# Patient Record
Sex: FEMALE | Race: WHITE | NOT HISPANIC OR LATINO | Employment: FULL TIME | URBAN - NONMETROPOLITAN AREA
[De-identification: names, ages, dates, MRNs, and addresses within clinical notes are randomized per-mention and may not be internally consistent; named-entity substitution may affect disease eponyms.]

---

## 2017-01-26 ENCOUNTER — LAB (OUTPATIENT)
Dept: LAB | Facility: HOSPITAL | Age: 57
End: 2017-01-26

## 2017-01-26 DIAGNOSIS — E03.9 ACQUIRED HYPOTHYROIDISM: ICD-10-CM

## 2017-01-26 LAB
T3 SERPL-MCNC: 102 NG/DL (ref 97–169)
T4 FREE SERPL-MCNC: 0.52 NG/DL (ref 0.78–2.19)
TSH SERPL DL<=0.05 MIU/L-ACNC: 10.7 MIU/ML (ref 0.46–4.68)

## 2017-01-26 PROCEDURE — 84443 ASSAY THYROID STIM HORMONE: CPT | Performed by: GENERAL PRACTICE

## 2017-01-26 PROCEDURE — 84439 ASSAY OF FREE THYROXINE: CPT | Performed by: GENERAL PRACTICE

## 2017-01-26 PROCEDURE — 36415 COLL VENOUS BLD VENIPUNCTURE: CPT

## 2017-01-26 PROCEDURE — 84480 ASSAY TRIIODOTHYRONINE (T3): CPT | Performed by: GENERAL PRACTICE

## 2017-01-30 DIAGNOSIS — Z12.11 ENCOUNTER FOR SCREENING COLONOSCOPY: Primary | ICD-10-CM

## 2017-01-30 RX ORDER — SODIUM CHLORIDE 0.9 % (FLUSH) 0.9 %
1-10 SYRINGE (ML) INJECTION AS NEEDED
Status: CANCELLED | OUTPATIENT
Start: 2017-01-30

## 2017-02-01 ENCOUNTER — OFFICE VISIT (OUTPATIENT)
Dept: FAMILY MEDICINE CLINIC | Facility: CLINIC | Age: 57
End: 2017-02-01

## 2017-02-01 VITALS
DIASTOLIC BLOOD PRESSURE: 78 MMHG | HEART RATE: 73 BPM | BODY MASS INDEX: 27.95 KG/M2 | WEIGHT: 173.9 LBS | SYSTOLIC BLOOD PRESSURE: 130 MMHG | OXYGEN SATURATION: 99 % | HEIGHT: 66 IN

## 2017-02-01 DIAGNOSIS — G43.009 NONINTRACTABLE MIGRAINE, UNSPECIFIED MIGRAINE TYPE: ICD-10-CM

## 2017-02-01 DIAGNOSIS — E03.9 ACQUIRED HYPOTHYROIDISM: Primary | ICD-10-CM

## 2017-02-01 PROCEDURE — 99214 OFFICE O/P EST MOD 30 MIN: CPT | Performed by: GENERAL PRACTICE

## 2017-02-01 RX ORDER — VENLAFAXINE HYDROCHLORIDE 75 MG/1
75 CAPSULE, EXTENDED RELEASE ORAL DAILY
Qty: 30 CAPSULE | Refills: 5 | Status: SHIPPED | OUTPATIENT
Start: 2017-02-01 | End: 2017-05-02

## 2017-02-01 RX ORDER — TOPIRAMATE 50 MG/1
50 TABLET, FILM COATED ORAL NIGHTLY
Qty: 90 TABLET | Refills: 3 | Status: SHIPPED | OUTPATIENT
Start: 2017-02-01 | End: 2017-08-02 | Stop reason: SDUPTHER

## 2017-02-01 RX ORDER — SUMATRIPTAN 20 MG/1
1 SPRAY NASAL
Qty: 10 EACH | Refills: 5 | Status: SHIPPED | OUTPATIENT
Start: 2017-02-01 | End: 2017-11-14 | Stop reason: SDUPTHER

## 2017-02-01 RX ORDER — LEVOTHYROXINE AND LIOTHYRONINE 57; 13.5 UG/1; UG/1
90 TABLET ORAL DAILY
Qty: 30 TABLET | Refills: 6 | Status: SHIPPED | OUTPATIENT
Start: 2017-02-01 | End: 2017-09-08 | Stop reason: CLARIF

## 2017-02-01 NOTE — PROGRESS NOTES
Subjective   Adilene Morgan is a 56 y.o. female.     Chief Complaint   Patient presents with   • Follow-up   • Hypothyroidism     Hypothyroidism   This is a chronic problem. The current episode started more than 1 year ago. The problem occurs constantly. The problem has been unchanged. Pertinent negatives include no abdominal pain, arthralgias, change in bowel habit, chest pain, chills, congestion, coughing, fatigue, fever, headaches, joint swelling, myalgias, nausea, neck pain, numbness, rash, sore throat, vomiting or weakness. Nothing aggravates the symptoms. Treatments tried: armour thyroid. The treatment provided significant relief.   Migraines are stable, occur rarely, would like to try intranasal imitrex as has worked well for her in the past.      The following portions of the patient's history were reviewed and updated as appropriate: allergies, current medications, past social history and problem list.    Current Outpatient Prescriptions:   •  aspirin 325 MG tablet, Take 325 mg by mouth Daily., Disp: , Rfl:   •  loratadine (CLARITIN) 10 MG tablet, Take 10 mg by mouth Daily., Disp: , Rfl:   •  mometasone (NASONEX) 50 MCG/ACT nasal spray, 2 sprays into each nostril Daily., Disp: , Rfl:   •  thyroid (ARMOUR THYROID) 60 MG PO tablet, Take 1 tablet by mouth Daily., Disp: 30 tablet, Rfl: 3  •  topiramate (TOPAMAX) 50 MG tablet, Take 1 tablet by mouth Every Night., Disp: 90 tablet, Rfl: 3  •  venlafaxine XR (EFFEXOR-XR) 75 MG 24 hr capsule, Take 1 capsule by mouth Daily., Disp: 30 capsule, Rfl: 5  •  SUMAtriptan (IMITREX) 20 MG/ACT nasal spray, 1 spray into each nostril Every 2 (Two) Hours As Needed for migraine., Disp: 10 each, Rfl: 5  •  Thyroid (ARMOUR THYROID) 90 MG PO tablet, Take 1 tablet by mouth Daily., Disp: 30 tablet, Rfl: 6    Review of Systems   Constitutional: Negative.  Negative for activity change, appetite change, chills, fatigue, fever and unexpected weight change.   HENT: Negative.  Negative  "for congestion, ear pain, hearing loss, nosebleeds, rhinorrhea, sinus pressure, sneezing, sore throat, tinnitus and trouble swallowing.    Eyes: Negative.  Negative for pain, discharge, redness, itching and visual disturbance.   Respiratory: Negative.  Negative for apnea, cough, chest tightness, shortness of breath and wheezing.    Cardiovascular: Negative.  Negative for chest pain, palpitations and leg swelling.   Gastrointestinal: Negative.  Negative for abdominal distention, abdominal pain, change in bowel habit, constipation, diarrhea, nausea and vomiting.   Endocrine: Negative.    Genitourinary: Negative.  Negative for dysuria, frequency and urgency.   Musculoskeletal: Negative.  Negative for arthralgias, back pain, gait problem, joint swelling, myalgias, neck pain and neck stiffness.   Skin: Negative.  Negative for color change and rash.   Allergic/Immunologic: Negative.    Neurological: Negative.  Negative for dizziness, weakness, light-headedness, numbness and headaches.   Hematological: Negative.  Negative for adenopathy.   Psychiatric/Behavioral: Negative.  Negative for dysphoric mood and sleep disturbance. The patient is not nervous/anxious.      Objective     Visit Vitals   • /78 (BP Location: Left arm, Patient Position: Standing, Cuff Size: Adult)   • Pulse 73   • Ht 66\" (167.6 cm)   • Wt 173 lb 14.4 oz (78.9 kg)   • SpO2 99%   • BMI 28.07 kg/m2     Physical Exam   Constitutional: She is oriented to person, place, and time. She appears well-developed and well-nourished. No distress.   HENT:   Head: Normocephalic and atraumatic.   Nose: Nose normal.   Mouth/Throat: Oropharynx is clear and moist.   Eyes: Conjunctivae and EOM are normal. Pupils are equal, round, and reactive to light. Right eye exhibits no discharge. Left eye exhibits no discharge.   Neck: No thyromegaly present.   Cardiovascular: Normal rate, regular rhythm, normal heart sounds and intact distal pulses.    Pulmonary/Chest: Effort " normal and breath sounds normal.   Lymphadenopathy:     She has no cervical adenopathy.   Neurological: She is alert and oriented to person, place, and time.   Skin: Skin is warm and dry.   Psychiatric: She has a normal mood and affect.   Nursing note and vitals reviewed.    Results for orders placed or performed in visit on 17   T4, Free   Result Value Ref Range    Free T4 0.52 (L) 0.78 - 2.19 ng/dL   TSH   Result Value Ref Range    TSH 10.700 (H) 0.460 - 4.680 mIU/mL   T3   Result Value Ref Range    T3, Total 102.0 97.0 - 169.0 ng/dl       Assessment/Plan     Problem List Items Addressed This Visit        Cardiovascular and Mediastinum    Migraine    Relevant Medications    venlafaxine XR (EFFEXOR-XR) 75 MG 24 hr capsule    topiramate (TOPAMAX) 50 MG tablet    SUMAtriptan (IMITREX) 20 MG/ACT nasal spray       Endocrine    Acquired hypothyroidism - Primary    Relevant Medications    Thyroid (ARMOUR THYROID) 90 MG PO tablet    Other Relevant Orders    TSH    T4, Free    T3        Start 60 alt with 90 mg of armour thyroid.    New Medications Ordered This Visit   Medications   • Thyroid (ARMOUR THYROID) 90 MG PO tablet     Sig: Take 1 tablet by mouth Daily.     Dispense:  30 tablet     Refill:  6   • venlafaxine XR (EFFEXOR-XR) 75 MG 24 hr capsule     Sig: Take 1 capsule by mouth Daily.     Dispense:  30 capsule     Refill:  5   • topiramate (TOPAMAX) 50 MG tablet     Sig: Take 1 tablet by mouth Every Night.     Dispense:  90 tablet     Refill:  3   • SUMAtriptan (IMITREX) 20 MG/ACT nasal spray     Si spray into each nostril Every 2 (Two) Hours As Needed for migraine.     Dispense:  10 each     Refill:  5

## 2017-02-10 RX ORDER — BIOTIN 10 MG
10000 TABLET ORAL DAILY
COMMUNITY
End: 2018-07-03

## 2017-02-10 RX ORDER — FLUTICASONE PROPIONATE 50 MCG
1 SPRAY, SUSPENSION (ML) NASAL DAILY PRN
COMMUNITY
End: 2022-05-18 | Stop reason: HOSPADM

## 2017-02-14 RX ORDER — THYROID 60 MG
TABLET ORAL
Qty: 30 TABLET | Refills: 3 | Status: SHIPPED | OUTPATIENT
Start: 2017-02-14 | End: 2017-09-08 | Stop reason: CLARIF

## 2017-02-15 ENCOUNTER — ANESTHESIA (OUTPATIENT)
Dept: GASTROENTEROLOGY | Facility: HOSPITAL | Age: 57
End: 2017-02-15

## 2017-02-15 ENCOUNTER — HOSPITAL ENCOUNTER (OUTPATIENT)
Facility: HOSPITAL | Age: 57
Setting detail: HOSPITAL OUTPATIENT SURGERY
Discharge: HOME | End: 2017-02-15
Attending: SURGERY | Admitting: SURGERY

## 2017-02-15 ENCOUNTER — ANESTHESIA EVENT (OUTPATIENT)
Dept: GASTROENTEROLOGY | Facility: HOSPITAL | Age: 57
End: 2017-02-15

## 2017-02-15 VITALS
OXYGEN SATURATION: 98 % | SYSTOLIC BLOOD PRESSURE: 105 MMHG | BODY MASS INDEX: 28.47 KG/M2 | TEMPERATURE: 96.8 F | RESPIRATION RATE: 20 BRPM | WEIGHT: 170.86 LBS | DIASTOLIC BLOOD PRESSURE: 56 MMHG | HEART RATE: 76 BPM | HEIGHT: 65 IN

## 2017-02-15 DIAGNOSIS — Z12.11 ENCOUNTER FOR SCREENING COLONOSCOPY: ICD-10-CM

## 2017-02-15 PROCEDURE — 25010000002 FENTANYL CITRATE (PF) 100 MCG/2ML SOLUTION: Performed by: NURSE ANESTHETIST, CERTIFIED REGISTERED

## 2017-02-15 PROCEDURE — 25010000002 PROPOFOL 10 MG/ML EMULSION: Performed by: NURSE ANESTHETIST, CERTIFIED REGISTERED

## 2017-02-15 PROCEDURE — 45378 DIAGNOSTIC COLONOSCOPY: CPT | Performed by: SURGERY

## 2017-02-15 RX ORDER — PROPOFOL 10 MG/ML
VIAL (ML) INTRAVENOUS AS NEEDED
Status: DISCONTINUED | OUTPATIENT
Start: 2017-02-15 | End: 2017-02-15 | Stop reason: SURG

## 2017-02-15 RX ORDER — FENTANYL CITRATE 50 UG/ML
INJECTION, SOLUTION INTRAMUSCULAR; INTRAVENOUS AS NEEDED
Status: DISCONTINUED | OUTPATIENT
Start: 2017-02-15 | End: 2017-02-15 | Stop reason: SURG

## 2017-02-15 RX ORDER — SODIUM CHLORIDE 0.9 % (FLUSH) 0.9 %
1-10 SYRINGE (ML) INJECTION AS NEEDED
Status: DISCONTINUED | OUTPATIENT
Start: 2017-02-15 | End: 2017-02-15 | Stop reason: HOSPADM

## 2017-02-15 RX ORDER — SODIUM CHLORIDE, SODIUM GLUCONATE, SODIUM ACETATE, POTASSIUM CHLORIDE, AND MAGNESIUM CHLORIDE 526; 502; 368; 37; 30 MG/100ML; MG/100ML; MG/100ML; MG/100ML; MG/100ML
1000 INJECTION, SOLUTION INTRAVENOUS CONTINUOUS PRN
Status: DISCONTINUED | OUTPATIENT
Start: 2017-02-15 | End: 2017-02-15 | Stop reason: HOSPADM

## 2017-02-15 RX ORDER — SODIUM CHLORIDE, SODIUM GLUCONATE, SODIUM ACETATE, POTASSIUM CHLORIDE, AND MAGNESIUM CHLORIDE 526; 502; 368; 37; 30 MG/100ML; MG/100ML; MG/100ML; MG/100ML; MG/100ML
20 INJECTION, SOLUTION INTRAVENOUS CONTINUOUS
Status: DISCONTINUED | OUTPATIENT
Start: 2017-02-15 | End: 2017-02-15 | Stop reason: HOSPADM

## 2017-02-15 RX ADMIN — SODIUM CHLORIDE, SODIUM GLUCONATE, SODIUM ACETATE, POTASSIUM CHLORIDE, AND MAGNESIUM CHLORIDE 20 ML/HR: 526; 502; 368; 37; 30 INJECTION, SOLUTION INTRAVENOUS at 07:32

## 2017-02-15 RX ADMIN — PROPOFOL 60 MG: 10 INJECTION, EMULSION INTRAVENOUS at 07:57

## 2017-02-15 RX ADMIN — PROPOFOL 20 MG: 10 INJECTION, EMULSION INTRAVENOUS at 08:14

## 2017-02-15 RX ADMIN — FENTANYL CITRATE 50 MCG: 50 INJECTION, SOLUTION INTRAMUSCULAR; INTRAVENOUS at 08:00

## 2017-02-15 RX ADMIN — FENTANYL CITRATE 50 MCG: 50 INJECTION, SOLUTION INTRAMUSCULAR; INTRAVENOUS at 07:57

## 2017-02-15 RX ADMIN — PROPOFOL 30 MG: 10 INJECTION, EMULSION INTRAVENOUS at 08:09

## 2017-02-15 RX ADMIN — PROPOFOL 30 MG: 10 INJECTION, EMULSION INTRAVENOUS at 08:00

## 2017-02-15 RX ADMIN — PROPOFOL 40 MG: 10 INJECTION, EMULSION INTRAVENOUS at 08:04

## 2017-02-15 NOTE — H&P
No chief complaint on file.  Screening cscope per Dr Raymundo    Adilene Morgan is a 56 y.o. female referred today for evaluation for colonoscopy.  She notes no change in bowel habits, no blood in the stool.     Prior Colonoscopy:no  Prior Polyps:no  Family History of Colon Cancer:no  On anticoagulation:no    Past Surgical History   Procedure Laterality Date   • Injection of medication  09/02/2011     Albuterol (2u) (1)      • Laparoscopic cholecystectomy  12/10/1995     Cholecystectomy, laparoscopic (1)      • Other surgical history       Head surgery procedure (1)    plastic surgery on the face    • Injection of medication  04/03/2015     Kenalog (5)     • Pap smear  05/08/2013     PAP SMEAR (2)    normal    • Other surgical history  10/17/2014     Repair Superficial Wound TR-EXT 2.5 < CM 59757 (1)      • Injection of medication  07/02/2012     Toradol (1)      • Cosmetic surgery       plastic surgery to face x 4 r/t trauma     Past Medical History   Diagnosis Date   • Achilles bursitis    • Achilles tendinitis    • Acquired hypothyroidism    • Acute bronchitis    • Acute maxillary sinusitis    • Acute pharyngitis    • Acute pharyngitis    • Acute rhinosinusitis    • Acute sinusitis    • Allergic rhinitis    • Allergy to meat      alphagal   • Allergy to milk products    • Ankle pain    • Asthmatic bronchitis    • Asthmatic bronchitis    • Calcaneal spur    • Candidiasis    • Depressive disorder    • Encounter for general adult medical examination without abnormal findings    • Encounter for gynecological examination    • Encounter for routine adult health examination    • Herpes simplex    • Hypermetropia    • Hypothyroidism    • Menopausal flushing    • Menopause    • Migraine    • Nausea    • Open wound of finger    • Otalgia    • Pain in the coccyx    • Pneumonia    • Presbyopia    • Screening mammogram, encounter for    • Trochanteric bursitis    • Upper respiratory infection    • Urinary tract infectious  disease    • Ventricular premature beats    • Wheezing      Social History     Social History   • Marital status:      Spouse name: N/A   • Number of children: N/A   • Years of education: N/A     Occupational History   • Not on file.     Social History Main Topics   • Smoking status: Never Smoker   • Smokeless tobacco: Never Used   • Alcohol use No   • Drug use: No   • Sexual activity: Defer     Other Topics Concern   • Not on file     Social History Narrative     Current Facility-Administered Medications   Medication Dose Route Frequency Provider Last Rate Last Dose   • electrolyte-148 (PLASMALYTE) solution 1,000 mL  1,000 mL Intravenous Continuous PRN Lupillo Ugarte MD       • electrolyte-148 (PLASMALYTE) solution  20 mL/hr Intravenous Continuous Lupillo Ugarte MD 20 mL/hr at 02/15/17 0732 20 mL/hr at 02/15/17 0732   • sodium chloride 0.9 % flush 1-10 mL  1-10 mL Intravenous PRN Lupillo Ugatre MD           Review of Systems   Constitutional: Negative.    HENT: Negative for hearing loss, nosebleeds and trouble swallowing.    Respiratory: Negative for apnea, chest tightness and shortness of breath.    Cardiovascular: Negative for chest pain and palpitations.   Gastrointestinal: Negative for abdominal distention, abdominal pain, blood in stool, constipation, diarrhea, nausea and vomiting.   Genitourinary: Negative for difficulty urinating, dysuria, frequency and urgency.   Musculoskeletal: Negative for back pain, joint swelling and neck pain.   Skin: Negative for rash.   Neurological: Negative for dizziness, seizures, weakness, light-headedness, numbness and headaches.   Hematological: Negative for adenopathy.   Psychiatric/Behavioral: Negative for agitation. The patient is not nervous/anxious.      Allergy to red meat and port, have retocele.  There were no vitals filed for this visit.    Physical Exam   Constitutional: She is oriented to person, place, and time. She appears well-developed and  well-nourished. No distress.   HENT:   Head: Normocephalic and atraumatic.   Nose: Nose normal.   Eyes: Conjunctivae are normal. No scleral icterus.   Neck: Normal range of motion. No tracheal deviation present. No thyromegaly present.   Cardiovascular: Normal rate, regular rhythm and normal heart sounds.    No murmur heard.  Pulmonary/Chest: Effort normal and breath sounds normal. No stridor. No respiratory distress. She has no wheezes. She has no rales. She exhibits no tenderness.   Abdominal: Soft. She exhibits no distension. There is no tenderness. There is no rebound and no guarding. No hernia.   Musculoskeletal: She exhibits no tenderness or deformity.   Neurological: She is alert and oriented to person, place, and time.   Skin: Skin is warm and dry. No rash noted.   Psychiatric: She has a normal mood and affect. Her behavior is normal. Judgment and thought content normal.   Vitals reviewed.      Assessment     In need of screening colonoscopy.    Plan     Risks, benefits, rationale and prep for colonoscopy have been discussed with the patient.  The patient indicates understanding of these issues and agrees with the plan.

## 2017-02-15 NOTE — PLAN OF CARE
Problem: Patient Care Overview (Adult)  Goal: Plan of Care Review    02/15/17 0806   Coping/Psychosocial Response Interventions   Plan Of Care Reviewed With patient   Patient Care Overview   Progress no change   Outcome Evaluation   Outcome Summary/Follow up Plan vitals stable, arousal on stimulation         Problem: GI Endoscopy (Adult)  Goal: Signs and Symptoms of Listed Potential Problems Will be Absent or Manageable (GI Endoscopy)    02/15/17 0806   GI Endoscopy   Problems Assessed (GI Endoscopy) all   Problems Present (GI Endoscopy) none

## 2017-02-15 NOTE — ADDENDUM NOTE
Addendum  created 02/15/17 0852 by Nabila Riggins, TREVA    Anesthesia Event edited, Anesthesia Intra Flowsheets edited, Anesthesia Intra Meds edited, Anesthesia Review and Sign - Ready for Procedure, Anesthesia Review and Sign - Signed, Anesthesia Staff edited, Flowsheet data copied forward, Order sets accessed, Patient device added, Patient device removed, Problem List reviewed, Procedure Event Log accessed, Sign clinical note

## 2017-02-15 NOTE — PLAN OF CARE
Problem: Patient Care Overview (Adult)  Goal: Plan of Care Review    02/15/17 0855   Coping/Psychosocial Response Interventions   Plan Of Care Reviewed With patient   Patient Care Overview   Progress no change         Problem: GI Endoscopy (Adult)  Goal: Signs and Symptoms of Listed Potential Problems Will be Absent or Manageable (GI Endoscopy)    02/15/17 0855   GI Endoscopy   Problems Assessed (GI Endoscopy) all   Problems Present (GI Endoscopy) none

## 2017-02-15 NOTE — ANESTHESIA POSTPROCEDURE EVALUATION
Patient: Adilene Morgan    Procedure Summary     Date Anesthesia Start Anesthesia Stop Room / Location    02/15/17 0756 0816 [facility] ENDOSCOPY 2 / [facility] ENDOSCOPY       Procedure Diagnosis Surgeon Provider    COLONOSCOPY (N/A ) Encounter for screening colonoscopy  (Encounter for screening colonoscopy [Z12.11]) MD Nabila Alvarado CRNA          Anesthesia Type: MAC  Last vitals  /60 (02/15/17 0744)    Temp 98.4 °F (36.9 °C) (02/15/17 0744)    Pulse 56 (02/15/17 0744)   Resp 18 (02/15/17 0744)    SpO2 99 % (02/15/17 0744)      Post Anesthesia Care and Evaluation    Patient location during evaluation: bedside  Patient participation: complete - patient participated  Level of consciousness: awake and awake and alert  Pain score: 1  Pain management: satisfactory to patient  Airway patency: patent  Anesthetic complications: No anesthetic complications  PONV Status: none  Cardiovascular status: acceptable and stable  Respiratory status: acceptable, room air, unassisted and spontaneous ventilation  Hydration status: acceptable  Post Neuraxial Block status: Motor and sensory function returned to baseline

## 2017-02-15 NOTE — ANESTHESIA PREPROCEDURE EVALUATION
Anesthesia Evaluation     Nursing notes reviewed   NPO Status: > 8 hours   Airway   Mallampati: II  TM distance: >3 FB  Neck ROM: full  no difficulty expected  Dental - normal exam     Pulmonary - normal exam   (+) pneumonia , asthma, recent URI resolved,   Cardiovascular - negative cardio ROS and normal exam        Neuro/Psych  (+) headaches, psychiatric history Depression,    GI/Hepatic/Renal/Endo    (+)  hypothyroidism,     Musculoskeletal (-) negative ROS    Abdominal  - normal exam   Substance History - negative use     OB/GYN negative ob/gyn ROS         Other - negative ROS                                   Anesthesia Plan    ASA 2     MAC     intravenous induction   Anesthetic plan and risks discussed with patient.

## 2017-05-01 ENCOUNTER — LAB (OUTPATIENT)
Dept: LAB | Facility: HOSPITAL | Age: 57
End: 2017-05-01

## 2017-05-01 DIAGNOSIS — E03.9 ACQUIRED HYPOTHYROIDISM: ICD-10-CM

## 2017-05-01 LAB
T3 SERPL-MCNC: 92.9 NG/DL (ref 97–169)
T4 FREE SERPL-MCNC: 0.56 NG/DL (ref 0.78–2.19)
TSH SERPL DL<=0.05 MIU/L-ACNC: 3.75 MIU/ML (ref 0.46–4.68)

## 2017-05-01 PROCEDURE — 36415 COLL VENOUS BLD VENIPUNCTURE: CPT

## 2017-05-01 PROCEDURE — 84439 ASSAY OF FREE THYROXINE: CPT | Performed by: GENERAL PRACTICE

## 2017-05-01 PROCEDURE — 84443 ASSAY THYROID STIM HORMONE: CPT | Performed by: GENERAL PRACTICE

## 2017-05-01 PROCEDURE — 84480 ASSAY TRIIODOTHYRONINE (T3): CPT | Performed by: GENERAL PRACTICE

## 2017-05-02 ENCOUNTER — OFFICE VISIT (OUTPATIENT)
Dept: FAMILY MEDICINE CLINIC | Facility: CLINIC | Age: 57
End: 2017-05-02

## 2017-05-02 VITALS
HEART RATE: 65 BPM | WEIGHT: 173.5 LBS | DIASTOLIC BLOOD PRESSURE: 70 MMHG | HEIGHT: 65 IN | SYSTOLIC BLOOD PRESSURE: 110 MMHG | BODY MASS INDEX: 28.91 KG/M2 | OXYGEN SATURATION: 99 %

## 2017-05-02 DIAGNOSIS — Z11.59 NEED FOR HEPATITIS C SCREENING TEST: ICD-10-CM

## 2017-05-02 DIAGNOSIS — M70.62 TROCHANTERIC BURSITIS OF LEFT HIP: ICD-10-CM

## 2017-05-02 DIAGNOSIS — F32.A DEPRESSIVE DISORDER: ICD-10-CM

## 2017-05-02 DIAGNOSIS — E03.9 ACQUIRED HYPOTHYROIDISM: Primary | ICD-10-CM

## 2017-05-02 PROCEDURE — 99214 OFFICE O/P EST MOD 30 MIN: CPT | Performed by: GENERAL PRACTICE

## 2017-05-02 RX ORDER — VENLAFAXINE HYDROCHLORIDE 37.5 MG/1
37.5 CAPSULE, EXTENDED RELEASE ORAL DAILY
Qty: 30 CAPSULE | Refills: 2 | Status: SHIPPED | OUTPATIENT
Start: 2017-05-02 | End: 2017-08-02 | Stop reason: SDUPTHER

## 2017-07-25 ENCOUNTER — LAB (OUTPATIENT)
Dept: LAB | Facility: HOSPITAL | Age: 57
End: 2017-07-25

## 2017-07-25 DIAGNOSIS — E03.9 ACQUIRED HYPOTHYROIDISM: ICD-10-CM

## 2017-07-25 DIAGNOSIS — Z11.59 NEED FOR HEPATITIS C SCREENING TEST: ICD-10-CM

## 2017-07-25 LAB
T3 SERPL-MCNC: 167 NG/DL (ref 97–169)
T4 FREE SERPL-MCNC: 0.63 NG/DL (ref 0.78–2.19)
TSH SERPL DL<=0.05 MIU/L-ACNC: 3.1 MIU/ML (ref 0.46–4.68)

## 2017-07-25 PROCEDURE — 84480 ASSAY TRIIODOTHYRONINE (T3): CPT | Performed by: GENERAL PRACTICE

## 2017-07-25 PROCEDURE — 84443 ASSAY THYROID STIM HORMONE: CPT | Performed by: GENERAL PRACTICE

## 2017-07-25 PROCEDURE — 86803 HEPATITIS C AB TEST: CPT | Performed by: GENERAL PRACTICE

## 2017-07-25 PROCEDURE — 84439 ASSAY OF FREE THYROXINE: CPT | Performed by: GENERAL PRACTICE

## 2017-07-25 PROCEDURE — 36415 COLL VENOUS BLD VENIPUNCTURE: CPT

## 2017-07-26 LAB — HCV AB SER DONR QL: NEGATIVE

## 2017-08-02 ENCOUNTER — OFFICE VISIT (OUTPATIENT)
Dept: FAMILY MEDICINE CLINIC | Facility: CLINIC | Age: 57
End: 2017-08-02

## 2017-08-02 VITALS
BODY MASS INDEX: 28.96 KG/M2 | HEIGHT: 65 IN | DIASTOLIC BLOOD PRESSURE: 75 MMHG | SYSTOLIC BLOOD PRESSURE: 115 MMHG | WEIGHT: 173.8 LBS | HEART RATE: 62 BPM | OXYGEN SATURATION: 98 %

## 2017-08-02 DIAGNOSIS — E03.9 ACQUIRED HYPOTHYROIDISM: Primary | Chronic | ICD-10-CM

## 2017-08-02 DIAGNOSIS — F32.A DEPRESSIVE DISORDER: Chronic | ICD-10-CM

## 2017-08-02 DIAGNOSIS — K13.0 ANGULAR CHEILITIS: ICD-10-CM

## 2017-08-02 DIAGNOSIS — G43.009 NONINTRACTABLE MIGRAINE, UNSPECIFIED MIGRAINE TYPE: ICD-10-CM

## 2017-08-02 DIAGNOSIS — Z00.00 ANNUAL PHYSICAL EXAM: ICD-10-CM

## 2017-08-02 PROCEDURE — 99214 OFFICE O/P EST MOD 30 MIN: CPT | Performed by: GENERAL PRACTICE

## 2017-08-02 RX ORDER — VENLAFAXINE HYDROCHLORIDE 37.5 MG/1
37.5 CAPSULE, EXTENDED RELEASE ORAL DAILY
Qty: 90 CAPSULE | Refills: 3 | Status: SHIPPED | OUTPATIENT
Start: 2017-08-02 | End: 2017-11-14 | Stop reason: SDUPTHER

## 2017-08-02 RX ORDER — TOPIRAMATE 50 MG/1
50 TABLET, FILM COATED ORAL NIGHTLY
Qty: 90 TABLET | Refills: 3 | Status: SHIPPED | OUTPATIENT
Start: 2017-08-02 | End: 2017-11-14 | Stop reason: SDUPTHER

## 2017-08-02 NOTE — PROGRESS NOTES
Subjective   Adilene Morgan is a 57 y.o. female.   Chief Complaint   Patient presents with   • Follow-up   • Hypothyroidism   • Depression   • Headache     For review and evaluation of management of chronic medical problems. Labs reviewed. Tried weaning off venlafaxine but too irritable off of it. Feels like mouth is coated in plastic, has cracking at edges of mouth. Migraines are stable, not frequent.   Hypothyroidism   This is a chronic problem. The current episode started more than 1 year ago. The problem occurs constantly. The problem has been unchanged. Associated symptoms include fatigue. Pertinent negatives include no abdominal pain, arthralgias, chest pain, chills, congestion, coughing, fever, headaches, joint swelling, myalgias, nausea, neck pain, numbness, rash, sore throat, vomiting or weakness. Nothing aggravates the symptoms. Treatments tried: armour thyroid. The treatment provided moderate relief.      The following portions of the patient's history were reviewed and updated as appropriate: allergies, current medications, past social history and problem list.    Outpatient Medications Prior to Visit   Medication Sig Dispense Refill   • ARMOUR THYROID 60 MG tablet TAKE 1 TABLET BY MOUTH DAILY. 30 tablet 3   • aspirin 325 MG tablet Take 325 mg by mouth Daily.     • Biotin (BIOTIN MAXIMUM STRENGTH) 10 MG tablet Take 10,000 mcg by mouth Daily.     • fluticasone (FLONASE ALLERGY RELIEF) 50 MCG/ACT nasal spray 1 spray into each nostril Daily As Needed.     • loratadine (CLARITIN) 10 MG tablet Take 10 mg by mouth Daily.     • SUMAtriptan (IMITREX) 20 MG/ACT nasal spray 1 spray into each nostril Every 2 (Two) Hours As Needed for migraine. 10 each 5   • Thyroid (ARMOUR THYROID) 90 MG PO tablet Take 1 tablet by mouth Daily. 30 tablet 6   • topiramate (TOPAMAX) 50 MG tablet Take 1 tablet by mouth Every Night. 90 tablet 3   • venlafaxine XR (EFFEXOR XR) 37.5 MG 24 hr capsule Take 1 capsule by mouth Daily. 30  "capsule 2     No facility-administered medications prior to visit.        Review of Systems   Constitutional: Positive for fatigue. Negative for chills, fever and unexpected weight change.   HENT: Negative.  Negative for congestion, ear pain, hearing loss, nosebleeds, rhinorrhea, sneezing, sore throat and tinnitus.    Eyes: Negative.  Negative for discharge.   Respiratory: Negative.  Negative for cough, shortness of breath and wheezing.    Cardiovascular: Negative.  Negative for chest pain and palpitations.   Gastrointestinal: Negative.  Negative for abdominal pain, constipation, diarrhea, nausea and vomiting.   Endocrine: Negative.    Genitourinary: Negative.  Negative for dysuria, frequency and urgency.   Musculoskeletal: Negative.  Negative for arthralgias, back pain, joint swelling, myalgias and neck pain.   Skin: Negative.  Negative for rash.   Allergic/Immunologic: Negative.    Neurological: Negative.  Negative for dizziness, weakness, numbness and headaches.   Hematological: Negative.  Negative for adenopathy.   Psychiatric/Behavioral: Negative.  Negative for dysphoric mood and sleep disturbance. The patient is not nervous/anxious.        Objective   Visit Vitals   • /75 (BP Location: Left arm, Patient Position: Sitting, Cuff Size: Adult)   • Pulse 62   • Ht 65\" (165.1 cm)   • Wt 173 lb 12.8 oz (78.8 kg)   • SpO2 98%   • BMI 28.92 kg/m2     Physical Exam   Constitutional: She is oriented to person, place, and time. She appears well-developed and well-nourished. No distress.   HENT:   Head: Normocephalic and atraumatic.   Nose: Nose normal.   Mouth/Throat: Oropharynx is clear and moist.   Angular cheilitis   Eyes: Conjunctivae and EOM are normal. Pupils are equal, round, and reactive to light. Right eye exhibits no discharge. Left eye exhibits no discharge.   Neck: No thyromegaly present.   Cardiovascular: Normal rate, regular rhythm, normal heart sounds and intact distal pulses.    Pulmonary/Chest: " Effort normal and breath sounds normal.   Lymphadenopathy:     She has no cervical adenopathy.   Neurological: She is alert and oriented to person, place, and time.   Skin: Skin is warm and dry.   Psychiatric: She has a normal mood and affect.   Nursing note and vitals reviewed.      Results for orders placed or performed in visit on 07/25/17   TSH   Result Value Ref Range    TSH 3.100 0.460 - 4.680 mIU/mL   T4, Free   Result Value Ref Range    Free T4 0.63 (L) 0.78 - 2.19 ng/dL   Hepatitis C Antibody   Result Value Ref Range    Hepatitis C Ab Negative Negative   T3   Result Value Ref Range    T3, Total 167.0 97.0 - 169.0 ng/dl       Assessment/Plan   Problem List Items Addressed This Visit        Cardiovascular and Mediastinum    Migraine    Relevant Medications    venlafaxine XR (EFFEXOR XR) 37.5 MG 24 hr capsule    topiramate (TOPAMAX) 50 MG tablet       Endocrine    Acquired hypothyroidism - Primary (Chronic)    Relevant Orders    Comprehensive Metabolic Panel    TSH    T4, Free    Urinalysis With Microscopic    Lipid Panel       Other    Depressive disorder (Chronic)    Relevant Medications    venlafaxine XR (EFFEXOR XR) 37.5 MG 24 hr capsule      Other Visit Diagnoses     Angular cheilitis        Annual physical exam        Relevant Orders    Comprehensive Metabolic Panel    TSH    T4, Free    Urinalysis With Microscopic    Lipid Panel        Increase armour thyroid.     New Medications Ordered This Visit   Medications   • Multiple Vitamins-Minerals (MULTIVITAMIN ADULT PO)     Sig: Take  by mouth.   • nystatin (MYCOSTATIN) 156994 UNIT/ML suspension     Sig: Swish and swallow 5 mL 4 (Four) Times a Day.     Dispense:  240 mL     Refill:  0   • venlafaxine XR (EFFEXOR XR) 37.5 MG 24 hr capsule     Sig: Take 1 capsule by mouth Daily.     Dispense:  90 capsule     Refill:  3   • topiramate (TOPAMAX) 50 MG tablet     Sig: Take 1 tablet by mouth Every Night.     Dispense:  90 tablet     Refill:  3     Return in about  3 months (around 11/6/2017) for Annual physical.

## 2017-09-08 ENCOUNTER — DOCUMENTATION (OUTPATIENT)
Dept: FAMILY MEDICINE CLINIC | Facility: CLINIC | Age: 57
End: 2017-09-08

## 2017-09-08 RX ORDER — LEVOTHYROXINE AND LIOTHYRONINE 57; 13.5 UG/1; UG/1
90 TABLET ORAL DAILY
COMMUNITY
End: 2017-11-14

## 2017-09-08 RX ORDER — LEVOTHYROXINE AND LIOTHYRONINE 38; 9 UG/1; UG/1
90 TABLET ORAL DAILY
Qty: 30 TABLET | Refills: 3 | Status: SHIPPED | OUTPATIENT
Start: 2017-09-08 | End: 2017-11-14

## 2017-09-11 ENCOUNTER — LAB (OUTPATIENT)
Dept: LAB | Facility: HOSPITAL | Age: 57
End: 2017-09-11

## 2017-09-11 ENCOUNTER — TRANSCRIBE ORDERS (OUTPATIENT)
Dept: LAB | Facility: HOSPITAL | Age: 57
End: 2017-09-11

## 2017-09-11 DIAGNOSIS — Z91.018 ALLERGY, FOOD: ICD-10-CM

## 2017-09-11 DIAGNOSIS — Z91.018 ALLERGY, FOOD: Primary | ICD-10-CM

## 2017-09-11 PROCEDURE — 86003 ALLG SPEC IGE CRUDE XTRC EA: CPT | Performed by: ALLERGY & IMMUNOLOGY

## 2017-09-11 PROCEDURE — 36415 COLL VENOUS BLD VENIPUNCTURE: CPT

## 2017-09-14 LAB
A-LACTALB IGE QN: <0.1 KU/L
ALPHA GAL IGE: 16.6 KU/L
BEEF IGE QN: 4.76 KU/L
CASEIN IGE QN: <0.1 KU/L
CONV CLASS DESCRIPTION: NORMAL
LACTOGLOB IGE QN: <0.1 KU/L
LAMB IGE QN: 1.16 KU/L
Lab: 2
Lab: 2
Lab: 3
PORK IGE: 2.74 KU/L

## 2017-11-05 PROCEDURE — 87660 TRICHOMONAS VAGIN DIR PROBE: CPT | Performed by: FAMILY MEDICINE

## 2017-11-05 PROCEDURE — 87510 GARDNER VAG DNA DIR PROBE: CPT | Performed by: FAMILY MEDICINE

## 2017-11-05 PROCEDURE — 87480 CANDIDA DNA DIR PROBE: CPT | Performed by: FAMILY MEDICINE

## 2017-11-08 ENCOUNTER — LAB (OUTPATIENT)
Dept: LAB | Facility: HOSPITAL | Age: 57
End: 2017-11-08

## 2017-11-08 DIAGNOSIS — E03.9 ACQUIRED HYPOTHYROIDISM: ICD-10-CM

## 2017-11-08 DIAGNOSIS — Z00.00 ANNUAL PHYSICAL EXAM: ICD-10-CM

## 2017-11-08 LAB
ALBUMIN SERPL-MCNC: 4.4 G/DL (ref 3.4–4.8)
ALBUMIN/GLOB SERPL: 1.3 G/DL (ref 1.1–1.8)
ALP SERPL-CCNC: 139 U/L (ref 38–126)
ALT SERPL W P-5'-P-CCNC: 30 U/L (ref 9–52)
ANION GAP SERPL CALCULATED.3IONS-SCNC: 13 MMOL/L (ref 5–15)
ARTICHOKE IGE QN: 72 MG/DL (ref 1–129)
AST SERPL-CCNC: 29 U/L (ref 14–36)
BACTERIA UR QL AUTO: ABNORMAL /HPF
BILIRUB SERPL-MCNC: 0.5 MG/DL (ref 0.2–1.3)
BILIRUB UR QL STRIP: NEGATIVE
BUN BLD-MCNC: 13 MG/DL (ref 7–21)
BUN/CREAT SERPL: 14.3 (ref 7–25)
CALCIUM SPEC-SCNC: 9.6 MG/DL (ref 8.4–10.2)
CHLORIDE SERPL-SCNC: 102 MMOL/L (ref 95–110)
CHOLEST SERPL-MCNC: 186 MG/DL (ref 0–199)
CLARITY UR: CLEAR
CO2 SERPL-SCNC: 30 MMOL/L (ref 22–31)
COLOR UR: ABNORMAL
CREAT BLD-MCNC: 0.91 MG/DL (ref 0.5–1)
GFR SERPL CREATININE-BSD FRML MDRD: 63 ML/MIN/1.73 (ref 51–120)
GLOBULIN UR ELPH-MCNC: 3.4 GM/DL (ref 2.3–3.5)
GLUCOSE BLD-MCNC: 95 MG/DL (ref 60–100)
GLUCOSE UR STRIP-MCNC: NEGATIVE MG/DL
HDLC SERPL-MCNC: 81 MG/DL (ref 60–200)
HGB UR QL STRIP.AUTO: NEGATIVE
HYALINE CASTS UR QL AUTO: ABNORMAL /LPF
KETONES UR QL STRIP: NEGATIVE
LDLC/HDLC SERPL: 1.05 {RATIO} (ref 0–3.22)
LEUKOCYTE ESTERASE UR QL STRIP.AUTO: ABNORMAL
NITRITE UR QL STRIP: NEGATIVE
PH UR STRIP.AUTO: <=5 [PH] (ref 5–9)
POTASSIUM BLD-SCNC: 4 MMOL/L (ref 3.5–5.1)
PROT SERPL-MCNC: 7.8 G/DL (ref 6.3–8.6)
PROT UR QL STRIP: NEGATIVE
RBC # UR: ABNORMAL /HPF
REF LAB TEST METHOD: ABNORMAL
SODIUM BLD-SCNC: 145 MMOL/L (ref 137–145)
SP GR UR STRIP: 1.02 (ref 1–1.03)
SQUAMOUS #/AREA URNS HPF: ABNORMAL /HPF
T4 FREE SERPL-MCNC: 0.43 NG/DL (ref 0.78–2.19)
TRIGL SERPL-MCNC: 100 MG/DL (ref 20–199)
TSH SERPL DL<=0.05 MIU/L-ACNC: 5.53 MIU/ML (ref 0.46–4.68)
UROBILINOGEN UR QL STRIP: ABNORMAL
WBC UR QL AUTO: ABNORMAL /HPF

## 2017-11-08 PROCEDURE — 36415 COLL VENOUS BLD VENIPUNCTURE: CPT

## 2017-11-08 PROCEDURE — 81001 URINALYSIS AUTO W/SCOPE: CPT | Performed by: GENERAL PRACTICE

## 2017-11-08 PROCEDURE — 80061 LIPID PANEL: CPT | Performed by: GENERAL PRACTICE

## 2017-11-08 PROCEDURE — 84439 ASSAY OF FREE THYROXINE: CPT | Performed by: GENERAL PRACTICE

## 2017-11-08 PROCEDURE — 80053 COMPREHEN METABOLIC PANEL: CPT | Performed by: GENERAL PRACTICE

## 2017-11-08 PROCEDURE — 84443 ASSAY THYROID STIM HORMONE: CPT | Performed by: GENERAL PRACTICE

## 2017-11-13 DIAGNOSIS — Z12.31 ENCOUNTER FOR SCREENING MAMMOGRAM FOR MALIGNANT NEOPLASM OF BREAST: Primary | ICD-10-CM

## 2017-11-13 RX ORDER — VENLAFAXINE HYDROCHLORIDE 37.5 MG/1
CAPSULE, EXTENDED RELEASE ORAL
Qty: 30 CAPSULE | Refills: 2 | Status: SHIPPED | OUTPATIENT
Start: 2017-11-13 | End: 2017-11-14 | Stop reason: SDUPTHER

## 2017-11-14 ENCOUNTER — OFFICE VISIT (OUTPATIENT)
Dept: FAMILY MEDICINE CLINIC | Facility: CLINIC | Age: 57
End: 2017-11-14

## 2017-11-14 VITALS
SYSTOLIC BLOOD PRESSURE: 120 MMHG | BODY MASS INDEX: 29.04 KG/M2 | HEART RATE: 68 BPM | WEIGHT: 180.7 LBS | DIASTOLIC BLOOD PRESSURE: 80 MMHG | OXYGEN SATURATION: 98 % | HEIGHT: 66 IN

## 2017-11-14 DIAGNOSIS — Z00.00 ANNUAL PHYSICAL EXAM: Primary | ICD-10-CM

## 2017-11-14 DIAGNOSIS — G43.009 NONINTRACTABLE MIGRAINE, UNSPECIFIED MIGRAINE TYPE: ICD-10-CM

## 2017-11-14 DIAGNOSIS — E03.9 ACQUIRED HYPOTHYROIDISM: Chronic | ICD-10-CM

## 2017-11-14 PROCEDURE — 99396 PREV VISIT EST AGE 40-64: CPT | Performed by: GENERAL PRACTICE

## 2017-11-14 RX ORDER — VENLAFAXINE HYDROCHLORIDE 37.5 MG/1
37.5 CAPSULE, EXTENDED RELEASE ORAL DAILY
Qty: 30 CAPSULE | Refills: 11 | Status: SHIPPED | OUTPATIENT
Start: 2017-11-14 | End: 2018-05-15 | Stop reason: SDUPTHER

## 2017-11-14 RX ORDER — TOPIRAMATE 50 MG/1
50 TABLET, FILM COATED ORAL NIGHTLY
Qty: 30 TABLET | Refills: 11 | Status: SHIPPED | OUTPATIENT
Start: 2017-11-14 | End: 2018-08-16 | Stop reason: SDUPTHER

## 2017-11-14 RX ORDER — LEVOTHYROXINE SODIUM 112 MCG
112 TABLET ORAL DAILY
Qty: 30 TABLET | Refills: 6 | Status: SHIPPED | OUTPATIENT
Start: 2017-11-14 | End: 2018-05-15

## 2017-11-14 RX ORDER — SUMATRIPTAN 20 MG/1
1 SPRAY NASAL
Qty: 10 EACH | Refills: 5 | Status: SHIPPED | OUTPATIENT
Start: 2017-11-14 | End: 2019-09-04 | Stop reason: SDUPTHER

## 2017-11-14 NOTE — PROGRESS NOTES
Subjective   Adilene Morgan is a 57 y.o. female.     Chief Complaint   Patient presents with   • Annual Exam   • Hypothyroidism   • Hypertension     History of Present Illness     For annual wellness exam.  Labs reviewed. Due for mammogram. Is still positive for Alphagal.     The following portions of the patient's history were reviewed and updated as appropriate: allergies, current medications, past family and social history and problem list.    Outpatient Medications Prior to Visit   Medication Sig Dispense Refill   • albuterol (VENTOLIN HFA) 108 (90 BASE) MCG/ACT inhaler Inhale 2 puffs Every 4 (Four) Hours As Needed for Wheezing. 1 inhaler 0   • aspirin 325 MG tablet Take 325 mg by mouth Daily.     • benzonatate (TESSALON) 200 MG capsule Take 1 capsule by mouth 3 (Three) Times a Day As Needed for Cough. 30 capsule 0   • Biotin (BIOTIN MAXIMUM STRENGTH) 10 MG tablet Take 10,000 mcg by mouth Daily.     • fluticasone (FLONASE ALLERGY RELIEF) 50 MCG/ACT nasal spray 1 spray into each nostril Daily As Needed.     • loratadine (CLARITIN) 10 MG tablet Take 10 mg by mouth Daily.     • Multiple Vitamins-Minerals (MULTIVITAMIN ADULT PO) Take  by mouth.     • SUMAtriptan (IMITREX) 20 MG/ACT nasal spray 1 spray into each nostril Every 2 (Two) Hours As Needed for migraine. 10 each 5   • Thyroid (ARMOUR THYROID) 60 MG PO tablet Take 1.5 tablets by mouth Daily. (Patient taking differently: Take 60 mg by mouth Daily. PT IS TAKING 90 MG EVERY DAY AND 60 MG ONE A WEEK) 30 tablet 3   • Thyroid 90 MG PO tablet Take 90 mg by mouth Daily. PT TAKING 90 MG EVERY DAY AND 60 MG 1 DAY A WEEK     • topiramate (TOPAMAX) 50 MG tablet Take 1 tablet by mouth Every Night. 90 tablet 3   • venlafaxine XR (EFFEXOR-XR) 37.5 MG 24 hr capsule TAKE 1 CAPSULE BY MOUTH DAILY. 30 capsule 2   • nystatin (MYCOSTATIN) 329459 UNIT/ML suspension Swish and swallow 5 mL 4 (Four) Times a Day. 240 mL 0   • promethazine-dextromethorphan (PROMETHAZINE-DM) 6.25-15  "MG/5ML syrup Take 5 mL by mouth At Night As Needed for Cough. 120 mL 0   • venlafaxine XR (EFFEXOR XR) 37.5 MG 24 hr capsule Take 1 capsule by mouth Daily. 90 capsule 3     No facility-administered medications prior to visit.        Review of Systems   Constitutional: Negative.  Negative for chills, fatigue, fever and unexpected weight change.   HENT: Negative.  Negative for congestion, ear pain, hearing loss, nosebleeds, rhinorrhea, sneezing, sore throat and tinnitus.    Eyes: Negative.  Negative for discharge.   Respiratory: Negative.  Negative for cough, shortness of breath and wheezing.    Cardiovascular: Negative.  Negative for chest pain and palpitations.   Gastrointestinal: Negative.  Negative for abdominal pain, constipation, diarrhea, nausea and vomiting.   Endocrine: Negative.    Genitourinary: Negative.  Negative for dysuria, frequency and urgency.   Musculoskeletal: Negative.  Negative for arthralgias, back pain, joint swelling, myalgias and neck pain.   Skin: Negative.  Negative for rash.   Allergic/Immunologic: Negative.    Neurological: Negative.  Negative for dizziness, weakness, numbness and headaches.   Hematological: Negative.  Negative for adenopathy.   Psychiatric/Behavioral: Negative.  Negative for dysphoric mood and sleep disturbance. The patient is not nervous/anxious.        Objective     Visit Vitals   • /80 (BP Location: Left arm, Patient Position: Sitting, Cuff Size: Adult)   • Pulse 68   • Ht 66\" (167.6 cm)   • Wt 180 lb 11.2 oz (82 kg)   • SpO2 98%   • BMI 29.17 kg/m2     Physical Exam   Constitutional: She is oriented to person, place, and time. She appears well-developed and well-nourished. No distress.   HENT:   Head: Normocephalic and atraumatic.   Nose: Nose normal.   Mouth/Throat: Oropharynx is clear and moist.   Eyes: Conjunctivae and EOM are normal. Pupils are equal, round, and reactive to light. Right eye exhibits no discharge. Left eye exhibits no discharge.   Neck: No " tracheal deviation present. No thyromegaly present.   Cardiovascular: Normal rate, regular rhythm, normal heart sounds and intact distal pulses.    No murmur heard.  Pulmonary/Chest: Effort normal and breath sounds normal. No respiratory distress. She has no wheezes. She has no rales. She exhibits no tenderness. Right breast exhibits no inverted nipple, no mass, no nipple discharge, no skin change and no tenderness. Left breast exhibits no inverted nipple, no mass, no nipple discharge, no skin change and no tenderness.   Abdominal: Soft. Bowel sounds are normal. She exhibits no distension and no mass. There is no tenderness. No hernia.   Musculoskeletal: Normal range of motion. She exhibits no deformity.   Lymphadenopathy:     She has no cervical adenopathy.   Neurological: She is alert and oriented to person, place, and time. She has normal reflexes.   Skin: Skin is warm and dry.   Psychiatric: She has a normal mood and affect. Her behavior is normal. Judgment and thought content normal.   Nursing note and vitals reviewed.    Results for orders placed or performed in visit on 11/08/17   Comprehensive Metabolic Panel   Result Value Ref Range    Glucose 95 60 - 100 mg/dL    BUN 13 7 - 21 mg/dL    Creatinine 0.91 0.50 - 1.00 mg/dL    Sodium 145 137 - 145 mmol/L    Potassium 4.0 3.5 - 5.1 mmol/L    Chloride 102 95 - 110 mmol/L    CO2 30.0 22.0 - 31.0 mmol/L    Calcium 9.6 8.4 - 10.2 mg/dL    Total Protein 7.8 6.3 - 8.6 g/dL    Albumin 4.40 3.40 - 4.80 g/dL    ALT (SGPT) 30 9 - 52 U/L    AST (SGOT) 29 14 - 36 U/L    Alkaline Phosphatase 139 (H) 38 - 126 U/L    Total Bilirubin 0.5 0.2 - 1.3 mg/dL    eGFR Non  Amer 63 51 - 120 mL/min/1.73    Globulin 3.4 2.3 - 3.5 gm/dL    A/G Ratio 1.3 1.1 - 1.8 g/dL    BUN/Creatinine Ratio 14.3 7.0 - 25.0    Anion Gap 13.0 5.0 - 15.0 mmol/L   TSH   Result Value Ref Range    TSH 5.530 (H) 0.460 - 4.680 mIU/mL   T4, Free   Result Value Ref Range    Free T4 0.43 (L) 0.78 - 2.19  ng/dL   Lipid Panel   Result Value Ref Range    Total Cholesterol 186 0 - 199 mg/dL    Triglycerides 100 20 - 199 mg/dL    HDL Cholesterol 81 60 - 200 mg/dL    LDL Cholesterol  72 1 - 129 mg/dL    LDL/HDL Ratio 1.05 0.00 - 3.22   Urinalysis - Urine, Clean Catch   Result Value Ref Range    Color, UA Dark Yellow Yellow, Straw, Dark Yellow, Charlene    Appearance, UA Clear Clear    pH, UA <=5.0 5.0 - 9.0    Specific Gravity, UA 1.023 1.003 - 1.030    Glucose, UA Negative Negative    Ketones, UA Negative Negative    Bilirubin, UA Negative Negative    Blood, UA Negative Negative    Protein, UA Negative Negative    Leuk Esterase, UA Small (1+) (A) Negative    Nitrite, UA Negative Negative    Urobilinogen, UA 0.2 E.U./dL 0.2 - 1.0 E.U./dL   Urinalysis, Microscopic Only - Urine, Clean Catch   Result Value Ref Range    RBC, UA 3-5 (A) None Seen /HPF    WBC, UA 0-2 None Seen, 0-2, 3-5 /HPF    Bacteria, UA None Seen None Seen /HPF    Squamous Epithelial Cells, UA 6-12 (A) None Seen, 0-2 /HPF    Hyaline Casts, UA 7-12 None Seen /LPF    Methodology Automated Microscopy       Assessment/Plan   Problem List Items Addressed This Visit        Endocrine    Acquired hypothyroidism (Chronic)    Relevant Medications    SYNTHROID 112 MCG tablet    Other Relevant Orders    TSH    T4, Free      Other Visit Diagnoses     Annual physical exam    -  Primary    Nonintractable migraine, unspecified migraine type        Relevant Medications    topiramate (TOPAMAX) 50 MG tablet    venlafaxine XR (EFFEXOR-XR) 37.5 MG 24 hr capsule    SUMAtriptan (IMITREX) 20 MG/ACT nasal spray        Will notify regarding results. Switch to Synthroid.     New Medications Ordered This Visit   Medications   • topiramate (TOPAMAX) 50 MG tablet     Sig: Take 1 tablet by mouth Every Night.     Dispense:  30 tablet     Refill:  11   • venlafaxine XR (EFFEXOR-XR) 37.5 MG 24 hr capsule     Sig: Take 1 capsule by mouth Daily.     Dispense:  30 capsule     Refill:  11   •  SUMAtriptan (IMITREX) 20 MG/ACT nasal spray     Si spray into each nostril Every 2 (Two) Hours As Needed for Migraine.     Dispense:  10 each     Refill:  5   • SYNTHROID 112 MCG tablet     Sig: Take 1 tablet by mouth Daily.     Dispense:  30 tablet     Refill:  6     Do not sub     Return in about 3 months (around 2018) for Recheck.

## 2017-11-28 DIAGNOSIS — E03.9 ACQUIRED HYPOTHYROIDISM: ICD-10-CM

## 2017-11-29 RX ORDER — THYROID,PORK 90 MG
TABLET ORAL
Qty: 30 TABLET | Refills: 6 | OUTPATIENT
Start: 2017-11-29

## 2018-02-06 ENCOUNTER — LAB (OUTPATIENT)
Dept: LAB | Facility: HOSPITAL | Age: 58
End: 2018-02-06

## 2018-02-06 DIAGNOSIS — E03.9 ACQUIRED HYPOTHYROIDISM: Chronic | ICD-10-CM

## 2018-02-06 LAB
T4 FREE SERPL-MCNC: 1.08 NG/DL (ref 0.78–2.19)
TSH SERPL DL<=0.05 MIU/L-ACNC: 0.24 MIU/ML (ref 0.46–4.68)

## 2018-02-06 PROCEDURE — 36415 COLL VENOUS BLD VENIPUNCTURE: CPT

## 2018-02-06 PROCEDURE — 84443 ASSAY THYROID STIM HORMONE: CPT

## 2018-02-06 PROCEDURE — 84439 ASSAY OF FREE THYROXINE: CPT

## 2018-02-13 RX ORDER — VENLAFAXINE HYDROCHLORIDE 37.5 MG/1
CAPSULE, EXTENDED RELEASE ORAL
Qty: 30 CAPSULE | Refills: 2 | Status: SHIPPED | OUTPATIENT
Start: 2018-02-13 | End: 2018-05-15 | Stop reason: SDUPTHER

## 2018-02-14 ENCOUNTER — OFFICE VISIT (OUTPATIENT)
Dept: FAMILY MEDICINE CLINIC | Facility: CLINIC | Age: 58
End: 2018-02-14

## 2018-02-14 VITALS
WEIGHT: 184.4 LBS | BODY MASS INDEX: 29.63 KG/M2 | OXYGEN SATURATION: 99 % | SYSTOLIC BLOOD PRESSURE: 128 MMHG | HEIGHT: 66 IN | DIASTOLIC BLOOD PRESSURE: 80 MMHG | HEART RATE: 63 BPM

## 2018-02-14 DIAGNOSIS — K59.00 CONSTIPATION, UNSPECIFIED CONSTIPATION TYPE: ICD-10-CM

## 2018-02-14 DIAGNOSIS — E03.9 ACQUIRED HYPOTHYROIDISM: Primary | Chronic | ICD-10-CM

## 2018-02-14 PROCEDURE — 99214 OFFICE O/P EST MOD 30 MIN: CPT | Performed by: GENERAL PRACTICE

## 2018-02-14 RX ORDER — LUBIPROSTONE 24 UG/1
24 CAPSULE ORAL 2 TIMES DAILY WITH MEALS
Qty: 60 CAPSULE | Refills: 2 | Status: SHIPPED | OUTPATIENT
Start: 2018-02-14 | End: 2018-05-15

## 2018-02-14 NOTE — PROGRESS NOTES
Subjective   Adilene Morgan is a 57 y.o. female.   Chief Complaint   Patient presents with   • Follow-up   • Hypothyroidism     For review and evaluation of management of chronic medical problems. Labs reviewed. Is having trouble with constipation, only going every 10 days and often will vomit at the same time, has had this in the past. Stool is pasty, does take metamucil.     Hypothyroidism   This is a chronic problem. The current episode started more than 1 year ago. The problem occurs constantly. The problem has been unchanged. Associated symptoms include a change in bowel habit and fatigue. Pertinent negatives include no abdominal pain, arthralgias, chest pain, chills, congestion, coughing, fever, headaches, joint swelling, myalgias, nausea, neck pain, numbness, rash, sore throat, vomiting or weakness. Nothing aggravates the symptoms. Treatments tried: armour thyroid. The treatment provided moderate relief.   Constipation   This is a chronic problem. The current episode started more than 1 year ago. The problem has been gradually worsening since onset. Her stool frequency is 1 time per week or less. Stool description: pasty. The patient is on a high fiber diet. She exercises regularly. There has been adequate water intake. Pertinent negatives include no abdominal pain, back pain, diarrhea, fever, nausea or vomiting. Risk factors include stress. She has tried fiber, enemas and laxatives for the symptoms. The treatment provided mild relief. Her past medical history is significant for endocrine disease.      The following portions of the patient's history were reviewed and updated as appropriate: allergies, current medications, past social history and problem list.    Outpatient Medications Prior to Visit   Medication Sig Dispense Refill   • aspirin 325 MG tablet Take 325 mg by mouth Daily.     • Biotin (BIOTIN MAXIMUM STRENGTH) 10 MG tablet Take 10,000 mcg by mouth Daily.     • fluticasone (FLONASE ALLERGY  RELIEF) 50 MCG/ACT nasal spray 1 spray into each nostril Daily As Needed.     • loratadine (CLARITIN) 10 MG tablet Take 10 mg by mouth Daily.     • Multiple Vitamins-Minerals (MULTIVITAMIN ADULT PO) Take  by mouth.     • SUMAtriptan (IMITREX) 20 MG/ACT nasal spray 1 spray into each nostril Every 2 (Two) Hours As Needed for Migraine. 10 each 5   • SYNTHROID 112 MCG tablet Take 1 tablet by mouth Daily. 30 tablet 6   • topiramate (TOPAMAX) 50 MG tablet Take 1 tablet by mouth Every Night. 30 tablet 11   • venlafaxine XR (EFFEXOR-XR) 37.5 MG 24 hr capsule Take 1 capsule by mouth Daily. 30 capsule 11   • venlafaxine XR (EFFEXOR-XR) 37.5 MG 24 hr capsule TAKE 1 CAPSULE BY MOUTH DAILY. 30 capsule 2     No facility-administered medications prior to visit.        Review of Systems   Constitutional: Positive for fatigue. Negative for chills, fever and unexpected weight change.   HENT: Negative.  Negative for congestion, ear pain, hearing loss, nosebleeds, rhinorrhea, sneezing, sore throat and tinnitus.    Eyes: Negative.  Negative for discharge.   Respiratory: Negative.  Negative for cough, shortness of breath and wheezing.    Cardiovascular: Negative.  Negative for chest pain and palpitations.   Gastrointestinal: Positive for change in bowel habit. Negative for abdominal pain, constipation, diarrhea, nausea and vomiting.   Endocrine: Negative.    Genitourinary: Negative.  Negative for dysuria, frequency and urgency.   Musculoskeletal: Negative.  Negative for arthralgias, back pain, joint swelling, myalgias and neck pain.   Skin: Negative.  Negative for rash.   Allergic/Immunologic: Negative.    Neurological: Negative.  Negative for dizziness, weakness, numbness and headaches.   Hematological: Negative.  Negative for adenopathy.   Psychiatric/Behavioral: Negative.  Negative for dysphoric mood and sleep disturbance. The patient is not nervous/anxious.        Objective   Visit Vitals   • /80 (BP Location: Left arm, Patient  "Position: Sitting, Cuff Size: Adult)   • Pulse 63   • Ht 167.6 cm (66\")   • Wt 83.6 kg (184 lb 6.4 oz)   • SpO2 99%   • BMI 29.76 kg/m2     Physical Exam   Constitutional: She is oriented to person, place, and time. She appears well-developed and well-nourished. No distress.   HENT:   Head: Normocephalic and atraumatic.   Nose: Nose normal.   Mouth/Throat: Oropharynx is clear and moist.   Eyes: Conjunctivae and EOM are normal. Pupils are equal, round, and reactive to light. Right eye exhibits no discharge. Left eye exhibits no discharge.   Neck: No thyromegaly present.   Cardiovascular: Normal rate, regular rhythm, normal heart sounds and intact distal pulses.    Pulmonary/Chest: Effort normal and breath sounds normal.   Abdominal: Soft. Normal appearance and bowel sounds are normal. She exhibits no mass. There is no tenderness.   Lymphadenopathy:     She has no cervical adenopathy.   Neurological: She is alert and oriented to person, place, and time.   Skin: Skin is warm and dry.   Psychiatric: She has a normal mood and affect.   Nursing note and vitals reviewed.    Results for orders placed or performed in visit on 02/06/18   TSH   Result Value Ref Range    TSH 0.240 (L) 0.460 - 4.680 mIU/mL   T4, Free   Result Value Ref Range    Free T4 1.08 0.78 - 2.19 ng/dL     Assessment/Plan   Problem List Items Addressed This Visit        Endocrine    Acquired hypothyroidism - Primary (Chronic)    Relevant Orders    Basic Metabolic Panel    TSH    T4, Free      Other Visit Diagnoses     Constipation, unspecified constipation type        Relevant Medications    lubiprostone (AMITIZA) 24 MCG capsule          Continue current treatment. Try Amitiza. Recheck if not improving.      New Medications Ordered This Visit   Medications   • lubiprostone (AMITIZA) 24 MCG capsule     Sig: Take 1 capsule by mouth 2 (Two) Times a Day With Meals.     Dispense:  60 capsule     Refill:  2     Return in about 3 months (around 5/14/2018) for " Recheck.

## 2018-03-14 ENCOUNTER — TRANSCRIBE ORDERS (OUTPATIENT)
Dept: LAB | Facility: HOSPITAL | Age: 58
End: 2018-03-14

## 2018-03-14 ENCOUNTER — LAB (OUTPATIENT)
Dept: LAB | Facility: HOSPITAL | Age: 58
End: 2018-03-14

## 2018-03-14 DIAGNOSIS — Z91.018 HISTORY OF FOOD ALLERGY: Primary | ICD-10-CM

## 2018-03-14 DIAGNOSIS — Z91.018 HISTORY OF FOOD ALLERGY: ICD-10-CM

## 2018-03-14 PROCEDURE — 36415 COLL VENOUS BLD VENIPUNCTURE: CPT

## 2018-03-14 PROCEDURE — 86003 ALLG SPEC IGE CRUDE XTRC EA: CPT

## 2018-03-17 LAB
COW MILK IGE QN: 0.31 KU/L
GELATIN IGE QN: <0.1 KU/L

## 2018-03-19 LAB
ALPHA GAL IGE: 6.18 KU/L
BEEF IGE QN: 2.03 KU/L
LAMB IGE QN: 0.49 KU/L
Lab: 1
Lab: 2
Lab: 2
PORK IGE: 1.42 KU/L

## 2018-05-09 ENCOUNTER — LAB (OUTPATIENT)
Dept: LAB | Facility: HOSPITAL | Age: 58
End: 2018-05-09

## 2018-05-09 DIAGNOSIS — E03.9 ACQUIRED HYPOTHYROIDISM: Chronic | ICD-10-CM

## 2018-05-09 LAB
ANION GAP SERPL CALCULATED.3IONS-SCNC: 8 MMOL/L (ref 5–15)
BUN BLD-MCNC: 15 MG/DL (ref 7–21)
BUN/CREAT SERPL: 16.5 (ref 7–25)
CALCIUM SPEC-SCNC: 9.4 MG/DL (ref 8.4–10.2)
CHLORIDE SERPL-SCNC: 104 MMOL/L (ref 95–110)
CO2 SERPL-SCNC: 28 MMOL/L (ref 22–31)
CREAT BLD-MCNC: 0.91 MG/DL (ref 0.5–1)
GFR SERPL CREATININE-BSD FRML MDRD: 63 ML/MIN/1.73 (ref 60–120)
GLUCOSE BLD-MCNC: 98 MG/DL (ref 60–100)
POTASSIUM BLD-SCNC: 4.1 MMOL/L (ref 3.5–5.1)
SODIUM BLD-SCNC: 140 MMOL/L (ref 137–145)
T4 FREE SERPL-MCNC: 1.32 NG/DL (ref 0.78–2.19)
TSH SERPL DL<=0.05 MIU/L-ACNC: 0.17 MIU/ML (ref 0.46–4.68)

## 2018-05-09 PROCEDURE — 84443 ASSAY THYROID STIM HORMONE: CPT

## 2018-05-09 PROCEDURE — 84439 ASSAY OF FREE THYROXINE: CPT

## 2018-05-09 PROCEDURE — 80048 BASIC METABOLIC PNL TOTAL CA: CPT

## 2018-05-09 PROCEDURE — 36415 COLL VENOUS BLD VENIPUNCTURE: CPT

## 2018-05-15 ENCOUNTER — OFFICE VISIT (OUTPATIENT)
Dept: FAMILY MEDICINE CLINIC | Facility: CLINIC | Age: 58
End: 2018-05-15

## 2018-05-15 VITALS
BODY MASS INDEX: 29.25 KG/M2 | HEIGHT: 66 IN | SYSTOLIC BLOOD PRESSURE: 120 MMHG | OXYGEN SATURATION: 98 % | DIASTOLIC BLOOD PRESSURE: 70 MMHG | HEART RATE: 70 BPM | WEIGHT: 182 LBS

## 2018-05-15 DIAGNOSIS — F32.A DEPRESSIVE DISORDER: Chronic | ICD-10-CM

## 2018-05-15 DIAGNOSIS — K59.00 CONSTIPATION, UNSPECIFIED CONSTIPATION TYPE: Chronic | ICD-10-CM

## 2018-05-15 DIAGNOSIS — E03.9 ACQUIRED HYPOTHYROIDISM: Primary | Chronic | ICD-10-CM

## 2018-05-15 PROCEDURE — 99214 OFFICE O/P EST MOD 30 MIN: CPT | Performed by: GENERAL PRACTICE

## 2018-05-15 RX ORDER — VENLAFAXINE HYDROCHLORIDE 37.5 MG/1
37.5 CAPSULE, EXTENDED RELEASE ORAL DAILY
Qty: 30 CAPSULE | Refills: 11 | Status: SHIPPED | OUTPATIENT
Start: 2018-05-15 | End: 2018-06-05

## 2018-05-15 RX ORDER — LEVOTHYROXINE SODIUM 100 MCG
100 TABLET ORAL DAILY
Qty: 30 TABLET | Refills: 11 | Status: SHIPPED | OUTPATIENT
Start: 2018-05-15 | End: 2018-11-19 | Stop reason: SDUPTHER

## 2018-05-15 NOTE — PROGRESS NOTES
Subjective   Adilene Morgan is a 58 y.o. female.   Chief Complaint   Patient presents with   • Thyroid Problem   • Depression     For review and evaluation of management of chronic medical problems. Labs reviewed. Still having problems with constipation. Depression stable.    Hypothyroidism   This is a chronic problem. The current episode started more than 1 year ago. The problem occurs constantly. The problem has been unchanged. Associated symptoms include a change in bowel habit and fatigue. Pertinent negatives include no abdominal pain, arthralgias, chest pain, chills, congestion, coughing, fever, headaches, joint swelling, myalgias, nausea, neck pain, numbness, rash, sore throat, vomiting or weakness. Nothing aggravates the symptoms. Treatments tried: armour thyroid. The treatment provided moderate relief.   Constipation   This is a chronic problem. The current episode started more than 1 year ago. The problem has been gradually worsening since onset. Her stool frequency is 1 time per week or less. Stool description: pasty. The patient is on a high fiber diet. She exercises regularly. There has been adequate water intake. Pertinent negatives include no abdominal pain, back pain, diarrhea, fever, nausea or vomiting. Risk factors include stress. She has tried fiber, enemas and laxatives for the symptoms. The treatment provided mild relief. Her past medical history is significant for endocrine disease.      The following portions of the patient's history were reviewed and updated as appropriate: allergies, current medications, past social history and problem list.    Outpatient Medications Prior to Visit   Medication Sig Dispense Refill   • aspirin 325 MG tablet Take 325 mg by mouth Daily.     • Biotin (BIOTIN MAXIMUM STRENGTH) 10 MG tablet Take 10,000 mcg by mouth Daily.     • fluticasone (FLONASE ALLERGY RELIEF) 50 MCG/ACT nasal spray 1 spray into each nostril Daily As Needed.     • loratadine (CLARITIN) 10 MG  "tablet Take 10 mg by mouth Daily.     • Multiple Vitamins-Minerals (MULTIVITAMIN ADULT PO) Take  by mouth.     • SUMAtriptan (IMITREX) 20 MG/ACT nasal spray 1 spray into each nostril Every 2 (Two) Hours As Needed for Migraine. 10 each 5   • topiramate (TOPAMAX) 50 MG tablet Take 1 tablet by mouth Every Night. 30 tablet 11   • SYNTHROID 112 MCG tablet Take 1 tablet by mouth Daily. 30 tablet 6   • venlafaxine XR (EFFEXOR-XR) 37.5 MG 24 hr capsule Take 1 capsule by mouth Daily. 30 capsule 11   • lubiprostone (AMITIZA) 24 MCG capsule Take 1 capsule by mouth 2 (Two) Times a Day With Meals. 60 capsule 2   • venlafaxine XR (EFFEXOR-XR) 37.5 MG 24 hr capsule TAKE 1 CAPSULE BY MOUTH DAILY. 30 capsule 2     No facility-administered medications prior to visit.        Review of Systems   Constitutional: Positive for fatigue. Negative for chills, fever and unexpected weight change.   HENT: Negative.  Negative for congestion, ear pain, hearing loss, nosebleeds, rhinorrhea, sneezing, sore throat and tinnitus.    Eyes: Negative.  Negative for discharge.   Respiratory: Negative.  Negative for cough and wheezing.    Cardiovascular: Negative.  Negative for chest pain.   Gastrointestinal: Positive for change in bowel habit. Negative for abdominal pain, constipation, diarrhea, nausea and vomiting.   Endocrine: Negative.    Genitourinary: Negative.  Negative for dysuria, frequency and urgency.   Musculoskeletal: Negative.  Negative for arthralgias, back pain, joint swelling, myalgias and neck pain.   Skin: Negative.  Negative for rash.   Allergic/Immunologic: Negative.    Neurological: Negative.  Negative for dizziness, weakness, numbness and headaches.   Hematological: Negative.  Negative for adenopathy.   Psychiatric/Behavioral: Negative.  Negative for dysphoric mood and sleep disturbance.       Objective   Visit Vitals  /70   Pulse 70   Ht 167.6 cm (66\")   Wt 82.6 kg (182 lb)   SpO2 98%   BMI 29.38 kg/m²     Physical Exam "   Constitutional: She is oriented to person, place, and time. She appears well-developed and well-nourished. No distress.   HENT:   Head: Normocephalic and atraumatic.   Nose: Nose normal.   Mouth/Throat: Oropharynx is clear and moist.   Eyes: Conjunctivae and EOM are normal. Pupils are equal, round, and reactive to light. Right eye exhibits no discharge. Left eye exhibits no discharge.   Neck: No thyromegaly present.   Cardiovascular: Normal rate, regular rhythm, normal heart sounds and intact distal pulses.    Pulmonary/Chest: Effort normal and breath sounds normal.   Lymphadenopathy:     She has no cervical adenopathy.   Neurological: She is alert and oriented to person, place, and time.   Skin: Skin is warm and dry.   Psychiatric: She has a normal mood and affect.   Nursing note and vitals reviewed.    Results for orders placed or performed in visit on 05/09/18   Basic Metabolic Panel   Result Value Ref Range    Glucose 98 60 - 100 mg/dL    BUN 15 7 - 21 mg/dL    Creatinine 0.91 0.50 - 1.00 mg/dL    Sodium 140 137 - 145 mmol/L    Potassium 4.1 3.5 - 5.1 mmol/L    Chloride 104 95 - 110 mmol/L    CO2 28.0 22.0 - 31.0 mmol/L    Calcium 9.4 8.4 - 10.2 mg/dL    eGFR Non African Amer 63 >60 mL/min/1.73    BUN/Creatinine Ratio 16.5 7.0 - 25.0    Anion Gap 8.0 5.0 - 15.0 mmol/L   TSH   Result Value Ref Range    TSH 0.170 (L) 0.460 - 4.680 mIU/mL   T4, Free   Result Value Ref Range    Free T4 1.32 0.78 - 2.19 ng/dL      Assessment/Plan   Problem List Items Addressed This Visit        Endocrine    Acquired hypothyroidism - Primary (Chronic)    Relevant Medications    SYNTHROID 100 MCG tablet    Other Relevant Orders    TSH    T4, Free       Other    Depressive disorder (Chronic)    Relevant Medications    venlafaxine XR (EFFEXOR-XR) 37.5 MG 24 hr capsule      Other Visit Diagnoses     Constipation, unspecified constipation type  (Chronic)            Decrease Synthroid. Try Linzess.     New Medications Ordered This Visit    Medications   • SYNTHROID 100 MCG tablet     Sig: Take 1 tablet by mouth Daily. Do not substitute     Dispense:  30 tablet     Refill:  11   • venlafaxine XR (EFFEXOR-XR) 37.5 MG 24 hr capsule     Sig: Take 1 capsule by mouth Daily.     Dispense:  30 capsule     Refill:  11   • linaclotide (LINZESS) 145 MCG capsule capsule     Sig: Take 1 capsule by mouth Every Morning Before Breakfast.     Dispense:  90 capsule     Refill:  3     Return in about 3 months (around 8/15/2018) for Recheck.

## 2018-07-03 ENCOUNTER — LAB (OUTPATIENT)
Dept: LAB | Facility: HOSPITAL | Age: 58
End: 2018-07-03

## 2018-07-03 ENCOUNTER — APPOINTMENT (OUTPATIENT)
Dept: GENERAL RADIOLOGY | Facility: HOSPITAL | Age: 58
End: 2018-07-03

## 2018-07-03 ENCOUNTER — HOSPITAL ENCOUNTER (EMERGENCY)
Facility: HOSPITAL | Age: 58
Discharge: HOME | End: 2018-07-03
Attending: EMERGENCY MEDICINE | Admitting: EMERGENCY MEDICINE

## 2018-07-03 VITALS
DIASTOLIC BLOOD PRESSURE: 59 MMHG | OXYGEN SATURATION: 97 % | HEIGHT: 66 IN | HEART RATE: 70 BPM | WEIGHT: 180 LBS | BODY MASS INDEX: 28.93 KG/M2 | SYSTOLIC BLOOD PRESSURE: 129 MMHG | RESPIRATION RATE: 18 BRPM | TEMPERATURE: 98 F
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
## 2018-07-03 DIAGNOSIS — R53.1 WEAKNESS: ICD-10-CM

## 2018-07-03 DIAGNOSIS — R19.7 DIARRHEA, UNSPECIFIED TYPE: ICD-10-CM

## 2018-07-03 DIAGNOSIS — R51.9 ACUTE NONINTRACTABLE HEADACHE, UNSPECIFIED HEADACHE TYPE: ICD-10-CM

## 2018-07-03 DIAGNOSIS — N39.0 URINARY TRACT INFECTION WITHOUT HEMATURIA, SITE UNSPECIFIED: Primary | ICD-10-CM

## 2018-07-03 LAB
ALBUMIN SERPL-MCNC: 3.8 G/DL (ref 3.4–4.8)
ALBUMIN/GLOB SERPL: 1.2 G/DL (ref 1.1–1.8)
ALP SERPL-CCNC: 118 U/L (ref 38–126)
ALT SERPL W P-5'-P-CCNC: 26 U/L (ref 9–52)
ANION GAP SERPL CALCULATED.3IONS-SCNC: 9 MMOL/L (ref 5–15)
AST SERPL-CCNC: 24 U/L (ref 14–36)
BACTERIA UR QL AUTO: ABNORMAL /HPF
BASOPHILS # BLD AUTO: 0.03 10*3/MM3 (ref 0–0.2)
BASOPHILS NFR BLD AUTO: 0.2 % (ref 0–2)
BILIRUB SERPL-MCNC: 0.4 MG/DL (ref 0.2–1.3)
BILIRUB UR QL STRIP: NEGATIVE
BUN BLD-MCNC: 13 MG/DL (ref 7–21)
BUN/CREAT SERPL: 11.5 (ref 7–25)
CALCIUM SPEC-SCNC: 8.6 MG/DL (ref 8.4–10.2)
CHLORIDE SERPL-SCNC: 101 MMOL/L (ref 95–110)
CLARITY UR: ABNORMAL
CO2 SERPL-SCNC: 25 MMOL/L (ref 22–31)
COLOR UR: YELLOW
CREAT BLD-MCNC: 1.13 MG/DL (ref 0.5–1)
D-LACTATE SERPL-SCNC: 0.9 MMOL/L (ref 0.5–2)
DEPRECATED RDW RBC AUTO: 42.8 FL (ref 36.4–46.3)
EOSINOPHIL # BLD AUTO: 0.02 10*3/MM3 (ref 0–0.7)
EOSINOPHIL NFR BLD AUTO: 0.2 % (ref 0–7)
ERYTHROCYTE [DISTWIDTH] IN BLOOD BY AUTOMATED COUNT: 13.8 % (ref 11.5–14.5)
GFR SERPL CREATININE-BSD FRML MDRD: 49 ML/MIN/1.73 (ref 51–120)
GLOBULIN UR ELPH-MCNC: 3.1 GM/DL (ref 2.3–3.5)
GLUCOSE BLD-MCNC: 117 MG/DL (ref 60–100)
GLUCOSE UR STRIP-MCNC: NEGATIVE MG/DL
HCT VFR BLD AUTO: 38.8 % (ref 35–45)
HGB BLD-MCNC: 13.2 G/DL (ref 12–15.5)
HGB UR QL STRIP.AUTO: ABNORMAL
HOLD SPECIMEN: NORMAL
HOLD SPECIMEN: NORMAL
HYALINE CASTS UR QL AUTO: ABNORMAL /LPF
IMM GRANULOCYTES # BLD: 0.03 10*3/MM3 (ref 0–0.02)
IMM GRANULOCYTES NFR BLD: 0.2 % (ref 0–0.5)
KETONES UR QL STRIP: NEGATIVE
LEUKOCYTE ESTERASE UR QL STRIP.AUTO: ABNORMAL
LYMPHOCYTES # BLD AUTO: 0.73 10*3/MM3 (ref 0.6–4.2)
LYMPHOCYTES NFR BLD AUTO: 5.8 % (ref 10–50)
MCH RBC QN AUTO: 28.9 PG (ref 26.5–34)
MCHC RBC AUTO-ENTMCNC: 34 G/DL (ref 31.4–36)
MCV RBC AUTO: 85.1 FL (ref 80–98)
MONOCYTES # BLD AUTO: 0.56 10*3/MM3 (ref 0–0.9)
MONOCYTES NFR BLD AUTO: 4.4 % (ref 0–12)
NEUTROPHILS # BLD AUTO: 11.24 10*3/MM3 (ref 2–8.6)
NEUTROPHILS NFR BLD AUTO: 89.2 % (ref 37–80)
NITRITE UR QL STRIP: NEGATIVE
PH UR STRIP.AUTO: 6 [PH] (ref 5–9)
PLATELET # BLD AUTO: 217 10*3/MM3 (ref 150–450)
PMV BLD AUTO: 9.9 FL (ref 8–12)
POTASSIUM BLD-SCNC: 3.3 MMOL/L (ref 3.5–5.1)
PROT SERPL-MCNC: 6.9 G/DL (ref 6.3–8.6)
PROT UR QL STRIP: ABNORMAL
RBC # BLD AUTO: 4.56 10*6/MM3 (ref 3.77–5.16)
RBC # UR: ABNORMAL /HPF
REF LAB TEST METHOD: ABNORMAL
SODIUM BLD-SCNC: 135 MMOL/L (ref 137–145)
SP GR UR STRIP: <=1.005 (ref 1–1.03)
SQUAMOUS #/AREA URNS HPF: ABNORMAL /HPF
UROBILINOGEN UR QL STRIP: ABNORMAL
WBC NRBC COR # BLD: 12.61 10*3/MM3 (ref 3.2–9.8)
WBC UR QL AUTO: ABNORMAL /HPF
WHOLE BLOOD HOLD SPECIMEN: NORMAL
WHOLE BLOOD HOLD SPECIMEN: NORMAL

## 2018-07-03 PROCEDURE — 81001 URINALYSIS AUTO W/SCOPE: CPT | Performed by: EMERGENCY MEDICINE

## 2018-07-03 PROCEDURE — 74022 RADEX COMPL AQT ABD SERIES: CPT

## 2018-07-03 PROCEDURE — 96365 THER/PROPH/DIAG IV INF INIT: CPT

## 2018-07-03 PROCEDURE — 25010000002 METOCLOPRAMIDE PER 10 MG: Performed by: EMERGENCY MEDICINE

## 2018-07-03 PROCEDURE — 83605 ASSAY OF LACTIC ACID: CPT | Performed by: EMERGENCY MEDICINE

## 2018-07-03 PROCEDURE — 25010000002 LEVOFLOXACIN PER 250 MG: Performed by: EMERGENCY MEDICINE

## 2018-07-03 PROCEDURE — 96361 HYDRATE IV INFUSION ADD-ON: CPT

## 2018-07-03 PROCEDURE — 80053 COMPREHEN METABOLIC PANEL: CPT | Performed by: EMERGENCY MEDICINE

## 2018-07-03 PROCEDURE — 80053 COMPREHEN METABOLIC PANEL: CPT | Performed by: FAMILY MEDICINE

## 2018-07-03 PROCEDURE — 96375 TX/PRO/DX INJ NEW DRUG ADDON: CPT

## 2018-07-03 PROCEDURE — 87040 BLOOD CULTURE FOR BACTERIA: CPT | Performed by: FAMILY MEDICINE

## 2018-07-03 PROCEDURE — 25010000002 KETOROLAC TROMETHAMINE PER 15 MG: Performed by: EMERGENCY MEDICINE

## 2018-07-03 PROCEDURE — 85025 COMPLETE CBC W/AUTO DIFF WBC: CPT | Performed by: EMERGENCY MEDICINE

## 2018-07-03 PROCEDURE — 99284 EMERGENCY DEPT VISIT MOD MDM: CPT

## 2018-07-03 PROCEDURE — 25010000002 DIPHENHYDRAMINE PER 50 MG: Performed by: EMERGENCY MEDICINE

## 2018-07-03 PROCEDURE — 25010000002 ONDANSETRON PER 1 MG: Performed by: EMERGENCY MEDICINE

## 2018-07-03 PROCEDURE — 85025 COMPLETE CBC W/AUTO DIFF WBC: CPT | Performed by: FAMILY MEDICINE

## 2018-07-03 RX ORDER — SODIUM CHLORIDE 0.9 % (FLUSH) 0.9 %
10 SYRINGE (ML) INJECTION AS NEEDED
Status: DISCONTINUED | OUTPATIENT
Start: 2018-07-03 | End: 2018-07-04 | Stop reason: HOSPADM

## 2018-07-03 RX ORDER — SULFAMETHOXAZOLE AND TRIMETHOPRIM 800; 160 MG/1; MG/1
1 TABLET ORAL 2 TIMES DAILY
Qty: 14 TABLET | Refills: 0 | Status: SHIPPED | OUTPATIENT
Start: 2018-07-03 | End: 2018-08-16

## 2018-07-03 RX ORDER — LEVOFLOXACIN 5 MG/ML
500 INJECTION, SOLUTION INTRAVENOUS ONCE
Status: COMPLETED | OUTPATIENT
Start: 2018-07-03 | End: 2018-07-03

## 2018-07-03 RX ORDER — ONDANSETRON 2 MG/ML
4 INJECTION INTRAMUSCULAR; INTRAVENOUS ONCE
Status: COMPLETED | OUTPATIENT
Start: 2018-07-03 | End: 2018-07-03

## 2018-07-03 RX ORDER — ONDANSETRON 4 MG/1
4 TABLET, ORALLY DISINTEGRATING ORAL EVERY 6 HOURS PRN
Qty: 12 TABLET | Refills: 0 | Status: SHIPPED | OUTPATIENT
Start: 2018-07-03 | End: 2018-08-16

## 2018-07-03 RX ORDER — KETOROLAC TROMETHAMINE 30 MG/ML
30 INJECTION, SOLUTION INTRAMUSCULAR; INTRAVENOUS ONCE
Status: COMPLETED | OUTPATIENT
Start: 2018-07-03 | End: 2018-07-03

## 2018-07-03 RX ORDER — SODIUM CHLORIDE 9 MG/ML
125 INJECTION, SOLUTION INTRAVENOUS CONTINUOUS
Status: DISCONTINUED | OUTPATIENT
Start: 2018-07-03 | End: 2018-07-04 | Stop reason: HOSPADM

## 2018-07-03 RX ORDER — DIPHENHYDRAMINE HYDROCHLORIDE 50 MG/ML
25 INJECTION INTRAMUSCULAR; INTRAVENOUS ONCE
Status: COMPLETED | OUTPATIENT
Start: 2018-07-03 | End: 2018-07-03

## 2018-07-03 RX ORDER — METOCLOPRAMIDE HYDROCHLORIDE 5 MG/ML
10 INJECTION INTRAMUSCULAR; INTRAVENOUS ONCE
Status: COMPLETED | OUTPATIENT
Start: 2018-07-03 | End: 2018-07-03

## 2018-07-03 RX ADMIN — KETOROLAC TROMETHAMINE 30 MG: 30 INJECTION, SOLUTION INTRAMUSCULAR at 18:35

## 2018-07-03 RX ADMIN — METOCLOPRAMIDE 10 MG: 5 INJECTION, SOLUTION INTRAMUSCULAR; INTRAVENOUS at 18:35

## 2018-07-03 RX ADMIN — SODIUM CHLORIDE 125 ML/HR: 900 INJECTION, SOLUTION INTRAVENOUS at 18:19

## 2018-07-03 RX ADMIN — SODIUM CHLORIDE 1000 ML: 900 INJECTION, SOLUTION INTRAVENOUS at 18:30

## 2018-07-03 RX ADMIN — ONDANSETRON HYDROCHLORIDE 4 MG: 2 INJECTION, SOLUTION INTRAMUSCULAR; INTRAVENOUS at 20:28

## 2018-07-03 RX ADMIN — LEVOFLOXACIN 500 MG: 5 INJECTION, SOLUTION INTRAVENOUS at 21:20

## 2018-07-03 RX ADMIN — DIPHENHYDRAMINE HYDROCHLORIDE 25 MG: 50 INJECTION INTRAMUSCULAR; INTRAVENOUS at 18:35

## 2018-07-03 NOTE — ED PROVIDER NOTES
Subjective   Patient presents with a 2 to three-day history of diarrhea, nausea vomiting, and progressive headache consistent with her prior migraines.  Patient notes diffuse abdominal cramps.  Patient is intractable diarrhea which was initially brown and now it is a greenish color to her.  Patient is vomited multiple times with this.  Patient having difficulty tolerating by mouth at this point.  Patient notes no significant prior abdominal surgeries.            Review of Systems   Constitutional: Negative.  Negative for appetite change, chills and fever.   HENT: Negative for congestion.    Eyes: Negative.  Negative for photophobia and visual disturbance.   Respiratory: Negative.  Negative for cough, chest tightness and shortness of breath.    Cardiovascular: Negative.  Negative for chest pain and palpitations.   Gastrointestinal: Positive for abdominal pain, diarrhea, nausea and vomiting. Negative for constipation.   Endocrine: Negative.    Genitourinary: Negative.  Negative for decreased urine volume, dysuria, flank pain and hematuria.   Musculoskeletal: Negative.  Negative for arthralgias, back pain, myalgias, neck pain and neck stiffness.   Skin: Negative.  Negative for pallor.   Neurological: Positive for headaches. Negative for dizziness, syncope, weakness, light-headedness and numbness.   Psychiatric/Behavioral: Negative.  Negative for confusion and suicidal ideas. The patient is not nervous/anxious.    All other systems reviewed and are negative.      Past Medical History:   Diagnosis Date   • Achilles bursitis    • Achilles tendinitis    • Acquired hypothyroidism    • Allergic rhinitis    • Allergy to meat     alphagal   • Allergy to milk products    • Asthmatic bronchitis    • Asthmatic bronchitis    • Calcaneal spur    • Depressive disorder    • Herpes simplex    • Hypermetropia    • Hypothyroidism    • Menopausal flushing    • Menopause    • Migraine    • Otalgia    • Pneumonia    • Presbyopia    •  Trochanteric bursitis    • Ventricular premature beats        Allergies   Allergen Reactions   • Meat Extract      Red meat, pork , ham   • Azithromycin    • Ceclor [Cefaclor] Diarrhea and Nausea Only   • Keflex [Cephalexin]    • Penicillins        Past Surgical History:   Procedure Laterality Date   • COLONOSCOPY N/A 2/15/2017    Procedure: COLONOSCOPY;  Surgeon: Lupillo Ugarte MD;  Location: [facility] ENDOSCOPY;  Service:    • COSMETIC SURGERY      plastic surgery to face x 4 r/t trauma   • LAPAROSCOPIC CHOLECYSTECTOMY  12/10/1995    Cholecystectomy, laparoscopic (1)      • OTHER SURGICAL HISTORY      Head surgery procedure (1)    plastic surgery on the face    • OTHER SURGICAL HISTORY  10/17/2014    Repair Superficial Wound TR-EXT 2.5 < CM 05791 (1)          Family History   Problem Relation Age of Onset   • Stroke Mother    • Diabetes Father    • Heart disease Father    • Thyroid disease Sister    • Breast cancer Maternal Aunt    • Cancer Neg Hx         multiple in mothers family       Social History     Social History   • Marital status:      Social History Main Topics   • Smoking status: Never Smoker   • Smokeless tobacco: Never Used   • Alcohol use No   • Drug use: No   • Sexual activity: Defer     Other Topics Concern   • Not on file           Objective   Physical Exam   Constitutional: She is oriented to person, place, and time. She appears well-developed and well-nourished. She appears distressed.   HENT:   Head: Normocephalic and atraumatic.   Nose: Nose normal.   Dry mucous membranes.   Eyes: Conjunctivae and EOM are normal. No scleral icterus.   Neck: Normal range of motion. Neck supple. No JVD present.   Cardiovascular: Normal rate, regular rhythm, normal heart sounds and intact distal pulses.  Exam reveals no gallop and no friction rub.    No murmur heard.  Pulmonary/Chest: Effort normal. No respiratory distress. She has no wheezes. She has no rales. She exhibits no tenderness.   Abdominal:  Soft. She exhibits no distension and no mass. There is tenderness. There is no rebound and no guarding.   Diffuse abdominal tenderness without rebound or guarding or rigidity.   Musculoskeletal: Normal range of motion. She exhibits no edema, tenderness or deformity.   Lymphadenopathy:     She has no cervical adenopathy.   Neurological: She is alert and oriented to person, place, and time. No cranial nerve deficit. She exhibits normal muscle tone.   Skin: Skin is warm and dry. No rash noted. She is not diaphoretic. No erythema. No pallor.   Psychiatric: She has a normal mood and affect. Her behavior is normal. Judgment and thought content normal.   Nursing note and vitals reviewed.      Procedures           ED Course      Labs Reviewed   COMPREHENSIVE METABOLIC PANEL - Abnormal; Notable for the following:        Result Value    Glucose 117 (*)     Creatinine 1.13 (*)     Sodium 135 (*)     Potassium 3.3 (*)     eGFR Non  Amer 49 (*)     All other components within normal limits   CBC WITH AUTO DIFFERENTIAL - Abnormal; Notable for the following:     WBC 12.61 (*)     Neutrophil % 89.2 (*)     Lymphocyte % 5.8 (*)     Neutrophils, Absolute 11.24 (*)     Immature Grans, Absolute 0.03 (*)     All other components within normal limits   URINALYSIS W/ MICROSCOPIC IF INDICATED (NO CULTURE) - Abnormal; Notable for the following:     Appearance, UA Cloudy (*)     Blood, UA Large (3+) (*)     Protein, UA 30 mg/dL (1+) (*)     Leuk Esterase, UA Large (3+) (*)     All other components within normal limits   URINALYSIS, MICROSCOPIC ONLY - Abnormal; Notable for the following:     RBC, UA Too Numerous to Count (*)     WBC, UA Too Numerous to Count (*)     Bacteria, UA 1+ (*)     Squamous Epithelial Cells, UA 6-12 (*)     All other components within normal limits   LACTIC ACID, PLASMA - Normal   BLOOD CULTURE   BLOOD CULTURE   GASTROINTESTINAL PANEL, PCR   RAINBOW DRAW    Narrative:     The following orders were created for  panel order [place] Draw.  Procedure                               Abnormality         Status                     ---------                               -----------         ------                     Light Blue Top[[id number]]                                   Final result               Green Top (Gel)[[id number]]                                  Final result               Lavender Top[[id number]]                                     Final result               Gold Top - SST[[id number]]                                   Final result                 Please view results for these tests on the individual orders.   CBC AND DIFFERENTIAL    Narrative:     The following orders were created for panel order CBC & Differential.  Procedure                               Abnormality         Status                     ---------                               -----------         ------                     CBC Auto Differential[[id number]]        Abnormal            Final result                 Please view results for these tests on the individual orders.   LIGHT BLUE TOP   GREEN TOP   LAVENDER TOP   GOLD TOP - SST       XR Abdomen 2 View With Chest 1 View   Final Result   CONCLUSION:          1. Negative examination.                                  If pain or symptoms persist beyond reasonable expectations, a   contrast enhanced CT examination is suggested, as is deemed   clinical appropriate.                           Electronically signed by:  FINN Zamora MD  7/3/2018 6:59 PM   CDT Workstation: [id number]        Patient with improvement with ED interventions.  There is no acute abdominal process noted.  Mild leukocytosis with symptoms consistent with UTI.  Patient already on doxycycline.  We'll add Bactrim to cover for any infectious diarrhea and urinary symptoms.  Patient continue with the Doxy that was given for the tick borne illness.  Patient also given antiemetics as needed for nausea.            MDM      Final diagnoses:    Urinary tract infection without hematuria, site unspecified   Diarrhea, unspecified type   Weakness   Acute nonintractable headache, unspecified headache type            Sam Crowder MD  07/03/18 [id number]

## 2018-07-06 ENCOUNTER — APPOINTMENT (OUTPATIENT)
Dept: LAB | Facility: HOSPITAL | Age: 58
End: 2018-07-06

## 2018-07-06 ENCOUNTER — OFFICE VISIT (OUTPATIENT)
Dept: FAMILY MEDICINE CLINIC | Facility: CLINIC | Age: 58
End: 2018-07-06

## 2018-07-06 ENCOUNTER — OFFICE VISIT (OUTPATIENT)
Dept: CARDIOLOGY | Facility: CLINIC | Age: 58
End: 2018-07-06

## 2018-07-06 VITALS
BODY MASS INDEX: 29.83 KG/M2 | WEIGHT: 185.6 LBS | SYSTOLIC BLOOD PRESSURE: 118 MMHG | DIASTOLIC BLOOD PRESSURE: 84 MMHG | HEART RATE: 62 BPM | HEIGHT: 66 IN

## 2018-07-06 VITALS
HEART RATE: 79 BPM | DIASTOLIC BLOOD PRESSURE: 82 MMHG | HEIGHT: 66 IN | OXYGEN SATURATION: 99 % | BODY MASS INDEX: 29.88 KG/M2 | WEIGHT: 185.9 LBS | SYSTOLIC BLOOD PRESSURE: 130 MMHG

## 2018-07-06 DIAGNOSIS — I49.8 BIGEMINY: ICD-10-CM

## 2018-07-06 DIAGNOSIS — R19.7 DIARRHEA, UNSPECIFIED TYPE: ICD-10-CM

## 2018-07-06 DIAGNOSIS — E03.9 ACQUIRED HYPOTHYROIDISM: Chronic | ICD-10-CM

## 2018-07-06 DIAGNOSIS — I49.3 PVC (PREMATURE VENTRICULAR CONTRACTION): Primary | ICD-10-CM

## 2018-07-06 DIAGNOSIS — N39.0 UTI (URINARY TRACT INFECTION), UNCOMPLICATED: Primary | ICD-10-CM

## 2018-07-06 DIAGNOSIS — R00.2 PALPITATION: ICD-10-CM

## 2018-07-06 LAB
ANION GAP SERPL CALCULATED.3IONS-SCNC: 12 MMOL/L (ref 5–15)
BACTERIA UR QL AUTO: ABNORMAL /HPF
BILIRUB UR QL STRIP: NEGATIVE
BILIRUB UR QL STRIP: NEGATIVE
BUN BLD-MCNC: 9 MG/DL (ref 7–21)
BUN/CREAT SERPL: 8.3 (ref 7–25)
CALCIUM SPEC-SCNC: 8.7 MG/DL (ref 8.4–10.2)
CHLORIDE SERPL-SCNC: 104 MMOL/L (ref 95–110)
CLARITY UR: ABNORMAL
CLARITY UR: ABNORMAL
CO2 SERPL-SCNC: 26 MMOL/L (ref 22–31)
COLOR UR: YELLOW
COLOR UR: YELLOW
CREAT BLD-MCNC: 1.09 MG/DL (ref 0.5–1)
GFR SERPL CREATININE-BSD FRML MDRD: 51 ML/MIN/1.73 (ref 51–120)
GLUCOSE BLD-MCNC: 85 MG/DL (ref 60–100)
GLUCOSE UR STRIP-MCNC: NEGATIVE MG/DL
GLUCOSE UR STRIP-MCNC: NEGATIVE MG/DL
HGB UR QL STRIP.AUTO: ABNORMAL
HGB UR QL STRIP.AUTO: ABNORMAL
HYALINE CASTS UR QL AUTO: ABNORMAL /LPF
KETONES UR QL STRIP: NEGATIVE
KETONES UR QL STRIP: NEGATIVE
LEUKOCYTE ESTERASE UR QL STRIP.AUTO: ABNORMAL
LEUKOCYTE ESTERASE UR QL STRIP.AUTO: ABNORMAL
MAGNESIUM SERPL-MCNC: 2 MG/DL (ref 1.6–2.3)
NITRITE UR QL STRIP: NEGATIVE
NITRITE UR QL STRIP: NEGATIVE
PH UR STRIP.AUTO: 6 [PH] (ref 5–9)
PH UR STRIP.AUTO: 6 [PH] (ref 5–9)
POTASSIUM BLD-SCNC: 3.4 MMOL/L (ref 3.5–5.1)
PROT UR QL STRIP: ABNORMAL
PROT UR QL STRIP: ABNORMAL
RBC # UR: ABNORMAL /HPF
REF LAB TEST METHOD: ABNORMAL
SODIUM BLD-SCNC: 142 MMOL/L (ref 137–145)
SP GR UR STRIP: 1.02 (ref 1–1.03)
SP GR UR STRIP: 1.02 (ref 1–1.03)
SQUAMOUS #/AREA URNS HPF: ABNORMAL /HPF
T4 FREE SERPL-MCNC: 1.19 NG/DL (ref 0.78–2.19)
TRANS CELLS #/AREA URNS HPF: ABNORMAL /HPF
TSH SERPL DL<=0.05 MIU/L-ACNC: 10.7 MIU/ML (ref 0.46–4.68)
UROBILINOGEN UR QL STRIP: ABNORMAL
UROBILINOGEN UR QL STRIP: ABNORMAL
WBC UR QL AUTO: ABNORMAL /HPF

## 2018-07-06 PROCEDURE — 80048 BASIC METABOLIC PNL TOTAL CA: CPT | Performed by: GENERAL PRACTICE

## 2018-07-06 PROCEDURE — 36415 COLL VENOUS BLD VENIPUNCTURE: CPT | Performed by: GENERAL PRACTICE

## 2018-07-06 PROCEDURE — 99214 OFFICE O/P EST MOD 30 MIN: CPT | Performed by: GENERAL PRACTICE

## 2018-07-06 PROCEDURE — 87086 URINE CULTURE/COLONY COUNT: CPT | Performed by: GENERAL PRACTICE

## 2018-07-06 PROCEDURE — 99204 OFFICE O/P NEW MOD 45 MIN: CPT | Performed by: INTERNAL MEDICINE

## 2018-07-06 PROCEDURE — 84443 ASSAY THYROID STIM HORMONE: CPT | Performed by: GENERAL PRACTICE

## 2018-07-06 PROCEDURE — 84439 ASSAY OF FREE THYROXINE: CPT | Performed by: GENERAL PRACTICE

## 2018-07-06 PROCEDURE — 81001 URINALYSIS AUTO W/SCOPE: CPT | Performed by: GENERAL PRACTICE

## 2018-07-06 PROCEDURE — 83735 ASSAY OF MAGNESIUM: CPT | Performed by: GENERAL PRACTICE

## 2018-07-06 NOTE — PROGRESS NOTES
Adilene Morgan  58 y.o. female    07/06/2018  1. PVC (premature ventricular contraction)    2. Acquired hypothyroidism    3. 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  No chest pain orthopnea PND no syncope or near syncopal episode reported.  No sustained palpitation was reported.  No dysuria hematuria or bright red blood per rectum reported.  Her BMI is 30 and she does not smoke. No Family history of premature heart disease.        SUBJECTIVE    Allergies   Allergen Reactions   • Meat Extract      Red meat, pork , ham   • Azithromycin    • Ceclor [Cefaclor] Diarrhea and Nausea Only   • Keflex [Cephalexin]    • Penicillins          Past Medical History:   Diagnosis Date   • Achilles bursitis    • Achilles tendinitis    • Acquired hypothyroidism    • Allergic rhinitis    • Allergy to meat     alphagal   • Allergy to milk products    • Arrhythmia    • Asthmatic bronchitis    • Asthmatic bronchitis    • Calcaneal spur    • Depressive disorder    • Herpes simplex    • Hypermetropia    • Hypothyroidism    • Menopausal flushing    • Menopause    • Migraine    • Otalgia    • Pneumonia    • Presbyopia    • Trochanteric bursitis    • UTI (urinary tract infection) 07/03/2018   • Ventricular premature beats          Past Surgical History:   Procedure Laterality Date   • COLONOSCOPY N/A 2/15/2017    Procedure: COLONOSCOPY;  Surgeon: Lupillo Ugarte MD;  Location: [facility] ENDOSCOPY;  Service:    • COSMETIC SURGERY      plastic surgery to face x 4 r/t trauma   • LAPAROSCOPIC CHOLECYSTECTOMY  12/10/1995    Cholecystectomy, laparoscopic (1)      • OTHER SURGICAL HISTORY      Head surgery procedure (1)    plastic surgery on the face    • OTHER SURGICAL HISTORY  10/17/2014    Repair  "Superficial Wound TR-EXT 2.5 < CM 72484 (1)            Family History   Problem Relation Age of Onset   • Stroke Mother    • Diabetes Father    • Heart disease Father    • Thyroid disease Sister    • Breast cancer Maternal Aunt    • Cancer Neg Hx         multiple in mothers family         Social History     Social History   • Marital status:      Spouse name: N/A   • Number of children: N/A   • Years of education: N/A     Occupational History   • Not on file.     Social History Main Topics   • Smoking status: Never Smoker   • Smokeless tobacco: Never Used   • Alcohol use No   • Drug use: No   • Sexual activity: Defer     Other Topics Concern   • Not on file     Social History Narrative   • No narrative on file         Current Outpatient Prescriptions   Medication Sig Dispense Refill   • aspirin 325 MG tablet Take 325 mg by mouth Daily.     • fluticasone (FLONASE ALLERGY RELIEF) 50 MCG/ACT nasal spray 1 spray into each nostril Daily As Needed.     • loratadine (CLARITIN) 10 MG tablet Take 10 mg by mouth Daily.     • Multiple Vitamins-Minerals (MULTIVITAMIN ADULT PO) Take  by mouth.     • ondansetron ODT (ZOFRAN-ODT) 4 MG disintegrating tablet Take 1 tablet by mouth Every 6 (Six) Hours As Needed for Nausea or Vomiting. 12 tablet 0   • sulfamethoxazole-trimethoprim (BACTRIM DS,SEPTRA DS) 800-160 MG per tablet Take 1 tablet by mouth 2 (Two) Times a Day. 14 tablet 0   • SUMAtriptan (IMITREX) 20 MG/ACT nasal spray 1 spray into each nostril Every 2 (Two) Hours As Needed for Migraine. 10 each 5   • SYNTHROID 100 MCG tablet Take 1 tablet by mouth Daily. Do not substitute 30 tablet 11   • topiramate (TOPAMAX) 50 MG tablet Take 1 tablet by mouth Every Night. 30 tablet 11     No current facility-administered medications for this visit.          OBJECTIVE    /84   Pulse 62   Ht 167.6 cm (66\")   Wt 84.2 kg (185 lb 9.6 oz)   BMI 29.96 kg/m²         Review of Systems     Constitutional:  Denies recent weight loss, " weight gain, fever or chills, no change in exercise tolerance     HENT:  Denies any hearing loss, epistaxis, hoarseness, or difficulty speaking.     Eyes: No blurring     Respiratory:  Denies dyspnea with exertion,no cough, wheezing, or hemoptysis.     Cardiovascular: The H&P     Gastrointestinal:  Denies change in bowel habits, dyspepsia, ulcer disease, hematochezia, or melena.     Endocrine: Negative for cold intolerance, heat intolerance, polydipsia, polyphagia and polyuria. Denies any history of weight change, heat/cold intolerance, polydipsia, polyuria     Genitourinary: Negative.      Musculoskeletal: Denies any history of arthritic symptoms or back problems     Skin:  Denies any change in hair or nails, rashes, or skin lesions.     Allergic/Immunologic: Negative.  Negative for environmental allergies, food allergies and immunocompromised state.     Neurological:  Denies any history of recurrent headaches, strokes, TIA, or seizure disorder.     Hematological: Denies any food allergies, seasonal allergies, bleeding disorders, or lymphadenopathy.     Psychiatric/Behavioral: Denies any history of depression, substance abuse, or change in cognitive function.         Physical Exam     Constitutional: Cooperative, alert and oriented, well-developed, well-nourished, in no acute distress.     HENT:   Head: Normocephalic, normal hair patterns, no masses or tenderness.  Ears, Nose, and Throat: No gross abnormalities. No pallor or cyanosis. Dentition good.   Eyes: EOMS intact, PERRL, conjunctivae and lids unremarkable. Fundoscopic exam and visual fields not performed.   Neck: No palpable masses or adenopathy, no thyromegaly, no JVD, carotid pulses are full and equal bilaterally and without  Bruits.     Cardiovascular: Regular rhythm, S1 and S2 normal, no S3 or S4. Apical impulse not displaced. No murmurs, gallops, or rubs detected.     Pulmonary/Chest: Chest: normal symmetry, no tenderness to palpation, normal  respiratory excursion, no intercostal retraction, no use of accessory muscles.            Pulmonary: Normal breath sounds. No rales or ronchi.    Abdominal: Abdomen soft, bowel sounds normoactive, no masses, no hepatosplenomegaly, non-tender, no bruits.     Musculoskeletal: No deformities, clubbing, cyanosis, erythema, or edema observed. There are no spinal abnormalities noted. Normal muscle strength and tone. Pulses full and equal in all extremities, no bruits auscultated.     Neurological: No gross motor or sensory deficits noted, affect appropriate, oriented to time, person, place.     Skin: Warm and dry to the touch, no apparent skin lesions or masses noted.     Psychiatric: She has a normal mood and affect. Her behavior is normal. Judgment and thought content normal.         Procedures      Lab Results   Component Value Date    WBC 12.61 (H) 07/03/2018    HGB 13.2 07/03/2018    HCT 38.8 07/03/2018    MCV 85.1 07/03/2018     07/03/2018     Lab Results   Component Value Date    GLUCOSE 85 07/06/2018    BUN 9 07/06/2018    CREATININE 1.09 (H) 07/06/2018    EGFRIFNONA 51 07/06/2018    BCR 8.3 07/06/2018    CO2 26.0 07/06/2018    CALCIUM 8.7 07/06/2018    ALBUMIN 3.80 07/03/2018    LABIL2 1.2 07/03/2018    AST 24 07/03/2018    ALT 26 07/03/2018     Lab Results   Component Value Date    CHOL 186 11/08/2017     Lab Results   Component Value Date    TRIG 100 11/08/2017    TRIG 176 10/06/2015     Lab Results   Component Value Date    HDL 81 11/08/2017    HDL 57 (L) 10/06/2015     No components found for: LDLCALC  Lab Results   Component Value Date    LDL 72 11/08/2017    LDL 53 10/25/2016    LDL 76 10/06/2015    LDL 57 10/06/2015     No results found for: HDLLDLRATIO  No components found for: CHOLHDL  No results found for: HGBA1C  Lab Results   Component Value Date    TSH 10.700 (H) 07/06/2018    L3CXBQU 167.0 07/25/2017           ASSESSMENT AND PLAN  #1 premature ventricular complex of atypical left  bundle-branch block morphology #2 palpitation # 3 hypothyroidism    Clinically,and cardiac decompensation based on the clinical history physical finding.  No evidence of ongoing ischemia.  Patient documented history of premature ventricular complex with atypical left bundle branch block morphology and small R-wave in lead V1 in the possibility of tachycardia or right ventricular outflow track or coronary sinus cannot be excluded.  No sustained palpitation reported.  We will proceed with 24-48 hour Holter in order to document the previously burden, treadmill stress test to assess the functional capacity and premature ventricular complex during stress test.  Will arrange an echocardiographic study to assess the left ventricle systolic function.  Risk factor lifestyle modification discussed.  Low carbohydrate and dash diet explained given the BMI of 30.  Will not consider medical management at this stage.  We'll see her back in 6 month R depends on patient clinical condition and our outcome of cardiac evaluations.    Adilene was seen today for palpitations.    Diagnoses and all orders for this visit:    PVC (premature ventricular contraction)  -     Holter Monitor - 48 Hour; Future  -     Treadmill Stress Test; Future  -     Adult Transthoracic Echo Complete W/ Cont if Necessary Per Protocol; Future    Acquired hypothyroidism  -     Holter Monitor - 48 Hour; Future  -     Treadmill Stress Test; Future  -     Adult Transthoracic Echo Complete W/ Cont if Necessary Per Protocol; Future    Palpitation  -     Treadmill Stress Test; Future  -     Adult Transthoracic Echo Complete W/ Cont if Necessary Per Protocol; Future        Clement Palacio MD  7/6/2018  10:47 AM

## 2018-07-06 NOTE — PROGRESS NOTES
"Subjective   Adilene Morgan is a 58 y.o. female.   Chief Complaint   Patient presents with   • Follow-up   • Urinary Tract Infection   • Abdominal Pain   • Fatigue     History of Present Illness     Seen in the ER for abdominal pain, nausea, vomiting and diarrhea. Got sick suddenly 5 days ago, has mucousy BM's. Had been on antibiotic in June for strep throat. Urine showed TNTC R$BC's and WBC\"S, given IV fluids, IV Levaquin and started on Bactrim. Is still feeling weak and nauseated, not eating well, no further diarrhea.   The patient denies fever or chills. Had not had any urinary symptoms. Labs and xrays reviewed. Medications reviewed and reconciled. Has an appt with Dr. Palacio today for frequent PVC's.    The following portions of the patient's history were reviewed and updated as appropriate: allergies, current medications, past social history and problem list.    Outpatient Medications Prior to Visit   Medication Sig Dispense Refill   • aspirin 325 MG tablet Take 325 mg by mouth Daily.     • fluticasone (FLONASE ALLERGY RELIEF) 50 MCG/ACT nasal spray 1 spray into each nostril Daily As Needed.     • loratadine (CLARITIN) 10 MG tablet Take 10 mg by mouth Daily.     • Multiple Vitamins-Minerals (MULTIVITAMIN ADULT PO) Take  by mouth.     • ondansetron ODT (ZOFRAN-ODT) 4 MG disintegrating tablet Take 1 tablet by mouth Every 6 (Six) Hours As Needed for Nausea or Vomiting. 12 tablet 0   • sulfamethoxazole-trimethoprim (BACTRIM DS,SEPTRA DS) 800-160 MG per tablet Take 1 tablet by mouth 2 (Two) Times a Day. 14 tablet 0   • SUMAtriptan (IMITREX) 20 MG/ACT nasal spray 1 spray into each nostril Every 2 (Two) Hours As Needed for Migraine. 10 each 5   • SYNTHROID 100 MCG tablet Take 1 tablet by mouth Daily. Do not substitute 30 tablet 11   • topiramate (TOPAMAX) 50 MG tablet Take 1 tablet by mouth Every Night. 30 tablet 11   • doxycycline (MONODOX) 100 MG capsule Take 1 capsule by mouth 2 (Two) Times a Day. 14 capsule 0 " "    No facility-administered medications prior to visit.        Review of Systems   Constitutional: Positive for fatigue. Negative for chills, fever and unexpected weight change.   HENT: Negative.  Negative for congestion, ear pain, hearing loss, nosebleeds, rhinorrhea, sneezing, sore throat and tinnitus.    Eyes: Negative.  Negative for discharge.   Respiratory: Negative.  Negative for cough, shortness of breath and wheezing.    Cardiovascular: Negative.  Negative for chest pain and palpitations.   Gastrointestinal: Positive for nausea. Negative for abdominal pain, constipation, diarrhea and vomiting.   Endocrine: Negative.    Genitourinary: Negative.  Negative for dysuria, frequency and urgency.   Musculoskeletal: Negative.  Negative for arthralgias, back pain, joint swelling, myalgias and neck pain.   Skin: Negative.  Negative for rash.   Allergic/Immunologic: Negative.    Neurological: Positive for weakness. Negative for dizziness, numbness and headaches.   Hematological: Negative.  Negative for adenopathy.   Psychiatric/Behavioral: Negative.  Negative for dysphoric mood and sleep disturbance. The patient is not nervous/anxious.        Objective   Visit Vitals  /82 (BP Location: Left arm, Patient Position: Sitting, Cuff Size: Adult)   Pulse 79   Ht 167.6 cm (66\")   Wt 84.3 kg (185 lb 14.4 oz)   SpO2 99%   BMI 30.01 kg/m²     Physical Exam   Constitutional: She is oriented to person, place, and time. She appears well-developed and well-nourished. No distress.   HENT:   Head: Normocephalic and atraumatic.   Nose: Nose normal.   Mouth/Throat: Oropharynx is clear and moist.   Eyes: Conjunctivae and EOM are normal. Pupils are equal, round, and reactive to light. Right eye exhibits no discharge. Left eye exhibits no discharge.   Neck: No thyromegaly present.   Cardiovascular: Normal rate, normal heart sounds and intact distal pulses.  An irregular rhythm present.  Extrasystoles are present.   Is in Bigeminy "   Pulmonary/Chest: Effort normal and breath sounds normal.   Abdominal: Soft. Normal appearance and bowel sounds are normal. She exhibits no mass. There is no tenderness.   Lymphadenopathy:     She has no cervical adenopathy.   Neurological: She is alert and oriented to person, place, and time.   Skin: Skin is warm and dry.   Psychiatric: She has a normal mood and affect.   Nursing note and vitals reviewed.      Assessment/Plan   Problem List Items Addressed This Visit        Endocrine    Acquired hypothyroidism (Chronic)    Relevant Orders    T4, Free    TSH      Other Visit Diagnoses     UTI (urinary tract infection), uncomplicated    -  Primary    Relevant Orders    Urinalysis With Culture If Indicated - Urine, Clean Catch    Urinalysis With Microscopic - Urine, Clean Catch    Diarrhea, unspecified type        Relevant Orders    Gastrointestinal Panel, PCR - Stool, Per Rectum    T4, Free    TSH    Urinalysis With Culture If Indicated - Urine, Clean Catch    Urinalysis With Microscopic - Urine, Clean Catch    Basic Metabolic Panel    Magnesium    Bigeminy        Relevant Orders    T4, Free    TSH    Urinalysis With Culture If Indicated - Urine, Clean Catch    Urinalysis With Microscopic - Urine, Clean Catch    Basic Metabolic Panel    Magnesium         Symptomatic treatment. Will notify regarding results. If has any more diarrhea need to check for C. Diff.     No orders of the defined types were placed in this encounter.    No Follow-up on file.

## 2018-07-07 LAB — BACTERIA SPEC AEROBE CULT: NORMAL

## 2018-07-08 LAB
BACTERIA SPEC AEROBE CULT: NORMAL
BACTERIA SPEC AEROBE CULT: NORMAL

## 2018-07-11 DIAGNOSIS — E03.9 ACQUIRED HYPOTHYROIDISM: Primary | Chronic | ICD-10-CM

## 2018-07-11 DIAGNOSIS — R31.29 OTHER MICROSCOPIC HEMATURIA: ICD-10-CM

## 2018-08-01 ENCOUNTER — DOCUMENTATION (OUTPATIENT)
Dept: CARDIOLOGY | Facility: CLINIC | Age: 58
End: 2018-08-01

## 2018-08-08 ENCOUNTER — LAB (OUTPATIENT)
Dept: LAB | Facility: HOSPITAL | Age: 58
End: 2018-08-08

## 2018-08-08 DIAGNOSIS — R31.29 OTHER MICROSCOPIC HEMATURIA: ICD-10-CM

## 2018-08-08 DIAGNOSIS — E03.9 ACQUIRED HYPOTHYROIDISM: Chronic | ICD-10-CM

## 2018-08-08 LAB
ANION GAP SERPL CALCULATED.3IONS-SCNC: 8 MMOL/L (ref 5–15)
BACTERIA UR QL AUTO: ABNORMAL /HPF
BILIRUB UR QL STRIP: NEGATIVE
BUN BLD-MCNC: 15 MG/DL (ref 7–21)
BUN/CREAT SERPL: 17.4 (ref 7–25)
CALCIUM SPEC-SCNC: 9.2 MG/DL (ref 8.4–10.2)
CHLORIDE SERPL-SCNC: 103 MMOL/L (ref 95–110)
CLARITY UR: ABNORMAL
CO2 SERPL-SCNC: 27 MMOL/L (ref 22–31)
COLOR UR: YELLOW
CREAT BLD-MCNC: 0.86 MG/DL (ref 0.5–1)
DEPRECATED RDW RBC AUTO: 42.4 FL (ref 36.4–46.3)
EOSINOPHIL # BLD MANUAL: 0.08 10*3/MM3 (ref 0–0.7)
EOSINOPHIL NFR BLD MANUAL: 1 % (ref 0–7)
ERYTHROCYTE [DISTWIDTH] IN BLOOD BY AUTOMATED COUNT: 13.4 % (ref 11.5–14.5)
GFR SERPL CREATININE-BSD FRML MDRD: 68 ML/MIN/1.73 (ref 51–120)
GLUCOSE BLD-MCNC: 68 MG/DL (ref 60–100)
GLUCOSE UR STRIP-MCNC: NEGATIVE MG/DL
HCT VFR BLD AUTO: 39.7 % (ref 35–45)
HGB BLD-MCNC: 13.1 G/DL (ref 12–15.5)
HGB UR QL STRIP.AUTO: ABNORMAL
HYALINE CASTS UR QL AUTO: ABNORMAL /LPF
KETONES UR QL STRIP: NEGATIVE
LEUKOCYTE ESTERASE UR QL STRIP.AUTO: ABNORMAL
LYMPHOCYTES # BLD MANUAL: 1.99 10*3/MM3 (ref 0.6–4.2)
LYMPHOCYTES NFR BLD MANUAL: 26 % (ref 10–50)
LYMPHOCYTES NFR BLD MANUAL: 5 % (ref 0–12)
MCH RBC QN AUTO: 28.2 PG (ref 26.5–34)
MCHC RBC AUTO-ENTMCNC: 33 G/DL (ref 31.4–36)
MCV RBC AUTO: 85.6 FL (ref 80–98)
MONOCYTES # BLD AUTO: 0.38 10*3/MM3 (ref 0–0.9)
NEUTROPHILS # BLD AUTO: 5.21 10*3/MM3 (ref 2–8.6)
NEUTROPHILS NFR BLD MANUAL: 68 % (ref 37–80)
NITRITE UR QL STRIP: NEGATIVE
PH UR STRIP.AUTO: 7 [PH] (ref 5–9)
PLAT MORPH BLD: NORMAL
PLATELET # BLD AUTO: 258 10*3/MM3 (ref 150–450)
PMV BLD AUTO: 10.6 FL (ref 8–12)
POTASSIUM BLD-SCNC: 3.7 MMOL/L (ref 3.5–5.1)
PROT UR QL STRIP: NEGATIVE
RBC # BLD AUTO: 4.64 10*6/MM3 (ref 3.77–5.16)
RBC # UR: ABNORMAL /HPF
RBC MORPH BLD: NORMAL
REF LAB TEST METHOD: ABNORMAL
SODIUM BLD-SCNC: 138 MMOL/L (ref 137–145)
SP GR UR STRIP: 1.01 (ref 1–1.03)
SQUAMOUS #/AREA URNS HPF: ABNORMAL /HPF
T4 FREE SERPL-MCNC: 1.66 NG/DL (ref 0.78–2.19)
TSH SERPL DL<=0.05 MIU/L-ACNC: 2.57 MIU/ML (ref 0.46–4.68)
UROBILINOGEN UR QL STRIP: ABNORMAL
WBC MORPH BLD: NORMAL
WBC NRBC COR # BLD: 7.66 10*3/MM3 (ref 3.2–9.8)
WBC UR QL AUTO: ABNORMAL /HPF

## 2018-08-08 PROCEDURE — 36415 COLL VENOUS BLD VENIPUNCTURE: CPT

## 2018-08-08 PROCEDURE — 84439 ASSAY OF FREE THYROXINE: CPT

## 2018-08-08 PROCEDURE — 85025 COMPLETE CBC W/AUTO DIFF WBC: CPT

## 2018-08-08 PROCEDURE — 80048 BASIC METABOLIC PNL TOTAL CA: CPT

## 2018-08-08 PROCEDURE — 85007 BL SMEAR W/DIFF WBC COUNT: CPT

## 2018-08-08 PROCEDURE — 81001 URINALYSIS AUTO W/SCOPE: CPT

## 2018-08-08 PROCEDURE — 84443 ASSAY THYROID STIM HORMONE: CPT

## 2018-08-08 PROCEDURE — 87086 URINE CULTURE/COLONY COUNT: CPT

## 2018-08-09 LAB — BACTERIA SPEC AEROBE CULT: NORMAL

## 2018-08-16 ENCOUNTER — OFFICE VISIT (OUTPATIENT)
Dept: FAMILY MEDICINE CLINIC | Facility: CLINIC | Age: 58
End: 2018-08-16

## 2018-08-16 VITALS
HEART RATE: 71 BPM | WEIGHT: 185 LBS | HEIGHT: 66 IN | SYSTOLIC BLOOD PRESSURE: 120 MMHG | OXYGEN SATURATION: 99 % | DIASTOLIC BLOOD PRESSURE: 70 MMHG | BODY MASS INDEX: 29.73 KG/M2

## 2018-08-16 DIAGNOSIS — G43.909 MIGRAINE WITHOUT STATUS MIGRAINOSUS, NOT INTRACTABLE, UNSPECIFIED MIGRAINE TYPE: ICD-10-CM

## 2018-08-16 DIAGNOSIS — E03.9 ACQUIRED HYPOTHYROIDISM: Primary | Chronic | ICD-10-CM

## 2018-08-16 DIAGNOSIS — Z00.00 ANNUAL PHYSICAL EXAM: ICD-10-CM

## 2018-08-16 DIAGNOSIS — G43.009 NONINTRACTABLE MIGRAINE, UNSPECIFIED MIGRAINE TYPE: ICD-10-CM

## 2018-08-16 PROCEDURE — 99213 OFFICE O/P EST LOW 20 MIN: CPT | Performed by: GENERAL PRACTICE

## 2018-08-16 RX ORDER — TOPIRAMATE 50 MG/1
50 TABLET, FILM COATED ORAL NIGHTLY
Qty: 90 TABLET | Refills: 3 | Status: SHIPPED | OUTPATIENT
Start: 2018-08-16 | End: 2018-11-19 | Stop reason: SDUPTHER

## 2018-08-16 NOTE — PATIENT INSTRUCTIONS
Trochanteric Bursitis Rehab  Ask your health care provider which exercises are safe for you. Do exercises exactly as told by your health care provider and adjust them as directed. It is normal to feel mild stretching, pulling, tightness, or discomfort as you do these exercises, but you should stop right away if you feel sudden pain or your pain gets worse. Do not begin these exercises until told by your health care provider.  Stretching exercises  These exercises warm up your muscles and joints and improve the movement and flexibility of your hip. These exercises also help to relieve pain and stiffness.  Exercise A: Iliotibial band stretch    1. Lie on your side with your left / right leg in the top position.  2. Bend your left / right knee and grab your ankle.  3. Slowly bring your knee back so your thigh is behind your body.  4. Slowly lower your knee toward the floor until you feel a gentle stretch on the outside of your left / right thigh. If you do not feel a stretch and your knee will not fall farther, place the heel of your other foot on top of your outer knee and pull your thigh down farther.  5. Hold this position for __________ seconds.  6. Slowly return to the starting position.  Repeat __________ times. Complete this exercise __________ times a day.  Strengthening exercises  These exercises build strength and endurance in your hip and pelvis. Endurance is the ability to use your muscles for a long time, even after they get tired.  Exercise B: Bridge (  hip extensors)  1. Lie on your back on a firm surface with your knees bent and your feet flat on the floor.  2. Tighten your buttocks muscles and lift your buttocks off the floor until your trunk is level with your thighs. You should feel the muscles working in your buttocks and the back of your thighs. If this exercise is too easy, try doing it with your arms crossed over your chest.  3. Hold this position for __________ seconds.  4. Slowly return to the  starting position.  5. Let your muscles relax completely between repetitions.  Repeat __________ times. Complete this exercise __________ times a day.  Exercise C: Squats (  knee extensors and  quadriceps)  1.  front of a table, with your feet and knees pointing straight ahead. You may rest your hands on the table for balance but not for support.  2. Slowly bend your knees and lower your hips like you are going to sit in a chair.  ? Keep your weight over your heels, not over your toes.  ? Keep your lower legs upright so they are parallel with the table legs.  ? Do not let your hips go lower than your knees.  ? Do not bend lower than told by your health care provider.  ? If your hip pain increases, do not bend as low.  3. Hold this position for __________ seconds.  4. Slowly push with your legs to return to standing. Do not use your hands to pull yourself to standing.  Repeat __________ times. Complete this exercise __________ times a day.  Exercise D: Hip hike  1. Stand sideways on a bottom step. Stand on your left / right leg with your other foot unsupported next to the step. You can hold onto the railing or wall if needed for balance.  2. Keeping your knees straight and your torso square, lift your left / right hip up toward the ceiling.  3. Hold this position for __________ seconds.  4. Slowly let your left / right hip lower toward the floor, past the starting position. Your foot should get closer to the floor. Do not lean or bend your knees.  Repeat __________ times. Complete this exercise __________ times a day.  Exercise E: Single leg stand  1. Stand near a counter or door frame that you can hold onto for balance as needed. It is helpful to  front of a mirror for this exercise so you can watch your hip.  2. Squeeze your left / right buttock muscles then lift up your other foot. Do not let your left / right hip push out to the side.  3. Hold this position for __________ seconds.  Repeat  __________ times. Complete this exercise __________ times a day.  This information is not intended to replace advice given to you by your health care provider. Make sure you discuss any questions you have with your health care provider.  Document Released: 01/25/2006 Document Revised: 08/24/2017 Document Reviewed: 12/02/2016  Elsevier Interactive Patient Education © 2018 [employer]

## 2018-08-16 NOTE — PROGRESS NOTES
Subjective   Adilene Morgan is a 58 y.o. female.   Chief Complaint   Patient presents with   • Hypothyroidism     labs     For review and evaluation of management of chronic medical problems. Labs reviewed. Headaches are stable on topamax.   Hypothyroidism   This is a chronic problem. The current episode started more than 1 year ago. The problem occurs constantly. The problem has been unchanged. Associated symptoms include a change in bowel habit and fatigue. Pertinent negatives include no abdominal pain, arthralgias, chest pain, chills, congestion, coughing, fever, headaches, joint swelling, myalgias, nausea, neck pain, numbness, rash, sore throat, vomiting or weakness. Nothing aggravates the symptoms. Treatments tried: armour thyroid. The treatment provided moderate relief.      The following portions of the patient's history were reviewed and updated as appropriate: allergies, current medications, past social history and problem list.    Outpatient Medications Prior to Visit   Medication Sig Dispense Refill   • aspirin 325 MG tablet Take 325 mg by mouth Daily.     • fluticasone (FLONASE ALLERGY RELIEF) 50 MCG/ACT nasal spray 1 spray into each nostril Daily As Needed.     • loratadine (CLARITIN) 10 MG tablet Take 10 mg by mouth Daily.     • Multiple Vitamins-Minerals (MULTIVITAMIN ADULT PO) Take  by mouth.     • SUMAtriptan (IMITREX) 20 MG/ACT nasal spray 1 spray into each nostril Every 2 (Two) Hours As Needed for Migraine. 10 each 5   • SYNTHROID 100 MCG tablet Take 1 tablet by mouth Daily. Do not substitute 30 tablet 11   • topiramate (TOPAMAX) 50 MG tablet Take 1 tablet by mouth Every Night. 30 tablet 11   • ondansetron ODT (ZOFRAN-ODT) 4 MG disintegrating tablet Take 1 tablet by mouth Every 6 (Six) Hours As Needed for Nausea or Vomiting. 12 tablet 0   • sulfamethoxazole-trimethoprim (BACTRIM DS,SEPTRA DS) 800-160 MG per tablet Take 1 tablet by mouth 2 (Two) Times a Day. 14 tablet 0     No  "facility-administered medications prior to visit.        Review of Systems   Constitutional: Positive for fatigue. Negative for chills, fever and unexpected weight change.   HENT: Negative.  Negative for congestion, ear pain, hearing loss, nosebleeds, rhinorrhea, sneezing, sore throat and tinnitus.    Eyes: Negative.  Negative for discharge.   Respiratory: Negative.  Negative for cough, shortness of breath and wheezing.    Cardiovascular: Negative.  Negative for chest pain and palpitations.   Gastrointestinal: Positive for change in bowel habit. Negative for abdominal pain, constipation, diarrhea, nausea and vomiting.   Endocrine: Negative.    Genitourinary: Negative.  Negative for dysuria, frequency and urgency.   Musculoskeletal: Negative.  Negative for arthralgias, back pain, joint swelling, myalgias and neck pain.   Skin: Negative.  Negative for rash.   Allergic/Immunologic: Negative.    Neurological: Negative.  Negative for dizziness, weakness, numbness and headaches.   Hematological: Negative.  Negative for adenopathy.   Psychiatric/Behavioral: Negative.  Negative for dysphoric mood and sleep disturbance. The patient is not nervous/anxious.        Objective   Visit Vitals  /70   Pulse 71   Ht 167.6 cm (66\")   Wt 83.9 kg (185 lb)   SpO2 99%   BMI 29.86 kg/m²     Physical Exam   Constitutional: She is oriented to person, place, and time. She appears well-developed and well-nourished. No distress.   HENT:   Head: Normocephalic and atraumatic.   Nose: Nose normal.   Mouth/Throat: Oropharynx is clear and moist.   Eyes: Pupils are equal, round, and reactive to light. Conjunctivae and EOM are normal. Right eye exhibits no discharge. Left eye exhibits no discharge.   Neck: No thyromegaly present.   Cardiovascular: Normal rate, regular rhythm, normal heart sounds and intact distal pulses.    Pulmonary/Chest: Effort normal and breath sounds normal.   Lymphadenopathy:     She has no cervical adenopathy. "   Neurological: She is alert and oriented to person, place, and time.   Skin: Skin is warm and dry.   Psychiatric: She has a normal mood and affect.   Nursing note and vitals reviewed.    Results for orders placed or performed in visit on 08/08/18   Urine Culture - Urine, Urine, Clean Catch   Result Value Ref Range    Urine Culture Mixed Anjelica Isolated    T4, Free   Result Value Ref Range    Free T4 1.66 0.78 - 2.19 ng/dL   TSH   Result Value Ref Range    TSH 2.570 0.460 - 4.680 mIU/mL   Basic Metabolic Panel   Result Value Ref Range    Glucose 68 60 - 100 mg/dL    BUN 15 7 - 21 mg/dL    Creatinine 0.86 0.50 - 1.00 mg/dL    Sodium 138 137 - 145 mmol/L    Potassium 3.7 3.5 - 5.1 mmol/L    Chloride 103 95 - 110 mmol/L    CO2 27.0 22.0 - 31.0 mmol/L    Calcium 9.2 8.4 - 10.2 mg/dL    eGFR Non  Amer 68 51 - 120 mL/min/1.73    BUN/Creatinine Ratio 17.4 7.0 - 25.0    Anion Gap 8.0 5.0 - 15.0 mmol/L   Urinalysis without microscopic (no culture) - Urine, Clean Catch   Result Value Ref Range    Color, UA Yellow Yellow, Straw, Dark Yellow, Charlene    Appearance, UA Cloudy (A) Clear    pH, UA 7.0 5.0 - 9.0    Specific Gravity, UA 1.009 1.003 - 1.030    Glucose, UA Negative Negative    Ketones, UA Negative Negative    Bilirubin, UA Negative Negative    Blood, UA Small (1+) (A) Negative    Protein, UA Negative Negative    Leuk Esterase, UA Large (3+) (A) Negative    Nitrite, UA Negative Negative    Urobilinogen, UA 0.2 E.U./dL 0.2 - 1.0 E.U./dL   Urinalysis, Microscopic Only - Urine, Clean Catch   Result Value Ref Range    RBC, UA 3-5 (A) None Seen /HPF    WBC, UA 6-12 (A) None Seen, 0-2, 3-5 /HPF    Bacteria, UA None Seen None Seen /HPF    Squamous Epithelial Cells, UA 6-12 (A) None Seen, 0-2 /HPF    Hyaline Casts, UA None Seen None Seen /LPF    Methodology Automated Microscopy    CBC Auto Differential   Result Value Ref Range    WBC 7.66 3.20 - 9.80 10*3/mm3    RBC 4.64 3.77 - 5.16 10*6/mm3    Hemoglobin 13.1 12.0 - 15.5  g/dL    Hematocrit 39.7 35.0 - 45.0 %    MCV 85.6 80.0 - 98.0 fL    MCH 28.2 26.5 - 34.0 pg    MCHC 33.0 31.4 - 36.0 g/dL    RDW 13.4 11.5 - 14.5 %    RDW-SD 42.4 36.4 - 46.3 fl    MPV 10.6 8.0 - 12.0 fL    Platelets 258 150 - 450 10*3/mm3   Manual Differential   Result Value Ref Range    Neutrophil % 68.0 37.0 - 80.0 %    Lymphocyte % 26.0 10.0 - 50.0 %    Monocyte % 5.0 0.0 - 12.0 %    Eosinophil % 1.0 0.0 - 7.0 %    Neutrophils Absolute 5.21 2.00 - 8.60 10*3/mm3    Lymphocytes Absolute 1.99 0.60 - 4.20 10*3/mm3    Monocytes Absolute 0.38 0.00 - 0.90 10*3/mm3    Eosinophils Absolute 0.08 0.00 - 0.70 10*3/mm3    RBC Morphology Normal Normal    WBC Morphology Normal Normal    Platelet Morphology Normal Normal      Assessment/Plan   Problem List Items Addressed This Visit        Cardiovascular and Mediastinum    Migraine    Relevant Medications    topiramate (TOPAMAX) 50 MG tablet       Endocrine    Acquired hypothyroidism - Primary (Chronic)    Relevant Orders    TSH    T4, Free      Other Visit Diagnoses     Nonintractable migraine, unspecified migraine type        Relevant Medications    topiramate (TOPAMAX) 50 MG tablet    Annual physical exam        Relevant Orders    Comprehensive Metabolic Panel    Lipid Panel    TSH    T4, Free    Urinalysis With Microscopic - Urine, Clean Catch         Continue current treatment.     New Medications Ordered This Visit   Medications   • topiramate (TOPAMAX) 50 MG tablet     Sig: Take 1 tablet by mouth Every Night.     Dispense:  90 tablet     Refill:  3     Return in about 3 months (around 11/16/2018) for Annual physical.

## 2018-08-20 DIAGNOSIS — Z12.31 ENCOUNTER FOR SCREENING MAMMOGRAM FOR MALIGNANT NEOPLASM OF BREAST: Primary | ICD-10-CM

## 2018-08-29 DIAGNOSIS — G43.009 NONINTRACTABLE MIGRAINE, UNSPECIFIED MIGRAINE TYPE: ICD-10-CM

## 2018-08-29 RX ORDER — TOPIRAMATE 50 MG/1
50 TABLET, FILM COATED ORAL NIGHTLY
Qty: 90 TABLET | Refills: 3 | Status: SHIPPED | OUTPATIENT
Start: 2018-08-29 | End: 2018-10-12 | Stop reason: SDUPTHER

## 2018-09-11 ENCOUNTER — LAB (OUTPATIENT)
Dept: LAB | Facility: HOSPITAL | Age: 58
End: 2018-09-11

## 2018-09-11 ENCOUNTER — TRANSCRIBE ORDERS (OUTPATIENT)
Dept: LAB | Facility: HOSPITAL | Age: 58
End: 2018-09-11

## 2018-09-11 DIAGNOSIS — Z91.018 ALLERGY, FOOD: ICD-10-CM

## 2018-09-11 DIAGNOSIS — Z91.018 ALLERGY, FOOD: Primary | ICD-10-CM

## 2018-09-11 PROCEDURE — 86003 ALLG SPEC IGE CRUDE XTRC EA: CPT

## 2018-09-11 PROCEDURE — 36415 COLL VENOUS BLD VENIPUNCTURE: CPT

## 2018-09-11 PROCEDURE — 86008 ALLG SPEC IGE RECOMB EA: CPT

## 2018-09-14 LAB
ALPHA GAL IGE: 5.19 KU/L
BEEF IGE QN: 2.11 KU/L
COW MILK IGE QN: 0.37 KU/L
GELATIN IGE QN: <0.1 KU/L
LAMB IGE QN: 0.87 KU/L
Lab: 2
PORK IGE: 0.91 KU/L

## 2018-11-16 ENCOUNTER — LAB (OUTPATIENT)
Dept: LAB | Facility: HOSPITAL | Age: 58
End: 2018-11-16

## 2018-11-16 DIAGNOSIS — E03.9 ACQUIRED HYPOTHYROIDISM: Chronic | ICD-10-CM

## 2018-11-16 DIAGNOSIS — Z00.00 ANNUAL PHYSICAL EXAM: ICD-10-CM

## 2018-11-16 LAB
ALBUMIN SERPL-MCNC: 4.8 G/DL (ref 3.4–4.8)
ALBUMIN/GLOB SERPL: 1.5 G/DL (ref 1.1–1.8)
ALP SERPL-CCNC: 173 U/L (ref 38–126)
ALT SERPL W P-5'-P-CCNC: 33 U/L (ref 9–52)
ANION GAP SERPL CALCULATED.3IONS-SCNC: 11 MMOL/L (ref 5–15)
ARTICHOKE IGE QN: 81 MG/DL (ref 1–129)
AST SERPL-CCNC: 40 U/L (ref 14–36)
BACTERIA UR QL AUTO: ABNORMAL /HPF
BILIRUB SERPL-MCNC: 0.5 MG/DL (ref 0.2–1.3)
BILIRUB UR QL STRIP: NEGATIVE
BUN BLD-MCNC: 16 MG/DL (ref 7–21)
BUN/CREAT SERPL: 18.6 (ref 7–25)
CALCIUM SPEC-SCNC: 9.5 MG/DL (ref 8.4–10.2)
CHLORIDE SERPL-SCNC: 99 MMOL/L (ref 95–110)
CHOLEST SERPL-MCNC: 190 MG/DL (ref 0–199)
CLARITY UR: CLEAR
CO2 SERPL-SCNC: 27 MMOL/L (ref 22–31)
COLOR UR: YELLOW
CREAT BLD-MCNC: 0.86 MG/DL (ref 0.5–1)
GFR SERPL CREATININE-BSD FRML MDRD: 68 ML/MIN/1.73 (ref 51–120)
GLOBULIN UR ELPH-MCNC: 3.2 GM/DL (ref 2.3–3.5)
GLUCOSE BLD-MCNC: 91 MG/DL (ref 60–100)
GLUCOSE UR STRIP-MCNC: NEGATIVE MG/DL
HDLC SERPL-MCNC: 64 MG/DL (ref 60–200)
HGB UR QL STRIP.AUTO: ABNORMAL
HYALINE CASTS UR QL AUTO: ABNORMAL /LPF
KETONES UR QL STRIP: NEGATIVE
LDLC/HDLC SERPL: 1.48 {RATIO} (ref 0–3.22)
LEUKOCYTE ESTERASE UR QL STRIP.AUTO: ABNORMAL
NITRITE UR QL STRIP: NEGATIVE
PH UR STRIP.AUTO: <=5 [PH] (ref 5–9)
POTASSIUM BLD-SCNC: 4.1 MMOL/L (ref 3.5–5.1)
PROT SERPL-MCNC: 8 G/DL (ref 6.3–8.6)
PROT UR QL STRIP: NEGATIVE
RBC # UR: ABNORMAL /HPF
REF LAB TEST METHOD: ABNORMAL
SODIUM BLD-SCNC: 137 MMOL/L (ref 137–145)
SP GR UR STRIP: 1.01 (ref 1–1.03)
SQUAMOUS #/AREA URNS HPF: ABNORMAL /HPF
T4 FREE SERPL-MCNC: 1.16 NG/DL (ref 0.78–2.19)
TRIGL SERPL-MCNC: 156 MG/DL (ref 20–199)
TSH SERPL DL<=0.05 MIU/L-ACNC: 1.6 MIU/ML (ref 0.46–4.68)
UROBILINOGEN UR QL STRIP: ABNORMAL
WBC UR QL AUTO: ABNORMAL /HPF

## 2018-11-16 PROCEDURE — 36415 COLL VENOUS BLD VENIPUNCTURE: CPT

## 2018-11-16 PROCEDURE — 84439 ASSAY OF FREE THYROXINE: CPT

## 2018-11-16 PROCEDURE — 80061 LIPID PANEL: CPT

## 2018-11-16 PROCEDURE — 84443 ASSAY THYROID STIM HORMONE: CPT

## 2018-11-16 PROCEDURE — 81001 URINALYSIS AUTO W/SCOPE: CPT

## 2018-11-16 PROCEDURE — 80053 COMPREHEN METABOLIC PANEL: CPT

## 2018-11-19 ENCOUNTER — OFFICE VISIT (OUTPATIENT)
Dept: FAMILY MEDICINE CLINIC | Facility: CLINIC | Age: 58
End: 2018-11-19

## 2018-11-19 VITALS
HEART RATE: 78 BPM | SYSTOLIC BLOOD PRESSURE: 122 MMHG | BODY MASS INDEX: 30.84 KG/M2 | WEIGHT: 191.9 LBS | DIASTOLIC BLOOD PRESSURE: 78 MMHG | HEIGHT: 66 IN | OXYGEN SATURATION: 99 %

## 2018-11-19 DIAGNOSIS — Z00.00 ANNUAL PHYSICAL EXAM: Primary | ICD-10-CM

## 2018-11-19 DIAGNOSIS — G43.009 NONINTRACTABLE MIGRAINE, UNSPECIFIED MIGRAINE TYPE: ICD-10-CM

## 2018-11-19 DIAGNOSIS — E03.9 ACQUIRED HYPOTHYROIDISM: Chronic | ICD-10-CM

## 2018-11-19 DIAGNOSIS — Z12.31 ENCOUNTER FOR SCREENING MAMMOGRAM FOR BREAST CANCER: Primary | ICD-10-CM

## 2018-11-19 PROCEDURE — 99396 PREV VISIT EST AGE 40-64: CPT | Performed by: GENERAL PRACTICE

## 2018-11-19 RX ORDER — LEVOTHYROXINE SODIUM 100 MCG
100 TABLET ORAL DAILY
Qty: 90 TABLET | Refills: 3 | Status: SHIPPED | OUTPATIENT
Start: 2018-11-19 | End: 2019-05-24

## 2018-11-19 RX ORDER — LUBIPROSTONE 24 UG/1
24 CAPSULE ORAL 2 TIMES DAILY WITH MEALS
Qty: 180 CAPSULE | Refills: 3 | OUTPATIENT
Start: 2018-11-19 | End: 2019-01-21

## 2018-11-19 RX ORDER — TOPIRAMATE 50 MG/1
50 TABLET, FILM COATED ORAL NIGHTLY
Qty: 90 TABLET | Refills: 3 | Status: SHIPPED | OUTPATIENT
Start: 2018-11-19 | End: 2019-11-27 | Stop reason: SDUPTHER

## 2018-11-19 RX ORDER — VENLAFAXINE HYDROCHLORIDE 37.5 MG/1
37.5 CAPSULE, EXTENDED RELEASE ORAL DAILY
Qty: 90 CAPSULE | Refills: 3 | OUTPATIENT
Start: 2018-11-19 | End: 2019-01-21

## 2018-11-19 RX ORDER — VENLAFAXINE 37.5 MG/1
37.5 TABLET ORAL DAILY
COMMUNITY
End: 2018-11-19

## 2019-01-22 ENCOUNTER — OFFICE VISIT (OUTPATIENT)
Dept: CARDIOLOGY | Facility: CLINIC | Age: 59
End: 2019-01-22

## 2019-01-22 VITALS
DIASTOLIC BLOOD PRESSURE: 76 MMHG | HEART RATE: 67 BPM | BODY MASS INDEX: 30.86 KG/M2 | OXYGEN SATURATION: 99 % | HEIGHT: 66 IN | SYSTOLIC BLOOD PRESSURE: 118 MMHG | WEIGHT: 192 LBS

## 2019-01-22 DIAGNOSIS — R00.2 PALPITATION: Primary | ICD-10-CM

## 2019-01-22 DIAGNOSIS — I49.3 PVC (PREMATURE VENTRICULAR CONTRACTION): ICD-10-CM

## 2019-01-22 DIAGNOSIS — E03.9 ACQUIRED HYPOTHYROIDISM: Chronic | ICD-10-CM

## 2019-01-22 PROCEDURE — 99213 OFFICE O/P EST LOW 20 MIN: CPT | Performed by: INTERNAL MEDICINE

## 2019-01-22 NOTE — PROGRESS NOTES
Adilene Morgan  58 y.o. female    01/22/2019  1. Palpitation    2. PVC (premature ventricular contraction)    3. Acquired hypothyroidism        History of Present Illness:    Patient's Body mass index is 30.99 kg/m². BMI is above normal parameters. Recommendations include: exercise counseling, nutrition counseling and referral to primary care.  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  No chest pain orthopnea PND no syncope or near syncopal episode reported.  No sustained palpitation was reported.  No dysuria hematuria or bright red blood per rectum reported.  Her BMI is 31 and she does not smoke. No Family history of premature heart disease.    STRESS TEST 7/2018  Patient exercised according to Leo protocol for 8 minutes and 24 second with METs achieved 10.  This reflects normal functional capacity.  Baseline heart rate 64 and increased to 164 bpm representing 100% of age matched projected heart rate.  The patient base line blood pressure 144/79 and increased to 195 representing hypertensive blood pressure response.  Test was stopped due to target heart rate achieved and leg discomfort.  No ST-T wave changes suggesting of ischemia.  No arrhythmia noted.    Echo 7/2018  · Mild mitral valve regurgitation is present  · Mild tricuspid valve regurgitation is present.  · Left ventricular systolic function is normal. Estimated EF = 60%.  · Left atrial cavity size is borderline dilated.    7/2018  Holter forInterpretations 48-hour      Indictation palpitations     Description     Rhythm is sinus with average heart rate of 67 minimum 45 the minimum heart to beat.  5:30 and 6 AM with a maximum 130 bpm.  No significant bradyarrhythmia or any significant pause noted.  There are premature ventricular complex total #1034  with some bigeminy and 115 couplet  rare3 beat runs of nonsustained wide complex rhythm with a maximum heart rate range from 130 to 150 bpm.  No sustained ventricular or supraventricular rhythm noted.    SUBJECTIVE:    Allergies   Allergen Reactions   • Meat Extract      Red meat, pork , ham   • Azithromycin    • Ceclor [Cefaclor] Diarrhea and Nausea Only   • Keflex [Cephalexin]    • Penicillins          Past Medical History:   Diagnosis Date   • Achilles bursitis    • Achilles tendinitis    • Acquired hypothyroidism    • Allergic rhinitis    • Allergy to meat     alphagal   • Allergy to milk products    • Arrhythmia    • Asthmatic bronchitis    • Asthmatic bronchitis    • Calcaneal spur    • Depressive disorder    • Herpes simplex    • Hypermetropia    • Hypothyroidism    • Menopausal flushing    • Menopause    • Migraine    • Otalgia    • Pneumonia    • Presbyopia    • Trochanteric bursitis    • UTI (urinary tract infection) 07/03/2018   • Ventricular premature beats          Past Surgical History:   Procedure Laterality Date   • COLONOSCOPY N/A 2/15/2017    Procedure: COLONOSCOPY;  Surgeon: Lupillo Ugarte MD;  Location: [facility] ENDOSCOPY;  Service:    • COSMETIC SURGERY      plastic surgery to face x 4 r/t trauma   • LAPAROSCOPIC CHOLECYSTECTOMY  12/10/1995    Cholecystectomy, laparoscopic (1)      • OTHER SURGICAL HISTORY      Head surgery procedure (1)    plastic surgery on the face    • OTHER SURGICAL HISTORY  10/17/2014    Repair Superficial Wound TR-EXT 2.5 < CM 70195 (1)            Family History   Problem Relation Age of Onset   • Stroke Mother    • Diabetes Father    • Heart disease Father    • Thyroid disease Sister    • Breast cancer Maternal Aunt    • Cancer Neg Hx         multiple in mothers family         Social History     Socioeconomic History   • Marital status:      Spouse name: Not on file   • Number of children: Not on file   • Years of education: Not on file   • Highest education  level: Not on file   Social Needs   • Financial resource strain: Not on file   • Food insecurity - worry: Not on file   • Food insecurity - inability: Not on file   • Transportation needs - medical: Not on file   • Transportation needs - non-medical: Not on file   Occupational History   • Not on file   Tobacco Use   • Smoking status: Never Smoker   • Smokeless tobacco: Never Used   Substance and Sexual Activity   • Alcohol use: No   • Drug use: No   • Sexual activity: Defer   Other Topics Concern   • Not on file   Social History Narrative   • Not on file         Current Outpatient Medications   Medication Sig Dispense Refill   • aspirin 325 MG tablet Take 325 mg by mouth Daily.     • Fexofenadine HCl (MUCINEX ALLERGY PO) Take 1 tablet by mouth.     • fluticasone (FLONASE ALLERGY RELIEF) 50 MCG/ACT nasal spray 1 spray into each nostril Daily As Needed.     • Multiple Vitamins-Minerals (MULTIVITAMIN ADULT PO) Take  by mouth.     • promethazine-dextromethorphan (PROMETHAZINE-DM) 6.25-15 MG/5ML syrup Take 5 mL by mouth Every 6 (Six) Hours As Needed for Cough. 120 mL 1   • SUMAtriptan (IMITREX) 20 MG/ACT nasal spray 1 spray into each nostril Every 2 (Two) Hours As Needed for Migraine. 10 each 5   • SYNTHROID 100 MCG tablet Take 1 tablet by mouth Daily. Do not substitute 90 tablet 3   • topiramate (TOPAMAX) 50 MG tablet Take 1 tablet by mouth Every Night. 90 tablet 3     No current facility-administered medications for this visit.            Review of Systems:     Constitutional:  Denies recent weight loss, weight gain, fever or chills, no change in exercise tolerance.     HENT:  Denies any hearing loss, epistaxis, hoarseness, or difficulty speaking.     Eyes: No blurring    Respiratory:  Denies dyspnea with exertion,no cough, wheezing, or hemoptysis.     Cardiovascular: See H&P    Gastrointestinal:  Denies change in bowel habits, dyspepsia, ulcer disease, hematochezia, or melena.     Endocrine: Negative for cold  "intolerance, heat intolerance, polydipsia, polyphagia and polyuria. Denies any history of weight change, polydipsia, polyuria.     Genitourinary: Negative.      Musculoskeletal: Denies any history of arthritic symptoms or back problems.     Skin:  Denies any change in hair or nails, rashes, or skin lesions.     Allergic/Immunologic: Negative.  Negative for environmental allergies, food allergies and immunocompromised state.     Neurological:  Denies any history of recurrent headaches, strokes, TIA, or seizure disorder.     Hematological: Denies any food allergies, seasonal allergies, bleeding disorders, or lymphadenopathy.     Psychiatric/Behavioral: Denies any history of depression, substance abuse, or change in cognitive function.       OBJECTIVE:    /76   Pulse 67   Ht 167.6 cm (66\")   Wt 87.1 kg (192 lb)   LMP  (LMP Unknown)   SpO2 99%   BMI 30.99 kg/m²       Physical Exam:     Constitutional: Cooperative, alert and oriented, well-developed, well-nourished, in no acute distress.     HENT:   Head: Normocephalic, normal hair patterns, no masses or tenderness.  Ears, Nose, and Throat: No gross abnormalities. No pallor or cyanosis. Dentition good.   Eyes: EOMS intact, PERRL, conjunctivae and lids unremarkable. Fundoscopic exam and visual fields not performed.   Neck: No palpable masses or adenopathy, no thyromegaly, no JVD, carotid pulses are full and equal bilaterally and without  Bruits.     Cardiovascular: Regular rhythm, S1 and S2 normal, no S3 or S4. Apical impulse not displaced. No murmurs, gallops, or rubs detected.     Pulmonary/Chest: Chest: normal symmetry, no tenderness to palpation, normal respiratory excursion, no intercostal retraction, no use of accessory muscles. Pulmonary: Normal breath sounds. No rales or rhonchi.    Abdominal: Abdomen soft, bowel sounds normoactive, no masses, no hepatosplenomegaly, non-tender, no bruits.     Musculoskeletal: No deformities, clubbing, cyanosis, " erythema, or edema observed. There are no spinal abnormalities noted. Normal muscle strength and tone. Pulses full and equal in all extremities, no bruits auscultated.     Neurological: No gross motor or sensory deficits noted, affect appropriate, oriented to time, person, place.     Skin: Warm and dry to the touch, no apparent skin lesions or masses noted.     Psychiatric: She has a normal mood and affect. Her behavior is normal. Judgment and thought content normal.         Procedures      Lab Results   Component Value Date    WBC 7.66 08/08/2018    HGB 13.1 08/08/2018    HCT 39.7 08/08/2018    MCV 85.6 08/08/2018     08/08/2018     Lab Results   Component Value Date    GLUCOSE 91 11/16/2018    BUN 16 11/16/2018    CREATININE 0.86 11/16/2018    EGFRIFNONA 68 11/16/2018    BCR 18.6 11/16/2018    CO2 27.0 11/16/2018    CALCIUM 9.5 11/16/2018    ALBUMIN 4.80 11/16/2018    AST 40 (H) 11/16/2018    ALT 33 11/16/2018     Lab Results   Component Value Date    CHOL 190 11/16/2018    CHOL 186 11/08/2017     Lab Results   Component Value Date    TRIG 156 11/16/2018    TRIG 100 11/08/2017    TRIG 176 10/06/2015     Lab Results   Component Value Date    HDL 64 11/16/2018    HDL 81 11/08/2017    HDL 57 (L) 10/06/2015     No components found for: LDLCALC  Lab Results   Component Value Date    LDL 81 11/16/2018    LDL 72 11/08/2017    LDL 53 10/25/2016     No results found for: HDLLDLRATIO  No components found for: CHOLHDL  No results found for: HGBA1C  Lab Results   Component Value Date    TSH 1.600 11/16/2018    H1GLXNT 167.0 07/25/2017           ASSESSMENT AND PLAN:  #1 premature ventricular complex of atypical left bundle-branch block morphology #2 palpitation # 3 hypothyroidism     Clinically,and cardiac decompensation based on the clinical history physical finding.  No evidence of ongoing ischemia.  Results of stress test, a 48 hour Holter an echocardiographic study discussed with the patient.  No change in the  palpitation compared to previous one [id number] slightly on the bedside.  She doesn't want to consider initiations of medication at this stage.   Patient documented history of premature ventricular complex with atypical left bundle branch block morphology and small R-wave in lead V1  possibility right ventricular outflow track or coronary sinus cannot be excluded.  No sustained palpitation reported.    Risk factor lifestyle modification discussed.  Low carbohydrate and dash diet explained given the BMI of 31.    We'll see her back in 12 months R depends on patient clinical condition and our outcome of cardiac evaluations        Adilene was seen today for follow-up.    Diagnoses and all orders for this visit:    Palpitation    PVC (premature ventricular contraction)    Acquired hypothyroidism        Clement Palacio MD  1/22/2019  3:36 PM

## 2019-05-14 ENCOUNTER — LAB (OUTPATIENT)
Dept: LAB | Facility: HOSPITAL | Age: 59
End: 2019-05-14

## 2019-05-14 DIAGNOSIS — E03.9 ACQUIRED HYPOTHYROIDISM: Chronic | ICD-10-CM

## 2019-05-14 LAB
ALBUMIN SERPL-MCNC: 4.1 G/DL (ref 3.5–5.2)
ALBUMIN/GLOB SERPL: 1.2 G/DL
ALP SERPL-CCNC: 169 U/L (ref 39–117)
ALT SERPL W P-5'-P-CCNC: 19 U/L (ref 1–33)
ANION GAP SERPL CALCULATED.3IONS-SCNC: 13.7 MMOL/L
AST SERPL-CCNC: 20 U/L (ref 1–32)
BILIRUB SERPL-MCNC: 0.3 MG/DL (ref 0.2–1.2)
BUN BLD-MCNC: 16 MG/DL (ref 8–23)
BUN/CREAT SERPL: 19.5 (ref 7–25)
CALCIUM SPEC-SCNC: 9.6 MG/DL (ref 8.6–10.5)
CHLORIDE SERPL-SCNC: 103 MMOL/L (ref 98–107)
CO2 SERPL-SCNC: 26.3 MMOL/L (ref 22–29)
CREAT BLD-MCNC: 0.82 MG/DL (ref 0.57–1)
GFR SERPL CREATININE-BSD FRML MDRD: 71 ML/MIN/1.73
GLOBULIN UR ELPH-MCNC: 3.3 GM/DL
GLUCOSE BLD-MCNC: 97 MG/DL (ref 65–99)
POTASSIUM BLD-SCNC: 4.1 MMOL/L (ref 3.5–5.2)
PROT SERPL-MCNC: 7.4 G/DL (ref 6–8.5)
SODIUM BLD-SCNC: 143 MMOL/L (ref 136–145)
T4 FREE SERPL-MCNC: 0.95 NG/DL (ref 0.93–1.7)
TSH SERPL DL<=0.05 MIU/L-ACNC: 4.18 MIU/ML (ref 0.27–4.2)

## 2019-05-14 PROCEDURE — 36415 COLL VENOUS BLD VENIPUNCTURE: CPT

## 2019-05-14 PROCEDURE — 84443 ASSAY THYROID STIM HORMONE: CPT

## 2019-05-14 PROCEDURE — 84439 ASSAY OF FREE THYROXINE: CPT

## 2019-05-14 PROCEDURE — 80053 COMPREHEN METABOLIC PANEL: CPT

## 2019-05-24 ENCOUNTER — OFFICE VISIT (OUTPATIENT)
Dept: FAMILY MEDICINE CLINIC | Facility: CLINIC | Age: 59
End: 2019-05-24

## 2019-05-24 VITALS
DIASTOLIC BLOOD PRESSURE: 80 MMHG | HEIGHT: 66 IN | OXYGEN SATURATION: 98 % | BODY MASS INDEX: 31.98 KG/M2 | HEART RATE: 67 BPM | SYSTOLIC BLOOD PRESSURE: 132 MMHG | WEIGHT: 199 LBS

## 2019-05-24 DIAGNOSIS — R53.83 OTHER FATIGUE: ICD-10-CM

## 2019-05-24 DIAGNOSIS — Z00.00 ANNUAL PHYSICAL EXAM: ICD-10-CM

## 2019-05-24 DIAGNOSIS — G43.909 MIGRAINE WITHOUT STATUS MIGRAINOSUS, NOT INTRACTABLE, UNSPECIFIED MIGRAINE TYPE: ICD-10-CM

## 2019-05-24 DIAGNOSIS — E03.9 ACQUIRED HYPOTHYROIDISM: Primary | Chronic | ICD-10-CM

## 2019-05-24 PROCEDURE — 99213 OFFICE O/P EST LOW 20 MIN: CPT | Performed by: GENERAL PRACTICE

## 2019-05-24 RX ORDER — LEVOTHYROXINE SODIUM 112 MCG
112 TABLET ORAL DAILY
Qty: 90 TABLET | Refills: 3 | Status: SHIPPED | OUTPATIENT
Start: 2019-05-24 | End: 2020-05-26

## 2019-05-24 NOTE — PROGRESS NOTES
Subjective   Adilene Morgan is a 59 y.o. female.   Chief Complaint   Patient presents with   • Hypothyroidism     labs     For review and evaluation of management of chronic medical problems. Labs reviewed. Migraines are controlled.   Hypothyroidism   This is a chronic problem. The current episode started more than 1 year ago. The problem occurs constantly. The problem has been unchanged. Associated symptoms include a change in bowel habit and fatigue. Pertinent negatives include no abdominal pain, arthralgias, chest pain, chills, congestion, coughing, fever, headaches, joint swelling, myalgias, nausea, neck pain, numbness, rash, sore throat, vomiting or weakness. Nothing aggravates the symptoms. Treatments tried: armour thyroid. The treatment provided moderate relief.      The following portions of the patient's history were reviewed and updated as appropriate: allergies, current medications, past social history and problem list.    Outpatient Medications Prior to Visit   Medication Sig Dispense Refill   • aspirin 325 MG tablet Take 325 mg by mouth Daily.     • Fexofenadine HCl (MUCINEX ALLERGY PO) Take 1 tablet by mouth.     • fluticasone (FLONASE ALLERGY RELIEF) 50 MCG/ACT nasal spray 1 spray into each nostril Daily As Needed.     • Multiple Vitamins-Minerals (MULTIVITAMIN ADULT PO) Take  by mouth.     • promethazine-dextromethorphan (PROMETHAZINE-DM) 6.25-15 MG/5ML syrup Take 5 mL by mouth Every 6 (Six) Hours As Needed for Cough. 120 mL 1   • pseudoephedrine (SUDAFED) 30 MG tablet Take 1-2 tablets by mouth every 6 hrs prn congestion. 30 tablet 0   • SUMAtriptan (IMITREX) 20 MG/ACT nasal spray 1 spray into each nostril Every 2 (Two) Hours As Needed for Migraine. 10 each 5   • topiramate (TOPAMAX) 50 MG tablet Take 1 tablet by mouth Every Night. 90 tablet 3   • SYNTHROID 100 MCG tablet Take 1 tablet by mouth Daily. Do not substitute 90 tablet 3     No facility-administered medications prior to visit.   "      Review of Systems   Constitutional: Positive for fatigue and unexpected weight change. Negative for chills and fever.   HENT: Negative.  Negative for congestion, ear pain, hearing loss, nosebleeds, rhinorrhea, sneezing, sore throat and tinnitus.    Eyes: Negative.  Negative for discharge.   Respiratory: Negative.  Negative for cough, shortness of breath and wheezing.    Cardiovascular: Negative.  Negative for chest pain and palpitations.   Gastrointestinal: Positive for change in bowel habit and constipation. Negative for abdominal pain, diarrhea, nausea and vomiting.   Endocrine: Negative.    Genitourinary: Negative.  Negative for dysuria, frequency and urgency.   Musculoskeletal: Negative.  Negative for arthralgias, back pain, joint swelling, myalgias and neck pain.   Skin: Negative.  Negative for rash.   Allergic/Immunologic: Negative.    Neurological: Negative.  Negative for dizziness, weakness, numbness and headaches.   Hematological: Negative.  Negative for adenopathy.   Psychiatric/Behavioral: Negative.  Negative for dysphoric mood and sleep disturbance. The patient is not nervous/anxious.        Objective   Visit Vitals  /80   Pulse 67   Ht 167.6 cm (66\")   Wt 90.3 kg (199 lb)   LMP  (LMP Unknown)   SpO2 98%   BMI 32.12 kg/m²     Physical Exam   Constitutional: She is oriented to person, place, and time. She appears well-developed and well-nourished. No distress.   HENT:   Head: Normocephalic and atraumatic.   Nose: Nose normal.   Mouth/Throat: Oropharynx is clear and moist.   Eyes: Conjunctivae and EOM are normal. Pupils are equal, round, and reactive to light. Right eye exhibits no discharge. Left eye exhibits no discharge.   Neck: No thyromegaly present.   Cardiovascular: Normal rate, regular rhythm, normal heart sounds and intact distal pulses.   Pulmonary/Chest: Effort normal and breath sounds normal.   Lymphadenopathy:     She has no cervical adenopathy.   Neurological: She is alert and " oriented to person, place, and time.   Skin: Skin is warm and dry.   Psychiatric: She has a normal mood and affect.   Nursing note and vitals reviewed.      Assessment/Plan   Problem List Items Addressed This Visit        Cardiovascular and Mediastinum    Migraine       Endocrine    Acquired hypothyroidism - Primary (Chronic)    Relevant Medications    SYNTHROID 112 MCG tablet    Other Relevant Orders    TSH    T4, Free      Other Visit Diagnoses     Other fatigue        Annual physical exam        Relevant Orders    Comprehensive Metabolic Panel    Lipid Panel    TSH    T4, Free    Urinalysis With Microscopic - Urine, Clean Catch         Increase Synthroid    New Medications Ordered This Visit   Medications   • SYNTHROID 112 MCG tablet     Sig: Take 1 tablet by mouth Daily.     Dispense:  90 tablet     Refill:  3     Do not sub, dosage change     Return in about 6 months (around 11/20/2019) for Annual physical.

## 2019-05-29 DIAGNOSIS — Z13.820 ENCOUNTER FOR SCREENING FOR OSTEOPOROSIS: Primary | ICD-10-CM

## 2019-05-29 DIAGNOSIS — Z12.31 ENCOUNTER FOR SCREENING MAMMOGRAM FOR MALIGNANT NEOPLASM OF BREAST: Primary | ICD-10-CM

## 2019-09-04 DIAGNOSIS — G43.009 NONINTRACTABLE MIGRAINE, UNSPECIFIED MIGRAINE TYPE: ICD-10-CM

## 2019-09-04 RX ORDER — SUMATRIPTAN 20 MG/1
SPRAY NASAL
Qty: 6 EACH | Refills: 5 | Status: SHIPPED | OUTPATIENT
Start: 2019-09-04 | End: 2020-08-17

## 2019-09-12 ENCOUNTER — TRANSCRIBE ORDERS (OUTPATIENT)
Dept: LAB | Facility: HOSPITAL | Age: 59
End: 2019-09-12

## 2019-09-12 ENCOUNTER — LAB (OUTPATIENT)
Dept: LAB | Facility: HOSPITAL | Age: 59
End: 2019-09-12

## 2019-09-12 DIAGNOSIS — Z91.018 ALLERGY TO MEAT: ICD-10-CM

## 2019-09-12 DIAGNOSIS — Z91.018 ALLERGY TO MEAT: Primary | ICD-10-CM

## 2019-09-12 PROCEDURE — 86003 ALLG SPEC IGE CRUDE XTRC EA: CPT

## 2019-09-12 PROCEDURE — 36415 COLL VENOUS BLD VENIPUNCTURE: CPT

## 2019-09-12 PROCEDURE — 86008 ALLG SPEC IGE RECOMB EA: CPT

## 2019-09-15 LAB
COW MILK IGE QN: 0.18 KU/L
GELATIN IGE QN: <0.1 KU/L

## 2019-09-17 LAB
ALPHA GAL IGE: 2.12 KU/L
BEEF IGE QN: 1.04 KU/L
LAMB IGE QN: 0.19 KU/L
Lab: 1
Lab: 2
Lab: ABNORMAL
PORK IGE: 0.68 KU/L

## 2019-11-11 ENCOUNTER — LAB (OUTPATIENT)
Dept: LAB | Facility: HOSPITAL | Age: 59
End: 2019-11-11

## 2019-11-11 DIAGNOSIS — E03.9 ACQUIRED HYPOTHYROIDISM: Chronic | ICD-10-CM

## 2019-11-11 DIAGNOSIS — Z00.00 ANNUAL PHYSICAL EXAM: ICD-10-CM

## 2019-11-11 LAB
ALBUMIN SERPL-MCNC: 4 G/DL (ref 3.5–5.2)
ALBUMIN/GLOB SERPL: 1.1 G/DL
ALP SERPL-CCNC: 196 U/L (ref 39–117)
ALT SERPL W P-5'-P-CCNC: 20 U/L (ref 1–33)
ANION GAP SERPL CALCULATED.3IONS-SCNC: 14.1 MMOL/L (ref 5–15)
AST SERPL-CCNC: 19 U/L (ref 1–32)
BACTERIA UR QL AUTO: ABNORMAL /HPF
BILIRUB SERPL-MCNC: 0.3 MG/DL (ref 0.2–1.2)
BILIRUB UR QL STRIP: NEGATIVE
BUN BLD-MCNC: 18 MG/DL (ref 8–23)
BUN/CREAT SERPL: 17.8 (ref 7–25)
CALCIUM SPEC-SCNC: 9.4 MG/DL (ref 8.6–10.5)
CHLORIDE SERPL-SCNC: 105 MMOL/L (ref 98–107)
CHOLEST SERPL-MCNC: 210 MG/DL (ref 0–200)
CLARITY UR: CLEAR
CO2 SERPL-SCNC: 23.9 MMOL/L (ref 22–29)
COLOR UR: YELLOW
CREAT BLD-MCNC: 1.01 MG/DL (ref 0.57–1)
GFR SERPL CREATININE-BSD FRML MDRD: 56 ML/MIN/1.73
GLOBULIN UR ELPH-MCNC: 3.7 GM/DL
GLUCOSE BLD-MCNC: 97 MG/DL (ref 65–99)
GLUCOSE UR STRIP-MCNC: NEGATIVE MG/DL
HDLC SERPL-MCNC: 56 MG/DL (ref 40–60)
HGB UR QL STRIP.AUTO: NEGATIVE
HYALINE CASTS UR QL AUTO: ABNORMAL /LPF
KETONES UR QL STRIP: NEGATIVE
LDLC SERPL CALC-MCNC: 108 MG/DL (ref 0–100)
LDLC/HDLC SERPL: 1.94 {RATIO}
LEUKOCYTE ESTERASE UR QL STRIP.AUTO: ABNORMAL
NITRITE UR QL STRIP: NEGATIVE
PH UR STRIP.AUTO: <=5 [PH] (ref 5–8)
POTASSIUM BLD-SCNC: 4.6 MMOL/L (ref 3.5–5.2)
PROT SERPL-MCNC: 7.7 G/DL (ref 6–8.5)
PROT UR QL STRIP: NEGATIVE
RBC # UR: ABNORMAL /HPF
REF LAB TEST METHOD: ABNORMAL
SODIUM BLD-SCNC: 143 MMOL/L (ref 136–145)
SP GR UR STRIP: 1.02 (ref 1–1.03)
SQUAMOUS #/AREA URNS HPF: ABNORMAL /HPF
T4 FREE SERPL-MCNC: 1.1 NG/DL (ref 0.93–1.7)
TRIGL SERPL-MCNC: 228 MG/DL (ref 0–150)
TSH SERPL DL<=0.05 MIU/L-ACNC: 1.51 UIU/ML (ref 0.27–4.2)
UROBILINOGEN UR QL STRIP: ABNORMAL
VLDLC SERPL-MCNC: 45.6 MG/DL (ref 5–40)
WBC UR QL AUTO: ABNORMAL /HPF

## 2019-11-11 PROCEDURE — 36415 COLL VENOUS BLD VENIPUNCTURE: CPT

## 2019-11-11 PROCEDURE — 80053 COMPREHEN METABOLIC PANEL: CPT

## 2019-11-11 PROCEDURE — 84439 ASSAY OF FREE THYROXINE: CPT

## 2019-11-11 PROCEDURE — 80061 LIPID PANEL: CPT

## 2019-11-11 PROCEDURE — 84443 ASSAY THYROID STIM HORMONE: CPT

## 2019-11-11 PROCEDURE — 81001 URINALYSIS AUTO W/SCOPE: CPT

## 2019-11-21 PROCEDURE — G0123 SCREEN CERV/VAG THIN LAYER: HCPCS | Performed by: GENERAL PRACTICE

## 2019-11-21 PROCEDURE — 87624 HPV HI-RISK TYP POOLED RSLT: CPT | Performed by: GENERAL PRACTICE

## 2019-11-22 ENCOUNTER — OFFICE VISIT (OUTPATIENT)
Dept: FAMILY MEDICINE CLINIC | Facility: CLINIC | Age: 59
End: 2019-11-22

## 2019-11-22 VITALS
HEART RATE: 66 BPM | BODY MASS INDEX: 31.98 KG/M2 | WEIGHT: 199 LBS | SYSTOLIC BLOOD PRESSURE: 125 MMHG | OXYGEN SATURATION: 99 % | HEIGHT: 66 IN | DIASTOLIC BLOOD PRESSURE: 80 MMHG

## 2019-11-22 DIAGNOSIS — F32.A DEPRESSIVE DISORDER: Chronic | ICD-10-CM

## 2019-11-22 DIAGNOSIS — Z01.419 ENCOUNTER FOR GYNECOLOGICAL EXAMINATION WITHOUT ABNORMAL FINDING: ICD-10-CM

## 2019-11-22 DIAGNOSIS — E03.9 ACQUIRED HYPOTHYROIDISM: Chronic | ICD-10-CM

## 2019-11-22 DIAGNOSIS — Z00.00 ANNUAL PHYSICAL EXAM: Primary | ICD-10-CM

## 2019-11-22 PROCEDURE — 99396 PREV VISIT EST AGE 40-64: CPT | Performed by: GENERAL PRACTICE

## 2019-11-22 RX ORDER — VENLAFAXINE 37.5 MG/1
37.5 TABLET ORAL EVERY OTHER DAY
COMMUNITY
End: 2020-03-02

## 2019-11-22 NOTE — PROGRESS NOTES
Subjective   Adilene Morgan is a 59 y.o. female.     Chief Complaint   Patient presents with   • Annual Exam   • Hypothyroidism   • Hyperlipidemia       History of Present Illness     For annual wellness exam.  Labs reviewed. Due for pap, mammogram and bone density.    The following portions of the patient's history were reviewed and updated as appropriate: allergies, current medications, past family and social history and problem list.    Outpatient Medications Prior to Visit   Medication Sig Dispense Refill   • aspirin 325 MG tablet Take 325 mg by mouth Daily.     • Fexofenadine HCl (MUCINEX ALLERGY PO) Take 1 tablet by mouth.     • fluticasone (FLONASE ALLERGY RELIEF) 50 MCG/ACT nasal spray 1 spray into each nostril Daily As Needed.     • Multiple Vitamins-Minerals (MULTIVITAMIN ADULT PO) Take  by mouth.     • promethazine-dextromethorphan (PROMETHAZINE-DM) 6.25-15 MG/5ML syrup Take 5 mL by mouth Every 6 (Six) Hours As Needed for Cough. 120 mL 1   • pseudoephedrine (SUDAFED) 30 MG tablet Take 1-2 tablets by mouth every 6 hrs prn congestion. 30 tablet 0   • SUMAtriptan (IMITREX) 20 MG/ACT nasal spray NOT COVERED : PILLS COVERED, INHALE 1 SPRAY INTO EACH NOSTRIL EVERY 2 HOURS AS NEEDED FOR MIGRAINE 6 each 5   • SYNTHROID 112 MCG tablet Take 1 tablet by mouth Daily. 90 tablet 3   • topiramate (TOPAMAX) 50 MG tablet Take 1 tablet by mouth Every Night. 90 tablet 3   • venlafaxine (EFFEXOR) 37.5 MG tablet Take 37.5 mg by mouth Every Other Day.       No facility-administered medications prior to visit.        Review of Systems   Constitutional: Negative.  Negative for chills, fatigue, fever and unexpected weight change.   HENT: Negative.  Negative for congestion, ear pain, hearing loss, nosebleeds, rhinorrhea, sneezing, sore throat and tinnitus.    Eyes: Negative.  Negative for discharge.   Respiratory: Negative.  Negative for cough, shortness of breath and wheezing.    Cardiovascular: Negative.  Negative for chest  "pain and palpitations.   Gastrointestinal: Negative.  Negative for abdominal pain, constipation, diarrhea, nausea and vomiting.   Endocrine: Negative.    Genitourinary: Negative.  Negative for dysuria, frequency and urgency.   Musculoskeletal: Negative.  Negative for arthralgias, back pain, joint swelling, myalgias and neck pain.   Skin: Negative.  Negative for rash.   Allergic/Immunologic: Negative.    Neurological: Negative.  Negative for dizziness, weakness, numbness and headaches.   Hematological: Negative.  Negative for adenopathy.   Psychiatric/Behavioral: Negative.  Negative for dysphoric mood and sleep disturbance. The patient is not nervous/anxious.        Objective     Visit Vitals  /80 (BP Location: Left arm)   Pulse 66   Ht 167.6 cm (66\")   Wt 90.3 kg (199 lb)   LMP  (LMP Unknown)   SpO2 99%   BMI 32.12 kg/m²     Physical Exam   Constitutional: She is oriented to person, place, and time. She appears well-developed and well-nourished. No distress.   HENT:   Head: Normocephalic and atraumatic.   Nose: Nose normal.   Mouth/Throat: Oropharynx is clear and moist.   Eyes: Conjunctivae and EOM are normal. Pupils are equal, round, and reactive to light. Right eye exhibits no discharge. Left eye exhibits no discharge.   Neck: No tracheal deviation present. No thyromegaly present.   Cardiovascular: Normal rate, regular rhythm, normal heart sounds and intact distal pulses.   No murmur heard.  Pulmonary/Chest: Effort normal and breath sounds normal. No respiratory distress. She has no wheezes. She has no rales. She exhibits no tenderness. Right breast exhibits no inverted nipple, no mass, no nipple discharge, no skin change and no tenderness. Left breast exhibits no inverted nipple, no mass, no nipple discharge, no skin change and no tenderness.   Abdominal: Soft. Bowel sounds are normal. She exhibits no distension and no mass. There is no tenderness. No hernia. Hernia confirmed negative in the right inguinal " area and confirmed negative in the left inguinal area.   Genitourinary: Vagina normal and uterus normal. Pelvic exam was performed with patient supine. There is no rash, tenderness or lesion on the right labia. There is no rash, tenderness or lesion on the left labia. Uterus is not deviated, not enlarged, not fixed and not tender. Cervix exhibits no discharge and no friability. Right adnexum displays no mass, no tenderness and no fullness. Left adnexum displays no mass, no tenderness and no fullness. No erythema, tenderness or bleeding in the vagina. No foreign body in the vagina. No vaginal discharge found.       Musculoskeletal: Normal range of motion. She exhibits no deformity.   Lymphadenopathy:     She has no cervical adenopathy.        Right: No inguinal adenopathy present.        Left: No inguinal adenopathy present.   Neurological: She is alert and oriented to person, place, and time. She has normal reflexes.   Skin: Skin is warm and dry.   Psychiatric: She has a normal mood and affect. Her behavior is normal. Judgment and thought content normal.   Nursing note and vitals reviewed.    Results for orders placed or performed in visit on 11/11/19   Comprehensive Metabolic Panel   Result Value Ref Range    Glucose 97 65 - 99 mg/dL    BUN 18 8 - 23 mg/dL    Creatinine 1.01 (H) 0.57 - 1.00 mg/dL    Sodium 143 136 - 145 mmol/L    Potassium 4.6 3.5 - 5.2 mmol/L    Chloride 105 98 - 107 mmol/L    CO2 23.9 22.0 - 29.0 mmol/L    Calcium 9.4 8.6 - 10.5 mg/dL    Total Protein 7.7 6.0 - 8.5 g/dL    Albumin 4.00 3.50 - 5.20 g/dL    ALT (SGPT) 20 1 - 33 U/L    AST (SGOT) 19 1 - 32 U/L    Alkaline Phosphatase 196 (H) 39 - 117 U/L    Total Bilirubin 0.3 0.2 - 1.2 mg/dL    eGFR Non African Amer 56 (L) >60 mL/min/1.73    Globulin 3.7 gm/dL    A/G Ratio 1.1 g/dL    BUN/Creatinine Ratio 17.8 7.0 - 25.0    Anion Gap 14.1 5.0 - 15.0 mmol/L   Lipid Panel   Result Value Ref Range    Total Cholesterol 210 (H) 0 - 200 mg/dL     Triglycerides 228 (H) 0 - 150 mg/dL    HDL Cholesterol 56 40 - 60 mg/dL    LDL Cholesterol  108 (H) 0 - 100 mg/dL    VLDL Cholesterol 45.6 (H) 5 - 40 mg/dL    LDL/HDL Ratio 1.94    TSH   Result Value Ref Range    TSH 1.510 0.270 - 4.200 uIU/mL   T4, Free   Result Value Ref Range    Free T4 1.10 0.93 - 1.70 ng/dL   Urinalysis without microscopic (no culture) - Urine, Clean Catch   Result Value Ref Range    Color, UA Yellow Yellow, Straw    Appearance, UA Clear Clear    pH, UA <=5.0 5.0 - 8.0    Specific Gravity, UA 1.021 1.005 - 1.030    Glucose, UA Negative Negative    Ketones, UA Negative Negative    Bilirubin, UA Negative Negative    Blood, UA Negative Negative    Protein, UA Negative Negative    Leuk Esterase, UA Moderate (2+) (A) Negative    Nitrite, UA Negative Negative    Urobilinogen, UA 0.2 E.U./dL 0.2 - 1.0 E.U./dL   Urinalysis, Microscopic Only - Urine, Clean Catch   Result Value Ref Range    RBC, UA 0-2 None Seen, 0-2 /HPF    WBC, UA 21-30 (A) None Seen, 0-2 /HPF    Bacteria, UA None Seen None Seen /HPF    Squamous Epithelial Cells, UA 3-6 (A) None Seen, 0-2 /HPF    Hyaline Casts, UA 0-2 None Seen /LPF    Methodology Automated Microscopy       Assessment/Plan   Problem List Items Addressed This Visit        Endocrine    Acquired hypothyroidism (Chronic)    Relevant Orders    Comprehensive Metabolic Panel    Lipid Panel    Urinalysis With Culture If Indicated -    TSH    T4, Free       Other    Depressive disorder (Chronic)    Relevant Medications    venlafaxine (EFFEXOR) 37.5 MG tablet      Other Visit Diagnoses     Annual physical exam    -  Primary    Relevant Orders    Comprehensive Metabolic Panel    Lipid Panel    Urinalysis With Culture If Indicated -    TSH    T4, Free    Encounter for gynecological examination without abnormal finding        Relevant Orders    Liquid-based Pap Smear, Screening          Will notify regarding results. Continue current treatment. Age-appropriate counseling is  provided.     No orders of the defined types were placed in this encounter.    Return in about 1 year (around 11/22/2020) for Annual physical.

## 2019-11-27 DIAGNOSIS — G43.009 NONINTRACTABLE MIGRAINE, UNSPECIFIED MIGRAINE TYPE: ICD-10-CM

## 2019-11-27 LAB
GEN CATEG CVX/VAG CYTO-IMP: NORMAL
LAB AP CASE REPORT: NORMAL
LAB AP GYN ADDITIONAL INFORMATION: NORMAL
PATH INTERP SPEC-IMP: NORMAL
STAT OF ADQ CVX/VAG CYTO-IMP: NORMAL

## 2019-11-27 RX ORDER — TOPIRAMATE 50 MG/1
50 TABLET, FILM COATED ORAL NIGHTLY
Qty: 90 TABLET | Refills: 3 | Status: SHIPPED | OUTPATIENT
Start: 2019-11-27 | End: 2021-02-05

## 2019-12-04 LAB — HPV I/H RISK 4 DNA CVX QL PROBE+SIG AMP: NEGATIVE

## 2019-12-13 RX ORDER — VENLAFAXINE HYDROCHLORIDE 37.5 MG/1
CAPSULE, EXTENDED RELEASE ORAL
Qty: 90 CAPSULE | Refills: 3 | Status: SHIPPED | OUTPATIENT
Start: 2019-12-13 | End: 2020-03-02

## 2020-01-06 ENCOUNTER — TRANSCRIBE ORDERS (OUTPATIENT)
Dept: LAB | Facility: HOSPITAL | Age: 60
End: 2020-01-06

## 2020-01-06 ENCOUNTER — LAB (OUTPATIENT)
Dept: LAB | Facility: HOSPITAL | Age: 60
End: 2020-01-06

## 2020-01-06 DIAGNOSIS — Z01.84 IMMUNITY STATUS TESTING: ICD-10-CM

## 2020-01-06 DIAGNOSIS — Z01.84 IMMUNITY STATUS TESTING: Primary | ICD-10-CM

## 2020-01-06 PROCEDURE — 86762 RUBELLA ANTIBODY: CPT

## 2020-01-06 PROCEDURE — 86735 MUMPS ANTIBODY: CPT

## 2020-01-06 PROCEDURE — 86615 BORDETELLA ANTIBODY: CPT

## 2020-01-06 PROCEDURE — 36415 COLL VENOUS BLD VENIPUNCTURE: CPT

## 2020-01-06 PROCEDURE — 86765 RUBEOLA ANTIBODY: CPT

## 2020-01-08 LAB
MEV IGG SER IA-ACNC: POSITIVE
MUV IGG SER IA-ACNC: POSITIVE
RUBV IGG SERPL IA-ACNC: POSITIVE

## 2020-01-09 LAB
B PERT IGG SER-ACNC: 2.33 INDEX (ref 0–0.94)
B PERT IGM SER QL IA: <1 INDEX (ref 0–0.9)

## 2020-01-28 DIAGNOSIS — R00.2 PALPITATION: Primary | ICD-10-CM

## 2020-03-11 ENCOUNTER — OFFICE VISIT (OUTPATIENT)
Dept: PODIATRY | Facility: CLINIC | Age: 60
End: 2020-03-11

## 2020-03-11 VITALS — OXYGEN SATURATION: 98 % | HEART RATE: 82 BPM | BODY MASS INDEX: 31.34 KG/M2 | HEIGHT: 66 IN | WEIGHT: 195 LBS

## 2020-03-11 DIAGNOSIS — M72.2 PLANTAR FASCIITIS: ICD-10-CM

## 2020-03-11 DIAGNOSIS — M79.672 LEFT FOOT PAIN: Primary | ICD-10-CM

## 2020-03-11 PROCEDURE — 99203 OFFICE O/P NEW LOW 30 MIN: CPT | Performed by: PODIATRIST

## 2020-03-11 PROCEDURE — 20550 NJX 1 TENDON SHEATH/LIGAMENT: CPT | Performed by: PODIATRIST

## 2020-03-11 RX ORDER — TRIAMCINOLONE ACETONIDE 40 MG/ML
10 INJECTION, SUSPENSION INTRA-ARTICULAR; INTRAMUSCULAR ONCE
Status: COMPLETED | OUTPATIENT
Start: 2020-03-11 | End: 2020-03-11

## 2020-03-11 RX ORDER — DEXAMETHASONE SODIUM PHOSPHATE 4 MG/ML
2 INJECTION, SOLUTION INTRA-ARTICULAR; INTRALESIONAL; INTRAMUSCULAR; INTRAVENOUS; SOFT TISSUE ONCE
Status: COMPLETED | OUTPATIENT
Start: 2020-03-11 | End: 2020-03-11

## 2020-03-11 RX ORDER — BUPIVACAINE HYDROCHLORIDE 5 MG/ML
5 INJECTION, SOLUTION PERINEURAL ONCE
Status: COMPLETED | OUTPATIENT
Start: 2020-03-11 | End: 2020-03-11

## 2020-03-11 RX ADMIN — BUPIVACAINE HYDROCHLORIDE 1 ML: 5 INJECTION, SOLUTION PERINEURAL at 15:27

## 2020-03-11 RX ADMIN — TRIAMCINOLONE ACETONIDE 10 MG: 40 INJECTION, SUSPENSION INTRA-ARTICULAR; INTRAMUSCULAR at 15:26

## 2020-03-11 RX ADMIN — DEXAMETHASONE SODIUM PHOSPHATE 2 MG: 4 INJECTION, SOLUTION INTRA-ARTICULAR; INTRALESIONAL; INTRAMUSCULAR; INTRAVENOUS; SOFT TISSUE at 15:27

## 2020-03-11 NOTE — PROGRESS NOTES
Adilene Morgan  1960  59 y.o. female     Patient came to clinic with pain in left foot states her pain is 6/10    03/11/2020    Chief Complaint   Patient presents with   • Left Foot - Pain       History of Present Illness    Adilene Morgan is a 59 y.o.female who presents to clinic today with chief complaint of left foot pain.  Pain is located to the bottom of her heel.  Pain is been present for approximately 4 months.  Relates to mild injury to the bottom of her heel prior to heel pain.  She describes the pain as sharp and rates it as a 6 out of 10.  Pain is aggravated with weightbearing.  She has tried stretching, icing and arch supports which have not helped.  She denies any other lower extremity complaints.      Past Medical History:   Diagnosis Date   • Achilles bursitis    • Achilles tendinitis    • Acquired hypothyroidism    • Allergic rhinitis    • Allergy to meat     alphagal   • Allergy to milk products    • Arrhythmia    • Asthmatic bronchitis    • Asthmatic bronchitis    • Bone spur    • Calcaneal spur    • Depressive disorder    • Family history of thyroid problem    • Herpes simplex    • Hypermetropia    • Hypothyroidism    • Menopausal flushing    • Menopause    • Migraine    • Otalgia    • Pneumonia    • Presbyopia    • Trochanteric bursitis    • UTI (urinary tract infection) 07/03/2018   • Ventricular premature beats          Past Surgical History:   Procedure Laterality Date   • COLONOSCOPY N/A 2/15/2017    Procedure: COLONOSCOPY;  Surgeon: Lupillo Ugarte MD;  Location: [facility] ENDOSCOPY;  Service:    • COSMETIC SURGERY      plastic surgery to face x 4 r/t trauma   • LAPAROSCOPIC CHOLECYSTECTOMY  12/10/1995    Cholecystectomy, laparoscopic (1)      • OTHER SURGICAL HISTORY      Head surgery procedure (1)    plastic surgery on the face    • OTHER SURGICAL HISTORY  10/17/2014    Repair Superficial Wound TR-EXT 2.5 < CM 60558 (1)            Family History   Problem Relation Age of Onset    • Stroke Mother    • Diabetes Father    • Heart disease Father    • Hypertension Father    • Thyroid disease Sister    • Breast cancer Maternal Aunt    • Cancer Neg Hx         multiple in mothers family       Allergies   Allergen Reactions   • Meat Extract      Red meat, pork , ham   • Azithromycin    • Ceclor [Cefaclor] Diarrhea and Nausea Only   • Keflex [Cephalexin]    • Penicillins        Social History     Socioeconomic History   • Marital status:      Spouse name: Not on file   • Number of children: Not on file   • Years of education: Not on file   • Highest education level: Not on file   Tobacco Use   • Smoking status: Never Smoker   • Smokeless tobacco: Never Used   Substance and Sexual Activity   • Alcohol use: No   • Drug use: No   • Sexual activity: Defer         Current Outpatient Medications   Medication Sig Dispense Refill   • aspirin 325 MG tablet Take 325 mg by mouth Daily.     • Fexofenadine HCl (MUCINEX ALLERGY PO) Take 1 tablet by mouth.     • fluticasone (FLONASE ALLERGY RELIEF) 50 MCG/ACT nasal spray 1 spray into each nostril Daily As Needed.     • metroNIDAZOLE (METROCREAM) 0.75 % cream APPLY TWICE DAILY FOR ROSACEA     • minocycline (MINOCIN,DYNACIN) 100 MG capsule      • Multiple Vitamins-Minerals (MULTIVITAMIN ADULT PO) Take  by mouth.     • SUMAtriptan (IMITREX) 20 MG/ACT nasal spray NOT COVERED : PILLS COVERED, INHALE 1 SPRAY INTO EACH NOSTRIL EVERY 2 HOURS AS NEEDED FOR MIGRAINE 6 each 5   • SYNTHROID 112 MCG tablet Take 1 tablet by mouth Daily. 90 tablet 3   • topiramate (TOPAMAX) 50 MG tablet TAKE 1 TABLET BY MOUTH EVERY NIGHT. 90 tablet 3     No current facility-administered medications for this visit.        Review of Systems   Constitutional: Negative.    HENT: Negative.    Eyes: Negative.    Respiratory: Negative.    Cardiovascular: Negative.    Gastrointestinal: Negative.    Endocrine: Negative.    Genitourinary: Negative.    Musculoskeletal:        Foot pain    Skin:  "Negative.    Allergic/Immunologic: Negative.    Neurological: Negative.    Hematological: Negative.    Psychiatric/Behavioral: Negative.          OBJECTIVE    Pulse 82   Ht 167.6 cm (66\")   Wt 88.5 kg (195 lb)   LMP  (LMP Unknown)   SpO2 98%   BMI 31.47 kg/m²       Physical Exam   Constitutional: She is oriented to person, place, and time. She appears well-developed and well-nourished. No distress.   HENT:   Head: Normocephalic and atraumatic.   Nose: Nose normal.   Eyes: Pupils are equal, round, and reactive to light. Conjunctivae and EOM are normal.   Pulmonary/Chest: Effort normal. No respiratory distress. She has no wheezes.   Musculoskeletal: Normal range of motion. She exhibits tenderness. She exhibits no edema or deformity.   Neurological: She is alert and oriented to person, place, and time. She displays normal reflexes.   Skin: Skin is warm and dry. Capillary refill takes less than 2 seconds.   Psychiatric: She has a normal mood and affect. Her behavior is normal.   Vitals reviewed.      Gait: normal     Assistive Device: none     Left Lower Extremity    Cardiovascular:    DP/PT pulses palpable    CFT brisk  to all digits  No erythema or edema noted   Musculoskeletal:  Muscle strength is 5/5 for all muscle groups tested   ROM of the 1st MTP is WNL   ROM of the MTJ is WNL   ROM of the STJ is WNL   ROM of the ankle joint is  WNL   Pain on palpation to the medial tubercle of the calcaneus.  Negative lateral squeeze test.  Dermatological:   Skin is warm, dry and intact   Webspaces 1-4 are clean, dry and intact.   No subcutaneous nodules or masses noted    Neurological:   Sensation intact to light touch        Procedures    Plantar Fasciits Injection  Date/Time: 03/11/2020  Performed by: CHIKI EDMOND  Authorized by: CHIKI EDMOND   Consent: Verbal consent obtained. Written consent obtained.  Risks and benefits: risks, benefits and alternatives were discussed  Consent given by: patient  Patient identity " confirmed: verbally with patient  Indications: pain relief  Nerve block body site: left heel.  Sedation:  Patient sedated: no    Patient position: sitting  Needle size: 27 G  Local anesthetic: 0.5% Marcaine plain, Kenalog 40 mg/ml , Decadron 4 mg/mL.   Outcome: pain improved  Patient tolerance: Patient tolerated the procedure well with no immediate complications     ASSESSMENT AND PLAN    Adilene was seen today for pain.    Diagnoses and all orders for this visit:    Left foot pain  -     bupivacaine (MARCAINE) 0.5 % injection 1 mL  -     dexamethasone (DECADRON) injection 2 mg  -     triamcinolone acetonide (KENALOG-40) injection 10 mg    Plantar fasciitis      - Comprehensive foot and ankle exam performed  - X-rays reviewed.  No acute osseous or articular abnormalities.  - Steroid injection left heel  - Patient advised to stretch, ice and to make appropriate shoe gear changes to include wearing athletic type shoes with supportive insoles. Patient was given written instructions on how to correctly perform the stretching of the Achilles tendon/calf stretches, and the heel spur/plantar fasciitis regimen. Limit bare foot walking.    - Recommended over-the-counter insole such as power steps, spenco or walk fit  to properly support the arch in order to alleviate the tension and stress on the plantar fascia associated with normal daily walking. Patient was educated on the break-in period for new arch supports.  - All questions were answered to the patient's satisfaction.  - RTC in 6-8 weeks           This document has been electronically signed by Tha Salazar DPM on March 11, 2020 17:00     3/11/2020  17:00

## 2020-05-15 ENCOUNTER — TELEPHONE (OUTPATIENT)
Dept: FAMILY MEDICINE CLINIC | Facility: CLINIC | Age: 60
End: 2020-05-15

## 2020-05-15 RX ORDER — FLUCONAZOLE 150 MG/1
150 TABLET ORAL ONCE
Qty: 2 TABLET | Refills: 0 | Status: SHIPPED | OUTPATIENT
Start: 2020-05-15 | End: 2020-05-15

## 2020-05-26 RX ORDER — LEVOTHYROXINE SODIUM 112 MCG
TABLET ORAL
Qty: 90 TABLET | Refills: 2 | Status: SHIPPED | OUTPATIENT
Start: 2020-05-26 | End: 2020-12-15 | Stop reason: SDUPTHER

## 2020-06-05 PROBLEM — M79.676 PAIN OF FIFTH TOE: Status: ACTIVE | Noted: 2020-06-05

## 2020-08-17 DIAGNOSIS — G43.009 NONINTRACTABLE MIGRAINE, UNSPECIFIED MIGRAINE TYPE: ICD-10-CM

## 2020-08-17 RX ORDER — SUMATRIPTAN 20 MG/1
SPRAY NASAL
Qty: 6 EACH | Refills: 5 | Status: SHIPPED | OUTPATIENT
Start: 2020-08-17 | End: 2021-08-23

## 2020-09-21 ENCOUNTER — TRANSCRIBE ORDERS (OUTPATIENT)
Dept: LAB | Facility: HOSPITAL | Age: 60
End: 2020-09-21

## 2020-09-21 DIAGNOSIS — Z91.018 ALLERGY TO OTHER FOODS: Primary | ICD-10-CM

## 2020-10-26 ENCOUNTER — LAB (OUTPATIENT)
Dept: LAB | Facility: HOSPITAL | Age: 60
End: 2020-10-26

## 2020-10-26 DIAGNOSIS — Z91.018 ALLERGY TO OTHER FOODS: ICD-10-CM

## 2020-10-26 PROCEDURE — 86003 ALLG SPEC IGE CRUDE XTRC EA: CPT

## 2020-10-26 PROCEDURE — 82785 ASSAY OF IGE: CPT

## 2020-10-26 PROCEDURE — 86008 ALLG SPEC IGE RECOMB EA: CPT

## 2020-10-26 PROCEDURE — 36415 COLL VENOUS BLD VENIPUNCTURE: CPT

## 2020-10-28 LAB
COW MILK IGE QN: 0.12 KU/L
IGE SERPL-ACNC: 80 IU/ML (ref 6–495)

## 2020-10-30 LAB
ALPHA-GAL IGE QN: 1.26 KU/L
BEEF IGE QN: 0.52 KU/L
DEPRECATED BEEF IGE RAST QL: 1
DEPRECATED LAMB IGE RAST QL: ABNORMAL
DEPRECATED PORK IGE RAST QL: ABNORMAL
LAMB IGE QN: 0.12 KU/L
PORK IGE QN: 0.28 KU/L

## 2020-12-07 ENCOUNTER — TELEPHONE (OUTPATIENT)
Dept: FAMILY MEDICINE CLINIC | Facility: CLINIC | Age: 60
End: 2020-12-07

## 2020-12-07 DIAGNOSIS — E03.9 ACQUIRED HYPOTHYROIDISM: ICD-10-CM

## 2020-12-07 DIAGNOSIS — Z00.00 ROUTINE GENERAL MEDICAL EXAMINATION AT A HEALTH CARE FACILITY: Primary | ICD-10-CM

## 2020-12-07 NOTE — TELEPHONE ENCOUNTER
PATIENT CALLING IN ADVISED THAT SHE WAS TOLD BY THE LAB THAT HER ORDERS HAVE  AND SHE WOULD LIKE TO GET NEW LABS SENT DOWN FOR HER TO GET DONE TOMORROW.     PATIENT IS REQUESTING A CALLBACK TO CONFIRM THAT THEY ARE READY.     Adilene Morgan (Self) [phone] (M)

## 2020-12-08 ENCOUNTER — LAB (OUTPATIENT)
Dept: LAB | Facility: HOSPITAL | Age: 60
End: 2020-12-08

## 2020-12-08 DIAGNOSIS — Z00.00 ROUTINE GENERAL MEDICAL EXAMINATION AT A HEALTH CARE FACILITY: ICD-10-CM

## 2020-12-08 DIAGNOSIS — E03.9 ACQUIRED HYPOTHYROIDISM: ICD-10-CM

## 2020-12-08 LAB
ALBUMIN SERPL-MCNC: 4.3 G/DL (ref 3.5–5.2)
ALBUMIN/GLOB SERPL: 1.3 G/DL
ALP SERPL-CCNC: 215 U/L (ref 39–117)
ALT SERPL W P-5'-P-CCNC: 22 U/L (ref 1–33)
ANION GAP SERPL CALCULATED.3IONS-SCNC: 11.7 MMOL/L (ref 5–15)
AST SERPL-CCNC: 28 U/L (ref 1–32)
BILIRUB SERPL-MCNC: 0.3 MG/DL (ref 0–1.2)
BILIRUB UR QL STRIP: NEGATIVE
BUN SERPL-MCNC: 15 MG/DL (ref 8–23)
BUN/CREAT SERPL: 17 (ref 7–25)
CALCIUM SPEC-SCNC: 9.2 MG/DL (ref 8.6–10.5)
CHLORIDE SERPL-SCNC: 105 MMOL/L (ref 98–107)
CHOLEST SERPL-MCNC: 176 MG/DL (ref 0–200)
CLARITY UR: CLEAR
CO2 SERPL-SCNC: 23.3 MMOL/L (ref 22–29)
COLOR UR: YELLOW
CREAT SERPL-MCNC: 0.88 MG/DL (ref 0.57–1)
GFR SERPL CREATININE-BSD FRML MDRD: 65 ML/MIN/1.73
GLOBULIN UR ELPH-MCNC: 3.2 GM/DL
GLUCOSE SERPL-MCNC: 92 MG/DL (ref 65–99)
GLUCOSE UR STRIP-MCNC: NEGATIVE MG/DL
HDLC SERPL-MCNC: 55 MG/DL (ref 40–60)
HGB UR QL STRIP.AUTO: NEGATIVE
KETONES UR QL STRIP: NEGATIVE
LDLC SERPL CALC-MCNC: 93 MG/DL (ref 0–100)
LDLC/HDLC SERPL: 1.6 {RATIO}
LEUKOCYTE ESTERASE UR QL STRIP.AUTO: NEGATIVE
NITRITE UR QL STRIP: NEGATIVE
PH UR STRIP.AUTO: <=5 [PH] (ref 5–8)
POTASSIUM SERPL-SCNC: 4 MMOL/L (ref 3.5–5.2)
PROT SERPL-MCNC: 7.5 G/DL (ref 6–8.5)
PROT UR QL STRIP: NEGATIVE
SODIUM SERPL-SCNC: 140 MMOL/L (ref 136–145)
SP GR UR STRIP: 1.02 (ref 1–1.03)
T4 FREE SERPL-MCNC: 1.08 NG/DL (ref 0.93–1.7)
TRIGL SERPL-MCNC: 166 MG/DL (ref 0–150)
TSH SERPL DL<=0.05 MIU/L-ACNC: 3.08 UIU/ML (ref 0.27–4.2)
UROBILINOGEN UR QL STRIP: NORMAL
VLDLC SERPL-MCNC: 28 MG/DL (ref 5–40)

## 2020-12-08 PROCEDURE — 80053 COMPREHEN METABOLIC PANEL: CPT

## 2020-12-08 PROCEDURE — 81003 URINALYSIS AUTO W/O SCOPE: CPT

## 2020-12-08 PROCEDURE — 80061 LIPID PANEL: CPT

## 2020-12-08 PROCEDURE — 36415 COLL VENOUS BLD VENIPUNCTURE: CPT

## 2020-12-08 PROCEDURE — 84439 ASSAY OF FREE THYROXINE: CPT

## 2020-12-08 PROCEDURE — 84443 ASSAY THYROID STIM HORMONE: CPT

## 2020-12-15 ENCOUNTER — OFFICE VISIT (OUTPATIENT)
Dept: FAMILY MEDICINE CLINIC | Facility: CLINIC | Age: 60
End: 2020-12-15

## 2020-12-15 DIAGNOSIS — N39.0 UTI (URINARY TRACT INFECTION), UNCOMPLICATED: ICD-10-CM

## 2020-12-15 DIAGNOSIS — E03.9 ACQUIRED HYPOTHYROIDISM: Chronic | ICD-10-CM

## 2020-12-15 DIAGNOSIS — Z12.31 ENCOUNTER FOR SCREENING MAMMOGRAM FOR MALIGNANT NEOPLASM OF BREAST: ICD-10-CM

## 2020-12-15 DIAGNOSIS — Z00.00 ANNUAL PHYSICAL EXAM: Primary | ICD-10-CM

## 2020-12-15 PROCEDURE — 99396 PREV VISIT EST AGE 40-64: CPT | Performed by: GENERAL PRACTICE

## 2020-12-15 RX ORDER — LEVOTHYROXINE SODIUM 112 MCG
112 TABLET ORAL DAILY
Qty: 90 TABLET | Refills: 3 | Status: SHIPPED | OUTPATIENT
Start: 2020-12-15 | End: 2021-12-17

## 2020-12-15 RX ORDER — SULFAMETHOXAZOLE AND TRIMETHOPRIM 800; 160 MG/1; MG/1
1 TABLET ORAL 2 TIMES DAILY
Qty: 14 TABLET | Refills: 0 | Status: SHIPPED | OUTPATIENT
Start: 2020-12-15 | End: 2021-09-24

## 2021-01-03 VITALS
SYSTOLIC BLOOD PRESSURE: 138 MMHG | HEIGHT: 66 IN | OXYGEN SATURATION: 98 % | HEART RATE: 68 BPM | DIASTOLIC BLOOD PRESSURE: 72 MMHG | BODY MASS INDEX: 33.11 KG/M2 | WEIGHT: 206 LBS

## 2021-01-06 RX ORDER — LOSARTAN POTASSIUM 25 MG/1
25 TABLET ORAL DAILY
Qty: 90 TABLET | Refills: 0 | Status: SHIPPED | OUTPATIENT
Start: 2021-01-06 | End: 2021-03-31

## 2021-01-20 LAB — REF LAB TEST RESULTS: NORMAL

## 2021-01-21 DIAGNOSIS — Z12.31 ENCOUNTER FOR SCREENING MAMMOGRAM FOR MALIGNANT NEOPLASM OF BREAST: Primary | ICD-10-CM

## 2021-02-05 DIAGNOSIS — G43.009 NONINTRACTABLE MIGRAINE, UNSPECIFIED MIGRAINE TYPE: ICD-10-CM

## 2021-02-05 RX ORDER — TOPIRAMATE 50 MG/1
50 TABLET, FILM COATED ORAL NIGHTLY
Qty: 90 TABLET | Refills: 3 | Status: SHIPPED | OUTPATIENT
Start: 2021-02-05 | End: 2022-02-09

## 2021-02-25 RX ORDER — VENLAFAXINE HYDROCHLORIDE 37.5 MG/1
CAPSULE, EXTENDED RELEASE ORAL
Qty: 90 CAPSULE | Refills: 3 | Status: SHIPPED | OUTPATIENT
Start: 2021-02-25 | End: 2022-03-03

## 2021-03-31 RX ORDER — LOSARTAN POTASSIUM 25 MG/1
TABLET ORAL
Qty: 90 TABLET | Refills: 0 | Status: SHIPPED | OUTPATIENT
Start: 2021-03-31 | End: 2021-07-06

## 2021-04-09 ENCOUNTER — IMMUNIZATION (OUTPATIENT)
Dept: VACCINE CLINIC | Facility: HOSPITAL | Age: 61
End: 2021-04-09

## 2021-04-09 PROCEDURE — 0001A: CPT | Performed by: THORACIC SURGERY (CARDIOTHORACIC VASCULAR SURGERY)

## 2021-04-09 PROCEDURE — 91300 HC SARSCOV02 VAC 30MCG/0.3ML IM: CPT | Performed by: THORACIC SURGERY (CARDIOTHORACIC VASCULAR SURGERY)

## 2021-04-30 ENCOUNTER — IMMUNIZATION (OUTPATIENT)
Dept: VACCINE CLINIC | Facility: HOSPITAL | Age: 61
End: 2021-04-30

## 2021-04-30 PROCEDURE — 91300 HC SARSCOV02 VAC 30MCG/0.3ML IM: CPT | Performed by: THORACIC SURGERY (CARDIOTHORACIC VASCULAR SURGERY)

## 2021-04-30 PROCEDURE — 0002A: CPT | Performed by: THORACIC SURGERY (CARDIOTHORACIC VASCULAR SURGERY)

## 2021-05-12 ENCOUNTER — TRANSCRIBE ORDERS (OUTPATIENT)
Dept: LAB | Facility: HOSPITAL | Age: 61
End: 2021-05-12

## 2021-05-12 ENCOUNTER — LAB (OUTPATIENT)
Dept: LAB | Facility: HOSPITAL | Age: 61
End: 2021-05-12

## 2021-05-12 DIAGNOSIS — Z91.018 ALLERGY TO OTHER FOODS: Primary | ICD-10-CM

## 2021-05-12 DIAGNOSIS — Z91.018 ALLERGY TO OTHER FOODS: ICD-10-CM

## 2021-05-12 PROCEDURE — 82785 ASSAY OF IGE: CPT

## 2021-05-12 PROCEDURE — 86003 ALLG SPEC IGE CRUDE XTRC EA: CPT

## 2021-05-12 PROCEDURE — 86008 ALLG SPEC IGE RECOMB EA: CPT

## 2021-05-12 PROCEDURE — 36415 COLL VENOUS BLD VENIPUNCTURE: CPT

## 2021-05-16 LAB
COW MILK IGE QN: <0.1 KU/L
IGE SERPL-ACNC: 62 IU/ML (ref 6–495)

## 2021-05-17 LAB
ALPHA-GAL IGE QN: 0.6 KU/L
BEEF IGE QN: 0.31 KU/L
DEPRECATED BEEF IGE RAST QL: ABNORMAL
DEPRECATED LAMB IGE RAST QL: 0
DEPRECATED PORK IGE RAST QL: ABNORMAL
LAMB IGE QN: <0.1 KU/L
PORK IGE QN: 0.16 KU/L

## 2021-05-19 LAB
REF LAB TEST METHOD: NORMAL
REF LAB TEST METHOD: NORMAL

## 2021-07-06 RX ORDER — LOSARTAN POTASSIUM 25 MG/1
TABLET ORAL
Qty: 90 TABLET | Refills: 0 | Status: SHIPPED | OUTPATIENT
Start: 2021-07-06 | End: 2021-09-24

## 2021-07-15 ENCOUNTER — APPOINTMENT (OUTPATIENT)
Dept: ONCOLOGY | Facility: CLINIC | Age: 61
End: 2021-07-15

## 2021-07-15 ENCOUNTER — APPOINTMENT (OUTPATIENT)
Dept: ONCOLOGY | Facility: HOSPITAL | Age: 61
End: 2021-07-15

## 2021-07-22 ENCOUNTER — APPOINTMENT (OUTPATIENT)
Dept: ONCOLOGY | Facility: HOSPITAL | Age: 61
End: 2021-07-22

## 2021-08-22 DIAGNOSIS — G43.009 NONINTRACTABLE MIGRAINE, UNSPECIFIED MIGRAINE TYPE: ICD-10-CM

## 2021-08-23 RX ORDER — SUMATRIPTAN 20 MG/1
SPRAY NASAL
Qty: 6 EACH | Refills: 5 | Status: SHIPPED | OUTPATIENT
Start: 2021-08-23 | End: 2022-07-06

## 2021-09-24 ENCOUNTER — OFFICE VISIT (OUTPATIENT)
Dept: FAMILY MEDICINE CLINIC | Facility: CLINIC | Age: 61
End: 2021-09-24

## 2021-09-24 DIAGNOSIS — I82.4Z2 ACUTE DEEP VEIN THROMBOSIS (DVT) OF DISTAL VEIN OF LEFT LOWER EXTREMITY (HCC): ICD-10-CM

## 2021-09-24 DIAGNOSIS — C34.90 PRIMARY MALIGNANT NEOPLASM OF LUNG METASTATIC TO OTHER SITE, UNSPECIFIED LATERALITY (HCC): Primary | ICD-10-CM

## 2021-09-24 DIAGNOSIS — I63.9 CEREBROVASCULAR ACCIDENT (CVA), UNSPECIFIED MECHANISM (HCC): ICD-10-CM

## 2021-09-24 PROCEDURE — 99214 OFFICE O/P EST MOD 30 MIN: CPT | Performed by: GENERAL PRACTICE

## 2021-09-24 RX ORDER — PROMETHAZINE HYDROCHLORIDE 25 MG/1
25 TABLET ORAL EVERY 6 HOURS PRN
COMMUNITY
End: 2021-12-17 | Stop reason: SDUPTHER

## 2021-09-24 RX ORDER — OXYCODONE AND ACETAMINOPHEN 7.5; 325 MG/1; MG/1
1 TABLET ORAL EVERY 4 HOURS PRN
COMMUNITY
End: 2021-12-17

## 2021-09-24 RX ORDER — LOPERAMIDE HYDROCHLORIDE 2 MG/1
2 CAPSULE ORAL AS NEEDED
COMMUNITY
End: 2022-05-18 | Stop reason: HOSPADM

## 2021-09-24 RX ORDER — SENNA PLUS 8.6 MG/1
1 TABLET ORAL DAILY
COMMUNITY
End: 2022-05-18 | Stop reason: HOSPADM

## 2021-09-24 RX ORDER — ATORVASTATIN CALCIUM 40 MG/1
40 TABLET, FILM COATED ORAL NIGHTLY
COMMUNITY
End: 2021-12-17

## 2021-09-24 RX ORDER — DIPHENHYDRAMINE HCL 25 MG
25 CAPSULE ORAL 2 TIMES DAILY PRN
COMMUNITY
End: 2021-12-17

## 2021-09-24 RX ORDER — ANTIOX #8/OM3/DHA/EPA/LUT/ZEAX 250-2.5 MG
1 CAPSULE ORAL 2 TIMES DAILY
COMMUNITY
End: 2022-05-05

## 2021-09-24 RX ORDER — PROCHLORPERAZINE MALEATE 10 MG
10 TABLET ORAL EVERY 6 HOURS PRN
COMMUNITY
End: 2022-03-18 | Stop reason: SDUPTHER

## 2021-09-24 RX ORDER — ONDANSETRON HYDROCHLORIDE 8 MG/1
8 TABLET, FILM COATED ORAL EVERY 8 HOURS PRN
COMMUNITY
End: 2022-03-18 | Stop reason: SDUPTHER

## 2021-09-24 RX ORDER — MULTIPLE VITAMINS W/ MINERALS TAB 9MG-400MCG
1 TAB ORAL DAILY
COMMUNITY

## 2021-09-24 RX ORDER — LORATADINE 10 MG/1
10 TABLET ORAL AS NEEDED
COMMUNITY
End: 2022-07-06

## 2021-09-24 RX ORDER — ASPIRIN 81 MG/1
81 TABLET ORAL DAILY
COMMUNITY

## 2021-10-12 DIAGNOSIS — C34.90 ADENOSQUAMOUS CARCINOMA OF LUNG, UNSPECIFIED LATERALITY (HCC): Primary | ICD-10-CM

## 2021-10-12 DIAGNOSIS — T45.1X5A ANEMIA DUE TO ANTINEOPLASTIC CHEMOTHERAPY: ICD-10-CM

## 2021-10-12 DIAGNOSIS — D64.81 ANEMIA DUE TO ANTINEOPLASTIC CHEMOTHERAPY: ICD-10-CM

## 2021-10-13 ENCOUNTER — INFUSION (OUTPATIENT)
Dept: ONCOLOGY | Facility: HOSPITAL | Age: 61
End: 2021-10-13

## 2021-10-13 ENCOUNTER — LAB (OUTPATIENT)
Dept: LAB | Facility: HOSPITAL | Age: 61
End: 2021-10-13

## 2021-10-13 VITALS
SYSTOLIC BLOOD PRESSURE: 113 MMHG | DIASTOLIC BLOOD PRESSURE: 66 MMHG | HEART RATE: 66 BPM | RESPIRATION RATE: 18 BRPM | TEMPERATURE: 97.5 F

## 2021-10-13 DIAGNOSIS — C34.90 ADENOSQUAMOUS CARCINOMA OF LUNG, UNSPECIFIED LATERALITY (HCC): ICD-10-CM

## 2021-10-13 DIAGNOSIS — D64.81 ANEMIA DUE TO ANTINEOPLASTIC CHEMOTHERAPY: ICD-10-CM

## 2021-10-13 DIAGNOSIS — T45.1X5A ANEMIA DUE TO ANTINEOPLASTIC CHEMOTHERAPY: ICD-10-CM

## 2021-10-13 DIAGNOSIS — I49.3 PVC (PREMATURE VENTRICULAR CONTRACTION): ICD-10-CM

## 2021-10-13 LAB
ABO GROUP BLD: NORMAL
ABO GROUP BLD: NORMAL
ANISOCYTOSIS BLD QL: ABNORMAL
BLD GP AB SCN SERPL QL: NEGATIVE
BLD GP AB SCN SERPL QL: NEGATIVE
DEPRECATED RDW RBC AUTO: 68.1 FL (ref 37–54)
EOSINOPHIL # BLD MANUAL: 0.06 10*3/MM3 (ref 0–0.4)
EOSINOPHIL NFR BLD MANUAL: 1 % (ref 0.3–6.2)
ERYTHROCYTE [DISTWIDTH] IN BLOOD BY AUTOMATED COUNT: 24.6 % (ref 12.3–15.4)
HCT VFR BLD AUTO: 22.9 % (ref 34–46.6)
HGB BLD-MCNC: 6.9 G/DL (ref 12–15.9)
HYPOCHROMIA BLD QL: ABNORMAL
LYMPHOCYTES # BLD MANUAL: 0.95 10*3/MM3 (ref 0.7–3.1)
LYMPHOCYTES NFR BLD MANUAL: 10 % (ref 5–12)
LYMPHOCYTES NFR BLD MANUAL: 16 % (ref 19.6–45.3)
Lab: NORMAL
Lab: NORMAL
MCH RBC QN AUTO: 24.1 PG (ref 26.6–33)
MCHC RBC AUTO-ENTMCNC: 30.1 G/DL (ref 31.5–35.7)
MCV RBC AUTO: 80.1 FL (ref 79–97)
MONOCYTES # BLD AUTO: 0.59 10*3/MM3 (ref 0.1–0.9)
NEUTROPHILS # BLD AUTO: 4.33 10*3/MM3 (ref 1.7–7)
NEUTROPHILS NFR BLD MANUAL: 70 % (ref 42.7–76)
NEUTS BAND NFR BLD MANUAL: 3 % (ref 0–5)
PLATELET # BLD AUTO: 410 10*3/MM3 (ref 140–450)
PMV BLD AUTO: 9.2 FL (ref 6–12)
POIKILOCYTOSIS BLD QL SMEAR: ABNORMAL
RBC # BLD AUTO: 2.86 10*6/MM3 (ref 3.77–5.28)
RH BLD: POSITIVE
RH BLD: POSITIVE
SMALL PLATELETS BLD QL SMEAR: ADEQUATE
T&S EXPIRATION DATE: NORMAL
T&S EXPIRATION DATE: NORMAL
WBC # BLD AUTO: 5.93 10*3/MM3 (ref 3.4–10.8)
WBC MORPH BLD: NORMAL

## 2021-10-13 PROCEDURE — 86900 BLOOD TYPING SEROLOGIC ABO: CPT

## 2021-10-13 PROCEDURE — 86850 RBC ANTIBODY SCREEN: CPT | Performed by: NURSE PRACTITIONER

## 2021-10-13 PROCEDURE — 36430 TRANSFUSION BLD/BLD COMPNT: CPT

## 2021-10-13 PROCEDURE — 86901 BLOOD TYPING SEROLOGIC RH(D): CPT

## 2021-10-13 PROCEDURE — 86900 BLOOD TYPING SEROLOGIC ABO: CPT | Performed by: NURSE PRACTITIONER

## 2021-10-13 PROCEDURE — 86901 BLOOD TYPING SEROLOGIC RH(D): CPT | Performed by: NURSE PRACTITIONER

## 2021-10-13 PROCEDURE — 85025 COMPLETE CBC W/AUTO DIFF WBC: CPT

## 2021-10-13 PROCEDURE — 36415 COLL VENOUS BLD VENIPUNCTURE: CPT

## 2021-10-13 PROCEDURE — 86923 COMPATIBILITY TEST ELECTRIC: CPT

## 2021-10-13 PROCEDURE — P9016 RBC LEUKOCYTES REDUCED: HCPCS

## 2021-10-13 PROCEDURE — 85007 BL SMEAR W/DIFF WBC COUNT: CPT

## 2021-10-13 PROCEDURE — 86850 RBC ANTIBODY SCREEN: CPT

## 2021-10-13 RX ORDER — SODIUM CHLORIDE 9 MG/ML
250 INJECTION, SOLUTION INTRAVENOUS AS NEEDED
Status: CANCELLED | OUTPATIENT
Start: 2021-10-13

## 2021-10-13 RX ORDER — SODIUM CHLORIDE 9 MG/ML
250 INJECTION, SOLUTION INTRAVENOUS AS NEEDED
Status: DISCONTINUED | OUTPATIENT
Start: 2021-10-13 | End: 2021-10-13 | Stop reason: HOSPADM

## 2021-10-13 RX ADMIN — SODIUM CHLORIDE 250 ML: 9 INJECTION, SOLUTION INTRAVENOUS at 10:59

## 2021-10-13 NOTE — PATIENT INSTRUCTIONS
"https://www.[facility].org/donate-blood/blood-donation-process/what-happens-to-donated-blood/blood-transfusions/types-of-[email or web address].html\"> https://www.[facility].org/donate-blood/blood-donation-process/what-happens-to-donated-blood/blood-transfusions/risks-complications.html\">   Blood Transfusion, Adult, Care After  This sheet gives you information about how to care for yourself after your procedure. Your health care provider may also give you more specific instructions. If you have problems or questions, contact your health care provider.  What can I expect after the procedure?  After the procedure, it is common to have:  · Bruising and soreness where the IV was inserted.  · A fever or chills on the day of the procedure. This may be your body's response to the new blood cells received.  · A headache.  Follow these instructions at home:  IV insertion site care         · Follow instructions from your health care provider about how to take care of your IV insertion site. Make sure you:  ? Wash your hands with soap and water before and after you change your bandage (dressing). If soap and water are not available, use hand .  ? Change your dressing as told by your health care provider.  · Check your IV insertion site every day for signs of infection. Check for:  ? Redness, swelling, or pain.  ? Bleeding from the site.  ? Warmth.  ? Pus or a bad smell.  General instructions  · Take over-the-counter and prescription medicines only as told by your health care provider.  · Rest as told by your health care provider.  · Return to your normal activities as told by your health care provider.  · Keep all follow-up visits as told by your health care provider. This is important.  Contact a health care provider if:  · You have itching or red, swollen areas of skin (hives).  · You feel anxious.  · You feel weak after doing your normal activities.  · You have redness, swelling, warmth, or pain around the IV " insertion site.  · You have blood coming from the IV insertion site that does not stop with pressure.  · You have pus or a bad smell coming from your IV insertion site.  Get help right away if:  · You have symptoms of a serious allergic or immune system reaction, including:  ? Trouble breathing or shortness of breath.  ? Swelling of the face or feeling flushed.  ? Fever or chills.  ? Pain in the head, back, or chest.  ? Dark urine or blood in the urine.  ? Widespread rash.  ? Fast heartbeat.  ? Feeling dizzy or light-headed.  If you receive your blood transfusion in an outpatient setting, you will be told whom to contact to report any reactions.  These symptoms may represent a serious problem that is an emergency. Do not wait to see if the symptoms will go away. Get medical help right away. Call your local emergency services (911 in the [place] Do not drive yourself to the hospital.  Summary  · Bruising and tenderness around the IV insertion site are common.  · Check your IV insertion site every day for signs of infection.  · Rest as told by your health care provider. Return to your normal activities as told by your health care provider.  · Get help right away for symptoms of a serious allergic or immune system reaction to blood transfusion.  This information is not intended to replace advice given to you by your health care provider. Make sure you discuss any questions you have with your health care provider.  Document Revised: 06/11/2020 Document Reviewed: 06/11/2020  [employer] © 2021 [employer]

## 2021-10-15 LAB
BH BB BLOOD EXPIRATION DATE: NORMAL
BH BB BLOOD EXPIRATION DATE: NORMAL
BH BB BLOOD TYPE BARCODE: 6200
BH BB BLOOD TYPE BARCODE: 6200
BH BB DISPENSE STATUS: NORMAL
BH BB DISPENSE STATUS: NORMAL
BH BB PRODUCT CODE: NORMAL
BH BB PRODUCT CODE: NORMAL
BH BB UNIT NUMBER: NORMAL
BH BB UNIT NUMBER: NORMAL
CROSSMATCH INTERPRETATION: NORMAL
CROSSMATCH INTERPRETATION: NORMAL
UNIT  ABO: NORMAL
UNIT  ABO: NORMAL
UNIT  RH: NORMAL
UNIT  RH: NORMAL

## 2021-10-29 ENCOUNTER — HOSPITAL ENCOUNTER (EMERGENCY)
Facility: HOSPITAL | Age: 61
Discharge: HOME | End: 2021-10-29
Attending: STUDENT IN AN ORGANIZED HEALTH CARE EDUCATION/TRAINING PROGRAM | Admitting: FAMILY MEDICINE

## 2021-10-29 ENCOUNTER — APPOINTMENT (OUTPATIENT)
Dept: CT IMAGING | Facility: HOSPITAL | Age: 61
End: 2021-10-29

## 2021-10-29 ENCOUNTER — OFFICE VISIT (OUTPATIENT)
Dept: FAMILY MEDICINE CLINIC | Facility: CLINIC | Age: 61
End: 2021-10-29

## 2021-10-29 VITALS
WEIGHT: 189.8 LBS | HEIGHT: 66 IN | RESPIRATION RATE: 15 BRPM | TEMPERATURE: 98.4 F | BODY MASS INDEX: 30.5 KG/M2 | SYSTOLIC BLOOD PRESSURE: 112 MMHG | OXYGEN SATURATION: 97 % | DIASTOLIC BLOOD PRESSURE: 60 MMHG | HEART RATE: 67 BPM

## 2021-10-29 VITALS
RESPIRATION RATE: 22 BRPM | OXYGEN SATURATION: 98 % | SYSTOLIC BLOOD PRESSURE: 112 MMHG | DIASTOLIC BLOOD PRESSURE: 64 MMHG | BODY MASS INDEX: 30.5 KG/M2 | HEART RATE: 45 BPM | HEIGHT: 66 IN | WEIGHT: 189.8 LBS

## 2021-10-29 DIAGNOSIS — R04.0 BLEEDING FROM THE NOSE: Primary | ICD-10-CM

## 2021-10-29 DIAGNOSIS — R04.0 NOSEBLEED: Primary | ICD-10-CM

## 2021-10-29 LAB
ALBUMIN SERPL-MCNC: 3.8 G/DL (ref 3.5–5.2)
ALBUMIN/GLOB SERPL: 1.1 G/DL
ALP SERPL-CCNC: 180 U/L (ref 39–117)
ALT SERPL W P-5'-P-CCNC: 14 U/L (ref 1–33)
ANION GAP SERPL CALCULATED.3IONS-SCNC: 9 MMOL/L (ref 5–15)
APTT PPP: 41.7 SECONDS (ref 20–40.3)
AST SERPL-CCNC: 19 U/L (ref 1–32)
BASOPHILS # BLD AUTO: 0.05 10*3/MM3 (ref 0–0.2)
BASOPHILS NFR BLD AUTO: 0.7 % (ref 0–1.5)
BILIRUB SERPL-MCNC: 0.2 MG/DL (ref 0–1.2)
BUN SERPL-MCNC: 24 MG/DL (ref 8–23)
BUN/CREAT SERPL: 16.4 (ref 7–25)
CALCIUM SPEC-SCNC: 8.9 MG/DL (ref 8.6–10.5)
CHLORIDE SERPL-SCNC: 106 MMOL/L (ref 98–107)
CO2 SERPL-SCNC: 27 MMOL/L (ref 22–29)
CREAT SERPL-MCNC: 1.46 MG/DL (ref 0.57–1)
DEPRECATED RDW RBC AUTO: 76.1 FL (ref 37–54)
EOSINOPHIL # BLD AUTO: 0.47 10*3/MM3 (ref 0–0.4)
EOSINOPHIL NFR BLD AUTO: 6.3 % (ref 0.3–6.2)
ERYTHROCYTE [DISTWIDTH] IN BLOOD BY AUTOMATED COUNT: 24.9 % (ref 12.3–15.4)
GFR SERPL CREATININE-BSD FRML MDRD: 36 ML/MIN/1.73
GLOBULIN UR ELPH-MCNC: 3.4 GM/DL
GLUCOSE SERPL-MCNC: 109 MG/DL (ref 65–99)
HCT VFR BLD AUTO: 26.7 % (ref 34–46.6)
HGB BLD-MCNC: 8.4 G/DL (ref 12–15.9)
HOLD SPECIMEN: NORMAL
IMM GRANULOCYTES # BLD AUTO: 0.03 10*3/MM3 (ref 0–0.05)
IMM GRANULOCYTES NFR BLD AUTO: 0.4 % (ref 0–0.5)
INR PPP: 1.57 (ref 0.8–1.2)
LYMPHOCYTES # BLD AUTO: 0.74 10*3/MM3 (ref 0.7–3.1)
LYMPHOCYTES NFR BLD AUTO: 9.9 % (ref 19.6–45.3)
MCH RBC QN AUTO: 26.6 PG (ref 26.6–33)
MCHC RBC AUTO-ENTMCNC: 31.5 G/DL (ref 31.5–35.7)
MCV RBC AUTO: 84.5 FL (ref 79–97)
MONOCYTES # BLD AUTO: 0.55 10*3/MM3 (ref 0.1–0.9)
MONOCYTES NFR BLD AUTO: 7.4 % (ref 5–12)
NEUTROPHILS NFR BLD AUTO: 5.61 10*3/MM3 (ref 1.7–7)
NEUTROPHILS NFR BLD AUTO: 75.3 % (ref 42.7–76)
NRBC BLD AUTO-RTO: 0 /100 WBC (ref 0–0.2)
PLATELET # BLD AUTO: 337 10*3/MM3 (ref 140–450)
PMV BLD AUTO: 9.3 FL (ref 6–12)
POTASSIUM SERPL-SCNC: 4.1 MMOL/L (ref 3.5–5.2)
PROT SERPL-MCNC: 7.2 G/DL (ref 6–8.5)
PROTHROMBIN TIME: 18.5 SECONDS (ref 11.1–15.3)
RBC # BLD AUTO: 3.16 10*6/MM3 (ref 3.77–5.28)
SODIUM SERPL-SCNC: 142 MMOL/L (ref 136–145)
WBC # BLD AUTO: 7.45 10*3/MM3 (ref 3.4–10.8)
WHOLE BLOOD HOLD SPECIMEN: NORMAL
WHOLE BLOOD HOLD SPECIMEN: NORMAL

## 2021-10-29 PROCEDURE — 70486 CT MAXILLOFACIAL W/O DYE: CPT

## 2021-10-29 PROCEDURE — 85610 PROTHROMBIN TIME: CPT | Performed by: PHYSICIAN ASSISTANT

## 2021-10-29 PROCEDURE — 99212 OFFICE O/P EST SF 10 MIN: CPT | Performed by: NURSE PRACTITIONER

## 2021-10-29 PROCEDURE — 99283 EMERGENCY DEPT VISIT LOW MDM: CPT

## 2021-10-29 PROCEDURE — 70450 CT HEAD/BRAIN W/O DYE: CPT

## 2021-10-29 PROCEDURE — 80053 COMPREHEN METABOLIC PANEL: CPT | Performed by: PHYSICIAN ASSISTANT

## 2021-10-29 PROCEDURE — 85025 COMPLETE CBC W/AUTO DIFF WBC: CPT | Performed by: PHYSICIAN ASSISTANT

## 2021-10-29 PROCEDURE — 85730 THROMBOPLASTIN TIME PARTIAL: CPT | Performed by: PHYSICIAN ASSISTANT

## 2021-10-29 NOTE — PROGRESS NOTES
"Chief Complaint  Facial Injury    Subjective          Adilene Morgan presents to [facility] [place] after a fall where she landed on her face. She was in a hotel room and tripped and fell. Her nose has been bleeding off and on since, she is on Xarelto        Nose Bleed   The bleeding has been from the right nare. This is a new problem. The current episode started today. The problem occurs constantly. The problem has been waxing and waning. The bleeding is associated with trauma. She has tried nothing for the symptoms. On Xarelto        Review of Systems   Constitutional: Positive for fatigue. Negative for activity change, appetite change and chills.   HENT: Positive for nosebleeds. Negative for congestion, ear pain, sore throat and trouble swallowing.    Eyes: Negative for discharge, itching and visual disturbance.   Respiratory: Negative for apnea, cough and wheezing.    Cardiovascular: Negative for chest pain and leg swelling.   Gastrointestinal: Negative for abdominal distention, constipation, diarrhea and nausea.   Endocrine: Negative for cold intolerance, heat intolerance and polyuria.   Genitourinary: Negative for dysuria, frequency and urgency.   Musculoskeletal: Negative for arthralgias, back pain and myalgias.   Skin: Negative for color change, pallor and wound.   Neurological: Negative for dizziness, seizures, syncope, weakness and light-headedness.   Psychiatric/Behavioral: Negative for agitation, confusion and sleep disturbance. The patient is not nervous/anxious.          Objective   Vital Signs:   /64 (BP Location: Right arm, Patient Position: Sitting, Cuff Size: Adult)   Pulse (!) 45   Resp 22   Ht 167.6 cm (66\")   Wt 86.1 kg (189 lb 12.8 oz)   SpO2 98%   BMI 30.63 kg/m²     Physical Exam  Vitals and nursing note reviewed.   Constitutional:       Appearance: She is well-developed.   HENT:      Head: Normocephalic and atraumatic.      Nose: " Signs of injury, nasal tenderness and mucosal edema present.      Right Nostril: Epistaxis present.      Comments: Bleeding  Eyes:      General: Lids are normal.      Conjunctiva/sclera: Conjunctivae normal.   Neck:      Thyroid: No thyroid mass or thyromegaly.      Trachea: Trachea normal. No tracheal tenderness.   Cardiovascular:      Rate and Rhythm: Normal rate.      Pulses: Normal pulses.      Heart sounds: Normal heart sounds.   Pulmonary:      Effort: Pulmonary effort is normal. No respiratory distress.      Breath sounds: Normal breath sounds. No wheezing.   Abdominal:      General: There is no distension.      Palpations: Abdomen is soft. There is no mass.   Musculoskeletal:         General: Normal range of motion.      Cervical back: Normal range of motion. No edema.   Lymphadenopathy:      Head:      Right side of head: No submental, submandibular or tonsillar adenopathy.      Left side of head: No submental, submandibular or tonsillar adenopathy.   Skin:     General: Skin is warm and dry.      Coloration: Skin is not pale.      Findings: No abrasion, erythema or lesion.   Neurological:      Mental Status: She is alert and oriented to person, place, and time.   Psychiatric:         Mood and Affect: Mood is not anxious. Affect is not inappropriate.         Speech: Speech normal.         Behavior: Behavior normal.         Thought Content: Thought content normal.         Judgment: Judgment normal. Judgment is not impulsive.        Result Review :                 Assessment and Plan    Diagnoses and all orders for this visit:    1. Bleeding from the nose (Primary)        Nose assessed, it has been bleeding since early this morning when she fell, I have concerns since she is on Xarelto, I recommend going to the ER to be evaluated, she agrees    Her  will drive her to the ER       ER called and given notice        I spent 14 minutes caring for Adilene on this date of service. This time includes time spent  by me in the following activities:performing a medically appropriate examination and/or evaluation , referring and communicating with other health care professionals  and documenting information in the medical record  Follow Up   Return if symptoms worsen or fail to improve, for Next scheduled follow up.  Patient was given instructions and counseling regarding her condition or for health maintenance advice. Please see specific information pulled into the AVS if appropriate.           This document has been electronically signed by SILVA Gamez on October 29, 2021 15:01 CDT

## 2021-11-03 ENCOUNTER — HOSPITAL ENCOUNTER (OUTPATIENT)
Dept: ULTRASOUND IMAGING | Facility: HOSPITAL | Age: 61
Discharge: HOME | End: 2021-11-03
Admitting: NURSE PRACTITIONER

## 2021-11-03 ENCOUNTER — TELEPHONE (OUTPATIENT)
Dept: FAMILY MEDICINE CLINIC | Facility: CLINIC | Age: 61
End: 2021-11-03

## 2021-11-03 ENCOUNTER — OFFICE VISIT (OUTPATIENT)
Dept: FAMILY MEDICINE CLINIC | Facility: CLINIC | Age: 61
End: 2021-11-03

## 2021-11-03 VITALS
SYSTOLIC BLOOD PRESSURE: 116 MMHG | HEIGHT: 66 IN | BODY MASS INDEX: 30.34 KG/M2 | OXYGEN SATURATION: 99 % | RESPIRATION RATE: 22 BRPM | HEART RATE: 63 BPM | WEIGHT: 188.8 LBS | DIASTOLIC BLOOD PRESSURE: 64 MMHG

## 2021-11-03 DIAGNOSIS — M79.89 LEFT LEG SWELLING: ICD-10-CM

## 2021-11-03 DIAGNOSIS — M79.89 LEFT LEG SWELLING: Primary | ICD-10-CM

## 2021-11-03 PROCEDURE — 93971 EXTREMITY STUDY: CPT

## 2021-11-03 PROCEDURE — 99213 OFFICE O/P EST LOW 20 MIN: CPT | Performed by: NURSE PRACTITIONER

## 2021-11-03 NOTE — PROGRESS NOTES
"Chief Complaint  Lower Extremity Issue    Subjective          Adilene Morgan presents to [facility] [place] with issues regarding left leg swelling. Was recently seen for a nosebleed in which her Xarelto was stopped for 2 days. She resumed Xarelto in Sunday, however on Monday she developed left leg swelling (below knee) and pain. Concerned since she has a history of DVT.   Leg Swelling  This is a new problem. The current episode started in the past 7 days. The problem occurs constantly. The problem has been gradually worsening. Associated symptoms include myalgias. Pertinent negatives include no arthralgias, chest pain, chills, congestion, coughing, nausea, sore throat or weakness. Nothing aggravates the symptoms. She has tried immobilization for the symptoms. The treatment provided no relief.       Review of Systems   Constitutional: Negative for activity change, appetite change and chills.   HENT: Negative for congestion, ear pain, sore throat and trouble swallowing.    Eyes: Negative for discharge, itching and visual disturbance.   Respiratory: Negative for apnea, cough and wheezing.    Cardiovascular: Negative for chest pain and leg swelling.   Gastrointestinal: Negative for abdominal distention, constipation, diarrhea and nausea.   Endocrine: Negative for cold intolerance, heat intolerance and polyuria.   Genitourinary: Negative for dysuria, frequency and urgency.   Musculoskeletal: Positive for myalgias. Negative for arthralgias and back pain.   Skin: Negative for color change, pallor and wound.   Neurological: Negative for dizziness, seizures, syncope, weakness and light-headedness.   Psychiatric/Behavioral: Negative for agitation, confusion and sleep disturbance. The patient is not nervous/anxious.          Objective   Vital Signs:   /64 (BP Location: Right arm, Patient Position: Sitting, Cuff Size: Adult)   Pulse 63   Resp 22   Ht 167.6 cm (66\")   " Wt 85.6 kg (188 lb 12.8 oz)   SpO2 99%   BMI 30.47 kg/m²     Physical Exam  Vitals and nursing note reviewed.   Constitutional:       Appearance: She is well-developed.   HENT:      Head: Normocephalic and atraumatic.   Eyes:      General: Lids are normal.      Conjunctiva/sclera: Conjunctivae normal.   Neck:      Thyroid: No thyroid mass or thyromegaly.      Trachea: Trachea normal. No tracheal tenderness.   Cardiovascular:      Rate and Rhythm: Normal rate.      Pulses: Normal pulses.      Heart sounds: Normal heart sounds.   Pulmonary:      Effort: Pulmonary effort is normal. No respiratory distress.      Breath sounds: Normal breath sounds. No wheezing.   Abdominal:      General: There is no distension.      Palpations: Abdomen is soft. There is no mass.   Musculoskeletal:         General: Normal range of motion.      Cervical back: Normal range of motion. No edema.      Left lower leg: Swelling and tenderness present. Edema present.      Left ankle: Swelling present.      Left foot: Swelling present.        Legs:    Lymphadenopathy:      Head:      Right side of head: No submental, submandibular or tonsillar adenopathy.      Left side of head: No submental, submandibular or tonsillar adenopathy.   Skin:     General: Skin is warm and dry.      Coloration: Skin is not pale.      Findings: No abrasion, erythema or lesion.   Neurological:      Mental Status: She is alert and oriented to person, place, and time.   Psychiatric:         Mood and Affect: Mood is not anxious. Affect is not inappropriate.         Speech: Speech normal.         Behavior: Behavior normal.         Thought Content: Thought content normal.         Judgment: Judgment normal. Judgment is not impulsive.        Result Review :                 Assessment and Plan    Diagnoses and all orders for this visit:    1. Left leg swelling (Primary)  -     US venous doppler lower extremity left (duplex); Future        US today  We will call with  results    Continue all current medications     Keep leg propped up above the level of heart      I spent 20 minutes caring for Adilene on this date of service. This time includes time spent by me in the following activities:preparing for the visit, performing a medically appropriate examination and/or evaluation , counseling and educating the patient/family/caregiver, ordering medications, tests, or procedures and documenting information in the medical record  Follow Up   Return if symptoms worsen or fail to improve, for Next scheduled follow up.  Patient was given instructions and counseling regarding her condition or for health maintenance advice. Please see specific information pulled into the AVS if appropriate.           This document has been electronically signed by SILVA Gamez on November 3, 2021 15:11 CDT

## 2021-11-07 VITALS
SYSTOLIC BLOOD PRESSURE: 112 MMHG | HEART RATE: 56 BPM | WEIGHT: 174.4 LBS | BODY MASS INDEX: 28.03 KG/M2 | OXYGEN SATURATION: 91 % | HEIGHT: 66 IN | DIASTOLIC BLOOD PRESSURE: 70 MMHG

## 2021-11-18 ENCOUNTER — HOSPITAL ENCOUNTER (EMERGENCY)
Facility: HOSPITAL | Age: 61
Discharge: HOME | End: 2021-11-18
Attending: EMERGENCY MEDICINE | Admitting: EMERGENCY MEDICINE

## 2021-11-18 VITALS
SYSTOLIC BLOOD PRESSURE: 107 MMHG | WEIGHT: 196.1 LBS | BODY MASS INDEX: 31.52 KG/M2 | DIASTOLIC BLOOD PRESSURE: 59 MMHG | HEIGHT: 66 IN | HEART RATE: 78 BPM | TEMPERATURE: 97.9 F | RESPIRATION RATE: 18 BRPM | OXYGEN SATURATION: 97 %

## 2021-11-18 DIAGNOSIS — T45.1X5A ANEMIA DUE TO ANTINEOPLASTIC CHEMOTHERAPY: ICD-10-CM

## 2021-11-18 DIAGNOSIS — D64.81 ANEMIA DUE TO ANTINEOPLASTIC CHEMOTHERAPY: ICD-10-CM

## 2021-11-18 DIAGNOSIS — R06.02 SOB (SHORTNESS OF BREATH): ICD-10-CM

## 2021-11-18 DIAGNOSIS — C34.90 ADENOSQUAMOUS CARCINOMA OF LUNG, UNSPECIFIED LATERALITY (HCC): Primary | ICD-10-CM

## 2021-11-18 DIAGNOSIS — D63.8 ANEMIA, CHRONIC DISEASE: Primary | ICD-10-CM

## 2021-11-18 DIAGNOSIS — R53.1 WEAKNESS: ICD-10-CM

## 2021-11-18 DIAGNOSIS — C34.90 ADENOSQUAMOUS CARCINOMA OF LUNG, UNSPECIFIED LATERALITY (HCC): ICD-10-CM

## 2021-11-18 LAB
ABO GROUP BLD: NORMAL
ALBUMIN SERPL-MCNC: 3.6 G/DL (ref 3.5–5.2)
ALBUMIN/GLOB SERPL: 1 G/DL
ALP SERPL-CCNC: 136 U/L (ref 39–117)
ALT SERPL W P-5'-P-CCNC: 10 U/L (ref 1–33)
ANION GAP SERPL CALCULATED.3IONS-SCNC: 4 MMOL/L (ref 5–15)
AST SERPL-CCNC: 16 U/L (ref 1–32)
BASOPHILS # BLD AUTO: 0.04 10*3/MM3 (ref 0–0.2)
BASOPHILS NFR BLD AUTO: 0.6 % (ref 0–1.5)
BILIRUB SERPL-MCNC: 0.2 MG/DL (ref 0–1.2)
BLD GP AB SCN SERPL QL: NEGATIVE
BUN SERPL-MCNC: 22 MG/DL (ref 8–23)
BUN/CREAT SERPL: 17.2 (ref 7–25)
CALCIUM SPEC-SCNC: 8.8 MG/DL (ref 8.6–10.5)
CHLORIDE SERPL-SCNC: 105 MMOL/L (ref 98–107)
CO2 SERPL-SCNC: 30 MMOL/L (ref 22–29)
CREAT SERPL-MCNC: 1.28 MG/DL (ref 0.57–1)
DEPRECATED RDW RBC AUTO: 70.4 FL (ref 37–54)
EOSINOPHIL # BLD AUTO: 0.39 10*3/MM3 (ref 0–0.4)
EOSINOPHIL NFR BLD AUTO: 5.6 % (ref 0.3–6.2)
ERYTHROCYTE [DISTWIDTH] IN BLOOD BY AUTOMATED COUNT: 22.2 % (ref 12.3–15.4)
GFR SERPL CREATININE-BSD FRML MDRD: 42 ML/MIN/1.73
GLOBULIN UR ELPH-MCNC: 3.7 GM/DL
GLUCOSE SERPL-MCNC: 108 MG/DL (ref 65–99)
HCT VFR BLD AUTO: 23 % (ref 34–46.6)
HGB BLD-MCNC: 7.2 G/DL (ref 12–15.9)
HOLD SPECIMEN: NORMAL
HOLD SPECIMEN: NORMAL
IMM GRANULOCYTES # BLD AUTO: 0.03 10*3/MM3 (ref 0–0.05)
IMM GRANULOCYTES NFR BLD AUTO: 0.4 % (ref 0–0.5)
LYMPHOCYTES # BLD AUTO: 0.6 10*3/MM3 (ref 0.7–3.1)
LYMPHOCYTES NFR BLD AUTO: 8.6 % (ref 19.6–45.3)
Lab: NORMAL
MCH RBC QN AUTO: 27.4 PG (ref 26.6–33)
MCHC RBC AUTO-ENTMCNC: 31.3 G/DL (ref 31.5–35.7)
MCV RBC AUTO: 87.5 FL (ref 79–97)
MONOCYTES # BLD AUTO: 0.44 10*3/MM3 (ref 0.1–0.9)
MONOCYTES NFR BLD AUTO: 6.3 % (ref 5–12)
NEUTROPHILS NFR BLD AUTO: 5.48 10*3/MM3 (ref 1.7–7)
NEUTROPHILS NFR BLD AUTO: 78.5 % (ref 42.7–76)
NRBC BLD AUTO-RTO: 0 /100 WBC (ref 0–0.2)
PLATELET # BLD AUTO: 443 10*3/MM3 (ref 140–450)
PMV BLD AUTO: 9.1 FL (ref 6–12)
POTASSIUM SERPL-SCNC: 4.6 MMOL/L (ref 3.5–5.2)
PROT SERPL-MCNC: 7.3 G/DL (ref 6–8.5)
RBC # BLD AUTO: 2.63 10*6/MM3 (ref 3.77–5.28)
RH BLD: POSITIVE
SODIUM SERPL-SCNC: 139 MMOL/L (ref 136–145)
T&S EXPIRATION DATE: NORMAL
WBC NRBC COR # BLD: 6.98 10*3/MM3 (ref 3.4–10.8)
WHOLE BLOOD HOLD SPECIMEN: NORMAL

## 2021-11-18 PROCEDURE — 86901 BLOOD TYPING SEROLOGIC RH(D): CPT | Performed by: EMERGENCY MEDICINE

## 2021-11-18 PROCEDURE — 99282 EMERGENCY DEPT VISIT SF MDM: CPT

## 2021-11-18 PROCEDURE — 86900 BLOOD TYPING SEROLOGIC ABO: CPT | Performed by: EMERGENCY MEDICINE

## 2021-11-18 PROCEDURE — 86850 RBC ANTIBODY SCREEN: CPT | Performed by: EMERGENCY MEDICINE

## 2021-11-18 PROCEDURE — 36415 COLL VENOUS BLD VENIPUNCTURE: CPT | Performed by: EMERGENCY MEDICINE

## 2021-11-18 PROCEDURE — 85025 COMPLETE CBC W/AUTO DIFF WBC: CPT | Performed by: EMERGENCY MEDICINE

## 2021-11-18 PROCEDURE — 80053 COMPREHEN METABOLIC PANEL: CPT | Performed by: EMERGENCY MEDICINE

## 2021-11-18 PROCEDURE — 86923 COMPATIBILITY TEST ELECTRIC: CPT

## 2021-11-18 PROCEDURE — 36415 COLL VENOUS BLD VENIPUNCTURE: CPT

## 2021-11-18 RX ORDER — SODIUM CHLORIDE 9 MG/ML
250 INJECTION, SOLUTION INTRAVENOUS AS NEEDED
Status: CANCELLED | OUTPATIENT
Start: 2021-11-18

## 2021-11-18 RX ORDER — SODIUM CHLORIDE 0.9 % (FLUSH) 0.9 %
10 SYRINGE (ML) INJECTION AS NEEDED
Status: DISCONTINUED | OUTPATIENT
Start: 2021-11-18 | End: 2021-11-18 | Stop reason: HOSPADM

## 2021-11-18 NOTE — ED PROVIDER NOTES
Subjective   Patient presents emergency department with complaint of low hemoglobin.  Patient noted to be 7.4 by her hematology oncology physician is actually in [place].  He told her to come for transfusion.  Patient is evidently been set up with transfusion before the [facility] though this was done through Dr. Raymundo.  Dr. Raymundo did not get these labs and is not involved in her care at this time.  So the patient came to the ER requesting a blood transfusion in the setting of her chronic anemia and cancer.  Patient states her symptoms are dizziness weakness and shortness of breath with the low blood counts.  Patient has a chronic left lower extremity swelling for which she is on Xarelto.  Patient did have DVT diagnosed in the past.  No respiratory symptoms.          Review of Systems   Constitutional: Positive for fatigue. Negative for appetite change, chills and fever.   HENT: Negative.  Negative for congestion.    Eyes: Negative.  Negative for photophobia and visual disturbance.   Respiratory: Positive for shortness of breath. Negative for cough and chest tightness.    Cardiovascular: Positive for leg swelling. Negative for chest pain and palpitations.   Gastrointestinal: Negative.  Negative for abdominal pain, constipation, diarrhea, nausea and vomiting.   Endocrine: Negative.    Genitourinary: Negative.  Negative for decreased urine volume, dysuria, flank pain and hematuria.   Musculoskeletal: Negative.  Negative for arthralgias, back pain, myalgias, neck pain and neck stiffness.   Skin: Negative.  Negative for pallor.   Neurological: Positive for weakness. Negative for dizziness, syncope, light-headedness, numbness and headaches.   Psychiatric/Behavioral: Negative.  Negative for confusion and suicidal ideas. The patient is not nervous/anxious.    All other systems reviewed and are negative.      Past Medical History:   Diagnosis Date   • Achilles bursitis    • Achilles tendinitis    • Acquired  hypothyroidism    • Allergic rhinitis    • Allergy to meat     alphagal   • Allergy to milk products    • Arrhythmia    • Asthmatic bronchitis    • Asthmatic bronchitis    • Bone spur    • Calcaneal spur    • Depressive disorder    • Family history of thyroid problem    • Herpes simplex    • Hypermetropia    • Hypothyroidism    • Menopausal flushing    • Menopause    • Migraine    • Otalgia    • Pneumonia    • Presbyopia    • Trochanteric bursitis    • UTI (urinary tract infection) 07/03/2018   • Ventricular premature beats        Allergies   Allergen Reactions   • Meat Extract      Red meat, pork , ham   • Azithromycin    • Ceclor [Cefaclor] Diarrhea and Nausea Only   • Keflex [Cephalexin]    • Lipitor [Atorvastatin] Nausea And Vomiting   • Penicillins    • Zocor [Simvastatin] Nausea And Vomiting       Past Surgical History:   Procedure Laterality Date   • COLONOSCOPY N/A 2/15/2017    Procedure: COLONOSCOPY;  Surgeon: Lupillo Ugarte MD;  Location: [facility] ENDOSCOPY;  Service:    • COSMETIC SURGERY      plastic surgery to face x 4 r/t trauma   • LAPAROSCOPIC CHOLECYSTECTOMY  12/10/1995    Cholecystectomy, laparoscopic (1)      • OTHER SURGICAL HISTORY      Head surgery procedure (1)    plastic surgery on the face    • OTHER SURGICAL HISTORY  10/17/2014    Repair Superficial Wound TR-EXT 2.5 < CM 44731 (1)          Family History   Problem Relation Age of Onset   • Stroke Mother    • Diabetes Father    • Heart disease Father    • Hypertension Father    • Thyroid disease Sister    • Breast cancer Maternal Aunt    • Cancer Neg Hx         multiple in mothers family       Social History     Socioeconomic History   • Marital status:    Tobacco Use   • Smoking status: Never Smoker   • Smokeless tobacco: Never Used   Substance and Sexual Activity   • Alcohol use: No   • Drug use: No   • Sexual activity: Defer           Objective   Physical Exam  Vitals and nursing note reviewed.   Constitutional:       General: She  is not in acute distress.     Appearance: Normal appearance. She is well-developed. She is not ill-appearing or diaphoretic.   HENT:      Head: Normocephalic and atraumatic.      Nose: Nose normal.   Eyes:      General: No scleral icterus.     Comments: Conjunctival pallor.   Neck:      Vascular: No JVD.   Cardiovascular:      Rate and Rhythm: Normal rate and regular rhythm.      Heart sounds: Normal heart sounds. No murmur heard.  No friction rub. No gallop.    Pulmonary:      Effort: Pulmonary effort is normal. No respiratory distress.      Breath sounds: No wheezing or rales.   Chest:      Chest wall: No tenderness.   Abdominal:      General: There is no distension.      Palpations: Abdomen is soft. There is no mass.      Tenderness: There is no abdominal tenderness. There is no guarding or rebound.   Musculoskeletal:         General: Swelling present. No tenderness or deformity. Normal range of motion.      Cervical back: Normal range of motion and neck supple.      Comments: Left lower extremity swelling with 2+ edema.   Lymphadenopathy:      Cervical: No cervical adenopathy.   Skin:     General: Skin is warm and dry.      Capillary Refill: Capillary refill takes 2 to 3 seconds.      Coloration: Skin is pale.      Findings: No erythema or rash.   Neurological:      General: No focal deficit present.      Mental Status: She is alert and oriented to person, place, and time.      Cranial Nerves: No cranial nerve deficit.      Motor: No abnormal muscle tone.   Psychiatric:         Behavior: Behavior normal.         Thought Content: Thought content normal.         Judgment: Judgment normal.         Procedures           ED Course  ED Course as of 11/18/21 1355   Thu Nov 18, 2021   1353 Transfusion was set up through Dr. Raymundo and will be complete at the [facility] tomorrow.  Patient continues mildly symptomatic.  Hemoglobin 7.2 today.  The blood was prepared and will be given tomorrow. [PC]      ED Course User  Index  [PC] Sam Crowder MD                                 Labs Reviewed   COMPREHENSIVE METABOLIC PANEL - Abnormal; Notable for the following components:       Result Value    Glucose 108 (*)     Creatinine 1.28 (*)     CO2 30.0 (*)     Alkaline Phosphatase 136 (*)     eGFR Non  Amer 42 (*)     Anion Gap 4.0 (*)     All other components within normal limits    Narrative:     GFR Normal >60  Chronic Kidney Disease <60  Kidney Failure <15     CBC WITH AUTO DIFFERENTIAL - Abnormal; Notable for the following components:    RBC 2.63 (*)     Hemoglobin 7.2 (*)     Hematocrit 23.0 (*)     MCHC 31.3 (*)     RDW 22.2 (*)     RDW-SD 70.4 (*)     Neutrophil % 78.5 (*)     Lymphocyte % 8.6 (*)     Lymphocytes, Absolute 0.60 (*)     All other components within normal limits   PREPARE RBC   TYPE AND SCREEN   PREVIOUS HISTORY   CBC AND DIFFERENTIAL    Narrative:     The following orders were created for panel order CBC & Differential.  Procedure                               Abnormality         Status                     ---------                               -----------         ------                     CBC Auto Differential[[id number]]        Abnormal            Final result                 Please view results for these tests on the individual orders.   EXTRA TUBES    Narrative:     The following orders were created for panel order Extra Tubes.  Procedure                               Abnormality         Status                     ---------                               -----------         ------                     Gold Top - SST[[id number]]                                   In process                 Castrejon Top[[id number]]                                         In process                 Light Blue Top[[id number]]                                   In process                   Please view results for these tests on the individual orders.   GOLD TOP - SST   GRAY TOP   LIGHT BLUE TOP       No orders to display                MDM    Final diagnoses:   Anemia, chronic disease       ED Disposition  ED Disposition     ED Disposition Condition Comment    Discharge Stable           Jen Raymundo MD  [address] [place] [address] [address]  [address] [address]  [place] [id number] [phone]      As needed, For further evaluation and management         Medication List      No changes were made to your prescriptions during this visit.          Sam Crowder MD  11/18/21 4508

## 2021-11-18 NOTE — DISCHARGE INSTRUCTIONS
Follow-up with the [facility] tomorrow for transfusion which has been set up by Dr. Raymundo.  Please be there at 8 AM.  Return with any new or worsening symptoms or any concerns.

## 2021-11-19 ENCOUNTER — INFUSION (OUTPATIENT)
Dept: ONCOLOGY | Facility: HOSPITAL | Age: 61
End: 2021-11-19

## 2021-11-19 VITALS
HEART RATE: 76 BPM | DIASTOLIC BLOOD PRESSURE: 56 MMHG | TEMPERATURE: 97.9 F | SYSTOLIC BLOOD PRESSURE: 99 MMHG | OXYGEN SATURATION: 100 % | RESPIRATION RATE: 20 BRPM

## 2021-11-19 DIAGNOSIS — C34.90 ADENOSQUAMOUS CARCINOMA OF LUNG, UNSPECIFIED LATERALITY (HCC): ICD-10-CM

## 2021-11-19 DIAGNOSIS — T45.1X5A ANEMIA DUE TO ANTINEOPLASTIC CHEMOTHERAPY: ICD-10-CM

## 2021-11-19 DIAGNOSIS — D64.81 ANEMIA DUE TO ANTINEOPLASTIC CHEMOTHERAPY: ICD-10-CM

## 2021-11-19 DIAGNOSIS — R53.1 WEAKNESS: ICD-10-CM

## 2021-11-19 DIAGNOSIS — R06.02 SOB (SHORTNESS OF BREATH): ICD-10-CM

## 2021-11-19 PROCEDURE — P9016 RBC LEUKOCYTES REDUCED: HCPCS

## 2021-11-19 PROCEDURE — 36430 TRANSFUSION BLD/BLD COMPNT: CPT

## 2021-11-19 PROCEDURE — 86900 BLOOD TYPING SEROLOGIC ABO: CPT

## 2021-11-19 RX ORDER — SODIUM CHLORIDE 9 MG/ML
250 INJECTION, SOLUTION INTRAVENOUS AS NEEDED
Status: DISCONTINUED | OUTPATIENT
Start: 2021-11-19 | End: 2021-11-19 | Stop reason: HOSPADM

## 2021-11-19 RX ADMIN — SODIUM CHLORIDE 250 ML: 9 INJECTION, SOLUTION INTRAVENOUS at 08:44

## 2021-11-20 LAB
BH BB BLOOD EXPIRATION DATE: NORMAL
BH BB BLOOD EXPIRATION DATE: NORMAL
BH BB BLOOD TYPE BARCODE: 6200
BH BB BLOOD TYPE BARCODE: NORMAL
BH BB DISPENSE STATUS: NORMAL
BH BB DISPENSE STATUS: NORMAL
BH BB PRODUCT CODE: NORMAL
BH BB PRODUCT CODE: NORMAL
BH BB UNIT NUMBER: NORMAL
BH BB UNIT NUMBER: NORMAL
CROSSMATCH INTERPRETATION: NORMAL
CROSSMATCH INTERPRETATION: NORMAL
UNIT  ABO: NORMAL
UNIT  ABO: NORMAL
UNIT  RH: NORMAL
UNIT  RH: NORMAL

## 2021-12-14 ENCOUNTER — LAB (OUTPATIENT)
Dept: LAB | Facility: HOSPITAL | Age: 61
End: 2021-12-14

## 2021-12-14 DIAGNOSIS — E03.9 ACQUIRED HYPOTHYROIDISM: Chronic | ICD-10-CM

## 2021-12-14 DIAGNOSIS — Z00.00 ANNUAL PHYSICAL EXAM: ICD-10-CM

## 2021-12-14 LAB
ALBUMIN SERPL-MCNC: 3.9 G/DL (ref 3.5–5.2)
ALBUMIN/GLOB SERPL: 1.1 G/DL
ALP SERPL-CCNC: 153 U/L (ref 39–117)
ALT SERPL W P-5'-P-CCNC: 18 U/L (ref 1–33)
ANION GAP SERPL CALCULATED.3IONS-SCNC: 8 MMOL/L (ref 5–15)
AST SERPL-CCNC: 20 U/L (ref 1–32)
BILIRUB SERPL-MCNC: 0.2 MG/DL (ref 0–1.2)
BUN SERPL-MCNC: 24 MG/DL (ref 8–23)
BUN/CREAT SERPL: 18.3 (ref 7–25)
CALCIUM SPEC-SCNC: 9.3 MG/DL (ref 8.6–10.5)
CHLORIDE SERPL-SCNC: 105 MMOL/L (ref 98–107)
CHOLEST SERPL-MCNC: 148 MG/DL (ref 0–200)
CO2 SERPL-SCNC: 27 MMOL/L (ref 22–29)
CREAT SERPL-MCNC: 1.31 MG/DL (ref 0.57–1)
GFR SERPL CREATININE-BSD FRML MDRD: 41 ML/MIN/1.73
GLOBULIN UR ELPH-MCNC: 3.5 GM/DL
GLUCOSE SERPL-MCNC: 84 MG/DL (ref 65–99)
HDLC SERPL-MCNC: 50 MG/DL (ref 40–60)
LDLC SERPL CALC-MCNC: 77 MG/DL (ref 0–100)
LDLC/HDLC SERPL: 1.48 {RATIO}
POTASSIUM SERPL-SCNC: 4.5 MMOL/L (ref 3.5–5.2)
PROT SERPL-MCNC: 7.4 G/DL (ref 6–8.5)
SODIUM SERPL-SCNC: 140 MMOL/L (ref 136–145)
T4 FREE SERPL-MCNC: 0.77 NG/DL (ref 0.93–1.7)
TRIGL SERPL-MCNC: 120 MG/DL (ref 0–150)
TSH SERPL DL<=0.05 MIU/L-ACNC: 11.5 UIU/ML (ref 0.27–4.2)
VLDLC SERPL-MCNC: 21 MG/DL (ref 5–40)

## 2021-12-14 PROCEDURE — 80061 LIPID PANEL: CPT

## 2021-12-14 PROCEDURE — 84443 ASSAY THYROID STIM HORMONE: CPT

## 2021-12-14 PROCEDURE — 84439 ASSAY OF FREE THYROXINE: CPT

## 2021-12-14 PROCEDURE — 80053 COMPREHEN METABOLIC PANEL: CPT

## 2021-12-14 PROCEDURE — 36415 COLL VENOUS BLD VENIPUNCTURE: CPT

## 2021-12-17 ENCOUNTER — LAB (OUTPATIENT)
Dept: LAB | Facility: HOSPITAL | Age: 61
End: 2021-12-17

## 2021-12-17 ENCOUNTER — OFFICE VISIT (OUTPATIENT)
Dept: FAMILY MEDICINE CLINIC | Facility: CLINIC | Age: 61
End: 2021-12-17

## 2021-12-17 VITALS
HEART RATE: 98 BPM | WEIGHT: 192 LBS | BODY MASS INDEX: 30.86 KG/M2 | HEIGHT: 66 IN | DIASTOLIC BLOOD PRESSURE: 80 MMHG | SYSTOLIC BLOOD PRESSURE: 122 MMHG

## 2021-12-17 DIAGNOSIS — E03.9 ACQUIRED HYPOTHYROIDISM: ICD-10-CM

## 2021-12-17 DIAGNOSIS — Z00.00 ANNUAL PHYSICAL EXAM: Primary | ICD-10-CM

## 2021-12-17 DIAGNOSIS — C34.90 ADENOSQUAMOUS CARCINOMA OF LUNG, UNSPECIFIED LATERALITY (HCC): ICD-10-CM

## 2021-12-17 DIAGNOSIS — N39.41 URGE INCONTINENCE OF URINE: ICD-10-CM

## 2021-12-17 DIAGNOSIS — I63.9 CEREBROVASCULAR ACCIDENT (CVA), UNSPECIFIED MECHANISM (HCC): ICD-10-CM

## 2021-12-17 LAB
BACTERIA UR QL AUTO: ABNORMAL /HPF
BILIRUB UR QL STRIP: NEGATIVE
CLARITY UR: ABNORMAL
COLOR UR: YELLOW
GLUCOSE UR STRIP-MCNC: NEGATIVE MG/DL
HGB UR QL STRIP.AUTO: ABNORMAL
HYALINE CASTS UR QL AUTO: ABNORMAL /LPF
KETONES UR QL STRIP: NEGATIVE
LEUKOCYTE ESTERASE UR QL STRIP.AUTO: ABNORMAL
NITRITE UR QL STRIP: NEGATIVE
PH UR STRIP.AUTO: 5.5 [PH] (ref 5–9)
PROT UR QL STRIP: ABNORMAL
RBC # UR STRIP: ABNORMAL /HPF
REF LAB TEST METHOD: ABNORMAL
SP GR UR STRIP: 1.02 (ref 1–1.03)
SQUAMOUS #/AREA URNS HPF: ABNORMAL /HPF
UROBILINOGEN UR QL STRIP: ABNORMAL
WBC # UR STRIP: ABNORMAL /HPF
YEAST URNS QL MICRO: ABNORMAL /HPF

## 2021-12-17 PROCEDURE — 81001 URINALYSIS AUTO W/SCOPE: CPT | Performed by: GENERAL PRACTICE

## 2021-12-17 PROCEDURE — 99396 PREV VISIT EST AGE 40-64: CPT | Performed by: GENERAL PRACTICE

## 2021-12-17 PROCEDURE — 87147 CULTURE TYPE IMMUNOLOGIC: CPT | Performed by: GENERAL PRACTICE

## 2021-12-17 PROCEDURE — 87086 URINE CULTURE/COLONY COUNT: CPT | Performed by: GENERAL PRACTICE

## 2021-12-17 RX ORDER — SOLIFENACIN SUCCINATE 10 MG/1
10 TABLET, FILM COATED ORAL DAILY
Qty: 90 TABLET | Refills: 0 | Status: SHIPPED | OUTPATIENT
Start: 2021-12-17 | End: 2022-04-11

## 2021-12-17 RX ORDER — LEVOTHYROXINE SODIUM 0.12 MG/1
125 TABLET ORAL DAILY
Qty: 90 TABLET | Refills: 1 | Status: SHIPPED | OUTPATIENT
Start: 2021-12-17 | End: 2021-12-20 | Stop reason: SDUPTHER

## 2021-12-17 RX ORDER — PROMETHAZINE HYDROCHLORIDE 25 MG/1
25 TABLET ORAL EVERY 6 HOURS PRN
Qty: 60 TABLET | Refills: 1 | Status: SHIPPED | OUTPATIENT
Start: 2021-12-17 | End: 2022-04-26 | Stop reason: SDUPTHER

## 2021-12-17 NOTE — PROGRESS NOTES
Subjective   Adilene Morgan is a 61 y.o. female.     Chief Complaint   Patient presents with   • Annual Exam       History of Present Illness     For annual wellness exam. Records reviewed. Recent labs, xrays reviewed and medications reconciled.   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 Doing well. Is taking Relizen 320 mg 2 tabs daily for hot flushes and Arsenicum album 30c tid for incontinence which is not helping. Gait and balance is still off from the CVA, is doing physical therapy. Due for mammogram.      The following portions of the patient's history were reviewed and updated as appropriate: allergies, current medications, past family and social history and problem list.    Outpatient Medications Prior to Visit   Medication Sig Dispense Refill   • aspirin 81 MG EC tablet Take 81 mg by mouth Daily.     • entrectinib (Rozlytrek) 200 MG capsule Take 600 mg by mouth Every Night.     • fluticasone (FLONASE ALLERGY RELIEF) 50 MCG/ACT nasal spray 1 spray into each nostril Daily As Needed.     • loperamide (IMODIUM) 2 MG capsule Take 2 mg by mouth As Needed for Diarrhea.     • loratadine (CLARITIN) 10 MG tablet Take 10 mg by mouth As Needed for Allergies.     • MAGNESIUM CITRATE PO Take 150 mg by mouth Every Night.     • multivitamin with minerals tablet tablet Take 1 tablet by mouth Daily.     • multivitamins-minerals (PRESERVISION AREDS 2) capsule capsule Take 1 capsule by mouth 2 (Two) Times a Day.     • ondansetron (ZOFRAN) 8 MG tablet Take 8 mg by mouth Every 8 (Eight) Hours As Needed for Nausea or Vomiting.     • prochlorperazine (COMPAZINE) 10 MG tablet Take 10 mg by mouth Every 6 (Six) Hours As Needed for Nausea or Vomiting.     • senna (senna) 8.6 MG tablet Take 1 tablet by mouth Daily. 2 tablets as needed 3 times a day x 30 days     • SUMAtriptan (IMITREX) 20 MG/ACT nasal spray INSTILL 1 SPRAY INTO EACH NOSTRIL EVERY 2 HOURS AS NEEDED FOR MIGRAINE 6 each 5   • topiramate  "(TOPAMAX) 50 MG tablet TAKE 1 TABLET BY MOUTH EVERY NIGHT. (Patient taking differently: Take 50 mg by mouth Daily.) 90 tablet 3   • venlafaxine XR (EFFEXOR-XR) 37.5 MG 24 hr capsule TAKE 1 CAPSULE BY MOUTH EVERY DAY 90 capsule 3   • promethazine (PHENERGAN) 25 MG tablet Take 25 mg by mouth Every 6 (Six) Hours As Needed for Nausea or Vomiting.     • rivaroxaban (XARELTO) 20 MG tablet Take 20 mg by mouth Daily. After finished with lower dose for 21 days     • Synthroid 112 MCG tablet Take 1 tablet by mouth Daily. 90 tablet 3   • atorvastatin (LIPITOR) 40 MG tablet Take 40 mg by mouth Every Night.     • diphenhydrAMINE (BENADRYL) 25 mg capsule Take 25 mg by mouth 2 (Two) Times a Day As Needed for Itching. Before scan done     • Fexofenadine HCl (MUCINEX ALLERGY PO) Take 1 tablet by mouth As Needed.     • fexofenadine ODT ([facility]) 30 MG disintegrating tablet Place 30 mg on the tongue As Needed for Allergies.     • oxyCODONE-acetaminophen (PERCOCET) 7.5-325 MG per tablet Take 1 tablet by mouth Every 4 (Four) Hours As Needed.       No facility-administered medications prior to visit.       Review of Systems  I have reviewed 12 systems with patient. Findings were negative except what is noted below and/or in history of present illness.    Objective     Visit Vitals  /80 (BP Location: Left arm)   Pulse 98   Ht 167.6 cm (66\")   Wt 87.1 kg (192 lb)   LMP  (LMP Unknown)   BMI 30.99 kg/m²     Physical Exam  Vitals and nursing note reviewed.   Constitutional:       General: She is not in acute distress.     Appearance: She is well-developed.   HENT:      Head: Normocephalic and atraumatic.      Nose: Nose normal.   Eyes:      General:         Right eye: No discharge.         Left eye: No discharge.      Conjunctiva/sclera: Conjunctivae normal.      Pupils: Pupils are equal, round, and reactive to light.   Neck:      Thyroid: No thyromegaly.      Trachea: No tracheal deviation.   Cardiovascular:      Rate " and Rhythm: Normal rate and regular rhythm.      Heart sounds: Murmur heard.    Systolic murmur is present with a grade of 2/6.      Pulmonary:      Effort: Pulmonary effort is normal. No respiratory distress.      Breath sounds: Normal breath sounds. No wheezing or rales.   Chest:      Chest wall: No tenderness.   Breasts:      Right: No inverted nipple, mass, nipple discharge, skin change or tenderness.      Left: No inverted nipple, mass, nipple discharge, skin change or tenderness.       Abdominal:      General: Bowel sounds are normal. There is no distension.      Palpations: Abdomen is soft. There is no mass.      Tenderness: There is no abdominal tenderness.      Hernia: No hernia is present.   Musculoskeletal:         General: No deformity. Normal range of motion.   Lymphadenopathy:      Cervical: No cervical adenopathy.   Skin:     General: Skin is warm and dry.   Neurological:      Mental Status: She is alert and oriented to person, place, and time.      Deep Tendon Reflexes: Reflexes are normal and symmetric.   Psychiatric:         Behavior: Behavior normal.         Thought Content: Thought content normal.         Judgment: Judgment normal.       Results for orders placed or performed in visit on 12/14/21   Comprehensive Metabolic Panel    Specimen: Blood   Result Value Ref Range    Glucose 84 65 - 99 mg/dL    BUN 24 (H) 8 - 23 mg/dL    Creatinine 1.31 (H) 0.57 - 1.00 mg/dL    Sodium 140 136 - 145 mmol/L    Potassium 4.5 3.5 - 5.2 mmol/L    Chloride 105 98 - 107 mmol/L    CO2 27.0 22.0 - 29.0 mmol/L    Calcium 9.3 8.6 - 10.5 mg/dL    Total Protein 7.4 6.0 - 8.5 g/dL    Albumin 3.90 3.50 - 5.20 g/dL    ALT (SGPT) 18 1 - 33 U/L    AST (SGOT) 20 1 - 32 U/L    Alkaline Phosphatase 153 (H) 39 - 117 U/L    Total Bilirubin 0.2 0.0 - 1.2 mg/dL    eGFR Non African Amer 41 (L) >60 mL/min/1.73    Globulin 3.5 gm/dL    A/G Ratio 1.1 g/dL    BUN/Creatinine Ratio 18.3 7.0 - 25.0    Anion Gap 8.0 5.0 - 15.0 mmol/L    Lipid Panel    Specimen: Blood   Result Value Ref Range    Total Cholesterol 148 0 - 200 mg/dL    Triglycerides 120 0 - 150 mg/dL    HDL Cholesterol 50 40 - 60 mg/dL    LDL Cholesterol  77 0 - 100 mg/dL    VLDL Cholesterol 21 5 - 40 mg/dL    LDL/HDL Ratio 1.48    TSH    Specimen: Blood   Result Value Ref Range    TSH 11.500 (H) 0.270 - 4.200 uIU/mL   T4, Free    Specimen: Blood   Result Value Ref Range    Free T4 0.77 (L) 0.93 - 1.70 ng/dL      Notes brought forward are reviewed and updated if indicated.     Assessment/Plan   Problems Addressed this Visit        Endocrine and Metabolic    Acquired hypothyroidism (Chronic)    Relevant Medications    levothyroxine (Synthroid) 125 MCG tablet    Other Relevant Orders    TSH    T4, Free      Other Visit Diagnoses     Annual physical exam    -  Primary    Urge incontinence of urine        Relevant Medications    solifenacin (VESIcare) 10 MG tablet    Other Relevant Orders    Urinalysis With Culture If Indicated -    Adenosquamous carcinoma of lung, unspecified laterality (HCC)        Relevant Medications    promethazine (PHENERGAN) 25 MG tablet    Cerebrovascular accident (CVA), unspecified mechanism (HCC)        Relevant Medications    rivaroxaban (XARELTO) 20 MG tablet      Diagnoses       Codes Comments    Annual physical exam    -  Primary ICD-10-CM: Z00.00  ICD-9-CM: V70.0     Urge incontinence of urine     ICD-10-CM: N39.41  ICD-9-CM: 788.31     Acquired hypothyroidism     ICD-10-CM: E03.9  ICD-9-CM: 244.9     Adenosquamous carcinoma of lung, unspecified laterality (HCC)     ICD-10-CM: C34.90  ICD-9-CM: 162.9     Cerebrovascular accident (CVA), unspecified mechanism (HCC)     ICD-10-CM: I63.9  ICD-9-CM: 434.91           Will notify regarding results. Continue current medications. Increase Synthroid. Try vesicare for incontinence.   Age-appropriate counseling is provided.     New Medications Ordered This Visit   Medications   • promethazine (PHENERGAN) 25 MG tablet      Sig: Take 1 tablet by mouth Every 6 (Six) Hours As Needed for Nausea or Vomiting.     Dispense:  60 tablet     Refill:  1   • rivaroxaban (XARELTO) 20 MG tablet     Sig: Take 1 tablet by mouth Daily. After finished with lower dose for 21 days     Dispense:  90 tablet     Refill:  3   • levothyroxine (Synthroid) 125 MCG tablet     Sig: Take 1 tablet by mouth Daily.     Dispense:  90 tablet     Refill:  1   • solifenacin (VESIcare) 10 MG tablet     Sig: Take 1 tablet by mouth Daily.     Dispense:  90 tablet     Refill:  0     Return in about 3 months (around 3/17/2022) for Recheck.        This document has been electronically signed by Jen Raymundo MD on December 17, 2021 15:03 CST

## 2021-12-18 LAB — BACTERIA SPEC AEROBE CULT: ABNORMAL

## 2021-12-20 DIAGNOSIS — E03.9 ACQUIRED HYPOTHYROIDISM: ICD-10-CM

## 2021-12-20 RX ORDER — SULFAMETHOXAZOLE AND TRIMETHOPRIM 800; 160 MG/1; MG/1
1 TABLET ORAL 2 TIMES DAILY
Qty: 14 TABLET | Refills: 0 | Status: SHIPPED | OUTPATIENT
Start: 2021-12-20 | End: 2022-02-28 | Stop reason: SDUPTHER

## 2021-12-20 RX ORDER — LEVOTHYROXINE SODIUM 0.12 MG/1
125 TABLET ORAL DAILY
Qty: 90 TABLET | Refills: 1 | Status: SHIPPED | OUTPATIENT
Start: 2021-12-20 | End: 2021-12-22 | Stop reason: SDUPTHER

## 2021-12-20 NOTE — TELEPHONE ENCOUNTER
Incoming Refill Request      Medication requested (name and dose): Synthroid 125mg    Pharmacy where request should be sent: [facility]     Additional details provided by patient: requesting it not be called in as generic     Best call back number: [phone]    Does the patient have less than a 3 day supply:  [x] Yes  [] No    Berlin Hoskins Rep  12/20/21, 12:54 CST

## 2021-12-22 ENCOUNTER — TELEPHONE (OUTPATIENT)
Dept: FAMILY MEDICINE CLINIC | Facility: CLINIC | Age: 61
End: 2021-12-22

## 2021-12-22 DIAGNOSIS — E03.9 ACQUIRED HYPOTHYROIDISM: ICD-10-CM

## 2021-12-22 RX ORDER — LEVOTHYROXINE SODIUM 125 MCG
125 TABLET ORAL DAILY
Qty: 90 TABLET | Refills: 0 | Status: SHIPPED | OUTPATIENT
Start: 2021-12-22 | End: 2022-01-13

## 2022-01-13 DIAGNOSIS — E03.9 ACQUIRED HYPOTHYROIDISM: ICD-10-CM

## 2022-01-13 RX ORDER — LEVOTHYROXINE SODIUM 125 MCG
TABLET ORAL
Qty: 90 TABLET | Refills: 0 | Status: SHIPPED | OUTPATIENT
Start: 2022-01-13 | End: 2022-03-18

## 2022-02-09 DIAGNOSIS — G43.009 NONINTRACTABLE MIGRAINE, UNSPECIFIED MIGRAINE TYPE: ICD-10-CM

## 2022-02-09 RX ORDER — TOPIRAMATE 50 MG/1
50 TABLET, FILM COATED ORAL NIGHTLY
Qty: 90 TABLET | Refills: 3 | Status: SHIPPED | OUTPATIENT
Start: 2022-02-09 | End: 2023-02-13

## 2022-02-28 ENCOUNTER — TELEPHONE (OUTPATIENT)
Dept: FAMILY MEDICINE CLINIC | Facility: CLINIC | Age: 62
End: 2022-02-28

## 2022-02-28 RX ORDER — SULFAMETHOXAZOLE AND TRIMETHOPRIM 800; 160 MG/1; MG/1
1 TABLET ORAL 2 TIMES DAILY
Qty: 14 TABLET | Refills: 0 | Status: SHIPPED | OUTPATIENT
Start: 2022-02-28 | End: 2022-03-18

## 2022-02-28 NOTE — TELEPHONE ENCOUNTER
Pt is having bladder infection symptoms and was wanting to see if something can be called in . Pt has had about 3 days [phone]    [place], [place]

## 2022-03-03 RX ORDER — VENLAFAXINE HYDROCHLORIDE 37.5 MG/1
CAPSULE, EXTENDED RELEASE ORAL
Qty: 90 CAPSULE | Refills: 3 | Status: SHIPPED | OUTPATIENT
Start: 2022-03-03 | End: 2023-03-06

## 2022-03-18 ENCOUNTER — OFFICE VISIT (OUTPATIENT)
Dept: FAMILY MEDICINE CLINIC | Facility: CLINIC | Age: 62
End: 2022-03-18

## 2022-03-18 VITALS
HEIGHT: 66 IN | OXYGEN SATURATION: 98 % | SYSTOLIC BLOOD PRESSURE: 140 MMHG | WEIGHT: 202.8 LBS | HEART RATE: 92 BPM | DIASTOLIC BLOOD PRESSURE: 88 MMHG | BODY MASS INDEX: 32.59 KG/M2

## 2022-03-18 DIAGNOSIS — C34.91 ADENOSQUAMOUS CARCINOMA OF RIGHT LUNG: ICD-10-CM

## 2022-03-18 DIAGNOSIS — R11.0 NAUSEA: ICD-10-CM

## 2022-03-18 DIAGNOSIS — E03.9 ACQUIRED HYPOTHYROIDISM: Primary | Chronic | ICD-10-CM

## 2022-03-18 DIAGNOSIS — N39.498 OTHER URINARY INCONTINENCE: ICD-10-CM

## 2022-03-18 DIAGNOSIS — F32.9 REACTIVE DEPRESSION: ICD-10-CM

## 2022-03-18 PROCEDURE — 99213 OFFICE O/P EST LOW 20 MIN: CPT | Performed by: GENERAL PRACTICE

## 2022-03-18 RX ORDER — PROCHLORPERAZINE MALEATE 10 MG
10 TABLET ORAL EVERY 6 HOURS PRN
Qty: 120 TABLET | Refills: 1 | Status: SHIPPED | OUTPATIENT
Start: 2022-03-18

## 2022-03-18 RX ORDER — ONDANSETRON HYDROCHLORIDE 8 MG/1
8 TABLET, FILM COATED ORAL EVERY 8 HOURS PRN
Qty: 90 TABLET | Refills: 1 | Status: SHIPPED | OUTPATIENT
Start: 2022-03-18

## 2022-03-18 RX ORDER — LEVOTHYROXINE SODIUM 137 UG/1
150 TABLET ORAL DAILY
COMMUNITY
End: 2022-05-05

## 2022-04-09 DIAGNOSIS — N39.41 URGE INCONTINENCE OF URINE: ICD-10-CM

## 2022-04-11 RX ORDER — SOLIFENACIN SUCCINATE 10 MG/1
TABLET, FILM COATED ORAL
Qty: 90 TABLET | Refills: 0 | Status: SHIPPED | OUTPATIENT
Start: 2022-04-11 | End: 2022-05-18 | Stop reason: HOSPADM

## 2022-04-26 DIAGNOSIS — C34.90 ADENOSQUAMOUS CARCINOMA OF LUNG, UNSPECIFIED LATERALITY: ICD-10-CM

## 2022-04-26 RX ORDER — PROMETHAZINE HYDROCHLORIDE 25 MG/1
25 TABLET ORAL EVERY 6 HOURS PRN
Qty: 60 TABLET | Refills: 1 | Status: SHIPPED | OUTPATIENT
Start: 2022-04-26 | End: 2022-09-14 | Stop reason: SDUPTHER

## 2022-04-26 NOTE — TELEPHONE ENCOUNTER
Incoming Refill Request      Medication requested (name and dose): PROMETHAZINE     Pharmacy where request should be sent: [facility]    Additional details provided by patient:     Best call back number:     Does the patient have less than a 3 day supply:  [] Yes  [] No    Berlin Arce Rep  04/26/22, 09:32 CDT

## 2022-05-03 DIAGNOSIS — I63.9 CEREBROVASCULAR ACCIDENT (CVA), UNSPECIFIED MECHANISM: ICD-10-CM

## 2022-05-03 NOTE — TELEPHONE ENCOUNTER
Incoming Refill Request      Medication requested (name and dose): rivaroxaban (XARELTO) 20 MG tablet    Pharmacy where request should be sent: [facility], [place]    Additional details provided by patient: NONE    Best call back number: [phone]    Does the patient have less than a 3 day supply:  [x] Yes  [] No    Berlin Ley Rep  05/03/22, 08:40 CDT

## 2022-05-05 ENCOUNTER — OFFICE VISIT (OUTPATIENT)
Dept: FAMILY MEDICINE CLINIC | Facility: CLINIC | Age: 62
End: 2022-05-05

## 2022-05-05 VITALS
SYSTOLIC BLOOD PRESSURE: 122 MMHG | DIASTOLIC BLOOD PRESSURE: 80 MMHG | WEIGHT: 214.4 LBS | HEIGHT: 55 IN | BODY MASS INDEX: 49.62 KG/M2 | OXYGEN SATURATION: 99 % | HEART RATE: 100 BPM

## 2022-05-05 DIAGNOSIS — R06.02 SOB (SHORTNESS OF BREATH): ICD-10-CM

## 2022-05-05 DIAGNOSIS — C34.91 ADENOSQUAMOUS CARCINOMA OF RIGHT LUNG: Primary | ICD-10-CM

## 2022-05-05 PROCEDURE — 99213 OFFICE O/P EST LOW 20 MIN: CPT | Performed by: GENERAL PRACTICE

## 2022-05-05 RX ORDER — FUROSEMIDE 20 MG/1
20 TABLET ORAL 2 TIMES DAILY
Qty: 30 TABLET | Refills: 0 | Status: ON HOLD | OUTPATIENT
Start: 2022-05-05 | End: 2022-05-12

## 2022-05-05 RX ORDER — LEVOTHYROXINE SODIUM 0.15 MG/1
200 TABLET ORAL DAILY
COMMUNITY
End: 2023-01-10

## 2022-05-05 NOTE — PROGRESS NOTES
Subjective   Adilene Morgan is a 62 y.o. female.   Chief Complaint   Patient presents with   • Leg Swelling     Is havng increased leg swelling and some shortness of breath on exertion. Has been having to travel in the car a fair bit. Occasional jerking of her hands. Also has more distention of abdomen. Is being treated for lung cancer. Is anemic with hgb of 8.3.  Leg Swelling  This is a recurrent problem. The current episode started in the past 7 days. The problem occurs constantly. The problem has been gradually worsening. Associated symptoms include myalgias. Pertinent negatives include no arthralgias, chest pain, chills, congestion, coughing, nausea, sore throat or weakness. Nothing aggravates the symptoms. She has tried immobilization for the symptoms. The treatment provided no relief.      The following portions of the patient's history were reviewed and updated as appropriate: allergies, current medications, past social history and problem list.    Outpatient Medications Prior to Visit   Medication Sig Dispense Refill   • aspirin 81 MG EC tablet Take 81 mg by mouth Daily.     • entrectinib (ROZLYTREK) 200 MG capsule Take 600 mg by mouth Every Night.     • fluticasone (FLONASE) 50 MCG/ACT nasal spray 1 spray into each nostril Daily As Needed.     • levothyroxine (SYNTHROID, LEVOTHROID) 150 MCG tablet Take 150 mcg by mouth Daily.     • loperamide (IMODIUM) 2 MG capsule Take 2 mg by mouth As Needed for Diarrhea.     • loratadine (CLARITIN) 10 MG tablet Take 10 mg by mouth As Needed for Allergies.     • multivitamin with minerals tablet tablet Take 1 tablet by mouth Daily.     • ondansetron (ZOFRAN) 8 MG tablet Take 1 tablet by mouth Every 8 (Eight) Hours As Needed for Nausea or Vomiting. 90 tablet 1   • prochlorperazine (COMPAZINE) 10 MG tablet Take 1 tablet by mouth Every 6 (Six) Hours As Needed for Nausea or Vomiting. 120 tablet 1   • promethazine (PHENERGAN) 25 MG tablet Take 1 tablet by mouth Every 6 (Six)  "Hours As Needed for Nausea or Vomiting. 60 tablet 1   • rivaroxaban (XARELTO) 20 MG tablet Take 1 tablet by mouth Daily. After finished with lower dose for 21 days 90 tablet 3   • senna (SENOKOT) 8.6 MG tablet Take 1 tablet by mouth Daily. 2 tablets as needed 3 times a day x 30 days     • solifenacin (VESICARE) 10 MG tablet TAKE 1 TABLET BY MOUTH EVERY DAY 90 tablet 0   • SUMAtriptan (IMITREX) 20 MG/ACT nasal spray INSTILL 1 SPRAY INTO EACH NOSTRIL EVERY 2 HOURS AS NEEDED FOR MIGRAINE 6 each 5   • topiramate (TOPAMAX) 50 MG tablet TAKE 1 TABLET BY MOUTH EVERY NIGHT. 90 tablet 3   • venlafaxine XR (EFFEXOR-XR) 37.5 MG 24 hr capsule TAKE 1 CAPSULE BY MOUTH EVERY DAY 90 capsule 3   • levothyroxine (SYNTHROID, LEVOTHROID) 137 MCG tablet Take 150 mcg by mouth Daily.     • multivitamins-minerals (PRESERVISION AREDS 2) capsule capsule Take 1 capsule by mouth 2 (Two) Times a Day.       No facility-administered medications prior to visit.       Review of Systems   Constitutional: Negative for chills.   HENT: Negative for congestion and sore throat.    Respiratory: Negative for cough.    Cardiovascular: Negative for chest pain.   Gastrointestinal: Negative for nausea.   Musculoskeletal: Positive for myalgias. Negative for arthralgias.   Neurological: Negative for weakness.     I have reviewed 12 systems with patient. Findings were negative except what is noted below and/or in history of present illness.     Objective   Visit Vitals  /80   Pulse 100   Ht 66 cm (25.98\")   Wt 97.3 kg (214 lb 6.4 oz)   LMP  (LMP Unknown)   SpO2 99%   .26 kg/m²     Physical Exam  Vitals and nursing note reviewed.   Constitutional:       General: She is not in acute distress.     Appearance: She is well-developed.   HENT:      Head: Normocephalic and atraumatic.      Nose: Nose normal.   Eyes:      General:         Right eye: No discharge.         Left eye: No discharge.      Conjunctiva/sclera: Conjunctivae normal.      Pupils: Pupils " are equal, round, and reactive to light.   Neck:      Thyroid: No thyromegaly.   Cardiovascular:      Rate and Rhythm: Normal rate and regular rhythm.      Heart sounds: Normal heart sounds.   Pulmonary:      Effort: Pulmonary effort is normal.      Breath sounds: Normal breath sounds.   Lymphadenopathy:      Cervical: No cervical adenopathy.   Skin:     General: Skin is warm and dry.   Neurological:      Mental Status: She is alert and oriented to person, place, and time.       Notes brought forward are reviewed and updated if indicated.     Assessment/Plan   Problems Addressed this Visit        Hematology and Neoplasia    Adenosquamous carcinoma of right lung (HCC) - Primary    Relevant Orders    XR Chest 2 View (Completed)      Other Visit Diagnoses     SOB (shortness of breath)        Relevant Orders    XR Chest 2 View (Completed)      Diagnoses       Codes Comments    Adenosquamous carcinoma of right lung (HCC)    -  Primary ICD-10-CM: C34.91  ICD-9-CM: 162.9     SOB (shortness of breath)     ICD-10-CM: R06.02  ICD-9-CM: 786.05           Will notify regarding results. Lasix for 3 days to see if we can improve swelling. Recheck if not improving.      New Medications Ordered This Visit   Medications   • furosemide (Lasix) 20 MG tablet     Sig: Take 1 tablet by mouth 2 (Two) Times a Day. Take daily for 3 days then prn for swelling     Dispense:  30 tablet     Refill:  0     No follow-ups on file.        This document has been electronically signed by Jen Raymundo MD on May 5, 2022 17:51 CDT

## 2022-05-09 ENCOUNTER — HOSPITAL ENCOUNTER (OUTPATIENT)
Facility: HOSPITAL | Age: 62
Setting detail: OBSERVATION
Discharge: HOME | End: 2022-05-18
Attending: FAMILY MEDICINE | Admitting: HOSPITALIST

## 2022-05-09 ENCOUNTER — APPOINTMENT (OUTPATIENT)
Dept: GENERAL RADIOLOGY | Facility: HOSPITAL | Age: 62
End: 2022-05-09

## 2022-05-09 ENCOUNTER — APPOINTMENT (OUTPATIENT)
Dept: CT IMAGING | Facility: HOSPITAL | Age: 62
End: 2022-05-09

## 2022-05-09 DIAGNOSIS — H53.8 BLURRED VISION: ICD-10-CM

## 2022-05-09 DIAGNOSIS — N17.9 AKI (ACUTE KIDNEY INJURY): Primary | ICD-10-CM

## 2022-05-09 DIAGNOSIS — Z74.09 IMPAIRED FUNCTIONAL MOBILITY, BALANCE, GAIT, AND ENDURANCE: ICD-10-CM

## 2022-05-09 DIAGNOSIS — Z78.9 IMPAIRED MOBILITY AND ADLS: ICD-10-CM

## 2022-05-09 DIAGNOSIS — Z74.09 IMPAIRED MOBILITY AND ADLS: ICD-10-CM

## 2022-05-09 DIAGNOSIS — R29.898 RIGHT LEG WEAKNESS: ICD-10-CM

## 2022-05-09 PROBLEM — G45.9 TRANSIENT ISCHEMIC ATTACK (TIA): Status: ACTIVE | Noted: 2022-05-09

## 2022-05-09 LAB
ABO GROUP BLD: NORMAL
ALBUMIN SERPL-MCNC: 3.6 G/DL (ref 3.5–5.2)
ALBUMIN/GLOB SERPL: 1.2 G/DL
ALP SERPL-CCNC: 119 U/L (ref 39–117)
ALT SERPL W P-5'-P-CCNC: 52 U/L (ref 1–33)
ANION GAP SERPL CALCULATED.3IONS-SCNC: 11 MMOL/L (ref 5–15)
AST SERPL-CCNC: 72 U/L (ref 1–32)
BASOPHILS # BLD AUTO: 0.03 10*3/MM3 (ref 0–0.2)
BASOPHILS NFR BLD AUTO: 0.4 % (ref 0–1.5)
BILIRUB SERPL-MCNC: 0.2 MG/DL (ref 0–1.2)
BLD GP AB SCN SERPL QL: NEGATIVE
BUN SERPL-MCNC: 38 MG/DL (ref 8–23)
BUN/CREAT SERPL: 13 (ref 7–25)
CALCIUM SPEC-SCNC: 7.8 MG/DL (ref 8.6–10.5)
CHLORIDE SERPL-SCNC: 101 MMOL/L (ref 98–107)
CK SERPL-CCNC: 695 U/L (ref 20–180)
CO2 SERPL-SCNC: 23 MMOL/L (ref 22–29)
CREAT SERPL-MCNC: 2.93 MG/DL (ref 0.57–1)
DEPRECATED RDW RBC AUTO: 57.6 FL (ref 37–54)
EGFRCR SERPLBLD CKD-EPI 2021: 17.5 ML/MIN/1.73
EOSINOPHIL # BLD AUTO: 0.34 10*3/MM3 (ref 0–0.4)
EOSINOPHIL NFR BLD AUTO: 4.3 % (ref 0.3–6.2)
ERYTHROCYTE [DISTWIDTH] IN BLOOD BY AUTOMATED COUNT: 17.5 % (ref 12.3–15.4)
FLUAV RNA RESP QL NAA+PROBE: NOT DETECTED
FLUBV RNA RESP QL NAA+PROBE: NOT DETECTED
GLOBULIN UR ELPH-MCNC: 3 GM/DL
GLUCOSE BLDC GLUCOMTR-MCNC: 168 MG/DL (ref 70–130)
GLUCOSE SERPL-MCNC: 110 MG/DL (ref 65–99)
HCT VFR BLD AUTO: 24.4 % (ref 34–46.6)
HGB BLD-MCNC: 7.7 G/DL (ref 12–15.9)
HOLD SPECIMEN: NORMAL
HOLD SPECIMEN: NORMAL
IMM GRANULOCYTES # BLD AUTO: 0.03 10*3/MM3 (ref 0–0.05)
IMM GRANULOCYTES NFR BLD AUTO: 0.4 % (ref 0–0.5)
INR PPP: 1.9 (ref 0.8–1.2)
LIPASE SERPL-CCNC: 24 U/L (ref 13–60)
LYMPHOCYTES # BLD AUTO: 0.76 10*3/MM3 (ref 0.7–3.1)
LYMPHOCYTES NFR BLD AUTO: 9.6 % (ref 19.6–45.3)
Lab: NORMAL
MAGNESIUM SERPL-MCNC: 2.5 MG/DL (ref 1.6–2.4)
MCH RBC QN AUTO: 28.4 PG (ref 26.6–33)
MCHC RBC AUTO-ENTMCNC: 31.6 G/DL (ref 31.5–35.7)
MCV RBC AUTO: 90 FL (ref 79–97)
MONOCYTES # BLD AUTO: 0.68 10*3/MM3 (ref 0.1–0.9)
MONOCYTES NFR BLD AUTO: 8.6 % (ref 5–12)
NEUTROPHILS NFR BLD AUTO: 6.08 10*3/MM3 (ref 1.7–7)
NEUTROPHILS NFR BLD AUTO: 76.7 % (ref 42.7–76)
NRBC BLD AUTO-RTO: 0 /100 WBC (ref 0–0.2)
NT-PROBNP SERPL-MCNC: 1852 PG/ML (ref 0–900)
PLATELET # BLD AUTO: 261 10*3/MM3 (ref 140–450)
PMV BLD AUTO: 10.2 FL (ref 6–12)
POTASSIUM SERPL-SCNC: 3.9 MMOL/L (ref 3.5–5.2)
PROT SERPL-MCNC: 6.6 G/DL (ref 6–8.5)
PROTHROMBIN TIME: 21.6 SECONDS (ref 11.1–15.3)
RBC # BLD AUTO: 2.71 10*6/MM3 (ref 3.77–5.28)
RH BLD: POSITIVE
SARS-COV-2 RNA RESP QL NAA+PROBE: NOT DETECTED
SODIUM SERPL-SCNC: 135 MMOL/L (ref 136–145)
T&S EXPIRATION DATE: NORMAL
TROPONIN T SERPL-MCNC: <0.01 NG/ML (ref 0–0.03)
WBC NRBC COR # BLD: 7.92 10*3/MM3 (ref 3.4–10.8)
WHOLE BLOOD HOLD SPECIMEN: NORMAL
WHOLE BLOOD HOLD SPECIMEN: NORMAL

## 2022-05-09 PROCEDURE — 93010 ELECTROCARDIOGRAM REPORT: CPT | Performed by: INTERNAL MEDICINE

## 2022-05-09 PROCEDURE — 86850 RBC ANTIBODY SCREEN: CPT | Performed by: FAMILY MEDICINE

## 2022-05-09 PROCEDURE — 85610 PROTHROMBIN TIME: CPT | Performed by: FAMILY MEDICINE

## 2022-05-09 PROCEDURE — 80053 COMPREHEN METABOLIC PANEL: CPT | Performed by: FAMILY MEDICINE

## 2022-05-09 PROCEDURE — 82550 ASSAY OF CK (CPK): CPT | Performed by: FAMILY MEDICINE

## 2022-05-09 PROCEDURE — 93005 ELECTROCARDIOGRAM TRACING: CPT | Performed by: FAMILY MEDICINE

## 2022-05-09 PROCEDURE — 71045 X-RAY EXAM CHEST 1 VIEW: CPT

## 2022-05-09 PROCEDURE — 85025 COMPLETE CBC W/AUTO DIFF WBC: CPT | Performed by: FAMILY MEDICINE

## 2022-05-09 PROCEDURE — C9803 HOPD COVID-19 SPEC COLLECT: HCPCS

## 2022-05-09 PROCEDURE — 99285 EMERGENCY DEPT VISIT HI MDM: CPT

## 2022-05-09 PROCEDURE — G0378 HOSPITAL OBSERVATION PER HR: HCPCS

## 2022-05-09 PROCEDURE — 83880 ASSAY OF NATRIURETIC PEPTIDE: CPT | Performed by: FAMILY MEDICINE

## 2022-05-09 PROCEDURE — 70450 CT HEAD/BRAIN W/O DYE: CPT

## 2022-05-09 PROCEDURE — 83735 ASSAY OF MAGNESIUM: CPT | Performed by: FAMILY MEDICINE

## 2022-05-09 PROCEDURE — 63710000001 PROMETHAZINE PER 25 MG: Performed by: FAMILY MEDICINE

## 2022-05-09 PROCEDURE — 84484 ASSAY OF TROPONIN QUANT: CPT | Performed by: FAMILY MEDICINE

## 2022-05-09 PROCEDURE — 83690 ASSAY OF LIPASE: CPT | Performed by: FAMILY MEDICINE

## 2022-05-09 PROCEDURE — 82962 GLUCOSE BLOOD TEST: CPT

## 2022-05-09 PROCEDURE — 87636 SARSCOV2 & INF A&B AMP PRB: CPT | Performed by: FAMILY MEDICINE

## 2022-05-09 PROCEDURE — 25010000002 ONDANSETRON PER 1 MG: Performed by: FAMILY MEDICINE

## 2022-05-09 PROCEDURE — 86900 BLOOD TYPING SEROLOGIC ABO: CPT | Performed by: FAMILY MEDICINE

## 2022-05-09 PROCEDURE — 86901 BLOOD TYPING SEROLOGIC RH(D): CPT | Performed by: FAMILY MEDICINE

## 2022-05-09 PROCEDURE — 96375 TX/PRO/DX INJ NEW DRUG ADDON: CPT

## 2022-05-09 RX ORDER — ONDANSETRON 4 MG/1
4 TABLET, FILM COATED ORAL EVERY 6 HOURS PRN
Status: DISCONTINUED | OUTPATIENT
Start: 2022-05-09 | End: 2022-05-18 | Stop reason: HOSPADM

## 2022-05-09 RX ORDER — SUMATRIPTAN 50 MG/1
100 TABLET, FILM COATED ORAL
Status: DISCONTINUED | OUTPATIENT
Start: 2022-05-09 | End: 2022-05-18 | Stop reason: HOSPADM

## 2022-05-09 RX ORDER — SODIUM CHLORIDE 0.9 % (FLUSH) 0.9 %
10 SYRINGE (ML) INJECTION AS NEEDED
Status: DISCONTINUED | OUTPATIENT
Start: 2022-05-09 | End: 2022-05-18 | Stop reason: HOSPADM

## 2022-05-09 RX ORDER — TOPIRAMATE 50 MG/1
50 TABLET, FILM COATED ORAL NIGHTLY
Status: DISCONTINUED | OUTPATIENT
Start: 2022-05-09 | End: 2022-05-18 | Stop reason: HOSPADM

## 2022-05-09 RX ORDER — LEVOTHYROXINE SODIUM 0.15 MG/1
150 TABLET ORAL
Status: DISCONTINUED | OUTPATIENT
Start: 2022-05-10 | End: 2022-05-18 | Stop reason: HOSPADM

## 2022-05-09 RX ORDER — PROMETHAZINE HYDROCHLORIDE 25 MG/1
25 TABLET ORAL EVERY 6 HOURS PRN
Status: DISCONTINUED | OUTPATIENT
Start: 2022-05-09 | End: 2022-05-18 | Stop reason: HOSPADM

## 2022-05-09 RX ORDER — ASPIRIN 81 MG/1
81 TABLET ORAL DAILY
Status: DISCONTINUED | OUTPATIENT
Start: 2022-05-10 | End: 2022-05-18 | Stop reason: HOSPADM

## 2022-05-09 RX ORDER — SODIUM CHLORIDE 0.9 % (FLUSH) 0.9 %
10 SYRINGE (ML) INJECTION EVERY 12 HOURS SCHEDULED
Status: DISCONTINUED | OUTPATIENT
Start: 2022-05-09 | End: 2022-05-18 | Stop reason: HOSPADM

## 2022-05-09 RX ORDER — ONDANSETRON 2 MG/ML
4 INJECTION INTRAMUSCULAR; INTRAVENOUS EVERY 6 HOURS PRN
Status: DISCONTINUED | OUTPATIENT
Start: 2022-05-09 | End: 2022-05-18 | Stop reason: HOSPADM

## 2022-05-09 RX ORDER — ATORVASTATIN CALCIUM 40 MG/1
80 TABLET, FILM COATED ORAL NIGHTLY
Status: DISCONTINUED | OUTPATIENT
Start: 2022-05-09 | End: 2022-05-18 | Stop reason: HOSPADM

## 2022-05-09 RX ORDER — VENLAFAXINE HYDROCHLORIDE 37.5 MG/1
37.5 CAPSULE, EXTENDED RELEASE ORAL DAILY
Status: DISCONTINUED | OUTPATIENT
Start: 2022-05-10 | End: 2022-05-18 | Stop reason: HOSPADM

## 2022-05-09 RX ORDER — ACETAMINOPHEN 650 MG/1
650 SUPPOSITORY RECTAL EVERY 4 HOURS PRN
Status: DISCONTINUED | OUTPATIENT
Start: 2022-05-09 | End: 2022-05-18 | Stop reason: HOSPADM

## 2022-05-09 RX ORDER — ALUMINA, MAGNESIA, AND SIMETHICONE 2400; 2400; 240 MG/30ML; MG/30ML; MG/30ML
15 SUSPENSION ORAL EVERY 6 HOURS PRN
Status: DISCONTINUED | OUTPATIENT
Start: 2022-05-09 | End: 2022-05-18 | Stop reason: HOSPADM

## 2022-05-09 RX ORDER — ACETAMINOPHEN 325 MG/1
650 TABLET ORAL EVERY 4 HOURS PRN
Status: DISCONTINUED | OUTPATIENT
Start: 2022-05-09 | End: 2022-05-18 | Stop reason: HOSPADM

## 2022-05-09 RX ORDER — LANOLIN ALCOHOL/MO/W.PET/CERES
5.25 CREAM (GRAM) TOPICAL NIGHTLY PRN
Status: DISCONTINUED | OUTPATIENT
Start: 2022-05-09 | End: 2022-05-18 | Stop reason: HOSPADM

## 2022-05-09 RX ORDER — MULTIPLE VITAMINS W/ MINERALS TAB 9MG-400MCG
1 TAB ORAL DAILY
Status: DISCONTINUED | OUTPATIENT
Start: 2022-05-10 | End: 2022-05-18 | Stop reason: HOSPADM

## 2022-05-09 RX ORDER — SODIUM CHLORIDE 9 MG/ML
100 INJECTION, SOLUTION INTRAVENOUS CONTINUOUS
Status: DISCONTINUED | OUTPATIENT
Start: 2022-05-09 | End: 2022-05-12

## 2022-05-09 RX ORDER — ACETAMINOPHEN 160 MG/5ML
650 SOLUTION ORAL EVERY 4 HOURS PRN
Status: DISCONTINUED | OUTPATIENT
Start: 2022-05-09 | End: 2022-05-09 | Stop reason: SDUPTHER

## 2022-05-09 RX ADMIN — ONDANSETRON 4 MG: 2 INJECTION INTRAMUSCULAR; INTRAVENOUS at 23:33

## 2022-05-09 RX ADMIN — PROMETHAZINE HYDROCHLORIDE 25 MG: 25 TABLET ORAL at 23:33

## 2022-05-09 NOTE — ED NOTES
Pt passed dysphagia screen. Pt has facial droop from previous stroke and this is her normal. No new abnormalities present.

## 2022-05-09 NOTE — ED NOTES
Pt states she had a mini stroke on labor day weekend last year 2021 that affected her L side. Has been seeing PT routinely for it. Pt states she is down to seeing PT once a week.

## 2022-05-09 NOTE — ED NOTES
Patient presents to the ED with c/o right leg weakness and blurred vision in left eye. Pt reports symptom started at 11am.

## 2022-05-09 NOTE — ED PROVIDER NOTES
Subjective   Patient presents emergency department with blurred vision that began at 11 AM this morning.  At that same time she also developed right leg weakness and fell when she was getting out of the car.  She had nausea and vomiting this past Saturday and Sunday that resolved this morning.  She had also been started on Lasix this past Friday and Saturday, but did not take Lasix on Sunday.  She has a history of a stroke that was diagnosed in September 2021 in [place].  Patient also has a history of lung cancer treated at [facility] of [place], and is currently on gene/autoimmune therapy from the cancer centers.  She never smoked but states that she has had a significant history of secondhand smoke as a child.          Extremity Weakness  Location:  Right leg  Severity:  Moderate  Duration:  1 day  Progression:  Waxing and waning  Chronicity:  New  Relieved by:  Rest  Worsened by:  Weightbearing  Associated symptoms: no abdominal pain, no chest pain, no congestion, no cough, no diarrhea, no ear pain, no fatigue, no fever, no headaches, no myalgias, no nausea, no rash, no rhinorrhea, no shortness of breath, no sore throat, no vomiting and no wheezing        Review of Systems   Constitutional: Positive for activity change. Negative for appetite change, chills, diaphoresis, fatigue and fever.   HENT: Negative for congestion, ear discharge, ear pain, nosebleeds, rhinorrhea, sinus pressure, sore throat and trouble swallowing.    Eyes: Positive for visual disturbance. Negative for discharge and redness.   Respiratory: Negative for apnea, cough, chest tightness, shortness of breath and wheezing.    Cardiovascular: Negative for chest pain.   Gastrointestinal: Negative for abdominal pain, diarrhea, nausea and vomiting.   Endocrine: Negative for polyuria.   Genitourinary: Negative for dysuria, frequency and urgency.   Musculoskeletal: Positive for extremity weakness. Negative for myalgias and neck pain.   Skin: Negative  for color change and rash.   Allergic/Immunologic: Negative for immunocompromised state.   Neurological: Positive for weakness. Negative for dizziness, seizures, syncope, light-headedness and headaches.   Hematological: Negative for adenopathy. Does not bruise/bleed easily.   Psychiatric/Behavioral: Negative for behavioral problems and confusion.   All other systems reviewed and are negative.      Past Medical History:   Diagnosis Date   • Achilles bursitis    • Achilles tendinitis    • Acquired hypothyroidism    • Allergic rhinitis    • Allergy to meat     alphagal   • Allergy to milk products    • Arrhythmia    • Asthmatic bronchitis    • Asthmatic bronchitis    • Bone spur    • Calcaneal spur    • Cancer (HCC)    • Depressive disorder    • Family history of thyroid problem    • Herpes simplex    • Hypermetropia    • Hypothyroidism    • Menopausal flushing    • Menopause    • Migraine    • Otalgia    • Pneumonia    • Presbyopia    • Stroke (HCC)    • Trochanteric bursitis    • UTI (urinary tract infection) 07/03/2018   • Ventricular premature beats        Allergies   Allergen Reactions   • Azithromycin    • Ceclor [Cefaclor] Diarrhea and Nausea Only   • Keflex [Cephalexin]    • Lipitor [Atorvastatin] Nausea And Vomiting   • Penicillins    • Zocor [Simvastatin] Nausea And Vomiting       Past Surgical History:   Procedure Laterality Date   • COLONOSCOPY N/A 2/15/2017    Procedure: COLONOSCOPY;  Surgeon: Lupillo Ugarte MD;  Location: [facility] ENDOSCOPY;  Service:    • COSMETIC SURGERY      plastic surgery to face x 4 r/t trauma   • LAPAROSCOPIC CHOLECYSTECTOMY  12/10/1995    Cholecystectomy, laparoscopic (1)      • OTHER SURGICAL HISTORY      Head surgery procedure (1)    plastic surgery on the face    • OTHER SURGICAL HISTORY  10/17/2014    Repair Superficial Wound TR-EXT 2.5 < CM 58499 (1)          Family History   Problem Relation Age of Onset   • Stroke Mother    • Diabetes Father    • Heart disease Father    •  Hypertension Father    • Thyroid disease Sister    • Breast cancer Maternal Aunt    • Cancer Neg Hx         multiple in mothers family       Social History     Socioeconomic History   • Marital status:    Tobacco Use   • Smoking status: Never Smoker   • Smokeless tobacco: Never Used   Substance and Sexual Activity   • Alcohol use: No   • Drug use: No   • Sexual activity: Defer           Objective   Physical Exam  Vitals and nursing note reviewed.   Constitutional:       Appearance: She is well-developed.   HENT:      Head: Normocephalic and atraumatic.      Nose: Nose normal.   Eyes:      General: No scleral icterus.        Right eye: No discharge.         Left eye: No discharge.      Conjunctiva/sclera: Conjunctivae normal.      Pupils: Pupils are equal, round, and reactive to light.   Neck:      Trachea: No tracheal deviation.   Cardiovascular:      Rate and Rhythm: Normal rate and regular rhythm.      Heart sounds: Normal heart sounds. No murmur heard.  Pulmonary:      Effort: Pulmonary effort is normal. No respiratory distress.      Breath sounds: Normal breath sounds. No stridor. No wheezing or rales.   Abdominal:      General: Bowel sounds are normal. There is no distension.      Palpations: Abdomen is soft. There is no mass.      Tenderness: There is no abdominal tenderness. There is no guarding or rebound.   Musculoskeletal:      Cervical back: Normal range of motion and neck supple.   Skin:     General: Skin is warm and dry.      Findings: No erythema or rash.   Neurological:      Mental Status: She is alert and oriented to person, place, and time.      Coordination: Coordination normal.   Psychiatric:         Behavior: Behavior normal.         Thought Content: Thought content normal.         Procedures           ED Course                   Labs Reviewed   COMPREHENSIVE METABOLIC PANEL - Abnormal; Notable for the following components:       Result Value    Glucose 110 (*)     BUN 38 (*)      Creatinine 2.93 (*)     Sodium 135 (*)     Calcium 7.8 (*)     ALT (SGPT) 52 (*)     AST (SGOT) 72 (*)     Alkaline Phosphatase 119 (*)     eGFR 17.5 (*)     All other components within normal limits    Narrative:     GFR Normal >60  Chronic Kidney Disease <60  Kidney Failure <15     CBC WITH AUTO DIFFERENTIAL - Abnormal; Notable for the following components:    RBC 2.71 (*)     Hemoglobin 7.7 (*)     Hematocrit 24.4 (*)     RDW 17.5 (*)     RDW-SD 57.6 (*)     Neutrophil % 76.7 (*)     Lymphocyte % 9.6 (*)     All other components within normal limits   PROTIME-INR - Abnormal; Notable for the following components:    Protime 21.6 (*)     INR 1.90 (*)     All other components within normal limits    Narrative:     Therapeutic range for most indications is 2.0-3.0 INR,  or 2.5-3.5 for mechanical heart valves.   CK - Abnormal; Notable for the following components:    Creatine Kinase 695 (*)     All other components within normal limits   BNP (IN-HOUSE) - Abnormal; Notable for the following components:    proBNP 1,852.0 (*)     All other components within normal limits    Narrative:     Among patients with dyspnea, NT-proBNP is highly sensitive for the detection of acute congestive heart failure. In addition NT-proBNP of <300 pg/ml effectively rules out acute congestive heart failure with 99% negative predictive value.    Results may be falsely decreased if patient taking Biotin.     MAGNESIUM - Abnormal; Notable for the following components:    Magnesium 2.5 (*)     All other components within normal limits   IRON PROFILE - Abnormal; Notable for the following components:    Iron 20 (*)     Iron Saturation 6 (*)     All other components within normal limits   LIPID PANEL - Abnormal; Notable for the following components:    HDL Cholesterol 61 (*)     All other components within normal limits    Narrative:     Cholesterol Reference Ranges  (U.S. Department of Health and Human Services ATP III  Classifications)    Desirable          <200 mg/dL  Borderline High    200-239 mg/dL  High Risk          >240 mg/dL      Triglyceride Reference Ranges  (U.S. Department of Health and Human Services ATP III Classifications)    Normal           <150 mg/dL  Borderline High  150-199 mg/dL  High             200-499 mg/dL  Very High        >500 mg/dL    HDL Reference Ranges  (U.S. Department of Health and Human Services ATP III Classifications)    Low     <40 mg/dl (major risk factor for CHD)  High    >60 mg/dl ('negative' risk factor for CHD)        LDL Reference Ranges  (U.S. Department of Health and Human Services ATP III Classifications)    Optimal          <100 mg/dL  Near Optimal     100-129 mg/dL  Borderline High  130-159 mg/dL  High             160-189 mg/dL  Very High        >189 mg/dL   CBC WITH AUTO DIFFERENTIAL - Abnormal; Notable for the following components:    RBC 2.86 (*)     Hemoglobin 8.0 (*)     Hematocrit 25.8 (*)     MCHC 31.0 (*)     RDW 17.2 (*)     RDW-SD 57.0 (*)     Lymphocyte % 13.3 (*)     Eosinophil % 8.1 (*)     Eosinophils, Absolute 0.62 (*)     All other components within normal limits   COMPREHENSIVE METABOLIC PANEL - Abnormal; Notable for the following components:    BUN 35 (*)     Creatinine 2.61 (*)     CO2 21.0 (*)     Calcium 7.6 (*)     Albumin 3.40 (*)     ALT (SGPT) 44 (*)     AST (SGOT) 55 (*)     Alkaline Phosphatase 124 (*)     eGFR 20.1 (*)     All other components within normal limits    Narrative:     GFR Normal >60  Chronic Kidney Disease <60  Kidney Failure <15     COMPREHENSIVE METABOLIC PANEL - Abnormal; Notable for the following components:    BUN 27 (*)     Creatinine 2.27 (*)     Chloride 114 (*)     Calcium 7.6 (*)     Total Protein 5.7 (*)     Albumin 3.00 (*)     eGFR 23.7 (*)     All other components within normal limits    Narrative:     GFR Normal >60  Chronic Kidney Disease <60  Kidney Failure <15     CBC WITH AUTO DIFFERENTIAL - Abnormal; Notable for the  following components:    RBC 2.75 (*)     Hemoglobin 7.9 (*)     Hematocrit 25.3 (*)     MCHC 31.2 (*)     RDW 17.2 (*)     RDW-SD 57.8 (*)     Neutrophil % 80.7 (*)     Lymphocyte % 5.6 (*)     Immature Grans % 0.7 (*)     Neutrophils, Absolute 8.48 (*)     Lymphocytes, Absolute 0.59 (*)     Eosinophils, Absolute 0.60 (*)     Immature Grans, Absolute 0.07 (*)     All other components within normal limits   COMPREHENSIVE METABOLIC PANEL - Abnormal; Notable for the following components:    Creatinine 1.95 (*)     Chloride 114 (*)     CO2 20.0 (*)     Calcium 7.8 (*)     Albumin 2.90 (*)     eGFR 28.5 (*)     All other components within normal limits    Narrative:     GFR Normal >60  Chronic Kidney Disease <60  Kidney Failure <15     LIANG'S STAIN - Abnormal; Notable for the following components:    Liang Stain Positive (*)     % EOS Liang Stain 2 (*)     All other components within normal limits   HEMOGLOBIN AND HEMATOCRIT, BLOOD - Abnormal; Notable for the following components:    Hemoglobin 7.3 (*)     Hematocrit 23.0 (*)     All other components within normal limits   COMPREHENSIVE METABOLIC PANEL - Abnormal; Notable for the following components:    Creatinine 2.08 (*)     Chloride 111 (*)     Calcium 8.2 (*)     Albumin 3.40 (*)     eGFR 26.3 (*)     All other components within normal limits    Narrative:     GFR Normal >60  Chronic Kidney Disease <60  Kidney Failure <15     URIC ACID - Abnormal; Notable for the following components:    Uric Acid 7.0 (*)     All other components within normal limits   CBC WITH AUTO DIFFERENTIAL - Abnormal; Notable for the following components:    RBC 2.84 (*)     Hemoglobin 8.0 (*)     Hematocrit 25.0 (*)     RDW 16.6 (*)     Lymphocyte % 9.3 (*)     Eosinophil % 8.2 (*)     Eosinophils, Absolute 0.63 (*)     All other components within normal limits   POCT GLUCOSE FINGERSTICK - Abnormal; Notable for the following components:    Glucose 168 (*)     All other components within  normal limits   COVID-19 AND FLU A/B, NP SWAB IN TRANSPORT MEDIA 8-12 HR TAT - Normal    Narrative:     Fact sheet for providers: https://www.fda.gov/media/336011/download    Fact sheet for patients: https://www.fda.gov/media/741914/download    Test performed by PCR.   TROPONIN (IN-HOUSE) - Normal    Narrative:     Troponin T Reference Range:  <= 0.03 ng/mL-   Negative for AMI  >0.03 ng/mL-     Abnormal for myocardial necrosis.  Clinicians would have to utilize clinical acumen, EKG, Troponin and serial changes to determine if it is an Acute Myocardial Infarction or myocardial injury due to an underlying chronic condition.       Results may be falsely decreased if patient taking Biotin.     LIPASE - Normal   FERRITIN - Normal    Narrative:     Results may be falsely decreased if patient taking Biotin.     HEMOGLOBIN A1C - Normal    Narrative:     Hemoglobin A1C Ranges:    Increased Risk for Diabetes  5.7% to 6.4%  Diabetes                     >= 6.5%  Diabetic Goal                < 7.0%   CK - Normal   POCT GLUCOSE FINGERSTICK - Normal   POCT GLUCOSE FINGERSTICK - Normal   SODIUM, URINE, RANDOM    Narrative:     Reference intervals for random urine have not been established.  Clinical usage is dependent upon physician's interpretation in combination with other laboratory tests.      CREATININE, URINE, RANDOM    Narrative:     Reference intervals for random urine have not been established.  Clinical usage is dependent upon physician's interpretation in combination with other laboratory tests.      RAINBOW DRAW    Narrative:     The following orders were created for panel order [place] Draw.  Procedure                               Abnormality         Status                     ---------                               -----------         ------                     Green Top (Gel)[[id number]]                                  Final result               Lavender Top[[id number]]                                     Final  result               Gold Top - SST[[id number]]                                   Final result               Light Blue Top[[id number]]                                   In process                   Please view results for these tests on the individual orders.   COMPREHENSIVE METABOLIC PANEL   CBC WITH AUTO DIFFERENTIAL   POCT GLUCOSE FINGERSTICK   TYPE AND SCREEN   PREVIOUS HISTORY   PREPARE RBC   CBC AND DIFFERENTIAL    Narrative:     The following orders were created for panel order CBC & Differential.  Procedure                               Abnormality         Status                     ---------                               -----------         ------                     CBC Auto Differential[[id number]]        Abnormal            Final result                 Please view results for these tests on the individual orders.   GREEN TOP   LAVENDER TOP   GOLD TOP - SST   EXTRA TUBES    Narrative:     The following orders were created for panel order Extra Tubes.  Procedure                               Abnormality         Status                     ---------                               -----------         ------                     Lavender Top[[id number]]                                     Final result                 Please view results for these tests on the individual orders.   LAVENDER TOP   EXTRA TUBES    Narrative:     The following orders were created for panel order Extra Tubes.  Procedure                               Abnormality         Status                     ---------                               -----------         ------                     Lavender Top[[id number]]                                     Final result                 Please view results for these tests on the individual orders.   LAVENDER TOP   CBC AND DIFFERENTIAL    Narrative:     The following orders were created for panel order CBC & Differential.  Procedure                               Abnormality         Status                      ---------                               -----------         ------                     CBC Auto Differential[[id number]]        Abnormal            Final result                 Please view results for these tests on the individual orders.   CBC AND DIFFERENTIAL    Narrative:     The following orders were created for panel order CBC & Differential.  Procedure                               Abnormality         Status                     ---------                               -----------         ------                     CBC Auto Differential[[id number]]        Abnormal            Final result                 Please view results for these tests on the individual orders.   LIGHT BLUE TOP   CBC AND DIFFERENTIAL    Narrative:     The following orders were created for panel order CBC & Differential.  Procedure                               Abnormality         Status                     ---------                               -----------         ------                     CBC Auto Differential[[id number]]                                                         Please view results for these tests on the individual orders.       XR Chest PA & Lateral   Final Result   CONCLUSION:   Minimal cardiomegaly.   Very small bilateral pleural effusions.   Linear atelectasis at the lung bases.      [id number]      Electronically signed by:  Talon Tuttle MD  5/13/2022 4:30 PM CDT   Workstation: [id number]      US Renal Bilateral   Final Result   CONCLUSION:   Normal ultrasound kidneys.   Minimal dependent debris in the bladder.       [id number]      Electronically signed by:  Talon Tuttle MD  5/12/2022 4:24 PM [employer]   Workstation: [id number]      US Venous Doppler Lower Extremity Bilateral (duplex)   Final Result   Impression:   1. No evidence for acute deep venous thrombus in the either lower   extremity.   2. Baker's cyst in the left popliteal fossa.      Electronically signed by:  Ajith Rodriguez MD  5/11/2022 12:14 PM   CDT Workstation: [id number]       US Carotid Bilateral   Final Result   CONCLUSION:   Less than 50% diameter reduction stenosis right internal carotid   artery.   Minimally elevated peak systolic and end diastolic velocities   proximal left internal carotid artery with normal ratio and   visually no significant stenosis. Suspect less than 50% diameter   reduction stenosis left internal carotid artery.   Antegrade flow is present in each vertebral artery.      [id number]      Electronically signed by:  Talon Tuttle MD  5/10/2022 10:58 AM   CDT Workstation: [id number]      MRI Brain Without Contrast   Final Result   Normal MRI of brain without contrast.      Electronically signed by:  Trevor Joyce MD  5/10/2022 10:05 AM CDT   Workstation: [id number]      XR Chest 1 View   Final Result   CONCLUSION:   Low lung volumes.   Linear atelectasis mid lungs and lung bases.      [id number]      Electronically signed by:  Talon Tuttle MD  5/9/2022 6:03 PM CDT   Workstation: [id number]      CT Head Without Contrast Stroke Protocol   Final Result   CONCLUSION:   No acute process.   Minimal cerebral and cerebellar atrophy.      [id number]      Electronically signed by:  Talon Tuttle MD  5/9/2022 5:30 PM CDT   Workstation: [id number]                                             MDM    Final diagnoses:   TODD (acute kidney injury) ([facility])   Blurred vision   Right leg weakness       ED Disposition  ED Disposition     ED Disposition   Decision to Admit    Condition   --    Comment   Level of Care: Stepdown [25]   Diagnosis: TODD (acute kidney injury) ([facility]) [715935]   Admitting Physician: JOSE PERDOMO [[id number]]   Attending Physician: JOSE PERDOMO [[id number]]               No follow-up provider specified.       Medication List      ASK your doctor about these medications    furosemide 20 MG tablet  Commonly known as: LASIX  TAKE 1 TABLET BY MOUTH 2 (TWO) TIMES A DAY. TAKE DAILY FOR 3 DAYS THEN AS NEEDED FOR SWELLING  Ask about: Which instructions should I use?           Where to Get  Your Medications      These medications were sent to [facility]/pharmacy #[id number] - [place], [place] - [address] [place] [address] - [phone]  - [phone] [address] [address]    Phone: [phone]   · furosemide 20 MG tablet          Frankie Wilkins MD  05/13/22 3368

## 2022-05-10 ENCOUNTER — APPOINTMENT (OUTPATIENT)
Dept: CARDIOLOGY | Facility: HOSPITAL | Age: 62
End: 2022-05-10

## 2022-05-10 ENCOUNTER — APPOINTMENT (OUTPATIENT)
Dept: ULTRASOUND IMAGING | Facility: HOSPITAL | Age: 62
End: 2022-05-10

## 2022-05-10 ENCOUNTER — APPOINTMENT (OUTPATIENT)
Dept: MRI IMAGING | Facility: HOSPITAL | Age: 62
End: 2022-05-10

## 2022-05-10 LAB
ALBUMIN SERPL-MCNC: 3.4 G/DL (ref 3.5–5.2)
ALBUMIN/GLOB SERPL: 1.3 G/DL
ALP SERPL-CCNC: 124 U/L (ref 39–117)
ALT SERPL W P-5'-P-CCNC: 44 U/L (ref 1–33)
ANION GAP SERPL CALCULATED.3IONS-SCNC: 13 MMOL/L (ref 5–15)
AST SERPL-CCNC: 55 U/L (ref 1–32)
BASOPHILS # BLD AUTO: 0.03 10*3/MM3 (ref 0–0.2)
BASOPHILS NFR BLD AUTO: 0.4 % (ref 0–1.5)
BH CV ECHO MEAS - ACS: 1.91 CM
BH CV ECHO MEAS - AO MAX PG: 13.5 MMHG
BH CV ECHO MEAS - AO MEAN PG: 7.3 MMHG
BH CV ECHO MEAS - AO ROOT DIAM: 3.2 CM
BH CV ECHO MEAS - AO V2 MAX: 177.2 CM/SEC
BH CV ECHO MEAS - AO V2 VTI: 29.8 CM
BH CV ECHO MEAS - AVA(I,D): 1.27 CM2
BH CV ECHO MEAS - EDV(CUBED): 126.8 ML
BH CV ECHO MEAS - EDV(MOD-SP2): 114 ML
BH CV ECHO MEAS - EDV(MOD-SP4): 93.3 ML
BH CV ECHO MEAS - EF(MOD-BP): 62.8 %
BH CV ECHO MEAS - EF(MOD-SP2): 64.7 %
BH CV ECHO MEAS - EF(MOD-SP4): 61.8 %
BH CV ECHO MEAS - ESV(CUBED): 18.6 ML
BH CV ECHO MEAS - ESV(MOD-SP2): 40.2 ML
BH CV ECHO MEAS - ESV(MOD-SP4): 35.6 ML
BH CV ECHO MEAS - FS: 47.2 %
BH CV ECHO MEAS - IVS/LVPW: 0.94 CM
BH CV ECHO MEAS - IVSD: 1.04 CM
BH CV ECHO MEAS - LA DIMENSION: 4.2 CM
BH CV ECHO MEAS - LAT PEAK E' VEL: 8.7 CM/SEC
BH CV ECHO MEAS - LV DIASTOLIC VOL/BSA (35-75): 45.2 CM2
BH CV ECHO MEAS - LV MASS(C)D: 200.5 GRAMS
BH CV ECHO MEAS - LV MAX PG: 3.3 MMHG
BH CV ECHO MEAS - LV MEAN PG: 1.83 MMHG
BH CV ECHO MEAS - LV SYSTOLIC VOL/BSA (12-30): 17.3 CM2
BH CV ECHO MEAS - LV V1 MAX: 84.2 CM/SEC
BH CV ECHO MEAS - LV V1 VTI: 15 CM
BH CV ECHO MEAS - LVIDD: 5 CM
BH CV ECHO MEAS - LVIDS: 2.7 CM
BH CV ECHO MEAS - LVOT AREA: 2.5 CM2
BH CV ECHO MEAS - LVOT DIAM: 1.79 CM
BH CV ECHO MEAS - LVPWD: 1.1 CM
BH CV ECHO MEAS - MED PEAK E' VEL: 6.4 CM/SEC
BH CV ECHO MEAS - MR MAX PG: 54.9 MMHG
BH CV ECHO MEAS - MR MAX VEL: 424.8 CM/SEC
BH CV ECHO MEAS - PA V2 MAX: 86.7 CM/SEC
BH CV ECHO MEAS - RAP SYSTOLE: 5 MMHG
BH CV ECHO MEAS - RVDD: 2.25 CM
BH CV ECHO MEAS - RVSP: 31.4 MMHG
BH CV ECHO MEAS - SI(MOD-SP2): 35.8 ML/M2
BH CV ECHO MEAS - SI(MOD-SP4): 28 ML/M2
BH CV ECHO MEAS - SV(LVOT): 37.9 ML
BH CV ECHO MEAS - SV(MOD-SP2): 73.8 ML
BH CV ECHO MEAS - SV(MOD-SP4): 57.7 ML
BH CV ECHO MEAS - TAPSE (>1.6): 1.94 CM
BH CV ECHO MEAS - TR MAX PG: 26.4 MMHG
BH CV ECHO MEAS - TR MAX VEL: 253.7 CM/SEC
BILIRUB SERPL-MCNC: 0.2 MG/DL (ref 0–1.2)
BUN SERPL-MCNC: 35 MG/DL (ref 8–23)
BUN/CREAT SERPL: 13.4 (ref 7–25)
CALCIUM SPEC-SCNC: 7.6 MG/DL (ref 8.6–10.5)
CHLORIDE SERPL-SCNC: 104 MMOL/L (ref 98–107)
CHOLEST SERPL-MCNC: 170 MG/DL (ref 0–200)
CO2 SERPL-SCNC: 21 MMOL/L (ref 22–29)
CREAT SERPL-MCNC: 2.61 MG/DL (ref 0.57–1)
DEPRECATED RDW RBC AUTO: 57 FL (ref 37–54)
EGFRCR SERPLBLD CKD-EPI 2021: 20.1 ML/MIN/1.73
EOSINOPHIL # BLD AUTO: 0.62 10*3/MM3 (ref 0–0.4)
EOSINOPHIL NFR BLD AUTO: 8.1 % (ref 0.3–6.2)
ERYTHROCYTE [DISTWIDTH] IN BLOOD BY AUTOMATED COUNT: 17.2 % (ref 12.3–15.4)
FERRITIN SERPL-MCNC: 61.2 NG/ML (ref 13–150)
GLOBULIN UR ELPH-MCNC: 2.7 GM/DL
GLUCOSE SERPL-MCNC: 92 MG/DL (ref 65–99)
HBA1C MFR BLD: 4.8 % (ref 4.8–5.6)
HCT VFR BLD AUTO: 25.8 % (ref 34–46.6)
HDLC SERPL-MCNC: 61 MG/DL (ref 40–60)
HGB BLD-MCNC: 8 G/DL (ref 12–15.9)
IMM GRANULOCYTES # BLD AUTO: 0.04 10*3/MM3 (ref 0–0.05)
IMM GRANULOCYTES NFR BLD AUTO: 0.5 % (ref 0–0.5)
IRON 24H UR-MRATE: 20 MCG/DL (ref 37–145)
IRON SATN MFR SERPL: 6 % (ref 20–50)
LDLC SERPL CALC-MCNC: 86 MG/DL (ref 0–100)
LDLC/HDLC SERPL: 1.36 {RATIO}
LEFT ATRIUM VOLUME INDEX: 29.4 ML/M2
LV EF 2D ECHO EST: 61 %
LYMPHOCYTES # BLD AUTO: 1.02 10*3/MM3 (ref 0.7–3.1)
LYMPHOCYTES NFR BLD AUTO: 13.3 % (ref 19.6–45.3)
MAXIMAL PREDICTED HEART RATE: 157 BPM
MCH RBC QN AUTO: 28 PG (ref 26.6–33)
MCHC RBC AUTO-ENTMCNC: 31 G/DL (ref 31.5–35.7)
MCV RBC AUTO: 90.2 FL (ref 79–97)
MONOCYTES # BLD AUTO: 0.83 10*3/MM3 (ref 0.1–0.9)
MONOCYTES NFR BLD AUTO: 10.8 % (ref 5–12)
NEUTROPHILS NFR BLD AUTO: 5.12 10*3/MM3 (ref 1.7–7)
NEUTROPHILS NFR BLD AUTO: 66.9 % (ref 42.7–76)
NRBC BLD AUTO-RTO: 0 /100 WBC (ref 0–0.2)
PLATELET # BLD AUTO: 230 10*3/MM3 (ref 140–450)
PMV BLD AUTO: 11.1 FL (ref 6–12)
POTASSIUM SERPL-SCNC: 4.1 MMOL/L (ref 3.5–5.2)
PROT SERPL-MCNC: 6.1 G/DL (ref 6–8.5)
RBC # BLD AUTO: 2.86 10*6/MM3 (ref 3.77–5.28)
SODIUM SERPL-SCNC: 138 MMOL/L (ref 136–145)
STRESS TARGET HR: 133 BPM
TIBC SERPL-MCNC: 328 MCG/DL (ref 298–536)
TRANSFERRIN SERPL-MCNC: 220 MG/DL (ref 200–360)
TRIGL SERPL-MCNC: 131 MG/DL (ref 0–150)
VLDLC SERPL-MCNC: 23 MG/DL (ref 5–40)
WBC NRBC COR # BLD: 7.66 10*3/MM3 (ref 3.4–10.8)

## 2022-05-10 PROCEDURE — 93306 TTE W/DOPPLER COMPLETE: CPT

## 2022-05-10 PROCEDURE — 93880 EXTRACRANIAL BILAT STUDY: CPT

## 2022-05-10 PROCEDURE — 96361 HYDRATE IV INFUSION ADD-ON: CPT

## 2022-05-10 PROCEDURE — 83036 HEMOGLOBIN GLYCOSYLATED A1C: CPT | Performed by: FAMILY MEDICINE

## 2022-05-10 PROCEDURE — 63710000001 PROMETHAZINE PER 25 MG: Performed by: FAMILY MEDICINE

## 2022-05-10 PROCEDURE — 97166 OT EVAL MOD COMPLEX 45 MIN: CPT

## 2022-05-10 PROCEDURE — G0378 HOSPITAL OBSERVATION PER HR: HCPCS

## 2022-05-10 PROCEDURE — 99204 OFFICE O/P NEW MOD 45 MIN: CPT | Performed by: PSYCHIATRY & NEUROLOGY

## 2022-05-10 PROCEDURE — 84466 ASSAY OF TRANSFERRIN: CPT | Performed by: FAMILY MEDICINE

## 2022-05-10 PROCEDURE — 80061 LIPID PANEL: CPT | Performed by: FAMILY MEDICINE

## 2022-05-10 PROCEDURE — 82728 ASSAY OF FERRITIN: CPT | Performed by: FAMILY MEDICINE

## 2022-05-10 PROCEDURE — 70551 MRI BRAIN STEM W/O DYE: CPT

## 2022-05-10 PROCEDURE — 97162 PT EVAL MOD COMPLEX 30 MIN: CPT

## 2022-05-10 PROCEDURE — 85025 COMPLETE CBC W/AUTO DIFF WBC: CPT | Performed by: FAMILY MEDICINE

## 2022-05-10 PROCEDURE — 93306 TTE W/DOPPLER COMPLETE: CPT | Performed by: INTERNAL MEDICINE

## 2022-05-10 PROCEDURE — 63710000001 ONDANSETRON PER 8 MG: Performed by: FAMILY MEDICINE

## 2022-05-10 PROCEDURE — 83540 ASSAY OF IRON: CPT | Performed by: FAMILY MEDICINE

## 2022-05-10 PROCEDURE — 82962 GLUCOSE BLOOD TEST: CPT

## 2022-05-10 PROCEDURE — 36415 COLL VENOUS BLD VENIPUNCTURE: CPT | Performed by: FAMILY MEDICINE

## 2022-05-10 PROCEDURE — 80053 COMPREHEN METABOLIC PANEL: CPT | Performed by: FAMILY MEDICINE

## 2022-05-10 RX ADMIN — TOPIRAMATE 50 MG: 50 TABLET, FILM COATED ORAL at 08:33

## 2022-05-10 RX ADMIN — Medication 1 TABLET: at 08:33

## 2022-05-10 RX ADMIN — Medication 10 ML: at 00:35

## 2022-05-10 RX ADMIN — LEVOTHYROXINE SODIUM 150 MCG: 150 TABLET ORAL at 06:45

## 2022-05-10 RX ADMIN — PROMETHAZINE HYDROCHLORIDE 25 MG: 25 TABLET ORAL at 20:14

## 2022-05-10 RX ADMIN — RIVAROXABAN 20 MG: 10 TABLET, FILM COATED ORAL at 17:24

## 2022-05-10 RX ADMIN — SODIUM CHLORIDE 100 ML/HR: 9 INJECTION, SOLUTION INTRAVENOUS at 00:36

## 2022-05-10 RX ADMIN — SODIUM CHLORIDE, POTASSIUM CHLORIDE, SODIUM LACTATE AND CALCIUM CHLORIDE 1000 ML: 600; 310; 30; 20 INJECTION, SOLUTION INTRAVENOUS at 06:45

## 2022-05-10 RX ADMIN — ASPIRIN 81 MG: 81 TABLET, FILM COATED ORAL at 08:33

## 2022-05-10 RX ADMIN — SODIUM CHLORIDE 100 ML/HR: 9 INJECTION, SOLUTION INTRAVENOUS at 08:35

## 2022-05-10 RX ADMIN — Medication 10 ML: at 08:33

## 2022-05-10 RX ADMIN — VENLAFAXINE HYDROCHLORIDE 37.5 MG: 37.5 CAPSULE, EXTENDED RELEASE ORAL at 08:33

## 2022-05-10 RX ADMIN — SODIUM CHLORIDE 100 ML/HR: 9 INJECTION, SOLUTION INTRAVENOUS at 20:10

## 2022-05-10 RX ADMIN — ONDANSETRON HYDROCHLORIDE 4 MG: 4 TABLET, FILM COATED ORAL at 20:14

## 2022-05-10 RX ADMIN — ACETAMINOPHEN 650 MG: 325 TABLET ORAL at 22:40
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
## 2022-05-10 NOTE — CONSULTS
Stroke Consult Note    Patient Name: Adilene Morgan   MRN: [id number]  Age: 62 y.o.  Sex: female  : 1960    Primary Care Physician: Jen Raymundo MD  Referring Physician:  Frankie Wilkins MD    Handedness: Right  Race: White    Chief Complaint/Reason for Consultation: Right lower extremity weakness    Subjective .  HPI: 62-year-old right-handed white female with known diagnosis of metastatic lung cancer with mets to lymph nodes, liver and bony metastasis, on immunotherapy and apparently responding to the treatment, left lower extremity DVT on Xarelto, stroke with left leg weakness in 2021, Hashimoto's thyroiditis, depression, who comes in with right lower extremity weakness since yesterday, now improving, but not back to baseline.  Patient has mild residual left lower extremity symptoms from her old stroke.  Patient takes Xarelto 20 mg and aspirin 81 mg daily, and is compliant with the medications.  Patient denies having any facial symptoms, any right upper extremity symptoms, slurred speech or facial droop.  Patient did have blurry vision in both eyes, which is improved.    Last Known Normal Date/Time: 11 AM EST     Review of Systems   Constitutional: Negative.    HENT: Negative.    Eyes: Positive for visual disturbance (Blurry vision both eyes, now improved).   Respiratory: Negative.    Cardiovascular: Negative.    Gastrointestinal: Negative.    Endocrine: Negative.    Genitourinary: Negative.    Musculoskeletal: Negative.    Skin: Negative.    Allergic/Immunologic: Negative.    Psychiatric/Behavioral: Negative.       Past Medical History:   Diagnosis Date   • Achilles bursitis    • Achilles tendinitis    • Acquired hypothyroidism    • Allergic rhinitis    • Allergy to meat     alphagal   • Allergy to milk products    • Arrhythmia    • Asthmatic bronchitis    • Asthmatic bronchitis    • Bone spur    • Calcaneal spur    • Cancer (HCC)    • Depressive disorder    • Family history of  thyroid problem    • Herpes simplex    • Hypermetropia    • Hypothyroidism    • Menopausal flushing    • Menopause    • Migraine    • Otalgia    • Pneumonia    • Presbyopia    • Stroke (HCC)    • Trochanteric bursitis    • UTI (urinary tract infection) 07/03/2018   • Ventricular premature beats      Past Surgical History:   Procedure Laterality Date   • COLONOSCOPY N/A 2/15/2017    Procedure: COLONOSCOPY;  Surgeon: Lupillo Ugarte MD;  Location: [facility] ENDOSCOPY;  Service:    • COSMETIC SURGERY      plastic surgery to face x 4 r/t trauma   • LAPAROSCOPIC CHOLECYSTECTOMY  12/10/1995    Cholecystectomy, laparoscopic (1)      • OTHER SURGICAL HISTORY      Head surgery procedure (1)    plastic surgery on the face    • OTHER SURGICAL HISTORY  10/17/2014    Repair Superficial Wound TR-EXT 2.5 < CM 33873 (1)        Family History   Problem Relation Age of Onset   • Stroke Mother    • Diabetes Father    • Heart disease Father    • Hypertension Father    • Thyroid disease Sister    • Breast cancer Maternal Aunt    • Cancer Neg Hx         multiple in mothers family     Social History     Socioeconomic History   • Marital status:    Tobacco Use   • Smoking status: Never Smoker   • Smokeless tobacco: Never Used   Substance and Sexual Activity   • Alcohol use: No   • Drug use: No   • Sexual activity: Defer     Allergies   Allergen Reactions   • Azithromycin    • Ceclor [Cefaclor] Diarrhea and Nausea Only   • Keflex [Cephalexin]    • Lipitor [Atorvastatin] Nausea And Vomiting   • Penicillins    • Zocor [Simvastatin] Nausea And Vomiting     Prior to Admission medications    Medication Sig Start Date End Date Taking? Authorizing Provider   aspirin 81 MG EC tablet Take 81 mg by mouth Daily.    Provider, MD Bing   entrectinib (ROZLYTREK) 200 MG capsule Take 600 mg by mouth Every Night.    Provider, MD Bing   fluticasone (FLONASE) 50 MCG/ACT nasal spray 1 spray into each nostril Daily As Needed.    Provider,  MD Bing   furosemide (Lasix) 20 MG tablet Take 1 tablet by mouth 2 (Two) Times a Day. Take daily for 3 days then prn for swelling 5/5/22   Jen Raymundo MD   levothyroxine (SYNTHROID, LEVOTHROID) 150 MCG tablet Take 150 mcg by mouth Daily.    Bing Mcclelland MD   loperamide (IMODIUM) 2 MG capsule Take 2 mg by mouth As Needed for Diarrhea.    Bing Mcclelland MD   loratadine (CLARITIN) 10 MG tablet Take 10 mg by mouth As Needed for Allergies.    Bing Mcclelland MD   multivitamin with minerals tablet tablet Take 1 tablet by mouth Daily.    Bing Mcclelland MD   ondansetron (ZOFRAN) 8 MG tablet Take 1 tablet by mouth Every 8 (Eight) Hours As Needed for Nausea or Vomiting. 3/18/22   Jen Raymundo MD   prochlorperazine (COMPAZINE) 10 MG tablet Take 1 tablet by mouth Every 6 (Six) Hours As Needed for Nausea or Vomiting. 3/18/22   Jen Raymundo MD   promethazine (PHENERGAN) 25 MG tablet Take 1 tablet by mouth Every 6 (Six) Hours As Needed for Nausea or Vomiting. 4/26/22   Jen Raymundo MD   rivaroxaban (XARELTO) 20 MG tablet Take 1 tablet by mouth Daily. After finished with lower dose for 21 days 5/3/22   Jen Raymundo MD   senna (SENOKOT) 8.6 MG tablet Take 1 tablet by mouth Daily. 2 tablets as needed 3 times a day x 30 days    Bing Mcclelland MD   solifenacin (VESICARE) 10 MG tablet TAKE 1 TABLET BY MOUTH EVERY DAY 4/11/22   Jen Raymundo MD   SUMAtriptan (IMITREX) 20 MG/ACT nasal spray INSTILL 1 SPRAY INTO EACH NOSTRIL EVERY 2 HOURS AS NEEDED FOR MIGRAINE 8/23/21   Jen Raymundo MD   topiramate (TOPAMAX) 50 MG tablet TAKE 1 TABLET BY MOUTH EVERY NIGHT. 2/9/22   Jen Raymundo MD   venlafaxine XR (EFFEXOR-XR) 37.5 MG 24 hr capsule TAKE 1 CAPSULE BY MOUTH EVERY DAY 3/3/22   Zackary, Jen L, MD             Objective     Temp:  [98.2 °F (36.8 °C)] 98.2 °F (36.8 °C)  Heart Rate:  [72-94] 90  Resp:  [18] 18  BP: ()/(45-72) 95/46  Neurological  Exam  Mental Status  Awake, alert and oriented to person, place and time.Alert. Speech is normal. Language is fluent with no aphasia. Attention and concentration are normal.    Cranial Nerves  CN II: Visual fields full to confrontation.  CN III, IV, VI: Extraocular movements intact bilaterally. Normal lids and orbits bilaterally. Pupils equal round and reactive to light bilaterally.  CN V:  Right: Facial sensation is normal.  Left: Diminished sensation of the entire left side of the face. Decreased left facial sensation, which is chronic.  CN VII: Full and symmetric facial movement.  CN VIII: Equal hearing bilaterally.  CN IX, X: Palate elevates symmetrically  CN XI: Shoulder shrug strength is normal.  CN XII: Tongue midline without atrophy or fasciculations.    Motor  Normal muscle bulk throughout. No fasciculations present.  No obvious motor weakness is appreciated.  Subjectively patient feels weaker in the right leg.    Sensory  Light touch is normal in upper and lower extremities.     Reflexes  Not assessed.    Coordination    No obvious dysmetria.    Gait    Not assessed.      Physical Exam  Vitals and nursing note reviewed.   Constitutional:       Appearance: Normal appearance.   HENT:      Head: Normocephalic and atraumatic.      Mouth/Throat:      Mouth: Mucous membranes are moist.      Pharynx: Oropharynx is clear.   Eyes:      General: Lids are normal.      Extraocular Movements: Extraocular movements intact.      Pupils: Pupils are equal, round, and reactive to light.   Cardiovascular:      Rate and Rhythm: Normal rate and regular rhythm.   Pulmonary:      Effort: Pulmonary effort is normal. No respiratory distress.   Musculoskeletal:      Cervical back: Normal range of motion and neck supple.   Neurological:      Mental Status: She is alert.   Psychiatric:         Mood and Affect: Mood normal.         Speech: Speech normal.         Behavior: Behavior normal.         Acute Stroke Data    IV Thrombolytic  (TPA/Tenecteplase) Inclusion / Exclusion Criteria    Time: 09:59 CDT  Person Administering Scale: Victor M Huynh MD    Inclusion Criteria  [x]   18 years of age or greater   []   Onset of symptoms < 4.5 hours before beginning treatment (stroke onset = time patient was last seen well or without symptoms).   []   Diagnosis of acute ischemic stroke causing measurable disabling deficit (Complete Hemianopia, Any Aphasia, Visual or Sensory Extinction, Any weakness limiting sustained effort against gravity)   []   Any remaining deficit considered potentially disabling in view of patient and practitioner   Exclusion criteria (Do not proceed with Alteplase if any are checked under exclusion criteria)  []   Onset unknown or GREATER than 4.5 hours   []   ICH on CT/MRI   []   CT demonstrates hypodensity representing acute or subacute infarct   []   Significant head trauma or prior stroke in the previous 3 months   []   Symptoms suggestive of subarachnoid hemorrhage   []   History of un-ruptured intracranial aneurysm GREATER than 10 mm   []   Recent intracranial or intraspinal surgery within the last 3 months   []   Arterial puncture at a non-compressible site in the previous 7 days   []   Active internal bleeding   []   Acute bleeding tendency   []   Platelet count LESS than 100,000 for known hematological diseases such as leukemia, thrombocytopenia or chronic cirrhosis   []   Current use of anticoagulant with INR GREATER than 1.7 or PT GREATER than 15 seconds, aPTT GREATER than 40 seconds   []   Heparin received within 48 hours, resulting in abnormally elevated aPTT GREATER than upper limit of normal   [x]   Current use of direct thrombin inhibitors or direct factor Xa inhibitors in the past 48 hours   []   Elevated blood pressure refractory to treatment (systolic GREATER than 185 mm/Hg or diastolic  GREATER than 110 mm/Hg   []   Suspected infective endocarditis and aortic arch dissection   []   Current use of therapeutic  treatment dose of low-molecular-weight heparin (LMWH) within the previous 24 hours   []   Structural GI malignancy or bleed   Relative exclusion for all patients  [x]   Only minor nondisabling symptoms   []   Pregnancy   []   Seizure at onset with postictal residual neurological impairments   []   Major surgery or previous trauma within past 14 days   []   History of previous spontaneous ICH, intracranial neoplasm, or AV malformation   []   Postpartum (within previous 14 days)   []   Recent GI or urinary tract hemorrhage (within previous 21 days)   []   Recent acute MI (within previous 3 months)   []   History of unruptured intracranial aneurysm LESS than 10 mm   []   History of ruptured intracranial aneurysm   []   Blood glucose LESS than 50 mg/dL (2.7 mmol/L)   []   Dural puncture within the last 7 days   []   Known GREATER than 10 cerebral microbleeds   Additional exclusions for patients with symptoms onset between 3 and 4.5 hours.  []   Age > 80.   []   On any anticoagulants regardless of INR  >>> Warfarin (Coumadin), Heparin, Enoxaparin (Lovenox), fondaparinux (Arixtra), bivalirudin (Angiomax), Argatroban, dabigatran (Pradaxa), rivaroxaban (Xarelto), or apixaban (Eliquis)   []   Severe stroke (NIHSS > 25).   []   History of BOTH diabetes and previous ischemic stroke.   []   The risks and benefits have been discussed with the patient or family related to the administration of IV alteplase for stroke symptoms.   []   I have discussed and reviewed the patient's case and imaging with the attending prior to IV Thrombolytic (TPA/Tenecteplase).    Time Thrombolytic administered       Hospital Meds:  Scheduled- aspirin, 81 mg, Oral, Daily  atorvastatin, 80 mg, Oral, Nightly  levothyroxine, 150 mcg, Oral, Q AM  multivitamin with minerals, 1 tablet, Oral, Daily  PATIENT SUPPLIED MEDICATION, 600 mg, Oral, Nightly  rivaroxaban, 20 mg, Oral, Daily With Dinner  sodium chloride, 10 mL, Intravenous, Q12H  topiramate, 50 mg,  Oral, Nightly  venlafaxine XR, 37.5 mg, Oral, Daily      Infusions- sodium chloride, 100 mL/hr, Last Rate: 100 mL/hr (05/10/22 7200)       PRNs- •  acetaminophen **OR** [DISCONTINUED] acetaminophen **OR** acetaminophen  •  aluminum-magnesium hydroxide-simethicone  •  melatonin  •  ondansetron **OR** ondansetron  •  promethazine  •  sodium chloride  •  sodium chloride  •  SUMAtriptan    Functional Status Prior to Current Stroke/Guin Score: 2    NIH Stroke Scale  Time: 09:59 CDT  Person Administering Scale: Victor M Huynh MD    1a  Level of consciousness: 0=alert; keenly responsive   1b. LOC questions:  0=Performs both tasks correctly   1c. LOC commands: 0=Performs both tasks correctly   2.  Best Gaze: 0=normal   3.  Visual: 0=No visual loss   4. Facial Palsy: 0=Normal symmetric movement   5a.  Motor left arm: 0=No drift, limb holds 90 (or 45) degrees for full 10 seconds   5b.  Motor right arm: 0=No drift, limb holds 90 (or 45) degrees for full 10 seconds   6a. motor left le=No drift, limb holds 90 (or 45) degrees for full 10 seconds   6b  Motor right le=No drift, limb holds 90 (or 45) degrees for full 10 seconds   7. Limb Ataxia: 0=Absent   8.  Sensory: 0=Normal; no sensory loss   9. Best Language:  0=No aphasia, normal   10. Dysarthria: 0=Normal   11. Extinction and Inattention: 0=No abnormality    Total:   0       Results Reviewed:  I have personally reviewed current lab, radiology, and data   CT head shows no acute changes, no hemorrhage  MRI brain shows no acute changes, no hemorrhage.  Minimal white matter disease.  Reviewed her labs.    Results for orders placed during the hospital encounter of 18    Adult Transthoracic Echo Complete W/ Cont if Necessary Per Protocol    Interpretation Summary  · Mild mitral valve regurgitation is present  · Mild tricuspid valve regurgitation is present.  · Left ventricular systolic function is normal. Estimated EF = 60%.  · Left atrial cavity size is  borderline dilated.            Assessment/Plan:      1. Right lower extremity weakness.  Possible TIA.  Her MRI brain and CT head looks okay,no  obvious evidence of old stroke either.  Patient is on Xarelto for left lower extremity DVT, and aspirin 81 mg daily, which can be continued.  Patient does have known diagnosis of metastatic lung cancer, which can make her hypercoagulable, and I would recommend continuing the Xarelto and a baby aspirin every day.  Patient has not had vascular imaging, secondary to elevated creatinine.  We will get carotid ultrasound.  Follow-up on 2D echocardiogram.  2. Metastatic lung cancer.  Reviewed her PET scan, which shows metastatic lesions to liver and bone, along with lymph nodes.  Patient is on immunotherapy, and is responsive to the same.  Continued follow-up as scheduled.  3. Acute kidney injury.  Continue IV fluids.  Treatment as per the hospitalist.  4. History of left lower extremity DVT.  Okay to continue Xarelto.  5. Increase activity with PT/OT.  6. Healthy heart diet.    Case was discussed with patient, her , nursing and will talk to the primary team.  Thank you for the consult.          Victor M Huynh MD  May 10, 2022  09:59 CDT    Verbal consent taken.  Patient agreeable to be seen via telemedicine.    This was an audio and video enabled telemedicine encounter.

## 2022-05-10 NOTE — PLAN OF CARE
Goal Outcome Evaluation:  Plan of Care Reviewed With: patient, daughter           Outcome Evaluation: OT clyde complete, co-eval with PT, patient agreeable for evaluation. Supine<>sit with supervision. SBA for LE dressing, donning/doffing socks EOB. Sit to stand and toilet transfer with CGA. Toileting and grooming with CGA. During functional mobility with HHA, patient had one LOB, therapist assist needed to maintain balance, returned to bed vitals signs stable. Patient also had reported some double vision, but has improved since yesterday, but still bothersome. Patient with decreased safety in transfers, ADLs, and higher level IADLs. Cont inpatient OT. Anticipate home with assist at d/c. Patient and  recommend assistance/supervison for shower (with use of shower chair), cooking and cleaning. Verbalized understanding.

## 2022-05-10 NOTE — THERAPY EVALUATION
Patient Name: Adilene Morgan  : 1960    MRN: [id number]                              Today's Date: 5/10/2022       Admit Date: 2022    Visit Dx:     ICD-10-CM ICD-9-CM   1. TODD (acute kidney injury) (HCC)  N17.9 584.9   2. Blurred vision  H53.8 368.8   3. Right leg weakness  R29.898 729.89   4. Impaired mobility and ADLs  Z74.09 V49.89    Z78.9    5. Impaired functional mobility, balance, gait, and endurance  Z74.09 V49.89     Patient Active Problem List   Diagnosis   • Acquired hypothyroidism   • Depressive disorder   • Migraine   • PVC (premature ventricular contraction)   • Palpitation   • Pain of fifth toe   • Adenosquamous carcinoma of right lung (HCC)   • TODD (acute kidney injury) (HCC)   • Transient ischemic attack (TIA)     Past Medical History:   Diagnosis Date   • Achilles bursitis    • Achilles tendinitis    • Acquired hypothyroidism    • Allergic rhinitis    • Allergy to meat     alphagal   • Allergy to milk products    • Arrhythmia    • Asthmatic bronchitis    • Asthmatic bronchitis    • Bone spur    • Calcaneal spur    • Cancer (HCC)    • Depressive disorder    • Family history of thyroid problem    • Herpes simplex    • Hypermetropia    • Hypothyroidism    • Menopausal flushing    • Menopause    • Migraine    • Otalgia    • Pneumonia    • Presbyopia    • Stroke (HCC)    • Trochanteric bursitis    • UTI (urinary tract infection) 2018   • Ventricular premature beats      Past Surgical History:   Procedure Laterality Date   • COLONOSCOPY N/A 2/15/2017    Procedure: COLONOSCOPY;  Surgeon: Lupillo Ugarte MD;  Location: [facility] ENDOSCOPY;  Service:    • COSMETIC SURGERY      plastic surgery to face x 4 r/t trauma   • LAPAROSCOPIC CHOLECYSTECTOMY  12/10/1995    Cholecystectomy, laparoscopic (1)      • OTHER SURGICAL HISTORY      Head surgery procedure (1)    plastic surgery on the face    • OTHER SURGICAL HISTORY  10/17/2014    Repair Superficial Wound TR-EXT 2.5 < CM 80873 (1)          General Information     Row Name 05/10/22 1244          Physical Therapy Time and Intention    Document Type evaluation  -CZ     Mode of Treatment occupational therapy;physical therapy  -     Row Name 05/10/22 1244          General Information    Patient Profile Reviewed yes  -CZ     Prior Level of Function independent:;all household mobility  -CZ     Existing Precautions/Restrictions fall  -CZ     Barriers to Rehab visual deficit;previous functional deficit  -     Row Name 05/10/22 1244          Living Environment    People in Home spouse  -     Row Name 05/10/22 1244          Home Main Entrance    Number of Stairs, Main Entrance five  -CZ     Stair Railings, Main Entrance railing on left side (ascending)  -CZ     Row Name 05/10/22 1244          Stairs Within Home, Primary    Stairs, Within Home, Primary Has FWW, 4WW, SPC, corral not use them.  -CZ     Number of Stairs, Within Home, Primary seven  -CZ     Stair Railings, Within Home, Primary railing on right side (ascending)  -CZ     Row Name 05/10/22 1244          Cognition    Orientation Status (Cognition) oriented x 4  -CZ     Row Name 05/10/22 1244          Safety Issues, Functional Mobility    Impairments Affecting Function (Mobility) endurance/activity tolerance;balance;pain;strength  -CZ           User Key  (r) = Recorded By, (t) = Taken By, (c) = Cosigned By    Initials Name Provider Type    CZ Vasquez Granados, PT Physical Therapist               Mobility     Row Name 05/10/22 1244          Bed Mobility    Bed Mobility supine-sit;sit-supine  -CZ     Supine-Sit [place] (Bed Mobility) supervision  -     Sit-Supine [place] (Bed Mobility) supervision  -     Assistive Device (Bed Mobility) head of bed elevated  -     Row Name 05/10/22 1244          Sit-Stand Transfer    Sit-Stand [place] (Transfers) contact guard  -     Row Name 05/10/22 1244          Gait/Stairs (Locomotion)    [place] Level (Gait) minimum assist (75% patient  "effort);2 person assist  -CZ     Distance in Feet (Gait) 10'x1, 30'x1. Unsteady initially so attmpted to provided FWW but unavailable.  Provided HHA x 2, became even more unsteady, returned to room safely.  Patient reports she felt \"awkward\". BP stable.  -CZ           User Key  (r) = Recorded By, (t) = Taken By, (c) = Cosigned By    Initials Name Provider Type    CZ Vasquez Granados, PT Physical Therapist               Obj/Interventions     Row Name 05/10/22 1244          Range of Motion Comprehensive    General Range of Motion bilateral lower extremity ROM WFL  -CZ     Row Name 05/10/22 1244          Strength Comprehensive (MMT)    Comment, General Manual Muscle Testing (MMT) Assessment BLEs: 4-/5 grossly. Equal strrength L to R, normal muscle tone, normal coordination.  -CZ     Row Name 05/10/22 1244          Sensory Assessment (Somatosensory)    Sensory Assessment (Somatosensory) LE sensation intact  -CZ           User Key  (r) = Recorded By, (t) = Taken By, (c) = Cosigned By    Initials Name Provider Type    CZ Vasquez Granados, PT Physical Therapist               Goals/Plan     Row Name 05/10/22 1244          Bed Mobility Goal 1 (PT)    Activity/Assistive Device (Bed Mobility Goal 1, PT) sit to supine/supine to sit  -CZ     [place] Level/Cues Needed (Bed Mobility Goal 1, PT) independent  -CZ     Time Frame (Bed Mobility Goal 1, PT) by discharge  -CZ     Strategies/Barriers (Bed Mobility Goal 1, PT) HOB flat, no bed rails.  -CZ     Progress/Outcomes (Bed Mobility Goal 1, PT) goal not met  -CZ     Row Name 05/10/22 1244          Transfer Goal 1 (PT)    Activity/Assistive Device (Transfer Goal 1, PT) sit-to-stand/stand-to-sit;bed-to-chair/chair-to-bed  -CZ     [place] Level/Cues Needed (Transfer Goal 1, PT) modified independence  -CZ     Time Frame (Transfer Goal 1, PT) by discharge  -CZ     Strategies/Barriers (Transfers Goal 1, PT) Encourage use of walker.  -CZ     Progress/Outcome (Transfer Goal 1, " PT) goal not met  -CZ     Row Name 05/10/22 1244          Gait Training Goal 1 (PT)    Activity/Assistive Device (Gait Training Goal 1, PT) walker, rolling  -CZ     [place] Level (Gait Training Goal 1, PT) modified independence  -CZ     Distance (Gait Training Goal 1, PT) 100'x2.  -CZ     Time Frame (Gait Training Goal 1, PT) by discharge  -CZ     Strategies/Barriers (Gait Training Goal 1, PT) Encourage use of walker.  -CZ     Progress/Outcome (Gait Training Goal 1, PT) goal not met  -CZ     Row Name 05/10/22 1244          Stairs Goal 1 (PT)    Activity/Assistive Device (Stairs Goal 1, PT) using handrail, right  -CZ     [place] Level/Cues Needed (Stairs Goal 1, PT) supervision required  -CZ     Number of Stairs (Stairs Goal 1, PT) 7 steps to bedroom upstairs.  -CZ     Time Frame (Stairs Goal 1, PT) by discharge  -CZ     Progress/Outcome (Stairs Goal 1, PT) goal not met  -CZ     Row Name 05/10/22 1244          Therapy Assessment/Plan (PT)    Planned Therapy Interventions (PT) balance training;bed mobility training;gait training;home exercise program;patient/family education;transfer training;stair training;strengthening;stretching  -CZ           User Key  (r) = Recorded By, (t) = Taken By, (c) = Cosigned By    Initials Name Provider Type    CZ Vasquez Granados, PT Physical Therapist               Clinical Impression     Row Name 05/10/22 1244          Pain    Pretreatment Pain Rating 4/10  -CZ     Posttreatment Pain Rating 4/10  -CZ     Pain Location - Side/Orientation Right  -CZ     Pain Location - knee;elbow  -CZ     Pre/Posttreatment Pain Comment D/t fall yesterday.  -CZ     Pain Intervention(s) Repositioned;Ambulation/increased activity;Distraction  -CZ     Row Name 05/10/22 1244          Plan of Care Review    Plan of Care Reviewed With patient;daughter  -CZ     Outcome Evaluation Initial PT evaluation complete, co-evaluation with OT.  Patient is alert and cooperative.  She requires SPV with bed  "mobility, CGA with transfers, min Ax1 with gait, ambulating 10'x1, 30'x1. Patient unsteady initially so attmpted to provided FWW but unavailable.  Provided HHA x 2 to support patien but she became more unsteady, returned to room safely.  Patient reports she felt \"awkward\". BP stable, fall avoided. LE strength is equal L to R, muscle tone is nomal, coordination limited on L but d/t edema, not neurological impairment.  Modified [place], pre: 1, post: 4.  Tinetti: 20/28 or moderate fall risk.  Recommend use of FWW (patient has one at home). Patient was active with O/P PT, recommend continuing with O/T  PT.  Goals established, continue skilled I/P PT.  -     Row Name 05/10/22 1244          Therapy Assessment/Plan (PT)    Rehab Potential (PT) good, to achieve stated therapy goals  -     Criteria for Skilled Interventions Met (PT) yes;skilled treatment is necessary  -     Therapy Frequency (PT) 5 times/wk  -     Row Name 05/10/22 1244          Vital Signs    Pre Systolic BP Rehab 103  -CZ     Pre Treatment Diastolic BP 53  -CZ     Intra Systolic BP Rehab 120  -CZ     Intra Treatment Diastolic BP 57  -CZ     Post Systolic BP Rehab 114  -CZ     Post Treatment Diastolic BP 69  -CZ     Pretreatment Heart Rate (beats/min) 87  -CZ     Intratreatment Heart Rate (beats/min) 82  -CZ     Posttreatment Heart Rate (beats/min) 97  -CZ     Pre Patient Position Supine  -CZ     Intra Patient Position Sitting  -CZ     Post Patient Position Supine  -CZ     Row Name 05/10/22 1241          Positioning and Restraints    Pre-Treatment Position in bed  -CZ     In Bed supine;call light within reach;encouraged to call for assist;exit alarm on;with family/caregiver  -           User Key  (r) = Recorded By, (t) = Taken By, (c) = Cosigned By    Initials Name Provider Type    CZ Vasquez Garnados, PT Physical Therapist               Outcome Measures     Row Name 05/10/22 7692          How much help from another person do you currently need... " "   Turning from your back to your side while in flat bed without using bedrails? 3  -CZ     Moving from lying on back to sitting on the side of a flat bed without bedrails? 3  -CZ     Moving to and from a bed to a chair (including a wheelchair)? 3  -CZ     Standing up from a chair using your arms (e.g., wheelchair, bedside chair)? 3  -CZ     Climbing 3-5 steps with a railing? 3  -CZ     To walk in hospital room? 3  -CZ     AM-PAC 6 Clicks Score (PT) 18  -CZ     Highest level of mobility 6 --> Walked 10 steps or more  -CZ     Row Name 05/10/22 1245 05/10/22 1244       Modified [place] Scale    Pre-Stroke Modified [place] Scale 1 - No significant disability despite symptoms.  Able to carry out all usual duties and activities.  -SJ 1 - No significant disability despite symptoms.  Able to carry out all usual duties and activities.  -CZ    Modified [place] Scale 4 - Moderately severe disability.  Unable to walk without assistance, and unable to attend to own bodily needs without assistance.  -SJ 4 - Moderately severe disability.  Unable to walk without assistance, and unable to attend to own bodily needs without assistance.  -CZ    Row Name 05/10/22 1244          Tinetti Assessment    Tinetti Assessment yes  -CZ     Sitting Balance 1  -CZ     Arises 2  -CZ     Attempts to Rise 2  -CZ     Immediate Standing Balance (first 5 sec) 2  -CZ     Standing Balance 1  -CZ     Sternal Nudge (feet close together) 1  -CZ     Eyes Closed (feet close together) 1  -CZ     Turning 360 Degrees- Steps 0  -CZ     Turning 360 Degrees- Steadiness 1  -CZ     Sitting Down 2  -CZ     Tinetti Balance Score 13  -CZ     Gait Initiation (immediate after told \"go\") 1  -CZ     Step Length- Right Swing 1  -CZ     Step Length- Left Swing 1  -CZ     Foot Clearance- Right Foot 1  -CZ     Foot Clearance- Left Foot 1  -CZ     Step Symmetry 1  -CZ     Step Continuity 0  -CZ     Path (excursion) 1  -CZ     Trunk 0  -CZ     Base of Support 0  -CZ     Gait Score 7 "  -CZ     Tinetti Total Score 20  -     Tinetti Assessment Comments HHA x 2.  -CZ     Row Name 05/10/22 1245 05/10/22 1244       Functional Assessment    Outcome Measure Options AM-PAC 6 Clicks Daily Activity (OT)  - AM-PAC 6 Clicks Basic Mobility (PT);Modified [place];Tinetti  -CZ          User Key  (r) = Recorded By, (t) = Taken By, (c) = Cosigned By    Initials Name Provider Type    CZ Vasquez Granados, PT Physical Therapist    Pardeep Min, OT Occupational Therapist                             Physical Therapy Education                 Title: PT OT SLP Therapies (In Progress)     Topic: Physical Therapy (In Progress)     Point: Mobility training (Not Started)     Learner Progress:  Not documented in this visit.          Point: Home exercise program (Not Started)     Learner Progress:  Not documented in this visit.          Point: Body mechanics (Not Started)     Learner Progress:  Not documented in this visit.          Point: Precautions (Done)     Learning Progress Summary           Patient Acceptance, E, VU,NR by  at 5/10/2022 5175    Comment: Tinetti assessed: 20/28 or moderate fall risk.  Recommend use of FWW during gait. Continue O/P PT.                               User Key     Initials Effective Dates Name Provider Type Discipline     06/16/21 -  Vasquez Granados, PT Physical Therapist PT              PT Recommendation and Plan  Planned Therapy Interventions (PT): balance training, bed mobility training, gait training, home exercise program, patient/family education, transfer training, stair training, strengthening, stretching  Plan of Care Reviewed With: patient, daughter  Outcome Evaluation: Initial PT evaluation complete, co-evaluation with OT.  Patient is alert and cooperative.  She requires SPV with bed mobility, CGA with transfers, min Ax1 with gait, ambulating 10'x1, 30'x1. Patient unsteady initially so attmpted to provided FWW but unavailable.  Provided HHA x 2 to support patien but  "she became more unsteady, returned to room safely.  Patient reports she felt \"awkward\". BP stable, fall avoided. LE strength is equal L to R, muscle tone is nomal, coordination limited on L but d/t edema, not neurological impairment.  Modified [place], pre: 1, post: 4.  T[place]: 20/28 or moderate fall risk.  Recommend use of FWW (patient has one at home). Patient was active with O/P PT, recommend continuing with O/T  PT.  Goals established, continue skilled I/P PT.     Time Calculation:    PT Charges     Row Name 05/10/22 1447             Time Calculation    Start Time 1244  -CZ      Stop Time 1333  -CZ      Time Calculation (min) 49 min  -CZ      PT Received On 05/10/22  -CZ      PT Goal Re-Cert Due Date 05/23/22  -CZ              Untimed Charges    PT Eval/Re-eval Minutes 49  -CZ              Total Minutes    Untimed Charges Total Minutes 49  -CZ       Total Minutes 49  -CZ            User Key  (r) = Recorded By, (t) = Taken By, (c) = Cosigned By    Initials Name Provider Type    CZ Vasquez Granados, PT Physical Therapist              Therapy Charges for Today     Code Description Service Date Service Provider Modifiers Qty    93091101512 HC PT EVAL MOD COMPLEXITY 3 5/10/2022 Vasquez Granados, PT GP 1          PT G-Codes  Outcome Measure Options: AM-PAC 6 Clicks Daily Activity (OT)  AM-PAC 6 Clicks Score (PT): 18  AM-PAC 6 Clicks Score (OT): 19  Modified [place] Scale: 4 - Moderately severe disability.  Unable to walk without assistance, and unable to attend to own bodily needs without assistance.  Tinetti Total Score: 20    Vasquez Granados PT  5/10/2022    "

## 2022-05-10 NOTE — THERAPY EVALUATION
Patient Name: Adilene Morgan  : 1960    MRN: [id number]                              Today's Date: 5/10/2022       Admit Date: 2022    Visit Dx:     ICD-10-CM ICD-9-CM   1. TODD (acute kidney injury) (HCC)  N17.9 584.9   2. Blurred vision  H53.8 368.8   3. Right leg weakness  R29.898 729.89   4. Impaired mobility and ADLs  Z74.09 V49.89    Z78.9      Patient Active Problem List   Diagnosis   • Acquired hypothyroidism   • Depressive disorder   • Migraine   • PVC (premature ventricular contraction)   • Palpitation   • Pain of fifth toe   • Adenosquamous carcinoma of right lung (HCC)   • TODD (acute kidney injury) (HCC)   • Transient ischemic attack (TIA)     Past Medical History:   Diagnosis Date   • Achilles bursitis    • Achilles tendinitis    • Acquired hypothyroidism    • Allergic rhinitis    • Allergy to meat     alphagal   • Allergy to milk products    • Arrhythmia    • Asthmatic bronchitis    • Asthmatic bronchitis    • Bone spur    • Calcaneal spur    • Cancer (HCC)    • Depressive disorder    • Family history of thyroid problem    • Herpes simplex    • Hypermetropia    • Hypothyroidism    • Menopausal flushing    • Menopause    • Migraine    • Otalgia    • Pneumonia    • Presbyopia    • Stroke (HCC)    • Trochanteric bursitis    • UTI (urinary tract infection) 2018   • Ventricular premature beats      Past Surgical History:   Procedure Laterality Date   • COLONOSCOPY N/A 2/15/2017    Procedure: COLONOSCOPY;  Surgeon: Lupillo Ugarte MD;  Location: [facility] ENDOSCOPY;  Service:    • COSMETIC SURGERY      plastic surgery to face x 4 r/t trauma   • LAPAROSCOPIC CHOLECYSTECTOMY  12/10/1995    Cholecystectomy, laparoscopic (1)      • OTHER SURGICAL HISTORY      Head surgery procedure (1)    plastic surgery on the face    • OTHER SURGICAL HISTORY  10/17/2014    Repair Superficial Wound TR-EXT 2.5 < CM 50748 (1)         General Information     Row Name 05/10/22 1245          OT Time and Intention     Document Type evaluation  -     Mode of Treatment occupational therapy;physical therapy  -     Row Name 05/10/22 1245          General Information    Patient Profile Reviewed yes  -SJ     Prior Level of Function independent:;gait;transfer;bed mobility;ADL's;feeding;grooming;dressing;cooking;home management   does laundry and driving;  assists with medication management, supervises showers  -     Existing Precautions/Restrictions fall  -     Barriers to Rehab visual deficit  -     Row Name 05/10/22 1245          Living Environment    People in Home spouse  -     Row Name 05/10/22 1245          Home Main Entrance    Number of Stairs, Main Entrance five  -SJ     Stair Railings, Main Entrance railing on left side (ascending)  -     Row Name 05/10/22 1245          Stairs Within Home, Primary    Stairs, Within Home, Primary walk in shower, has a shower chair (does not use it). Comfort height toilet. Has a cane and a rollator, reluctant to use it. Is working with Outpatient PT currently. Reports she can go through [employer] using a shopping cart. Patient reports some double vision, but improved since yesterday.  -SJ     Number of Stairs, Within Home, Primary seven  -SJ     Stair Railings, Within Home, Primary railing on right side (ascending)  -SJ     Stairs Comment, Within Home, Primary To bedroom  -     Row Name 05/10/22 1245          Cognition    Orientation Status (Cognition) oriented x 4  -     Row Name 05/10/22 1245          Safety Issues, Functional Mobility    Safety Issues Affecting Function (Mobility) insight into deficits/self-awareness  -     Impairments Affecting Function (Mobility) endurance/activity tolerance;balance;pain  -           User Key  (r) = Recorded By, (t) = Taken By, (c) = Cosigned By    Initials Name Provider Type     Pardeep Dasilva OT Occupational Therapist                 Mobility/ADL's     Row Name 05/10/22 1245          Bed Mobility    Bed Mobility  supine-sit;sit-supine  -     Supine-Sit [place] (Bed Mobility) supervision  -     Sit-Supine [place] (Bed Mobility) supervision  -     Assistive Device (Bed Mobility) head of bed elevated  -[facility] Name 05/10/22 1245          Transfers    Transfers sit-stand transfer;toilet transfer  -     Comment, (Transfers) [facility]     Sit-Stand [place] (Transfers) contact guard  -     [place] Level (Toilet Transfer) contact guard  -SJ     Row Name 05/10/22 1245          Sit-Stand Transfer    Comment, (Sit-Stand Transfer) HHA  -[facility] 05/10/22 1245          Activities of Daily Living    BADL Assessment/Intervention toileting;grooming;lower body dressing  -SJ     Row Name 05/10/22 1245          Toileting Assessment/Training    [place] Level (Toileting) contact guard assist  -     Position (Toileting) unsupported sitting  -SJ     Row Name 05/10/22 1245          Grooming Assessment/Training    [place] Level (Grooming) contact guard assist  -SJ     Position (Grooming) unsupported standing  -SJ     Row Name 05/10/22 1245          Lower Body Dressing Assessment/Training    [place] Level (Lower Body Dressing) doff;don;socks;standby assist  -SJ     Position (Lower Body Dressing) edge of bed sitting  -           User Key  (r) = Recorded By, (t) = Taken By, (c) = Cosigned By    Initials Name Provider Type    Pardeep Min, OT Occupational Therapist               Obj/Interventions     [facility] Name 05/10/22 1245          Sensory Assessment (Somatosensory)    Sensory Assessment (Somatosensory) UE sensation intact  -SJ     Row Name 05/10/22 1245          Range of Motion Comprehensive    General Range of Motion bilateral upper extremity ROM WFL  -[facility] Name 05/10/22 1245          Strength Comprehensive (MMT)    General Manual Muscle Testing (MMT) Assessment other (see comments)  -     Comment, General Manual Muscle Testing (MMT) Assessment L shoulder flexion 4-/5, L  elbow/wrist/hand 4/5, RUE 4/5 grossly  -SJ           User Key  (r) = Recorded By, (t) = Taken By, (c) = Cosigned By    Initials Name Provider Type     Pardeep Dasilva, OT Occupational Therapist               Goals/Plan     Row Name 05/10/22 1245          Transfer Goal 1 (OT)    Activity/Assistive Device (Transfer Goal 1, OT) toilet  -SJ     [place] Level/Cues Needed (Transfer Goal 1, OT) modified independence  -SJ     Time Frame (Transfer Goal 1, OT) long term goal (LTG);by discharge  -SJ     Progress/Outcome (Transfer Goal 1, OT) goal not met  -[facility] Name 05/10/22 1245          Bathing Goal 1 (OT)    Activity/Device (Bathing Goal 1, OT) lower body bathing  -SJ     [place] Level/Cues Needed (Bathing Goal 1, OT) modified independence  -SJ     Time Frame (Bathing Goal 1, OT) long term goal (LTG);by discharge  -     Progress/Outcomes (Bathing Goal 1, OT) goal not met  -SJ     Row Name 05/10/22 1245          Dressing Goal 1 (OT)    Activity/Device (Dressing Goal 1, OT) lower body dressing  -SJ     [place]/Cues Needed (Dressing Goal 1, OT) independent  -SJ     Time Frame (Dressing Goal 1, OT) long term goal (LTG);by discharge  -     Progress/Outcome (Dressing Goal 1, OT) goal not met  -SJ     Row Name 05/10/22 1245          Toileting Goal 1 (OT)    Activity/Device (Toileting Goal 1, OT) toileting skills, all  -SJ     [place] Level/Cues Needed (Toileting Goal 1, OT) independent  -SJ     Time Frame (Toileting Goal 1, OT) long term goal (LTG);by discharge  -     Progress/Outcome (Toileting Goal 1, OT) goal not met  -[facility] Name 05/10/22 1245          Therapy Assessment/Plan (OT)    Planned Therapy Interventions (OT) activity tolerance training;adaptive equipment training;BADL retraining;edema control/reduction;cognitive/visual perception retraining;functional balance retraining;IADL retraining;manual therapy/joint mobilization;occupation/activity based interventions;neuromuscular  control/coordination retraining;passive ROM/stretching;patient/caregiver education/training;ROM/therapeutic exercise;transfer/mobility retraining;strengthening exercise  -           User Key  (r) = Recorded By, (t) = Taken By, (c) = Cosigned By    Initials Name Provider Type     Pardeep Dasilva, OT Occupational Therapist               Clinical Impression     Row Name 05/10/22 1245          Pain Assessment    Pretreatment Pain Rating 4/10  -SJ     Posttreatment Pain Rating 4/10  -SJ     Pain Location - Side/Orientation Right  -     Pain Location - elbow;knee  -SJ     Pre/Posttreatment Pain Comment From fall yesterday, on R elbow and R knee  -SJ     Row Name 05/10/22 1248          Plan of Care Review    Plan of Care Reviewed With patient;daughter  -     Outcome Evaluation OT eval complete, co-eval with PT, patient agreeable for evaluation. Supine<>sit with supervision. SBA for LE dressing, donning/doffing socks EOB. Sit to stand and toilet transfer with CGA. Toileting and grooming with CGA. During functional mobility with HHA, patient had one LOB, therapist assist needed to maintain balance, returned to bed vitals signs stable. Patient also had reported some double vision, but has improved since yesterday, but still bothersome. Patient with decreased safety in transfers, ADLs, and higher level IADLs. Cont inpatient OT. Anticipate home with assist at d/c. Patient and  recommend assistance/supervison for shower (with use of shower chair), cooking and cleaning. Verbalized understanding.  -     Row Name 05/10/22 1249          Therapy Assessment/Plan (OT)    Patient/Family Therapy Goal Statement (OT) return home  -     Rehab Potential (OT) good, to achieve stated therapy goals  -     Criteria for Skilled Therapeutic Interventions Met (OT) yes;skilled treatment is necessary  -     Therapy Frequency (OT) other (see comments)  5-7 d/wk  -     Predicted Duration of Therapy Intervention (OT) until d/c  or all goals met  -     Row Name 05/10/22 1245          Therapy Plan Review/Discharge Plan (OT)    Anticipated Discharge Disposition (OT) home with 24/7 care  -     Row Name 05/10/22 1245          Vital Signs    Pre Systolic BP Rehab 103  -SJ     Pre Treatment Diastolic BP 53  -SJ     Intra Systolic BP Rehab 120  -SJ     Intra Treatment Diastolic BP 57  -SJ     Post Systolic BP Rehab 114  -SJ     Post Treatment Diastolic BP 69  -SJ     Pretreatment Heart Rate (beats/min) 87  -SJ     Intratreatment Heart Rate (beats/min) 82  -SJ     Posttreatment Heart Rate (beats/min) 97  -SJ     Pre Patient Position Supine  -SJ     Intra Patient Position Sitting  -SJ     Post Patient Position Supine  -SJ     [facility] Name 05/10/22 1245          Positioning and Restraints    Pre-Treatment Position in bed  -SJ     Post Treatment Position bed  -SJ     In Bed supine;notified nsg;call light within reach;encouraged to call for assist;with family/caregiver;with nsg  -           User Key  (r) = Recorded By, (t) = Taken By, (c) = Cosigned By    Initials Name Provider Type     Pardeep Dasilva, OT Occupational Therapist               Outcome Measures     Row Name 05/10/22 1245          How much help from another is currently needed...    Putting on and taking off regular lower body clothing? 3  -SJ     Bathing (including washing, rinsing, and drying) 3  -SJ     Toileting (which includes using toilet bed pan or urinal) 3  -SJ     Putting on and taking off regular upper body clothing 3  -SJ     Taking care of personal grooming (such as brushing teeth) 3  -SJ     Eating meals 4  -SJ     AM-PAC 6 Clicks Score (OT) 19  -SJ     [facility] Name 05/10/22 1244          How much help from another person do you currently need...    Turning from your back to your side while in flat bed without using bedrails? 3  -CZ     Moving from lying on back to sitting on the side of a flat bed without bedrails? 3  -CZ     Moving to and from a bed to a chair (including  a wheelchair)? 3  -CZ     Standing up from a chair using your arms (e.g., wheelchair, bedside chair)? 3  -CZ     Climbing 3-5 steps with a railing? 3  -CZ     To walk in hospital room? 3  -CZ     AM-PAC 6 Clicks Score (PT) 18  -CZ     Highest level of mobility 6 --> Walked 10 steps or more  -     Row Name 05/10/22 1245 05/10/22 1244       Modified [place] Scale    Pre-Stroke Modified [place] Scale 1 - No significant disability despite symptoms.  Able to carry out all usual duties and activities.  -SJ 1 - No significant disability despite symptoms.  Able to carry out all usual duties and activities.  -CZ    Modified [place] Scale 4 - Moderately severe disability.  Unable to walk without assistance, and unable to attend to own bodily needs without assistance.  -SJ 4 - Moderately severe disability.  Unable to walk without assistance, and unable to attend to own bodily needs without assistance.  -    Row Name 05/10/22 1245 05/10/22 1244       Functional Assessment    Outcome Measure Options AM-PAC 6 Clicks Daily Activity (OT)  -SJ AM-PAC 6 Clicks Basic Mobility (PT);Modified [place]  -CZ          User Key  (r) = Recorded By, (t) = Taken By, (c) = Cosigned By    Initials Name Provider Type    CZ Vasquez Granados, PT Physical Therapist    Pardeep Min, OT Occupational Therapist                Occupational Therapy Education                 Title: PT OT SLP Therapies (In Progress)     Topic: Occupational Therapy (In Progress)     Point: ADL training (Not Started)     Description:   Instruct learner(s) on proper safety adaptation and remediation techniques during self care or transfers.   Instruct in proper use of assistive devices.              Learner Progress:  Not documented in this visit.          Point: Home exercise program (Not Started)     Description:   Instruct learner(s) on appropriate technique for monitoring, assisting and/or progressing therapeutic exercises/activities.              Learner  Progress:  Not documented in this visit.          Point: Precautions (Done)     Description:   Instruct learner(s) on prescribed precautions during self-care and functional transfers.              Learning Progress Summary           Patient Acceptance, E,TB, VU,NR by  at 5/10/2022 1315    Comment: POC, role of OT, transfer training                   Point: Body mechanics (Done)     Description:   Instruct learner(s) on proper positioning and spine alignment during self-care, functional mobility activities and/or exercises.              Learning Progress Summary           Patient Acceptance, E,TB, VU,NR by  at 5/10/2022 1315    Comment: POC, role of OT, transfer training                               User Key     Initials Effective Dates Name Provider Type Discipline     06/14/21 -  Pardeep Dasilva OT Occupational Therapist OT              OT Recommendation and Plan  Planned Therapy Interventions (OT): activity tolerance training, adaptive equipment training, BADL retraining, edema control/reduction, cognitive/visual perception retraining, functional balance retraining, IADL retraining, manual therapy/joint mobilization, occupation/activity based interventions, neuromuscular control/coordination retraining, passive ROM/stretching, patient/caregiver education/training, ROM/therapeutic exercise, transfer/mobility retraining, strengthening exercise  Therapy Frequency (OT): other (see comments) (5-7 d/wk)  Plan of Care Review  Plan of Care Reviewed With: patient, daughter  Outcome Evaluation: OT eval complete, co-eval with PT, patient agreeable for evaluation. Supine<>sit with supervision. SBA for LE dressing, donning/doffing socks EOB. Sit to stand and toilet transfer with CGA. Toileting and grooming with CGA. During functional mobility with HHA, patient had one LOB, therapist assist needed to maintain balance, returned to bed vitals signs stable. Patient also had reported some double vision, but has improved  since yesterday, but still bothersome. Patient with decreased safety in transfers, ADLs, and higher level IADLs. Cont inpatient OT. Anticipate home with assist at d/c. Patient and  recommend assistance/supervison for shower (with use of shower chair), cooking and cleaning. Verbalized understanding.     Time Calculation:    Time Calculation- OT     Row Name 05/10/22 1438             Time Calculation- OT    OT Start Time 1245  -      OT Stop Time 1332  -      OT Time Calculation (min) 47 min  -      OT Received On 05/10/22  -      OT Goal Re-Cert Due Date 05/23/22  -              Untimed Charges    OT Eval/Re-eval Minutes 47  -SJ              Total Minutes    Untimed Charges Total Minutes 47  -SJ       Total Minutes 47  -SJ            User Key  (r) = Recorded By, (t) = Taken By, (c) = Cosigned By    Initials Name Provider Type     Pardeep Dasilva OT Occupational Therapist              Therapy Charges for Today     Code Description Service Date Service Provider Modifiers Qty    32202093141 HC OT EVAL MOD COMPLEXITY 3 5/10/2022 Pardeep Dasilva OT GO 1               Pardeep Dasilva OT  5/10/2022

## 2022-05-10 NOTE — PROGRESS NOTES
[facility] Medicine Services  INPATIENT PROGRESS NOTE    Length of Stay: 1  Date of Admission: 5/9/2022  Primary Care Physician: Jen Raymundo MD    Subjective   Chief Complaint: weakness   HPI:      She is a 62 year old with metastatic lung cancer on immunotherapy that presented after a fall. She stated lasix last week from PCP and had a GI bug with N/V/D for 24 hours. Since then, she does not have any diarrhea or vomiting. Still has weakness. She has been seen by neurology for a workup for stroke. MRI is negative. She is back at her neurological baseline.       Review of Systems   Constitutional: Negative for chills, diaphoresis and fever.   HENT: Negative for sneezing and sore throat.    Eyes: Negative for pain and discharge.   Respiratory: Negative for cough and shortness of breath.    Gastrointestinal: Negative for constipation, diarrhea, nausea and vomiting.   Endocrine: Negative for cold intolerance and heat intolerance.   Genitourinary: Negative for difficulty urinating, dysuria, frequency and urgency.   Musculoskeletal: Negative for arthralgias and myalgias.   Skin: Negative for color change and pallor.   Allergic/Immunologic: Negative for environmental allergies and food allergies.   Neurological: Positive for weakness. Negative for dizziness and syncope.   Psychiatric/Behavioral: Negative for confusion and sleep disturbance.        All pertinent negatives and positives are as above. All other systems have been reviewed and are negative unless otherwise stated.     Objective    Temp:  [98 °F (36.7 °C)-98.2 °F (36.8 °C)] 98 °F (36.7 °C)  Heart Rate:  [72-94] 78  Resp:  [16-18] 16  BP: ()/(45-72) 92/55    Physical Exam  Vitals reviewed.   Constitutional:       General: She is not in acute distress.     Appearance: She is well-developed.   HENT:      Head: Normocephalic and atraumatic.      Nose: Nose normal.   Eyes:      Conjunctiva/sclera: Conjunctivae  normal.   Cardiovascular:      Rate and Rhythm: Normal rate and regular rhythm.   Pulmonary:      Effort: Pulmonary effort is normal. No respiratory distress.      Breath sounds: Normal breath sounds. No wheezing or rales.   Musculoskeletal:         General: Normal range of motion.      Cervical back: Normal range of motion and neck supple.   Skin:     General: Skin is warm and dry.   Neurological:      Mental Status: She is alert and oriented to person, place, and time.   Psychiatric:         Behavior: Behavior normal.             Results Review:  I have reviewed the labs, radiology results, and diagnostic studies.    Laboratory Data:   Results from last 7 days   Lab Units 05/10/22  0709 05/09/22  1709 05/04/22  0950   SODIUM mmol/L 138 135* 141   POTASSIUM mmol/L 4.1 3.9 4.6   CHLORIDE mmol/L 104 101 111*   TOTAL CO2 mmol/L  --   --  25   CO2 mmol/L 21.0* 23.0  --    BUN mg/dL 35* 38* 34*   CREATININE mg/dL 2.61* 2.93* 1.4*   GLUCOSE mg/dL 92 110*  --    CALCIUM mg/dL 7.6* 7.8* 8.7   BILIRUBIN mg/dL 0.2 0.2 0.22*   ALK PHOS U/L 124* 119* 130*   ALT (SGPT) U/L 44* 52* 15   AST (SGOT) U/L 55* 72* 21   ANION GAP mmol/L 13.0 11.0  --      Estimated Creatinine Clearance: 26 mL/min (A) (by C-G formula based on SCr of 2.61 mg/dL (H)).  Results from last 7 days   Lab Units 05/09/22  1709   MAGNESIUM mg/dL 2.5*         Results from last 7 days   Lab Units 05/10/22  0709 05/09/22  1709   WBC 10*3/mm3 7.66 7.92   HEMOGLOBIN g/dL 8.0* 7.7*   HEMATOCRIT % 25.8* 24.4*   PLATELETS 10*3/mm3 230 261     Results from last 7 days   Lab Units 05/09/22  1709   INR  1.90*       Culture Data:   No results found for: BLOODCX  No results found for: URINECX  No results found for: RESPCX  No results found for: WOUNDCX  No results found for: STOOLCX  No components found for: BODYFLD    Radiology Data:   Imaging Results (Last 24 Hours)     Procedure Component Value Units Date/Time    US Carotid Bilateral [[id number]] Collected: 05/10/22 1018      Updated: 05/10/22 1100    Narrative:        ULTRASOUND CAROTID DUPLEX SCAN    HISTORY: Right lower extremity weakness. Other visualized  disturbances. Presyncope.    COMPARISON: None.    Duplex ultrasound of the carotid bifurcations was performed.    FINDINGS:  Real time and color flow images demonstrate bilateral minimal  plaque formation.  The peak systolic velocity in the right internal carotid artery  is 120.0 cm/s.  End-diastolic velocity 41.2 cm/s.  Ratio peak systolic velocity right internal carotid artery to  right common carotid 1.6.  Peak systolic velocity right external carotid 149.4 cm/s.  The peak systolic velocity in left internal carotid artery is  minimally elevated at 132.3 cm/s.  End-diastolic velocity minimally increased to 41.8 cm/s.  Normal ratio peak systolic velocity left internal carotid artery  to left common carotid artery of 1.4.  Peak systolic velocity left external carotid artery 88.7 cm/s.  Antegrade flow is present in each vertebral artery.      Impression:      CONCLUSION:  Less than 50% diameter reduction stenosis right internal carotid  artery.  Minimally elevated peak systolic and end diastolic velocities  proximal left internal carotid artery with normal ratio and  visually no significant stenosis. Suspect less than 50% diameter  reduction stenosis left internal carotid artery.  Antegrade flow is present in each vertebral artery.    [id number]    Electronically signed by:  Talon Tuttle MD  5/10/2022 10:58 AM  CDT Workstation: [id number]    MRI Brain Without Contrast [[id number]] Collected: 05/10/22 0725     Updated: 05/10/22 1008    Narrative:      Procedure: MRI brain without contrast    Reason for exam: TIA.    FINDINGS: Multisequence multiplanar MR imaging of the brain was  performed without contrast. The diffusion weighted series is  normal. There is no abnormal signal to suggest restricted  diffusion. Cerebral and cerebellar parenchyma are normal.  Ventricular system and  subarachnoid spaces are normal.      Impression:      Normal MRI of brain without contrast.    Electronically signed by:  Trevor Joyce MD  5/10/2022 10:05 AM CDT  Workstation: [id number]    XR Chest 1 View [[id number]] Collected: 05/09/22 1711     Updated: 05/09/22 1805    Narrative:        PORTABLE CHEST    HISTORY: Stroke protocol onset greater than 12 hours.    Portable AP film of the chest was obtained at 4:43 PM.  COMPARISON: May 5, 2022    FINDINGS:   Low lung volumes.  Linear atelectasis mid lungs and lung bases.  The heart is not enlarged.  The pulmonary vasculature is not increased.  No pleural effusion.  No pneumothorax.  No acute osseous abnormality.  Degenerative changes are present in the thoracic spine.  Cholecystectomy.      Impression:      CONCLUSION:  Low lung volumes.  Linear atelectasis mid lungs and lung bases.    38245    Electronically signed by:  Talon Tuttle MD  5/9/2022 6:03 PM CDT  Workstation: [id number]    CT Head Without Contrast Stroke Protocol [[id number]] Collected: 05/09/22 1703     Updated: 05/09/22 1732    Narrative:        CT Head Without Contrast    History: Blurred vision left eye. Right leg giving out. Stroke.    Axial scans of the brain were obtained without intravenous  contrast.  Coronal and sagital reconstructions were preformed.    This exam was performed according to our departmental  dose-optimization program, which includes automated exposure  control, adjustment of the mA and/or kV according to patient size  and/or use of iterative reconstruction technique.    DLP: 829.60    Comparison: October 29, 2021    Findings:  Bone windows are unremarkable.  Minimal mucosal thickening ethmoid sinuses.  Small retention cysts sphenoid sinuses.    No acute process.  Minimal cerebral and cerebellar atrophy.  No hemorrhage.  No mass.  No abnormal areas of increased or decreased attenuation.  No midline shift.  No abnormal extra-axial fluid collections.      Impression:       CONCLUSION:  No acute process.  Minimal cerebral and cerebellar atrophy.    06159    Electronically signed by:  Talon Tuttle MD  5/9/2022 5:30 PM CDT  Workstation: [id number]          I have reviewed the patient current medications.     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 Right LE weakness   -neurology consulted, appreciate recs, plans to continue Xarelto/ASA  -MRI reviewed, no acute issue  -PT, OT  -reviewed carotid US  -echo pending, will f/u   -last echo 2018 EF 60%, normal stress test around that time.     2. Metastatic lung cancer  -on immunotherapy  -follows in [place]     3. TODD   -IVF  -creatinine 2.93 on admission  -improved to 2.61 today     4. Hx of DVT  -hypercoagulable due to cancer  -continue Xarelto     5. Hypotension  -secondary to dehydration   -continue IVF                  Discharge Planning: pending PT/OT rec's, improvement in renal function     I confirmed that the patient's Advance Care Plan is present, code status is documented, or surrogate decision maker is listed in the patient's medical record.           This document has been electronically signed by Lou Lima MD on May 10, 2022 12:06 CDT

## 2022-05-10 NOTE — PLAN OF CARE
"Goal Outcome Evaluation:  Plan of Care Reviewed With: patient, daughter           Outcome Evaluation: Initial PT evaluation complete, co-evaluation with OT.  Patient is alert and cooperative.  She requires SPV with bed mobility, CGA with transfers, min Ax1 with gait, ambulating 10'x1, 30'x1. Patient unsteady initially so attmpted to provided FWW but unavailable.  Provided HHA x 2 to support patien but she became more unsteady, returned to room safely.  Patient reports she felt \"awkward\". BP stable, fall avoided. LE strength is equal L to R, muscle tone is nomal, coordination limited on L but d/t edema, not neurological impairment.  Modified [place], pre: 1, post: 4.  Tinetti: 20/28 or moderate fall risk.  Recommend use of FWW (patient has one at home). Patient was active with O/P PT, recommend continuing with O/T  PT.  Goals established, continue skilled I/P PT.  "

## 2022-05-10 NOTE — SIGNIFICANT NOTE
05/10/22 1404   Rehab Time/Intention   Session Not Performed other (see comments)  (Pt was screened for speech and swallow. Pt and  deny any deficits. Pt currently on regular diet. Pt speech WFL. MRI negative and pt reports symptoms have resolved. No evaluation warranted at this time.)

## 2022-05-11 ENCOUNTER — APPOINTMENT (OUTPATIENT)
Dept: ULTRASOUND IMAGING | Facility: HOSPITAL | Age: 62
End: 2022-05-11

## 2022-05-11 LAB
ALBUMIN SERPL-MCNC: 3 G/DL (ref 3.5–5.2)
ALBUMIN/GLOB SERPL: 1.1 G/DL
ALP SERPL-CCNC: 98 U/L (ref 39–117)
ALT SERPL W P-5'-P-CCNC: 30 U/L (ref 1–33)
ANION GAP SERPL CALCULATED.3IONS-SCNC: 7 MMOL/L (ref 5–15)
AST SERPL-CCNC: 27 U/L (ref 1–32)
BASOPHILS # BLD AUTO: 0.03 10*3/MM3 (ref 0–0.2)
BASOPHILS NFR BLD AUTO: 0.3 % (ref 0–1.5)
BILIRUB SERPL-MCNC: <0.2 MG/DL (ref 0–1.2)
BUN SERPL-MCNC: 27 MG/DL (ref 8–23)
BUN/CREAT SERPL: 11.9 (ref 7–25)
CALCIUM SPEC-SCNC: 7.6 MG/DL (ref 8.6–10.5)
CHLORIDE SERPL-SCNC: 114 MMOL/L (ref 98–107)
CO2 SERPL-SCNC: 22 MMOL/L (ref 22–29)
CREAT SERPL-MCNC: 2.27 MG/DL (ref 0.57–1)
DEPRECATED RDW RBC AUTO: 57.8 FL (ref 37–54)
EGFRCR SERPLBLD CKD-EPI 2021: 23.7 ML/MIN/1.73
EOSINOPHIL # BLD AUTO: 0.6 10*3/MM3 (ref 0–0.4)
EOSINOPHIL NFR BLD AUTO: 5.7 % (ref 0.3–6.2)
ERYTHROCYTE [DISTWIDTH] IN BLOOD BY AUTOMATED COUNT: 17.2 % (ref 12.3–15.4)
GLOBULIN UR ELPH-MCNC: 2.7 GM/DL
GLUCOSE BLDC GLUCOMTR-MCNC: 119 MG/DL (ref 70–130)
GLUCOSE BLDC GLUCOMTR-MCNC: 128 MG/DL (ref 70–130)
GLUCOSE SERPL-MCNC: 96 MG/DL (ref 65–99)
HCT VFR BLD AUTO: 25.3 % (ref 34–46.6)
HGB BLD-MCNC: 7.9 G/DL (ref 12–15.9)
IMM GRANULOCYTES # BLD AUTO: 0.07 10*3/MM3 (ref 0–0.05)
IMM GRANULOCYTES NFR BLD AUTO: 0.7 % (ref 0–0.5)
LYMPHOCYTES # BLD AUTO: 0.59 10*3/MM3 (ref 0.7–3.1)
LYMPHOCYTES NFR BLD AUTO: 5.6 % (ref 19.6–45.3)
MCH RBC QN AUTO: 28.7 PG (ref 26.6–33)
MCHC RBC AUTO-ENTMCNC: 31.2 G/DL (ref 31.5–35.7)
MCV RBC AUTO: 92 FL (ref 79–97)
MONOCYTES # BLD AUTO: 0.73 10*3/MM3 (ref 0.1–0.9)
MONOCYTES NFR BLD AUTO: 7 % (ref 5–12)
NEUTROPHILS NFR BLD AUTO: 8.48 10*3/MM3 (ref 1.7–7)
NEUTROPHILS NFR BLD AUTO: 80.7 % (ref 42.7–76)
NRBC BLD AUTO-RTO: 0 /100 WBC (ref 0–0.2)
PLATELET # BLD AUTO: 250 10*3/MM3 (ref 140–450)
PMV BLD AUTO: 10.4 FL (ref 6–12)
POTASSIUM SERPL-SCNC: 4.4 MMOL/L (ref 3.5–5.2)
PROT SERPL-MCNC: 5.7 G/DL (ref 6–8.5)
RBC # BLD AUTO: 2.75 10*6/MM3 (ref 3.77–5.28)
SODIUM SERPL-SCNC: 143 MMOL/L (ref 136–145)
WBC NRBC COR # BLD: 10.5 10*3/MM3 (ref 3.4–10.8)
WHOLE BLOOD HOLD SPECIMEN: NORMAL

## 2022-05-11 PROCEDURE — 36415 COLL VENOUS BLD VENIPUNCTURE: CPT | Performed by: FAMILY MEDICINE

## 2022-05-11 PROCEDURE — 63710000001 ONDANSETRON PER 8 MG: Performed by: FAMILY MEDICINE

## 2022-05-11 PROCEDURE — 82962 GLUCOSE BLOOD TEST: CPT

## 2022-05-11 PROCEDURE — 63710000001 PROMETHAZINE PER 25 MG: Performed by: FAMILY MEDICINE

## 2022-05-11 PROCEDURE — 80053 COMPREHEN METABOLIC PANEL: CPT | Performed by: FAMILY MEDICINE

## 2022-05-11 PROCEDURE — 96361 HYDRATE IV INFUSION ADD-ON: CPT

## 2022-05-11 PROCEDURE — 99214 OFFICE O/P EST MOD 30 MIN: CPT | Performed by: NURSE PRACTITIONER

## 2022-05-11 PROCEDURE — 85025 COMPLETE CBC W/AUTO DIFF WBC: CPT | Performed by: STUDENT IN AN ORGANIZED HEALTH CARE EDUCATION/TRAINING PROGRAM

## 2022-05-11 PROCEDURE — 93970 EXTREMITY STUDY: CPT

## 2022-05-11 PROCEDURE — 97535 SELF CARE MNGMENT TRAINING: CPT

## 2022-05-11 PROCEDURE — G0378 HOSPITAL OBSERVATION PER HR: HCPCS

## 2022-05-11 PROCEDURE — 97530 THERAPEUTIC ACTIVITIES: CPT

## 2022-05-11 RX ORDER — TRAMADOL HYDROCHLORIDE 50 MG/1
50 TABLET ORAL EVERY 8 HOURS PRN
COMMUNITY
End: 2022-07-06

## 2022-05-11 RX ORDER — TRAMADOL HYDROCHLORIDE 50 MG/1
50 TABLET ORAL EVERY 8 HOURS PRN
Status: DISCONTINUED | OUTPATIENT
Start: 2022-05-11 | End: 2022-05-12

## 2022-05-11 RX ADMIN — SODIUM CHLORIDE 100 ML/HR: 9 INJECTION, SOLUTION INTRAVENOUS at 22:26

## 2022-05-11 RX ADMIN — SODIUM CHLORIDE 100 ML/HR: 9 INJECTION, SOLUTION INTRAVENOUS at 05:52

## 2022-05-11 RX ADMIN — ONDANSETRON HYDROCHLORIDE 4 MG: 4 TABLET, FILM COATED ORAL at 20:26

## 2022-05-11 RX ADMIN — VENLAFAXINE HYDROCHLORIDE 37.5 MG: 37.5 CAPSULE, EXTENDED RELEASE ORAL at 08:29

## 2022-05-11 RX ADMIN — RIVAROXABAN 20 MG: 10 TABLET, FILM COATED ORAL at 18:42

## 2022-05-11 RX ADMIN — ONDANSETRON HYDROCHLORIDE 4 MG: 4 TABLET, FILM COATED ORAL at 06:16

## 2022-05-11 RX ADMIN — PROMETHAZINE HYDROCHLORIDE 25 MG: 25 TABLET ORAL at 22:35

## 2022-05-11 RX ADMIN — TOPIRAMATE 50 MG: 50 TABLET, FILM COATED ORAL at 08:29

## 2022-05-11 RX ADMIN — Medication 10 ML: at 08:30

## 2022-05-11 RX ADMIN — TRAMADOL HYDROCHLORIDE 50 MG: 50 TABLET, COATED ORAL at 20:26

## 2022-05-11 RX ADMIN — ASPIRIN 81 MG: 81 TABLET, FILM COATED ORAL at 08:29

## 2022-05-11 RX ADMIN — Medication 1 TABLET: at 08:29

## 2022-05-11 RX ADMIN — LEVOTHYROXINE SODIUM 150 MCG: 150 TABLET ORAL at 05:18

## 2022-05-11 NOTE — PLAN OF CARE
Goal Outcome Evaluation:  Plan of Care Reviewed With: patient        Progress: improving  Outcome Evaluation: nursing ok'd OT, pt a agreeable sup<>sit supervision sit<>stand cga, toilet transfer sup-cga, stood at sink to wash hands w/ cga, amb in room ~ 15 ft cga pt declined R/W grooming at eob, lou,  no new ot goals met during this tx, cont poc

## 2022-05-11 NOTE — PROGRESS NOTES
[facility] Medicine Services  INPATIENT PROGRESS NOTE    Length of Stay: 1  Date of Admission: 5/9/2022  Primary Care Physician: Jen Raymundo MD    Subjective   Chief Complaint: weakness   HPI:      She is a 62 year old with metastatic lung cancer on immunotherapy that presented after a fall. She stated lasix last week from PCP and had a GI bug with N/V/D for 24 hours. Since then, she does not have any diarrhea or vomiting. Still has weakness. She has been seen by neurology for a workup for stroke. MRI is negative. She is back at her neurological baseline. Renal function continues to improve. She states she is still having a little dizziness. Trying to attempt to increase water intake.       Review of Systems   Constitutional: Negative for chills, diaphoresis and fever.   HENT: Negative for sneezing and sore throat.    Eyes: Negative for pain and discharge.   Respiratory: Negative for cough and shortness of breath.    Gastrointestinal: Negative for constipation, diarrhea, nausea and vomiting.   Endocrine: Negative for cold intolerance and heat intolerance.   Genitourinary: Negative for difficulty urinating, dysuria, frequency and urgency.   Musculoskeletal: Negative for arthralgias and myalgias.   Skin: Negative for color change and pallor.   Allergic/Immunologic: Negative for environmental allergies and food allergies.   Neurological: Positive for weakness. Negative for dizziness and syncope.   Psychiatric/Behavioral: Negative for confusion and sleep disturbance.        All pertinent negatives and positives are as above. All other systems have been reviewed and are negative unless otherwise stated.     Objective    Temp:  [96.3 °F (35.7 °C)-98 °F (36.7 °C)] 97.9 °F (36.6 °C)  Heart Rate:  [55-92] 92  Resp:  [14-18] 16  BP: ()/(50-74) 110/52    Physical Exam  Vitals reviewed.   Constitutional:       General: She is not in acute distress.     Appearance: She is  well-developed.   HENT:      Head: Normocephalic and atraumatic.      Nose: Nose normal.   Eyes:      Conjunctiva/sclera: Conjunctivae normal.   Cardiovascular:      Rate and Rhythm: Normal rate and regular rhythm.   Pulmonary:      Effort: Pulmonary effort is normal. No respiratory distress.      Breath sounds: Normal breath sounds. No wheezing or rales.   Musculoskeletal:         General: Normal range of motion.      Cervical back: Normal range of motion and neck supple.   Skin:     General: Skin is warm and dry.   Neurological:      Mental Status: She is alert and oriented to person, place, and time.   Psychiatric:         Behavior: Behavior normal.             Results Review:  I have reviewed the labs, radiology results, and diagnostic studies.    Laboratory Data:   Results from last 7 days   Lab Units 05/11/22  0613 05/10/22  0709 05/09/22  1709   SODIUM mmol/L 143 138 135*   POTASSIUM mmol/L 4.4 4.1 3.9   CHLORIDE mmol/L 114* 104 101   CO2 mmol/L 22.0 21.0* 23.0   BUN mg/dL 27* 35* 38*   CREATININE mg/dL 2.27* 2.61* 2.93*   GLUCOSE mg/dL 96 92 110*   CALCIUM mg/dL 7.6* 7.6* 7.8*   BILIRUBIN mg/dL <0.2 0.2 0.2   ALK PHOS U/L 98 124* 119*   ALT (SGPT) U/L 30 44* 52*   AST (SGOT) U/L 27 55* 72*   ANION GAP mmol/L 7.0 13.0 11.0     Estimated Creatinine Clearance: 29.9 mL/min (A) (by C-G formula based on SCr of 2.27 mg/dL (H)).  Results from last 7 days   Lab Units 05/09/22  1709   MAGNESIUM mg/dL 2.5*         Results from last 7 days   Lab Units 05/10/22  0709 05/09/22  1709   WBC 10*3/mm3 7.66 7.92   HEMOGLOBIN g/dL 8.0* 7.7*   HEMATOCRIT % 25.8* 24.4*   PLATELETS 10*3/mm3 230 261     Results from last 7 days   Lab Units 05/09/22  1709   INR  1.90*       Culture Data:   No results found for: BLOODCX  No results found for: URINECX  No results found for: RESPCX  No results found for: WOUNDCX  No results found for: STOOLCX  No components found for: BODYFLD    Radiology Data:   Imaging Results (Last 24 Hours)      Procedure Component Value Units Date/Time    US Carotid Bilateral [[id number]] Collected: 05/10/22 1018     Updated: 05/10/22 1100    Narrative:        ULTRASOUND CAROTID DUPLEX SCAN    HISTORY: Right lower extremity weakness. Other visualized  disturbances. Presyncope.    COMPARISON: None.    Duplex ultrasound of the carotid bifurcations was performed.    FINDINGS:  Real time and color flow images demonstrate bilateral minimal  plaque formation.  The peak systolic velocity in the right internal carotid artery  is 120.0 cm/s.  End-diastolic velocity 41.2 cm/s.  Ratio peak systolic velocity right internal carotid artery to  right common carotid 1.6.  Peak systolic velocity right external carotid 149.4 cm/s.  The peak systolic velocity in left internal carotid artery is  minimally elevated at 132.3 cm/s.  End-diastolic velocity minimally increased to 41.8 cm/s.  Normal ratio peak systolic velocity left internal carotid artery  to left common carotid artery of 1.4.  Peak systolic velocity left external carotid artery 88.7 cm/s.  Antegrade flow is present in each vertebral artery.      Impression:      CONCLUSION:  Less than 50% diameter reduction stenosis right internal carotid  artery.  Minimally elevated peak systolic and end diastolic velocities  proximal left internal carotid artery with normal ratio and  visually no significant stenosis. Suspect less than 50% diameter  reduction stenosis left internal carotid artery.  Antegrade flow is present in each vertebral artery.    [id number]    Electronically signed by:  Talon Tuttle MD  5/10/2022 10:58 AM  CDT Workstation: [id number]          I have reviewed the patient current medications.     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  Right LE weakness   -neurology consulted, appreciate recs, plans to continue Xarelto/ASA  -MRI reviewed, no acute issue  -PT, OT--will continue PT/OT at home   -reviewed carotid US  -echo reviewed   -last echo 2018 EF 60%, normal stress test around that time.   -will check duplex US to r/o DVT    2. Metastatic lung cancer  -on immunotherapy  -follows in [place]     3. TODD   -IVF  -creatinine 2.93 on admission  -improved to 2.27 today    4. Hx of DVT  -hypercoagulable due to cancer  -continue Xarelto   -Hb pending from this AM  -baseline is around 7-9   -given her dizziness, will transfuse if Hb <8, risks/benefits discussed and they are agreeable     5. Hypotension  -secondary to dehydration   -continue IVF                Discharge Planning: hopeful d/c tomorrow if renal function continues to improve, f/u duplex US     I confirmed that the patient's Advance Care Plan is present, code status is documented, or surrogate decision maker is listed in the patient's medical record.           This document has been electronically signed by Lou Lima MD on May 11, 2022 10:37 CDT

## 2022-05-11 NOTE — PLAN OF CARE
Problem: Adjustment to Illness (Stroke, Ischemic/Transient Ischemic Attack)  Goal: Optimal Coping  Outcome: Ongoing, Progressing     Problem: Bowel Elimination Impaired (Stroke, Ischemic/Transient Ischemic Attack)  Goal: Effective Bowel Elimination  Outcome: Ongoing, Progressing     Problem: Cerebral Tissue Perfusion (Stroke, Ischemic/Transient Ischemic Attack)  Goal: Optimal Cerebral Tissue Perfusion  Outcome: Ongoing, Progressing     Problem: Cognitive Impairment (Stroke, Ischemic/Transient Ischemic Attack)  Goal: Optimal Cognitive Function  Outcome: Ongoing, Progressing     Problem: Communication Impairment (Stroke, Ischemic/Transient Ischemic Attack)  Goal: Improved Communication Skills  Outcome: Ongoing, Progressing     Problem: Functional Ability Impaired (Stroke, Ischemic/Transient Ischemic Attack)  Goal: Optimal Functional Ability  Outcome: Ongoing, Progressing  Intervention: Optimize Functional Ability  Recent Flowsheet Documentation  Taken 5/11/2022 0205 by Mandi Bal RN  Activity Management: activity adjusted per tolerance  Taken 5/11/2022 0010 by Mandi Bal RN  Activity Management: activity adjusted per tolerance  Taken 5/10/2022 2240 by Mandi Bal RN  Activity Management: activity adjusted per tolerance  Taken 5/10/2022 2118 by Mandi Bal RN  Activity Management: ambulated to bathroom  Taken 5/10/2022 2005 by Mandi Bal RN  Activity Management: activity adjusted per tolerance  Taken 5/10/2022 1910 by Mandi Bal RN  Activity Management: activity adjusted per tolerance     Problem: Respiratory Compromise (Stroke, Ischemic/Transient Ischemic Attack)  Goal: Effective Oxygenation and Ventilation  Outcome: Ongoing, Progressing  Intervention: Optimize Oxygenation and Ventilation  Recent Flowsheet Documentation  Taken 5/11/2022 0010 by Mandi Bal RN  Head of Bed (HOB) Positioning: HOB elevated  Taken 5/10/2022 2240 by Mandi Bal RN  Head of Bed (HOB) Positioning: HOB  elevated  Taken 5/10/2022 2005 by Mandi Bal RN  Head of Bed (HOB) Positioning: HOB elevated     Problem: Sensorimotor Impairment (Stroke, Ischemic/Transient Ischemic Attack)  Goal: Improved Sensorimotor Function  Outcome: Ongoing, Progressing  Intervention: Optimize Range of Motion, Motor Control and Function  Recent Flowsheet Documentation  Taken 5/11/2022 0010 by Mandi Bal RN  Positioning/Transfer Devices: pillows  Taken 5/10/2022 2240 by Mandi Bal RN  Positioning/Transfer Devices: pillows  Taken 5/10/2022 2005 by Mandi Bal RN  Positioning/Transfer Devices: pillows     Problem: Swallowing Impairment (Stroke, Ischemic/Transient Ischemic Attack)  Goal: Optimal Eating and Swallowing without Aspiration  Outcome: Ongoing, Progressing     Problem: Urinary Elimination Impaired (Stroke, Ischemic/Transient Ischemic Attack)  Goal: Effective Urinary Elimination  Outcome: Ongoing, Progressing     Problem: Fall Injury Risk  Goal: Absence of Fall and Fall-Related Injury  Outcome: Ongoing, Progressing  Intervention: Promote Injury-Free Environment  Recent Flowsheet Documentation  Taken 5/11/2022 0205 by Mandi Bal RN  Safety Promotion/Fall Prevention: safety round/check completed  Taken 5/11/2022 0010 by Mandi Bal RN  Safety Promotion/Fall Prevention: safety round/check completed  Taken 5/10/2022 2240 by Mandi Bal RN  Safety Promotion/Fall Prevention: safety round/check completed  Taken 5/10/2022 2118 by Mandi Bal RN  Safety Promotion/Fall Prevention: safety round/check completed  Taken 5/10/2022 2005 by Mandi Bal RN  Safety Promotion/Fall Prevention: safety round/check completed  Taken 5/10/2022 1910 by Mandi Bal RN  Safety Promotion/Fall Prevention: safety round/check completed     Problem: Adult Inpatient Plan of Care  Goal: Plan of Care Review  Outcome: Ongoing, Progressing  Flowsheets (Taken 5/11/2022 0356)  Progress: no change  Plan of Care Reviewed With:  patient  Goal: Patient-Specific Goal (Individualized)  Outcome: Ongoing, Progressing  Goal: Absence of Hospital-Acquired Illness or Injury  Outcome: Ongoing, Progressing  Intervention: Identify and Manage Fall Risk  Recent Flowsheet Documentation  Taken 5/11/2022 0205 by Mandi Bal RN  Safety Promotion/Fall Prevention: safety round/check completed  Taken 5/11/2022 0010 by Mandi Bal RN  Safety Promotion/Fall Prevention: safety round/check completed  Taken 5/10/2022 2240 by Mandi Bal RN  Safety Promotion/Fall Prevention: safety round/check completed  Taken 5/10/2022 2118 by Mandi Bal RN  Safety Promotion/Fall Prevention: safety round/check completed  Taken 5/10/2022 2005 by Mandi Bal RN  Safety Promotion/Fall Prevention: safety round/check completed  Taken 5/10/2022 1910 by Mandi Bal RN  Safety Promotion/Fall Prevention: safety round/check completed  Intervention: Prevent Skin Injury  Recent Flowsheet Documentation  Taken 5/11/2022 0205 by Mandi Bal RN  Body Position: supine  Taken 5/11/2022 0010 by Mandi Bal RN  Body Position:   right   tilted  Taken 5/10/2022 2240 by Mandi Bal RN  Body Position:   left   tilted  Taken 5/10/2022 2005 by Mandi Bal RN  Body Position: supine  Intervention: Prevent and Manage VTE (Venous Thromboembolism) Risk  Recent Flowsheet Documentation  Taken 5/11/2022 0205 by Mandi Bal RN  Activity Management: activity adjusted per tolerance  Taken 5/11/2022 0010 by Mandi Bal RN  Activity Management: activity adjusted per tolerance  Taken 5/10/2022 2240 by Mandi Bal RN  Activity Management: activity adjusted per tolerance  Taken 5/10/2022 2118 by Mandi Bal RN  Activity Management: ambulated to bathroom  Taken 5/10/2022 2005 by Mandi Bal RN  Activity Management: activity adjusted per tolerance  VTE Prevention/Management:   bilateral   sequential compression devices off   patient refused intervention  Taken  5/10/2022 1910 by Mandi Bal, RN  Activity Management: activity adjusted per tolerance  Goal: Optimal Comfort and Wellbeing  Outcome: Ongoing, Progressing  Intervention: Monitor Pain and Promote Comfort  Recent Flowsheet Documentation  Taken 5/10/2022 2240 by Mandi Bal, RN  Pain Management Interventions: (pt requested tylenol)   see MAR   cold applied  Intervention: Provide Person-Centered Care  Recent Flowsheet Documentation  Taken 5/10/2022 2005 by Mandi Bal RN  Trust Relationship/Rapport: care explained  Goal: Readiness for Transition of Care  Outcome: Ongoing, Progressing     Problem: Fluid and Electrolyte Imbalance (Acute Kidney Injury/Impairment)  Goal: Fluid and Electrolyte Balance  Outcome: Ongoing, Progressing     Problem: Oral Intake Inadequate (Acute Kidney Injury/Impairment)  Goal: Optimal Nutrition Intake  Outcome: Ongoing, Progressing     Problem: Renal Function Impairment (Acute Kidney Injury/Impairment)  Goal: Effective Renal Function  Outcome: Ongoing, Progressing   Goal Outcome Evaluation:  Plan of Care Reviewed With: patient        Progress: no change

## 2022-05-11 NOTE — PLAN OF CARE
Goal Outcome Evaluation:  Plan of Care Reviewed With: patient        Progress: improving  Outcome Evaluation: pt in bed and is agreeable. daughter at bedside. hr in 40's throughtout session o2  ra. mod ind with bed mob and stands with sba. needs cues for pushing off and reaching back. gt x 280'  and sit for brif rest, then stands againg and another 100' back to room. in bed after gt . no c/o family in room.

## 2022-05-11 NOTE — PROGRESS NOTES
Stroke Progress Note       Chief Complaint: Right leg weakness    Subjective     HPI: Pt is a 62-year-old right-handed white female with known diagnosis of metastatic lung cancer with mets to lymph nodes, liver and bony metastasis, on immunotherapy and apparently responding to the treatment, left lower extremity DVT on Xarelto, stroke with left leg weakness in September 2021, Hashimoto's thyroiditis, depression, who comes in with right lower extremity weakness since a couple days ago, now improving, but not back to baseline. Patient has mild residual left lower extremity symptoms from her old stroke. Patient takes Xarelto 20 mg and aspirin 81 mg daily, and is compliant with the medications. Today she states some improvement to her RLE, she has been walking to the bathroom with no issues. States she still has some blurriness that is worse in the left eye. Denies numbness, and visual field is intact.        Review of Systems   HENT: Negative.    Eyes: Positive for visual disturbance.   Respiratory: Negative.    Cardiovascular: Negative.    Gastrointestinal: Negative.    Genitourinary: Negative.    Musculoskeletal: Negative.    Neurological: Positive for weakness.   Psychiatric/Behavioral: Negative.         Objective      Temp:  [96.3 °F (35.7 °C)-98 °F (36.7 °C)] 97.9 °F (36.6 °C)  Heart Rate:  [55-92] 92  Resp:  [14-18] 16  BP: ()/(50-74) 110/52    Neurological Exam  Mental Status  Awake, alert and oriented to person, place and time.Alert. Oriented to person, place, and time.    Cranial Nerves  CN II: Visual acuity is normal. Visual fields full to confrontation.  CN III, IV, VI: Extraocular movements intact bilaterally. Normal lids and orbits bilaterally. Pupils equal round and reactive to light bilaterally.  CN V: Facial sensation is normal.  CN VII: Full and symmetric facial movement.  CN IX, X: Palate elevates symmetrically. Normal gag reflex.  CN XI: Shoulder shrug strength is normal.  CN XII: Tongue  midline without atrophy or fasciculations.    Motor  Normal muscle bulk throughout. No fasciculations present.    Sensory  Sensation is intact to light touch, pinprick, vibration and proprioception in all four extremities.    Reflexes  Not assessed.    Coordination    Finger-to-nose, rapid alternating movements and heel-to-shin normal bilaterally without dysmetria.    Gait    Not assessed.      Physical Exam  Vitals and nursing note reviewed.   Constitutional:       Appearance: Normal appearance.   HENT:      Head: Normocephalic and atraumatic.   Eyes:      General: Lids are normal.      Extraocular Movements: Extraocular movements intact.      Pupils: Pupils are equal, round, and reactive to light.   Cardiovascular:      Rate and Rhythm: Normal rate.   Pulmonary:      Effort: Pulmonary effort is normal.   Musculoskeletal:         General: Normal range of motion.   Skin:     General: Skin is warm and dry.   Neurological:      Mental Status: She is alert and oriented to person, place, and time.      Motor: Weakness present.      Coordination: Coordination is intact.   Psychiatric:         Mood and Affect: Mood normal.         Results Review:    I reviewed the patient's new clinical results.    Lab Results (last 24 hours)     Procedure Component Value Units Date/Time    Extra Tubes [[id number]] Collected: 05/11/22 0613    Specimen: Blood, Venous Line Updated: 05/11/22 0718    Narrative:      The following orders were created for panel order Extra Tubes.  Procedure                               Abnormality         Status                     ---------                               -----------         ------                     Lavender Top[[id number]]                                     Final result                 Please view results for these tests on the individual orders.    Lavender Top [[id number]] Collected: 05/11/22 0613    Specimen: Blood Updated: 05/11/22 0718     Extra Tube hold for add-on     Comment: Auto  resulted       Comprehensive Metabolic Panel [[id number]]  (Abnormal) Collected: 05/11/22 0613    Specimen: Blood Updated: 05/11/22 0717     Glucose 96 mg/dL      BUN 27 mg/dL      Creatinine 2.27 mg/dL      Sodium 143 mmol/L      Potassium 4.4 mmol/L      Chloride 114 mmol/L      CO2 22.0 mmol/L      Calcium 7.6 mg/dL      Total Protein 5.7 g/dL      Albumin 3.00 g/dL      ALT (SGPT) 30 U/L      AST (SGOT) 27 U/L      Alkaline Phosphatase 98 U/L      Total Bilirubin <0.2 mg/dL      Globulin 2.7 gm/dL      A/G Ratio 1.1 g/dL      BUN/Creatinine Ratio 11.9     Anion Gap 7.0 mmol/L      eGFR 23.7 mL/min/1.73      Comment: National Kidney Foundation and American Society of Nephrology (ASN) Task Force recommended calculation based on the Chronic Kidney Disease Epidemiology Collaboration (CKD-EPI) equation refit without adjustment for race.       Narrative:      GFR Normal >60  Chronic Kidney Disease <60  Kidney Failure <15      POC Glucose Once [[id number]]  (Normal) Collected: 05/10/22 1912    Specimen: Blood Updated: 05/11/22 0116     Glucose 128 mg/dL      Comment: RN NotifiedOperator: [id number] [facility] [id number]           MRI Brain Without Contrast    Result Date: 5/10/2022  Normal MRI of brain without contrast. Electronically signed by:  Trevor Joyce MD  5/10/2022 10:05 AM CDT Workstation: [id number]    XR Chest 1 View    Result Date: 5/9/2022  CONCLUSION: Low lung volumes. Linear atelectasis mid lungs and lung bases. [id number] Electronically signed by:  Talon Tuttle MD  5/9/2022 6:03 PM CDT Workstation: [id number]    US Carotid Bilateral    Result Date: 5/10/2022  CONCLUSION: Less than 50% diameter reduction stenosis right internal carotid artery. Minimally elevated peak systolic and end diastolic velocities proximal left internal carotid artery with normal ratio and visually no significant stenosis. Suspect less than 50% diameter reduction stenosis left internal carotid artery. Antegrade flow is present  in each vertebral artery. [id number] Electronically signed by:  Talon Tuttle MD  5/10/2022 10:58 AM CDT Workstation: [id number]    CT Head Without Contrast Stroke Protocol    Result Date: 5/9/2022  CONCLUSION: No acute process. Minimal cerebral and cerebellar atrophy. [id number] Electronically signed by:  Talon Tuttle MD  5/9/2022 5:30 PM CDT Workstation: [id number]    Results for orders placed during the hospital encounter of 05/09/22    Adult Transthoracic Echo Complete w/ Color, Spectral and Contrast if necessary per protocol    Interpretation Summary  · Estimated right ventricular systolic pressure from tricuspid regurgitation is normal (<35 mmHg).  · Estimated left ventricular EF = 61% Left ventricular ejection fraction appears to be 61 - 65%. Left ventricular systolic function is normal.  · Left ventricular diastolic function is consistent with (grade I) impaired relaxation.  · Left atrial volume is mildly increased.        Assessment/Plan     Assessment/Plan: Pt is a 62-year-old right-handed white female with known diagnosis of metastatic lung cancer with mets to lymph nodes, liver and bony metastasis, on immunotherapy and apparently responding to the treatment, left lower extremity DVT on Xarelto, stroke with left leg weakness in September 2021, Hashimoto's thyroiditis, depression, who comes in with right lower extremity weakness since a couple days ago, now improving, but not back to baseline. Patient has mild residual left lower extremity symptoms from her old stroke. Patient takes Xarelto 20 mg and aspirin 81 mg daily, and is compliant with the medications. Today she states some improvement to her RLE, she has been walking to the bathroom with no issues. States she still has some blurriness that is worse in the left eye. Denies numbness, and visual field is intact.    1. Right lower extremity weakness. Improved, Possible TIA.  Her MRI brain and CT head looks okay,no  obvious evidence of old stroke either.  Patient is  on Xarelto for left lower extremity DVT, and aspirin 81 mg daily, which can be continued.  Patient does have known diagnosis of metastatic lung cancer, which can make her hypercoagulable, and I would recommend continuing the Xarelto and a baby aspirin every day.  Patient has not had vascular imaging, secondary to elevated creatinine.  Carotid ultrasound shows <50% stenosis in tucker ICAs. 2D echocardiogram shows EF 62%, Left atrial volume is mildly increased, and no LAD.  2. Metastatic lung cancer.  Reviewed her PET scan, which shows metastatic lesions to liver and bone, along with lymph nodes.  Patient is on immunotherapy, and is responsive to the same.  Continued follow-up as scheduled.  3. Acute kidney injury.  Continue IV fluids.  Treatment as per the hospitalist.  4. History of left lower extremity DVT.  Okay to continue Xarelto.  5. Increase activity with PT/OT.  6. Healthy heart diet.  Case was discussed with pt, Dr. Huynh, and Hospitalist team. Neurology will sign off.          Patient Active Problem List   Diagnosis   • Acquired hypothyroidism   • Depressive disorder   • Migraine   • PVC (premature ventricular contraction)   • Palpitation   • Pain of fifth toe   • Adenosquamous carcinoma of right lung (HCC)   • TODD (acute kidney injury) (HCC)   • Transient ischemic attack (TIA)           SILVA Warren  05/11/22  09:36 CDT        I spent 45 minutes caring for Adilene Morgan  on this date of service. This time includes time spent by me in the following activities: preparing for the visit, reviewing tests, obtaining and/or reviewing a separately obtained history, performing a medically appropriate examination and/or evaluation, counseling and educating the patient/family/caregiver, ordering medications, tests, or procedures, referring and communicating with other health care professionals, documenting information in the medical record, independently interpreting results and communicating that information  with the patient/family/caregiver and care coordination

## 2022-05-11 NOTE — THERAPY TREATMENT NOTE
Acute Care - Physical Therapy Treatment Note  [facility]     Patient Name: Adilene Morgan  : 1960  MRN: [id number]  Today's Date: 2022      Visit Dx:     ICD-10-CM ICD-9-CM   1. TODD (acute kidney injury) (HCC)  N17.9 584.9   2. Blurred vision  H53.8 368.8   3. Right leg weakness  R29.898 729.89   4. Impaired mobility and ADLs  Z74.09 V49.89    Z78.9    5. Impaired functional mobility, balance, gait, and endurance  Z74.09 V49.89     Patient Active Problem List   Diagnosis   • Acquired hypothyroidism   • Depressive disorder   • Migraine   • PVC (premature ventricular contraction)   • Palpitation   • Pain of fifth toe   • Adenosquamous carcinoma of right lung (HCC)   • TODD (acute kidney injury) (HCC)   • Transient ischemic attack (TIA)     Past Medical History:   Diagnosis Date   • Achilles bursitis    • Achilles tendinitis    • Acquired hypothyroidism    • Allergic rhinitis    • Allergy to meat     alphagal   • Allergy to milk products    • Arrhythmia    • Asthmatic bronchitis    • Asthmatic bronchitis    • Bone spur    • Calcaneal spur    • Cancer (HCC)    • Depressive disorder    • Family history of thyroid problem    • Herpes simplex    • Hypermetropia    • Hypothyroidism    • Menopausal flushing    • Menopause    • Migraine    • Otalgia    • Pneumonia    • Presbyopia    • Stroke (HCC)    • Trochanteric bursitis    • UTI (urinary tract infection) 2018   • Ventricular premature beats      Past Surgical History:   Procedure Laterality Date   • COLONOSCOPY N/A 2/15/2017    Procedure: COLONOSCOPY;  Surgeon: Lupillo Ugarte MD;  Location: [facility] ENDOSCOPY;  Service:    • COSMETIC SURGERY      plastic surgery to face x 4 r/t trauma   • LAPAROSCOPIC CHOLECYSTECTOMY  12/10/1995    Cholecystectomy, laparoscopic (1)      • OTHER SURGICAL HISTORY      Head surgery procedure (1)    plastic surgery on the face    • OTHER SURGICAL HISTORY  10/17/2014    Repair Superficial Wound TR-EXT 2.5 < CM 47942  (1)        PT Assessment (last 12 hours)     PT Evaluation and Treatment     Row Name 05/11/22 1454          Physical Therapy Time and Intention    Subjective Information no complaints  -RW     Document Type therapy note (daily note)  -RW     Mode of Treatment physical therapy  -RW     Patient Effort good  -RW     Row Name 05/11/22 1454          General Information    Patient Profile Reviewed yes  -RW     Existing Precautions/Restrictions fall  -RW     Row Name 05/11/22 1454          Pain    Posttreatment Pain Rating 0/10 - no pain  -RW     Row Name 05/11/22 1454          Cognition    Orientation Status (Cognition) oriented x 4  -RW     Row Name 05/11/22 1454          Range of Motion Comprehensive    General Range of Motion bilateral lower extremity ROM WFL  -RW     [facility] Name 05/11/22 1454          Bed Mobility    Bed Mobility supine-sit;sit-supine  -RW     Supine-Sit [place] (Bed Mobility) supervision  -RW     Sit-Supine [place] (Bed Mobility) supervision  -RW     Assistive Device (Bed Mobility) head of bed elevated  -     Row Name 05/11/22 1454          Transfers    Sit-Stand [place] (Transfers) contact guard;supervision  -RW     [facility] Name 05/11/22 1454          Sit-Stand Transfer    Comment, (Sit-Stand Transfer) cues to reach back  -     Row Name 05/11/22 1454          Gait/Stairs (Locomotion)    [place] Level (Gait) minimum assist (75% patient effort);2 person assist  -RW     Assistive Device (Gait) walker, front-wheeled  -RW     Distance in Feet (Gait) 280, 100  -RW     Pattern (Gait) step-through  -RW     Deviations/Abnormal Patterns (Gait) mack decreased;gait speed decreased;stride length decreased  -     Row Name 05/11/22 1454          Safety Issues, Functional Mobility    Impairments Affecting Function (Mobility) endurance/activity tolerance;balance;pain;strength  -     Row Name 05/11/22 1454          Vital Signs    Pre Systolic BP Rehab 133  -RW     Pre Treatment Diastolic BP  66  -RW     Pretreatment Heart Rate (beats/min) 45  -RW     Intratreatment Heart Rate (beats/min) 50  -RW     Posttreatment Heart Rate (beats/min) 48  -RW     Pre SpO2 (%) 99  -RW     O2 Delivery Pre Treatment room air  -RW     Post SpO2 (%) 100  -RW     O2 Delivery Post Treatment room air  -RW     Row Name 05/11/22 1454          Positioning and Restraints    Pre-Treatment Position in bed  -RW     Post Treatment Position bed  -RW     Row Name 05/11/22 1454          Therapy Assessment/Plan (PT)    Rehab Potential (PT) good, to achieve stated therapy goals  -RW     Criteria for Skilled Interventions Met (PT) yes;skilled treatment is necessary  -RW     Therapy Frequency (PT) 5 times/wk  -RW     Row Name 05/11/22 1454          Bed Mobility Goal 1 (PT)    Activity/Assistive Device (Bed Mobility Goal 1, PT) sit to supine/supine to sit  -RW     [place] Level/Cues Needed (Bed Mobility Goal 1, PT) independent  -RW     Time Frame (Bed Mobility Goal 1, PT) by discharge  -RW     Strategies/Barriers (Bed Mobility Goal 1, PT) HOB flat, no bed rails.  -RW     Progress/Outcomes (Bed Mobility Goal 1, PT) goal not met  -RW     Row Name 05/11/22 1454          Transfer Goal 1 (PT)    Activity/Assistive Device (Transfer Goal 1, PT) sit-to-stand/stand-to-sit;bed-to-chair/chair-to-bed  -RW     [place] Level/Cues Needed (Transfer Goal 1, PT) modified independence  -RW     Time Frame (Transfer Goal 1, PT) by discharge  -RW     Strategies/Barriers (Transfers Goal 1, PT) Encourage use of walker.  -RW     Progress/Outcome (Transfer Goal 1, PT) goal not met  -RW     Row Name 05/11/22 1454          Gait Training Goal 1 (PT)    Activity/Assistive Device (Gait Training Goal 1, PT) walker, rolling  -RW     [place] Level (Gait Training Goal 1, PT) modified independence  -RW     Distance (Gait Training Goal 1, PT) 100'x2.  -RW     Time Frame (Gait Training Goal 1, PT) by discharge  -RW     Strategies/Barriers (Gait Training Goal 1, PT)  Encourage use of walker.  -RW     Progress/Outcome (Gait Training Goal 1, PT) goal not met  -RW     Row Name 05/11/22 1454          Stairs Goal 1 (PT)    Activity/Assistive Device (Stairs Goal 1, PT) using handrail, right  -RW     [place] Level/Cues Needed (Stairs Goal 1, PT) supervision required  -RW     Number of Stairs (Stairs Goal 1, PT) 7 steps to bedroom upstairs.  -RW     Time Frame (Stairs Goal 1, PT) by discharge  -RW     Progress/Outcome (Stairs Goal 1, PT) goal not met  -RW           User Key  (r) = Recorded By, (t) = Taken By, (c) = Cosigned By    Initials Name Provider Type    RW Carmelo Mcintosh, PTA Physical Therapist Assistant                Physical Therapy Education                 Title: PT OT SLP Therapies (In Progress)     Topic: Physical Therapy (In Progress)     Point: Mobility training (Not Started)     Learner Progress:  Not documented in this visit.          Point: Home exercise program (Not Started)     Learner Progress:  Not documented in this visit.          Point: Body mechanics (Not Started)     Learner Progress:  Not documented in this visit.          Point: Precautions (Done)     Learning Progress Summary           Patient Acceptance, E, VU,NR by  at 5/10/2022 0791    Comment: [place] assessed: 20/28 or moderate fall risk.  Recommend use of FWW during gait. Continue O/P PT.                               User Key     Initials Effective Dates Name Provider Type Discipline     06/16/21 -  Vasquez Granados, PT Physical Therapist PT              PT Recommendation and Plan  Anticipated Discharge Disposition (PT): home with outpatient therapy services  Therapy Frequency (PT): 5 times/wk  Plan of Care Reviewed With: patient  Progress: improving  Outcome Evaluation: pt in bed and is agreeable. daughter at bedside. hr in 40's throughtout session o2  ra. mod ind with bed mob and stands with sba. needs cues for pushing off and reaching back. gt x 280'  and sit for brif rest, then  stands againg and another 100' back to room. in bed after gt . no c/o family in room.       Time Calculation:    PT Charges     Row Name 05/11/22 1532             Time Calculation    Start Time 1454  -RW      Stop Time 1520  -RW      Time Calculation (min) 26 min  -RW              Time Calculation- PT    Total Timed Code Minutes- PT 26 minute(s)  -RW              Timed Charges    34678 - PT Therapeutic Activity Minutes 26  -RW              Total Minutes    Timed Charges Total Minutes 26  -RW       Total Minutes 26  -RW            User Key  (r) = Recorded By, (t) = Taken By, (c) = Cosigned By    Initials Name Provider Type    Carmelo Shelby PTA Physical Therapist Assistant              Therapy Charges for Today     Code Description Service Date Service Provider Modifiers Qty    93344873008 HC PT THERAPEUTIC ACT EA 15 MIN 5/11/2022 Carmelo Mcintosh PTA GP 2          PT G-Codes  Outcome Measure Options: AM-PAC 6 Clicks Daily Activity (OT)  AM-PAC 6 Clicks Score (PT): 18  AM-PAC 6 Clicks Score (OT): 19  Modified Marleny Scale: 4 - Moderately severe disability.  Unable to walk without assistance, and unable to attend to own bodily needs without assistance.  Tinetti Total Score: 20    Carmelo Mcintosh PTA  5/11/2022

## 2022-05-11 NOTE — THERAPY TREATMENT NOTE
Patient Name: Adilene Morgan  : 1960    MRN: [id number]                              Today's Date: 2022       Admit Date: 2022    Visit Dx:     ICD-10-CM ICD-9-CM   1. TODD (acute kidney injury) (HCC)  N17.9 584.9   2. Blurred vision  H53.8 368.8   3. Right leg weakness  R29.898 729.89   4. Impaired mobility and ADLs  Z74.09 V49.89    Z78.9    5. Impaired functional mobility, balance, gait, and endurance  Z74.09 V49.89     Patient Active Problem List   Diagnosis   • Acquired hypothyroidism   • Depressive disorder   • Migraine   • PVC (premature ventricular contraction)   • Palpitation   • Pain of fifth toe   • Adenosquamous carcinoma of right lung (HCC)   • TODD (acute kidney injury) (HCC)   • Transient ischemic attack (TIA)     Past Medical History:   Diagnosis Date   • Achilles bursitis    • Achilles tendinitis    • Acquired hypothyroidism    • Allergic rhinitis    • Allergy to meat     alphagal   • Allergy to milk products    • Arrhythmia    • Asthmatic bronchitis    • Asthmatic bronchitis    • Bone spur    • Calcaneal spur    • Cancer (HCC)    • Depressive disorder    • Family history of thyroid problem    • Herpes simplex    • Hypermetropia    • Hypothyroidism    • Menopausal flushing    • Menopause    • Migraine    • Otalgia    • Pneumonia    • Presbyopia    • Stroke (HCC)    • Trochanteric bursitis    • UTI (urinary tract infection) 2018   • Ventricular premature beats      Past Surgical History:   Procedure Laterality Date   • COLONOSCOPY N/A 2/15/2017    Procedure: COLONOSCOPY;  Surgeon: Lupillo Ugarte MD;  Location: [facility] ENDOSCOPY;  Service:    • COSMETIC SURGERY      plastic surgery to face x 4 r/t trauma   • LAPAROSCOPIC CHOLECYSTECTOMY  12/10/1995    Cholecystectomy, laparoscopic (1)      • OTHER SURGICAL HISTORY      Head surgery procedure (1)    plastic surgery on the face    • OTHER SURGICAL HISTORY  10/17/2014    Repair Superficial Wound TR-EXT 2.5 < CM 66930 (1)          General Information     Row Name 05/11/22 0750          OT Time and Intention    Document Type therapy note (daily note)  -     Mode of Treatment occupational therapy;individual therapy  -     Row Name 05/11/22 0750          General Information    Patient Profile Reviewed yes  -     Existing Precautions/Restrictions fall  -     Row Name 05/11/22 0750          Cognition    Orientation Status (Cognition) oriented x 4  -     Row Name 05/11/22 0750          Safety Issues, Functional Mobility    Impairments Affecting Function (Mobility) endurance/activity tolerance;balance;pain  -           User Key  (r) = Recorded By, (t) = Taken By, (c) = Cosigned By    Initials Name Provider Type     Nedra Lanier COTA Occupational Therapist Assistant                 Mobility/ADL's    No documentation.                Obj/Interventions    No documentation.                Goals/Plan    No documentation.                Clinical Impression    No documentation.                Outcome Measures    No documentation.                 Occupational Therapy Education                 Title: PT OT SLP Therapies (In Progress)     Topic: Occupational Therapy (In Progress)     Point: ADL training (In Progress)     Description:   Instruct learner(s) on proper safety adaptation and remediation techniques during self care or transfers.   Instruct in proper use of assistive devices.              Learning Progress Summary           Patient Acceptance, E, NR by  at 5/11/2022 1139                   Point: Home exercise program (Not Started)     Description:   Instruct learner(s) on appropriate technique for monitoring, assisting and/or progressing therapeutic exercises/activities.              Learner Progress:  Not documented in this visit.          Point: Precautions (In Progress)     Description:   Instruct learner(s) on prescribed precautions during self-care and functional transfers.              Learning Progress Summary            Patient Acceptance, E, NR by  at 5/11/2022 1139    Acceptance, E,TB, VU,NR by  at 5/10/2022 1315    Comment: POC, role of OT, transfer training                   Point: Body mechanics (In Progress)     Description:   Instruct learner(s) on proper positioning and spine alignment during self-care, functional mobility activities and/or exercises.              Learning Progress Summary           Patient Acceptance, E, NR by  at 5/11/2022 1139    Acceptance, E,TB, VU,NR by  at 5/10/2022 1315    Comment: POC, role of OT, transfer training                               User Key     Initials Effective Dates Name Provider Type Discipline     06/16/21 -  Nedra Lanier COTA Occupational Therapist Assistant OT     06/14/21 -  Pardeep Dasilva, OT Occupational Therapist OT              OT Recommendation and Plan     Plan of Care Review  Plan of Care Reviewed With: patient  Progress: improving  Outcome Evaluation: nursing ok'd OT, pt a agreeable sup<>sit supervision sit<>stand cga, toilet transfer sup-cga, stood at sink to wash hands w/ cga, amb in room ~ 15 ft cga pt declined R/W grooming at eob, lou,  no new ot goals met during this tx, cont poc     Time Calculation:    Time Calculation- OT     Row Name 05/11/22 1135             Time Calculation- OT    OT Start Time 0750  -RC      OT Stop Time 0813  -RC      OT Time Calculation (min) 23 min  -RC      Total Timed Code Minutes- OT 23 minute(s)  -RC      OT Received On 05/11/22  -RC              Timed Charges    37402 - OT Self Care/Mgmt Minutes 23  -RC              Total Minutes    Timed Charges Total Minutes 23  -RC       Total Minutes 23  -RC            User Key  (r) = Recorded By, (t) = Taken By, (c) = Cosigned By    Initials Name Provider Type    RC Nedra Lanier COTA Occupational Therapist Assistant              Therapy Charges for Today     Code Description Service Date Service Provider Modifiers Qty    42264400723 HC OT SELF CARE/MGMT/TRAIN EA 15 MIN  5/11/2022 Nedra Lanier, [place]  2               CAROLINA Sheffield  5/11/2022

## 2022-05-12 ENCOUNTER — APPOINTMENT (OUTPATIENT)
Dept: ULTRASOUND IMAGING | Facility: HOSPITAL | Age: 62
End: 2022-05-12

## 2022-05-12 LAB
% EOS HANSEL STAIN: 2 %
ALBUMIN SERPL-MCNC: 2.9 G/DL (ref 3.5–5.2)
ALBUMIN/GLOB SERPL: 0.9 G/DL
ALP SERPL-CCNC: 100 U/L (ref 39–117)
ALT SERPL W P-5'-P-CCNC: 24 U/L (ref 1–33)
ANION GAP SERPL CALCULATED.3IONS-SCNC: 9 MMOL/L (ref 5–15)
AST SERPL-CCNC: 21 U/L (ref 1–32)
BILIRUB SERPL-MCNC: <0.2 MG/DL (ref 0–1.2)
BUN SERPL-MCNC: 19 MG/DL (ref 8–23)
BUN/CREAT SERPL: 9.7 (ref 7–25)
CALCIUM SPEC-SCNC: 7.8 MG/DL (ref 8.6–10.5)
CHLORIDE SERPL-SCNC: 114 MMOL/L (ref 98–107)
CO2 SERPL-SCNC: 20 MMOL/L (ref 22–29)
CREAT SERPL-MCNC: 1.95 MG/DL (ref 0.57–1)
CREAT UR-MCNC: 29.8 MG/DL
EGFRCR SERPLBLD CKD-EPI 2021: 28.5 ML/MIN/1.73
GLOBULIN UR ELPH-MCNC: 3.1 GM/DL
GLUCOSE SERPL-MCNC: 88 MG/DL (ref 65–99)
HANSEL STAIN: POSITIVE
HCT VFR BLD AUTO: 23 % (ref 34–46.6)
HGB BLD-MCNC: 7.3 G/DL (ref 12–15.9)
POTASSIUM SERPL-SCNC: 4.6 MMOL/L (ref 3.5–5.2)
PROT SERPL-MCNC: 6 G/DL (ref 6–8.5)
SODIUM SERPL-SCNC: 143 MMOL/L (ref 136–145)
SODIUM UR-SCNC: 69 MMOL/L

## 2022-05-12 PROCEDURE — 63710000001 ONDANSETRON PER 8 MG: Performed by: FAMILY MEDICINE

## 2022-05-12 PROCEDURE — 80053 COMPREHEN METABOLIC PANEL: CPT | Performed by: FAMILY MEDICINE

## 2022-05-12 PROCEDURE — 36430 TRANSFUSION BLD/BLD COMPNT: CPT

## 2022-05-12 PROCEDURE — 85018 HEMOGLOBIN: CPT | Performed by: STUDENT IN AN ORGANIZED HEALTH CARE EDUCATION/TRAINING PROGRAM

## 2022-05-12 PROCEDURE — 97530 THERAPEUTIC ACTIVITIES: CPT

## 2022-05-12 PROCEDURE — 96376 TX/PRO/DX INJ SAME DRUG ADON: CPT

## 2022-05-12 PROCEDURE — 84300 ASSAY OF URINE SODIUM: CPT | Performed by: INTERNAL MEDICINE

## 2022-05-12 PROCEDURE — 96365 THER/PROPH/DIAG IV INF INIT: CPT

## 2022-05-12 PROCEDURE — 86923 COMPATIBILITY TEST ELECTRIC: CPT

## 2022-05-12 PROCEDURE — P9041 ALBUMIN (HUMAN),5%, 50ML: HCPCS | Performed by: NURSE PRACTITIONER

## 2022-05-12 PROCEDURE — 25010000002 ONDANSETRON PER 1 MG: Performed by: FAMILY MEDICINE

## 2022-05-12 PROCEDURE — 76775 US EXAM ABDO BACK WALL LIM: CPT

## 2022-05-12 PROCEDURE — 25010000002 ALBUMIN HUMAN 5% PER 50 ML: Performed by: NURSE PRACTITIONER

## 2022-05-12 PROCEDURE — 96361 HYDRATE IV INFUSION ADD-ON: CPT

## 2022-05-12 PROCEDURE — 82570 ASSAY OF URINE CREATININE: CPT | Performed by: INTERNAL MEDICINE

## 2022-05-12 PROCEDURE — 87205 SMEAR GRAM STAIN: CPT | Performed by: INTERNAL MEDICINE

## 2022-05-12 PROCEDURE — 96367 TX/PROPH/DG ADDL SEQ IV INF: CPT

## 2022-05-12 PROCEDURE — G0378 HOSPITAL OBSERVATION PER HR: HCPCS

## 2022-05-12 PROCEDURE — 36415 COLL VENOUS BLD VENIPUNCTURE: CPT | Performed by: FAMILY MEDICINE

## 2022-05-12 PROCEDURE — 96366 THER/PROPH/DIAG IV INF ADDON: CPT

## 2022-05-12 PROCEDURE — P9016 RBC LEUKOCYTES REDUCED: HCPCS

## 2022-05-12 PROCEDURE — 63710000001 PROMETHAZINE PER 25 MG: Performed by: FAMILY MEDICINE

## 2022-05-12 PROCEDURE — 85014 HEMATOCRIT: CPT | Performed by: STUDENT IN AN ORGANIZED HEALTH CARE EDUCATION/TRAINING PROGRAM

## 2022-05-12 PROCEDURE — 86900 BLOOD TYPING SEROLOGIC ABO: CPT

## 2022-05-12 RX ORDER — ALBUMIN, HUMAN INJ 5% 5 %
250 SOLUTION INTRAVENOUS 2 TIMES DAILY
Status: DISCONTINUED | OUTPATIENT
Start: 2022-05-12 | End: 2022-05-13

## 2022-05-12 RX ORDER — ALBUMIN, HUMAN INJ 5% 5 %
250 SOLUTION INTRAVENOUS 2 TIMES DAILY
Status: DISCONTINUED | OUTPATIENT
Start: 2022-05-12 | End: 2022-05-12

## 2022-05-12 RX ORDER — FUROSEMIDE 20 MG/1
TABLET ORAL
Qty: 30 TABLET | Refills: 0 | OUTPATIENT
Start: 2022-05-12 | End: 2022-05-18 | Stop reason: HOSPADM

## 2022-05-12 RX ORDER — TRAMADOL HYDROCHLORIDE 50 MG/1
50 TABLET ORAL EVERY 6 HOURS PRN
Status: DISCONTINUED | OUTPATIENT
Start: 2022-05-12 | End: 2022-05-18 | Stop reason: HOSPADM

## 2022-05-12 RX ADMIN — MELATONIN 5.25 MG: 3 TAB ORAL at 22:08

## 2022-05-12 RX ADMIN — ASPIRIN 81 MG: 81 TABLET, FILM COATED ORAL at 09:59

## 2022-05-12 RX ADMIN — ALBUMIN HUMAN 250 ML: 0.05 INJECTION, SOLUTION INTRAVENOUS at 18:37

## 2022-05-12 RX ADMIN — Medication 10 ML: at 20:02

## 2022-05-12 RX ADMIN — TOPIRAMATE 50 MG: 50 TABLET, FILM COATED ORAL at 09:59

## 2022-05-12 RX ADMIN — LEVOTHYROXINE SODIUM 150 MCG: 150 TABLET ORAL at 05:16

## 2022-05-12 RX ADMIN — SODIUM CHLORIDE 100 ML/HR: 9 INJECTION, SOLUTION INTRAVENOUS at 09:10

## 2022-05-12 RX ADMIN — PROMETHAZINE HYDROCHLORIDE 25 MG: 25 TABLET ORAL at 20:03

## 2022-05-12 RX ADMIN — Medication 1 TABLET: at 09:59

## 2022-05-12 RX ADMIN — RIVAROXABAN 20 MG: 10 TABLET, FILM COATED ORAL at 18:15

## 2022-05-12 RX ADMIN — VENLAFAXINE HYDROCHLORIDE 37.5 MG: 37.5 CAPSULE, EXTENDED RELEASE ORAL at 09:59

## 2022-05-12 RX ADMIN — ONDANSETRON 4 MG: 2 INJECTION INTRAMUSCULAR; INTRAVENOUS at 09:10

## 2022-05-12 RX ADMIN — Medication 10 ML: at 09:59

## 2022-05-12 RX ADMIN — ONDANSETRON HYDROCHLORIDE 4 MG: 4 TABLET, FILM COATED ORAL at 20:03

## 2022-05-12 NOTE — PLAN OF CARE
Goal Outcome Evaluation:  Plan of Care Reviewed With: patient        Progress: improving  Outcome Evaluation: pt just back into bed but is willing to get up to walk. sba for tranfers and gt with fww x 180' cga. pt is tired and hurting and wants to go back to bed. cont with act as tolerates.

## 2022-05-12 NOTE — THERAPY TREATMENT NOTE
Acute Care - Physical Therapy Treatment Note  [facility]     Patient Name: Adilene Morgan  : 1960  MRN: [id number]  Today's Date: 2022      Visit Dx:     ICD-10-CM ICD-9-CM   1. TODD (acute kidney injury) (HCC)  N17.9 584.9   2. Blurred vision  H53.8 368.8   3. Right leg weakness  R29.898 729.89   4. Impaired mobility and ADLs  Z74.09 V49.89    Z78.9    5. Impaired functional mobility, balance, gait, and endurance  Z74.09 V49.89     Patient Active Problem List   Diagnosis   • Acquired hypothyroidism   • Depressive disorder   • Migraine   • PVC (premature ventricular contraction)   • Palpitation   • Pain of fifth toe   • Adenosquamous carcinoma of right lung (HCC)   • TODD (acute kidney injury) (HCC)   • Transient ischemic attack (TIA)     Past Medical History:   Diagnosis Date   • Achilles bursitis    • Achilles tendinitis    • Acquired hypothyroidism    • Allergic rhinitis    • Allergy to meat     alphagal   • Allergy to milk products    • Arrhythmia    • Asthmatic bronchitis    • Asthmatic bronchitis    • Bone spur    • Calcaneal spur    • Cancer (HCC)    • Depressive disorder    • Family history of thyroid problem    • Herpes simplex    • Hypermetropia    • Hypothyroidism    • Menopausal flushing    • Menopause    • Migraine    • Otalgia    • Pneumonia    • Presbyopia    • Stroke (HCC)    • Trochanteric bursitis    • UTI (urinary tract infection) 2018   • Ventricular premature beats      Past Surgical History:   Procedure Laterality Date   • COLONOSCOPY N/A 2/15/2017    Procedure: COLONOSCOPY;  Surgeon: Lupillo Ugarte MD;  Location: [facility] ENDOSCOPY;  Service:    • COSMETIC SURGERY      plastic surgery to face x 4 r/t trauma   • LAPAROSCOPIC CHOLECYSTECTOMY  12/10/1995    Cholecystectomy, laparoscopic (1)      • OTHER SURGICAL HISTORY      Head surgery procedure (1)    plastic surgery on the face    • OTHER SURGICAL HISTORY  10/17/2014    Repair Superficial Wound TR-EXT 2.5 < CM 91787  (1)        PT Assessment (last 12 hours)     PT Evaluation and Treatment     Row Name 05/12/22 1500          Physical Therapy Time and Intention    Subjective Information complains of;fatigue  -RW     Document Type therapy note (daily note)  -RW     Mode of Treatment physical therapy  -RW     Patient Effort good  -RW     Row Name 05/12/22 1500          General Information    Patient Profile Reviewed yes  -RW     Existing Precautions/Restrictions fall  -RW     Row Name 05/12/22 1500          Pain    Pretreatment Pain Rating 5/10  -RW     Posttreatment Pain Rating 5/10  -RW     Pain Location - back  -RW     Row Name 05/12/22 1500          Cognition    Orientation Status (Cognition) oriented x 4  -RW     Row Name 05/12/22 1500          Range of Motion Comprehensive    General Range of Motion bilateral lower extremity ROM WFL  -RW     Row Name 05/12/22 1500          Bed Mobility    Bed Mobility supine-sit;sit-supine  -RW     Supine-Sit [place] (Bed Mobility) supervision  -RW     Sit-Supine [place] (Bed Mobility) supervision  -RW     Assistive Device (Bed Mobility) head of bed elevated  -RW     Row Name 05/12/22 1500          Transfers    Sit-Stand [place] (Transfers) supervision  -RW     Row Name 05/12/22 1500          Gait/Stairs (Locomotion)    [place] Level (Gait) contact guard  -RW     Assistive Device (Gait) walker, front-wheeled  -RW     Distance in Feet (Gait) 180  -RW     Pattern (Gait) step-through  -RW     Deviations/Abnormal Patterns (Gait) mack decreased;gait speed decreased;stride length decreased  -RW     Row Name 05/12/22 1500          Safety Issues, Functional Mobility    Impairments Affecting Function (Mobility) endurance/activity tolerance;balance;pain;strength  -RW     Row Name 05/12/22 1500          Vital Signs    Pre Systolic BP Rehab 158  -RW     Pre Treatment Diastolic BP 71  -RW     Pretreatment Heart Rate (beats/min) 87  -RW     Pre SpO2 (%) 96  -RW     O2 Delivery Pre  Treatment room air  -RW     Row Name 05/12/22 1500          Positioning and Restraints    Pre-Treatment Position in bed  -RW     Post Treatment Position bed  -RW     Row Name 05/12/22 1500          Therapy Assessment/Plan (PT)    Rehab Potential (PT) good, to achieve stated therapy goals  -RW     Criteria for Skilled Interventions Met (PT) yes;skilled treatment is necessary  -RW     Therapy Frequency (PT) 5 times/wk  -RW     Row Name 05/12/22 1500          Bed Mobility Goal 1 (PT)    Activity/Assistive Device (Bed Mobility Goal 1, PT) sit to supine/supine to sit  -RW     [place] Level/Cues Needed (Bed Mobility Goal 1, PT) independent  -RW     Time Frame (Bed Mobility Goal 1, PT) by discharge  -RW     Strategies/Barriers (Bed Mobility Goal 1, PT) HOB flat, no bed rails.  -RW     Progress/Outcomes (Bed Mobility Goal 1, PT) goal not met  -RW     Row Name 05/12/22 1500          Transfer Goal 1 (PT)    Activity/Assistive Device (Transfer Goal 1, PT) sit-to-stand/stand-to-sit;bed-to-chair/chair-to-bed  -RW     [place] Level/Cues Needed (Transfer Goal 1, PT) modified independence  -RW     Time Frame (Transfer Goal 1, PT) by discharge  -RW     Strategies/Barriers (Transfers Goal 1, PT) Encourage use of walker.  -RW     Progress/Outcome (Transfer Goal 1, PT) goal not met  -     Row Name 05/12/22 1500          Gait Training Goal 1 (PT)    Activity/Assistive Device (Gait Training Goal 1, PT) walker, rolling  -RW     [place] Level (Gait Training Goal 1, PT) modified independence  -RW     Distance (Gait Training Goal 1, PT) 100'x2.  -RW     Time Frame (Gait Training Goal 1, PT) by discharge  -RW     Strategies/Barriers (Gait Training Goal 1, PT) Encourage use of walker.  -RW     Progress/Outcome (Gait Training Goal 1, PT) goal not met  -     Row Name 05/12/22 1500          Stairs Goal 1 (PT)    Activity/Assistive Device (Stairs Goal 1, PT) using handrail, right  -RW     [place] Level/Cues Needed (Stairs  Goal 1, PT) supervision required  -RW     Number of Stairs (Stairs Goal 1, PT) 7 steps to bedroom upstairs.  -RW     Time Frame (Stairs Goal 1, PT) by discharge  -RW     Progress/Outcome (Stairs Goal 1, PT) goal not met  -RW           User Key  (r) = Recorded By, (t) = Taken By, (c) = Cosigned By    Initials Name Provider Type    RW Carmelo Mcintosh, PTA Physical Therapist Assistant                Physical Therapy Education                 Title: PT OT SLP Therapies (In Progress)     Topic: Physical Therapy (In Progress)     Point: Mobility training (Not Started)     Learner Progress:  Not documented in this visit.          Point: Home exercise program (Not Started)     Learner Progress:  Not documented in this visit.          Point: Body mechanics (Not Started)     Learner Progress:  Not documented in this visit.          Point: Precautions (Done)     Learning Progress Summary           Patient Acceptance, E, VU,NR by  at 5/10/2022 6191    Comment: Tinrupa assessed: 20/28 or moderate fall risk.  Recommend use of FWW during gait. Continue O/P PT.                               User Key     Initials Effective Dates Name Provider Type Discipline     06/16/21 -  Vasquez Granados, PT Physical Therapist PT              PT Recommendation and Plan  Anticipated Discharge Disposition (PT): home with outpatient therapy services  Therapy Frequency (PT): 5 times/wk  Plan of Care Reviewed With: patient  Progress: improving  Outcome Evaluation: pt just back into bed but is willing to get up to walk. sba for tranfers and gt with fww x 180' cga. pt is tired and hurting and wants to go back to bed. cont with act as tolerates.       Time Calculation:    PT Charges     Row Name 05/12/22 1605             Time Calculation    Start Time 1500  -RW      Stop Time 1514  -RW      Time Calculation (min) 14 min  -RW              Time Calculation- PT    Total Timed Code Minutes- PT 14 minute(s)  -RW              Timed Charges    61506 - PT  Therapeutic Activity Minutes 14  -RW              Total Minutes    Timed Charges Total Minutes 14  -RW       Total Minutes 14  -RW            User Key  (r) = Recorded By, (t) = Taken By, (c) = Cosigned By    Initials Name Provider Type    Carmelo Shelby PTA Physical Therapist Assistant              Therapy Charges for Today     Code Description Service Date Service Provider Modifiers Qty    58136906536 HC PT THERAPEUTIC ACT EA 15 MIN 5/11/2022 Carmelo Mcintosh PTA GP 2    32764441724 HC PT THERAPEUTIC ACT EA 15 MIN 5/12/2022 Carmelo Mcintosh PTA GP 1          PT G-Codes  Outcome Measure Options: AM-PAC 6 Clicks Daily Activity (OT)  AM-PAC 6 Clicks Score (PT): 18  AM-PAC 6 Clicks Score (OT): 20  Modified Schley Scale: 4 - Moderately severe disability.  Unable to walk without assistance, and unable to attend to own bodily needs without assistance.  Tinetti Total Score: 20    Carmelo Mcintosh PTA  5/12/2022

## 2022-05-12 NOTE — PROGRESS NOTES
[facility] Medicine Services  INPATIENT PROGRESS NOTE    Length of Stay: 1  Date of Admission: 5/9/2022  Primary Care Physician: Jen Raymundo MD    Subjective   Chief Complaint: weakness   HPI:      She is a 62 year old with metastatic lung cancer on immunotherapy that presented after a fall. She stated lasix last week from PCP and had a GI bug with N/V/D for 24 hours. Since then, she does not have any diarrhea or vomiting. Still has weakness. She has been seen by neurology for a workup for stroke. MRI is negative. She is back at her neurological baseline. Renal function continues to improve. She states she is still having a little dizziness Hb is 7.3.   Otherwise no complaints.       Review of Systems   Constitutional: Negative for chills, diaphoresis and fever.   HENT: Negative for sneezing and sore throat.    Eyes: Negative for pain and discharge.   Respiratory: Negative for cough and shortness of breath.    Gastrointestinal: Negative for constipation, diarrhea, nausea and vomiting.   Endocrine: Negative for cold intolerance and heat intolerance.   Genitourinary: Negative for difficulty urinating, dysuria, frequency and urgency.   Musculoskeletal: Negative for arthralgias and myalgias.   Skin: Negative for color change and pallor.   Allergic/Immunologic: Negative for environmental allergies and food allergies.   Neurological: Positive for weakness. Negative for dizziness and syncope.   Psychiatric/Behavioral: Negative for confusion and sleep disturbance.        All pertinent negatives and positives are as above. All other systems have been reviewed and are negative unless otherwise stated.     Objective    Temp:  [96.8 °F (36 °C)-98.9 °F (37.2 °C)] 97.3 °F (36.3 °C)  Heart Rate:  [] 93  Resp:  [18] 18  BP: (117-158)/(55-71) 158/71    Physical Exam  Vitals reviewed.   Constitutional:       General: She is not in acute distress.     Appearance: She is  well-developed.   HENT:      Head: Normocephalic and atraumatic.      Nose: Nose normal.   Eyes:      Conjunctiva/sclera: Conjunctivae normal.   Cardiovascular:      Rate and Rhythm: Normal rate and regular rhythm.   Pulmonary:      Effort: Pulmonary effort is normal. No respiratory distress.      Breath sounds: Normal breath sounds. No wheezing or rales.   Musculoskeletal:         General: Normal range of motion.      Cervical back: Normal range of motion and neck supple.   Skin:     General: Skin is warm and dry.   Neurological:      Mental Status: She is alert and oriented to person, place, and time.   Psychiatric:         Behavior: Behavior normal.             Results Review:  I have reviewed the labs, radiology results, and diagnostic studies.    Laboratory Data:   Results from last 7 days   Lab Units 05/12/22  0650 05/11/22  0613 05/10/22  0709   SODIUM mmol/L 143 143 138   POTASSIUM mmol/L 4.6 4.4 4.1   CHLORIDE mmol/L 114* 114* 104   CO2 mmol/L 20.0* 22.0 21.0*   BUN mg/dL 19 27* 35*   CREATININE mg/dL 1.95* 2.27* 2.61*   GLUCOSE mg/dL 88 96 92   CALCIUM mg/dL 7.8* 7.6* 7.6*   BILIRUBIN mg/dL <0.2 <0.2 0.2   ALK PHOS U/L 100 98 124*   ALT (SGPT) U/L 24 30 44*   AST (SGOT) U/L 21 27 55*   ANION GAP mmol/L 9.0 7.0 13.0     Estimated Creatinine Clearance: 35.1 mL/min (A) (by C-G formula based on SCr of 1.95 mg/dL (H)).  Results from last 7 days   Lab Units 05/09/22  1709   MAGNESIUM mg/dL 2.5*         Results from last 7 days   Lab Units 05/12/22  1407 05/11/22  1054 05/10/22  0709 05/09/22  1709   WBC 10*3/mm3  --  10.50 7.66 7.92   HEMOGLOBIN g/dL 7.3* 7.9* 8.0* 7.7*   HEMATOCRIT % 23.0* 25.3* 25.8* 24.4*   PLATELETS 10*3/mm3  --  250 230 261     Results from last 7 days   Lab Units 05/09/22  1709   INR  1.90*       Culture Data:   No results found for: BLOODCX  No results found for: URINECX  No results found for: RESPCX  No results found for: WOUNDCX  No results found for: STOOLCX  No components found for:  BODYFLD    Radiology Data:   Imaging Results (Last 24 Hours)     Procedure Component Value Units Date/Time    US Renal Bilateral [[id number]] Resulted: 05/12/22 1158     Updated: 05/12/22 1254          I have reviewed the patient current medications.     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 Right LE weakness   -neurology consulted, appreciate recs, plans to continue Xarelto/ASA  -MRI reviewed, no acute issue  -PT, OT--will continue PT/OT at home   -reviewed carotid US  -echo reviewed   -last echo 2018 EF 60%, normal stress test around that time.   -will check duplex US to r/o DVT    2. Metastatic lung cancer  -on immunotherapy  -follows in [place]     3. TODD   -IVF  -creatinine 2.93 on admission  -improved to 1.95 today    4. Hx of DVT  -hypercoagulable due to cancer  -continue Xarelto   -baseline is around 7-9   -given her dizziness, will transfuse if Hb <8, risks/benefits discussed and they are agreeable     5. Hypotension  -secondary to dehydration   -continue IVF    6. Anemia  -Hb 7.3 with symptoms  -transfuse 1 unit               Discharge Planning: pending improvement     I confirmed that the patient's Advance Care Plan is present, code status is documented, or surrogate decision maker is listed in the patient's medical record.           This document has been electronically signed by Lou Lima MD on May 12, 2022 15:07 CDT

## 2022-05-12 NOTE — PLAN OF CARE
Goal Outcome Evaluation:  Plan of Care Reviewed With: patient        Progress: no change   VSS, 1 unit prbc given.

## 2022-05-12 NOTE — CONSULTS
[facility]  [address] [place] [address]. [address]  T - [phone]    [phone]     Consultation         PATIENT  DEMOGRAPHICS   PATIENT NAME: Adilene Morgan                      PHYSICIAN: SILVA Archer  : 1960  MRN: [id number]    Subjective   SUBJECTIVE   Referring Provider: Dr. Lima  Reason for Consultation: TODD on CKD 3  History of present illness: This is a 62-year-old female with a past medical history significant for chronic kidney disease stage III and lung cancer diagnosed last year and started on immunotherapy who presented to the hospital on the .  She presented with complaints of blurry vision in both of her eyes.  She also reported right leg weakness.  Reportedly she had a previous CVA in  and was concerned about repeat CVA.  On evaluation here she was found to have significant acute kidney injury as well.  After discussion with her today with recent history she has had Bactrim exposure at the end of April as well as started diuretics approximately 1 week prior to admission.  She did have nausea vomiting and diarrhea prior to admission as well as a fall.  She has undergone neurological evaluation for her right lower extremity weakness.  Today nephrology is consulted to manage TODD on CKD 3.    Past Medical History:   Diagnosis Date    Achilles bursitis     Achilles tendinitis     Acquired hypothyroidism     Allergic rhinitis     Allergy to meat     alphagal    Allergy to milk products     Arrhythmia     Asthmatic bronchitis     Asthmatic bronchitis     Bone spur     Calcaneal spur     Cancer (HCC)     Depressive disorder     Family history of thyroid problem     Herpes simplex     Hypermetropia     Hypothyroidism     Menopausal flushing     Menopause     Migraine     Otalgia     Pneumonia     Presbyopia     Stroke (HCC)     Trochanteric bursitis     UTI (urinary tract infection) 2018    Ventricular premature beats      Past Surgical  "History:   Procedure Laterality Date    COLONOSCOPY N/A 2/15/2017    Procedure: COLONOSCOPY;  Surgeon: Lupillo Ugarte MD;  Location: [facility] ENDOSCOPY;  Service:     COSMETIC SURGERY      plastic surgery to face x 4 r/t trauma    LAPAROSCOPIC CHOLECYSTECTOMY  12/10/1995    Cholecystectomy, laparoscopic (1)       OTHER SURGICAL HISTORY      Head surgery procedure (1)    plastic surgery on the face     OTHER SURGICAL HISTORY  10/17/2014    Repair Superficial Wound TR-EXT 2.5 < CM 48353 (1)        Family History   Problem Relation Age of Onset    Stroke Mother     Diabetes Father     Heart disease Father     Hypertension Father     Thyroid disease Sister     Breast cancer Maternal Aunt     Cancer Neg Hx         multiple in mothers family     Social History     Tobacco Use    Smoking status: Never Smoker    Smokeless tobacco: Never Used   Substance Use Topics    Alcohol use: No    Drug use: No     Allergies:  Azithromycin, Ceclor [cefaclor], Keflex [cephalexin], Lipitor [atorvastatin], Penicillins, and Zocor [simvastatin]     REVIEW OF SYSTEMS    Review of Systems   Cardiovascular: Positive for leg swelling.   All other systems reviewed and are negative.      Objective   OBJECTIVE   Vital Signs  Temp:  [96.8 °F (36 °C)-98.9 °F (37.2 °C)] 97.3 °F (36.3 °C)  Heart Rate:  [] 79  Resp:  [18] 18  BP: (117-138)/(55-60) 119/58    Flowsheet Rows      Flowsheet Row First Filed Value   Admission Height 167.6 cm (66\") Documented at 05/09/2022 1623   Admission Weight 97.5 kg (215 lb) Documented at 05/09/2022 1623             I/O last 3 completed shifts:  In: 3255 [P.O.:1320; I.V.:1935]  Out: 4350 [Urine:4350]    PHYSICAL EXAM    Physical Exam  Constitutional:       Appearance: She is well-developed. She is ill-appearing.   HENT:      Head: Normocephalic and atraumatic.   Eyes:      Conjunctiva/sclera: Conjunctivae normal.      Pupils: Pupils are equal, round, and reactive to light.   Cardiovascular:      Rate and Rhythm: " Normal rate and regular rhythm.   Pulmonary:      Effort: Pulmonary effort is normal.      Breath sounds: Normal breath sounds.   Abdominal:      Palpations: Abdomen is soft.   Musculoskeletal:      Cervical back: Neck supple.      Right lower leg: Edema present.      Left lower leg: Edema present.   Skin:     General: Skin is warm and dry.   Neurological:      Mental Status: She is alert and oriented to person, place, and time.   Psychiatric:         Mood and Affect: Mood normal.         Behavior: Behavior normal.         RESULTS   Results Review:    Results from last 7 days   Lab Units 05/12/22  0650 05/11/22  0613 05/10/22  0709   SODIUM mmol/L 143 143 138   POTASSIUM mmol/L 4.6 4.4 4.1   CHLORIDE mmol/L 114* 114* 104   CO2 mmol/L 20.0* 22.0 21.0*   BUN mg/dL 19 27* 35*   CREATININE mg/dL 1.95* 2.27* 2.61*   CALCIUM mg/dL 7.8* 7.6* 7.6*   BILIRUBIN mg/dL <0.2 <0.2 0.2   ALK PHOS U/L 100 98 124*   ALT (SGPT) U/L 24 30 44*   AST (SGOT) U/L 21 27 55*   GLUCOSE mg/dL 88 96 92       Estimated Creatinine Clearance: 35.1 mL/min (A) (by C-G formula based on SCr of 1.95 mg/dL (H)).    Results from last 7 days   Lab Units 05/09/22  1709   MAGNESIUM mg/dL 2.5*             Results from last 7 days   Lab Units 05/12/22  1407 05/11/22  1054 05/10/22  0709 05/09/22  1709   WBC 10*3/mm3  --  10.50 7.66 7.92   HEMOGLOBIN g/dL 7.3* 7.9* 8.0* 7.7*   PLATELETS 10*3/mm3  --  250 230 261       Results from last 7 days   Lab Units 05/09/22  1709   INR  1.90*        MEDICATIONS    aspirin, 81 mg, Oral, Daily  atorvastatin, 80 mg, Oral, Nightly  levothyroxine, 150 mcg, Oral, Q AM  multivitamin with minerals, 1 tablet, Oral, Daily  PATIENT SUPPLIED MEDICATION, 600 mg, Oral, Nightly  rivaroxaban, 20 mg, Oral, Daily With Dinner  sodium chloride, 10 mL, Intravenous, Q12H  topiramate, 50 mg, Oral, Nightly  venlafaxine XR, 37.5 mg, Oral, Daily      sodium bicarbonate drip less than/equal to 75 mEq/bag, 75 mEq, Last Rate: 100 mL/hr at 05/12/22  1418      Medications Prior to Admission   Medication Sig Dispense Refill Last Dose    aspirin 81 MG EC tablet Take 81 mg by mouth Daily.       entrectinib (ROZLYTREK) 200 MG capsule Take 600 mg by mouth Every Night.       fluticasone (FLONASE) 50 MCG/ACT nasal spray 1 spray into each nostril Daily As Needed.       levothyroxine (SYNTHROID, LEVOTHROID) 150 MCG tablet Take 150 mcg by mouth Daily.       loperamide (IMODIUM) 2 MG capsule Take 2 mg by mouth As Needed for Diarrhea.       loratadine (CLARITIN) 10 MG tablet Take 10 mg by mouth As Needed for Allergies.       multivitamin with minerals tablet tablet Take 1 tablet by mouth Daily.       ondansetron (ZOFRAN) 8 MG tablet Take 1 tablet by mouth Every 8 (Eight) Hours As Needed for Nausea or Vomiting. 90 tablet 1     prochlorperazine (COMPAZINE) 10 MG tablet Take 1 tablet by mouth Every 6 (Six) Hours As Needed for Nausea or Vomiting. 120 tablet 1     promethazine (PHENERGAN) 25 MG tablet Take 1 tablet by mouth Every 6 (Six) Hours As Needed for Nausea or Vomiting. 60 tablet 1     rivaroxaban (XARELTO) 20 MG tablet Take 1 tablet by mouth Daily. After finished with lower dose for 21 days 90 tablet 3     senna (SENOKOT) 8.6 MG tablet Take 1 tablet by mouth Daily. 2 tablets as needed 3 times a day x 30 days       solifenacin (VESICARE) 10 MG tablet TAKE 1 TABLET BY MOUTH EVERY DAY 90 tablet 0     SUMAtriptan (IMITREX) 20 MG/ACT nasal spray INSTILL 1 SPRAY INTO EACH NOSTRIL EVERY 2 HOURS AS NEEDED FOR MIGRAINE 6 each 5     topiramate (TOPAMAX) 50 MG tablet TAKE 1 TABLET BY MOUTH EVERY NIGHT. 90 tablet 3     traMADol (ULTRAM) 50 MG tablet Take 50 mg by mouth Every 8 (Eight) Hours As Needed for Moderate Pain  or Severe Pain .       venlafaxine XR (EFFEXOR-XR) 37.5 MG 24 hr capsule TAKE 1 CAPSULE BY MOUTH EVERY DAY 90 capsule 3      Assessment & Plan   ASSESSMENT / PLAN      Acquired hypothyroidism    Adenosquamous carcinoma of right lung (HCC)    TODD (acute kidney injury)  (HCC)    Transient ischemic attack (TIA)    1.  TODD on CKD 3- Baseline creatinine around 1.4, creatinine was up to 2.93 on admission and has improved to 1.95 so far.  Creatinine was at baseline as recently as May 4.  Etiology of TODD is not immediately apparent.  She did have Lasix but does not appear dehydrated.  She tolerated Bactrim exposure in April with documented stable labs following that.  Her CK was elevated on admission and will need to be rechecked.    -At present we will add sodium bicarbonate drip, check urine studies, check ultrasound.  We will check CK, uric acid, and add albumin as well.    -In literature review we found her entrectinib is associated with increased serum creatinine which could be the source of her acute kidney injury.    2.  Metabolic acidosis- sodium bicarbonate drip as above    3.  Metastatic lung cancer- on immunotherapy, follows in [place]    4.  History of DVT    6.  Hypotension- resolved    7.  Right lower extremity weakness- manage per primary team, also had neurology evaluation      Thank you for the consult, we will continue to follow the patient.         I discussed the patients findings and my recommendations with patient and family      This document has been electronically signed by SILVA Archer on May 12, 2022 14:39 CDT      For this patient encounter, I have reviewed the Nurse Practitioner's documentation, medical decision making, and treatment plan and personally spent time with the patient.

## 2022-05-12 NOTE — PLAN OF CARE
Goal Outcome Evaluation:  Plan of Care Reviewed With: patient        Progress: improving  Outcome Evaluation: nursing OK'd OT pt agreeable sup<>sit mod i, sit<>stand cga pt amb ~ 6 ft and returned to bed nursing present to start IV OT tx ended   cont poc

## 2022-05-12 NOTE — THERAPY TREATMENT NOTE
Patient Name: Adilene Morgan  : 1960    MRN: [id number]                              Today's Date: 2022       Admit Date: 2022    Visit Dx:     ICD-10-CM ICD-9-CM   1. TODD (acute kidney injury) (HCC)  N17.9 584.9   2. Blurred vision  H53.8 368.8   3. Right leg weakness  R29.898 729.89   4. Impaired mobility and ADLs  Z74.09 V49.89    Z78.9    5. Impaired functional mobility, balance, gait, and endurance  Z74.09 V49.89     Patient Active Problem List   Diagnosis   • Acquired hypothyroidism   • Depressive disorder   • Migraine   • PVC (premature ventricular contraction)   • Palpitation   • Pain of fifth toe   • Adenosquamous carcinoma of right lung (HCC)   • TODD (acute kidney injury) (HCC)   • Transient ischemic attack (TIA)     Past Medical History:   Diagnosis Date   • Achilles bursitis    • Achilles tendinitis    • Acquired hypothyroidism    • Allergic rhinitis    • Allergy to meat     alphagal   • Allergy to milk products    • Arrhythmia    • Asthmatic bronchitis    • Asthmatic bronchitis    • Bone spur    • Calcaneal spur    • Cancer (HCC)    • Depressive disorder    • Family history of thyroid problem    • Herpes simplex    • Hypermetropia    • Hypothyroidism    • Menopausal flushing    • Menopause    • Migraine    • Otalgia    • Pneumonia    • Presbyopia    • Stroke (HCC)    • Trochanteric bursitis    • UTI (urinary tract infection) 2018   • Ventricular premature beats      Past Surgical History:   Procedure Laterality Date   • COLONOSCOPY N/A 2/15/2017    Procedure: COLONOSCOPY;  Surgeon: Lupillo Ugarte MD;  Location: [facility] ENDOSCOPY;  Service:    • COSMETIC SURGERY      plastic surgery to face x 4 r/t trauma   • LAPAROSCOPIC CHOLECYSTECTOMY  12/10/1995    Cholecystectomy, laparoscopic (1)      • OTHER SURGICAL HISTORY      Head surgery procedure (1)    plastic surgery on the face    • OTHER SURGICAL HISTORY  10/17/2014    Repair Superficial Wound TR-EXT 2.5 < CM 61730 (1)          General Information     Row Name 05/12/22 [facility]          OT Time and Intention    Document Type therapy note (daily note)  -[facility] Name 05/12/22 [facility]7          General Information    Patient Profile Reviewed yes  -     Existing Precautions/Restrictions fall  -[facility] Name 05/12/22 1417          Cognition    Orientation Status (Cognition) oriented x 4  -RC     Row Name 05/12/22 [facility]7          Safety Issues, Functional Mobility    Impairments Affecting Function (Mobility) endurance/activity tolerance;balance;pain;strength  -           User Key  (r) = Recorded By, (t) = Taken By, (c) = Cosigned By    Initials Name Provider Type     Nedra Lanier COTA Occupational Therapist Assistant                 Mobility/ADL's    No documentation.                Obj/Interventions    No documentation.                Goals/Plan     Row Name 05/12/22 1417          Transfer Goal 1 (OT)    Activity/Assistive Device (Transfer Goal 1, OT) toilet  -RC     [place] Level/Cues Needed (Transfer Goal 1, OT) modified independence  -RC     Time Frame (Transfer Goal 1, OT) long term goal (LTG);by discharge  -RC     Progress/Outcome (Transfer Goal 1, OT) goal not met  -RC     Row Name 05/12/22 1417          Bathing Goal 1 (OT)    Activity/Device (Bathing Goal 1, OT) lower body bathing  -RC     [place] Level/Cues Needed (Bathing Goal 1, OT) modified independence  -RC     Time Frame (Bathing Goal 1, OT) long term goal (LTG);by discharge  -RC     Progress/Outcomes (Bathing Goal 1, OT) goal not met  -RC     Row Name 05/12/22 1417          Dressing Goal 1 (OT)    Activity/Device (Dressing Goal 1, OT) lower body dressing  -RC     [place]/Cues Needed (Dressing Goal 1, OT) independent  -RC     Time Frame (Dressing Goal 1, OT) long term goal (LTG);by discharge  -RC     Progress/Outcome (Dressing Goal 1, OT) goal not met  -RC     Row Name 05/12/22 [facility]7          Toileting Goal 1 (OT)    Activity/Device (Toileting Goal 1, OT)  toileting skills, all  -RC     [place] Level/Cues Needed (Toileting Goal 1, OT) independent  -RC     Time Frame (Toileting Goal 1, OT) long term goal (LTG);by discharge  -     Progress/Outcome (Toileting Goal 1, OT) goal not met  -           User Key  (r) = Recorded By, (t) = Taken By, (c) = Cosigned By    Initials Name Provider Type    Nedra Walden COTA Occupational Therapist Assistant               Clinical Impression     Row Name 05/12/22 1417          Pain Assessment    Pretreatment Pain Rating 5/10  -RC     Posttreatment Pain Rating 5/10  -RC     Pain Location - back  -     Pre/Posttreatment Pain Comment pt reports she has compression fx in mid back  -     Row Name 05/12/22 1417          Therapy Assessment/Plan (OT)    Rehab Potential (OT) good, to achieve stated therapy goals  -     Criteria for Skilled Therapeutic Interventions Met (OT) yes;skilled treatment is necessary  -     Therapy Frequency (OT) other (see comments)  5-7 d/wk  -     Row Name 05/12/22 1417          Therapy Plan Review/Discharge Plan (OT)    Anticipated Discharge Disposition (OT) home with 24/7 care  -           User Key  (r) = Recorded By, (t) = Taken By, (c) = Cosigned By    Initials Name Provider Type    Nedra Walden COTA Occupational Therapist Assistant               Outcome Measures     Row Name 05/12/22 1417          How much help from another is currently needed...    Putting on and taking off regular lower body clothing? 3  -RC     Bathing (including washing, rinsing, and drying) 3  -RC     Toileting (which includes using toilet bed pan or urinal) 3  -RC     Putting on and taking off regular upper body clothing 3  -RC     Taking care of personal grooming (such as brushing teeth) 4  -RC     Eating meals 4  -RC     AM-PAC 6 Clicks Score (OT) 20  -     Row Name 05/12/22 1417          Functional Assessment    Outcome Measure Options AM-PAC 6 Clicks Daily Activity (OT)  -           User Key  (r)  = Recorded By, (t) = Taken By, (c) = Cosigned By    Initials Name Provider Type     Nedra Lanier COTA Occupational Therapist Assistant                Occupational Therapy Education                 Title: PT OT SLP Therapies (In Progress)     Topic: Occupational Therapy (In Progress)     Point: ADL training (In Progress)     Description:   Instruct learner(s) on proper safety adaptation and remediation techniques during self care or transfers.   Instruct in proper use of assistive devices.              Learning Progress Summary           Patient Acceptance, E, NR by  at 5/11/2022 1139                   Point: Home exercise program (Not Started)     Description:   Instruct learner(s) on appropriate technique for monitoring, assisting and/or progressing therapeutic exercises/activities.              Learner Progress:  Not documented in this visit.          Point: Precautions (In Progress)     Description:   Instruct learner(s) on prescribed precautions during self-care and functional transfers.              Learning Progress Summary           Patient Acceptance, E, NR by  at 5/12/2022 1440    Acceptance, E, NR by  at 5/11/2022 1139    Acceptance, E,TB, VU,NR by  at 5/10/2022 1315    Comment: POC, role of OT, transfer training                   Point: Body mechanics (In Progress)     Description:   Instruct learner(s) on proper positioning and spine alignment during self-care, functional mobility activities and/or exercises.              Learning Progress Summary           Patient Acceptance, E, NR by  at 5/12/2022 1440    Acceptance, E, NR by  at 5/11/2022 1139    Acceptance, E,TB, VU,NR by  at 5/10/2022 1315    Comment: POC, role of OT, transfer training                               User Key     Initials Effective Dates Name Provider Type [facility] 06/16/21 -  Nedra Lanier COTA Occupational Therapist Assistant OT     06/14/21 -  Pardeep Dasilva OT Occupational Therapist OT               OT Recommendation and Plan  Therapy Frequency (OT): other (see comments) (5-7 d/wk)  Plan of Care Review  Plan of Care Reviewed With: patient  Progress: improving  Outcome Evaluation: nursing OK'd OT pt agreeable sup<>sit mod i, sit<>stand cga pt amb ~ 6 ft and returned to bed nursing present to start IV OT tx ended   cont poc     Time Calculation:    Time Calculation- OT     Row Name 05/12/22 1434             Time Calculation- OT    OT Start Time 1417  -RC      OT Stop Time 1429  -RC      OT Time Calculation (min) 12 min  -RC      Total Timed Code Minutes- OT 12 minute(s)  -RC      OT Received On 05/12/22  -RC              Timed Charges    06720 - OT Therapeutic Activity Minutes 12  -RC              Total Minutes    Timed Charges Total Minutes 12  -RC       Total Minutes 12  -RC            User Key  (r) = Recorded By, (t) = Taken By, (c) = Cosigned By    Initials Name Provider Type    RC Nedra Lanier FIGUEROA Occupational Therapist Assistant              Therapy Charges for Today     Code Description Service Date Service Provider Modifiers Qty    31725725719 HC OT SELF CARE/MGMT/TRAIN EA 15 MIN 5/11/2022 Nedra Lanier COTA GO 2    50249735425 HC OT THERAPEUTIC ACT EA 15 MIN 5/12/2022 Nedra Lanier FIGUEROA GO 1               CAROLINA Sheffield  5/12/2022

## 2022-05-12 NOTE — PLAN OF CARE
Problem: Adjustment to Illness (Stroke, Ischemic/Transient Ischemic Attack)  Goal: Optimal Coping  Outcome: Ongoing, Progressing     Problem: Bowel Elimination Impaired (Stroke, Ischemic/Transient Ischemic Attack)  Goal: Effective Bowel Elimination  Outcome: Ongoing, Progressing     Problem: Cerebral Tissue Perfusion (Stroke, Ischemic/Transient Ischemic Attack)  Goal: Optimal Cerebral Tissue Perfusion  Outcome: Ongoing, Progressing     Problem: Cognitive Impairment (Stroke, Ischemic/Transient Ischemic Attack)  Goal: Optimal Cognitive Function  Outcome: Ongoing, Progressing     Problem: Communication Impairment (Stroke, Ischemic/Transient Ischemic Attack)  Goal: Improved Communication Skills  Outcome: Ongoing, Progressing     Problem: Functional Ability Impaired (Stroke, Ischemic/Transient Ischemic Attack)  Goal: Optimal Functional Ability  Outcome: Ongoing, Progressing  Intervention: Optimize Functional Ability  Recent Flowsheet Documentation  Taken 5/12/2022 0430 by Mandi Bal RN  Activity Management: activity adjusted per tolerance  Taken 5/12/2022 0220 by Mandi Bal RN  Activity Management: activity adjusted per tolerance  Taken 5/12/2022 0025 by Mandi Bal, RN  Activity Management: activity adjusted per tolerance  Taken 5/11/2022 2220 by Mnadi Bal RN  Activity Management: activity adjusted per tolerance  Taken 5/11/2022 2105 by Mandi Bal RN  Activity Management: activity adjusted per tolerance  Taken 5/11/2022 2026 by Mandi Bal RN  Activity Management: ambulated in room     Problem: Respiratory Compromise (Stroke, Ischemic/Transient Ischemic Attack)  Goal: Effective Oxygenation and Ventilation  Outcome: Ongoing, Progressing  Intervention: Optimize Oxygenation and Ventilation  Recent Flowsheet Documentation  Taken 5/11/2022 2220 by Mandi Bal RN  Head of Bed (HOB) Positioning: HOB lowered  Taken 5/11/2022 2026 by Mandi Bal RN  Head of Bed (HOB) Positioning: HOB  elevated     Problem: Sensorimotor Impairment (Stroke, Ischemic/Transient Ischemic Attack)  Goal: Improved Sensorimotor Function  Outcome: Ongoing, Progressing  Intervention: Optimize Range of Motion, Motor Control and Function  Recent Flowsheet Documentation  Taken 5/11/2022 2220 by Mandi Bal RN  Positioning/Transfer Devices: pillows  Taken 5/11/2022 2026 by Mandi Bal RN  Positioning/Transfer Devices: pillows     Problem: Swallowing Impairment (Stroke, Ischemic/Transient Ischemic Attack)  Goal: Optimal Eating and Swallowing without Aspiration  Outcome: Ongoing, Progressing     Problem: Urinary Elimination Impaired (Stroke, Ischemic/Transient Ischemic Attack)  Goal: Effective Urinary Elimination  Outcome: Ongoing, Progressing     Problem: Fall Injury Risk  Goal: Absence of Fall and Fall-Related Injury  Outcome: Ongoing, Progressing  Intervention: Promote Injury-Free Environment  Recent Flowsheet Documentation  Taken 5/12/2022 0430 by Mandi Bal RN  Safety Promotion/Fall Prevention: safety round/check completed  Taken 5/12/2022 0220 by Mandi Bal RN  Safety Promotion/Fall Prevention: safety round/check completed  Taken 5/12/2022 0025 by Mandi Bal RN  Safety Promotion/Fall Prevention: safety round/check completed  Taken 5/11/2022 2220 by Mandi Bal RN  Safety Promotion/Fall Prevention: safety round/check completed  Taken 5/11/2022 2105 by Mandi Bal RN  Safety Promotion/Fall Prevention: safety round/check completed  Taken 5/11/2022 2026 by Mandi Bal RN  Safety Promotion/Fall Prevention: safety round/check completed     Problem: Adult Inpatient Plan of Care  Goal: Plan of Care Review  Outcome: Ongoing, Progressing  Flowsheets  Taken 5/12/2022 0516 by Mandi Bal RN  Progress: improving  Taken 5/11/2022 1848 by Veronique Zhu RN  Plan of Care Reviewed With: patient  Goal: Patient-Specific Goal (Individualized)  Outcome: Ongoing, Progressing  Goal: Absence of  Hospital-Acquired Illness or Injury  Outcome: Ongoing, Progressing  Intervention: Identify and Manage Fall Risk  Recent Flowsheet Documentation  Taken 5/12/2022 0430 by Mandi Bal RN  Safety Promotion/Fall Prevention: safety round/check completed  Taken 5/12/2022 0220 by Mandi Bal RN  Safety Promotion/Fall Prevention: safety round/check completed  Taken 5/12/2022 0025 by Mandi Bal, RN  Safety Promotion/Fall Prevention: safety round/check completed  Taken 5/11/2022 2220 by Mandi Bal RN  Safety Promotion/Fall Prevention: safety round/check completed  Taken 5/11/2022 2105 by Mandi Bal RN  Safety Promotion/Fall Prevention: safety round/check completed  Taken 5/11/2022 2026 by Mandi Bal RN  Safety Promotion/Fall Prevention: safety round/check completed  Intervention: Prevent Skin Injury  Recent Flowsheet Documentation  Taken 5/12/2022 0430 by Mandi Bal RN  Body Position:   left   tilted  Taken 5/12/2022 0220 by Mandi Bal, RN  Body Position: supine, legs elevated  Taken 5/12/2022 0025 by Mandi Bal, RN  Body Position:   right   tilted  Taken 5/11/2022 2220 by Mandi Bal, RN  Body Position: supine, legs elevated  Taken 5/11/2022 2026 by Mandi Bal, RN  Body Position: position changed independently  Intervention: Prevent and Manage VTE (Venous Thromboembolism) Risk  Recent Flowsheet Documentation  Taken 5/12/2022 0430 by Mandi Bal RN  Activity Management: activity adjusted per tolerance  Taken 5/12/2022 0220 by Mandi Bal RN  Activity Management: activity adjusted per tolerance  Taken 5/12/2022 0025 by Mandi Bal RN  Activity Management: activity adjusted per tolerance  Taken 5/11/2022 2220 by Mandi Bal RN  Activity Management: activity adjusted per tolerance  Taken 5/11/2022 2105 by Mandi Bal, RN  Activity Management: activity adjusted per tolerance  Taken 5/11/2022 2026 by Mandi Bal, RN  Activity Management: ambulated in room  VTE  Prevention/Management:   bilateral   sequential compression devices off  Goal: Optimal Comfort and Wellbeing  Outcome: Ongoing, Progressing  Intervention: Monitor Pain and Promote Comfort  Recent Flowsheet Documentation  Taken 5/11/2022 2026 by Mandi Bal RN  Pain Management Interventions: see MAR  Intervention: Provide Person-Centered Care  Recent Flowsheet Documentation  Taken 5/11/2022 2026 by Mandi Bal, RN  Trust Relationship/Rapport: care explained  Goal: Readiness for Transition of Care  Outcome: Ongoing, Progressing     Problem: Fluid and Electrolyte Imbalance (Acute Kidney Injury/Impairment)  Goal: Fluid and Electrolyte Balance  Outcome: Ongoing, Progressing     Problem: Oral Intake Inadequate (Acute Kidney Injury/Impairment)  Goal: Optimal Nutrition Intake  Outcome: Ongoing, Progressing     Problem: Renal Function Impairment (Acute Kidney Injury/Impairment)  Goal: Effective Renal Function  Outcome: Ongoing, Progressing   Goal Outcome Evaluation:  Plan of Care Reviewed With: patient        Progress: improving

## 2022-05-13 ENCOUNTER — APPOINTMENT (OUTPATIENT)
Dept: GENERAL RADIOLOGY | Facility: HOSPITAL | Age: 62
End: 2022-05-13

## 2022-05-13 LAB
ALBUMIN SERPL-MCNC: 3.4 G/DL (ref 3.5–5.2)
ALBUMIN/GLOB SERPL: 1.2 G/DL
ALP SERPL-CCNC: 105 U/L (ref 39–117)
ALT SERPL W P-5'-P-CCNC: 26 U/L (ref 1–33)
ANION GAP SERPL CALCULATED.3IONS-SCNC: 9 MMOL/L (ref 5–15)
AST SERPL-CCNC: 26 U/L (ref 1–32)
BASOPHILS # BLD AUTO: 0.04 10*3/MM3 (ref 0–0.2)
BASOPHILS NFR BLD AUTO: 0.5 % (ref 0–1.5)
BILIRUB SERPL-MCNC: 0.2 MG/DL (ref 0–1.2)
BUN SERPL-MCNC: 21 MG/DL (ref 8–23)
BUN/CREAT SERPL: 10.1 (ref 7–25)
CALCIUM SPEC-SCNC: 8.2 MG/DL (ref 8.6–10.5)
CHLORIDE SERPL-SCNC: 111 MMOL/L (ref 98–107)
CK SERPL-CCNC: 130 U/L (ref 20–180)
CO2 SERPL-SCNC: 22 MMOL/L (ref 22–29)
CREAT SERPL-MCNC: 2.08 MG/DL (ref 0.57–1)
DEPRECATED RDW RBC AUTO: 53.1 FL (ref 37–54)
EGFRCR SERPLBLD CKD-EPI 2021: 26.3 ML/MIN/1.73
EOSINOPHIL # BLD AUTO: 0.63 10*3/MM3 (ref 0–0.4)
EOSINOPHIL NFR BLD AUTO: 8.2 % (ref 0.3–6.2)
ERYTHROCYTE [DISTWIDTH] IN BLOOD BY AUTOMATED COUNT: 16.6 % (ref 12.3–15.4)
GLOBULIN UR ELPH-MCNC: 2.8 GM/DL
GLUCOSE SERPL-MCNC: 94 MG/DL (ref 65–99)
HCT VFR BLD AUTO: 25 % (ref 34–46.6)
HGB BLD-MCNC: 8 G/DL (ref 12–15.9)
IMM GRANULOCYTES # BLD AUTO: 0.03 10*3/MM3 (ref 0–0.05)
IMM GRANULOCYTES NFR BLD AUTO: 0.4 % (ref 0–0.5)
LYMPHOCYTES # BLD AUTO: 0.72 10*3/MM3 (ref 0.7–3.1)
LYMPHOCYTES NFR BLD AUTO: 9.3 % (ref 19.6–45.3)
MCH RBC QN AUTO: 28.2 PG (ref 26.6–33)
MCHC RBC AUTO-ENTMCNC: 32 G/DL (ref 31.5–35.7)
MCV RBC AUTO: 88 FL (ref 79–97)
MONOCYTES # BLD AUTO: 0.54 10*3/MM3 (ref 0.1–0.9)
MONOCYTES NFR BLD AUTO: 7 % (ref 5–12)
NEUTROPHILS NFR BLD AUTO: 5.76 10*3/MM3 (ref 1.7–7)
NEUTROPHILS NFR BLD AUTO: 74.6 % (ref 42.7–76)
NRBC BLD AUTO-RTO: 0 /100 WBC (ref 0–0.2)
PLATELET # BLD AUTO: 242 10*3/MM3 (ref 140–450)
PMV BLD AUTO: 10.2 FL (ref 6–12)
POTASSIUM SERPL-SCNC: 4.7 MMOL/L (ref 3.5–5.2)
PROT SERPL-MCNC: 6.2 G/DL (ref 6–8.5)
QT INTERVAL: 360 MS
QTC INTERVAL: 435 MS
RBC # BLD AUTO: 2.84 10*6/MM3 (ref 3.77–5.28)
SODIUM SERPL-SCNC: 142 MMOL/L (ref 136–145)
URATE SERPL-MCNC: 7 MG/DL (ref 2.4–5.7)
WBC NRBC COR # BLD: 7.72 10*3/MM3 (ref 3.4–10.8)

## 2022-05-13 PROCEDURE — P9041 ALBUMIN (HUMAN),5%, 50ML: HCPCS | Performed by: NURSE PRACTITIONER

## 2022-05-13 PROCEDURE — 97110 THERAPEUTIC EXERCISES: CPT

## 2022-05-13 PROCEDURE — G0378 HOSPITAL OBSERVATION PER HR: HCPCS

## 2022-05-13 PROCEDURE — 97535 SELF CARE MNGMENT TRAINING: CPT

## 2022-05-13 PROCEDURE — 96366 THER/PROPH/DIAG IV INF ADDON: CPT

## 2022-05-13 PROCEDURE — 25010000002 ALBUMIN HUMAN 5% PER 50 ML: Performed by: NURSE PRACTITIONER

## 2022-05-13 PROCEDURE — 63710000001 ONDANSETRON PER 8 MG: Performed by: FAMILY MEDICINE

## 2022-05-13 PROCEDURE — 96367 TX/PROPH/DG ADDL SEQ IV INF: CPT

## 2022-05-13 PROCEDURE — 80053 COMPREHEN METABOLIC PANEL: CPT | Performed by: FAMILY MEDICINE

## 2022-05-13 PROCEDURE — 96376 TX/PRO/DX INJ SAME DRUG ADON: CPT

## 2022-05-13 PROCEDURE — 25010000002 FUROSEMIDE PER 20 MG: Performed by: INTERNAL MEDICINE

## 2022-05-13 PROCEDURE — 82550 ASSAY OF CK (CPK): CPT | Performed by: NURSE PRACTITIONER

## 2022-05-13 PROCEDURE — 84550 ASSAY OF BLOOD/URIC ACID: CPT | Performed by: NURSE PRACTITIONER

## 2022-05-13 PROCEDURE — 97530 THERAPEUTIC ACTIVITIES: CPT

## 2022-05-13 PROCEDURE — 71046 X-RAY EXAM CHEST 2 VIEWS: CPT

## 2022-05-13 PROCEDURE — 85025 COMPLETE CBC W/AUTO DIFF WBC: CPT | Performed by: STUDENT IN AN ORGANIZED HEALTH CARE EDUCATION/TRAINING PROGRAM

## 2022-05-13 RX ORDER — FUROSEMIDE 10 MG/ML
20 INJECTION INTRAMUSCULAR; INTRAVENOUS ONCE
Status: COMPLETED | OUTPATIENT
Start: 2022-05-13 | End: 2022-05-13

## 2022-05-13 RX ADMIN — RIVAROXABAN 20 MG: 10 TABLET, FILM COATED ORAL at 17:20

## 2022-05-13 RX ADMIN — Medication 1 TABLET: at 08:49

## 2022-05-13 RX ADMIN — MELATONIN 5.25 MG: 3 TAB ORAL at 20:53

## 2022-05-13 RX ADMIN — FUROSEMIDE 20 MG: 10 INJECTION, SOLUTION INTRAMUSCULAR; INTRAVENOUS at 14:47

## 2022-05-13 RX ADMIN — ONDANSETRON HYDROCHLORIDE 4 MG: 4 TABLET, FILM COATED ORAL at 17:20

## 2022-05-13 RX ADMIN — Medication 10 ML: at 08:50

## 2022-05-13 RX ADMIN — VENLAFAXINE HYDROCHLORIDE 37.5 MG: 37.5 CAPSULE, EXTENDED RELEASE ORAL at 08:50

## 2022-05-13 RX ADMIN — SODIUM BICARBONATE 75 MEQ: 84 INJECTION, SOLUTION INTRAVENOUS at 13:39

## 2022-05-13 RX ADMIN — SODIUM BICARBONATE 75 MEQ: 84 INJECTION, SOLUTION INTRAVENOUS at 05:34

## 2022-05-13 RX ADMIN — ASPIRIN 81 MG: 81 TABLET, FILM COATED ORAL at 08:49

## 2022-05-13 RX ADMIN — LEVOTHYROXINE SODIUM 150 MCG: 150 TABLET ORAL at 05:11

## 2022-05-13 RX ADMIN — TOPIRAMATE 50 MG: 50 TABLET, FILM COATED ORAL at 08:49

## 2022-05-13 RX ADMIN — ALBUMIN HUMAN 250 ML: 0.05 INJECTION, SOLUTION INTRAVENOUS at 08:53

## 2022-05-13 RX ADMIN — FUROSEMIDE 5 MG/HR: 10 INJECTION, SOLUTION INTRAMUSCULAR; INTRAVENOUS at 14:47

## 2022-05-13 NOTE — PLAN OF CARE
Goal Outcome Evaluation:  Plan of Care Reviewed With: patient        Progress: improving  Outcome Evaluation: pt feeling better this visit than yesterday pm. mod ind with bed mob and stands with sba .uses fww for gt x 360' cga and then 4-6 stairs with cga and tucker hr. back to room with family at bedside. cont with rehab.

## 2022-05-13 NOTE — NURSING NOTE
Provider placed order allowing Pt to take home-supplied medicine called Entrectnib. The medicine is part of her cancer immunotherapy. Pt's spouse does not want to bring medicine bottle in for pharmacy to identify and label because it is very expensive and should not be handled. He has been bringing in one dose at night for Pt to take. Pharmacy was informed of family's decision.

## 2022-05-13 NOTE — THERAPY TREATMENT NOTE
Acute Care - Physical Therapy Treatment Note  [facility]     Patient Name: Adilene Morgan  : 1960  MRN: [id number]  Today's Date: 2022      Visit Dx:     ICD-10-CM ICD-9-CM   1. TODD (acute kidney injury) (HCC)  N17.9 584.9   2. Blurred vision  H53.8 368.8   3. Right leg weakness  R29.898 729.89   4. Impaired mobility and ADLs  Z74.09 V49.89    Z78.9    5. Impaired functional mobility, balance, gait, and endurance  Z74.09 V49.89     Patient Active Problem List   Diagnosis   • Acquired hypothyroidism   • Depressive disorder   • Migraine   • PVC (premature ventricular contraction)   • Palpitation   • Pain of fifth toe   • Adenosquamous carcinoma of right lung (HCC)   • TODD (acute kidney injury) (HCC)   • Transient ischemic attack (TIA)     Past Medical History:   Diagnosis Date   • Achilles bursitis    • Achilles tendinitis    • Acquired hypothyroidism    • Allergic rhinitis    • Allergy to meat     alphagal   • Allergy to milk products    • Arrhythmia    • Asthmatic bronchitis    • Asthmatic bronchitis    • Bone spur    • Calcaneal spur    • Cancer (HCC)    • Depressive disorder    • Family history of thyroid problem    • Herpes simplex    • Hypermetropia    • Hypothyroidism    • Menopausal flushing    • Menopause    • Migraine    • Otalgia    • Pneumonia    • Presbyopia    • Stroke (HCC)    • Trochanteric bursitis    • UTI (urinary tract infection) 2018   • Ventricular premature beats      Past Surgical History:   Procedure Laterality Date   • COLONOSCOPY N/A 2/15/2017    Procedure: COLONOSCOPY;  Surgeon: Lupillo Ugarte MD;  Location: [facility] ENDOSCOPY;  Service:    • COSMETIC SURGERY      plastic surgery to face x 4 r/t trauma   • LAPAROSCOPIC CHOLECYSTECTOMY  12/10/1995    Cholecystectomy, laparoscopic (1)      • OTHER SURGICAL HISTORY      Head surgery procedure (1)    plastic surgery on the face    • OTHER SURGICAL HISTORY  10/17/2014    Repair Superficial Wound TR-EXT 2.5 < CM 65032  (1)        PT Assessment (last 12 hours)     PT Evaluation and Treatment     Row Name 05/13/22 1031          Physical Therapy Time and Intention    Subjective Information no complaints  -RW     Document Type therapy note (daily note)  -RW     Mode of Treatment physical therapy  -RW     Patient Effort good  -RW     Row Name 05/13/22 1031          General Information    Patient Profile Reviewed yes  -RW     Existing Precautions/Restrictions fall  -RW     Row Name 05/13/22 1031          Pain    Pre/Posttreatment Pain Comment gave no pain rating  -RW     Row Name 05/13/22 1031          Cognition    Orientation Status (Cognition) oriented x 4  -RW     Row Name 05/13/22 1031          Range of Motion Comprehensive    General Range of Motion bilateral lower extremity ROM WFL  -RW     Row Name 05/13/22 1031          Bed Mobility    Bed Mobility supine-sit;sit-supine  -RW     Supine-Sit [place] (Bed Mobility) modified independence  -RW     Sit-Supine [place] (Bed Mobility) modified independence  -RW     Assistive Device (Bed Mobility) head of bed elevated  -RW     Comment, (Bed Mobility) keeps lower bed gatched and elevated  -RW     Row Name 05/13/22 1031          Transfers    Sit-Stand [place] (Transfers) supervision  -RW     Row Name 05/13/22 1031          Gait/Stairs (Locomotion)    [place] Level (Gait) contact guard  -RW     Assistive Device (Gait) walker, front-wheeled  -RW     Distance in Feet (Gait) 360  -RW     Pattern (Gait) step-through  -RW     Deviations/Abnormal Patterns (Gait) mack decreased;gait speed decreased;stride length decreased  -RW     [place] Level (Stairs) contact guard  -RW     Handrail Location (Stairs) both sides  -RW     Number of Steps (Stairs) 4-6  -RW     Ascending Technique (Stairs) step-over-step  -RW     Descending Technique (Stairs) step-to-step  -RW     Row Name 05/13/22 1031          Safety Issues, Functional Mobility    Impairments Affecting Function  (Mobility) endurance/activity tolerance;balance;pain;strength  -RW     Row Name 05/13/22 1031          Vital Signs    Pre Systolic BP Rehab 117  -RW     Pre Treatment Diastolic BP 69  -RW     Post Systolic BP Rehab 163  -RW     Post Treatment Diastolic BP 70  -RW     Pretreatment Heart Rate (beats/min) 82  -RW     Pre SpO2 (%) 94  -RW     O2 Delivery Pre Treatment room air  -RW     Row Name 05/13/22 1031          Positioning and Restraints    Pre-Treatment Position in bed  -RW     Post Treatment Position bed  -RW     In Bed with family/caregiver;legs elevated;call light within reach;encouraged to call for assist  -RW     Row Name 05/13/22 1031          Therapy Assessment/Plan (PT)    Rehab Potential (PT) good, to achieve stated therapy goals  -RW     Criteria for Skilled Interventions Met (PT) yes;skilled treatment is necessary  -RW     Therapy Frequency (PT) 5 times/wk  -RW     Row Name 05/13/22 1031          Bed Mobility Goal 1 (PT)    Activity/Assistive Device (Bed Mobility Goal 1, PT) sit to supine/supine to sit  -RW     [place] Level/Cues Needed (Bed Mobility Goal 1, PT) independent  -RW     Time Frame (Bed Mobility Goal 1, PT) by discharge  -RW     Strategies/Barriers (Bed Mobility Goal 1, PT) HOB flat, no bed rails.  -RW     Progress/Outcomes (Bed Mobility Goal 1, PT) goal not met  -     Row Name 05/13/22 1031          Transfer Goal 1 (PT)    Activity/Assistive Device (Transfer Goal 1, PT) sit-to-stand/stand-to-sit;bed-to-chair/chair-to-bed  -RW     [place] Level/Cues Needed (Transfer Goal 1, PT) modified independence  -RW     Time Frame (Transfer Goal 1, PT) by discharge  -RW     Strategies/Barriers (Transfers Goal 1, PT) Encourage use of walker.  -RW     Progress/Outcome (Transfer Goal 1, PT) goal not met  -     Row Name 05/13/22 1031          Gait Training Goal 1 (PT)    Activity/Assistive Device (Gait Training Goal 1, PT) walker, rolling  -RW     [place] Level (Gait Training Goal 1, PT)  modified independence  -RW     Distance (Gait Training Goal 1, PT) 100'x2.  -RW     Time Frame (Gait Training Goal 1, PT) by discharge  -RW     Strategies/Barriers (Gait Training Goal 1, PT) Encourage use of walker.  -RW     Progress/Outcome (Gait Training Goal 1, PT) goal partially met  -RW     Row Name 05/13/22 1031          Stairs Goal 1 (PT)    Activity/Assistive Device (Stairs Goal 1, PT) using handrail, right  -RW     [place] Level/Cues Needed (Stairs Goal 1, PT) supervision required  -RW     Number of Stairs (Stairs Goal 1, PT) 7 steps to bedroom upstairs.  -RW     Time Frame (Stairs Goal 1, PT) by discharge  -RW     Progress/Outcome (Stairs Goal 1, PT) goal partially met;continuing progress toward goal  -RW           User Key  (r) = Recorded By, (t) = Taken By, (c) = Cosigned By    Initials Name Provider Type    RW Carmelo Mcintosh, PTA Physical Therapist Assistant                Physical Therapy Education                 Title: PT OT SLP Therapies (In Progress)     Topic: Physical Therapy (In Progress)     Point: Mobility training (Not Started)     Learner Progress:  Not documented in this visit.          Point: Home exercise program (Not Started)     Learner Progress:  Not documented in this visit.          Point: Body mechanics (Not Started)     Learner Progress:  Not documented in this visit.          Point: Precautions (Done)     Learning Progress Summary           Patient Acceptance, E, VU,NR by  at 5/10/2022 6941    Comment: Bryant assessed: 20/28 or moderate fall risk.  Recommend use of FWW during gait. Continue O/P PT.                               User Key     Initials Effective Dates Name Provider Type Discipline     06/16/21 -  Vasquez Granados, PT Physical Therapist PT              PT Recommendation and Plan  Anticipated Discharge Disposition (PT): home with outpatient therapy services  Therapy Frequency (PT): 5 times/wk  Plan of Care Reviewed With: patient  Progress: improving  Outcome  Evaluation: pt feeling better this visit than yesterday pm. mod ind with bed mob and stands with sba .uses fww for gt x 360' cga and then 4-6 stairs with cga and tucker hr. back to room with family at bedside. cont with rehab.       Time Calculation:    PT Charges     Row Name 05/13/22 1308             Time Calculation    Start Time 1031  -RW      Stop Time 1100  -RW      Time Calculation (min) 29 min  -RW              Time Calculation- PT    Total Timed Code Minutes- PT 29 minute(s)  -RW              Timed Charges    46535 - PT Therapeutic Activity Minutes 29  -RW              Total Minutes    Timed Charges Total Minutes 29  -RW       Total Minutes 29  -RW            User Key  (r) = Recorded By, (t) = Taken By, (c) = Cosigned By    Initials Name Provider Type    Carmelo Shelby PTA Physical Therapist Assistant              Therapy Charges for Today     Code Description Service Date Service Provider Modifiers Qty    69234854376 HC PT THERAPEUTIC ACT EA 15 MIN 5/12/2022 Carmelo Mcintosh PTA GP 1    71569932161 HC PT THERAPEUTIC ACT EA 15 MIN 5/13/2022 Carmelo Mcintosh, SUMIT GP 2          PT G-Codes  Outcome Measure Options: AM-PAC 6 Clicks Daily Activity (OT)  AM-PAC 6 Clicks Score (PT): 18  AM-PAC 6 Clicks Score (OT): 20  Modified Stokes Scale: 4 - Moderately severe disability.  Unable to walk without assistance, and unable to attend to own bodily needs without assistance.  Tinetti Total Score: 20    Carmelo Mcintosh PTA  5/13/2022

## 2022-05-13 NOTE — THERAPY TREATMENT NOTE
Patient Name: Adilene Morgan  : 1960    MRN: [id number]                              Today's Date: 2022       Admit Date: 2022    Visit Dx:     ICD-10-CM ICD-9-CM   1. TODD (acute kidney injury) (HCC)  N17.9 584.9   2. Blurred vision  H53.8 368.8   3. Right leg weakness  R29.898 729.89   4. Impaired mobility and ADLs  Z74.09 V49.89    Z78.9    5. Impaired functional mobility, balance, gait, and endurance  Z74.09 V49.89     Patient Active Problem List   Diagnosis   • Acquired hypothyroidism   • Depressive disorder   • Migraine   • PVC (premature ventricular contraction)   • Palpitation   • Pain of fifth toe   • Adenosquamous carcinoma of right lung (HCC)   • TODD (acute kidney injury) (HCC)   • Transient ischemic attack (TIA)     Past Medical History:   Diagnosis Date   • Achilles bursitis    • Achilles tendinitis    • Acquired hypothyroidism    • Allergic rhinitis    • Allergy to meat     alphagal   • Allergy to milk products    • Arrhythmia    • Asthmatic bronchitis    • Asthmatic bronchitis    • Bone spur    • Calcaneal spur    • Cancer (HCC)    • Depressive disorder    • Family history of thyroid problem    • Herpes simplex    • Hypermetropia    • Hypothyroidism    • Menopausal flushing    • Menopause    • Migraine    • Otalgia    • Pneumonia    • Presbyopia    • Stroke (HCC)    • Trochanteric bursitis    • UTI (urinary tract infection) 2018   • Ventricular premature beats      Past Surgical History:   Procedure Laterality Date   • COLONOSCOPY N/A 2/15/2017    Procedure: COLONOSCOPY;  Surgeon: Lupillo Ugarte MD;  Location: [facility] ENDOSCOPY;  Service:    • COSMETIC SURGERY      plastic surgery to face x 4 r/t trauma   • LAPAROSCOPIC CHOLECYSTECTOMY  12/10/1995    Cholecystectomy, laparoscopic (1)      • OTHER SURGICAL HISTORY      Head surgery procedure (1)    plastic surgery on the face    • OTHER SURGICAL HISTORY  10/17/2014    Repair Superficial Wound TR-EXT 2.5 < CM 95462 (1)          General Information     [facility] Name 05/13/22 0945          OT Time and Intention    Document Type therapy note (daily note)  -     Mode of Treatment occupational therapy  -     Row Name 05/13/22 0945          General Information    Patient Profile Reviewed yes  -CS     Existing Precautions/Restrictions fall  -CS     Row Name 05/13/22 0945          Cognition    Orientation Status (Cognition) oriented x 4  -     Row Name 05/13/22 0945          Safety Issues, Functional Mobility    Impairments Affecting Function (Mobility) endurance/activity tolerance;balance;pain;strength  -           User Key  (r) = Recorded By, (t) = Taken By, (c) = Cosigned By    Initials Name Provider Type    CS María Cook COTA Occupational Therapist Assistant                 Mobility/ADL's     Row Name 05/13/22 0845          Bed Mobility    Bed Mobility supine-sit;sit-supine  -     Supine-Sit [place] (Bed Mobility) supervision  -     Sit-Supine [place] (Bed Mobility) supervision  -     Assistive Device (Bed Mobility) head of bed elevated  -[facility] Name 05/13/22 0845          Transfers    Transfers sit-stand transfer;stand-sit transfer;toilet transfer  -     Sit-Stand [place] (Transfers) supervision  -     [place] Level (Toilet Transfer) supervision;contact guard  -CS     Assistive Device (Toilet Transfer) commode;grab bars/safety frame  -     Row Name 05/13/22 0845          Toilet Transfer    Type (Toilet Transfer) sit-stand;stand-sit  -[facility] Name 05/13/22 0845          Functional Mobility    Functional Mobility- Ind. Level contact guard assist  -CS     Functional Mobility- Device walker, front-wheeled  -     Row Name 05/13/22 0845          Activities of Daily Living    BADL Assessment/Intervention grooming;toileting  -[facility] Name 05/13/22 0845          Toileting Assessment/Training    [place] Level (Toileting) supervision  -     Assistive Devices (Toileting) commode;grab  bar/safety frame  -     Position (Toileting) unsupported sitting;supported standing  -     Row Name 05/13/22 0845          Grooming Assessment/Training    [place] Level (Grooming) grooming skills;wash face, hands  -     Position (Grooming) sink side  -           User Key  (r) = Recorded By, (t) = Taken By, (c) = Cosigned By    Initials Name Provider Type     María Cook COTA Occupational Therapist Assistant               Obj/Interventions     Row Name 05/13/22 0945          Shoulder (Therapeutic Exercise)    Shoulder (Therapeutic Exercise) AROM (active range of motion)  -     Shoulder AROM (Therapeutic Exercise) bilateral;flexion;extension;external rotation;internal rotation  -     Row Name 05/13/22 0945          Elbow/Forearm (Therapeutic Exercise)    Elbow/Forearm (Therapeutic Exercise) AROM (active range of motion)  -     Elbow/Forearm AROM (Therapeutic Exercise) bilateral;flexion;extension;supination;pronation  -     Row Name 05/13/22 0945          Wrist (Therapeutic Exercise)    Wrist (Therapeutic Exercise) AROM (active range of motion)  -     Wrist AROM (Therapeutic Exercise) bilateral;flexion;extension  -[facility] Name 05/13/22 0945          Hand (Therapeutic Exercise)    Hand (Therapeutic Exercise) AROM (active range of motion)  -     Hand AROM/AAROM (Therapeutic Exercise) bilateral;finger flexion;finger extension  -     Row Name 05/13/22 0945          Motor Skills    Therapeutic Exercise shoulder;elbow/forearm;wrist;hand  -           User Key  (r) = Recorded By, (t) = Taken By, (c) = Cosigned By    Initials Name Provider Type     María Cook COTA Occupational Therapist Assistant               Goals/Plan     Row Name 05/13/22 0945          Transfer Goal 1 (OT)    Activity/Assistive Device (Transfer Goal 1, OT) toilet  -     [place] Level/Cues Needed (Transfer Goal 1, OT) modified independence  -     Time Frame (Transfer Goal 1, OT) long term goal  (LTG);by discharge  -CS     Progress/Outcome (Transfer Goal 1, OT) goal not met  -CS     Row Name 05/13/22 0945          Bathing Goal 1 (OT)    Activity/Device (Bathing Goal 1, OT) lower body bathing  -CS     [place] Level/Cues Needed (Bathing Goal 1, OT) modified independence  -CS     Time Frame (Bathing Goal 1, OT) long term goal (LTG);by discharge  -CS     Progress/Outcomes (Bathing Goal 1, OT) goal not met  -CS     Row Name 05/13/22 0945          Dressing Goal 1 (OT)    Activity/Device (Dressing Goal 1, OT) lower body dressing  -CS     [place]/Cues Needed (Dressing Goal 1, OT) independent  -CS     Time Frame (Dressing Goal 1, OT) long term goal (LTG);by discharge  -CS     Progress/Outcome (Dressing Goal 1, OT) goal not met  -CS     Row Name 05/13/22 0945          Toileting Goal 1 (OT)    Activity/Device (Toileting Goal 1, OT) toileting skills, all  -CS     [place] Level/Cues Needed (Toileting Goal 1, OT) independent  -CS     Time Frame (Toileting Goal 1, OT) long term goal (LTG);by discharge  -CS     Progress/Outcome (Toileting Goal 1, OT) goal not met  -CS           User Key  (r) = Recorded By, (t) = Taken By, (c) = Cosigned By    Initials Name Provider Type    CS María Cook COTA Occupational Therapist Assistant               Clinical Impression     Row Name 05/13/22 0945          Pain Assessment    Pretreatment Pain Rating 0/10 - no pain  -CS     Posttreatment Pain Rating 0/10 - no pain  -CS     Row Name 05/13/22 0945          Plan of Care Review    Plan of Care Reviewed With patient  -CS     Progress improving  -CS     Outcome Evaluation Pt tolerated tx well this date. Pt was SBA with bed mobility. Pt was SBA with sit-stand-sit. Pt was SBA/CGA with toilet t/f. Pt gave good effort with UE ther ex. Continue OT POC.  -CS     Row Name 05/13/22 0945          Therapy Assessment/Plan (OT)    Rehab Potential (OT) good, to achieve stated therapy goals  -CS     Criteria for Skilled Therapeutic  Interventions Met (OT) yes;skilled treatment is necessary  -CS     Row Name 05/13/22 0945          Therapy Plan Review/Discharge Plan (OT)    Anticipated Discharge Disposition (OT) home with 24/7 care  -CS     Row Name 05/13/22 0945          Vital Signs    Pre Patient Position Supine  -CS     Intra Patient Position Sitting  -CS     Post Patient Position Supine  -CS     Row Name 05/13/22 0945          Positioning and Restraints    Pre-Treatment Position in bed  -CS     Post Treatment Position bed  -CS     In Bed fowlers;call light within reach;encouraged to call for assist;exit alarm on  -CS           User Key  (r) = Recorded By, (t) = Taken By, (c) = Cosigned By    Initials Name Provider Type    CS María Cook COTA Occupational Therapist Assistant               Outcome Measures    No documentation.                 Occupational Therapy Education                 Title: PT OT SLP Therapies (In Progress)     Topic: Occupational Therapy (In Progress)     Point: ADL training (In Progress)     Description:   Instruct learner(s) on proper safety adaptation and remediation techniques during self care or transfers.   Instruct in proper use of assistive devices.              Learning Progress Summary           Patient Acceptance, E, NR by  at 5/11/2022 1139                   Point: Home exercise program (Not Started)     Description:   Instruct learner(s) on appropriate technique for monitoring, assisting and/or progressing therapeutic exercises/activities.              Learner Progress:  Not documented in this visit.          Point: Precautions (In Progress)     Description:   Instruct learner(s) on prescribed precautions during self-care and functional transfers.              Learning Progress Summary           Patient Acceptance, E, NR by RC at 5/12/2022 1440    Acceptance, E, NR by RC at 5/11/2022 1139    Acceptance, E,TB, VU,NR by  at 5/10/2022 1315    Comment: POC, role of OT, transfer training                    Point: Body mechanics (In Progress)     Description:   Instruct learner(s) on proper positioning and spine alignment during self-care, functional mobility activities and/or exercises.              Learning Progress Summary           Patient Acceptance, E, NR by  at 5/12/2022 1440    Acceptance, E, NR by  at 5/11/2022 1139    Acceptance, E,TB, VU,NR by  at 5/10/2022 1315    Comment: POC, role of OT, transfer training                               User Key     Initials Effective Dates Name Provider Type Discipline     06/16/21 -  Nedra Lanier COTA Occupational Therapist Assistant OT     06/14/21 -  Pardeep Dasilva, OT Occupational Therapist OT              OT Recommendation and Plan     Plan of Care Review  Plan of Care Reviewed With: patient  Progress: improving  Outcome Evaluation: Pt tolerated tx well this date. Pt was SBA with bed mobility. Pt was SBA with sit-stand-sit. Pt was SBA/CGA with toilet t/f. Pt gave good effort with UE ther ex. Continue OT POC.     Time Calculation:    Time Calculation- OT     Row Name 05/13/22 1232             Time Calculation- OT    OT Start Time 0945  -CS      OT Stop Time 1018  -CS      OT Time Calculation (min) 33 min  -CS      Total Timed Code Minutes- OT 33 minute(s)  -CS      OT Received On 05/13/22  -CS              Timed Charges    95352 - OT Therapeutic Exercise Minutes 23  -CS      99652 - OT Self Care/Mgmt Minutes 10  -CS              Total Minutes    Timed Charges Total Minutes 33  -CS       Total Minutes 33  -CS            User Key  (r) = Recorded By, (t) = Taken By, (c) = Cosigned By    Initials Name Provider Type    CS María Cook COTA Occupational Therapist Assistant              Therapy Charges for Today     Code Description Service Date Service Provider Modifiers Qty    72193116875 HC OT THER PROC EA 15 MIN 5/13/2022 María Cook COTA GO 1    59454849147 HC OT SELF CARE/MGMT/TRAIN EA 15 MIN 5/13/2022 María Cook COTA GO 1                María Cook, [email or web address]  5/13/2022

## 2022-05-13 NOTE — PLAN OF CARE
Problem: Adjustment to Illness (Stroke, Ischemic/Transient Ischemic Attack)  Goal: Optimal Coping  Outcome: Ongoing, Progressing  Intervention: Support Psychosocial Response to Stroke  Recent Flowsheet Documentation  Taken 5/12/2022 2000 by Mandi Bal RN  Supportive Measures: active listening utilized     Problem: Bowel Elimination Impaired (Stroke, Ischemic/Transient Ischemic Attack)  Goal: Effective Bowel Elimination  Outcome: Ongoing, Progressing     Problem: Cerebral Tissue Perfusion (Stroke, Ischemic/Transient Ischemic Attack)  Goal: Optimal Cerebral Tissue Perfusion  Outcome: Ongoing, Progressing     Problem: Cognitive Impairment (Stroke, Ischemic/Transient Ischemic Attack)  Goal: Optimal Cognitive Function  Outcome: Ongoing, Progressing     Problem: Communication Impairment (Stroke, Ischemic/Transient Ischemic Attack)  Goal: Improved Communication Skills  Outcome: Ongoing, Progressing     Problem: Functional Ability Impaired (Stroke, Ischemic/Transient Ischemic Attack)  Goal: Optimal Functional Ability  Outcome: Ongoing, Progressing  Intervention: Optimize Functional Ability  Recent Flowsheet Documentation  Taken 5/13/2022 0200 by Mandi Bal RN  Activity Management: activity adjusted per tolerance  Taken 5/12/2022 2208 by Mandi Bal RN  Activity Management: activity adjusted per tolerance  Taken 5/12/2022 2103 by Mandi Bal RN  Activity Management: activity adjusted per tolerance  Taken 5/12/2022 2000 by Mandi Bal RN  Activity Management: activity adjusted per tolerance  Taken 5/12/2022 1910 by Mandi Bal RN  Activity Management: activity adjusted per tolerance     Problem: Respiratory Compromise (Stroke, Ischemic/Transient Ischemic Attack)  Goal: Effective Oxygenation and Ventilation  Outcome: Ongoing, Progressing  Intervention: Optimize Oxygenation and Ventilation  Recent Flowsheet Documentation  Taken 5/13/2022 0200 by Mandi Bal RN  Head of Bed (HOB) Positioning: HOB  elevated     Problem: Sensorimotor Impairment (Stroke, Ischemic/Transient Ischemic Attack)  Goal: Improved Sensorimotor Function  Outcome: Ongoing, Progressing  Intervention: Optimize Range of Motion, Motor Control and Function  Recent Flowsheet Documentation  Taken 5/13/2022 0200 by Mandi Bal RN  Positioning/Transfer Devices: pillows     Problem: Swallowing Impairment (Stroke, Ischemic/Transient Ischemic Attack)  Goal: Optimal Eating and Swallowing without Aspiration  Outcome: Ongoing, Progressing     Problem: Urinary Elimination Impaired (Stroke, Ischemic/Transient Ischemic Attack)  Goal: Effective Urinary Elimination  Outcome: Ongoing, Progressing     Problem: Fall Injury Risk  Goal: Absence of Fall and Fall-Related Injury  Outcome: Ongoing, Progressing  Intervention: Promote Injury-Free Environment  Recent Flowsheet Documentation  Taken 5/13/2022 0200 by Mandi Bal RN  Safety Promotion/Fall Prevention: safety round/check completed  Taken 5/13/2022 0026 by Mandi Bal RN  Safety Promotion/Fall Prevention: safety round/check completed  Taken 5/12/2022 2208 by Mandi Bal RN  Safety Promotion/Fall Prevention: safety round/check completed  Taken 5/12/2022 2103 by Mandi Bal RN  Safety Promotion/Fall Prevention: safety round/check completed  Taken 5/12/2022 2000 by Mandi Bal RN  Safety Promotion/Fall Prevention: safety round/check completed  Taken 5/12/2022 1910 by Mandi Bal RN  Safety Promotion/Fall Prevention: safety round/check completed     Problem: Adult Inpatient Plan of Care  Goal: Plan of Care Review  Outcome: Ongoing, Progressing  Flowsheets (Taken 5/13/2022 0343)  Progress: improving  Plan of Care Reviewed With: patient  Goal: Patient-Specific Goal (Individualized)  Outcome: Ongoing, Progressing  Goal: Absence of Hospital-Acquired Illness or Injury  Outcome: Ongoing, Progressing  Intervention: Identify and Manage Fall Risk  Recent Flowsheet Documentation  Taken 5/13/2022  0200 by Mandi Bal RN  Safety Promotion/Fall Prevention: safety round/check completed  Taken 5/13/2022 0026 by Mandi Bal RN  Safety Promotion/Fall Prevention: safety round/check completed  Taken 5/12/2022 2208 by Mandi Bal RN  Safety Promotion/Fall Prevention: safety round/check completed  Taken 5/12/2022 2103 by Mandi Bal RN  Safety Promotion/Fall Prevention: safety round/check completed  Taken 5/12/2022 2000 by Mandi Bal RN  Safety Promotion/Fall Prevention: safety round/check completed  Taken 5/12/2022 1910 by Mandi Bal RN  Safety Promotion/Fall Prevention: safety round/check completed  Intervention: Prevent Skin Injury  Recent Flowsheet Documentation  Taken 5/13/2022 0200 by Mandi Bal RN  Body Position:   left   tilted  Taken 5/13/2022 0026 by Mandi Bal RN  Body Position:   right   tilted  Taken 5/12/2022 2208 by Mandi Bal RN  Body Position: sitting up in bed  Taken 5/12/2022 2000 by Mandi Bal RN  Body Position: dangle, side of bed  Intervention: Prevent and Manage VTE (Venous Thromboembolism) Risk  Recent Flowsheet Documentation  Taken 5/13/2022 0200 by Mandi Bal RN  Activity Management: activity adjusted per tolerance  Taken 5/12/2022 2208 by Mandi Bal RN  Activity Management: activity adjusted per tolerance  Taken 5/12/2022 2103 by Mandi Bal RN  Activity Management: activity adjusted per tolerance  Taken 5/12/2022 2000 by Mandi Bal RN  Activity Management: activity adjusted per tolerance  VTE Prevention/Management:   bilateral   sequential compression devices off  Taken 5/12/2022 1910 by Mandi Bal RN  Activity Management: activity adjusted per tolerance  Goal: Optimal Comfort and Wellbeing  Outcome: Ongoing, Progressing  Goal: Readiness for Transition of Care  Outcome: Ongoing, Progressing     Problem: Fluid and Electrolyte Imbalance (Acute Kidney Injury/Impairment)  Goal: Fluid and Electrolyte Balance  Outcome: Ongoing,  Progressing     Problem: Oral Intake Inadequate (Acute Kidney Injury/Impairment)  Goal: Optimal Nutrition Intake  Outcome: Ongoing, Progressing     Problem: Renal Function Impairment (Acute Kidney Injury/Impairment)  Goal: Effective Renal Function  Outcome: Ongoing, Progressing   Goal Outcome Evaluation:  Plan of Care Reviewed With: patient        Progress: improving

## 2022-05-13 NOTE — PLAN OF CARE
Goal Outcome Evaluation:  Plan of Care Reviewed With: patient        Progress: improving  Outcome Evaluation: Pt tolerated tx well this date. Pt was SBA with bed mobility. Pt was SBA with sit-stand-sit. Pt was SBA/CGA with toilet t/f. Pt gave good effort with UE ther ex. Continue OT POC.

## 2022-05-13 NOTE — PROGRESS NOTES
[facility] Medicine Services  INPATIENT PROGRESS NOTE    Length of Stay: 1  Date of Admission: 5/9/2022  Primary Care Physician: Jen Raymundo MD    Subjective   Chief Complaint: weakness   HPI:      She is a 62 year old with metastatic lung cancer on immunotherapy that presented after a fall. She stated lasix last week from PCP and had a GI bug with N/V/D for 24 hours. Since then, she does not have any diarrhea or vomiting. Still has weakness. She has been seen by neurology for a workup for stroke. MRI is negative. She is back at her neurological baseline. Her renal function did not improve so nephrology was consulted. She states her legs are more swollen.         Review of Systems   Constitutional: Negative for chills, diaphoresis and fever.   HENT: Negative for sneezing and sore throat.    Eyes: Negative for pain and discharge.   Respiratory: Negative for cough and shortness of breath.    Gastrointestinal: Negative for constipation, diarrhea, nausea and vomiting.   Endocrine: Negative for cold intolerance and heat intolerance.   Genitourinary: Negative for difficulty urinating, dysuria, frequency and urgency.   Musculoskeletal: Negative for arthralgias and myalgias.   Skin: Negative for color change and pallor.   Allergic/Immunologic: Negative for environmental allergies and food allergies.   Neurological: Positive for weakness. Negative for dizziness and syncope.   Psychiatric/Behavioral: Negative for confusion and sleep disturbance.        All pertinent negatives and positives are as above. All other systems have been reviewed and are negative unless otherwise stated.     Objective    Temp:  [96.8 °F (36 °C)-98.2 °F (36.8 °C)] 97.3 °F (36.3 °C)  Heart Rate:  [79-93] 86  Resp:  [15-18] 16  BP: (122-163)/(60-83) 163/70    Physical Exam  Vitals reviewed.   Constitutional:       General: She is not in acute distress.     Appearance: She is well-developed.   HENT:       Head: Normocephalic and atraumatic.      Nose: Nose normal.   Eyes:      Conjunctiva/sclera: Conjunctivae normal.   Cardiovascular:      Rate and Rhythm: Normal rate and regular rhythm.   Pulmonary:      Effort: Pulmonary effort is normal. No respiratory distress.      Breath sounds: Normal breath sounds. No wheezing or rales.   Musculoskeletal:         General: Normal range of motion.      Cervical back: Normal range of motion and neck supple.   Skin:     General: Skin is warm and dry.   Neurological:      Mental Status: She is alert and oriented to person, place, and time.   Psychiatric:         Behavior: Behavior normal.             Results Review:  I have reviewed the labs, radiology results, and diagnostic studies.    Laboratory Data:   Results from last 7 days   Lab Units 05/13/22  0548 05/12/22  0650 05/11/22  0613   SODIUM mmol/L 142 143 143   POTASSIUM mmol/L 4.7 4.6 4.4   CHLORIDE mmol/L 111* 114* 114*   CO2 mmol/L 22.0 20.0* 22.0   BUN mg/dL 21 19 27*   CREATININE mg/dL 2.08* 1.95* 2.27*   GLUCOSE mg/dL 94 88 96   CALCIUM mg/dL 8.2* 7.8* 7.6*   BILIRUBIN mg/dL 0.2 <0.2 <0.2   ALK PHOS U/L 105 100 98   ALT (SGPT) U/L 26 24 30   AST (SGOT) U/L 26 21 27   ANION GAP mmol/L 9.0 9.0 7.0     Estimated Creatinine Clearance: 33.3 mL/min (A) (by C-G formula based on SCr of 2.08 mg/dL (H)).  Results from last 7 days   Lab Units 05/09/22  1709   MAGNESIUM mg/dL 2.5*     Results from last 7 days   Lab Units 05/13/22  0548   URIC ACID mg/dL 7.0*     Results from last 7 days   Lab Units 05/13/22  0548 05/12/22  1407 05/11/22  1054 05/10/22  0709 05/09/22  1709   WBC 10*3/mm3 7.72  --  10.50 7.66 7.92   HEMOGLOBIN g/dL 8.0* 7.3* 7.9* 8.0* 7.7*   HEMATOCRIT % 25.0* 23.0* 25.3* 25.8* 24.4*   PLATELETS 10*3/mm3 242  --  250 230 261     Results from last 7 days   Lab Units 05/09/22  1709   INR  1.90*       Culture Data:   No results found for: BLOODCX  No results found for: URINECX  No results found for: RESPCX  No results  found for: WOUNDCX  No results found for: STOOLCX  No components found for: BODYFLD    Radiology Data:   Imaging Results (Last 24 Hours)     Procedure Component Value Units Date/Time    US Renal Bilateral [[id number]] Collected: 05/12/22 1158     Updated: 05/12/22 1626    Narrative:      Ultrasound renal complete    HISTORY:  Chronic kidney disease stage IIIa. Acute kidney  failure.    Ultrasound examination of the kidneys and urinary bladder was  performed.    COMPARISON: None.     FINDINGS:  The right kidney measures 10.30 cm in length by 5.56 cm x 4.78 cm  transverse.  The left kidney measures 11.12 cm in length by 5.51 cm x 5.16 cm  transverse.  The kidneys are of normal echotexture.  No hydronephrosis.  No solid or cystic renal mass.    Minimal dependent debris in the bladder.   Bilateral ureteral jets.  Bladder volume 251.50 mL.      Impression:      CONCLUSION:  Normal ultrasound kidneys.  Minimal dependent debris in the bladder.     [id number]    Electronically signed by:  Talon Tuttle MD  5/12/2022 4:24 PM CDT  Workstation: [id number]          I have reviewed the patient current medications.     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 Right LE weakness   -neurology consulted, appreciate recs, plans to continue Xarelto/ASA  -MRI reviewed, no acute issue  -PT, OT--will continue PT/OT at home   -reviewed carotid US  -echo reviewed   -last echo 2018 EF 60%, normal stress test around that time.   -duplex US to r/o DVT negative    2. Metastatic lung cancer  -on immunotherapy  -follows in [place]     3. TODD   -creatinine 2.93 on admission  -nephrology consulted  -could be 2/2 ATN  -now on lasix drip      4. Hx of DVT  -hypercoagulable due to cancer  -continue Xarelto    -baseline is around 7-9     5. Hypotension  -improved after IVF     6. Anemia  -transfuse 1 unit 5/12   -Hb improved               Discharge Planning: pending improvement     I confirmed that the patient's Advance Care Plan is present, code status is documented, or surrogate decision maker is listed in the patient's medical record.           This document has been electronically signed by Lou Lima MD on May 13, 2022 14:35 CDT

## 2022-05-13 NOTE — PROGRESS NOTES
"[facility]  [address] [place] [address]. [address]  T - [phone]  F - [phone]     Progress Note          PATIENT  DEMOGRAPHICS   PATIENT NAME: Adilene Morgan                      PHYSICIAN: Alexa Rios MD  : 1960  MRN: [id number]   LOS: 1 day    Patient Care Team:  Jen Raymundo MD as PCP - General  [facility] as Phys[facility] Assistant (Family Medicine)  Subjective   SUBJECTIVE   No n/v no soa. Good UO         Objective   OBJECTIVE   Vital Signs  Temp:  [96.8 °F (36 °C)-98.2 °F (36.8 °C)] 97.3 °F (36.3 °C)  Heart Rate:  [79-93] 86  Resp:  [15-18] 16  BP: (122-163)/(60-83) 163/70    Flowsheet Rows    Flowsheet Row First Filed Value   Admission Height 167.6 cm (66\") Documented at 2022 1623   Admission Weight 97.5 kg (215 lb) Documented at 2022 1623           I/O last 3 completed shifts:  In: 3595 [P.O.:1320; I.V.:1975; Blood:300]  Out: 3700 [Urine:3700]    PHYSICAL EXAM    Physical Exam  Constitutional:       Appearance: She is well-developed.   HENT:      Head: Normocephalic.   Eyes:      Pupils: Pupils are equal, round, and reactive to light.   Cardiovascular:      Rate and Rhythm: Normal rate and regular rhythm.      Heart sounds: Normal heart sounds.   Pulmonary:      Effort: Pulmonary effort is normal.      Breath sounds: Normal breath sounds.   Abdominal:      General: Bowel sounds are normal.      Palpations: Abdomen is soft.   Musculoskeletal:         General: No swelling.   Skin:     Coloration: Skin is not jaundiced.   Neurological:      General: No focal deficit present.      Mental Status: She is alert and oriented to person, place, and time.         RESULTS   Results Review:    Results from last 7 days   Lab Units 22  0548 22  0650 22  0613   SODIUM mmol/L 142 143 143   POTASSIUM mmol/L 4.7 4.6 4.4   CHLORIDE mmol/L 111* 114* 114*   CO2 mmol/L 22.0 20.0* 22.0   BUN mg/dL 21 19 27*   CREATININE mg/dL 2.08* 1.95* " 2.27*   CALCIUM mg/dL 8.2* 7.8* 7.6*   BILIRUBIN mg/dL 0.2 <0.2 <0.2   ALK PHOS U/L 105 100 98   ALT (SGPT) U/L 26 24 30   AST (SGOT) U/L 26 21 27   GLUCOSE mg/dL 94 88 96       Estimated Creatinine Clearance: 33.3 mL/min (A) (by C-G formula based on SCr of 2.08 mg/dL (H)).    Results from last 7 days   Lab Units 05/09/22  1709   MAGNESIUM mg/dL 2.5*       Results from last 7 days   Lab Units 05/13/22  0548   URIC ACID mg/dL 7.0*       Results from last 7 days   Lab Units 05/13/22  0548 05/12/22  1407 05/11/22  1054 05/10/22  0709 05/09/22  1709   WBC 10*3/mm3 7.72  --  10.50 7.66 7.92   HEMOGLOBIN g/dL 8.0* 7.3* 7.9* 8.0* 7.7*   PLATELETS 10*3/mm3 242  --  250 230 261       Results from last 7 days   Lab Units 05/09/22  1709   INR  1.90*         Imaging Results (Last 24 Hours)     Procedure Component Value Units Date/Time    US Renal Bilateral [[id number]] Collected: 05/12/22 1158     Updated: 05/12/22 1626    Narrative:      Ultrasound renal complete    HISTORY:  Chronic kidney disease stage IIIa. Acute kidney  failure.    Ultrasound examination of the kidneys and urinary bladder was  performed.    COMPARISON: None.     FINDINGS:  The right kidney measures 10.30 cm in length by 5.56 cm x 4.78 cm  transverse.  The left kidney measures 11.12 cm in length by 5.51 cm x 5.16 cm  transverse.  The kidneys are of normal echotexture.  No hydronephrosis.  No solid or cystic renal mass.    Minimal dependent debris in the bladder.   Bilateral ureteral jets.  Bladder volume 251.50 mL.      Impression:      CONCLUSION:  Normal ultrasound kidneys.  Minimal dependent debris in the bladder.     [id number]    Electronically signed by:  Talon Tuttle MD  5/12/2022 4:24 PM CDT  Workstation: [id number]           MEDICATIONS    albumin human, 250 mL, Intravenous, BID  aspirin, 81 mg, Oral, Daily  atorvastatin, 80 mg, Oral, Nightly  levothyroxine, 150 mcg, Oral, Q AM  multivitamin with minerals, 1 tablet, Oral, Daily  PATIENT SUPPLIED  MEDICATION, 600 mg, Oral, Nightly  rivaroxaban, 20 mg, Oral, Daily With Dinner  sodium chloride, 10 mL, Intravenous, Q12H  topiramate, 50 mg, Oral, Nightly  venlafaxine XR, 37.5 mg, Oral, Daily      sodium bicarbonate drip less than/equal to 75 mEq/bag, 75 mEq, Last Rate: 75 mEq (05/13/22 1339)        Assessment & Plan   ASSESSMENT / PLAN      Acquired hypothyroidism    Adenosquamous carcinoma of right lung (HCC)    TODD (acute kidney injury) (HCC)    Transient ischemic attack (TIA)    1.  TODD on CKD 3- Baseline creatinine around 1.4, creatinine was up to 2.93 on admission and has improved to 1.95 so far.  Creatinine was at baseline as recently as May 4.  Etiology of TODD is not immediately apparent.  She did have Lasix but does not appear dehydrated.  She tolerated Bactrim exposure in April with documented stable labs following that.  Her CK was elevated on admission and now better     -fena is consistent with atn. U/s no hydro. Uric acid is at 7      -In literature review we found her entrectinib is associated with increased serum creatinine which could be the source of her acute kidney injury.    Check UA and urine protein. Add lasix drip at slow rate. Dc ivf due to weight gain. Will try to contact her Oncologist. For now continue with immunotherapy     2.  Metabolic acidosis- corrected     3.  Metastatic lung cancer- on immunotherapy, follows in [place]     4.  History of DVT     6.  Hypotension- resolved     7.  Right lower extremity weakness- manage per primary team, also had neurology evaluation                   This document has been electronically signed by Alexa Rios MD on May 13, 2022 14:08 CDT

## 2022-05-14 LAB
ALBUMIN SERPL-MCNC: 3.4 G/DL (ref 3.5–5.2)
ALBUMIN/GLOB SERPL: 1.2 G/DL
ALP SERPL-CCNC: 103 U/L (ref 39–117)
ALT SERPL W P-5'-P-CCNC: 24 U/L (ref 1–33)
ANION GAP SERPL CALCULATED.3IONS-SCNC: 10 MMOL/L (ref 5–15)
AST SERPL-CCNC: 23 U/L (ref 1–32)
BACTERIA UR QL AUTO: ABNORMAL /HPF
BASOPHILS # BLD AUTO: 0.06 10*3/MM3 (ref 0–0.2)
BASOPHILS NFR BLD AUTO: 0.8 % (ref 0–1.5)
BH BB BLOOD EXPIRATION DATE: NORMAL
BH BB BLOOD TYPE BARCODE: NORMAL
BH BB DISPENSE STATUS: NORMAL
BH BB PRODUCT CODE: NORMAL
BH BB UNIT NUMBER: NORMAL
BILIRUB SERPL-MCNC: 0.2 MG/DL (ref 0–1.2)
BILIRUB UR QL STRIP: NEGATIVE
BUN SERPL-MCNC: 21 MG/DL (ref 8–23)
BUN/CREAT SERPL: 9.7 (ref 7–25)
CALCIUM SPEC-SCNC: 9.1 MG/DL (ref 8.6–10.5)
CHLORIDE SERPL-SCNC: 107 MMOL/L (ref 98–107)
CLARITY UR: ABNORMAL
CO2 SERPL-SCNC: 29 MMOL/L (ref 22–29)
COLOR UR: YELLOW
CREAT SERPL-MCNC: 2.17 MG/DL (ref 0.57–1)
CREAT UR-MCNC: 15 MG/DL
CROSSMATCH INTERPRETATION: NORMAL
DEPRECATED RDW RBC AUTO: 52.8 FL (ref 37–54)
EGFRCR SERPLBLD CKD-EPI 2021: 25 ML/MIN/1.73
EOSINOPHIL # BLD AUTO: 0.72 10*3/MM3 (ref 0–0.4)
EOSINOPHIL NFR BLD AUTO: 9.2 % (ref 0.3–6.2)
ERYTHROCYTE [DISTWIDTH] IN BLOOD BY AUTOMATED COUNT: 16.5 % (ref 12.3–15.4)
GLOBULIN UR ELPH-MCNC: 2.8 GM/DL
GLUCOSE SERPL-MCNC: 90 MG/DL (ref 65–99)
GLUCOSE UR STRIP-MCNC: NEGATIVE MG/DL
HCT VFR BLD AUTO: 25.3 % (ref 34–46.6)
HGB BLD-MCNC: 8.1 G/DL (ref 12–15.9)
HGB UR QL STRIP.AUTO: ABNORMAL
HYALINE CASTS UR QL AUTO: ABNORMAL /LPF
IMM GRANULOCYTES # BLD AUTO: 0.04 10*3/MM3 (ref 0–0.05)
IMM GRANULOCYTES NFR BLD AUTO: 0.5 % (ref 0–0.5)
KETONES UR QL STRIP: NEGATIVE
LEUKOCYTE ESTERASE UR QL STRIP.AUTO: ABNORMAL
LYMPHOCYTES # BLD AUTO: 1.13 10*3/MM3 (ref 0.7–3.1)
LYMPHOCYTES NFR BLD AUTO: 14.5 % (ref 19.6–45.3)
MCH RBC QN AUTO: 28.1 PG (ref 26.6–33)
MCHC RBC AUTO-ENTMCNC: 32 G/DL (ref 31.5–35.7)
MCV RBC AUTO: 87.8 FL (ref 79–97)
MONOCYTES # BLD AUTO: 0.57 10*3/MM3 (ref 0.1–0.9)
MONOCYTES NFR BLD AUTO: 7.3 % (ref 5–12)
NEUTROPHILS NFR BLD AUTO: 5.3 10*3/MM3 (ref 1.7–7)
NEUTROPHILS NFR BLD AUTO: 67.7 % (ref 42.7–76)
NITRITE UR QL STRIP: NEGATIVE
NRBC BLD AUTO-RTO: 0 /100 WBC (ref 0–0.2)
PH UR STRIP.AUTO: 5.5 [PH] (ref 5–9)
PLATELET # BLD AUTO: 262 10*3/MM3 (ref 140–450)
PMV BLD AUTO: 10.2 FL (ref 6–12)
POTASSIUM SERPL-SCNC: 4.3 MMOL/L (ref 3.5–5.2)
PROT ?TM UR-MCNC: 12.7 MG/DL
PROT SERPL-MCNC: 6.2 G/DL (ref 6–8.5)
PROT UR QL STRIP: NEGATIVE
PROT/CREAT UR: 846.7 MG/G CREA (ref 0–200)
RBC # BLD AUTO: 2.88 10*6/MM3 (ref 3.77–5.28)
RBC # UR STRIP: ABNORMAL /HPF
REF LAB TEST METHOD: ABNORMAL
SODIUM SERPL-SCNC: 146 MMOL/L (ref 136–145)
SP GR UR STRIP: 1.01 (ref 1–1.03)
SQUAMOUS #/AREA URNS HPF: ABNORMAL /HPF
UNIT  ABO: NORMAL
UNIT  RH: NORMAL
UROBILINOGEN UR QL STRIP: ABNORMAL
WBC # UR STRIP: ABNORMAL /HPF
WBC NRBC COR # BLD: 7.82 10*3/MM3 (ref 3.4–10.8)

## 2022-05-14 PROCEDURE — G0378 HOSPITAL OBSERVATION PER HR: HCPCS

## 2022-05-14 PROCEDURE — 85025 COMPLETE CBC W/AUTO DIFF WBC: CPT | Performed by: STUDENT IN AN ORGANIZED HEALTH CARE EDUCATION/TRAINING PROGRAM

## 2022-05-14 PROCEDURE — 25010000002 FUROSEMIDE PER 20 MG: Performed by: INTERNAL MEDICINE

## 2022-05-14 PROCEDURE — 97530 THERAPEUTIC ACTIVITIES: CPT

## 2022-05-14 PROCEDURE — 80053 COMPREHEN METABOLIC PANEL: CPT | Performed by: FAMILY MEDICINE

## 2022-05-14 PROCEDURE — 84156 ASSAY OF PROTEIN URINE: CPT | Performed by: INTERNAL MEDICINE

## 2022-05-14 PROCEDURE — 63710000001 ONDANSETRON PER 8 MG: Performed by: FAMILY MEDICINE

## 2022-05-14 PROCEDURE — 97116 GAIT TRAINING THERAPY: CPT

## 2022-05-14 PROCEDURE — 82570 ASSAY OF URINE CREATININE: CPT | Performed by: INTERNAL MEDICINE

## 2022-05-14 PROCEDURE — 97110 THERAPEUTIC EXERCISES: CPT

## 2022-05-14 PROCEDURE — 97535 SELF CARE MNGMENT TRAINING: CPT

## 2022-05-14 PROCEDURE — 96366 THER/PROPH/DIAG IV INF ADDON: CPT

## 2022-05-14 PROCEDURE — 81001 URINALYSIS AUTO W/SCOPE: CPT | Performed by: INTERNAL MEDICINE

## 2022-05-14 RX ADMIN — FUROSEMIDE 5 MG/HR: 10 INJECTION, SOLUTION INTRAMUSCULAR; INTRAVENOUS at 09:23

## 2022-05-14 RX ADMIN — ONDANSETRON HYDROCHLORIDE 4 MG: 4 TABLET, FILM COATED ORAL at 11:45

## 2022-05-14 RX ADMIN — ONDANSETRON HYDROCHLORIDE 4 MG: 4 TABLET, FILM COATED ORAL at 17:25

## 2022-05-14 RX ADMIN — ASPIRIN 81 MG: 81 TABLET, FILM COATED ORAL at 08:30

## 2022-05-14 RX ADMIN — LEVOTHYROXINE SODIUM 150 MCG: 150 TABLET ORAL at 06:19

## 2022-05-14 RX ADMIN — Medication 10 ML: at 08:30

## 2022-05-14 RX ADMIN — Medication 1 TABLET: at 08:30

## 2022-05-14 RX ADMIN — RIVAROXABAN 20 MG: 10 TABLET, FILM COATED ORAL at 17:19

## 2022-05-14 RX ADMIN — VENLAFAXINE HYDROCHLORIDE 37.5 MG: 37.5 CAPSULE, EXTENDED RELEASE ORAL at 08:30

## 2022-05-14 RX ADMIN — TOPIRAMATE 50 MG: 50 TABLET, FILM COATED ORAL at 08:30

## 2022-05-14 NOTE — PROGRESS NOTES
"[facility]  [address] [place] [address]. [address]  T - [phone]  F - [phone]     Progress Note          PATIENT  DEMOGRAPHICS   PATIENT NAME: Adilene Morgan                      PHYSICIAN: Alexa Rios MD  : 1960  MRN: [id number]   LOS: 1 day    Patient Care Team:  Jen Raymundo MD as PCP - General  [facility] ho PA as Physician Assistant (Family Medicine)  Subjective   SUBJECTIVE   Has some nausea. no soa. Good UO upto 5 L. Has lost weight and now 218 lbs from 226 lbs.          Objective   OBJECTIVE   Vital Signs  Temp:  [98 °F (36.7 °C)-98.3 °F (36.8 °C)] 98.1 °F (36.7 °C)  Heart Rate:  [63-85] 63  Resp:  [15-16] 16  BP: (111-139)/(56-68) 139/67    Flowsheet Rows    Flowsheet Row First Filed Value   Admission Height 167.6 cm (66\") Documented at 2022 1623   Admission Weight 97.5 kg (215 lb) Documented at 2022 1623           I/O last 3 completed shifts:  In: 2680 [P.O.:1680; I.V.:1000]  Out: 4950 [Urine:4950]    PHYSICAL EXAM    Physical Exam  Constitutional:       Appearance: She is well-developed.   HENT:      Head: Normocephalic.   Eyes:      Pupils: Pupils are equal, round, and reactive to light.   Cardiovascular:      Rate and Rhythm: Normal rate and regular rhythm.      Heart sounds: Normal heart sounds.   Pulmonary:      Effort: Pulmonary effort is normal.      Breath sounds: Normal breath sounds.   Abdominal:      General: Bowel sounds are normal.      Palpations: Abdomen is soft.   Musculoskeletal:         General: No swelling.   Skin:     Coloration: Skin is not jaundiced.   Neurological:      General: No focal deficit present.      Mental Status: She is alert and oriented to person, place, and time.         RESULTS   Results Review:    Results from last 7 days   Lab Units 22  0604 22  0548 22  0650   SODIUM mmol/L 146* 142 143   POTASSIUM mmol/L 4.3 4.7 4.6   CHLORIDE mmol/L 107 111* 114*   CO2 mmol/L 29.0 22.0 20.0* "   BUN mg/dL 21 21 19   CREATININE mg/dL 2.17* 2.08* 1.95*   CALCIUM mg/dL 9.1 8.2* 7.8*   BILIRUBIN mg/dL 0.2 0.2 <0.2   ALK PHOS U/L 103 105 100   ALT (SGPT) U/L 24 26 24   AST (SGOT) U/L 23 26 21   GLUCOSE mg/dL 90 94 88       Estimated Creatinine Clearance: 31.3 mL/min (A) (by C-G formula based on SCr of 2.17 mg/dL (H)).    Results from last 7 days   Lab Units 05/09/22  1709   MAGNESIUM mg/dL 2.5*       Results from last 7 days   Lab Units 05/13/22  0548   URIC ACID mg/dL 7.0*       Results from last 7 days   Lab Units 05/14/22  0604 05/13/22  0548 05/12/22  1407 05/11/22  1054 05/10/22  0709 05/09/22  1709   WBC 10*3/mm3 7.82 7.72  --  10.50 7.66 7.92   HEMOGLOBIN g/dL 8.1* 8.0* 7.3* 7.9* 8.0* 7.7*   PLATELETS 10*3/mm3 262 242  --  250 230 261       Results from last 7 days   Lab Units 05/09/22  1709   INR  1.90*         Imaging Results (Last 24 Hours)     Procedure Component Value Units Date/Time    XR Chest PA & Lateral [[id number]] Collected: 05/13/22 1532     Updated: 05/13/22 1632    Narrative:      TWO VIEW CHEST    HISTORY: Shortness of breath. Acute kidney failure.    Frontal and lateral films of the chest were obtained.    COMPARISON: May 9, 2022    FINDINGS:    EKG leads.  Minimal cardiomegaly.  The pulmonary vasculature is not increased.  Very small bilateral pleural effusions.  Linear atelectasis at the lung bases.  No pneumothorax.  No acute osseous abnormality.  Degenerative changes are present in the thoracic and lumbar  spine.  Old minimal to moderate compression deformity mid thoracic spine.  Cholecystectomy.      Impression:      CONCLUSION:  Minimal cardiomegaly.  Very small bilateral pleural effusions.  Linear atelectasis at the lung bases.    [id number]    Electronically signed by:  Talon Tuttle MD  5/13/2022 4:30 PM CDT  Workstation: [id number]           MEDICATIONS    aspirin, 81 mg, Oral, Daily  atorvastatin, 80 mg, Oral, Nightly  levothyroxine, 150 mcg, Oral, Q AM  multivitamin with minerals, 1  tablet, Oral, Daily  PATIENT SUPPLIED MEDICATION, 600 mg, Oral, Nightly  rivaroxaban, 20 mg, Oral, Daily With Dinner  sodium chloride, 10 mL, Intravenous, Q12H  topiramate, 50 mg, Oral, Nightly  venlafaxine XR, 37.5 mg, Oral, Daily      furosemide (LASIX) 100 mg in 100mL NS infusion, 5 mg/hr, Last Rate: 5 mg/hr (05/14/22 0909)        Assessment & Plan   ASSESSMENT / PLAN      Acquired hypothyroidism    Adenosquamous carcinoma of right lung (HCC)    TODD (acute kidney injury) (HCC)    Transient ischemic attack (TIA)    1.  TODD on CKD 3- Baseline creatinine around 1.4, creatinine was up to 2.93 on admission and has improved to 1.9-2.1 so far.  Creatinine was at baseline as recently as May 4. She did have Lasix but does not appear dehydrated.  She tolerated Bactrim exposure in April with documented stable labs following that.  Her CK was mildly elevated on admission and now better     -fena is consistent with atn. U/s no hydro. Liang stain is positive.      -It appears that clinically OTDD is relate to entrectinib which can be associated with increased serum creatinine. I have discussed the case with patient Primary Oncologist Dr Morales and she is agreeable to stop Entrectinib. I have explained this to patient and she is agreeable.      Keep lasix drip at slow rate.      2.  Metabolic acidosis- corrected     3.  Metastatic lung cancer- on immunotherapy, follows in [place]     4.  History of DVT     6.  Hypotension- resolved     7.  Right lower extremity weakness- manage per primary team, also had neurology evaluation                   This document has been electronically signed by Alexa Rios MD on May 14, 2022 11:32 CDT

## 2022-05-14 NOTE — PLAN OF CARE
Goal Outcome Evaluation:  Plan of Care Reviewed With: patient        Progress: no change  Outcome Evaluation: pt. was mod. (I) for bed mobility and spv. for sit to stand transfers this tx.  Pt. amb. 350' with No AD and CGA and some unsteadiness with pt. also holding onto handrail at times.  Pt. performed B LE therex sitting EOB with AROM.  No new goals met and pt. would benefit from continued PT.

## 2022-05-14 NOTE — PLAN OF CARE
Goal Outcome Evaluation:  Plan of Care Reviewed With: patient        Progress: improving  Outcome Evaluation: nursing o'd OT pt agreeable sup<>sit supervision, sit<>stand x5 w/ vc's, amb to toilet request no ad  FIGUEROA recommends R/W , min -cga toilet transfer cga, toileting sba, hannah tx well.  cont poc

## 2022-05-14 NOTE — PROGRESS NOTES
[facility] Medicine Services  INPATIENT PROGRESS NOTE    Length of Stay: 1  Date of Admission: 5/9/2022  Primary Care Physician: Jen Raymundo MD    Subjective   Chief Complaint: Generalized weakness  HPI:    Patient admitted for acute renal failure.  Creatinine staying stable.  She denies any chest pain Orian nausea vomiting diarrhea    Review of Systems   Respiratory: Negative for shortness of breath.    Cardiovascular: Negative for chest pain.        All pertinent negatives and positives are as above. All other systems have been reviewed and are negative unless otherwise stated.     Objective    Temp:  [98 °F (36.7 °C)-98.3 °F (36.8 °C)] 98.3 °F (36.8 °C)  Heart Rate:  [63-85] 70  Resp:  [15-16] 16  BP: (111-139)/(56-68) 116/68  Physical Exam  Vitals and nursing note reviewed.   Constitutional:       General: She is not in acute distress.     Appearance: She is well-developed. She is not diaphoretic.   HENT:      Head: Normocephalic and atraumatic.   Cardiovascular:      Rate and Rhythm: Normal rate.   Pulmonary:      Effort: Pulmonary effort is normal. No respiratory distress.      Breath sounds: No wheezing.   Abdominal:      General: There is no distension.      Palpations: Abdomen is soft.   Musculoskeletal:         General: Normal range of motion.   Skin:     General: Skin is warm and dry.   Neurological:      Mental Status: She is alert.      Cranial Nerves: No cranial nerve deficit.   Psychiatric:         Behavior: Behavior normal.         Thought Content: Thought content normal.         Judgment: Judgment normal.             Results Review:  I have reviewed the labs, radiology results, and diagnostic studies.    Laboratory Data:   Lab Results (last 24 hours)     Procedure Component Value Units Date/Time    Comprehensive Metabolic Panel [[id number]]  (Abnormal) Collected: 05/14/22 0604    Specimen: Blood Updated: 05/14/22 0704     Glucose 90 mg/dL      BUN 21  mg/dL      Creatinine 2.17 mg/dL      Sodium 146 mmol/L      Potassium 4.3 mmol/L      Comment: Slight hemolysis detected by analyzer. Results may be affected.        Chloride 107 mmol/L      CO2 29.0 mmol/L      Calcium 9.1 mg/dL      Total Protein 6.2 g/dL      Albumin 3.40 g/dL      ALT (SGPT) 24 U/L      AST (SGOT) 23 U/L      Alkaline Phosphatase 103 U/L      Total Bilirubin 0.2 mg/dL      Globulin 2.8 gm/dL      A/G Ratio 1.2 g/dL      BUN/Creatinine Ratio 9.7     Anion Gap 10.0 mmol/L      eGFR 25.0 mL/min/1.73      Comment: National Kidney Foundation and American Society of Nephrology (ASN) Task Force recommended calculation based on the Chronic Kidney Disease Epidemiology Collaboration (CKD-EPI) equation refit without adjustment for race.       Narrative:      GFR Normal >60  Chronic Kidney Disease <60  Kidney Failure <15      CBC & Differential [[id number]]  (Abnormal) Collected: 05/14/22 0604    Specimen: Blood Updated: 05/14/22 0625    Narrative:      The following orders were created for panel order CBC & Differential.  Procedure                               Abnormality         Status                     ---------                               -----------         ------                     CBC Auto Differential[[id number]]        Abnormal            Final result                 Please view results for these tests on the individual orders.    CBC Auto Differential [[id number]]  (Abnormal) Collected: 05/14/22 0604    Specimen: Blood Updated: 05/14/22 0625     WBC 7.82 10*3/mm3      RBC 2.88 10*6/mm3      Hemoglobin 8.1 g/dL      Hematocrit 25.3 %      MCV 87.8 fL      MCH 28.1 pg      MCHC 32.0 g/dL      RDW 16.5 %      RDW-SD 52.8 fl      MPV 10.2 fL      Platelets 262 10*3/mm3      Neutrophil % 67.7 %      Lymphocyte % 14.5 %      Monocyte % 7.3 %      Eosinophil % 9.2 %      Basophil % 0.8 %      Immature Grans % 0.5 %      Neutrophils, Absolute 5.30 10*3/mm3      Lymphocytes, Absolute 1.13 10*3/mm3       Monocytes, Absolute 0.57 10*3/mm3      Eosinophils, Absolute 0.72 10*3/mm3      Basophils, Absolute 0.06 10*3/mm3      Immature Grans, Absolute 0.04 10*3/mm3      nRBC 0.0 /100 WBC           Culture Data:   No results found for: BLOODCX  No results found for: URINECX  No results found for: RESPCX  No results found for: WOUNDCX  No results found for: STOOLCX  No components found for: BODYFLD    Radiology Data:   Imaging Results (Last 24 Hours)     Procedure Component Value Units Date/Time    XR Chest PA & Lateral [[id number]] Collected: 05/13/22 1532     Updated: 05/13/22 1632    Narrative:      TWO VIEW CHEST    HISTORY: Shortness of breath. Acute kidney failure.    Frontal and lateral films of the chest were obtained.    COMPARISON: May 9, 2022    FINDINGS:    EKG leads.  Minimal cardiomegaly.  The pulmonary vasculature is not increased.  Very small bilateral pleural effusions.  Linear atelectasis at the lung bases.  No pneumothorax.  No acute osseous abnormality.  Degenerative changes are present in the thoracic and lumbar  spine.  Old minimal to moderate compression deformity mid thoracic spine.  Cholecystectomy.      Impression:      CONCLUSION:  Minimal cardiomegaly.  Very small bilateral pleural effusions.  Linear atelectasis at the lung bases.    [id number]    Electronically signed by:  Talon Tuttle MD  5/13/2022 4:30 PM CDT  Workstation: [id number]          I have reviewed the patient's current medications.     Assessment/Plan     Active Hospital Problems    Diagnosis    • TODD (acute kidney injury) (HCC)    • Transient ischemic attack (TIA)    • Adenosquamous carcinoma of right lung (HCC)    • Acquired hypothyroidism        1. Right LE weakness   -neurology consulted, appreciate recs, plans to continue Xarelto/ASA  -MRI reviewed, no acute issue  -PT, OT--will continue PT/OT at home   -reviewed carotid US  -echo reviewed   -last echo 2018 EF 60%, normal stress test around that time.   -duplex US to r/o DVT  negative     2. Metastatic lung cancer  -on immunotherapy  -follows in [place]      3. TODD   -creatinine 2.93 on admission  -nephrology consulted  -could be 2/2 ATN  -now on lasix drip  Creatinine has improved some but now remaining level.  We will continue to monitor        4. Hx of DVT  -hypercoagulable due to cancer  -continue Xarelto   -baseline is around 7-9      5. Hypotension  -improved after IVF      6. Anemia  -transfuse 1 unit 5/12   -Hb improved             Darien Ugalde MD   05/14/22   11:49 CDT

## 2022-05-14 NOTE — THERAPY TREATMENT NOTE
Acute Care - Physical Therapy Treatment Note  [facility]     Patient Name: Adilene Morgan  : 1960  MRN: [id number]  Today's Date: 2022      Visit Dx:     ICD-10-CM ICD-9-CM   1. TODD (acute kidney injury) (HCC)  N17.9 584.9   2. Blurred vision  H53.8 368.8   3. Right leg weakness  R29.898 729.89   4. Impaired mobility and ADLs  Z74.09 V49.89    Z78.9    5. Impaired functional mobility, balance, gait, and endurance  Z74.09 V49.89     Patient Active Problem List   Diagnosis   • Acquired hypothyroidism   • Depressive disorder   • Migraine   • PVC (premature ventricular contraction)   • Palpitation   • Pain of fifth toe   • Adenosquamous carcinoma of right lung (HCC)   • TODD (acute kidney injury) (HCC)   • Transient ischemic attack (TIA)     Past Medical History:   Diagnosis Date   • Achilles bursitis    • Achilles tendinitis    • Acquired hypothyroidism    • Allergic rhinitis    • Allergy to meat     alphagal   • Allergy to milk products    • Arrhythmia    • Asthmatic bronchitis    • Asthmatic bronchitis    • Bone spur    • Calcaneal spur    • Cancer (HCC)    • Depressive disorder    • Family history of thyroid problem    • Herpes simplex    • Hypermetropia    • Hypothyroidism    • Menopausal flushing    • Menopause    • Migraine    • Otalgia    • Pneumonia    • Presbyopia    • Stroke (HCC)    • Trochanteric bursitis    • UTI (urinary tract infection) 2018   • Ventricular premature beats      Past Surgical History:   Procedure Laterality Date   • COLONOSCOPY N/A 2/15/2017    Procedure: COLONOSCOPY;  Surgeon: Lupillo Ugarte MD;  Location: [facility] ENDOSCOPY;  Service:    • COSMETIC SURGERY      plastic surgery to face x 4 r/t trauma   • LAPAROSCOPIC CHOLECYSTECTOMY  12/10/1995    Cholecystectomy, laparoscopic (1)      • OTHER SURGICAL HISTORY      Head surgery procedure (1)    plastic surgery on the face    • OTHER SURGICAL HISTORY  10/17/2014    Repair Superficial Wound TR-EXT 2.5 < CM 32400  (1)        PT Assessment (last 12 hours)     PT Evaluation and Treatment     Row Name 05/14/22 1303          Physical Therapy Time and Intention    Subjective Information no complaints  -CA     Document Type therapy note (daily note)  -CA     Mode of Treatment individual therapy;physical therapy  -CA     Patient Effort good  -CA     Row Name 05/14/22 1303          General Information    Existing Precautions/Restrictions fall  -CA     Row Name 05/14/22 1303          Pain    Pretreatment Pain Rating 0/10 - no pain  -CA     Posttreatment Pain Rating 0/10 - no pain  -CA     Row Name 05/14/22 1303          Cognition    Orientation Status (Cognition) oriented x 4  -CA     Row Name 05/14/22 1303          Bed Mobility    Bed Mobility supine-sit;sit-supine  -CA     Supine-Sit [place] (Bed Mobility) modified independence  -CA     Sit-Supine [place] (Bed Mobility) modified independence  -CA     Assistive Device (Bed Mobility) head of bed elevated;bed rails  -CA     Row Name 05/14/22 1303          Transfers    Transfers sit-stand transfer;stand-sit transfer  -CA     Sit-Stand [place] (Transfers) supervision  -CA     Stand-Sit [place] (Transfers) supervision  -CA     Row Name 05/14/22 1303          Gait/Stairs (Locomotion)    [place] Level (Gait) contact guard  -CA     Assistive Device (Gait) --  HHA  -CA     Distance in Feet (Gait) 350  -CA     Pattern (Gait) step-through  -CA     Deviations/Abnormal Patterns (Gait) mack decreased;antalgic  -CA     Row Name 05/14/22 1303          Motor Skills    Therapeutic Exercise hip;knee;ankle  -[facility] Name 05/14/22 1303          Hip (Therapeutic Exercise)    Hip (Therapeutic Exercise) AROM (active range of motion)  -CA     Hip AROM (Therapeutic Exercise) bilateral;flexion;extension  -CA     Row Name 05/14/22 1303          Knee (Therapeutic Exercise)    Knee (Therapeutic Exercise) AROM (active range of motion)  -CA     Knee AROM (Therapeutic Exercise)  bilateral;LAQ (long arc quad)  -CA     Row Name 05/14/22 1303          Ankle (Therapeutic Exercise)    Ankle (Therapeutic Exercise) AROM (active range of motion)  -CA     Ankle AROM (Therapeutic Exercise) bilateral;dorsiflexion;plantarflexion  -CA     Row Name 05/14/22 1303          Positioning and Restraints    Pre-Treatment Position in bed  -CA     Post Treatment Position bathroom  -CA     Bathroom sitting;call light within reach  -CA           User Key  (r) = Recorded By, (t) = Taken By, (c) = Cosigned By    Initials Name Provider Type    Christophe Chen, PTA Physical Therapist Assistant                Physical Therapy Education                 Title: PT OT SLP Therapies (In Progress)     Topic: Physical Therapy (In Progress)     Point: Mobility training (Not Started)     Learner Progress:  Not documented in this visit.          Point: Home exercise program (Not Started)     Learner Progress:  Not documented in this visit.          Point: Body mechanics (Not Started)     Learner Progress:  Not documented in this visit.          Point: Precautions (Done)     Learning Progress Summary           Patient Acceptance, E, VU,NR by  at 5/10/2022 6085    Comment: Bryant assessed: 20/28 or moderate fall risk.  Recommend use of FWW during gait. Continue O/P PT.                               User Key     Initials Effective Dates Name Provider Type Discipline     06/16/21 -  Vasquez Granados, PT Physical Therapist PT              PT Recommendation and Plan     Plan of Care Reviewed With: patient  Progress: no change  Outcome Evaluation: pt. was mod. (I) for bed mobility and spv. for sit to stand transfers this tx.  Pt. amb. 350' with No AD and CGA and some unsteadiness with pt. also holding onto handrail at times.  Pt. performed B LE therex sitting EOB with AROM.  No new goals met and pt. would benefit from continued PT.       Time Calculation:    PT Charges     Row Name 05/14/22 2371             Time Calculation     Start Time 1303  -CA      Stop Time 1330  -CA      Time Calculation (min) 27 min  -CA      PT Received On 05/14/22  -CA              Time Calculation- PT    Total Timed Code Minutes- PT 27 minute(s)  -CA            User Key  (r) = Recorded By, (t) = Taken By, (c) = Cosigned By    Initials Name Provider Type    CA Christophe Bailey PTA Physical Therapist Assistant              Therapy Charges for Today     Code Description Service Date Service Provider Modifiers Qty    94283164387 HC GAIT TRAINING EA 15 MIN 5/14/2022 Christophe Bailey PTA GP 1    [id number]  PT THER PROC EA 15 MIN 5/14/2022 Christophe Bailey PTA GP 1          PT G-Codes  Outcome Measure Options: AM-PAC 6 Clicks Daily Activity (OT)  AM-PAC 6 Clicks Score (PT): 18  AM-PAC 6 Clicks Score (OT): 20  Modified [place] Scale: 4 - Moderately severe disability.  Unable to walk without assistance, and unable to attend to own bodily needs without assistance.  Tinetti Total Score: 20    Christophe Bailey PTA  5/14/2022

## 2022-05-14 NOTE — PLAN OF CARE
Goal Outcome Evaluation:              Outcome Evaluation: Pt on Lasix gtt; voiding large amounts; up with standby assistance; VSS at this time

## 2022-05-14 NOTE — THERAPY TREATMENT NOTE
Patient Name: Adilene Morgan  : 1960    MRN: [id number]                              Today's Date: 2022       Admit Date: 2022    Visit Dx:     ICD-10-CM ICD-9-CM   1. TODD (acute kidney injury) (HCC)  N17.9 584.9   2. Blurred vision  H53.8 368.8   3. Right leg weakness  R29.898 729.89   4. Impaired mobility and ADLs  Z74.09 V49.89    Z78.9    5. Impaired functional mobility, balance, gait, and endurance  Z74.09 V49.89     Patient Active Problem List   Diagnosis   • Acquired hypothyroidism   • Depressive disorder   • Migraine   • PVC (premature ventricular contraction)   • Palpitation   • Pain of fifth toe   • Adenosquamous carcinoma of right lung (HCC)   • TODD (acute kidney injury) (HCC)   • Transient ischemic attack (TIA)     Past Medical History:   Diagnosis Date   • Achilles bursitis    • Achilles tendinitis    • Acquired hypothyroidism    • Allergic rhinitis    • Allergy to meat     alphagal   • Allergy to milk products    • Arrhythmia    • Asthmatic bronchitis    • Asthmatic bronchitis    • Bone spur    • Calcaneal spur    • Cancer (HCC)    • Depressive disorder    • Family history of thyroid problem    • Herpes simplex    • Hypermetropia    • Hypothyroidism    • Menopausal flushing    • Menopause    • Migraine    • Otalgia    • Pneumonia    • Presbyopia    • Stroke (HCC)    • Trochanteric bursitis    • UTI (urinary tract infection) 2018   • Ventricular premature beats      Past Surgical History:   Procedure Laterality Date   • COLONOSCOPY N/A 2/15/2017    Procedure: COLONOSCOPY;  Surgeon: Lupillo Ugarte MD;  Location: [facility] ENDOSCOPY;  Service:    • COSMETIC SURGERY      plastic surgery to face x 4 r/t trauma   • LAPAROSCOPIC CHOLECYSTECTOMY  12/10/1995    Cholecystectomy, laparoscopic (1)      • OTHER SURGICAL HISTORY      Head surgery procedure (1)    plastic surgery on the face    • OTHER SURGICAL HISTORY  10/17/2014    Repair Superficial Wound TR-EXT 2.5 < CM 84238 (1)          General Information     [facility] Name 05/14/22 08          OT Time and Intention    Document Type therapy note (daily note)  -     Mode of Treatment occupational therapy;individual therapy  -[facility] Name 05/14/22 0807          General Information    Patient Profile Reviewed yes  -     Existing Precautions/Restrictions fall  -[facility] Name 05/14/22 08          Cognition    Orientation Status (Cognition) oriented x 4  -[facility] Name 05/14/22 08          Safety Issues, Functional Mobility    Impairments Affecting Function (Mobility) endurance/activity tolerance;balance;pain;strength  -           User Key  (r) = Recorded By, (t) = Taken By, (c) = Cosigned By    Initials Name Provider Type    RC Nedra Lanier COTA Occupational Therapist Assistant                 Mobility/ADL's    No documentation.                Obj/Interventions    No documentation.                Goals/Plan     [facility] Name 05/14/22 0807          Transfer Goal 1 (OT)    Activity/Assistive Device (Transfer Goal 1, OT) toilet  -RC     [place] Level/Cues Needed (Transfer Goal 1, OT) modified independence  -RC     Time Frame (Transfer Goal 1, OT) long term goal (LTG);by discharge  -RC     Progress/Outcome (Transfer Goal 1, OT) goal not met  -[facility] Name 05/14/22 0807          Bathing Goal 1 (OT)    Activity/Device (Bathing Goal 1, OT) lower body bathing  -RC     [place] Level/Cues Needed (Bathing Goal 1, OT) modified independence  -RC     Time Frame (Bathing Goal 1, OT) long term goal (LTG);by discharge  -RC     Progress/Outcomes (Bathing Goal 1, OT) goal not met  -[facility] Name 05/14/22 0807          Dressing Goal 1 (OT)    Activity/Device (Dressing Goal 1, OT) lower body dressing  -RC     [place]/Cues Needed (Dressing Goal 1, OT) independent  -RC     Time Frame (Dressing Goal 1, OT) long term goal (LTG);by discharge  -RC     Progress/Outcome (Dressing Goal 1, OT) goal not met  -RC     Row Name 05/14/22 0807           Toileting Goal 1 (OT)    Activity/Device (Toileting Goal 1, OT) toileting skills, all  -RC     [place] Level/Cues Needed (Toileting Goal 1, OT) independent  -RC     Time Frame (Toileting Goal 1, OT) long term goal (LTG);by discharge  -RC     Progress/Outcome (Toileting Goal 1, OT) goal not met  -RC           User Key  (r) = Recorded By, (t) = Taken By, (c) = Cosigned By    Initials Name Provider Type    Nedra Walden COTA Occupational Therapist Assistant               Clinical Impression     Row Name 05/14/22 [facility]          Pain Assessment    Pretreatment Pain Rating 0/10 - no pain  -RC     Posttreatment Pain Rating 0/10 - no pain  -RC     Row Name 05/14/22 [facility]          Therapy Assessment/Plan (OT)    Rehab Potential (OT) good, to achieve stated therapy goals  -     Criteria for Skilled Therapeutic Interventions Met (OT) yes;skilled treatment is necessary  -RC     Therapy Frequency (OT) other (see comments)  5-7 d/wk  -     Row Name 05/14/22 [facility]          Therapy Plan Review/Discharge Plan (OT)    Anticipated Discharge Disposition (OT) home with 24/7 care  -RC           User Key  (r) = Recorded By, (t) = Taken By, (c) = Cosigned By    Initials Name Provider Type    Nedra Walden COTA Occupational Therapist Assistant               Outcome Measures     Row Name 05/14/22 [facility]          How much help from another is currently needed...    Putting on and taking off regular lower body clothing? 3  -RC     Bathing (including washing, rinsing, and drying) 3  -RC     Toileting (which includes using toilet bed pan or urinal) 3  -RC     Putting on and taking off regular upper body clothing 3  -RC     Taking care of personal grooming (such as brushing teeth) 4  -RC     Eating meals 4  -RC     AM-PAC 6 Clicks Score (OT) 20  -RC     Row Name 05/14/22 [facility]          Functional Assessment    Outcome Measure Options AM-PAC 6 Clicks Daily Activity (OT)  -RC           User Key  (r) = Recorded By, (t) = Taken By,  (c) = Cosigned By    Initials Name Provider Type     Nedra Lanier COTA Occupational Therapist Assistant                Occupational Therapy Education                 Title: PT OT SLP Therapies (In Progress)     Topic: Occupational Therapy (In Progress)     Point: ADL training (In Progress)     Description:   Instruct learner(s) on proper safety adaptation and remediation techniques during self care or transfers.   Instruct in proper use of assistive devices.              Learning Progress Summary           Patient Acceptance, E, NR by  at 5/11/2022 1139                   Point: Home exercise program (Not Started)     Description:   Instruct learner(s) on appropriate technique for monitoring, assisting and/or progressing therapeutic exercises/activities.              Learner Progress:  Not documented in this visit.          Point: Precautions (In Progress)     Description:   Instruct learner(s) on prescribed precautions during self-care and functional transfers.              Learning Progress Summary           Patient Acceptance, E, NR by  at 5/14/2022 1449    Acceptance, E, NR by  at 5/12/2022 1440    Acceptance, E, NR by  at 5/11/2022 1139    Acceptance, E,TB, VU,NR by  at 5/10/2022 1315    Comment: POC, role of OT, transfer training                   Point: Body mechanics (In Progress)     Description:   Instruct learner(s) on proper positioning and spine alignment during self-care, functional mobility activities and/or exercises.              Learning Progress Summary           Patient Acceptance, E, NR by  at 5/14/2022 1449    Acceptance, E, NR by  at 5/12/2022 1440    Acceptance, E, NR by  at 5/11/2022 1139    Acceptance, E,TB, VU,NR by  at 5/10/2022 1315    Comment: POC, role of OT, transfer training                               User Key     Initials Effective Dates Name Provider Type Discipline     06/16/21 -  Nedra Lanier COTA Occupational Therapist Assistant OT      06/14/21 -  Pardeep Dasilva, OT Occupational Therapist OT              OT Recommendation and Plan  Therapy Frequency (OT): other (see comments) (5-7 d/wk)  Plan of Care Review  Plan of Care Reviewed With: patient  Progress: improving  Outcome Evaluation: nursing o'd OT pt agreeable sup<>sit supervision, sit<>stand x5 w/ vc's, amb to toilet request no ad  FIGUEROA recommends R/W , min -cga toilet transfer cga, toileting sba, hannah tx well.  cont poc     Time Calculation:    Time Calculation- OT     Row Name 05/14/22 1444             Time Calculation- OT    OT Start Time 0807  -RC      OT Stop Time 0830  -RC      OT Time Calculation (min) 23 min  -RC      Total Timed Code Minutes- OT 23 minute(s)  -RC      OT Received On 05/14/22  -RC              Timed Charges    18523 - OT Therapeutic Activity Minutes 10  -RC      04106 - OT Self Care/Mgmt Minutes 13  -RC              Total Minutes    Timed Charges Total Minutes 23  -RC       Total Minutes 23  -RC            User Key  (r) = Recorded By, (t) = Taken By, (c) = Cosigned By    Initials Name Provider Type    RC Nedra Lanier COTA Occupational Therapist Assistant              Therapy Charges for Today     Code Description Service Date Service Provider Modifiers Qty    18786334674 HC OT THERAPEUTIC ACT EA 15 MIN 5/14/2022 Nedra Lanier COTA GO 1    18645072083 HC OT SELF CARE/MGMT/TRAIN EA 15 MIN 5/14/2022 Nedra Lanier COTA GO 1               CAROLINA Sheffield  5/14/2022

## 2022-05-14 NOTE — PLAN OF CARE
Goal Outcome Evaluation:  Plan of Care Reviewed With: patient        Progress: no change     Pt reports breathing easier today. Diuresis continues with Lasix gtt. Pt reports intermittent nausea, which is resolved by PRN Zofran.Cancer immunosuppressant pill - a home medicine - has been stopped until Monday when Pt can communicate with her oncologist in [place]. The drug possibly is affecting kidney function. Vital signs stable.

## 2022-05-15 LAB
ALBUMIN SERPL-MCNC: 4 G/DL (ref 3.5–5.2)
ALBUMIN/GLOB SERPL: 1.2 G/DL
ALP SERPL-CCNC: 130 U/L (ref 39–117)
ALT SERPL W P-5'-P-CCNC: 26 U/L (ref 1–33)
ANION GAP SERPL CALCULATED.3IONS-SCNC: 13 MMOL/L (ref 5–15)
AST SERPL-CCNC: 23 U/L (ref 1–32)
BASOPHILS # BLD AUTO: 0.06 10*3/MM3 (ref 0–0.2)
BASOPHILS NFR BLD AUTO: 0.6 % (ref 0–1.5)
BILIRUB SERPL-MCNC: 0.3 MG/DL (ref 0–1.2)
BUN SERPL-MCNC: 30 MG/DL (ref 8–23)
BUN/CREAT SERPL: 11.3 (ref 7–25)
CALCIUM SPEC-SCNC: 9.5 MG/DL (ref 8.6–10.5)
CHLORIDE SERPL-SCNC: 96 MMOL/L (ref 98–107)
CO2 SERPL-SCNC: 30 MMOL/L (ref 22–29)
CREAT SERPL-MCNC: 2.65 MG/DL (ref 0.57–1)
DEPRECATED RDW RBC AUTO: 51.1 FL (ref 37–54)
EGFRCR SERPLBLD CKD-EPI 2021: 19.7 ML/MIN/1.73
EOSINOPHIL # BLD AUTO: 0.69 10*3/MM3 (ref 0–0.4)
EOSINOPHIL NFR BLD AUTO: 7.4 % (ref 0.3–6.2)
ERYTHROCYTE [DISTWIDTH] IN BLOOD BY AUTOMATED COUNT: 16.3 % (ref 12.3–15.4)
GLOBULIN UR ELPH-MCNC: 3.3 GM/DL
GLUCOSE SERPL-MCNC: 84 MG/DL (ref 65–99)
HCT VFR BLD AUTO: 30.3 % (ref 34–46.6)
HGB BLD-MCNC: 9.8 G/DL (ref 12–15.9)
IMM GRANULOCYTES # BLD AUTO: 0.03 10*3/MM3 (ref 0–0.05)
IMM GRANULOCYTES NFR BLD AUTO: 0.3 % (ref 0–0.5)
LYMPHOCYTES # BLD AUTO: 1.1 10*3/MM3 (ref 0.7–3.1)
LYMPHOCYTES NFR BLD AUTO: 11.7 % (ref 19.6–45.3)
MCH RBC QN AUTO: 27.5 PG (ref 26.6–33)
MCHC RBC AUTO-ENTMCNC: 32.3 G/DL (ref 31.5–35.7)
MCV RBC AUTO: 84.9 FL (ref 79–97)
MONOCYTES # BLD AUTO: 0.5 10*3/MM3 (ref 0.1–0.9)
MONOCYTES NFR BLD AUTO: 5.3 % (ref 5–12)
NEUTROPHILS NFR BLD AUTO: 7 10*3/MM3 (ref 1.7–7)
NEUTROPHILS NFR BLD AUTO: 74.7 % (ref 42.7–76)
NRBC BLD AUTO-RTO: 0 /100 WBC (ref 0–0.2)
PLATELET # BLD AUTO: 358 10*3/MM3 (ref 140–450)
PMV BLD AUTO: 10.7 FL (ref 6–12)
POTASSIUM SERPL-SCNC: 4.2 MMOL/L (ref 3.5–5.2)
PROT SERPL-MCNC: 7.3 G/DL (ref 6–8.5)
RBC # BLD AUTO: 3.57 10*6/MM3 (ref 3.77–5.28)
SODIUM SERPL-SCNC: 139 MMOL/L (ref 136–145)
WBC NRBC COR # BLD: 9.38 10*3/MM3 (ref 3.4–10.8)

## 2022-05-15 PROCEDURE — 63710000001 PREDNISONE PER 1 MG: Performed by: INTERNAL MEDICINE

## 2022-05-15 PROCEDURE — 96366 THER/PROPH/DIAG IV INF ADDON: CPT

## 2022-05-15 PROCEDURE — G0378 HOSPITAL OBSERVATION PER HR: HCPCS

## 2022-05-15 PROCEDURE — 80053 COMPREHEN METABOLIC PANEL: CPT | Performed by: FAMILY MEDICINE

## 2022-05-15 PROCEDURE — 25010000002 FUROSEMIDE PER 20 MG: Performed by: INTERNAL MEDICINE

## 2022-05-15 PROCEDURE — 85025 COMPLETE CBC W/AUTO DIFF WBC: CPT | Performed by: STUDENT IN AN ORGANIZED HEALTH CARE EDUCATION/TRAINING PROGRAM

## 2022-05-15 PROCEDURE — 36415 COLL VENOUS BLD VENIPUNCTURE: CPT | Performed by: FAMILY MEDICINE

## 2022-05-15 PROCEDURE — 97535 SELF CARE MNGMENT TRAINING: CPT

## 2022-05-15 RX ORDER — PREDNISONE 20 MG/1
40 TABLET ORAL
Status: DISCONTINUED | OUTPATIENT
Start: 2022-05-15 | End: 2022-05-18 | Stop reason: HOSPADM

## 2022-05-15 RX ADMIN — PREDNISONE 40 MG: 20 TABLET ORAL at 12:22

## 2022-05-15 RX ADMIN — VENLAFAXINE HYDROCHLORIDE 37.5 MG: 37.5 CAPSULE, EXTENDED RELEASE ORAL at 08:06

## 2022-05-15 RX ADMIN — RIVAROXABAN 20 MG: 10 TABLET, FILM COATED ORAL at 17:12

## 2022-05-15 RX ADMIN — Medication 10 ML: at 08:06

## 2022-05-15 RX ADMIN — TOPIRAMATE 50 MG: 50 TABLET, FILM COATED ORAL at 08:06

## 2022-05-15 RX ADMIN — Medication 1 TABLET: at 08:06

## 2022-05-15 RX ADMIN — LEVOTHYROXINE SODIUM 150 MCG: 150 TABLET ORAL at 05:45

## 2022-05-15 RX ADMIN — ASPIRIN 81 MG: 81 TABLET, FILM COATED ORAL at 08:06

## 2022-05-15 RX ADMIN — FUROSEMIDE 5 MG/HR: 10 INJECTION, SOLUTION INTRAMUSCULAR; INTRAVENOUS at 08:06

## 2022-05-15 NOTE — PLAN OF CARE
Goal Outcome Evaluation:  Plan of Care Reviewed With: patient        Progress: no change       Pt continues to show good output and weight loss on lasix gtt, which was turned down to 2.5 mL/hour today. Pt says she is breathing easier and has less edema in legs and feet. Pt will resume taking home med for immunotherapy cancer regiment today. Pt will discuss future use of med with her oncologist in [place] on Monday. The med is a possible cause of TODD. Vital signs stable.

## 2022-05-15 NOTE — PLAN OF CARE
Goal Outcome Evaluation:  Plan of Care Reviewed With: patient        Progress: improving  Outcome Evaluation: nursing ok'd OT pt agreeable sup>sit mod I, sit<>stand sup, toilet transfer sup, toileting modified I, introduced and demo use of reacher and sock aid for lb dressing. recommend use of both for increased I and back protection.  cont poc

## 2022-05-15 NOTE — PROGRESS NOTES
"[facility]  [address] [place] [address]. [address]  T - [phone]  F - [phone]     Progress Note          PATIENT  DEMOGRAPHICS   PATIENT NAME: Adilene Morgan                      PHYSICIAN: Alexa Rios MD  : 1960  MRN: [id number]   LOS: 1 day    Patient Care Team:  Jen Raymundo MD as PCP - General  [facility] (Family Medicine)  Subjective   SUBJECTIVE   Good UO upto 5 L. Has lost weight and now 212 lbs from 226 lbs.          Objective   OBJECTIVE   Vital Signs  Temp:  [97.4 °F (36.3 °C)-98.4 °F (36.9 °C)] 97.6 °F (36.4 °C)  Heart Rate:  [61-79] 77  Resp:  [16-18] 18  BP: ()/(51-62) 105/57    Flowsheet Rows    Flowsheet Row First Filed Value   Admission Height 167.6 cm (66\") Documented at 2022 1623   Admission Weight 97.5 kg (215 lb) Documented at 2022 1623           I/O last 3 completed shifts:  In: 1800 [P.O.:1800]  Out: 5100 [Urine:5100]    PHYSICAL EXAM    Physical Exam  Constitutional:       Appearance: She is well-developed.   HENT:      Head: Normocephalic.   Eyes:      Pupils: Pupils are equal, round, and reactive to light.   Cardiovascular:      Rate and Rhythm: Normal rate and regular rhythm.      Heart sounds: Normal heart sounds.   Pulmonary:      Effort: Pulmonary effort is normal.      Breath sounds: Normal breath sounds.   Abdominal:      General: Bowel sounds are normal.      Palpations: Abdomen is soft.   Musculoskeletal:         General: No swelling.   Skin:     Coloration: Skin is not jaundiced.   Neurological:      General: No focal deficit present.      Mental Status: She is alert and oriented to person, place, and time.         RESULTS   Results Review:    Results from last 7 days   Lab Units 05/15/22  0613 22  0604 22  0548   SODIUM mmol/L 139 146* 142   POTASSIUM mmol/L 4.2 4.3 4.7   CHLORIDE mmol/L 96* 107 111*   CO2 mmol/L 30.0* 29.0 22.0   BUN mg/dL 30* 21 21   CREATININE " mg/dL 2.65* 2.17* 2.08*   CALCIUM mg/dL 9.5 9.1 8.2*   BILIRUBIN mg/dL 0.3 0.2 0.2   ALK PHOS U/L 130* 103 105   ALT (SGPT) U/L 26 24 26   AST (SGOT) U/L 23 23 26   GLUCOSE mg/dL 84 90 94       Estimated Creatinine Clearance: 25.2 mL/min (A) (by C-G formula based on SCr of 2.65 mg/dL (H)).    Results from last 7 days   Lab Units 05/09/22  1709   MAGNESIUM mg/dL 2.5*       Results from last 7 days   Lab Units 05/13/22  0548   URIC ACID mg/dL 7.0*       Results from last 7 days   Lab Units 05/15/22  0613 05/14/22  0604 05/13/22  0548 05/12/22  1407 05/11/22  1054 05/10/22  0709   WBC 10*3/mm3 9.38 7.82 7.72  --  10.50 7.66   HEMOGLOBIN g/dL 9.8* 8.1* 8.0* 7.3* 7.9* 8.0*   PLATELETS 10*3/mm3 358 262 242  --  250 230       Results from last 7 days   Lab Units 05/09/22  1709   INR  1.90*         Imaging Results (Last 24 Hours)     ** No results found for the last 24 hours. **           MEDICATIONS    aspirin, 81 mg, Oral, Daily  atorvastatin, 80 mg, Oral, Nightly  levothyroxine, 150 mcg, Oral, Q AM  multivitamin with minerals, 1 tablet, Oral, Daily  rivaroxaban, 20 mg, Oral, Daily With Dinner  sodium chloride, 10 mL, Intravenous, Q12H  topiramate, 50 mg, Oral, Nightly  venlafaxine XR, 37.5 mg, Oral, Daily      furosemide (LASIX) 100 mg in 100mL NS infusion, 5 mg/hr, Last Rate: 5 mg/hr (05/15/22 0806)        Assessment & Plan   ASSESSMENT / PLAN      Acquired hypothyroidism    Adenosquamous carcinoma of right lung (HCC)    TODD (acute kidney injury) (HCC)    Transient ischemic attack (TIA)    1.  TODD on CKD 3- Baseline creatinine around 1.4, creatinine was up to 2.93 on admission and has improved to 1.9-2.1. now 2.65 today. Good UO and has good weight loss.     Creatinine was at baseline as recently as May 4. She did have Lasix / gastroenteritis symptoms. She tolerated Bactrim exposure in April with documented stable labs following that.  Her CK was mildly elevated on admission and now better. Doubt pre renal (fena  consistent with atn and no improvement with ivf), doubt any rhabdo (ck is <1000). More so intrinsic injury / AIN      U/s no hydro. Liang stain is positive.      -It appears that clinically TODD is relate to entrectinib which can be associated with increased serum creatinine. I have discussed the case with patient Primary Oncologist Dr Morales and we have decided to stop Entrectinib. I have explained this to patient on 5/14 and she is agreeable with the plan. She likes to go back on it today and we can restart it provided she understands the risk of worsening cr. She likes to talk to Dr Morales.     Resume entrectinib. Add po prednisone for AIN. Lower lasix drip to 2.5mg / hr.      2.  Metabolic acidosis- corrected     3.  Metastatic lung cancer- on immunotherapy, follows in [place]     4.  History of DVT     6.  Hypotension- resolved     7.  Right lower extremity weakness- manage per primary team, also had neurology evaluation                   This document has been electronically signed by Alexa Rios MD on May 15, 2022 11:55 CDT

## 2022-05-15 NOTE — PLAN OF CARE
Goal Outcome Evaluation:              Outcome Evaluation: No changes overnight, remains on Lasix gtt at 5 ml/hour; VSS

## 2022-05-15 NOTE — THERAPY TREATMENT NOTE
Patient Name: Adilene Morgan  : 1960    MRN: [id number]                              Today's Date: 5/15/2022       Admit Date: 2022    Visit Dx:     ICD-10-CM ICD-9-CM   1. TODD (acute kidney injury) (HCC)  N17.9 584.9   2. Blurred vision  H53.8 368.8   3. Right leg weakness  R29.898 729.89   4. Impaired mobility and ADLs  Z74.09 V49.89    Z78.9    5. Impaired functional mobility, balance, gait, and endurance  Z74.09 V49.89     Patient Active Problem List   Diagnosis   • Acquired hypothyroidism   • Depressive disorder   • Migraine   • PVC (premature ventricular contraction)   • Palpitation   • Pain of fifth toe   • Adenosquamous carcinoma of right lung (HCC)   • TODD (acute kidney injury) (HCC)   • Transient ischemic attack (TIA)     Past Medical History:   Diagnosis Date   • Achilles bursitis    • Achilles tendinitis    • Acquired hypothyroidism    • Allergic rhinitis    • Allergy to meat     alphagal   • Allergy to milk products    • Arrhythmia    • Asthmatic bronchitis    • Asthmatic bronchitis    • Bone spur    • Calcaneal spur    • Cancer (HCC)    • Depressive disorder    • Family history of thyroid problem    • Herpes simplex    • Hypermetropia    • Hypothyroidism    • Menopausal flushing    • Menopause    • Migraine    • Otalgia    • Pneumonia    • Presbyopia    • Stroke (HCC)    • Trochanteric bursitis    • UTI (urinary tract infection) 2018   • Ventricular premature beats      Past Surgical History:   Procedure Laterality Date   • COLONOSCOPY N/A 2/15/2017    Procedure: COLONOSCOPY;  Surgeon: Lupillo Ugarte MD;  Location: [facility] ENDOSCOPY;  Service:    • COSMETIC SURGERY      plastic surgery to face x 4 r/t trauma   • LAPAROSCOPIC CHOLECYSTECTOMY  12/10/1995    Cholecystectomy, laparoscopic (1)      • OTHER SURGICAL HISTORY      Head surgery procedure (1)    plastic surgery on the face    • OTHER SURGICAL HISTORY  10/17/2014    Repair Superficial Wound TR-EXT 2.5 < CM [id number] (1)          General Information     Row Name 05/15/22 1040          OT Time and Intention    Document Type therapy note (daily note)  -     Mode of Treatment individual therapy;occupational therapy  -[facility] Name 05/15/22 1040          General Information    Patient Profile Reviewed yes  -     Existing Precautions/Restrictions fall  -[facility] Name 05/15/22 1040          Cognition    Orientation Status (Cognition) oriented x 4  -RC     Row Name 05/15/22 1040          Safety Issues, Functional Mobility    Impairments Affecting Function (Mobility) endurance/activity tolerance;balance;pain;strength  -           User Key  (r) = Recorded By, (t) = Taken By, (c) = Cosigned By    Initials Name Provider Type    RC Nedra Lanier COTA Occupational Therapist Assistant                 Mobility/ADL's    No documentation.                Obj/Interventions    No documentation.                Goals/Plan     Row Name 05/15/22 1040          Transfer Goal 1 (OT)    Activity/Assistive Device (Transfer Goal 1, OT) toilet  -RC     [place] Level/Cues Needed (Transfer Goal 1, OT) modified independence  -RC     Time Frame (Transfer Goal 1, OT) long term goal (LTG);by discharge  -RC     Progress/Outcome (Transfer Goal 1, OT) goal not met  -RC     Row Name 05/15/22 1040          Bathing Goal 1 (OT)    Activity/Device (Bathing Goal 1, OT) lower body bathing  -RC     [place] Level/Cues Needed (Bathing Goal 1, OT) modified independence  -RC     Time Frame (Bathing Goal 1, OT) long term goal (LTG);by discharge  -RC     Progress/Outcomes (Bathing Goal 1, OT) goal not met  -RC     Row Name 05/15/22 1040          Dressing Goal 1 (OT)    Activity/Device (Dressing Goal 1, OT) lower body dressing  -RC     [place]/Cues Needed (Dressing Goal 1, OT) independent  -RC     Time Frame (Dressing Goal 1, OT) long term goal (LTG);by discharge  -RC     Progress/Outcome (Dressing Goal 1, OT) goal not met  -RC     Row Name 05/15/22 1040           Toileting Goal 1 (OT)    Activity/Device (Toileting Goal 1, OT) toileting skills, all  -RC     [place] Level/Cues Needed (Toileting Goal 1, OT) independent  -RC     Time Frame (Toileting Goal 1, OT) long term goal (LTG);by discharge  -RC     Progress/Outcome (Toileting Goal 1, OT) goal not met  -RC           User Key  (r) = Recorded By, (t) = Taken By, (c) = Cosigned By    Initials Name Provider Type    Nedra Walden COTA Occupational Therapist Assistant               Clinical Impression     Row Name 05/15/22 1040          Pain Assessment    Pretreatment Pain Rating 0/10 - no pain  -RC     Posttreatment Pain Rating 0/10 - no pain  -RC     Row Name 05/15/22 1040          Therapy Assessment/Plan (OT)    Rehab Potential (OT) good, to achieve stated therapy goals  -     Criteria for Skilled Therapeutic Interventions Met (OT) yes;skilled treatment is necessary  -RC     Therapy Frequency (OT) other (see comments)  5-7 d/wk  -     Row Name 05/15/22 1040          Therapy Plan Review/Discharge Plan (OT)    Anticipated Discharge Disposition (OT) home with 24/7 care  -RC           User Key  (r) = Recorded By, (t) = Taken By, (c) = Cosigned By    Initials Name Provider Type    Nedra Walden COTA Occupational Therapist Assistant               Outcome Measures     Row Name 05/15/22 1040          How much help from another is currently needed...    Putting on and taking off regular lower body clothing? 3  -RC     Bathing (including washing, rinsing, and drying) 3  -RC     Toileting (which includes using toilet bed pan or urinal) 3  -RC     Putting on and taking off regular upper body clothing 3  -RC     Taking care of personal grooming (such as brushing teeth) 4  -RC     Eating meals 4  -RC     AM-PAC 6 Clicks Score (OT) 20  -RC     Row Name 05/15/22 1040          Functional Assessment    Outcome Measure Options AM-PAC 6 Clicks Daily Activity (OT)  -RC           User Key  (r) = Recorded By, (t) = Taken By,  (c) = Cosigned By    Initials Name Provider Type     Nedra Lanier COTA Occupational Therapist Assistant                Occupational Therapy Education                 Title: PT OT SLP Therapies (In Progress)     Topic: Occupational Therapy (In Progress)     Point: ADL training (Done)     Description:   Instruct learner(s) on proper safety adaptation and remediation techniques during self care or transfers.   Instruct in proper use of assistive devices.              Learning Progress Summary           Patient Acceptance, D,E, NR,VU by  at 5/15/2022 1300    Acceptance, E, NR by  at 5/11/2022 1139                   Point: Home exercise program (Not Started)     Description:   Instruct learner(s) on appropriate technique for monitoring, assisting and/or progressing therapeutic exercises/activities.              Learner Progress:  Not documented in this visit.          Point: Precautions (Done)     Description:   Instruct learner(s) on prescribed precautions during self-care and functional transfers.              Learning Progress Summary           Patient Acceptance, D,E, NR,VU by  at 5/15/2022 1300    Acceptance, E, NR by  at 5/14/2022 1449    Acceptance, E, NR by  at 5/12/2022 1440    Acceptance, E, NR by  at 5/11/2022 1139    Acceptance, E,TB, VU,NR by  at 5/10/2022 1315    Comment: POC, role of OT, transfer training                   Point: Body mechanics (In Progress)     Description:   Instruct learner(s) on proper positioning and spine alignment during self-care, functional mobility activities and/or exercises.              Learning Progress Summary           Patient Acceptance, E, NR by  at 5/14/2022 1449    Acceptance, E, NR by  at 5/12/2022 1440    Acceptance, E, NR by  at 5/11/2022 1139    Acceptance, E,TB, VU,NR by  at 5/10/2022 1315    Comment: POC, role of OT, transfer training                               User Key     Initials Effective Dates Name Provider Type Discipline     RC 06/16/21 -  Nedra Lanier COTA Occupational Therapist Assistant OT    SJ 06/14/21 -  aPrdeep Dasilva OT Occupational Therapist OT              OT Recommendation and Plan  Therapy Frequency (OT): other (see comments) (5-7 d/wk)  Plan of Care Review  Plan of Care Reviewed With: patient  Progress: improving  Outcome Evaluation: nursing ok'd OT pt agreeable sup>sit mod I, sit<>stand sup, toilet transfer sup, toileting modified I, introduced and demo use of reacher and sock aid for lb dressing. recommend use of both for increased I and back protection.  cont poc     Time Calculation:    Time Calculation- OT     Row Name 05/15/22 1251             Time Calculation- OT    OT Start Time 1040  -RC      OT Stop Time 1105  -RC      OT Time Calculation (min) 25 min  -RC      Total Timed Code Minutes- OT 25 minute(s)  -RC      OT Received On 05/15/22  -RC              Timed Charges    17982 - OT Self Care/Mgmt Minutes 25  -RC              Total Minutes    Timed Charges Total Minutes 25  -RC       Total Minutes 25  -RC            User Key  (r) = Recorded By, (t) = Taken By, (c) = Cosigned By    Initials Name Provider Type    RC Nedra Lanier COTA Occupational Therapist Assistant              Therapy Charges for Today     Code Description Service Date Service Provider Modifiers Qty    48940235828 HC OT THERAPEUTIC ACT EA 15 MIN 5/14/2022 Nedra Lanier COTA GO 1    92809304884 HC OT SELF CARE/MGMT/TRAIN EA 15 MIN 5/14/2022 Nedra Lanier COTA GO 1    24509215314 HC OT SELF CARE/MGMT/TRAIN EA 15 MIN 5/15/2022 Nedra Lanier COTA GO 2               CAROLINA Sheffield  5/15/2022

## 2022-05-15 NOTE — PROGRESS NOTES
[facility]  INPATIENT PROGRESS NOTE    Length of Stay: 1  Date of Admission: 5/9/2022  Primary Care Physician: Jen Raymundo MD    Subjective   Chief Complaint: Generalized weakness  HPI:    Admitted for renal failure.  Creatinine is slightly worse today.  She is otherwise feeling fine.  Denies any chest pain or any nausea vomiting diarrhea.    Review of Systems   Respiratory: Negative for shortness of breath.    Cardiovascular: Negative for chest pain.        All pertinent negatives and positives are as above. All other systems have been reviewed and are negative unless otherwise stated.     Objective    Temp:  [97.4 °F (36.3 °C)-98.4 °F (36.9 °C)] 97.6 °F (36.4 °C)  Heart Rate:  [61-79] 77  Resp:  [16-18] 18  BP: ()/(51-68) 105/57  Physical Exam  Vitals and nursing note reviewed.   Constitutional:       General: She is not in acute distress.     Appearance: She is well-developed. She is not diaphoretic.   HENT:      Head: Normocephalic and atraumatic.   Cardiovascular:      Rate and Rhythm: Normal rate.   Pulmonary:      Effort: Pulmonary effort is normal. No respiratory distress.      Breath sounds: No wheezing.   Abdominal:      General: There is no distension.      Palpations: Abdomen is soft.   Musculoskeletal:         General: Normal range of motion.   Skin:     General: Skin is warm and dry.   Neurological:      Mental Status: She is alert.      Cranial Nerves: No cranial nerve deficit.   Psychiatric:         Behavior: Behavior normal.         Thought Content: Thought content normal.         Judgment: Judgment normal.             Results Review:  I have reviewed the labs, radiology results, and diagnostic studies.    Laboratory Data:   Lab Results (last 24 hours)     Procedure Component Value Units Date/Time    Comprehensive Metabolic Panel [[id number]]  (Abnormal) Collected: 05/15/22 0613    Specimen: Blood Updated: 05/15/22 0761      Glucose 84 mg/dL      BUN 30 mg/dL      Creatinine 2.65 mg/dL      Sodium 139 mmol/L      Potassium 4.2 mmol/L      Chloride 96 mmol/L      CO2 30.0 mmol/L      Calcium 9.5 mg/dL      Total Protein 7.3 g/dL      Albumin 4.00 g/dL      ALT (SGPT) 26 U/L      AST (SGOT) 23 U/L      Alkaline Phosphatase 130 U/L      Total Bilirubin 0.3 mg/dL      Globulin 3.3 gm/dL      A/G Ratio 1.2 g/dL      BUN/Creatinine Ratio 11.3     Anion Gap 13.0 mmol/L      eGFR 19.7 mL/min/1.73      Comment: National Kidney Foundation and American Society of Nephrology (ASN) Task Force recommended calculation based on the Chronic Kidney Disease Epidemiology Collaboration (CKD-EPI) equation refit without adjustment for race.       Narrative:      GFR Normal >60  Chronic Kidney Disease <60  Kidney Failure <15      CBC & Differential [[id number]]  (Abnormal) Collected: 05/15/22 0613    Specimen: Blood Updated: 05/15/22 0711    Narrative:      The following orders were created for panel order CBC & Differential.  Procedure                               Abnormality         Status                     ---------                               -----------         ------                     CBC Auto Differential[[id number]]        Abnormal            Final result                 Please view results for these tests on the individual orders.    CBC Auto Differential [[id number]]  (Abnormal) Collected: 05/15/22 0613    Specimen: Blood Updated: 05/15/22 0711     WBC 9.38 10*3/mm3      RBC 3.57 10*6/mm3      Hemoglobin 9.8 g/dL      Hematocrit 30.3 %      MCV 84.9 fL      MCH 27.5 pg      MCHC 32.3 g/dL      RDW 16.3 %      RDW-SD 51.1 fl      MPV 10.7 fL      Platelets 358 10*3/mm3      Neutrophil % 74.7 %      Lymphocyte % 11.7 %      Monocyte % 5.3 %      Eosinophil % 7.4 %      Basophil % 0.6 %      Immature Grans % 0.3 %      Neutrophils, Absolute 7.00 10*3/mm3      Lymphocytes, Absolute 1.10 10*3/mm3      Monocytes, Absolute 0.50 10*3/mm3       Eosinophils, Absolute 0.69 10*3/mm3      Basophils, Absolute 0.06 10*3/mm3      Immature Grans, Absolute 0.03 10*3/mm3      nRBC 0.0 /100 WBC     Protein / Creatinine Ratio, Urine - Urine, Clean Catch [[id number]]  (Abnormal) Collected: 05/14/22 1330    Specimen: Urine, Clean Catch Updated: 05/14/22 2220     Protein/Creatinine Ratio, Urine 846.7 mg/G Crea      Creatinine, Urine 15.0 mg/dL      Total Protein, Urine 12.7 mg/dL     Urinalysis With Microscopic - Urine, Clean Catch [[id number]]  (Abnormal) Collected: 05/14/22 1330    Specimen: Urine, Clean Catch Updated: 05/14/22 1359    Narrative:      The following orders were created for panel order Urinalysis With Microscopic - Urine, Clean Catch.  Procedure                               Abnormality         Status                     ---------                               -----------         ------                     Urinalysis without micro...[[id number]]  Abnormal            Final result               Urinalysis, Microscopic ...[[id number]]  Abnormal            Final result                 Please view results for these tests on the individual orders.    Urinalysis, Microscopic Only - Urine, Clean Catch [[id number]]  (Abnormal) Collected: 05/14/22 1330    Specimen: Urine, Clean Catch Updated: 05/14/22 1359     RBC, UA 3-5 /HPF      WBC, UA Too Numerous to Count /HPF      Bacteria, UA 4+ /HPF      Squamous Epithelial Cells, UA 0-2 /HPF      Hyaline Casts, UA None Seen /LPF      Methodology Automated Microscopy    Urinalysis without microscopic (no culture) - Urine, Clean Catch [[id number]]  (Abnormal) Collected: 05/14/22 1330    Specimen: Urine, Clean Catch Updated: 05/14/22 1357     Color, UA Yellow     Appearance, UA Turbid     pH, UA 5.5     Specific Gravity, UA 1.006     Glucose, UA Negative     Ketones, UA Negative     Bilirubin, UA Negative     Blood, UA Moderate (2+)     Protein, UA Negative     Leuk Esterase, UA Large (3+)     Nitrite, UA Negative      Urobilinogen, UA 0.2 E.U./dL          Culture Data:   No results found for: BLOODCX  No results found for: URINECX  No results found for: RESPCX  No results found for: WOUNDCX  No results found for: STOOLCX  No components found for: BODYFLD    Radiology Data:   Imaging Results (Last 24 Hours)     ** No results found for the last 24 hours. **          I have reviewed the patient's current medications.     Assessment/Plan     Active Hospital Problems    Diagnosis    • TODD (acute kidney injury) (HCC)    • Transient ischemic attack (TIA)    • Adenosquamous carcinoma of right lung (HCC)    • Acquired hypothyroidism        1. Right LE weakness   -neurology consulted, appreciate recs, plans to continue Xarelto/ASA  -MRI reviewed, no acute issue  -PT, OT--will continue PT/OT at home   -reviewed carotid US  -echo reviewed   -last echo 2018 EF 60%, normal stress test around that time.   -duplex US to r/o DVT negative     2. Metastatic lung cancer  -on immunotherapy  -follows in [place]      3. TODD   -creatinine 2.93 on admission  -nephrology consulted  -could be 2/2 ATN  -now on lasix drip  Creatinine slightly worse today, we will continue to monitor.          4. Hx of DVT  -hypercoagulable due to cancer  -continue Xarelto   -baseline is around 7-9      5. Hypotension  -improved after IVF      6. Anemia  -transfuse 1 unit 5/12   Hemoglobin has been improving, will continue to monitor.                  Darien Ugalde MD   05/15/22   10:08 CDT

## 2022-05-16 LAB
ALBUMIN SERPL-MCNC: 4.2 G/DL (ref 3.5–5.2)
ALBUMIN/GLOB SERPL: 1.4 G/DL
ALP SERPL-CCNC: 124 U/L (ref 39–117)
ALT SERPL W P-5'-P-CCNC: 21 U/L (ref 1–33)
ANION GAP SERPL CALCULATED.3IONS-SCNC: 17 MMOL/L (ref 5–15)
AST SERPL-CCNC: 19 U/L (ref 1–32)
BACTERIA UR QL AUTO: ABNORMAL /HPF
BASOPHILS # BLD AUTO: 0.02 10*3/MM3 (ref 0–0.2)
BASOPHILS NFR BLD AUTO: 0.2 % (ref 0–1.5)
BILIRUB SERPL-MCNC: 0.3 MG/DL (ref 0–1.2)
BILIRUB UR QL STRIP: NEGATIVE
BUN SERPL-MCNC: 41 MG/DL (ref 8–23)
BUN/CREAT SERPL: 16.9 (ref 7–25)
CALCIUM SPEC-SCNC: 9.4 MG/DL (ref 8.6–10.5)
CHLORIDE SERPL-SCNC: 96 MMOL/L (ref 98–107)
CLARITY UR: ABNORMAL
CO2 SERPL-SCNC: 27 MMOL/L (ref 22–29)
COLOR UR: YELLOW
CREAT SERPL-MCNC: 2.42 MG/DL (ref 0.57–1)
DEPRECATED RDW RBC AUTO: 49.9 FL (ref 37–54)
EGFRCR SERPLBLD CKD-EPI 2021: 22 ML/MIN/1.73
EOSINOPHIL # BLD AUTO: 0 10*3/MM3 (ref 0–0.4)
EOSINOPHIL NFR BLD AUTO: 0 % (ref 0.3–6.2)
ERYTHROCYTE [DISTWIDTH] IN BLOOD BY AUTOMATED COUNT: 15.9 % (ref 12.3–15.4)
GLOBULIN UR ELPH-MCNC: 2.9 GM/DL
GLUCOSE SERPL-MCNC: 133 MG/DL (ref 65–99)
GLUCOSE UR STRIP-MCNC: NEGATIVE MG/DL
HCT VFR BLD AUTO: 30.2 % (ref 34–46.6)
HGB BLD-MCNC: 9.7 G/DL (ref 12–15.9)
HGB UR QL STRIP.AUTO: ABNORMAL
HYALINE CASTS UR QL AUTO: ABNORMAL /LPF
IMM GRANULOCYTES # BLD AUTO: 0.09 10*3/MM3 (ref 0–0.05)
IMM GRANULOCYTES NFR BLD AUTO: 0.9 % (ref 0–0.5)
KETONES UR QL STRIP: NEGATIVE
LEUKOCYTE ESTERASE UR QL STRIP.AUTO: ABNORMAL
LYMPHOCYTES # BLD AUTO: 0.58 10*3/MM3 (ref 0.7–3.1)
LYMPHOCYTES NFR BLD AUTO: 5.9 % (ref 19.6–45.3)
MCH RBC QN AUTO: 27.8 PG (ref 26.6–33)
MCHC RBC AUTO-ENTMCNC: 32.1 G/DL (ref 31.5–35.7)
MCV RBC AUTO: 86.5 FL (ref 79–97)
MONOCYTES # BLD AUTO: 0.4 10*3/MM3 (ref 0.1–0.9)
MONOCYTES NFR BLD AUTO: 4.1 % (ref 5–12)
NEUTROPHILS NFR BLD AUTO: 8.77 10*3/MM3 (ref 1.7–7)
NEUTROPHILS NFR BLD AUTO: 88.9 % (ref 42.7–76)
NITRITE UR QL STRIP: NEGATIVE
NRBC BLD AUTO-RTO: 0 /100 WBC (ref 0–0.2)
PH UR STRIP.AUTO: 7 [PH] (ref 5–9)
PLATELET # BLD AUTO: 325 10*3/MM3 (ref 140–450)
PMV BLD AUTO: 10.2 FL (ref 6–12)
POTASSIUM SERPL-SCNC: 4.5 MMOL/L (ref 3.5–5.2)
PROT SERPL-MCNC: 7.1 G/DL (ref 6–8.5)
PROT UR QL STRIP: ABNORMAL
RBC # BLD AUTO: 3.49 10*6/MM3 (ref 3.77–5.28)
RBC # UR STRIP: ABNORMAL /HPF
REF LAB TEST METHOD: ABNORMAL
SODIUM SERPL-SCNC: 140 MMOL/L (ref 136–145)
SP GR UR STRIP: 1.01 (ref 1–1.03)
SQUAMOUS #/AREA URNS HPF: ABNORMAL /HPF
UROBILINOGEN UR QL STRIP: ABNORMAL
WBC # UR STRIP: ABNORMAL /HPF
WBC NRBC COR # BLD: 9.86 10*3/MM3 (ref 3.4–10.8)

## 2022-05-16 PROCEDURE — 97110 THERAPEUTIC EXERCISES: CPT

## 2022-05-16 PROCEDURE — G0378 HOSPITAL OBSERVATION PER HR: HCPCS

## 2022-05-16 PROCEDURE — 97530 THERAPEUTIC ACTIVITIES: CPT

## 2022-05-16 PROCEDURE — 25010000002 ONDANSETRON PER 1 MG: Performed by: FAMILY MEDICINE

## 2022-05-16 PROCEDURE — 63710000001 PREDNISONE PER 1 MG: Performed by: INTERNAL MEDICINE

## 2022-05-16 PROCEDURE — 25010000002 FUROSEMIDE PER 20 MG: Performed by: INTERNAL MEDICINE

## 2022-05-16 PROCEDURE — 97116 GAIT TRAINING THERAPY: CPT

## 2022-05-16 PROCEDURE — 80053 COMPREHEN METABOLIC PANEL: CPT | Performed by: FAMILY MEDICINE

## 2022-05-16 PROCEDURE — 96366 THER/PROPH/DIAG IV INF ADDON: CPT

## 2022-05-16 PROCEDURE — 81001 URINALYSIS AUTO W/SCOPE: CPT | Performed by: FAMILY MEDICINE

## 2022-05-16 PROCEDURE — 96376 TX/PRO/DX INJ SAME DRUG ADON: CPT

## 2022-05-16 PROCEDURE — 87086 URINE CULTURE/COLONY COUNT: CPT | Performed by: FAMILY MEDICINE

## 2022-05-16 PROCEDURE — 85025 COMPLETE CBC W/AUTO DIFF WBC: CPT | Performed by: STUDENT IN AN ORGANIZED HEALTH CARE EDUCATION/TRAINING PROGRAM

## 2022-05-16 PROCEDURE — 97535 SELF CARE MNGMENT TRAINING: CPT

## 2022-05-16 RX ADMIN — PREDNISONE 40 MG: 20 TABLET ORAL at 08:28

## 2022-05-16 RX ADMIN — Medication 1 TABLET: at 08:28

## 2022-05-16 RX ADMIN — TOPIRAMATE 50 MG: 50 TABLET, FILM COATED ORAL at 08:28

## 2022-05-16 RX ADMIN — ASPIRIN 81 MG: 81 TABLET, FILM COATED ORAL at 08:28

## 2022-05-16 RX ADMIN — ONDANSETRON 4 MG: 2 INJECTION INTRAMUSCULAR; INTRAVENOUS at 20:31

## 2022-05-16 RX ADMIN — FUROSEMIDE 2.5 MG/HR: 10 INJECTION, SOLUTION INTRAMUSCULAR; INTRAVENOUS at 20:46

## 2022-05-16 RX ADMIN — RIVAROXABAN 20 MG: 10 TABLET, FILM COATED ORAL at 17:27

## 2022-05-16 RX ADMIN — VENLAFAXINE HYDROCHLORIDE 37.5 MG: 37.5 CAPSULE, EXTENDED RELEASE ORAL at 08:28

## 2022-05-16 RX ADMIN — Medication 10 ML: at 08:29

## 2022-05-16 RX ADMIN — ACETAMINOPHEN 650 MG: 325 TABLET ORAL at 09:34

## 2022-05-16 RX ADMIN — LEVOTHYROXINE SODIUM 150 MCG: 150 TABLET ORAL at 05:29

## 2022-05-16 NOTE — THERAPY TREATMENT NOTE
Patient Name: Adilene Morgan  : 1960    MRN: [id number]                              Today's Date: 2022       Admit Date: 2022    Visit Dx:     ICD-10-CM ICD-9-CM   1. TODD (acute kidney injury) (HCC)  N17.9 584.9   2. Blurred vision  H53.8 368.8   3. Right leg weakness  R29.898 729.89   4. Impaired mobility and ADLs  Z74.09 V49.89    Z78.9    5. Impaired functional mobility, balance, gait, and endurance  Z74.09 V49.89     Patient Active Problem List   Diagnosis   • Acquired hypothyroidism   • Depressive disorder   • Migraine   • PVC (premature ventricular contraction)   • Palpitation   • Pain of fifth toe   • Adenosquamous carcinoma of right lung (HCC)   • TODD (acute kidney injury) (HCC)   • Transient ischemic attack (TIA)     Past Medical History:   Diagnosis Date   • Achilles bursitis    • Achilles tendinitis    • Acquired hypothyroidism    • Allergic rhinitis    • Allergy to meat     alphagal   • Allergy to milk products    • Arrhythmia    • Asthmatic bronchitis    • Asthmatic bronchitis    • Bone spur    • Calcaneal spur    • Cancer (HCC)    • Depressive disorder    • Family history of thyroid problem    • Herpes simplex    • Hypermetropia    • Hypothyroidism    • Menopausal flushing    • Menopause    • Migraine    • Otalgia    • Pneumonia    • Presbyopia    • Stroke (HCC)    • Trochanteric bursitis    • UTI (urinary tract infection) 2018   • Ventricular premature beats      Past Surgical History:   Procedure Laterality Date   • COLONOSCOPY N/A 2/15/2017    Procedure: COLONOSCOPY;  Surgeon: Lupillo Ugarte MD;  Location: [facility] ENDOSCOPY;  Service:    • COSMETIC SURGERY      plastic surgery to face x 4 r/t trauma   • LAPAROSCOPIC CHOLECYSTECTOMY  12/10/1995    Cholecystectomy, laparoscopic (1)      • OTHER SURGICAL HISTORY      Head surgery procedure (1)    plastic surgery on the face    • OTHER SURGICAL HISTORY  10/17/2014    Repair Superficial Wound TR-EXT 2.5 < CM 41258 (1)          General Information     Row Name 05/16/22 0945          OT Time and Intention    Document Type therapy note (daily note)  -     Mode of Treatment individual therapy;occupational therapy  -     Row Name 05/16/22 0945          General Information    Patient Profile Reviewed yes  -     Existing Precautions/Restrictions fall  -     Row Name 05/16/22 0945          Cognition    Orientation Status (Cognition) oriented x 4  -     Row Name 05/16/22 0945          Safety Issues, Functional Mobility    Impairments Affecting Function (Mobility) endurance/activity tolerance;balance;strength;pain  -           User Key  (r) = Recorded By, (t) = Taken By, (c) = Cosigned By    Initials Name Provider Type     Jayde Johnson COTA Occupational Therapist Assistant                 Mobility/ADL's     Row Name 05/16/22 1326          Bed Mobility    Bed Mobility supine-sit  -     Supine-Sit [place] (Bed Mobility) independent  -     Row Name 05/16/22 1326          Transfers    Transfers sit-stand transfer;stand-sit transfer;toilet transfer  -     Sit-Stand [place] (Transfers) independent  -     Stand-Sit [place] (Transfers) independent  -     [place] Level (Toilet Transfer) independent  -     Assistive Device (Toilet Transfer) commode, bedside with drop arms;grab bars/safety frame;walker, knee scooter  -     [place] Level (Shower Transfer) independent  -     Row Name 05/16/22 1326          Activities of Daily Living    BADL Assessment/Intervention bathing;upper body dressing;lower body dressing;grooming;toileting  -     Row Name 05/16/22 1326          Toileting Assessment/Training    [place] Level (Toileting) adjust/manage clothing;perform perineal hygiene;independent  -     Assistive Devices (Toileting) grab bar/safety frame  -     Position (Toileting) supported standing  -     Row Name 05/16/22 1326          Grooming Assessment/Training    [place] Level (Grooming)  hair care, combing/brushing;oral care regimen;wash face, hands;independent  -LW     Position (Grooming) sink side  -     Row Name 05/16/22 1326          Lower Body Dressing Assessment/Training    [place] Level (Lower Body Dressing) lower body dressing skills;doff;don;pants/bottoms;socks;independent  -LW     Position (Lower Body Dressing) edge of bed sitting  -     Row Name 05/16/22 1326          Shower Transfer    Type (Shower Transfer) stand pivot/stand step  -     Row Name 05/16/22 1326          Bathing Assessment/Intervention    [place] Level (Bathing) lower body;upper body;upper extremities;chest/trunk;distal lower extremities/feet;proximal lower extremities;perineal area;independent  -LW     Position (Bathing) unsupported standing  -     Row Name 05/16/22 1326          Upper Body Dressing Assessment/Training    [place] Level (Upper Body Dressing) upper body dressing skills;doff;don;bra/undergarment;pull-over garment;independent  -LW     Position (Upper Body Dressing) edge of bed sitting  -LW           User Key  (r) = Recorded By, (t) = Taken By, (c) = Cosigned By    Initials Name Provider Type    Jayde Mesa COTA Occupational Therapist Assistant               Obj/Interventions    No documentation.                Goals/Plan     Row Name 05/16/22 0945          Transfer Goal 1 (OT)    Activity/Assistive Device (Transfer Goal 1, OT) toilet  -LW     [place] Level/Cues Needed (Transfer Goal 1, OT) modified independence  -LW     Time Frame (Transfer Goal 1, OT) long term goal (LTG);by discharge  -LW     Progress/Outcome (Transfer Goal 1, OT) goal met   -     Row Name 05/16/22 0945          Bathing Goal 1 (OT)    Activity/Device (Bathing Goal 1, OT) lower body bathing  -LW     [place] Level/Cues Needed (Bathing Goal 1, OT) modified independence  -LW     Time Frame (Bathing Goal 1, OT) long term goal (LTG);by discharge  -LW     Progress/Outcomes (Bathing Goal 1, OT) goal met    -LW     Row Name 05/16/22 0945          Dressing Goal 1 (OT)    Activity/Device (Dressing Goal 1, OT) lower body dressing  -LW     [place]/Cues Needed (Dressing Goal 1, OT) independent  -LW     Time Frame (Dressing Goal 1, OT) long term goal (LTG);by discharge  -LW     Progress/Outcome (Dressing Goal 1, OT) goal met   -LW     Row Name 05/16/22 0945          Toileting Goal 1 (OT)    Activity/Device (Toileting Goal 1, OT) toileting skills, all  -LW     [place] Level/Cues Needed (Toileting Goal 1, OT) independent  -LW     Time Frame (Toileting Goal 1, OT) long term goal (LTG);by discharge  -LW     Progress/Outcome (Toileting Goal 1, OT) goal met   -LW           User Key  (r) = Recorded By, (t) = Taken By, (c) = Cosigned By    Initials Name Provider Type    Jayde Mesa COTA Occupational Therapist Assistant               Clinical Impression     Row Name 05/16/22 0745          Pain Assessment    Pretreatment Pain Rating 0/10 - no pain  -LW     Posttreatment Pain Rating 0/10 - no pain  -LW     Row Name 05/16/22 0745          Plan of Care Review    Plan of Care Reviewed With patient  -LW     Progress improving  -LW     Outcome Evaluation Pt supine to sit, sit to stand t/f to bathroom with [place]. T/f to shower Independent. Bathing, Dressing and grooming Independent. Pt sitting EOB with all needs in reach. Daughter present.  -LW     Row Name 05/16/22 0745          Positioning and Restraints    Pre-Treatment Position in bed  -LW     Post Treatment Position bed  -LW     In Bed sitting EOB;call light within reach;encouraged to call for assist;with family/caregiver  -LW           User Key  (r) = Recorded By, (t) = Taken By, (c) = Cosigned By    Initials Name Provider Type    Jayde Mesa COTA Occupational Therapist Assistant               Outcome Measures     Row Name 05/16/22 0945          How much help from another is currently needed...    Putting on and taking off regular lower body clothing?  4  -LW     Bathing (including washing, rinsing, and drying) 4  -LW     Toileting (which includes using toilet bed pan or urinal) 4  -LW     Putting on and taking off regular upper body clothing 4  -LW     Taking care of personal grooming (such as brushing teeth) 4  -LW     Eating meals 4  -LW     AM-PAC 6 Clicks Score (OT) 24  -LW           User Key  (r) = Recorded By, (t) = Taken By, (c) = Cosigned By    Initials Name Provider Type    LW Jayde Johnson COTA Occupational Therapist Assistant                Occupational Therapy Education                 Title: PT OT SLP Therapies (In Progress)     Topic: Occupational Therapy (Done)     Point: ADL training (Done)     Description:   Instruct learner(s) on proper safety adaptation and remediation techniques during self care or transfers.   Instruct in proper use of assistive devices.              Learning Progress Summary           Patient Acceptance, E,TB, VU by  at 5/16/2022 1337    Acceptance, D,E, NR,VU by  at 5/15/2022 1300    Acceptance, E, NR by  at 5/11/2022 1139                   Point: Home exercise program (Done)     Description:   Instruct learner(s) on appropriate technique for monitoring, assisting and/or progressing therapeutic exercises/activities.              Learning Progress Summary           Patient Acceptance, E,TB, VU by  at 5/16/2022 1337                   Point: Precautions (Done)     Description:   Instruct learner(s) on prescribed precautions during self-care and functional transfers.              Learning Progress Summary           Patient Acceptance, E,TB, VU by  at 5/16/2022 1337    Acceptance, D,E, NR,VU by  at 5/15/2022 1300    Acceptance, E, NR by  at 5/14/2022 1449    Acceptance, E, NR by RC at 5/12/2022 1440    Acceptance, E, NR by RC at 5/11/2022 1139    Acceptance, E,TB, VU,NR by  at 5/10/2022 1315    Comment: POC, role of OT, transfer training                   Point: Body mechanics (Done)     Description:    Instruct learner(s) on proper positioning and spine alignment during self-care, functional mobility activities and/or exercises.              Learning Progress Summary           Patient Acceptance, E,TB, VU by  at 5/16/2022 1337    Acceptance, E, NR by  at 5/14/2022 1449    Acceptance, E, NR by  at 5/12/2022 1440    Acceptance, E, NR by  at 5/11/2022 1139    Acceptance, E,TB, VU,NR by  at 5/10/2022 1315    Comment: POC, role of OT, transfer training                               User Key     Initials Effective Dates Name Provider Type Discipline     06/16/21 -  Nedra Lanier COTA Occupational Therapist Assistant OT     06/16/21 -  Jayde Johnson COTA Occupational Therapist Assistant OT     06/14/21 -  Pardeep Dasilva, OT Occupational Therapist OT              OT Recommendation and Plan     Plan of Care Review  Plan of Care Reviewed With: patient  Progress: improving  Outcome Evaluation: Pt supine to sit, sit to stand t/f to bathroom with [place]. T/f to shower Independent. Bathing, Dressing and grooming Independent. Pt sitting EOB with all needs in reach. Daughter present.     Time Calculation:    Time Calculation- OT     Row Name 05/16/22 0945             Time Calculation- OT    OT Start Time 0945  -LW      OT Stop Time 1045  -LW      OT Time Calculation (min) 60 min  -LW      Total Timed Code Minutes- OT 60 minute(s)  -LW      OT Received On 05/16/22  -LW              Timed Charges    95749 - OT Self Care/Mgmt Minutes 60  -LW              Total Minutes    Timed Charges Total Minutes 60  -LW       Total Minutes 60  -LW            User Key  (r) = Recorded By, (t) = Taken By, (c) = Cosigned By    Initials Name Provider Type     Jayde Johnson COTA Occupational Therapist Assistant              Therapy Charges for Today     Code Description Service Date Service Provider Modifiers Qty    06934329425 HC OT SELF CARE/MGMT/TRAIN EA 15 MIN 5/16/2022 Jayde Johnson COTA GO 4                Jayde Johnson, FIGUEROA  5/16/2022

## 2022-05-16 NOTE — CONSULTS
"Adult Nutrition  Assessment    Patient Name:  Adilene Morgan  YOB: 1960  MRN: [id number]  Admit Date:  5/9/2022    Assessment Date:  5/16/2022    Comments:  Pt with dx TODD, TIA, adenosquamous carcinoma of right lung, acquired hypothyroidism, depression.  Pt indicates her appetite is fine.  Voiced no food preferences.  Pt reports weighing 200 lb at time of cancer dx, lost down to 175, gained up to 228 but has lost down   To 212 at present.  Current wt loss possibly fluid.  IV lasix prescribed.  Intake 100% - 3x, 75% - 1x   Pt reports some nausea and having vomiting and diarrhea at times all related to meds.  PRN Zofran, PRN Phenergan prescribed.  Blood glucose is elevated on occasion.  Meds include Predisone.  Hgb, Hct are low.  MVI with minerals prescribed.  BUN, Creat are elevated consistent with dx TODD.  Will maintain pt on prescribed diet and monitor intake.     Reason for Assessment     Row Name 05/16/22 1237          Reason for Assessment    Reason For Assessment per organizational policy  Length of Stay                  Anthropometrics     Row Name 05/16/22 1238          Anthropometrics    Age for Calculations 62     Height for Calculation 1.664 m (5' 5.51\")     Weight for Calculation 66.7 kg (147 lb 0.8 oz)                Labs/Tests/Procedures/Meds     Row Name 05/16/22 1238          Labs/Procedures/Meds    Lab Results Reviewed reviewed, pertinent            Medications    Pertinent Medications Reviewed reviewed, pertinent                  Estimated/Assessed Needs - Anthropometrics     Row Name 05/16/22 1238          Anthropometrics    Age for Calculations 62     Height for Calculation 1.664 m (5' 5.51\")     Weight for Calculation 66.7 kg (147 lb 0.8 oz)            Estimated/Assessed Needs    Additional Documentation Fluid Requirements (Group);Tucker-St. Jeor Equation (Group);Protein Requirements (Group)            Tucker-St. Jeor Equation    RMR (Tucker-St. Jeor Equation) 5530.6388     " [facility] Activity Factors 1.4 - 1.5     Activity Factors ([facility]) 7974.1330 - [phone]            Protein Requirements    Weight Used For Protein Calculations 66.7 kg (147 lb 0.8 oz)     Est Protein Requirement Amount (gms/kg) 1.2 gm protein     Estimated Protein Requirements (gms/day) 80.04            Fluid Requirements    Fluid Requirements (mL/day) 2001     RDA Method (mL) 2001                Nutrition Prescription Ordered     Row Name 05/16/22 1241          Nutrition Prescription PO    Current PO Diet Regular     Common Modifiers Cardiac                Evaluation of Received Nutrient/Fluid Intake     Row Name 05/16/22 1242          PO Evaluation    Number of Meals 4     % PO Intake 100% - 3x, 75% - 1x                     Electronically signed by:  Adilene Marroquin RD  05/16/22 12:42 CDT

## 2022-05-16 NOTE — THERAPY TREATMENT NOTE
Acute Care - Physical Therapy Treatment Note  [facility]     Patient Name: Adilene Morgan  : 1960  MRN: [id number]  Today's Date: 2022      Visit Dx:     ICD-10-CM ICD-9-CM   1. TODD (acute kidney injury) (HCC)  N17.9 584.9   2. Blurred vision  H53.8 368.8   3. Right leg weakness  R29.898 729.89   4. Impaired mobility and ADLs  Z74.09 V49.89    Z78.9    5. Impaired functional mobility, balance, gait, and endurance  Z74.09 V49.89     Patient Active Problem List   Diagnosis   • Acquired hypothyroidism   • Depressive disorder   • Migraine   • PVC (premature ventricular contraction)   • Palpitation   • Pain of fifth toe   • Adenosquamous carcinoma of right lung (HCC)   • TODD (acute kidney injury) (HCC)   • Transient ischemic attack (TIA)     Past Medical History:   Diagnosis Date   • Achilles bursitis    • Achilles tendinitis    • Acquired hypothyroidism    • Allergic rhinitis    • Allergy to meat     alphagal   • Allergy to milk products    • Arrhythmia    • Asthmatic bronchitis    • Asthmatic bronchitis    • Bone spur    • Calcaneal spur    • Cancer (HCC)    • Depressive disorder    • Family history of thyroid problem    • Herpes simplex    • Hypermetropia    • Hypothyroidism    • Menopausal flushing    • Menopause    • Migraine    • Otalgia    • Pneumonia    • Presbyopia    • Stroke (HCC)    • Trochanteric bursitis    • UTI (urinary tract infection) 2018   • Ventricular premature beats      Past Surgical History:   Procedure Laterality Date   • COLONOSCOPY N/A 2/15/2017    Procedure: COLONOSCOPY;  Surgeon: Lupillo Ugarte MD;  Location: [facility] ENDOSCOPY;  Service:    • COSMETIC SURGERY      plastic surgery to face x 4 r/t trauma   • LAPAROSCOPIC CHOLECYSTECTOMY  12/10/1995    Cholecystectomy, laparoscopic (1)      • OTHER SURGICAL HISTORY      Head surgery procedure (1)    plastic surgery on the face    • OTHER SURGICAL HISTORY  10/17/2014    Repair Superficial Wound TR-EXT 2.5 < CM 53228  (1)        PT Assessment (last 12 hours)     PT Evaluation and Treatment     [facility] Name 05/16/22 1120          Physical Therapy Time and Intention    Subjective Information complains of;fatigue  -LN     Document Type therapy note (daily note)  -LN     Mode of Treatment individual therapy;physical therapy  -LN     Patient Effort good  -LN     [facility] Name 05/16/22 1120          General Information    Existing Precautions/Restrictions fall  -LN     [facility] Name 05/16/22 1120          Pain    Pretreatment Pain Rating 0/10 - no pain  -LN     Posttreatment Pain Rating 0/10 - no pain  -LN     [facility] Name 05/16/22 1120          Cognition    Orientation Status (Cognition) oriented x 4  -LN     [facility] Name 05/16/22 1120          Bed Mobility    Bed Mobility supine-sit;sit-supine  -LN     Supine-Sit [place] (Bed Mobility) modified independence  -LN     Sit-Supine [place] (Bed Mobility) modified independence  -LN     Assistive Device (Bed Mobility) head of bed elevated;bed rails  -LN     [facility] Name 05/16/22 1120          Transfers    Transfers sit-stand transfer;stand-sit transfer  -LN     Sit-Stand [place] (Transfers) supervision  -LN     Stand-Sit [place] (Transfers) supervision  -LN     [facility] Name 05/16/22 1120          Gait/Stairs (Locomotion)    [place] Level (Gait) contact guard  -LN     Distance in Feet (Gait) 320  -LN     Pattern (Gait) step-through  -LN     Deviations/Abnormal Patterns (Gait) mack decreased;antalgic  -LN     [facility] Name 05/16/22 1120          Motor Skills    Therapeutic Exercise hip;knee;ankle  -[facility] Name 05/16/22 1120          Hip (Therapeutic Exercise)    Hip (Therapeutic Exercise) AROM (active range of motion)  -LN     Hip AROM (Therapeutic Exercise) bilateral;flexion;aBduction;aDduction;standing  -LN     [facility] Name 05/16/22 1120          Knee (Therapeutic Exercise)    Knee (Therapeutic Exercise) AROM (active range of motion)  -LN     Knee AROM (Therapeutic Exercise) bilateral  minisquats   -LN     Row Name 05/16/22 1120          Ankle (Therapeutic Exercise)    Ankle (Therapeutic Exercise) AROM (active range of motion)  heel raises  -LN     Row Name 05/16/22 1120          Plan of Care Review    Plan of Care Reviewed With patient  -LN     Outcome Evaluation sup-sit-sup mod ind,sit-stand-sit sba of 1;amb 320' w/o ad and cga of 1-no lob  -LN     Row Name 05/16/22 1120          Vital Signs    Pre Systolic BP Rehab 140  -LN     Pre Treatment Diastolic BP 77  -LN     Post Systolic BP Rehab 134  -LN     Post Treatment Diastolic BP 73  -LN     Pretreatment Heart Rate (beats/min) 77  -LN     Intratreatment Heart Rate (beats/min) 82  -LN     Posttreatment Heart Rate (beats/min) 73  -LN     Pre SpO2 (%) 95  -LN     O2 Delivery Pre Treatment room air  -LN     Post SpO2 (%) 95  -LN     Pre Patient Position Sitting  -LN     Intra Patient Position Standing  -LN     Post Patient Position Sitting  -LN     Row Name 05/16/22 1120          Positioning and Restraints    In Bed fowlers;call light within reach;encouraged to call for assist;with family/caregiver  -LN     Row Name 05/16/22 1120          Bed Mobility Goal 1 (PT)    Activity/Assistive Device (Bed Mobility Goal 1, PT) sit to supine/supine to sit  -LN     [place] Level/Cues Needed (Bed Mobility Goal 1, PT) independent  -LN     Time Frame (Bed Mobility Goal 1, PT) by discharge  -LN     Strategies/Barriers (Bed Mobility Goal 1, PT) HOB flat, no bed rails.  -LN     Progress/Outcomes (Bed Mobility Goal 1, PT) goal not met  -LN     Row Name 05/16/22 1120          Transfer Goal 1 (PT)    Activity/Assistive Device (Transfer Goal 1, PT) sit-to-stand/stand-to-sit;bed-to-chair/chair-to-bed  -LN     [place] Level/Cues Needed (Transfer Goal 1, PT) modified independence  -LN     Time Frame (Transfer Goal 1, PT) by discharge  -LN     Strategies/Barriers (Transfers Goal 1, PT) Encourage use of walker.  -LN     Progress/Outcome (Transfer Goal 1, PT) goal not met  -LN      Row Name 05/16/22 1120          Gait Training Goal 1 (PT)    Activity/Assistive Device (Gait Training Goal 1, PT) walker, rolling  -LN     [place] Level (Gait Training Goal 1, PT) modified independence  -LN     Distance (Gait Training Goal 1, PT) 100'x2.  -LN     Time Frame (Gait Training Goal 1, PT) by discharge  -LN     Strategies/Barriers (Gait Training Goal 1, PT) Encourage use of walker.  -LN     Progress/Outcome (Gait Training Goal 1, PT) goal partially met  -LN     Row Name 05/16/22 1120          Stairs Goal 1 (PT)    Activity/Assistive Device (Stairs Goal 1, PT) using handrail, right  -LN     [place] Level/Cues Needed (Stairs Goal 1, PT) supervision required  -LN     Number of Stairs (Stairs Goal 1, PT) 7 steps to bedroom upstairs.  -LN     Time Frame (Stairs Goal 1, PT) by discharge  -LN     Progress/Outcome (Stairs Goal 1, PT) goal partially met;continuing progress toward goal  -LN           User Key  (r) = Recorded By, (t) = Taken By, (c) = Cosigned By    Initials Name Provider Type    LN Romana Ramos PTA Physical Therapist Assistant                Physical Therapy Education                 Title: PT OT SLP Therapies (In Progress)     Topic: Physical Therapy (In Progress)     Point: Mobility training (Not Started)     Learner Progress:  Not documented in this visit.          Point: Home exercise program (Not Started)     Learner Progress:  Not documented in this visit.          Point: Body mechanics (Not Started)     Learner Progress:  Not documented in this visit.          Point: Precautions (Done)     Learning Progress Summary           Patient Acceptance, E, VU,NR by  at 5/10/2022 7902    Comment: [place] assessed: 20/28 or moderate fall risk.  Recommend use of FWW during gait. Continue O/P PT.                               User Key     Initials Effective Dates Name Provider Type Discipline    JIM 06/16/21 -  Vasquez Granados, PT Physical Therapist PT              PT Recommendation and  Plan     Plan of Care Reviewed With: patient  Outcome Evaluation: sup-sit-sup mod ind,sit-stand-sit sba of 1;amb 320' w/o ad and cga of 1-no lob       Time Calculation:    PT Charges     Row Name 05/16/22 1251             Time Calculation    Start Time 1120  -LN      Stop Time 1145  -LN      Time Calculation (min) 25 min  -LN      PT Received On 05/16/22  -LN              Time Calculation- PT    Total Timed Code Minutes- PT 25 minute(s)  -LN            User Key  (r) = Recorded By, (t) = Taken By, (c) = Cosigned By    Initials Name Provider Type    Romana Byrd PTA Physical Therapist Assistant              Therapy Charges for Today     Code Description Service Date Service Provider Modifiers Qty    57045561233 HC GAIT TRAINING EA 15 MIN 5/16/2022 Romana Ramos PTA GP 1    06455598897 HC PT THER PROC EA 15 MIN 5/16/2022 Romana Ramos PTA GP 1          PT G-Codes  Outcome Measure Options: AM-PAC 6 Clicks Daily Activity (OT)  AM-PAC 6 Clicks Score (PT): 18  AM-PAC 6 Clicks Score (OT): 20  Modified [place] Scale: 4 - Moderately severe disability.  Unable to walk without assistance, and unable to attend to own bodily needs without assistance.  [place] Total Score: 20    Romana Ramos PTA  5/16/2022

## 2022-05-16 NOTE — PLAN OF CARE
Goal Outcome Evaluation:  Plan of Care Reviewed With: patient           Outcome Evaluation: sup-sit-sup mod ind,sit-stand-sit sba of 1;amb 320' w/o ad and cga of 1-no lob

## 2022-05-16 NOTE — PLAN OF CARE
Goal Outcome Evaluation:  Plan of Care Reviewed With: patient           Outcome Evaluation: sit-stand-sit ind,amb 310,140'170 with 1 lob requiring min assist to correct-min soa with gt-encouraged use of ad but pt declined

## 2022-05-16 NOTE — PLAN OF CARE
Goal Outcome Evaluation:              Outcome Evaluation: Pt always pleasant, voiced concerns regarding recent renal function changes; rested well overnight; VSS

## 2022-05-16 NOTE — PROGRESS NOTES
[facility]  INPATIENT PROGRESS NOTE    Length of Stay: 1  Date of Admission: 5/9/2022  Primary Care Physician: Jen Raymundo MD    Subjective   Chief Complaint: weakness  HPI:    Doing fair today.  Has had some back pain.  Admitted for renal failure, creatinine as slightly improved today.  Urine output has been good.  Has been losing weight.    Review of Systems   Respiratory: Negative for shortness of breath.    Cardiovascular: Negative for chest pain.        All pertinent negatives and positives are as above. All other systems have been reviewed and are negative unless otherwise stated.     Objective    Temp:  [97 °F (36.1 °C)-97.9 °F (36.6 °C)] 97.4 °F (36.3 °C)  Heart Rate:  [65-84] 65  Resp:  [18] 18  BP: (127-140)/(57-80) 139/73  Physical Exam  Vitals and nursing note reviewed.   Constitutional:       General: She is not in acute distress.     Appearance: She is well-developed. She is not diaphoretic.   HENT:      Head: Normocephalic and atraumatic.   Cardiovascular:      Rate and Rhythm: Normal rate.   Pulmonary:      Effort: Pulmonary effort is normal. No respiratory distress.      Breath sounds: No wheezing.   Abdominal:      General: There is no distension.      Palpations: Abdomen is soft.   Musculoskeletal:         General: Normal range of motion.   Skin:     General: Skin is warm and dry.   Neurological:      Mental Status: She is alert.      Cranial Nerves: No cranial nerve deficit.   Psychiatric:         Behavior: Behavior normal.         Thought Content: Thought content normal.         Judgment: Judgment normal.             Results Review:  I have reviewed the labs, radiology results, and diagnostic studies.    Laboratory Data:   Lab Results (last 24 hours)     Procedure Component Value Units Date/Time    Urinalysis With Culture If Indicated - Urine, Random Void [[id number]] Collected: 05/16/22 0936    Specimen: Urine, Random Void  Updated: 05/16/22 0936    Comprehensive Metabolic Panel [[id number]]  (Abnormal) Collected: 05/16/22 0515    Specimen: Blood Updated: 05/16/22 0659     Glucose 133 mg/dL      BUN 41 mg/dL      Creatinine 2.42 mg/dL      Sodium 140 mmol/L      Potassium 4.5 mmol/L      Comment: Slight hemolysis detected by analyzer. Results may be affected.        Chloride 96 mmol/L      CO2 27.0 mmol/L      Calcium 9.4 mg/dL      Total Protein 7.1 g/dL      Albumin 4.20 g/dL      ALT (SGPT) 21 U/L      AST (SGOT) 19 U/L      Alkaline Phosphatase 124 U/L      Total Bilirubin 0.3 mg/dL      Globulin 2.9 gm/dL      A/G Ratio 1.4 g/dL      BUN/Creatinine Ratio 16.9     Anion Gap 17.0 mmol/L      eGFR 22.0 mL/min/1.73      Comment: National Kidney Foundation and American Society of Nephrology (ASN) Task Force recommended calculation based on the Chronic Kidney Disease Epidemiology Collaboration (CKD-EPI) equation refit without adjustment for race.       Narrative:      GFR Normal >60  Chronic Kidney Disease <60  Kidney Failure <15      CBC & Differential [[id number]]  (Abnormal) Collected: 05/16/22 0557    Specimen: Blood Updated: 05/16/22 0618    Narrative:      The following orders were created for panel order CBC & Differential.  Procedure                               Abnormality         Status                     ---------                               -----------         ------                     CBC Auto Differential[[id number]]        Abnormal            Final result                 Please view results for these tests on the individual orders.    CBC Auto Differential [[id number]]  (Abnormal) Collected: 05/16/22 0557    Specimen: Blood Updated: 05/16/22 0618     WBC 9.86 10*3/mm3      RBC 3.49 10*6/mm3      Hemoglobin 9.7 g/dL      Hematocrit 30.2 %      MCV 86.5 fL      MCH 27.8 pg      MCHC 32.1 g/dL      RDW 15.9 %      RDW-SD 49.9 fl      MPV 10.2 fL      Platelets 325 10*3/mm3      Neutrophil % 88.9 %      Lymphocyte % 5.9 %       Monocyte % 4.1 %      Eosinophil % 0.0 %      Basophil % 0.2 %      Immature Grans % 0.9 %      Neutrophils, Absolute 8.77 10*3/mm3      Lymphocytes, Absolute 0.58 10*3/mm3      Monocytes, Absolute 0.40 10*3/mm3      Eosinophils, Absolute 0.00 10*3/mm3      Basophils, Absolute 0.02 10*3/mm3      Immature Grans, Absolute 0.09 10*3/mm3      nRBC 0.0 /100 WBC           Culture Data:   No results found for: BLOODCX  No results found for: URINECX  No results found for: RESPCX  No results found for: WOUNDCX  No results found for: STOOLCX  No components found for: BODYFLD    Radiology Data:   Imaging Results (Last 24 Hours)     ** No results found for the last 24 hours. **          I have reviewed the patient's current medications.     Assessment/Plan     Active Hospital Problems    Diagnosis    • TODD (acute kidney injury) (HCC)    • Transient ischemic attack (TIA)    • Adenosquamous carcinoma of right lung (HCC)    • Acquired hypothyroidism          1. Right LE weakness   -neurology consulted, appreciate recs, plans to continue Xarelto/ASA  -MRI reviewed, no acute issue  -PT, OT--will continue PT/OT at home   -reviewed carotid US  -echo reviewed   -last echo 2018 EF 60%, normal stress test around that time.   -duplex US to r/o DVT negative     2. Metastatic lung cancer  -on immunotherapy  -follows in [place]      3. TODD   -creatinine 2.93 on admission  -nephrology consulted  -could be 2/2 ATN  -now on lasix drip  Urine output is good, creatinine slightly improving.  We will continue to monitor           4. Hx of DVT  -hypercoagulable due to cancer  -continue Xarelto   -baseline is around 7-9      5. Hypotension  -improved after IVF      6. Anemia  -transfuse 1 unit 5/12   Hemoglobin has been improving, will continue to monitor.             Darien Ugalde MD   05/16/22   09:58 CDT

## 2022-05-16 NOTE — THERAPY TREATMENT NOTE
Acute Care - Physical Therapy Treatment Note  [facility]     Patient Name: Adilene Morgan  : 1960  MRN: [id number]  Today's Date: 2022      Visit Dx:     ICD-10-CM ICD-9-CM   1. TODD (acute kidney injury) (HCC)  N17.9 584.9   2. Blurred vision  H53.8 368.8   3. Right leg weakness  R29.898 729.89   4. Impaired mobility and ADLs  Z74.09 V49.89    Z78.9    5. Impaired functional mobility, balance, gait, and endurance  Z74.09 V49.89     Patient Active Problem List   Diagnosis   • Acquired hypothyroidism   • Depressive disorder   • Migraine   • PVC (premature ventricular contraction)   • Palpitation   • Pain of fifth toe   • Adenosquamous carcinoma of right lung (HCC)   • TODD (acute kidney injury) (HCC)   • Transient ischemic attack (TIA)     Past Medical History:   Diagnosis Date   • Achilles bursitis    • Achilles tendinitis    • Acquired hypothyroidism    • Allergic rhinitis    • Allergy to meat     alphagal   • Allergy to milk products    • Arrhythmia    • Asthmatic bronchitis    • Asthmatic bronchitis    • Bone spur    • Calcaneal spur    • Cancer (HCC)    • Depressive disorder    • Family history of thyroid problem    • Herpes simplex    • Hypermetropia    • Hypothyroidism    • Menopausal flushing    • Menopause    • Migraine    • Otalgia    • Pneumonia    • Presbyopia    • Stroke (HCC)    • Trochanteric bursitis    • UTI (urinary tract infection) 2018   • Ventricular premature beats      Past Surgical History:   Procedure Laterality Date   • COLONOSCOPY N/A 2/15/2017    Procedure: COLONOSCOPY;  Surgeon: Lupillo Ugarte MD;  Location: [facility] ENDOSCOPY;  Service:    • COSMETIC SURGERY      plastic surgery to face x 4 r/t trauma   • LAPAROSCOPIC CHOLECYSTECTOMY  12/10/1995    Cholecystectomy, laparoscopic (1)      • OTHER SURGICAL HISTORY      Head surgery procedure (1)    plastic surgery on the face    • OTHER SURGICAL HISTORY  10/17/2014    Repair Superficial Wound TR-EXT 2.5 < CM 10864  (1)        PT Assessment (last 12 hours)     PT Evaluation and Treatment     Row Name 05/16/22 1452 05/16/22 1120       Physical Therapy Time and Intention    Subjective Information complains of;weakness;pain  -LN complains of;fatigue  -LN    Document Type therapy note (daily note)  -LN therapy note (daily note)  -LN    Mode of Treatment individual therapy;physical therapy  -LN individual therapy;physical therapy  -LN    Patient Effort good  -LN good  -LN    Row Name 05/16/22 1452 05/16/22 1120       General Information    Existing Precautions/Restrictions fall  -LN fall  -LN    Row Name 05/16/22 1452 05/16/22 1120       Pain    Pretreatment Pain Rating 4/10  -LN 0/10 - no pain  -LN    Posttreatment Pain Rating 4/10  -LN 0/10 - no pain  -LN    Pain Location - back  -LN --    Pain Intervention(s) Repositioned  meds given earlier  -LN --    Row Name 05/16/22 1452 05/16/22 1120       Cognition    Orientation Status (Cognition) oriented x 4  -LN oriented x 4  -LN    Row Name 05/16/22 1452 05/16/22 1120       Bed Mobility    Bed Mobility supine-sit;sit-supine  -LN supine-sit;sit-supine  -LN    Supine-Sit [place] (Bed Mobility) modified independence  -LN modified independence  -LN    Sit-Supine [place] (Bed Mobility) modified independence  -LN modified independence  -LN    Assistive Device (Bed Mobility) head of bed elevated;bed rails  -LN head of bed elevated;bed rails  -LN    Row Name 05/16/22 1452 05/16/22 1120       Transfers    Transfers sit-stand transfer;stand-sit transfer  -LN sit-stand transfer;stand-sit transfer  -LN    Sit-Stand [place] (Transfers) independent  -LN supervision  -LN    Stand-Sit [place] (Transfers) independent  -LN supervision  -LN    [place] Level (Toilet Transfer) independent  -LN --    Assistive Device (Toilet Transfer) grab bars/safety frame;commode  -LN --    Row Name 05/16/22 1452 05/16/22 1120       Gait/Stairs (Locomotion)    [place] Level (Gait) contact  guard  -LN contact guard  -LN    Distance in Feet (Gait) 310,140,170  -  -LN    Pattern (Gait) step-through  -LN step-through  -LN    Deviations/Abnormal Patterns (Gait) mack decreased;antalgic  -LN mack decreased;antalgic  -LN    Row Name 05/16/22 1120          Motor Skills    Therapeutic Exercise hip;knee;ankle  -LN     Row Name 05/16/22 1120          Hip (Therapeutic Exercise)    Hip (Therapeutic Exercise) AROM (active range of motion)  -LN     Hip AROM (Therapeutic Exercise) bilateral;flexion;aBduction;aDduction;standing  -LN     Row Name 05/16/22 1120          Knee (Therapeutic Exercise)    Knee (Therapeutic Exercise) AROM (active range of motion)  -LN     Knee AROM (Therapeutic Exercise) bilateral  minisquats  -LN     Row Name 05/16/22 1120          Ankle (Therapeutic Exercise)    Ankle (Therapeutic Exercise) AROM (active range of motion)  heel raises  -LN     Row Name 05/16/22 1452 05/16/22 1120       Plan of Care Review    Plan of Care Reviewed With patient  -LN patient  -LN    Outcome Evaluation sit-stand-sit ind,amb 310,140'170 with 1 lob requiring min assist to correct-min soa with gt-encouraged use of ad but pt declined  -LN sup-sit-sup mod ind,sit-stand-sit sba of 1;amb 320' w/o ad and cga of 1-no lob  -LN    Row Name 05/16/22 1452 05/16/22 1120       Vital Signs    Pre Systolic BP Rehab 133  -  -LN    Pre Treatment Diastolic BP 64  -LN 77  -LN    Post Systolic BP Rehab 134  -  -LN    Post Treatment Diastolic BP 82  -LN 73  -LN    Pretreatment Heart Rate (beats/min) 92  -LN 77  -LN    Intratreatment Heart Rate (beats/min) 78  -LN 82  -LN    Posttreatment Heart Rate (beats/min) 72  -LN 73  -LN    Pre SpO2 (%) 96  -LN 95  -LN    O2 Delivery Pre Treatment room air  -LN room air  -LN    Intra SpO2 (%) 96  -LN --    Post SpO2 (%) 97  -LN 95  -LN    Pre Patient Position Sitting  -LN Sitting  -LN    Intra Patient Position Standing  -LN Standing  -LN    Post Patient Position Sitting  -LN  Sitting  -LN    Row Name 05/16/22 1452 05/16/22 1120       Positioning and Restraints    In Bed fowlers;call light within reach;encouraged to call for assist;legs elevated  fob gatched  -LN fowlers;call light within reach;encouraged to call for assist;with family/caregiver  -LN    Row Name 05/16/22 1452 05/16/22 1120       Bed Mobility Goal 1 (PT)    Activity/Assistive Device (Bed Mobility Goal 1, PT) sit to supine/supine to sit  -LN sit to supine/supine to sit  -LN    [place] Level/Cues Needed (Bed Mobility Goal 1, PT) independent  -LN independent  -LN    Time Frame (Bed Mobility Goal 1, PT) by discharge  -LN by discharge  -LN    Strategies/Barriers (Bed Mobility Goal 1, PT) HOB flat, no bed rails.  -LN HOB flat, no bed rails.  -LN    Progress/Outcomes (Bed Mobility Goal 1, PT) goal not met  -LN goal not met  -LN    Row Name 05/16/22 1452 05/16/22 1120       Transfer Goal 1 (PT)    Activity/Assistive Device (Transfer Goal 1, PT) sit-to-stand/stand-to-sit;bed-to-chair/chair-to-bed  -LN sit-to-stand/stand-to-sit;bed-to-chair/chair-to-bed  -LN    [place] Level/Cues Needed (Transfer Goal 1, PT) modified independence  -LN modified independence  -LN    Time Frame (Transfer Goal 1, PT) by discharge  -LN by discharge  -LN    Strategies/Barriers (Transfers Goal 1, PT) Encourage use of walker.  -LN Encourage use of walker.  -LN    Progress/Outcome (Transfer Goal 1, PT) goal not met  -LN goal not met  -LN    Row Name 05/16/22 1452 05/16/22 1120       Gait Training Goal 1 (PT)    Activity/Assistive Device (Gait Training Goal 1, PT) walker, rolling  -LN walker, rolling  -LN    [place] Level (Gait Training Goal 1, PT) modified independence  -LN modified independence  -LN    Distance (Gait Training Goal 1, PT) 100'x2.  -'x2.  -LN    Time Frame (Gait Training Goal 1, PT) by discharge  -LN by discharge  -LN    Strategies/Barriers (Gait Training Goal 1, PT) Encourage use of walker.  -LN Encourage use of walker.   -LN    Progress/Outcome (Gait Training Goal 1, PT) goal partially met  -LN goal partially met  -LN    Row Name 05/16/22 1452 05/16/22 1120       Stairs Goal 1 (PT)    Activity/Assistive Device (Stairs Goal 1, PT) using handrail, right  -LN using handrail, right  -LN    [place] Level/Cues Needed (Stairs Goal 1, PT) supervision required  -LN supervision required  -LN    Number of Stairs (Stairs Goal 1, PT) 7 steps to bedroom upstairs.  -LN 7 steps to bedroom upstairs.  -LN    Time Frame (Stairs Goal 1, PT) by discharge  -LN by discharge  -LN    Progress/Outcome (Stairs Goal 1, PT) goal partially met;continuing progress toward goal  -LN goal partially met;continuing progress toward goal  -LN          User Key  (r) = Recorded By, (t) = Taken By, (c) = Cosigned By    Initials Name Provider Type    LN Romana Ramos, PTA Physical Therapist Assistant                Physical Therapy Education                 Title: PT OT SLP Therapies (In Progress)     Topic: Physical Therapy (In Progress)     Point: Mobility training (Not Started)     Learner Progress:  Not documented in this visit.          Point: Home exercise program (Not Started)     Learner Progress:  Not documented in this visit.          Point: Body mechanics (Not Started)     Learner Progress:  Not documented in this visit.          Point: Precautions (Done)     Learning Progress Summary           Patient Acceptance, E, VU,NR by JIM at 5/10/2022 1572    Comment: Bryant assessed: 20/28 or moderate fall risk.  Recommend use of FWW during gait. Continue O/P PT.                               User Key     Initials Effective Dates Name Provider Type Discipline     06/16/21 -  Vasquez Granados, PT Physical Therapist PT              PT Recommendation and Plan     Plan of Care Reviewed With: patient  Outcome Evaluation: sit-stand-sit ind,amb 310,140'170 with 1 lob requiring min assist to correct-min soa with gt-encouraged use of ad but pt declined       Time  Calculation:    PT Charges     Row Name 05/16/22 1527 05/16/22 1251          Time Calculation    Start Time 1452  -LN 1120  -LN     Stop Time 1515  -LN 1145  -LN     Time Calculation (min) 23 min  -LN 25 min  -LN     PT Received On 05/16/22  -LN 05/16/22  -LN            Time Calculation- PT    Total Timed Code Minutes- PT 23 minute(s)  -LN 25 minute(s)  -LN           User Key  (r) = Recorded By, (t) = Taken By, (c) = Cosigned By    Initials Name Provider Type    Romana Byrd PTA Physical Therapist Assistant              Therapy Charges for Today     Code Description Service Date Service Provider Modifiers Qty    09223755694 HC GAIT TRAINING EA 15 MIN 5/16/2022 Romana Ramos, PTA GP 1    57719345620 HC PT THER PROC EA 15 MIN 5/16/2022 Romana Ramos, PTA GP 1    00858395306 HC GAIT TRAINING EA 15 MIN 5/16/2022 Romana Ramos, PTA GP 1    69387536739 HC PT THERAPEUTIC ACT EA 15 MIN 5/16/2022 Romana Ramos, PTA GP 1          PT G-Codes  Outcome Measure Options: AM-PAC 6 Clicks Daily Activity (OT)  AM-PAC 6 Clicks Score (PT): 18  AM-PAC 6 Clicks Score (OT): 24  Modified Moniteau Scale: 4 - Moderately severe disability.  Unable to walk without assistance, and unable to attend to own bodily needs without assistance.  Tinetti Total Score: 20    Romana Ramos PTA  5/16/2022

## 2022-05-16 NOTE — PLAN OF CARE
Problem: Adult Inpatient Plan of Care  Goal: Plan of Care Review  Outcome: Ongoing, Progressing  Flowsheets  Taken 5/16/2022 1337  Progress: improving  Plan of Care Reviewed With: patient  Taken 5/16/2022 1089  Progress: improving  Plan of Care Reviewed With: patient  Outcome Evaluation: Pt supine to sit, sit to stand t/f to bathroom with Coffee. T/f to shower Independent. Bathing, Dressing and grooming Independent. Pt sitting EOB with all needs in reach. Daughter present.   Goal Outcome Evaluation:  Plan of Care Reviewed With: patient        Progress: improving  Outcome Evaluation: Pt supine to sit, sit to stand t/f to bathroom with Coffee. T/f to shower Independent. Bathing, Dressing and grooming Independent. Pt sitting EOB with all needs in reach. Daughter present.

## 2022-05-16 NOTE — PROGRESS NOTES
"[facility]  [address] [place] [address]. [address]  T - [phone]  F - [phone]     Progress Note          PATIENT  DEMOGRAPHICS   PATIENT NAME: Adilene Morgan                      PHYSICIAN: Alexa Rios MD  : 1960  MRN: [id number]   LOS: 1 day    Patient Care Team:  Jen Raymundo MD as PCP - General  [facility] Physician Assistant (Family Medicine)  Subjective   SUBJECTIVE   Good UO upto 4 L. Has lost weight and now 212 lbs from 226 lbs.          Objective   OBJECTIVE   Vital Signs  Temp:  [97 °F (36.1 °C)-97.9 °F (36.6 °C)] 97.4 °F (36.3 °C)  Heart Rate:  [65-84] 73  Resp:  [18] 18  BP: (100-140)/(57-80) 100/58    Flowsheet Rows    Flowsheet Row First Filed Value   Admission Height 167.6 cm (66\") Documented at 2022 1623   Admission Weight 97.5 kg (215 lb) Documented at 2022 1623           I/O last 3 completed shifts:  In: 2520 [P.O.:2520]  Out: 3900 [Urine:3900]    PHYSICAL EXAM    Physical Exam  Constitutional:       Appearance: She is well-developed.   HENT:      Head: Normocephalic.   Eyes:      Pupils: Pupils are equal, round, and reactive to light.   Cardiovascular:      Rate and Rhythm: Normal rate and regular rhythm.      Heart sounds: Normal heart sounds.   Pulmonary:      Effort: Pulmonary effort is normal.      Breath sounds: Normal breath sounds.   Abdominal:      General: Bowel sounds are normal.      Palpations: Abdomen is soft.   Musculoskeletal:         General: No swelling.   Skin:     Coloration: Skin is not jaundiced.   Neurological:      General: No focal deficit present.      Mental Status: She is alert and oriented to person, place, and time.         RESULTS   Results Review:    Results from last 7 days   Lab Units 22  0515 05/15/22  0613 22  0604   SODIUM mmol/L 140 139 146*   POTASSIUM mmol/L 4.5 4.2 4.3   CHLORIDE mmol/L 96* 96* 107   CO2 mmol/L 27.0 30.0* 29.0   BUN mg/dL 41* 30* 21   CREATININE " mg/dL 2.42* 2.65* 2.17*   CALCIUM mg/dL 9.4 9.5 9.1   BILIRUBIN mg/dL 0.3 0.3 0.2   ALK PHOS U/L 124* 130* 103   ALT (SGPT) U/L 21 26 24   AST (SGOT) U/L 19 23 23   GLUCOSE mg/dL 133* 84 90       Estimated Creatinine Clearance: 27.6 mL/min (A) (by C-G formula based on SCr of 2.42 mg/dL (H)).    Results from last 7 days   Lab Units 05/09/22  1709   MAGNESIUM mg/dL 2.5*       Results from last 7 days   Lab Units 05/13/22  0548   URIC ACID mg/dL 7.0*       Results from last 7 days   Lab Units 05/16/22  0557 05/15/22  0613 05/14/22  0604 05/13/22  0548 05/12/22  1407 05/11/22  1054   WBC 10*3/mm3 9.86 9.38 7.82 7.72  --  10.50   HEMOGLOBIN g/dL 9.7* 9.8* 8.1* 8.0* 7.3* 7.9*   PLATELETS 10*3/mm3 325 358 262 242  --  250       Results from last 7 days   Lab Units 05/09/22  1709   INR  1.90*         Imaging Results (Last 24 Hours)     ** No results found for the last 24 hours. **           MEDICATIONS    aspirin, 81 mg, Oral, Daily  atorvastatin, 80 mg, Oral, Nightly  levothyroxine, 150 mcg, Oral, Q AM  multivitamin with minerals, 1 tablet, Oral, Daily  NON FORMULARY, 600 mg, Oral, Nightly  predniSONE, 40 mg, Oral, Daily With Breakfast  rivaroxaban, 20 mg, Oral, Daily With Dinner  sodium chloride, 10 mL, Intravenous, Q12H  topiramate, 50 mg, Oral, Nightly  venlafaxine XR, 37.5 mg, Oral, Daily      furosemide (LASIX) 100 mg in 100mL NS infusion, 2.5 mg/hr, Last Rate: 2.5 mg/hr (05/15/22 1210)        Assessment & Plan   ASSESSMENT / PLAN      Acquired hypothyroidism    Adenosquamous carcinoma of right lung (HCC)    TODD (acute kidney injury) (HCC)    Transient ischemic attack (TIA)    1.  TODD on CKD 3- Baseline creatinine around 1.4, creatinine was up to 2.93 on admission and has improved to 1.9-2.1. peak 2.65 Good UO and has good weight loss.     Creatinine was at baseline as recently as May 4. She did have Lasix / gastroenteritis symptoms. She tolerated Bactrim exposure in April with documented stable labs following that.   Her CK was mildly elevated on admission and now better. Doubt pre renal (fena consistent with atn and no improvement with ivf), doubt any rhabdo (ck is <1000). More so intrinsic injury / AIN      U/s no hydro. Liang stain is positive.      -It appears that clinically TODD is relate to entrectinib which can be associated with increased serum creatinine. I have discussed the case with patient Primary Oncologist Dr Morales and we have decided to stop Entrectinib. I have explained this to patient on 5/14 and she is agreeable with the plan. She likes to go back on it on our discussion on 5/15 and we have restarted it. Awaiting her discussion with Dr Morales.     keep entrectinib. keep po prednisone for AIN. Keep asix drip to 2.5mg / hr. Check mg in am     2.  Metabolic acidosis- corrected     3.  Metastatic lung cancer- on immunotherapy, follows in [place]     4.  History of DVT     6.  Hypotension- resolved     7.  Right lower extremity weakness- manage per primary team, also had neurology evaluation                   This document has been electronically signed by Alexa Rios MD on May 16, 2022 12:02 CDT

## 2022-05-16 NOTE — NURSING NOTE
Pt reports per her oncologist in [place], pt is suppose to hold off on Entrectinib until labs are drawn on Friday. Message left with Dr. Ugalde. Will continue to monitor.

## 2022-05-16 NOTE — PLAN OF CARE
Goal Outcome Evaluation:  Plan of Care Reviewed With: patient        Progress: no change  Outcome Evaluation: Initial assessment.  Intake 100% - 3x, 75% - 1x.  Will maintain pt on prescribed diet.  Encourage intake.

## 2022-05-16 NOTE — PLAN OF CARE
Pt was able to work with PT/ OT today. Pt reported pain this AM, PRN Tylenol administered. Cr 2.42, BUN 41, hgb 9.7. Entrectinib dcd per MD order r/t pt's oncologist suggestion. Will continue to monitor.         Goal Outcome Evaluation:

## 2022-05-17 LAB
ALBUMIN SERPL-MCNC: 4 G/DL (ref 3.5–5.2)
ALBUMIN/GLOB SERPL: 1.3 G/DL
ALP SERPL-CCNC: 120 U/L (ref 39–117)
ALT SERPL W P-5'-P-CCNC: 18 U/L (ref 1–33)
ANION GAP SERPL CALCULATED.3IONS-SCNC: 15 MMOL/L (ref 5–15)
AST SERPL-CCNC: 15 U/L (ref 1–32)
BACTERIA SPEC AEROBE CULT: NORMAL
BASOPHILS # BLD AUTO: 0.02 10*3/MM3 (ref 0–0.2)
BASOPHILS NFR BLD AUTO: 0.2 % (ref 0–1.5)
BILIRUB SERPL-MCNC: 0.2 MG/DL (ref 0–1.2)
BUN SERPL-MCNC: 47 MG/DL (ref 8–23)
BUN/CREAT SERPL: 20.5 (ref 7–25)
CALCIUM SPEC-SCNC: 9.1 MG/DL (ref 8.6–10.5)
CHLORIDE SERPL-SCNC: 99 MMOL/L (ref 98–107)
CO2 SERPL-SCNC: 27 MMOL/L (ref 22–29)
CREAT SERPL-MCNC: 2.29 MG/DL (ref 0.57–1)
DEPRECATED RDW RBC AUTO: 50.4 FL (ref 37–54)
EGFRCR SERPLBLD CKD-EPI 2021: 23.5 ML/MIN/1.73
EOSINOPHIL # BLD AUTO: 0.08 10*3/MM3 (ref 0–0.4)
EOSINOPHIL NFR BLD AUTO: 0.7 % (ref 0.3–6.2)
ERYTHROCYTE [DISTWIDTH] IN BLOOD BY AUTOMATED COUNT: 15.8 % (ref 12.3–15.4)
GLOBULIN UR ELPH-MCNC: 3.1 GM/DL
GLUCOSE SERPL-MCNC: 103 MG/DL (ref 65–99)
HCT VFR BLD AUTO: 29.3 % (ref 34–46.6)
HGB BLD-MCNC: 9.6 G/DL (ref 12–15.9)
IMM GRANULOCYTES # BLD AUTO: 0.08 10*3/MM3 (ref 0–0.05)
IMM GRANULOCYTES NFR BLD AUTO: 0.7 % (ref 0–0.5)
LYMPHOCYTES # BLD AUTO: 0.97 10*3/MM3 (ref 0.7–3.1)
LYMPHOCYTES NFR BLD AUTO: 8.4 % (ref 19.6–45.3)
MCH RBC QN AUTO: 28.7 PG (ref 26.6–33)
MCHC RBC AUTO-ENTMCNC: 32.8 G/DL (ref 31.5–35.7)
MCV RBC AUTO: 87.5 FL (ref 79–97)
MONOCYTES # BLD AUTO: 0.81 10*3/MM3 (ref 0.1–0.9)
MONOCYTES NFR BLD AUTO: 7 % (ref 5–12)
NEUTROPHILS NFR BLD AUTO: 83 % (ref 42.7–76)
NEUTROPHILS NFR BLD AUTO: 9.6 10*3/MM3 (ref 1.7–7)
NRBC BLD AUTO-RTO: 0 /100 WBC (ref 0–0.2)
PLATELET # BLD AUTO: 373 10*3/MM3 (ref 140–450)
PMV BLD AUTO: 9.8 FL (ref 6–12)
POTASSIUM SERPL-SCNC: 3.5 MMOL/L (ref 3.5–5.2)
PROT SERPL-MCNC: 7.1 G/DL (ref 6–8.5)
RBC # BLD AUTO: 3.35 10*6/MM3 (ref 3.77–5.28)
SODIUM SERPL-SCNC: 141 MMOL/L (ref 136–145)
WBC NRBC COR # BLD: 11.56 10*3/MM3 (ref 3.4–10.8)

## 2022-05-17 PROCEDURE — 97110 THERAPEUTIC EXERCISES: CPT

## 2022-05-17 PROCEDURE — G0378 HOSPITAL OBSERVATION PER HR: HCPCS

## 2022-05-17 PROCEDURE — 97530 THERAPEUTIC ACTIVITIES: CPT

## 2022-05-17 PROCEDURE — 97116 GAIT TRAINING THERAPY: CPT

## 2022-05-17 PROCEDURE — 97535 SELF CARE MNGMENT TRAINING: CPT

## 2022-05-17 PROCEDURE — 85025 COMPLETE CBC W/AUTO DIFF WBC: CPT | Performed by: STUDENT IN AN ORGANIZED HEALTH CARE EDUCATION/TRAINING PROGRAM

## 2022-05-17 PROCEDURE — 96366 THER/PROPH/DIAG IV INF ADDON: CPT

## 2022-05-17 PROCEDURE — 63710000001 PREDNISONE PER 1 MG: Performed by: INTERNAL MEDICINE

## 2022-05-17 PROCEDURE — 36415 COLL VENOUS BLD VENIPUNCTURE: CPT | Performed by: FAMILY MEDICINE

## 2022-05-17 PROCEDURE — 80053 COMPREHEN METABOLIC PANEL: CPT | Performed by: FAMILY MEDICINE

## 2022-05-17 RX ORDER — BUMETANIDE 1 MG/1
2 TABLET ORAL 2 TIMES DAILY
Status: DISCONTINUED | OUTPATIENT
Start: 2022-05-17 | End: 2022-05-18 | Stop reason: HOSPADM

## 2022-05-17 RX ADMIN — TOPIRAMATE 50 MG: 50 TABLET, FILM COATED ORAL at 08:38

## 2022-05-17 RX ADMIN — VENLAFAXINE HYDROCHLORIDE 37.5 MG: 37.5 CAPSULE, EXTENDED RELEASE ORAL at 08:38

## 2022-05-17 RX ADMIN — MELATONIN 5.25 MG: 3 TAB ORAL at 21:33

## 2022-05-17 RX ADMIN — PREDNISONE 40 MG: 20 TABLET ORAL at 08:38

## 2022-05-17 RX ADMIN — Medication 1 TABLET: at 08:38

## 2022-05-17 RX ADMIN — RIVAROXABAN 20 MG: 10 TABLET, FILM COATED ORAL at 17:20

## 2022-05-17 RX ADMIN — Medication 10 ML: at 19:33

## 2022-05-17 RX ADMIN — LEVOTHYROXINE SODIUM 150 MCG: 150 TABLET ORAL at 05:57

## 2022-05-17 RX ADMIN — Medication 10 ML: at 08:38

## 2022-05-17 RX ADMIN — BUMETANIDE 2 MG: 1 TABLET ORAL at 12:19

## 2022-05-17 RX ADMIN — BUMETANIDE 2 MG: 1 TABLET ORAL at 17:20

## 2022-05-17 RX ADMIN — ASPIRIN 81 MG: 81 TABLET, FILM COATED ORAL at 08:38

## 2022-05-17 NOTE — PLAN OF CARE
Goal Outcome Evaluation:  Plan of Care Reviewed With: patient        Progress: improving  Outcome Evaluation: pt perf well with OT  pt tf sit to stand with ind funct mob x 200-300 ft in hallway with good hannah sba, toilet tf with ind  pt also perf ther ex eob b ue pt with md at exit

## 2022-05-17 NOTE — PROGRESS NOTES
"[facility]  [address] [place] [address]. [id number]  T - [phone]  F - [phone]     Progress Note          PATIENT  DEMOGRAPHICS   PATIENT NAME: Adilene Morgan                      PHYSICIAN: Alexa Rios MD  : 1960  MRN: [id number]   LOS: 1 day    Patient Care Team:  Jen Raymundo MD as PCP - General  AramSanta ho PA as Physician Assistant (Family Medicine)  Subjective   SUBJECTIVE   Good UO upto 4.7 L.   Cr is better         Objective   OBJECTIVE   Vital Signs  Temp:  [96.8 °F (36 °C)-97.6 °F (36.4 °C)] 97.6 °F (36.4 °C)  Heart Rate:  [67-79] 79  Resp:  [18] 18  BP: (100-139)/(57-82) 114/57    Flowsheet Rows    Flowsheet Row First Filed Value   Admission Height 167.6 cm (66\") Documented at 2022 1623   Admission Weight 97.5 kg (215 lb) Documented at 2022 1623           I/O last 3 completed shifts:  In: 1920 [P.O.:1920]  Out: 4700 [Urine:4700]    PHYSICAL EXAM    Physical Exam  Constitutional:       Appearance: She is well-developed.   HENT:      Head: Normocephalic.   Eyes:      Pupils: Pupils are equal, round, and reactive to light.   Cardiovascular:      Rate and Rhythm: Normal rate and regular rhythm.      Heart sounds: Normal heart sounds.   Pulmonary:      Effort: Pulmonary effort is normal.      Breath sounds: Normal breath sounds.   Abdominal:      General: Bowel sounds are normal.      Palpations: Abdomen is soft.   Musculoskeletal:         General: No swelling.   Skin:     Coloration: Skin is not jaundiced.   Neurological:      General: No focal deficit present.      Mental Status: She is alert and oriented to person, place, and time.         RESULTS   Results Review:    Results from last 7 days   Lab Units 22  0609 22  0515 05/15/22  0613   SODIUM mmol/L 141 140 139   POTASSIUM mmol/L 3.5 4.5 4.2   CHLORIDE mmol/L 99 96* 96*   CO2 mmol/L 27.0 27.0 30.0*   BUN mg/dL 47* 41* 30*   CREATININE mg/dL 2.29* 2.42* 2.65*   CALCIUM " mg/dL 9.1 9.4 9.5   BILIRUBIN mg/dL 0.2 0.3 0.3   ALK PHOS U/L 120* 124* 130*   ALT (SGPT) U/L 18 21 26   AST (SGOT) U/L 15 19 23   GLUCOSE mg/dL 103* 133* 84       Estimated Creatinine Clearance: 29.1 mL/min (A) (by C-G formula based on SCr of 2.29 mg/dL (H)).          Results from last 7 days   Lab Units 05/13/22  0548   URIC ACID mg/dL 7.0*       Results from last 7 days   Lab Units 05/17/22  0609 05/16/22  0557 05/15/22  0613 05/14/22  0604 05/13/22  0548   WBC 10*3/mm3 11.56* 9.86 9.38 7.82 7.72   HEMOGLOBIN g/dL 9.6* 9.7* 9.8* 8.1* 8.0*   PLATELETS 10*3/mm3 373 325 358 262 242               Imaging Results (Last 24 Hours)     ** No results found for the last 24 hours. **           MEDICATIONS    aspirin, 81 mg, Oral, Daily  atorvastatin, 80 mg, Oral, Nightly  levothyroxine, 150 mcg, Oral, Q AM  multivitamin with minerals, 1 tablet, Oral, Daily  predniSONE, 40 mg, Oral, Daily With Breakfast  rivaroxaban, 20 mg, Oral, Daily With Dinner  sodium chloride, 10 mL, Intravenous, Q12H  topiramate, 50 mg, Oral, Nightly  venlafaxine XR, 37.5 mg, Oral, Daily      furosemide (LASIX) 100 mg in 100mL NS infusion, 2.5 mg/hr, Last Rate: 2.5 mg/hr (05/17/22 0440)        Assessment & Plan   ASSESSMENT / PLAN      Acquired hypothyroidism    Adenosquamous carcinoma of right lung (HCC)    TODD (acute kidney injury) (HCC)    Transient ischemic attack (TIA)    1.  TODD on CKD 3- Baseline creatinine around 1.4, creatinine was up to 2.93 on admission and has improved to 1.9-2.1 Good UO and has good weight loss.     Creatinine was at baseline as recently as May 4. She did have Lasix / gastroenteritis symptoms. She tolerated Bactrim exposure in April with documented stable labs following that.  Her CK was mildly elevated on admission and now better. Doubt pre renal (fena consistent with atn and no improvement with ivf), doubt any rhabdo (ck is <1000). More so intrinsic injury / AIN      U/s no hydro. Liang stain is positive.      -It  appears that clinically TODD is relate to entrectinib which can be associated with increased serum creatinine. I have discussed the case with patient Primary Oncologist Dr Morales and we have decided to stop Entrectinib. Patient has also discussed with her Oncologist and she is now off of it from 5/16. Plan to check renal function on Friday and then have follow up with Dr Morales.     Keep prednisone. Dc lasix drip and add bumex oral to plan for discharge tomorrow      2.  Metabolic acidosis- corrected     3.  Metastatic lung cancer- on immunotherapy, follows in [place]     4.  History of DVT     6.  Hypotension- resolved     7.  Right lower extremity weakness- manage per primary team, also had neurology evaluation                   This document has been electronically signed by Alexa Rios MD on May 17, 2022 10:54 CDT

## 2022-05-17 NOTE — PLAN OF CARE
Problem: Adult Inpatient Plan of Care  Goal: Plan of Care Review  Recent Flowsheet Documentation  Taken 5/17/2022 1349 by Judith Lora COTA  Progress: improving  Plan of Care Reviewed With: patient  Outcome Evaluation: pt perf well with OT  pt tf sit to stand with ind funct mob x 200-300 ft in hallway with good hannah sba, toilet tf with ind  pt also perf ther ex eob b ue pt with md at exit   Goal Outcome Evaluation:  Plan of Care Reviewed With: patient        Progress: improving  Outcome Evaluation: pt perf well with OT  pt tf sit to stand with ind funct mob x 200-300 ft in hallway with good hannah sba, toilet tf with ind  pt also perf ther ex eob b ue pt with md at exit

## 2022-05-17 NOTE — PROGRESS NOTES
[facility]   INPATIENT PROGRESS NOTE      Patient Name: Adilene Morgan  Date of Admission: 5/9/2022  Today's Date: 05/17/22  Length of Stay: 1  Primary Care Physician: Jen Raymundo MD    Subjective   Chief Complaint: weakness  HPI   Doing very well today, seated on the side of bed and talking animatedly. Feeling better, hoping to go home soon.    States she has been in regular contact with cancer specialist, Dr. Morales and they are currently holding her immunotx on suspicion this is causing her TODD.  She states plans on restarting it Friday if her labs are good per her most recent discussion with Dr. Morales.    Denies abd pain, urine output good, no urinary symptoms.       Review of Systems   Respiratory: Negative for shortness of breath.    Cardiovascular: Negative for chest pain.   Genitourinary: Negative for difficulty urinating, dysuria and enuresis.        All pertinent negatives and positives are as above. All other systems have been reviewed and are negative unless otherwise stated.     Objective    Temp:  [96.8 °F (36 °C)-97.6 °F (36.4 °C)] 97.6 °F (36.4 °C)  Heart Rate:  [67-78] 73  Resp:  [18] 18  BP: (100-139)/(57-82) 114/57  Physical Exam  HENT:      Mouth/Throat:      Mouth: Mucous membranes are moist.   Cardiovascular:      Rate and Rhythm: Normal rate and regular rhythm.      Pulses: Normal pulses.      Heart sounds: Normal heart sounds.   Pulmonary:      Effort: Pulmonary effort is normal.      Breath sounds: Normal breath sounds.   Abdominal:      General: Abdomen is flat.      Palpations: Abdomen is soft.   Neurological:      Mental Status: She is alert.         Results Review:  I have reviewed the labs, radiology results, and diagnostic studies.    Laboratory Data:   Results from last 7 days   Lab Units 05/17/22  0609 05/16/22  0557 05/15/22  0613   WBC 10*3/mm3 11.56* 9.86 9.38   HEMOGLOBIN g/dL 9.6* 9.7* 9.8*    HEMATOCRIT % 29.3* 30.2* 30.3*   PLATELETS 10*3/mm3 373 325 358        Results from last 7 days   Lab Units 05/17/22  0609 05/16/22  0515 05/15/22  0613   SODIUM mmol/L 141 140 139   POTASSIUM mmol/L 3.5 4.5 4.2   CHLORIDE mmol/L 99 96* 96*   CO2 mmol/L 27.0 27.0 30.0*   BUN mg/dL 47* 41* 30*   CREATININE mg/dL 2.29* 2.42* 2.65*   CALCIUM mg/dL 9.1 9.4 9.5   BILIRUBIN mg/dL 0.2 0.3 0.3   ALK PHOS U/L 120* 124* 130*   ALT (SGPT) U/L 18 21 26   AST (SGOT) U/L 15 19 23   GLUCOSE mg/dL 103* 133* 84       Culture Data:   No results found for: BLOODCX  No results found for: URINECX  No results found for: RESPCX  No results found for: WOUNDCX  No results found for: STOOLCX  No components found for: BODYFLD    Radiology Data:   Imaging Results (Last 24 Hours)     ** No results found for the last 24 hours. **          I have reviewed the patient's current medications.     Assessment/Plan     Active Hospital Problems    Diagnosis    • TODD (acute kidney injury) (HCC)    • Transient ischemic attack (TIA)    • Adenosquamous carcinoma of right lung (HCC)    • Acquired hypothyroidism      1. Right LE weakness   -neurology consulted, appreciate recs, plans to continue Xarelto/ASA  -MRI reviewed, no acute issue  -PT, OT--will continue PT/OT at home   -reviewed carotid US  -echo reviewed   -last echo 2018 EF 60%, normal stress test around that time.   -duplex US to r/o DVT negative     2. Metastatic lung cancer  -on immunotherapy  -follows in [place]      3. TODD   -creatinine 2.93 on admission  -nephrology consulted  -could be 2/2 ATN  -now on lasix drip  Urine output is good, creatinine slightly improving.  We will continue to monitor   -5/17: per neph: dc lasix drip, ad bumex oral, plan for DC tomorrow possibly, pending AM labs       4. Hx of DVT  -hypercoagulable due to cancer  -continue Xarelto   -baseline is around 7-9      5. Hypotension  -improved after IVF      6. Anemia  -transfuse 1 unit 5/12   Hemoglobin has been  improving, will continue to monitor.  - 5/17: Hgb stable at 9.6    7. Asymptomatic bacteruria  - UA abnormal yesterday, culture negative         Discharge Planning: I expect the patient to be discharged to home possibly tomorrow pending nephrology recs    Electronically signed by Candelaria Evans PA-C, 05/17/22, 09:33 CDT.

## 2022-05-17 NOTE — PLAN OF CARE
Goal Outcome Evaluation:  Plan of Care Reviewed With: patient           Outcome Evaluation: sup-sit-sup mod ind,sit-stand-sit ind,amb 150,300 w/o ad and cga of 1-no lob this rx;up/down steps with hr and sba of 1;copies given of hep and performed

## 2022-05-17 NOTE — THERAPY TREATMENT NOTE
Patient Name: Adilene Morgan  : 1960    MRN: [id number]                              Today's Date: 2022       Admit Date: 2022    Visit Dx:     ICD-10-CM ICD-9-CM   1. TODD (acute kidney injury) (HCC)  N17.9 584.9   2. Blurred vision  H53.8 368.8   3. Right leg weakness  R29.898 729.89   4. Impaired mobility and ADLs  Z74.09 V49.89    Z78.9    5. Impaired functional mobility, balance, gait, and endurance  Z74.09 V49.89     Patient Active Problem List   Diagnosis   • Acquired hypothyroidism   • Depressive disorder   • Migraine   • PVC (premature ventricular contraction)   • Palpitation   • Pain of fifth toe   • Adenosquamous carcinoma of right lung (HCC)   • TODD (acute kidney injury) (HCC)   • Transient ischemic attack (TIA)     Past Medical History:   Diagnosis Date   • Achilles bursitis    • Achilles tendinitis    • Acquired hypothyroidism    • Allergic rhinitis    • Allergy to meat     alphagal   • Allergy to milk products    • Arrhythmia    • Asthmatic bronchitis    • Asthmatic bronchitis    • Bone spur    • Calcaneal spur    • Cancer (HCC)    • Depressive disorder    • Family history of thyroid problem    • Herpes simplex    • Hypermetropia    • Hypothyroidism    • Menopausal flushing    • Menopause    • Migraine    • Otalgia    • Pneumonia    • Presbyopia    • Stroke (HCC)    • Trochanteric bursitis    • UTI (urinary tract infection) 2018   • Ventricular premature beats      Past Surgical History:   Procedure Laterality Date   • COLONOSCOPY N/A 2/15/2017    Procedure: COLONOSCOPY;  Surgeon: Lupillo Ugarte MD;  Location: [facility] ENDOSCOPY;  Service:    • COSMETIC SURGERY      plastic surgery to face x 4 r/t trauma   • LAPAROSCOPIC CHOLECYSTECTOMY  12/10/1995    Cholecystectomy, laparoscopic (1)      • OTHER SURGICAL HISTORY      Head surgery procedure (1)    plastic surgery on the face    • OTHER SURGICAL HISTORY  10/17/2014    Repair Superficial Wound TR-EXT 2.5 < CM [id number] (1)          General Information     Row Name 05/17/22 1321          OT Time and Intention    Document Type therapy note (daily note)  -LM     Mode of Treatment individual therapy;occupational therapy  -LM           User Key  (r) = Recorded By, (t) = Taken By, (c) = Cosigned By    Initials Name Provider Type    LM Judith Lora COTA Occupational Therapist Assistant                 Mobility/ADL's     Row Name 05/17/22 1321          Bed Mobility    All Activities, [place] (Bed Mobility) independent  -LM     Row Name 05/17/22 1326          Transfers    Transfers bed-chair transfer;sit-stand transfer;stand-sit transfer  -LM     Sit-Stand [place] (Transfers) independent  -LM     Stand-Sit [place] (Transfers) independent  -LM     [place] Level (Toilet Transfer) independent  -LM     Assistive Device (Toilet Transfer) commode, 3-in-1  -LM     Row Name 05/17/22 1326          Toilet Transfer    Type (Toilet Transfer) sit-stand;stand-sit;stand pivot/stand step  -LM     Row Name 05/17/22 1343 05/17/22 1326       Functional Mobility    Functional Mobility- Ind. Level independent  -LM independent  -LM    Functional Mobility-Distance (Feet) 200  -LM --    Row Name 05/17/22 1326          Activities of Daily Living    BADL Assessment/Intervention toileting  -LM     Row Name 05/17/22 1343 05/17/22 1326       Toileting Assessment/Training    [place] Level (Toileting) toileting skills;adjust/manage clothing;perform perineal hygiene;independent  -LM toileting skills;adjust/manage clothing  -LM    Assistive Devices (Toileting) -- raised toilet seat  -LM          User Key  (r) = Recorded By, (t) = Taken By, (c) = Cosigned By    Initials Name Provider Type    Judith Clay COTA Occupational Therapist Assistant               Obj/Interventions     Row Name 05/17/22 1341          Shoulder (Therapeutic Exercise)    Shoulder (Therapeutic Exercise) AROM (active range of motion);other (see comments)  -LM     Shoulder  AROM (Therapeutic Exercise) bilateral  -LM     Row Name 05/17/22 1341          Elbow/Forearm (Therapeutic Exercise)    Elbow/Forearm (Therapeutic Exercise) AROM (active range of motion)  -LM     Elbow/Forearm AROM (Therapeutic Exercise) bilateral  -LM     Row Name 05/17/22 1341          Wrist (Therapeutic Exercise)    Wrist (Therapeutic Exercise) AROM (active range of motion)  -LM     Wrist AROM (Therapeutic Exercise) bilateral  -LM     Row Name 05/17/22 1341          Motor Skills    Motor Skills coordination;functional endurance  -LM     Therapeutic Exercise shoulder;elbow/forearm  ther ex with 3 lb wt all planes sitting eob  10 reps each  -LM     Row Name 05/17/22 1341          Balance    Balance Assessment sitting static balance;sit to stand dynamic balance;sitting dynamic balance;standing static balance;standing dynamic balance  -LM     Static Sitting Balance independent  -LM     Dynamic Sitting Balance independent  -LM     Static Standing Balance independent  -LM     Dynamic Standing Balance standby assist  -LM     Balance Interventions sitting;standing;sit to stand;supported;static;dynamic;moderate challenge;minimal challenge  -           User Key  (r) = Recorded By, (t) = Taken By, (c) = Cosigned By    Initials Name Provider Type    LM Judith Lora COTA Occupational Therapist Assistant               Goals/Plan     Row Name 05/17/22 1356          Transfer Goal 1 (OT)    Activity/Assistive Device (Transfer Goal 1, OT) toilet  -LM     [place] Level/Cues Needed (Transfer Goal 1, OT) modified independence  -LM     Time Frame (Transfer Goal 1, OT) long term goal (LTG);by discharge  -LM     Progress/Outcome (Transfer Goal 1, OT) goal met  -     Row Name 05/17/22 1356          Bathing Goal 1 (OT)    Activity/Device (Bathing Goal 1, OT) lower body bathing  -LM     [place] Level/Cues Needed (Bathing Goal 1, OT) modified independence  -LM     Time Frame (Bathing Goal 1, OT) long term goal (LTG);by  discharge  -LM     Progress/Outcomes (Bathing Goal 1, OT) goal met  -LM     Row Name 05/17/22 1356          Dressing Goal 1 (OT)    Activity/Device (Dressing Goal 1, OT) lower body dressing  -LM     [place]/Cues Needed (Dressing Goal 1, OT) independent  -LM     Time Frame (Dressing Goal 1, OT) long term goal (LTG);by discharge  -LM     Progress/Outcome (Dressing Goal 1, OT) goal met  -LM     Row Name 05/17/22 1356          Toileting Goal 1 (OT)    Activity/Device (Toileting Goal 1, OT) toileting skills, all  -LM     [place] Level/Cues Needed (Toileting Goal 1, OT) independent  -LM     Time Frame (Toileting Goal 1, OT) long term goal (LTG);by discharge  -LM     Progress/Outcome (Toileting Goal 1, OT) goal met  -LM           User Key  (r) = Recorded By, (t) = Taken By, (c) = Cosigned By    Initials Name Provider Type    Judith Clay COTA Occupational Therapist Assistant               Clinical Impression     Row Name 05/17/22 1342          Pain Assessment    Pretreatment Pain Rating 0/10 - no pain  -LM     Posttreatment Pain Rating 0/10 - no pain  -LM     Pain Intervention(s) Medication (See MAR)  -LM     Row Name 05/17/22 1348          Plan of Care Review    Plan of Care Reviewed With patient  -LM     Progress improving  -LM     Outcome Evaluation pt perf well with OT  pt tf sit to stand with ind funct mob x 200-300 ft in hallway with good hannah sba, toilet tf with ind  pt also perf ther ex eob b ue pt with md at exit  -LM           User Key  (r) = Recorded By, (t) = Taken By, (c) = Cosigned By    Initials Name Provider Type    Judith Clay COTA Occupational Therapist Assistant               Outcome Measures     Row Name 05/17/22 9404          How much help from another is currently needed...    Putting on and taking off regular lower body clothing? 4  -LM     Bathing (including washing, rinsing, and drying) 4  -LM     Toileting (which includes using toilet bed pan or urinal) 4  -LM      Putting on and taking off regular upper body clothing 4  -LM     Taking care of personal grooming (such as brushing teeth) 4  -LM     Eating meals 4  -LM     AM-PAC 6 Clicks Score (OT) 24  -LM           User Key  (r) = Recorded By, (t) = Taken By, (c) = Cosigned By    Initials Name Provider Type    LM Judith Lora COTA Occupational Therapist Assistant                Occupational Therapy Education                 Title: PT OT SLP Therapies (In Progress)     Topic: Occupational Therapy (Done)     Point: ADL training (Done)     Description:   Instruct learner(s) on proper safety adaptation and remediation techniques during self care or transfers.   Instruct in proper use of assistive devices.              Learning Progress Summary           Patient Acceptance, E,TB, VU by  at 5/16/2022 1337    Acceptance, D,E, NR,VU by  at 5/15/2022 1300    Acceptance, E, NR by  at 5/11/2022 1139                   Point: Home exercise program (Done)     Description:   Instruct learner(s) on appropriate technique for monitoring, assisting and/or progressing therapeutic exercises/activities.              Learning Progress Summary           Patient Acceptance, E,TB, VU by  at 5/16/2022 1337                   Point: Precautions (Done)     Description:   Instruct learner(s) on prescribed precautions during self-care and functional transfers.              Learning Progress Summary           Patient Acceptance, E,TB, VU by  at 5/16/2022 1337    Acceptance, D,E, NR,VU by  at 5/15/2022 1300    Acceptance, E, NR by  at 5/14/2022 1449    Acceptance, E, NR by  at 5/12/2022 1440    Acceptance, E, NR by  at 5/11/2022 1139    Acceptance, E,TB, VU,NR by  at 5/10/2022 1315    Comment: POC, role of OT, transfer training                   Point: Body mechanics (Done)     Description:   Instruct learner(s) on proper positioning and spine alignment during self-care, functional mobility activities and/or exercises.               Learning Progress Summary           Patient Acceptance, E,TB, VU by  at 5/16/2022 1337    Acceptance, E, NR by  at 5/14/2022 1449    Acceptance, E, NR by  at 5/12/2022 1440    Acceptance, E, NR by  at 5/11/2022 1139    Acceptance, E,TB, VU,NR by  at 5/10/2022 1315    Comment: POC, role of OT, transfer training                               User Key     Initials Effective Dates Name Provider Type Discipline     06/16/21 -  Nedra Lanier COTA Occupational Therapist Assistant OT     06/16/21 -  Jayde Johnson COTA Occupational Therapist Assistant OT     06/14/21 -  Pardeep Dasilva, OT Occupational Therapist OT              OT Recommendation and Plan     Plan of Care Review  Plan of Care Reviewed With: patient  Progress: improving  Outcome Evaluation: pt perf well with OT  pt tf sit to stand with ind funct mob x 200-300 ft in hallway with good hannah sba, toilet tf with ind  pt also perf ther ex eob b ue pt with md at exit     Time Calculation:              CAROLINA Delgado  5/17/2022

## 2022-05-17 NOTE — PLAN OF CARE
Lasix drip dcd per Md order. Cr 2.29, BUN 47. Pt has been ambulating in hallway today. Pt reports no pain at this time. Will continue to monitor.       Goal Outcome Evaluation:

## 2022-05-17 NOTE — THERAPY TREATMENT NOTE
Acute Care - Physical Therapy Treatment Note  [facility]     Patient Name: Adilene Morgan  : 1960  MRN: [id number]  Today's Date: 2022      Visit Dx:     ICD-10-CM ICD-9-CM   1. TODD (acute kidney injury) (HCC)  N17.9 584.9   2. Blurred vision  H53.8 368.8   3. Right leg weakness  R29.898 729.89   4. Impaired mobility and ADLs  Z74.09 V49.89    Z78.9    5. Impaired functional mobility, balance, gait, and endurance  Z74.09 V49.89     Patient Active Problem List   Diagnosis   • Acquired hypothyroidism   • Depressive disorder   • Migraine   • PVC (premature ventricular contraction)   • Palpitation   • Pain of fifth toe   • Adenosquamous carcinoma of right lung (HCC)   • TODD (acute kidney injury) (HCC)   • Transient ischemic attack (TIA)     Past Medical History:   Diagnosis Date   • Achilles bursitis    • Achilles tendinitis    • Acquired hypothyroidism    • Allergic rhinitis    • Allergy to meat     alphagal   • Allergy to milk products    • Arrhythmia    • Asthmatic bronchitis    • Asthmatic bronchitis    • Bone spur    • Calcaneal spur    • Cancer (HCC)    • Depressive disorder    • Family history of thyroid problem    • Herpes simplex    • Hypermetropia    • Hypothyroidism    • Menopausal flushing    • Menopause    • Migraine    • Otalgia    • Pneumonia    • Presbyopia    • Stroke (HCC)    • Trochanteric bursitis    • UTI (urinary tract infection) 2018   • Ventricular premature beats      Past Surgical History:   Procedure Laterality Date   • COLONOSCOPY N/A 2/15/2017    Procedure: COLONOSCOPY;  Surgeon: Lupillo Ugarte MD;  Location: [facility] ENDOSCOPY;  Service:    • COSMETIC SURGERY      plastic surgery to face x 4 r/t trauma   • LAPAROSCOPIC CHOLECYSTECTOMY  12/10/1995    Cholecystectomy, laparoscopic (1)      • OTHER SURGICAL HISTORY      Head surgery procedure (1)    plastic surgery on the face    • OTHER SURGICAL HISTORY  10/17/2014    Repair Superficial Wound TR-EXT 2.5 < CM 17215  (1)        PT Assessment (last 12 hours)     PT Evaluation and Treatment     Row Name 05/17/22 1130          Physical Therapy Time and Intention    Subjective Information no complaints  -LN     Document Type therapy note (daily note)  -LN     Mode of Treatment individual therapy;physical therapy  -LN     Patient Effort good  -LN     Row Name 05/17/22 1130          General Information    Existing Precautions/Restrictions fall  -LN     Row Name 05/17/22 1130          Pain    Pretreatment Pain Rating 0/10 - no pain  -LN     Posttreatment Pain Rating 0/10 - no pain  -LN     Row Name 05/17/22 1130          Cognition    Orientation Status (Cognition) oriented x 4  -LN     Row Name 05/17/22 1130          Bed Mobility    Bed Mobility supine-sit;sit-supine  -LN     Supine-Sit [place] (Bed Mobility) modified independence  -LN     Sit-Supine [place] (Bed Mobility) modified independence  -LN     Assistive Device (Bed Mobility) head of bed elevated;bed rails  -LN     Row Name 05/17/22 1130          Transfers    Transfers sit-stand transfer;stand-sit transfer  -LN     Sit-Stand [place] (Transfers) independent  -LN     Stand-Sit [place] (Transfers) independent  -LN     [place] Level (Toilet Transfer) independent  -LN     Assistive Device (Toilet Transfer) grab bars/safety frame;commode  -LN     Row Name 05/17/22 1130          Gait/Stairs (Locomotion)    [place] Level (Gait) contact guard  -LN     Distance in Feet (Gait) 150,300  -LN     Pattern (Gait) step-through  -LN     Deviations/Abnormal Patterns (Gait) mack decreased;antalgic  -LN     [place] Level (Stairs) supervision  -LN     Handrail Location (Stairs) right side (ascending)  -LN     Number of Steps (Stairs) 10,5  -LN     Ascending Technique (Stairs) step-over-step;step-to-step  -LN     Descending Technique (Stairs) step-over-step;step-to-step  -LN     Comment, (Gait/Stairs) pt also demonstrated step to due to recent fall  -LN     Row  Name 05/17/22 1130          Hip (Therapeutic Exercise)    Hip (Therapeutic Exercise) AROM (active range of motion)  -LN     Hip AROM (Therapeutic Exercise) standing;bilateral;flexion;aBduction;aDduction  -LN     Row Name 05/17/22 1130          Knee (Therapeutic Exercise)    Knee (Therapeutic Exercise) AROM (active range of motion)  -LN     Knee AROM (Therapeutic Exercise) bilateral;standing;flexion;extension  -LN     Row Name 05/17/22 1130          Ankle (Therapeutic Exercise)    Ankle (Therapeutic Exercise) AROM (active range of motion)  -LN     Ankle AROM (Therapeutic Exercise) bilateral  heel raises  -LN     Row Name 05/17/22 1130          Plan of Care Review    Plan of Care Reviewed With patient  -LN     Outcome Evaluation sup-sit-sup mod ind,sit-stand-sit ind,amb 150,300 w/o ad and cga of 1-no lob this rx;up/down steps with hr and sba of 1;copies given of hep and performed  -LN     Row Name 05/17/22 1130          Vital Signs    Pre Systolic BP Rehab 111  -LN     Pre Treatment Diastolic BP 61  -LN     Post Systolic BP Rehab 131  -LN     Post Treatment Diastolic BP 64  -LN     Pretreatment Heart Rate (beats/min) 73  -LN     Posttreatment Heart Rate (beats/min) 73  -LN     Pre SpO2 (%) 98  -LN     O2 Delivery Pre Treatment room air  -LN     Pre Patient Position Sitting  -LN     Intra Patient Position Standing  -LN     Post Patient Position Sitting  -LN     Row Name 05/17/22 1130          Positioning and Restraints    In Bed fowlers;call light within reach;with family/caregiver  -LN     Row Name 05/17/22 1130          Bed Mobility Goal 1 (PT)    Activity/Assistive Device (Bed Mobility Goal 1, PT) sit to supine/supine to sit  -LN     [place] Level/Cues Needed (Bed Mobility Goal 1, PT) independent  -LN     Time Frame (Bed Mobility Goal 1, PT) by discharge  -LN     Strategies/Barriers (Bed Mobility Goal 1, PT) HOB flat, no bed rails.  -LN     Progress/Outcomes (Bed Mobility Goal 1, PT) goal not met  -LN     Row  Name 05/17/22 1130          Transfer Goal 1 (PT)    Activity/Assistive Device (Transfer Goal 1, PT) sit-to-stand/stand-to-sit;bed-to-chair/chair-to-bed  -LN     [place] Level/Cues Needed (Transfer Goal 1, PT) modified independence  -LN     Time Frame (Transfer Goal 1, PT) by discharge  -LN     Strategies/Barriers (Transfers Goal 1, PT) Encourage use of walker.  -LN     Progress/Outcome (Transfer Goal 1, PT) goal partially met  -LN     Row Name 05/17/22 1130          Gait Training Goal 1 (PT)    Activity/Assistive Device (Gait Training Goal 1, PT) walker, rolling  -LN     [place] Level (Gait Training Goal 1, PT) modified independence  -LN     Distance (Gait Training Goal 1, PT) 100'x2.  -LN     Time Frame (Gait Training Goal 1, PT) by discharge  -LN     Strategies/Barriers (Gait Training Goal 1, PT) Encourage use of walker.  -LN     Progress/Outcome (Gait Training Goal 1, PT) goal partially met  -LN     Row Name 05/17/22 1130          Stairs Goal 1 (PT)    Activity/Assistive Device (Stairs Goal 1, PT) using handrail, right  -LN     [place] Level/Cues Needed (Stairs Goal 1, PT) supervision required  -LN     Number of Stairs (Stairs Goal 1, PT) 7 steps to bedroom upstairs.  -LN     Time Frame (Stairs Goal 1, PT) by discharge  -LN     Progress/Outcome (Stairs Goal 1, PT) goal met;goal ongoing  -LN           User Key  (r) = Recorded By, (t) = Taken By, (c) = Cosigned By    Initials Name Provider Type    LN Romana Ramos PTA Physical Therapist Assistant                Physical Therapy Education                 Title: PT OT SLP Therapies (In Progress)     Topic: Physical Therapy (In Progress)     Point: Mobility training (Not Started)     Learner Progress:  Not documented in this visit.          Point: Home exercise program (Not Started)     Learner Progress:  Not documented in this visit.          Point: Body mechanics (Not Started)     Learner Progress:  Not documented in this visit.          Point:  Precautions (Done)     Learning Progress Summary           Patient Acceptance, E, VU,NR by JIM at 5/10/2022 7803    Comment: [place] assessed: 20/28 or moderate fall risk.  Recommend use of FWW during gait. Continue O/P PT.                               User Key     Initials Effective Dates Name Provider Type Discipline     06/16/21 -  Vasquez Granados, PT Physical Therapist PT              PT Recommendation and Plan     Plan of Care Reviewed With: patient  Outcome Evaluation: sup-sit-sup mod ind,sit-stand-sit ind,amb 150,300 w/o ad and cga of 1-no lob this rx;up/down steps with hr and sba of 1;copies given of hep and performed       Time Calculation:    PT Charges     Row Name 05/17/22 1302             Time Calculation    Start Time 1130  -LN      Stop Time 1158  -LN      Time Calculation (min) 28 min  -LN      PT Received On 05/17/22  -LN              Time Calculation- PT    Total Timed Code Minutes- PT 28 minute(s)  -LN            User Key  (r) = Recorded By, (t) = Taken By, (c) = Cosigned By    Initials Name Provider Type    LN Romana Ramos S, PTA Physical Therapist Assistant              Therapy Charges for Today     Code Description Service Date Service Provider Modifiers Qty    70199973114 HC GAIT TRAINING EA 15 MIN 5/16/2022 Richard, Romana S, PTA GP 1    61932334525 HC PT THER PROC EA 15 MIN 5/16/2022 Richard, Romana S, PTA GP 1    94579219999 HC GAIT TRAINING EA 15 MIN 5/16/2022 Richard, Romana S, PTA GP 1    36900678040 HC PT THERAPEUTIC ACT EA 15 MIN 5/16/2022 Richard, Romana S, PTA GP 1    79183750964 HC GAIT TRAINING EA 15 MIN 5/17/2022 Richard, Romana S, PTA GP 1    63125580073 HC PT THER PROC EA 15 MIN 5/17/2022 Richard, Romana S, PTA GP 1          PT G-Codes  Outcome Measure Options: AM-PAC 6 Clicks Daily Activity (OT)  AM-PAC 6 Clicks Score (PT): 18  AM-PAC 6 Clicks Score (OT): 24  Modified Marleny Scale: 4 - Moderately severe disability.  Unable to walk without assistance, and unable to attend to own bodily needs without  assistance.  Tinetti Total Score: 20    Romana Ramos, PTA  5/17/2022

## 2022-05-17 NOTE — THERAPY TREATMENT NOTE
Patient Name: Adilene Morgan  : 1960    MRN: [id number]                              Today's Date: 2022       Admit Date: 2022    Visit Dx:     ICD-10-CM ICD-9-CM   1. TODD (acute kidney injury) (HCC)  N17.9 584.9   2. Blurred vision  H53.8 368.8   3. Right leg weakness  R29.898 729.89   4. Impaired mobility and ADLs  Z74.09 V49.89    Z78.9    5. Impaired functional mobility, balance, gait, and endurance  Z74.09 V49.89     Patient Active Problem List   Diagnosis   • Acquired hypothyroidism   • Depressive disorder   • Migraine   • PVC (premature ventricular contraction)   • Palpitation   • Pain of fifth toe   • Adenosquamous carcinoma of right lung (HCC)   • TODD (acute kidney injury) (HCC)   • Transient ischemic attack (TIA)     Past Medical History:   Diagnosis Date   • Achilles bursitis    • Achilles tendinitis    • Acquired hypothyroidism    • Allergic rhinitis    • Allergy to meat     alphagal   • Allergy to milk products    • Arrhythmia    • Asthmatic bronchitis    • Asthmatic bronchitis    • Bone spur    • Calcaneal spur    • Cancer (HCC)    • Depressive disorder    • Family history of thyroid problem    • Herpes simplex    • Hypermetropia    • Hypothyroidism    • Menopausal flushing    • Menopause    • Migraine    • Otalgia    • Pneumonia    • Presbyopia    • Stroke (HCC)    • Trochanteric bursitis    • UTI (urinary tract infection) 2018   • Ventricular premature beats      Past Surgical History:   Procedure Laterality Date   • COLONOSCOPY N/A 2/15/2017    Procedure: COLONOSCOPY;  Surgeon: Lupillo Ugarte MD;  Location: [facility] ENDOSCOPY;  Service:    • COSMETIC SURGERY      plastic surgery to face x 4 r/t trauma   • LAPAROSCOPIC CHOLECYSTECTOMY  12/10/1995    Cholecystectomy, laparoscopic (1)      • OTHER SURGICAL HISTORY      Head surgery procedure (1)    plastic surgery on the face    • OTHER SURGICAL HISTORY  10/17/2014    Repair Superficial Wound TR-EXT 2.5 < CM 49856 (1)          General Information     Row Name 05/17/22 1321          OT Time and Intention    Document Type therapy note (daily note)  -LM     Mode of Treatment individual therapy;occupational therapy  -LM           User Key  (r) = Recorded By, (t) = Taken By, (c) = Cosigned By    Initials Name Provider Type    LM Judith Lora COTA Occupational Therapist Assistant                 Mobility/ADL's     Row Name 05/17/22 1321          Bed Mobility    All Activities, [place] (Bed Mobility) independent  -LM     Row Name 05/17/22 1326          Transfers    Transfers bed-chair transfer;sit-stand transfer;stand-sit transfer  -LM     Sit-Stand [place] (Transfers) independent  -LM     Stand-Sit [place] (Transfers) independent  -LM     [place] Level (Toilet Transfer) independent  -LM     Assistive Device (Toilet Transfer) commode, 3-in-1  -LM     Row Name 05/17/22 1326          Toilet Transfer    Type (Toilet Transfer) sit-stand;stand-sit;stand pivot/stand step  -LM     Row Name 05/17/22 1343 05/17/22 1326       Functional Mobility    Functional Mobility- Ind. Level independent  -LM independent  -LM    Functional Mobility-Distance (Feet) 200  -LM --    Row Name 05/17/22 1326          Activities of Daily Living    BADL Assessment/Intervention toileting  -LM     Row Name 05/17/22 1343 05/17/22 1326       Toileting Assessment/Training    [place] Level (Toileting) toileting skills;adjust/manage clothing;perform perineal hygiene;independent  -LM toileting skills;adjust/manage clothing  -LM    Assistive Devices (Toileting) -- raised toilet seat  -LM          User Key  (r) = Recorded By, (t) = Taken By, (c) = Cosigned By    Initials Name Provider Type    Judith Clay COTA Occupational Therapist Assistant               Obj/Interventions     Row Name 05/17/22 1341          Shoulder (Therapeutic Exercise)    Shoulder (Therapeutic Exercise) AROM (active range of motion);other (see comments)  -LM     Shoulder  AROM (Therapeutic Exercise) bilateral  -LM     Row Name 05/17/22 1341          Elbow/Forearm (Therapeutic Exercise)    Elbow/Forearm (Therapeutic Exercise) AROM (active range of motion)  -LM     Elbow/Forearm AROM (Therapeutic Exercise) bilateral  -LM     Row Name 05/17/22 1341          Wrist (Therapeutic Exercise)    Wrist (Therapeutic Exercise) AROM (active range of motion)  -LM     Wrist AROM (Therapeutic Exercise) bilateral  -LM     Row Name 05/17/22 1341          Motor Skills    Motor Skills coordination;functional endurance  -LM     Therapeutic Exercise shoulder;elbow/forearm  ther ex with 3 lb wt all planes sitting eob  10 reps each  -LM     Row Name 05/17/22 1341          Balance    Balance Assessment sitting static balance;sit to stand dynamic balance;sitting dynamic balance;standing static balance;standing dynamic balance  -LM     Static Sitting Balance independent  -LM     Dynamic Sitting Balance independent  -LM     Static Standing Balance independent  -LM     Dynamic Standing Balance standby assist  -LM     Balance Interventions sitting;standing;sit to stand;supported;static;dynamic;moderate challenge;minimal challenge  -           User Key  (r) = Recorded By, (t) = Taken By, (c) = Cosigned By    Initials Name Provider Type    LM Judith Lora COTA Occupational Therapist Assistant               Goals/Plan     Row Name 05/17/22 1356          Transfer Goal 1 (OT)    Activity/Assistive Device (Transfer Goal 1, OT) toilet  -LM     [place] Level/Cues Needed (Transfer Goal 1, OT) modified independence  -LM     Time Frame (Transfer Goal 1, OT) long term goal (LTG);by discharge  -LM     Progress/Outcome (Transfer Goal 1, OT) goal met  -     Row Name 05/17/22 1356          Bathing Goal 1 (OT)    Activity/Device (Bathing Goal 1, OT) lower body bathing  -LM     [place] Level/Cues Needed (Bathing Goal 1, OT) modified independence  -LM     Time Frame (Bathing Goal 1, OT) long term goal (LTG);by  discharge  -LM     Progress/Outcomes (Bathing Goal 1, OT) goal met  -LM     Row Name 05/17/22 1356          Dressing Goal 1 (OT)    Activity/Device (Dressing Goal 1, OT) lower body dressing  -LM     [place]/Cues Needed (Dressing Goal 1, OT) independent  -LM     Time Frame (Dressing Goal 1, OT) long term goal (LTG);by discharge  -LM     Progress/Outcome (Dressing Goal 1, OT) goal met  -LM     Row Name 05/17/22 1356          Toileting Goal 1 (OT)    Activity/Device (Toileting Goal 1, OT) toileting skills, all  -LM     [place] Level/Cues Needed (Toileting Goal 1, OT) independent  -LM     Time Frame (Toileting Goal 1, OT) long term goal (LTG);by discharge  -LM     Progress/Outcome (Toileting Goal 1, OT) goal met  -LM           User Key  (r) = Recorded By, (t) = Taken By, (c) = Cosigned By    Initials Name Provider Type    Judith Clay COTA Occupational Therapist Assistant               Clinical Impression     Row Name 05/17/22 1348          Pain Assessment    Pretreatment Pain Rating 0/10 - no pain  -LM     Posttreatment Pain Rating 0/10 - no pain  -LM     Pain Intervention(s) Medication (See MAR)  -LM     Row Name 05/17/22 1344          Plan of Care Review    Plan of Care Reviewed With patient  -LM     Progress improving  -LM     Outcome Evaluation pt perf well with OT  pt tf sit to stand with ind funct mob x 200-300 ft in hallway with good [place]annah sba, toilet tf with ind  pt also perf ther ex eob b ue pt with md at exit  -LM           User Key  (r) = Recorded By, (t) = Taken By, (c) = Cosigned By    Initials Name Provider Type    Judith Clay COTA Occupational Therapist Assistant               Outcome Measures     Row Name 05/17/22 1425          How much help from another is currently needed...    Putting on and taking off regular lower body clothing? 4  -LM     Bathing (including washing, rinsing, and drying) 4  -LM     Toileting (which includes using toilet bed pan or urinal) 4  -LM      Putting on and taking off regular upper body clothing 4  -LM     Taking care of personal grooming (such as brushing teeth) 4  -LM     Eating meals 4  -LM     AM-PAC 6 Clicks Score (OT) 24  -LM           User Key  (r) = Recorded By, (t) = Taken By, (c) = Cosigned By    Initials Name Provider Type    LM Judith Lora COTA Occupational Therapist Assistant                Occupational Therapy Education                 Title: PT OT SLP Therapies (In Progress)     Topic: Occupational Therapy (Done)     Point: ADL training (Done)     Description:   Instruct learner(s) on proper safety adaptation and remediation techniques during self care or transfers.   Instruct in proper use of assistive devices.              Learning Progress Summary           Patient Acceptance, E,TB, VU by  at 5/16/2022 1337    Acceptance, D,E, NR,VU by  at 5/15/2022 1300    Acceptance, E, NR by  at 5/11/2022 1139                   Point: Home exercise program (Done)     Description:   Instruct learner(s) on appropriate technique for monitoring, assisting and/or progressing therapeutic exercises/activities.              Learning Progress Summary           Patient Acceptance, E,TB, VU by  at 5/16/2022 1337                   Point: Precautions (Done)     Description:   Instruct learner(s) on prescribed precautions during self-care and functional transfers.              Learning Progress Summary           Patient Acceptance, E,TB, VU by  at 5/16/2022 1337    Acceptance, D,E, NR,VU by  at 5/15/2022 1300    Acceptance, E, NR by  at 5/14/2022 1449    Acceptance, E, NR by  at 5/12/2022 1440    Acceptance, E, NR by  at 5/11/2022 1139    Acceptance, E,TB, VU,NR by  at 5/10/2022 1315    Comment: POC, role of OT, transfer training                   Point: Body mechanics (Done)     Description:   Instruct learner(s) on proper positioning and spine alignment during self-care, functional mobility activities and/or exercises.               Learning Progress Summary           Patient Acceptance, E,TB, VU by  at 5/16/2022 1337    Acceptance, E, NR by  at 5/14/2022 1449    Acceptance, E, NR by  at 5/12/2022 1440    Acceptance, E, NR by  at 5/11/2022 1139    Acceptance, E,TB, VU,NR by  at 5/10/2022 1315    Comment: POC, role of OT, transfer training                               User Key     Initials Effective Dates Name Provider Type Discipline     06/16/21 -  Nedra Lanier, CAROLINA Occupational Therapist Assistant OT     06/16/21 -  Jayde Johnson, FIGUEROA Occupational Therapist Assistant OT     06/14/21 -  Pardeep Dasilva, OT Occupational Therapist OT              OT Recommendation and Plan     Plan of Care Review  Plan of Care Reviewed With: patient  Progress: improving  Outcome Evaluation: pt perf well with OT  pt tf sit to stand with ind funct mob x 200-300 ft in hallway with good hannah sba, toilet tf with ind  pt also perf ther ex eob b ue pt with md at exit     Time Calculation:    Time Calculation- OT     Row Name 05/17/22 1459             Time Calculation- OT    OT Start Time 0935  -LM      OT Stop Time 1030  -LM      OT Time Calculation (min) 55 min  -LM      Total Timed Code Minutes- OT 55 minute(s)  -LM      OT Received On 05/17/22  -LM              Timed Charges    06563 - OT Therapeutic Exercise Minutes 25  -LM      25442 - OT Therapeutic Activity Minutes 20  -LM      35833 - OT Self Care/Mgmt Minutes 10  -LM              Total Minutes    Timed Charges Total Minutes 55  -LM       Total Minutes 55  -LM            User Key  (r) = Recorded By, (t) = Taken By, (c) = Cosigned By    Initials Name Provider Type    LM Judith Lora COTA Occupational Therapist Assistant              Therapy Charges for Today     Code Description Service Date Service Provider Modifiers Qty    80792238996 HC OT THERAPEUTIC ACT EA 15 MIN 5/17/2022 Judith Lora COTA GO 2    86967859581 HC OT THER PROC EA 15 MIN 5/17/2022 Judith Lora  [place]OL[place] GO 2    45267400488 HC OT SELF CARE/MGMT/TRAIN EA 15 MIN 5/17/2022 Judith Lora, CAROLINA GO 1               CAROLINA Delgado  5/17/2022

## 2022-05-18 ENCOUNTER — READMISSION MANAGEMENT (OUTPATIENT)
Dept: CALL CENTER | Facility: HOSPITAL | Age: 62
End: 2022-05-18

## 2022-05-18 VITALS
WEIGHT: 210.6 LBS | SYSTOLIC BLOOD PRESSURE: 123 MMHG | BODY MASS INDEX: 33.85 KG/M2 | RESPIRATION RATE: 18 BRPM | DIASTOLIC BLOOD PRESSURE: 60 MMHG | HEIGHT: 66 IN | OXYGEN SATURATION: 98 % | TEMPERATURE: 96.1 F | HEART RATE: 64 BPM

## 2022-05-18 LAB
ALBUMIN SERPL-MCNC: 4.3 G/DL (ref 3.5–5.2)
ALBUMIN/GLOB SERPL: 1.2 G/DL
ALP SERPL-CCNC: 134 U/L (ref 39–117)
ALT SERPL W P-5'-P-CCNC: 20 U/L (ref 1–33)
ANION GAP SERPL CALCULATED.3IONS-SCNC: 12 MMOL/L (ref 5–15)
AST SERPL-CCNC: 18 U/L (ref 1–32)
BASOPHILS # BLD AUTO: 0.05 10*3/MM3 (ref 0–0.2)
BASOPHILS NFR BLD AUTO: 0.3 % (ref 0–1.5)
BILIRUB SERPL-MCNC: 0.2 MG/DL (ref 0–1.2)
BUN SERPL-MCNC: 52 MG/DL (ref 8–23)
BUN/CREAT SERPL: 24.2 (ref 7–25)
CALCIUM SPEC-SCNC: 9.6 MG/DL (ref 8.6–10.5)
CHLORIDE SERPL-SCNC: 98 MMOL/L (ref 98–107)
CO2 SERPL-SCNC: 29 MMOL/L (ref 22–29)
CREAT SERPL-MCNC: 2.15 MG/DL (ref 0.57–1)
DEPRECATED RDW RBC AUTO: 48.7 FL (ref 37–54)
EGFRCR SERPLBLD CKD-EPI 2021: 25.3 ML/MIN/1.73
EOSINOPHIL # BLD AUTO: 0.08 10*3/MM3 (ref 0–0.4)
EOSINOPHIL NFR BLD AUTO: 0.5 % (ref 0.3–6.2)
ERYTHROCYTE [DISTWIDTH] IN BLOOD BY AUTOMATED COUNT: 15.8 % (ref 12.3–15.4)
GLOBULIN UR ELPH-MCNC: 3.7 GM/DL
GLUCOSE SERPL-MCNC: 78 MG/DL (ref 65–99)
HCT VFR BLD AUTO: 33.7 % (ref 34–46.6)
HGB BLD-MCNC: 11.1 G/DL (ref 12–15.9)
IMM GRANULOCYTES # BLD AUTO: 0.27 10*3/MM3 (ref 0–0.05)
IMM GRANULOCYTES NFR BLD AUTO: 1.8 % (ref 0–0.5)
LYMPHOCYTES # BLD AUTO: 1.31 10*3/MM3 (ref 0.7–3.1)
LYMPHOCYTES NFR BLD AUTO: 8.5 % (ref 19.6–45.3)
MCH RBC QN AUTO: 28.5 PG (ref 26.6–33)
MCHC RBC AUTO-ENTMCNC: 32.9 G/DL (ref 31.5–35.7)
MCV RBC AUTO: 86.4 FL (ref 79–97)
MONOCYTES # BLD AUTO: 0.82 10*3/MM3 (ref 0.1–0.9)
MONOCYTES NFR BLD AUTO: 5.3 % (ref 5–12)
NEUTROPHILS NFR BLD AUTO: 12.87 10*3/MM3 (ref 1.7–7)
NEUTROPHILS NFR BLD AUTO: 83.6 % (ref 42.7–76)
NRBC BLD AUTO-RTO: 0 /100 WBC (ref 0–0.2)
PLATELET # BLD AUTO: 446 10*3/MM3 (ref 140–450)
PMV BLD AUTO: 9.8 FL (ref 6–12)
POTASSIUM SERPL-SCNC: 3.7 MMOL/L (ref 3.5–5.2)
PROT SERPL-MCNC: 8 G/DL (ref 6–8.5)
RBC # BLD AUTO: 3.9 10*6/MM3 (ref 3.77–5.28)
SODIUM SERPL-SCNC: 139 MMOL/L (ref 136–145)
WBC NRBC COR # BLD: 15.4 10*3/MM3 (ref 3.4–10.8)

## 2022-05-18 PROCEDURE — 36415 COLL VENOUS BLD VENIPUNCTURE: CPT | Performed by: FAMILY MEDICINE

## 2022-05-18 PROCEDURE — 80053 COMPREHEN METABOLIC PANEL: CPT | Performed by: FAMILY MEDICINE

## 2022-05-18 PROCEDURE — 85025 COMPLETE CBC W/AUTO DIFF WBC: CPT | Performed by: STUDENT IN AN ORGANIZED HEALTH CARE EDUCATION/TRAINING PROGRAM

## 2022-05-18 PROCEDURE — 97530 THERAPEUTIC ACTIVITIES: CPT

## 2022-05-18 PROCEDURE — 63710000001 PREDNISONE PER 1 MG: Performed by: INTERNAL MEDICINE

## 2022-05-18 PROCEDURE — G0378 HOSPITAL OBSERVATION PER HR: HCPCS

## 2022-05-18 PROCEDURE — 97110 THERAPEUTIC EXERCISES: CPT

## 2022-05-18 RX ORDER — PREDNISONE 20 MG/1
40 TABLET ORAL
Qty: 28 TABLET | Refills: 0 | Status: SHIPPED | OUTPATIENT
Start: 2022-05-19 | End: 2022-06-02

## 2022-05-18 RX ORDER — BUMETANIDE 2 MG/1
2 TABLET ORAL 2 TIMES DAILY
Qty: 60 TABLET | Refills: 0 | Status: ON HOLD | OUTPATIENT
Start: 2022-05-18 | End: 2022-06-14 | Stop reason: SDUPTHER

## 2022-05-18 RX ORDER — ATORVASTATIN CALCIUM 80 MG/1
80 TABLET, FILM COATED ORAL NIGHTLY
Qty: 90 TABLET | Refills: 0 | Status: SHIPPED | OUTPATIENT
Start: 2022-05-18 | End: 2022-05-25

## 2022-05-18 RX ADMIN — BUMETANIDE 2 MG: 1 TABLET ORAL at 08:09

## 2022-05-18 RX ADMIN — TOPIRAMATE 50 MG: 50 TABLET, FILM COATED ORAL at 08:08

## 2022-05-18 RX ADMIN — LEVOTHYROXINE SODIUM 150 MCG: 150 TABLET ORAL at 05:39

## 2022-05-18 RX ADMIN — Medication 10 ML: at 08:10

## 2022-05-18 RX ADMIN — ASPIRIN 81 MG: 81 TABLET, FILM COATED ORAL at 08:09

## 2022-05-18 RX ADMIN — PREDNISONE 40 MG: 20 TABLET ORAL at 08:08

## 2022-05-18 RX ADMIN — Medication 1 TABLET: at 08:09

## 2022-05-18 RX ADMIN — VENLAFAXINE HYDROCHLORIDE 37.5 MG: 37.5 CAPSULE, EXTENDED RELEASE ORAL at 08:09

## 2022-05-18 NOTE — DISCHARGE PLACEMENT REQUEST
"Adilene Cat (62 y.o. Female)             Date of Birth   1960    Social Security Number       [place]   [address] [place] [address]    Home Phone   [phone]    MRN   [id number]       Adventism   Hinduism    Marital Status                               Admission Date   22    Admission Type   Emergency    Admitting Provider   Sonu Ledezma MD    Attending Provider   Sonu Ledezma MD    Department, Room/Bed   [address], [facility]/1       Discharge Date       Discharge Disposition       Discharge Destination                               Attending Provider: Sonu Ledezma MD    Allergies: Azithromycin, Ceclor [Cefaclor], Keflex [Cephalexin], Lipitor [Atorvastatin], Penicillins, Zocor [Simvastatin]    Isolation: None   Infection: None   Code Status: CPR   Advance Care Planning Activity    Ht: 166.4 cm (65.5\")   Wt: 95.5 kg (210 lb 9.6 oz)    Admission Cmt: None   Principal Problem: None                Active Insurance as of 2022     Primary Coverage     Payor Plan Insurance Group Employer/Plan Group    [facility] [employer] [id number]     Payor Plan Address Payor Plan Phone Number Payor Plan Fax Number Effective Dates    [address] [phone]  2022 - None Entered    [facility] [id number]       Subscriber Name Subscriber Birth Date Member ID       ADILENE CAT 1960 [id number]                 Emergency Contacts      (Rel.) Home Phone Work Phone Mobile Phone    Ted Cat (Spouse) [phone] -- [phone]        [address] [address]  Phone:  [phone]  Fax:  [phone] Date: May 13, 2022      Ambulatory Referral to Physical Therapy Evaluate and treat     Patient:  Adilene Cat MRN:  [id number]    [place] [address] :  1960  SSN:    Phone: " [phone] Sex:  F      INSURANCE PAYOR PLAN GROUP # SUBSCRIBER ID   Primary:    CHELSEY OAKES [id number]      Referring Provider Information:  LOU LIMA Phone: [phone] Fax: [phone]       Referral Information:   # Visits:  1 Referral Type: Physical Therapy [AE1]   Urgency:  Routine Referral Reason: Specialty Services Required   Start Date: May 13, 2022 End Date:  To be determined by Insurer   Diagnosis: Impaired mobility and ADLs (Z74.09,Z78.9 [ICD-10-CM] V49.89 [ICD-9-CM])  Impaired functional mobility, balance, gait, and endurance (Z74.09 [ICD-10-CM] V49.89 [ICD-9-CM])      Refer to Dept:   Refer to Provider:   Refer to Provider Phone:   Refer to Facility:       Specialty needed: Evaluate and treat     This document serves as a request of services and does not constitute Insurance authorization or approval of services.  To determine eligibility, please contact the members Insurance carrier to verify and review coverage.     If you have medical questions regarding this request for services. Please contact [address] at [phone] during normal business hours.        Authorizing Provider:Lou Lima MD  Authorizing Provider's NPI: [id number]  Order Entered By: Lou Lima MD 5/13/2022  2:38 PM     Electronically signed by: Lou Lima MD 5/13/2022  2:38 PM         Insurance Information                [employer]AK[employer] EMPL[employer]EE Phone: [phone]    Subscriber: Adilene Morgan Subscriber#: [id number]    Group#: [id number] Precert#: --

## 2022-05-18 NOTE — SIGNIFICANT NOTE
05/18/22 1110   OTHER   Discipline physical therapy assistant   Rehab Time/Intention   Session Not Performed patient/family declined treatment

## 2022-05-18 NOTE — PROGRESS NOTES
"[facility]  [address] [place] [address]. [id number]  T - [phone]  F - [phone]     Progress Note          PATIENT  DEMOGRAPHICS   PATIENT NAME: Adilene Morgan                      PHYSICIAN: Alexa Rios MD  : 1960  MRN: [id number]   LOS: 1 day    Patient Care Team:  Jen Raymundo MD as PCP - General  [facility] as Physician Assistant (Family Medicine)  Subjective   SUBJECTIVE   Good UO upto 4.9 L.   Leg edema has improved         Objective   OBJECTIVE   Vital Signs  Temp:  [96.1 °F (35.6 °C)-97.7 °F (36.5 °C)] 96.1 °F (35.6 °C)  Heart Rate:  [61-87] 64  Resp:  [18-20] 18  BP: (100-136)/(49-74) 123/60    Flowsheet Rows    Flowsheet Row First Filed Value   Admission Height 167.6 cm (66\") Documented at 2022 1623   Admission Weight 97.5 kg (215 lb) Documented at 2022 1623           I/O last 3 completed shifts:  In: 1320 [P.O.:1320]  Out: 4900 [Urine:4900]    PHYSICAL EXAM    Physical Exam  Constitutional:       Appearance: She is well-developed.   HENT:      Head: Normocephalic.   Eyes:      Pupils: Pupils are equal, round, and reactive to light.   Cardiovascular:      Rate and Rhythm: Normal rate and regular rhythm.      Heart sounds: Normal heart sounds.   Pulmonary:      Effort: Pulmonary effort is normal.      Breath sounds: Normal breath sounds.   Abdominal:      General: Bowel sounds are normal.      Palpations: Abdomen is soft.   Musculoskeletal:         General: No swelling.   Skin:     Coloration: Skin is not jaundiced.   Neurological:      General: No focal deficit present.      Mental Status: She is alert and oriented to person, place, and time.         RESULTS   Results Review:    Results from last 7 days   Lab Units 22  0720 22  0609 22  0515   SODIUM mmol/L 139 141 140   POTASSIUM mmol/L 3.7 3.5 4.5   CHLORIDE mmol/L 98 99 96*   CO2 mmol/L 29.0 27.0 27.0   BUN mg/dL 52* 47* 41*   CREATININE mg/dL 2.15* 2.29* " 2.42*   CALCIUM mg/dL 9.6 9.1 9.4   BILIRUBIN mg/dL 0.2 0.2 0.3   ALK PHOS U/L 134* 120* 124*   ALT (SGPT) U/L 20 18 21   AST (SGOT) U/L 18 15 19   GLUCOSE mg/dL 78 103* 133*       Estimated Creatinine Clearance: 30.9 mL/min (A) (by C-G formula based on SCr of 2.15 mg/dL (H)).          Results from last 7 days   Lab Units 05/13/22  0548   URIC ACID mg/dL 7.0*       Results from last 7 days   Lab Units 05/18/22  0720 05/17/22  0609 05/16/22  0557 05/15/22  0613 05/14/22  0604   WBC 10*3/mm3 15.40* 11.56* 9.86 9.38 7.82   HEMOGLOBIN g/dL 11.1* 9.6* 9.7* 9.8* 8.1*   PLATELETS 10*3/mm3 446 373 325 358 262               Imaging Results (Last 24 Hours)     ** No results found for the last 24 hours. **           MEDICATIONS    aspirin, 81 mg, Oral, Daily  atorvastatin, 80 mg, Oral, Nightly  bumetanide, 2 mg, Oral, BID  levothyroxine, 150 mcg, Oral, Q AM  multivitamin with minerals, 1 tablet, Oral, Daily  predniSONE, 40 mg, Oral, Daily With Breakfast  rivaroxaban, 20 mg, Oral, Daily With Dinner  sodium chloride, 10 mL, Intravenous, Q12H  topiramate, 50 mg, Oral, Nightly  venlafaxine XR, 37.5 mg, Oral, Daily           Assessment & Plan   ASSESSMENT / PLAN      Acquired hypothyroidism    Adenosquamous carcinoma of right lung (HCC)    TODD (acute kidney injury) (HCC)    Transient ischemic attack (TIA)    1.  TODD on CKD 3- Baseline creatinine around 1.4, creatinine was up to 2.93 on admission and has improved to 1.9-2.1 Good UO and has good weight loss from 228 to 210 lbs.     Creatinine was at baseline as recently as May 4. She did have Lasix / gastroenteritis symptoms. She tolerated Bactrim exposure in April with documented stable labs following that.  Her CK was mildly elevated on admission and now better. Doubt pre renal (fena consistent with atn and no improvement with ivf), doubt any rhabdo (ck is <1000). More so intrinsic injury / AIN      U/s no hydro. Liang stain is positive.      -It appears that clinically TODD is  related to entrectinib which can be associated with increased serum creatinine. I have discussed the case with patient Primary Oncologist Dr Morales and we have decided to stop Entrectinib. Patient has also discussed with her Oncologist and she is now off of it from 5/16. Plan to check renal function on Friday and then have follow up with Dr Morales.     Keep prednisone for AIN. off lasix drip and now on bumex . Good response with oral bumex. F/u in office in 2 week s    2.  Metabolic acidosis- corrected     3.  Metastatic lung cancer- on immunotherapy, follows in [place]     4.  History of DVT     6.  Hypotension- resolved     7.  Right lower extremity weakness- manage per primary team, also had neurology evaluation                   This document has been electronically signed by Alexa Rios MD on May 18, 2022 10:43 CDT

## 2022-05-18 NOTE — PLAN OF CARE
Goal Outcome Evaluation:  Plan of Care Reviewed With: patient        Progress: improving       Pt denies having nausea or pain. She has been steady on her feet. Vital signs stable. She is being discharged today.

## 2022-05-18 NOTE — OUTREACH NOTE
Prep Survey    Flowsheet Row Responses   [facility] patient discharged from? [facility]   Is LACE score < 7 ? No   Emergency Room discharge w/ pulse ox? No   Eligibility [facility]   Date of Admission 05/09/22   Date of Discharge 05/18/22   Discharge Disposition Home or Self Care   Discharge diagnosis Interstitial nephritis secondary to cancer medication   Does the patient have one of the following disease processes/diagnoses(primary or secondary)? Other   Does the patient have Home health ordered? No   Is there a DME ordered? No   Prep survey completed? Yes          AMIRA BERGER - Registered Nurse

## 2022-05-18 NOTE — DISCHARGE SUMMARY
[facility] Medicine Services  DISCHARGE SUMMARY       Date of Admission: 5/9/2022  Date of Discharge:  5/18/2022  Primary Care Physician: Jen Raymundo MD    Presenting Problem/History of Present Illness:  Blurred vision [H53.8]  Right leg weakness [R29.898]  TODD (acute kidney injury) (HCC) [N17.9]  Impaired mobility and ADLs [Z74.09, Z78.9]       Final Discharge Diagnoses:  Active Hospital Problems    Diagnosis    • TODD (acute kidney injury) (HCC)    • Transient ischemic attack (TIA)    • Adenosquamous carcinoma of right lung (HCC)    • Acquired hypothyroidism        Consults:   Consults     Date and Time Order Name Status Description    5/12/2022  9:15 AM Inpatient Nephrology Consult Completed     5/9/2022 11:12 PM Inpatient Neurology Consult Stroke Completed     5/9/2022  7:21 PM Hospitalist (on-call MD unless specified)      5/9/2022  7:21 PM Inpatient Neurology Consult Stroke Completed           Procedures Performed:                 Pertinent Test Results:   Lab Results (most recent)     Procedure Component Value Units Date/Time    Comprehensive Metabolic Panel [[id number]]  (Abnormal) Collected: 05/18/22 0720    Specimen: Blood Updated: 05/18/22 0808     Glucose 78 mg/dL      BUN 52 mg/dL      Creatinine 2.15 mg/dL      Sodium 139 mmol/L      Potassium 3.7 mmol/L      Comment: Slight hemolysis detected by analyzer. Results may be affected.        Chloride 98 mmol/L      CO2 29.0 mmol/L      Calcium 9.6 mg/dL      Total Protein 8.0 g/dL      Albumin 4.30 g/dL      ALT (SGPT) 20 U/L      AST (SGOT) 18 U/L      Alkaline Phosphatase 134 U/L      Total Bilirubin 0.2 mg/dL      Globulin 3.7 gm/dL      A/G Ratio 1.2 g/dL      BUN/Creatinine Ratio 24.2     Anion Gap 12.0 mmol/L      eGFR 25.3 mL/min/1.73      Comment: National Kidney Foundation and American Society of Nephrology (ASN) Task Force recommended calculation based on the Chronic Kidney Disease Epidemiology  Collaboration (CKD-EPI) equation refit without adjustment for race.       Narrative:      GFR Normal >60  Chronic Kidney Disease <60  Kidney Failure <15      CBC & Differential [[id number]]  (Abnormal) Collected: 05/18/22 0720    Specimen: Blood Updated: 05/18/22 0751    Narrative:      The following orders were created for panel order CBC & Differential.  Procedure                               Abnormality         Status                     ---------                               -----------         ------                     CBC Auto Differential[[id number]]        Abnormal            Final result                 Please view results for these tests on the individual orders.    CBC Auto Differential [[id number]]  (Abnormal) Collected: 05/18/22 0720    Specimen: Blood Updated: 05/18/22 0751     WBC 15.40 10*3/mm3      RBC 3.90 10*6/mm3      Hemoglobin 11.1 g/dL      Hematocrit 33.7 %      MCV 86.4 fL      MCH 28.5 pg      MCHC 32.9 g/dL      RDW 15.8 %      RDW-SD 48.7 fl      MPV 9.8 fL      Platelets 446 10*3/mm3      Neutrophil % 83.6 %      Lymphocyte % 8.5 %      Monocyte % 5.3 %      Eosinophil % 0.5 %      Basophil % 0.3 %      Immature Grans % 1.8 %      Neutrophils, Absolute 12.87 10*3/mm3      Lymphocytes, Absolute 1.31 10*3/mm3      Monocytes, Absolute 0.82 10*3/mm3      Eosinophils, Absolute 0.08 10*3/mm3      Basophils, Absolute 0.05 10*3/mm3      Immature Grans, Absolute 0.27 10*3/mm3      nRBC 0.0 /100 WBC     Urine Culture - Urine, Urine, Random Void [[id number]] Collected: 05/16/22 0936    Specimen: Urine, Random Void Updated: 05/17/22 1030     Urine Culture 25,000 CFU/mL Mixed Anjelica Isolated    Narrative:      Colonization of the urinary tract without infection is common. Treatment is discouraged unless the patient is symptomatic, pregnant, or undergoing an invasive urologic procedure.  Specimen contains mixed organisms of questionable pathogenicity suggestive of contamination. If symptoms persist,  suggest recollection.    Comprehensive Metabolic Panel [[id number]]  (Abnormal) Collected: 05/17/22 0609    Specimen: Blood Updated: 05/17/22 0700     Glucose 103 mg/dL      BUN 47 mg/dL      Creatinine 2.29 mg/dL      Sodium 141 mmol/L      Potassium 3.5 mmol/L      Chloride 99 mmol/L      CO2 27.0 mmol/L      Calcium 9.1 mg/dL      Total Protein 7.1 g/dL      Albumin 4.00 g/dL      ALT (SGPT) 18 U/L      AST (SGOT) 15 U/L      Alkaline Phosphatase 120 U/L      Total Bilirubin 0.2 mg/dL      Globulin 3.1 gm/dL      A/G Ratio 1.3 g/dL      BUN/Creatinine Ratio 20.5     Anion Gap 15.0 mmol/L      eGFR 23.5 mL/min/1.73      Comment: National Kidney Foundation and American Society of Nephrology (ASN) Task Force recommended calculation based on the Chronic Kidney Disease Epidemiology Collaboration (CKD-EPI) equation refit without adjustment for race.       Narrative:      GFR Normal >60  Chronic Kidney Disease <60  Kidney Failure <15      CBC & Differential [[id number]]  (Abnormal) Collected: 05/17/22 0609    Specimen: Blood Updated: 05/17/22 0631    Narrative:      The following orders were created for panel order CBC & Differential.  Procedure                               Abnormality         Status                     ---------                               -----------         ------                     CBC Auto Differential[[id number]]        Abnormal            Final result                 Please view results for these tests on the individual orders.    CBC Auto Differential [[id number]]  (Abnormal) Collected: 05/17/22 0609    Specimen: Blood Updated: 05/17/22 0631     WBC 11.56 10*3/mm3      RBC 3.35 10*6/mm3      Hemoglobin 9.6 g/dL      Hematocrit 29.3 %      MCV 87.5 fL      MCH 28.7 pg      MCHC 32.8 g/dL      RDW 15.8 %      RDW-SD 50.4 fl      MPV 9.8 fL      Platelets 373 10*3/mm3      Neutrophil % 83.0 %      Lymphocyte % 8.4 %      Monocyte % 7.0 %      Eosinophil % 0.7 %      Basophil % 0.2 %      Immature  Grans % 0.7 %      Neutrophils, Absolute 9.60 10*3/mm3      Lymphocytes, Absolute 0.97 10*3/mm3      Monocytes, Absolute 0.81 10*3/mm3      Eosinophils, Absolute 0.08 10*3/mm3      Basophils, Absolute 0.02 10*3/mm3      Immature Grans, Absolute 0.08 10*3/mm3      nRBC 0.0 /100 WBC     Urinalysis, Microscopic Only - Urine, Random Void [[id number]]  (Abnormal) Collected: 05/16/22 0936    Specimen: Urine, Random Void Updated: 05/16/22 1007     RBC, UA 6-12 /HPF      WBC, UA Too Numerous to Count /HPF      Bacteria, UA None Seen /HPF      Squamous Epithelial Cells, UA 0-2 /HPF      Hyaline Casts, UA None Seen /LPF      Methodology Automated Microscopy    Urinalysis With Culture If Indicated - Urine, Random Void [[id number]]  (Abnormal) Collected: 05/16/22 0936    Specimen: Urine, Random Void Updated: 05/16/22 1007     Color, UA Yellow     Appearance, UA Turbid     pH, UA 7.0     Specific Gravity, UA 1.006     Glucose, UA Negative     Ketones, UA Negative     Bilirubin, UA Negative     Blood, UA Moderate (2+)     Protein, UA 30 mg/dL (1+)     Leuk Esterase, UA Large (3+)     Nitrite, UA Negative     Urobilinogen, UA 0.2 E.U./dL    Narrative:      In absence of clinical symptoms, the presence of pyuria, bacteria, and/or nitrites on the urinalysis result does not correlate with infection.    Protein / Creatinine Ratio, Urine - Urine, Clean Catch [[id number]]  (Abnormal) Collected: 05/14/22 1330    Specimen: Urine, Clean Catch Updated: 05/14/22 2220     Protein/Creatinine Ratio, Urine 846.7 mg/G Crea      Creatinine, Urine 15.0 mg/dL      Total Protein, Urine 12.7 mg/dL     Urinalysis With Microscopic - Urine, Clean Catch [[id number]]  (Abnormal) Collected: 05/14/22 1330    Specimen: Urine, Clean Catch Updated: 05/14/22 1359    Narrative:      The following orders were created for panel order Urinalysis With Microscopic - Urine, Clean Catch.  Procedure                               Abnormality         Status                      ---------                               -----------         ------                     Urinalysis without micro...[[id number]]  Abnormal            Final result               Urinalysis, Microscopic ...[[id number]]  Abnormal            Final result                 Please view results for these tests on the individual orders.    Urinalysis, Microscopic Only - Urine, Clean Catch [[id number]]  (Abnormal) Collected: 05/14/22 1330    Specimen: Urine, Clean Catch Updated: 05/14/22 1359     RBC, UA 3-5 /HPF      WBC, UA Too Numerous to Count /HPF      Bacteria, UA 4+ /HPF      Squamous Epithelial Cells, UA 0-2 /HPF      Hyaline Casts, UA None Seen /LPF      Methodology Automated Microscopy    Urinalysis without microscopic (no culture) - Urine, Clean Catch [[id number]]  (Abnormal) Collected: 05/14/22 1330    Specimen: Urine, Clean Catch Updated: 05/14/22 1357     Color, UA Yellow     Appearance, UA Turbid     pH, UA 5.5     Specific Gravity, UA 1.006     Glucose, UA Negative     Ketones, UA Negative     Bilirubin, UA Negative     Blood, UA Moderate (2+)     Protein, UA Negative     Leuk Esterase, UA Large (3+)     Nitrite, UA Negative     Urobilinogen, UA 0.2 E.U./dL    CK [[id number]]  (Normal) Collected: 05/13/22 0548    Specimen: Blood Updated: 05/13/22 0638     Creatine Kinase 130 U/L     Uric Acid [[id number]]  (Abnormal) Collected: 05/13/22 0548    Specimen: Blood Updated: 05/13/22 0638     Uric Acid 7.0 mg/dL     Urine Eosinophils, Liang's Stain - Urine, Clean Catch [[id number]]  (Abnormal) Collected: 05/12/22 1318    Specimen: Urine, Clean Catch Updated: 05/12/22 2139     Liang Stain Positive     % EOS Liang Stain 2 %     Creatinine, Urine, Random - Urine, Clean Catch [[id number]] Collected: 05/12/22 1318    Specimen: Urine, Clean Catch Updated: 05/12/22 1904     Creatinine, Urine 29.8 mg/dL     Narrative:      Reference intervals for random urine have not been established.  Clinical usage is dependent upon  physician's interpretation in combination with other laboratory tests.       Hemoglobin & Hematocrit, Blood [[id number]]  (Abnormal) Collected: 05/12/22 1407    Specimen: Blood Updated: 05/12/22 1423     Hemoglobin 7.3 g/dL      Hematocrit 23.0 %     Sodium, Urine, Random - Urine, Clean Catch [[id number]] Collected: 05/12/22 1318    Specimen: Urine, Clean Catch Updated: 05/12/22 1343     Sodium, Urine 69 mmol/L     Narrative:      Reference intervals for random urine have not been established.  Clinical usage is dependent upon physician's interpretation in combination with other laboratory tests.       POC Glucose Once [[id number]]  (Normal) Collected: 05/11/22 2015    Specimen: Blood Updated: 05/11/22 2131     Glucose 119 mg/dL      Comment: RN NotifiedOperator: [id number] ZAC Parada ID: [id number]       Extra Tubes [[id number]] Collected: 05/11/22 0613    Specimen: Blood Updated: 05/11/22 0718    Narrative:      The following orders were created for panel order Extra Tubes.  Procedure                               Abnormality         Status                     ---------                               -----------         ------                     Lavender Top[[id number]]                                     Final result                 Please view results for these tests on the individual orders.    Lavender Top [[id number]] Collected: 05/11/22 0613    Specimen: Blood Updated: 05/11/22 0718     Extra Tube hold for add-on     Comment: Auto resulted       POC Glucose Once [[id number]]  (Normal) Collected: 05/10/22 1912    Specimen: Blood Updated: 05/11/22 0116     Glucose 128 mg/dL      Comment: RN NotifiedOperator[id number] [facility] [id number]       Hemoglobin A1c [[id number]]  (Normal) Collected: 05/10/22 0709    Specimen: Blood Updated: 05/10/22 0920     Hemoglobin A1C 4.80 %     Narrative:      Hemoglobin A1C Ranges:    Increased Risk for Diabetes  5.7% to 6.4%  Diabetes                     >=  6.5%  Diabetic Goal                < 7.0%    Ferritin [[id number]]  (Normal) Collected: 05/10/22 0709    Specimen: Blood Updated: 05/10/22 0756     Ferritin 61.20 ng/mL     Narrative:      Results may be falsely decreased if patient taking Biotin.      Iron Profile [[id number]]  (Abnormal) Collected: 05/10/22 0709    Specimen: Blood Updated: 05/10/22 0751     Iron 20 mcg/dL      Iron Saturation 6 %      Transferrin 220 mg/dL      TIBC 328 mcg/dL     Lipid Panel [[id number]]  (Abnormal) Collected: 05/10/22 0709    Specimen: Blood Updated: 05/10/22 0751     Total Cholesterol 170 mg/dL      Triglycerides 131 mg/dL      HDL Cholesterol 61 mg/dL      LDL Cholesterol  86 mg/dL      VLDL Cholesterol 23 mg/dL      LDL/HDL Ratio 1.36    Narrative:      Cholesterol Reference Ranges  (U.S. Department of Health and Human Services ATP III Classifications)    Desirable          <200 mg/dL  Borderline High    200-239 mg/dL  High Risk          >240 mg/dL      Triglyceride Reference Ranges  (U.S. Department of Health and Human Services ATP III Classifications)    Normal           <150 mg/dL  Borderline High  150-199 mg/dL  High             200-499 mg/dL  Very High        >500 mg/dL    HDL Reference Ranges  (U.S. Department of Health and Human Services ATP III Classifications)    Low     <40 mg/dl (major risk factor for CHD)  High    >60 mg/dl ('negative' risk factor for CHD)        LDL Reference Ranges  (U.S. Department of Health and Human Services ATP III Classifications)    Optimal          <100 mg/dL  Near Optimal     100-129 mg/dL  Borderline High  130-159 mg/dL  High             160-189 mg/dL  Very High        >189 mg/dL    COVID-19 and FLU A/B PCR - Swab, Nasopharynx [[id number]]  (Normal) Collected: 05/09/22 1948    Specimen: Swab from Nasopharynx Updated: 05/09/22 2110     COVID19 Not Detected     Influenza A PCR Not Detected     Influenza B PCR Not Detected    Narrative:      Fact sheet for providers:  https://www.fda.gov/media/018350/download    Fact sheet for patients: https://www.fda.gov/media/785724/download    Test performed by PCR.    Lipase [[id number]]  (Normal) Collected: 05/09/22 1709    Specimen: Blood Updated: 05/09/22 1833     Lipase 24 U/L     CK [[id number]]  (Abnormal) Collected: 05/09/22 1709    Specimen: Blood Updated: 05/09/22 1833     Creatine Kinase 695 U/L     Magnesium [[id number]]  (Abnormal) Collected: 05/09/22 1709    Specimen: Blood Updated: 05/09/22 1833     Magnesium 2.5 mg/dL     Troponin [[id number]]  (Normal) Collected: 05/09/22 1709    Specimen: Blood Updated: 05/09/22 1831     Troponin T <0.010 ng/mL     Narrative:      Troponin T Reference Range:  <= 0.03 ng/mL-   Negative for AMI  >0.03 ng/mL-     Abnormal for myocardial necrosis.  Clinicians would have to utilize clinical acumen, EKG, Troponin and serial changes to determine if it is an Acute Myocardial Infarction or myocardial injury due to an underlying chronic condition.       Results may be falsely decreased if patient taking Biotin.      BNP [[id number]]  (Abnormal) Collected: 05/09/22 1709    Specimen: Blood Updated: 05/09/22 1831     proBNP 1,852.0 pg/mL     Narrative:      Among patients with dyspnea, NT-proBNP is highly sensitive for the detection of acute congestive heart failure. In addition NT-proBNP of <300 pg/ml effectively rules out acute congestive heart failure with 99% negative predictive value.    Results may be falsely decreased if patient taking Biotin.      Extra Tubes [[id number]] Collected: 05/09/22 1710    Specimen: Blood, Venous Line Updated: 05/09/22 1817    Narrative:      The following orders were created for panel order Extra Tubes.  Procedure                               Abnormality         Status                     ---------                               -----------         ------                     Lavender Top[[id number]]                                     Final result                 Please view  results for these tests on the individual orders.    Lavender Top [[id number]] Collected: 05/09/22 1710    Specimen: Blood Updated: 05/09/22 1817     Extra Tube hold for add-on     Comment: Auto resulted       [place] Draw [[id number]] Collected: 05/09/22 1709    Specimen: Blood Updated: 05/09/22 1817    Narrative:      The following orders were created for panel order [place] Draw.  Procedure                               Abnormality         Status                     ---------                               -----------         ------                     Green Top (Gel)[[id number]]                                  Final result               Lavender Top[[id number]]                                     Final result               Gold Top - SST[[id number]]                                   Final result               Light Blue Top[[id number]]                                   In process                   Please view results for these tests on the individual orders.    Green Top (Gel) [[id number]] Collected: 05/09/22 1709    Specimen: Blood Updated: 05/09/22 1817     Extra Tube Hold for add-ons.     Comment: Auto resulted.       Gold Top - SST [[id number]] Collected: 05/09/22 1709    Specimen: Blood Updated: 05/09/22 1817     Extra Tube Hold for add-ons.     Comment: Auto resulted.       Protime-INR [[id number]]  (Abnormal) Collected: 05/09/22 1709    Specimen: Blood Updated: 05/09/22 1733     Protime 21.6 Seconds      INR 1.90    Narrative:      Therapeutic range for most indications is 2.0-3.0 INR,  or 2.5-3.5 for mechanical heart valves.    Light Blue Top [[id number]] Collected: 05/09/22 1709    Specimen: Blood Updated: 05/09/22 1709        Imaging Results (Most Recent)     Procedure Component Value Units Date/Time    XR Chest PA & Lateral [[id number]] Collected: 05/13/22 1532     Updated: 05/13/22 1632    Narrative:      TWO VIEW CHEST    HISTORY: Shortness of breath. Acute kidney failure.    Frontal and lateral films of the  chest were obtained.    COMPARISON: May 9, 2022    FINDINGS:    EKG leads.  Minimal cardiomegaly.  The pulmonary vasculature is not increased.  Very small bilateral pleural effusions.  Linear atelectasis at the lung bases.  No pneumothorax.  No acute osseous abnormality.  Degenerative changes are present in the thoracic and lumbar  spine.  Old minimal to moderate compression deformity mid thoracic spine.  Cholecystectomy.      Impression:      CONCLUSION:  Minimal cardiomegaly.  Very small bilateral pleural effusions.  Linear atelectasis at the lung bases.    [id number]    Electronically signed by:  Talon Tuttle MD  5/13/2022 4:30 PM CDT  Workstation: [id number]    US Renal Bilateral [[id number]] Collected: 05/12/22 1158     Updated: 05/12/22 1626    Narrative:      Ultrasound renal complete    HISTORY:  Chronic kidney disease stage IIIa. Acute kidney  failure.    Ultrasound examination of the kidneys and urinary bladder was  performed.    COMPARISON: None.     FINDINGS:  The right kidney measures 10.30 cm in length by 5.56 cm x 4.78 cm  transverse.  The left kidney measures 11.12 cm in length by 5.51 cm x 5.16 cm  transverse.  The kidneys are of normal echotexture.  No hydronephrosis.  No solid or cystic renal mass.    Minimal dependent debris in the bladder.   Bilateral ureteral jets.  Bladder volume 251.50 mL.      Impression:      CONCLUSION:  Normal ultrasound kidneys.  Minimal dependent debris in the bladder.     [id number]    Electronically signed by:  Talon Tuttle MD  5/12/2022 4:24 PM CDT  Workstation: [id number]    US Venous Doppler Lower Extremity Bilateral (duplex) [[id number]] Collected: 05/11/22 1126     Updated: 05/11/22 1216    Narrative:      BILATERAL LOWER EXTREMITY VENOUS IMAGING    CLINICAL HISTORY:  62 years Female,rule out DVT, N17.9 Acute  kidney failure, unspecified H53.8 Other visual disturbances  R29.898 Other symptoms and signs involving the musculoskeletal  system Z74.09 Other reduced mobility Z78.9  Other specified health  status Z74.09 Other reduced mobility    DESCRIPTION:     The right common femoral vein, profunda femoral vein, femoral  vein and popliteal vein have normal venous flow on color and  duplex Doppler. The veins augment well, compress, and appear to  be patent with no filling defects visible on grayscale or color  Doppler imaging. The infrapopliteal veins also appear patent with  no evidence of thrombus.      The left common femoral vein, profunda femoral vein, femoral  vein, and popliteal vein have normal venous flow on color and  duplex Doppler. The veins augment well, compress, and appear to  be patent with no filling defects visible on grayscale or color  Doppler imaging. The infrapopliteal veins also appear patent with  no evidence of thrombus.     The left popliteal fossa is a 3.6 x 1.4 x 2.7 cm Baker's cyst.       Impression:      Impression:  1. No evidence for acute deep venous thrombus in the either lower  extremity.  2. Baker's cyst in the left popliteal fossa.    Electronically signed by:  Ajith Rodriguez MD  5/11/2022 12:14 PM  CDT Workstation: [id number]    US Carotid Bilateral [[id number]] Collected: 05/10/22 1018     Updated: 05/10/22 1100    Narrative:        ULTRASOUND CAROTID DUPLEX SCAN    HISTORY: Right lower extremity weakness. Other visualized  disturbances. Presyncope.    COMPARISON: None.    Duplex ultrasound of the carotid bifurcations was performed.    FINDINGS:  Real time and color flow images demonstrate bilateral minimal  plaque formation.  The peak systolic velocity in the right internal carotid artery  is 120.0 cm/s.  End-diastolic velocity 41.2 cm/s.  Ratio peak systolic velocity right internal carotid artery to  right common carotid 1.6.  Peak systolic velocity right external carotid 149.4 cm/s.  The peak systolic velocity in left internal carotid artery is  minimally elevated at 132.3 cm/s.  End-diastolic velocity minimally increased to 41.8 cm/s.  Normal ratio  peak systolic velocity left internal carotid artery  to left common carotid artery of 1.4.  Peak systolic velocity left external carotid artery 88.7 cm/s.  Antegrade flow is present in each vertebral artery.      Impression:      CONCLUSION:  Less than 50% diameter reduction stenosis right internal carotid  artery.  Minimally elevated peak systolic and end diastolic velocities  proximal left internal carotid artery with normal ratio and  visually no significant stenosis. Suspect less than 50% diameter  reduction stenosis left internal carotid artery.  Antegrade flow is present in each vertebral artery.    [id number]    Electronically signed by:  Talon Tuttle MD  5/10/2022 10:58 AM  CDT Workstation: [id number]    MRI Brain Without Contrast [[id number]] Collected: 05/10/22 0725     Updated: 05/10/22 1008    Narrative:      Procedure: MRI brain without contrast    Reason for exam: TIA.    FINDINGS: Multisequence multiplanar MR imaging of the brain was  performed without contrast. The diffusion weighted series is  normal. There is no abnormal signal to suggest restricted  diffusion. Cerebral and cerebellar parenchyma are normal.  Ventricular system and subarachnoid spaces are normal.      Impression:      Normal MRI of brain without contrast.    Electronically signed by:  Trevor Joyce MD  5/10/2022 10:05 AM CDT  Workstation: [id number]    XR Chest 1 View [[id number]] Collected: 05/09/22 1711     Updated: 05/09/22 1805    Narrative:        PORTABLE CHEST    HISTORY: Stroke protocol onset greater than 12 hours.    Portable AP film of the chest was obtained at 4:43 PM.  COMPARISON: May 5, 2022    FINDINGS:   Low lung volumes.  Linear atelectasis mid lungs and lung bases.  The heart is not enlarged.  The pulmonary vasculature is not increased.  No pleural effusion.  No pneumothorax.  No acute osseous abnormality.  Degenerative changes are present in the thoracic spine.  Cholecystectomy.      Impression:      CONCLUSION:  Low lung  volumes.  Linear atelectasis mid lungs and lung bases.    [id number]    Electronically signed by:  Talon Tuttle MD  5/9/2022 6:03 PM CDT  Workstation: [id number]    CT Head Without Contrast Stroke Protocol [[id number]] Collected: 05/09/22 1703     Updated: 05/09/22 1732    Narrative:        CT Head Without Contrast    History: Blurred vision left eye. Right leg giving out. Stroke.    Axial scans of the brain were obtained without intravenous  contrast.  Coronal and sagital reconstructions were preformed.    This exam was performed according to our departmental  dose-optimization program, which includes automated exposure  control, adjustment of the mA and/or kV according to patient size  and/or use of iterative reconstruction technique.    DLP: 829.60    Comparison: October 29, 2021    Findings:  Bone windows are unremarkable.  Minimal mucosal thickening ethmoid sinuses.  Small retention cysts sphenoid sinuses.    No acute process.  Minimal cerebral and cerebellar atrophy.  No hemorrhage.  No mass.  No abnormal areas of increased or decreased attenuation.  No midline shift.  No abnormal extra-axial fluid collections.      Impression:      CONCLUSION:  No acute process.  Minimal cerebral and cerebellar atrophy.    [id number]    Electronically signed by:  Talon Tuttle MD  5/9/2022 5:30 PM CDT  Workstation: [id number]          Chief Complaint on Day of Discharge: Weakness which is improved    Hospital Course:  The patient is a 62 y.o. female who presented to [facility] with complaint of weakness with nausea vomiting.  Patient is admitted to the hospital on 5/9/2022 she is discharged home on 5/18/2022 in stable condition.    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  Which resulted in acute kidney injury.  Present on admission  Right lower extremity weakness  Metastatic lung cancer  Acute kidney injury  Metabolic acidosis: Resolved  History of DVT  Hypotension secondary to  dehydration  Anemia    History:  The patient is a 62-year-old female admitted to the hospital via the ER on 5/9/2022 complaining of weakness with nausea vomiting.  Patient complaining blurred vision that began at 11 AM on morning of admission..  At that same time she also developed right leg weakness and fell when she was getting out of the car.  She had nausea and vomiting this past Saturday and Sunday that resolved this morning.  She had also been started on Lasix this past Friday and Saturday, but did not take Lasix on Sunday.  She has a history of a stroke that was diagnosed in September 2021 in [place].  Patient also has a history of lung cancer treated at cancer [facility], and is currently on gene/autoimmune therapy from the cancer centers.  She never smoked but states that she has had a significant history of secondhand smoke as a child.  The patient was admitted to the hospital neurology was consulted.  MRI of the brain was completed.  Showed no acute intercranial process seen.  Neurology saw the patient on 5/10/2022.  Noted that the patient's MRI and CT scan look without any problem.  Ultrasound of bilateral lower extremities were completed which showed no DVT.  Ultrasound carotids showed no acute problems.  Recommended to continue with Xarelto.  Nephrology saw the patient due to declining renal function.  Recommended the patient placed on sodium bicarb drip.  Followed urine studies.  Conclusion during their evaluation that the patient was probably having kidney function problems due to interstitial nephritis from cancer medication.  This medication was held oncologist was made aware.  The patient was very weak and deconditioned.  PT OT saw the patient and the patient has made significant improvement.  The patient is doing well on day of discharge.  Nephrology has seen the patient and recommendations for medication changes are noted.  The patient is currently stable she is awake and alert.  Denies  "any new problems or complaints.  The patient will be discharged home today on medications noted.  Her activity is as tolerated.  Diet is cardiac.  The patient is to follow-up with nephrology within 2 weeks.  Follow-up with her primary care provider within 1 week.    Condition on Discharge: Stable    Physical Exam on Discharge:  /60 (BP Location: Left arm, Patient Position: Lying)   Pulse 64   Temp 96.1 °F (35.6 °C) (Oral)   Resp 18   Ht 166.4 cm (65.5\")   Wt 95.5 kg (210 lb 9.6 oz)   LMP  (LMP Unknown)   SpO2 98%   BMI 34.51 kg/m²   Physical Exam  Vitals and nursing note reviewed.   Constitutional:       Appearance: Normal appearance.   HENT:      Head: Normocephalic and atraumatic.      Right Ear: External ear normal.      Left Ear: External ear normal.      Nose: Nose normal.      Mouth/Throat:      Mouth: Mucous membranes are moist.      Pharynx: Oropharynx is clear.   Eyes:      Extraocular Movements: Extraocular movements intact.      Conjunctiva/sclera: Conjunctivae normal.      Pupils: Pupils are equal, round, and reactive to light.   Cardiovascular:      Rate and Rhythm: Normal rate and regular rhythm.      Pulses: Normal pulses.      Heart sounds: Normal heart sounds.   Pulmonary:      Effort: Pulmonary effort is normal.      Breath sounds: Normal breath sounds.   Abdominal:      General: Bowel sounds are normal.      Palpations: Abdomen is soft.   Musculoskeletal:         General: Normal range of motion.      Cervical back: Normal range of motion and neck supple.   Skin:     General: Skin is warm and dry.   Neurological:      General: No focal deficit present.      Mental Status: She is alert and oriented to person, place, and time.   Psychiatric:         Mood and Affect: Mood normal.         Behavior: Behavior normal.           Discharge Disposition:  Home or Self Care    Discharge Medications:     Discharge Medications      New Medications      Instructions Start Date   atorvastatin 80 MG " tablet  Commonly known as: LIPITOR   80 mg, Oral, Nightly      bumetanide 2 MG tablet  Commonly known as: BUMEX   2 mg, Oral, 2 Times Daily      predniSONE 20 MG tablet  Commonly known as: DELTASONE   40 mg, Oral, Daily With Breakfast   Start Date: May 19, 2022        Continue These Medications      Instructions Start Date   aspirin 81 MG EC tablet   81 mg, Oral, Daily      levothyroxine 150 MCG tablet  Commonly known as: SYNTHROID, LEVOTHROID   150 mcg, Oral, Daily      loratadine 10 MG tablet  Commonly known as: CLARITIN   10 mg, Oral, As Needed      multivitamin with minerals tablet tablet   1 tablet, Oral, Daily      ondansetron 8 MG tablet  Commonly known as: ZOFRAN   8 mg, Oral, Every 8 Hours PRN      prochlorperazine 10 MG tablet  Commonly known as: COMPAZINE   10 mg, Oral, Every 6 Hours PRN      promethazine 25 MG tablet  Commonly known as: PHENERGAN   25 mg, Oral, Every 6 Hours PRN      rivaroxaban 20 MG tablet  Commonly known as: XARELTO   20 mg, Oral, Daily, After finished with lower dose for 21 days       SUMAtriptan 20 MG/ACT nasal spray  Commonly known as: IMITREX   INSTILL 1 SPRAY INTO EACH NOSTRIL EVERY 2 HOURS AS NEEDED FOR MIGRAINE      topiramate 50 MG tablet  Commonly known as: TOPAMAX   50 mg, Oral, Nightly      traMADol 50 MG tablet  Commonly known as: ULTRAM   50 mg, Oral, Every 8 Hours PRN      venlafaxine XR 37.5 MG 24 hr capsule  Commonly known as: EFFEXOR-XR   TAKE 1 CAPSULE BY MOUTH EVERY DAY         Stop These Medications    entrectinib 200 MG capsule  Commonly known as: ROZLYTREK     fluticasone 50 MCG/ACT nasal spray  Commonly known as: FLONASE     furosemide 20 MG tablet  Commonly known as: Lasix     loperamide 2 MG capsule  Commonly known as: IMODIUM     senna 8.6 MG tablet  Commonly known as: SENOKOT     solifenacin 10 MG tablet  Commonly known as: VESICARE            Discharge Diet:   Diet Instructions     Diet: Regular, Cardiac      Discharge Diet:  Regular  Cardiac              Activity at Discharge:   Activity Instructions     Activity as Tolerated            Discharge Care Plan/Instructions: Follow-up with primary care provider within 1 week of discharge.  Continue home medications as noted above.  Follow-up with Dr. Rios within 2 weeks of discharge.  Follow-up with oncology.  Continue home medications as noted above.  Activity as tolerated.  Diet cardiac regular texture.    Follow-up Appointments:   Future Appointments   Date Time Provider Department Center   8/18/2022  2:30 PM Jen Raymundo MD [facility]       Test Results Pending at Discharge:   Pending Labs     Order Current Status    Light Blue Top In process    [place] Draw In process          The patient has current or prior documentation of LVEF less than 40%, or moderate to severely depressed left ventricular systolic function. The patient was prescribed or already taking a beta-blocker.      The patient has current or prior documentation of LVEF less than 40%, or moderate to severely depressed left ventricular systolic function. The patent was prescribed or already taking an ACE inhibitor or ARB.     Sher Avalos DO    Time: Time to complete discharge greater than 30 minutes.

## 2022-05-18 NOTE — THERAPY TREATMENT NOTE
Patient Name: Adilene Morgan  : 1960    MRN: [id number]                              Today's Date: 2022       Admit Date: 2022    Visit Dx:     ICD-10-CM ICD-9-CM   1. TODD (acute kidney injury) (HCC)  N17.9 584.9   2. Blurred vision  H53.8 368.8   3. Right leg weakness  R29.898 729.89   4. Impaired mobility and ADLs  Z74.09 V49.89    Z78.9    5. Impaired functional mobility, balance, gait, and endurance  Z74.09 V49.89     Patient Active Problem List   Diagnosis   • Acquired hypothyroidism   • Depressive disorder   • Migraine   • PVC (premature ventricular contraction)   • Palpitation   • Pain of fifth toe   • Adenosquamous carcinoma of right lung (HCC)   • TODD (acute kidney injury) (HCC)   • Transient ischemic attack (TIA)     Past Medical History:   Diagnosis Date   • Achilles bursitis    • Achilles tendinitis    • Acquired hypothyroidism    • Allergic rhinitis    • Allergy to meat     alphagal   • Allergy to milk products    • Arrhythmia    • Asthmatic bronchitis    • Asthmatic bronchitis    • Bone spur    • Calcaneal spur    • Cancer (HCC)    • Depressive disorder    • Family history of thyroid problem    • Herpes simplex    • Hypermetropia    • Hypothyroidism    • Menopausal flushing    • Menopause    • Migraine    • Otalgia    • Pneumonia    • Presbyopia    • Stroke (HCC)    • Trochanteric bursitis    • UTI (urinary tract infection) 2018   • Ventricular premature beats      Past Surgical History:   Procedure Laterality Date   • COLONOSCOPY N/A 2/15/2017    Procedure: COLONOSCOPY;  Surgeon: Lupillo Ugarte MD;  Location: [facility] ENDOSCOPY;  Service:    • COSMETIC SURGERY      plastic surgery to face x 4 r/t trauma   • LAPAROSCOPIC CHOLECYSTECTOMY  12/10/1995    Cholecystectomy, laparoscopic (1)      • OTHER SURGICAL HISTORY      Head surgery procedure (1)    plastic surgery on the face    • OTHER SURGICAL HISTORY  10/17/2014    Repair Superficial Wound TR-EXT 2.5 < CM 56348 (1)          General Information     Row Name 05/18/22 1447 05/18/22 1428       OT Time and Intention    Document Type therapy note (daily note)  -LM therapy note (daily note)  -LM    Mode of Treatment individual therapy  -LM individual therapy  -LM          User Key  (r) = Recorded By, (t) = Taken By, (c) = Cosigned By    Initials Name Provider Type    LM Judith Lora COTA Occupational Therapist Assistant                 Mobility/ADL's     Row Name 05/18/22 1447 05/18/22 1428       Bed Mobility    Bed Mobility bed mobility (all) activities  -LM bed mobility (all) activities  -LM    All Activities, [place] (Bed Mobility) independent  -LM contact guard  -LM    Rolling Right [place] (Bed Mobility) independent  -LM contact guard  -LM    Supine-Sit [place] (Bed Mobility) independent  -LM contact guard  -LM    Sit-Supine [place] (Bed Mobility) independent  -LM contact guard  -LM    Row Name 05/18/22 1452 05/18/22 1447       Transfers    Bed-Chair [place] (Transfers) -- contact guard  -LM    Stand-Sit [place] (Transfers) independent  -LM --    Row Name 05/18/22 1428          Transfers    Transfers bed-chair transfer;sit-stand transfer;stand-sit transfer  -LM     Bed-Chair [place] (Transfers) contact guard  -LM     Assistive Device (Bed-Chair Transfers) walker, front-wheeled  -LM     Sit-Stand [place] (Transfers) contact guard;minimum assist (75% patient effort)  -LM     Stand-Sit [place] (Transfers) independent  -LM     Row Name 05/18/22 1428          Sit-Stand Transfer    Assistive Device (Sit-Stand Transfers) walker, front-wheeled  -LM     Row Name 05/18/22 1428          Stand-Sit Transfer    Assistive Device (Stand-Sit Transfers) walker, rolling platform  -LM     Row Name 05/18/22 1447          Functional Mobility    Functional Mobility- Ind. Level independent;supervision required  -LM     Functional Mobility-Distance (Feet) 400  tf in hallway withsba  -LM           User Key  (r)  = Recorded By, (t) = Taken By, (c) = Cosigned By    Initials Name Provider Type    Judith Clay COTA Occupational Therapist Assistant               Obj/Interventions     Row Name 05/18/22 1433          Shoulder (Therapeutic Exercise)    Shoulder (Therapeutic Exercise) AROM (active range of motion)  -LM     Shoulder AROM (Therapeutic Exercise) bilateral;flexion;extension;aBduction;aDduction  pt perf ther ex with level 2 tband all planes sitting eob  -     Row Name 05/18/22 1433          Elbow/Forearm (Therapeutic Exercise)    Elbow/Forearm (Therapeutic Exercise) AROM (active range of motion)  -LM     Elbow/Forearm AROM (Therapeutic Exercise) bilateral  -     Row Name 05/18/22 1433          Motor Skills    Coordination WFL  -     Therapeutic Exercise shoulder;elbow/forearm  -     Row Name 05/18/22 1453 05/18/22 1450       Balance    Balance Assessment sitting static balance;sitting dynamic balance;sit to stand dynamic balance;standing static balance;standing dynamic balance  -LM --    Sit to Stand Dynamic Balance -- contact guard  -LM    Static Standing Balance independent  -LM --    Dynamic Standing Balance independent  balance task reaching all planes with dynamic balance tasks  - --    Row Name 05/18/22 1433          Balance    Balance Assessment sitting static balance;sitting dynamic balance;sit to stand dynamic balance;standing static balance;standing dynamic balance  -LM     Static Sitting Balance independent  -LM     Dynamic Sitting Balance independent  -LM     Sit to Stand Dynamic Balance supervision  -LM     Static Standing Balance independent  -LM     Dynamic Standing Balance supervision  -LM     Balance Interventions sitting;standing;sit to stand;supported;static;dynamic;minimal challenge  -LM           User Key  (r) = Recorded By, (t) = Taken By, (c) = Cosigned By    Initials Name Provider Type    Judith Clay COTA Occupational Therapist Assistant               Goals/Plan      Row Name 05/18/22 1456          Transfer Goal 1 (OT)    Activity/Assistive Device (Transfer Goal 1, OT) toilet  -LM     [place] Level/Cues Needed (Transfer Goal 1, OT) modified independence  -LM     Time Frame (Transfer Goal 1, OT) long term goal (LTG);by discharge  -LM     Progress/Outcome (Transfer Goal 1, OT) goal met  -LM     Row Name 05/18/22 1456          Bathing Goal 1 (OT)    Activity/Device (Bathing Goal 1, OT) lower body bathing  -LM     [place] Level/Cues Needed (Bathing Goal 1, OT) modified independence  -LM     Time Frame (Bathing Goal 1, OT) long term goal (LTG);by discharge  -LM     Progress/Outcomes (Bathing Goal 1, OT) goal met  -LM     Row Name 05/18/22 1456          Dressing Goal 1 (OT)    Activity/Device (Dressing Goal 1, OT) lower body dressing  -LM     [place]/Cues Needed (Dressing Goal 1, OT) independent  -LM     Time Frame (Dressing Goal 1, OT) long term goal (LTG);by discharge  -LM     Progress/Outcome (Dressing Goal 1, OT) goal met  -LM     Row Name 05/18/22 1456          Toileting Goal 1 (OT)    Activity/Device (Toileting Goal 1, OT) toileting skills, all  -LM     [place] Level/Cues Needed (Toileting Goal 1, OT) independent  -LM     Time Frame (Toileting Goal 1, OT) long term goal (LTG);by discharge  -LM     Progress/Outcome (Toileting Goal 1, OT) goal met  -LM           User Key  (r) = Recorded By, (t) = Taken By, (c) = Cosigned By    Initials Name Provider Type    LM Judith Lora COTA Occupational Therapist Assistant               Clinical Impression     Row Name 05/18/22 1455 05/18/22 1451       Pain Assessment    Pretreatment Pain Rating 0/10 - no pain  -LM 0/10 - no pain  -LM    Posttreatment Pain Rating 0/10 - no pain  -LM 0/10 - no pain  -LM          User Key  (r) = Recorded By, (t) = Taken By, (c) = Cosigned By    Initials Name Provider Type    Judith Clay COTA Occupational Therapist Assistant               Outcome Measures     Row Name  05/18/22 1456          How much help from another is currently needed...    Putting on and taking off regular lower body clothing? 4  -LM     Bathing (including washing, rinsing, and drying) 4  -LM     Toileting (which includes using toilet bed pan or urinal) 4  -LM     Putting on and taking off regular upper body clothing 4  -LM     Taking care of personal grooming (such as brushing teeth) 4  -LM     Eating meals 4  -LM     AM-PAC 6 Clicks Score (OT) 24  -LM           User Key  (r) = Recorded By, (t) = Taken By, (c) = Cosigned By    Initials Name Provider Type    LM Judith Lora COTA Occupational Therapist Assistant                Occupational Therapy Education                 Title: PT OT SLP Therapies (Resolved)     Topic: Occupational Therapy (Resolved)     Point: ADL training (Resolved)     Description:   Instruct learner(s) on proper safety adaptation and remediation techniques during self care or transfers.   Instruct in proper use of assistive devices.              Learning Progress Summary           Patient Acceptance, E,TB, VU by  at 5/16/2022 1337    Acceptance, D,E, NR,VU by  at 5/15/2022 1300    Acceptance, E, NR by  at 5/11/2022 1139                   Point: Home exercise program (Resolved)     Description:   Instruct learner(s) on appropriate technique for monitoring, assisting and/or progressing therapeutic exercises/activities.              Learning Progress Summary           Patient Acceptance, E,TB, VU by  at 5/16/2022 1337                   Point: Precautions (Resolved)     Description:   Instruct learner(s) on prescribed precautions during self-care and functional transfers.              Learning Progress Summary           Patient Acceptance, E,TB, VU by  at 5/16/2022 1337    Acceptance, D,E, NR,VU by  at 5/15/2022 1300    Acceptance, E, NR by  at 5/14/2022 1449    Acceptance, E, NR by RC at 5/12/2022 1440    Acceptance, E, NR by  at 5/11/2022 1139    Acceptance, E,TB,  VU,NR by  at 5/10/2022 1315    Comment: POC, role of OT, transfer training                   Point: Body mechanics (Resolved)     Description:   Instruct learner(s) on proper positioning and spine alignment during self-care, functional mobility activities and/or exercises.              Learning Progress Summary           Patient Acceptance, E,TB, VU by  at 5/16/2022 1337    Acceptance, E, NR by  at 5/14/2022 1449    Acceptance, E, NR by  at 5/12/2022 1440    Acceptance, E, NR by  at 5/11/2022 1139    Acceptance, E,TB, VU,NR by  at 5/10/2022 1315    Comment: POC, role of OT, transfer training                               User Key     Initials Effective Dates Name Provider Type Discipline     06/16/21 -  Nedra Lanier COTA Occupational Therapist Assistant OT     06/16/21 -  Jayde Johnson COTA Occupational Therapist Assistant OT     06/14/21 -  Pardeep Dasilva, OT Occupational Therapist OT              OT Recommendation and Plan     Plan of Care Review  Plan of Care Reviewed With: patient  Progress: improving  Outcome Evaluation: pt perf well with OT  pt tf sit to stand with ind funct mob x 200-300 ft in hallway with good hannah sba, toilet tf with ind  pt also perf ther ex eob b ue pt with md at exit     Time Calculation:    Time Calculation- OT     Row Name 05/18/22 1435             Time Calculation- OT    OT Start Time 1003  -LM      OT Stop Time 1059  -LM      OT Time Calculation (min) 56 min  -LM      Total Timed Code Minutes- OT 56 minute(s)  -LM      OT Received On 05/18/22  -LM              Timed Charges    68270 - OT Therapeutic Exercise Minutes 26  -LM      07170 - OT Therapeutic Activity Minutes 30  -LM              Total Minutes    Timed Charges Total Minutes 56  -LM       Total Minutes 56  -LM            User Key  (r) = Recorded By, (t) = Taken By, (c) = Cosigned By    Initials Name Provider Type    LM Judith Lora COTA Occupational Therapist Assistant              Therapy  Charges for Today     Code Description Service Date Service Provider Modifiers Qty    98579196672 HC OT THERAPEUTIC ACT EA 15 MIN 5/17/2022 Judith Lora COTA GO 2    21582158563 HC OT THER PROC EA 15 MIN 5/17/2022 Judith Lora, CAROLINA RIGGS 2    60286580015 HC OT SELF CARE/MGMT/TRAIN EA 15 MIN 5/17/2022 Judith Lora COTA GO 1    65295406426 HC OT THER PROC EA 15 MIN 5/18/2022 Judith Lora COTA GO 2    38072488363 HC OT THERAPEUTIC ACT EA 15 MIN 5/18/2022 Judith Lora COTA GO 2               CAROLINA Delgado  5/18/2022

## 2022-05-18 NOTE — PLAN OF CARE
Goal Outcome Evaluation:  Plan of Care Reviewed With: patient  Progress: improving   Vss. No changes

## 2022-05-19 ENCOUNTER — TRANSITIONAL CARE MANAGEMENT TELEPHONE ENCOUNTER (OUTPATIENT)
Dept: CALL CENTER | Facility: HOSPITAL | Age: 62
End: 2022-05-19

## 2022-05-19 NOTE — OUTREACH NOTE
Call Center TCM Note    Flowsheet Row Responses   [facility] patient discharged from? [facility]   Does the patient have one of the following disease processes/diagnoses(primary or secondary)? Other   TCM attempt successful? Yes   Call start time 1401   Call end time 1403   Discharge diagnosis Interstitial nephritis secondary to cancer medication   Meds reviewed with patient/caregiver? Yes   Is the patient having any side effects they believe may be caused by any medication additions or changes? No   Does the patient have all medications ordered at discharge? Yes   Is the patient taking all medications as directed (includes completed medication regime)? Yes   Does the patient have a primary care provider?  Yes   Does the patient have an appointment with their PCP within 7 days of discharge? Yes   Comments regarding PCP PCP APPOINTMENT IS 5/25/22   Has the patient kept scheduled appointments due by today? N/A   Has home health visited the patient within 72 hours of discharge? N/A   Psychosocial issues? No   Did the patient receive a copy of their discharge instructions? Yes   Nursing interventions Reviewed instructions with patient   What is the patient's perception of their health status since discharge? Improving   Is the patient/caregiver able to teach back signs and symptoms related to disease process for when to call PCP? Yes   Is the patient/caregiver able to teach back signs and symptoms related to disease process for when to call 911? Yes   Is the patient/caregiver able to teach back the hierarchy of who to call/visit for symptoms/problems? PCP, Specialist, Home health nurse, Urgent Care, ED, 911 Yes   If the patient is a current smoker, are they able to teach back resources for cessation? Not a smoker   TCM call completed? Yes          Mary Nuñez LPN    5/19/2022, 14:06 CDT

## 2022-05-25 ENCOUNTER — OFFICE VISIT (OUTPATIENT)
Dept: FAMILY MEDICINE CLINIC | Facility: CLINIC | Age: 62
End: 2022-05-25

## 2022-05-25 VITALS
SYSTOLIC BLOOD PRESSURE: 132 MMHG | RESPIRATION RATE: 18 BRPM | BODY MASS INDEX: 33.14 KG/M2 | HEIGHT: 66 IN | WEIGHT: 206.2 LBS | HEART RATE: 88 BPM | OXYGEN SATURATION: 98 % | DIASTOLIC BLOOD PRESSURE: 86 MMHG

## 2022-05-25 DIAGNOSIS — N12 INTERSTITIAL NEPHRITIS: Primary | ICD-10-CM

## 2022-05-25 DIAGNOSIS — D64.9 ANEMIA, UNSPECIFIED TYPE: ICD-10-CM

## 2022-05-25 DIAGNOSIS — N17.9 ACUTE RENAL FAILURE, UNSPECIFIED ACUTE RENAL FAILURE TYPE: ICD-10-CM

## 2022-05-25 DIAGNOSIS — C34.91 ADENOSQUAMOUS CARCINOMA OF RIGHT LUNG: ICD-10-CM

## 2022-05-25 DIAGNOSIS — R29.818 NEUROLOGICAL ABNORMALITY: ICD-10-CM

## 2022-05-25 PROBLEM — E66.9 OBESITY (BMI 30.0-34.9): Status: ACTIVE | Noted: 2022-05-25

## 2022-05-25 PROCEDURE — 99213 OFFICE O/P EST LOW 20 MIN: CPT | Performed by: GENERAL PRACTICE

## 2022-05-30 ENCOUNTER — APPOINTMENT (OUTPATIENT)
Dept: GENERAL RADIOLOGY | Facility: HOSPITAL | Age: 62
End: 2022-05-30

## 2022-05-30 ENCOUNTER — APPOINTMENT (OUTPATIENT)
Dept: CT IMAGING | Facility: HOSPITAL | Age: 62
End: 2022-05-30

## 2022-05-30 ENCOUNTER — HOSPITAL ENCOUNTER (OUTPATIENT)
Facility: HOSPITAL | Age: 62
Discharge: HOME | End: 2022-06-01
Attending: STUDENT IN AN ORGANIZED HEALTH CARE EDUCATION/TRAINING PROGRAM | Admitting: INTERNAL MEDICINE

## 2022-05-30 ENCOUNTER — APPOINTMENT (OUTPATIENT)
Dept: CARDIOLOGY | Facility: HOSPITAL | Age: 62
End: 2022-05-30

## 2022-05-30 DIAGNOSIS — R55 SYNCOPE, UNSPECIFIED SYNCOPE TYPE: ICD-10-CM

## 2022-05-30 DIAGNOSIS — R42 ORTHOSTATIC DIZZINESS: ICD-10-CM

## 2022-05-30 DIAGNOSIS — K92.2 GASTROINTESTINAL HEMORRHAGE, UNSPECIFIED GASTROINTESTINAL HEMORRHAGE TYPE: Primary | ICD-10-CM

## 2022-05-30 DIAGNOSIS — R42 LIGHTHEADED: ICD-10-CM

## 2022-05-30 PROBLEM — N18.30 CKD (CHRONIC KIDNEY DISEASE) STAGE 3, GFR 30-59 ML/MIN: Status: ACTIVE | Noted: 2022-05-30

## 2022-05-30 PROBLEM — N30.00 ACUTE CYSTITIS WITHOUT HEMATURIA: Status: ACTIVE | Noted: 2022-05-30

## 2022-05-30 LAB
ABO GROUP BLD: NORMAL
ALBUMIN SERPL-MCNC: 3.5 G/DL (ref 3.5–5.2)
ALBUMIN/GLOB SERPL: 1.3 G/DL
ALP SERPL-CCNC: 107 U/L (ref 39–117)
ALT SERPL W P-5'-P-CCNC: 19 U/L (ref 1–33)
AMPHET+METHAMPHET UR QL: NEGATIVE
AMPHETAMINES UR QL: NEGATIVE
ANION GAP SERPL CALCULATED.3IONS-SCNC: 10 MMOL/L (ref 5–15)
APTT PPP: 30.2 SECONDS (ref 20–40.3)
AST SERPL-CCNC: 19 U/L (ref 1–32)
BACTERIA UR QL AUTO: ABNORMAL /HPF
BARBITURATES UR QL SCN: NEGATIVE
BASOPHILS # BLD AUTO: 0.04 10*3/MM3 (ref 0–0.2)
BASOPHILS NFR BLD AUTO: 0.4 % (ref 0–1.5)
BENZODIAZ UR QL SCN: NEGATIVE
BILIRUB SERPL-MCNC: 0.2 MG/DL (ref 0–1.2)
BILIRUB UR QL STRIP: NEGATIVE
BLD GP AB SCN SERPL QL: NEGATIVE
BUN SERPL-MCNC: 46 MG/DL (ref 8–23)
BUN/CREAT SERPL: 20.1 (ref 7–25)
BUPRENORPHINE SERPL-MCNC: NEGATIVE NG/ML
C DIFF TOX GENS STL QL NAA+PROBE: NEGATIVE
CALCIUM SPEC-SCNC: 8.3 MG/DL (ref 8.6–10.5)
CANNABINOIDS SERPL QL: NEGATIVE
CHLORIDE SERPL-SCNC: 101 MMOL/L (ref 98–107)
CLARITY UR: ABNORMAL
CO2 SERPL-SCNC: 27 MMOL/L (ref 22–29)
COCAINE UR QL: NEGATIVE
COLOR UR: YELLOW
CREAT SERPL-MCNC: 2.29 MG/DL (ref 0.57–1)
D-LACTATE SERPL-SCNC: 1.9 MMOL/L (ref 0.5–2)
DEPRECATED RDW RBC AUTO: 52.4 FL (ref 37–54)
EGFRCR SERPLBLD CKD-EPI 2021: 23.5 ML/MIN/1.73
EOSINOPHIL # BLD AUTO: 0.3 10*3/MM3 (ref 0–0.4)
EOSINOPHIL NFR BLD AUTO: 2.7 % (ref 0.3–6.2)
ERYTHROCYTE [DISTWIDTH] IN BLOOD BY AUTOMATED COUNT: 17.1 % (ref 12.3–15.4)
ETHANOL BLD-MCNC: <10 MG/DL (ref 0–10)
ETHANOL UR QL: <0.01 %
FLUAV RNA RESP QL NAA+PROBE: NOT DETECTED
FLUBV RNA RESP QL NAA+PROBE: NOT DETECTED
GLOBULIN UR ELPH-MCNC: 2.7 GM/DL
GLUCOSE SERPL-MCNC: 115 MG/DL (ref 65–99)
GLUCOSE UR STRIP-MCNC: NEGATIVE MG/DL
HCT VFR BLD AUTO: 22.9 % (ref 34–46.6)
HCT VFR BLD AUTO: 28.7 % (ref 34–46.6)
HGB BLD-MCNC: 7.6 G/DL (ref 12–15.9)
HGB BLD-MCNC: 9.5 G/DL (ref 12–15.9)
HGB UR QL STRIP.AUTO: ABNORMAL
HOLD SPECIMEN: NORMAL
HYALINE CASTS UR QL AUTO: ABNORMAL /LPF
IMM GRANULOCYTES # BLD AUTO: 0.11 10*3/MM3 (ref 0–0.05)
IMM GRANULOCYTES NFR BLD AUTO: 1 % (ref 0–0.5)
INR PPP: 2.48 (ref 0.8–1.2)
KETONES UR QL STRIP: NEGATIVE
LEUKOCYTE ESTERASE UR QL STRIP.AUTO: ABNORMAL
LIPASE SERPL-CCNC: 27 U/L (ref 13–60)
LYMPHOCYTES # BLD AUTO: 0.68 10*3/MM3 (ref 0.7–3.1)
LYMPHOCYTES NFR BLD AUTO: 6.2 % (ref 19.6–45.3)
Lab: NORMAL
MAGNESIUM SERPL-MCNC: 2.2 MG/DL (ref 1.6–2.4)
MCH RBC QN AUTO: 28.3 PG (ref 26.6–33)
MCHC RBC AUTO-ENTMCNC: 33.2 G/DL (ref 31.5–35.7)
MCV RBC AUTO: 85.1 FL (ref 79–97)
METHADONE UR QL SCN: NEGATIVE
MONOCYTES # BLD AUTO: 0.64 10*3/MM3 (ref 0.1–0.9)
MONOCYTES NFR BLD AUTO: 5.9 % (ref 5–12)
NEUTROPHILS NFR BLD AUTO: 83.8 % (ref 42.7–76)
NEUTROPHILS NFR BLD AUTO: 9.16 10*3/MM3 (ref 1.7–7)
NITRITE UR QL STRIP: POSITIVE
NRBC BLD AUTO-RTO: 0 /100 WBC (ref 0–0.2)
OPIATES UR QL: NEGATIVE
OXYCODONE UR QL SCN: NEGATIVE
PCP UR QL SCN: NEGATIVE
PH UR STRIP.AUTO: 7 [PH] (ref 5–9)
PLATELET # BLD AUTO: 295 10*3/MM3 (ref 140–450)
PMV BLD AUTO: 9.7 FL (ref 6–12)
POTASSIUM SERPL-SCNC: 4 MMOL/L (ref 3.5–5.2)
PROPOXYPH UR QL: NEGATIVE
PROT SERPL-MCNC: 6.2 G/DL (ref 6–8.5)
PROT UR QL STRIP: ABNORMAL
PROTHROMBIN TIME: 26.7 SECONDS (ref 11.1–15.3)
QT INTERVAL: 378 MS
QTC INTERVAL: 430 MS
RBC # BLD AUTO: 2.69 10*6/MM3 (ref 3.77–5.28)
RBC # UR STRIP: ABNORMAL /HPF
REF LAB TEST METHOD: ABNORMAL
RH BLD: POSITIVE
SARS-COV-2 RNA RESP QL NAA+PROBE: NOT DETECTED
SODIUM SERPL-SCNC: 138 MMOL/L (ref 136–145)
SP GR UR STRIP: 1.01 (ref 1–1.03)
SQUAMOUS #/AREA URNS HPF: ABNORMAL /HPF
T&S EXPIRATION DATE: NORMAL
TRANS CELLS #/AREA URNS HPF: ABNORMAL /HPF
TRICYCLICS UR QL SCN: NEGATIVE
TSH SERPL DL<=0.05 MIU/L-ACNC: 8.75 UIU/ML (ref 0.27–4.2)
UROBILINOGEN UR QL STRIP: ABNORMAL
WBC # UR STRIP: ABNORMAL /HPF
WBC NRBC COR # BLD: 10.93 10*3/MM3 (ref 3.4–10.8)
WHOLE BLOOD HOLD COAG: NORMAL

## 2022-05-30 PROCEDURE — 36415 COLL VENOUS BLD VENIPUNCTURE: CPT | Performed by: NURSE PRACTITIONER

## 2022-05-30 PROCEDURE — 99204 OFFICE O/P NEW MOD 45 MIN: CPT | Performed by: INTERNAL MEDICINE

## 2022-05-30 PROCEDURE — 70450 CT HEAD/BRAIN W/O DYE: CPT

## 2022-05-30 PROCEDURE — 86850 RBC ANTIBODY SCREEN: CPT | Performed by: STUDENT IN AN ORGANIZED HEALTH CARE EDUCATION/TRAINING PROGRAM

## 2022-05-30 PROCEDURE — 63710000001 PREDNISONE PER 1 MG

## 2022-05-30 PROCEDURE — 85730 THROMBOPLASTIN TIME PARTIAL: CPT

## 2022-05-30 PROCEDURE — 71045 X-RAY EXAM CHEST 1 VIEW: CPT

## 2022-05-30 PROCEDURE — 96374 THER/PROPH/DIAG INJ IV PUSH: CPT

## 2022-05-30 PROCEDURE — 93010 ELECTROCARDIOGRAM REPORT: CPT | Performed by: INTERNAL MEDICINE

## 2022-05-30 PROCEDURE — 86900 BLOOD TYPING SEROLOGIC ABO: CPT

## 2022-05-30 PROCEDURE — 87636 SARSCOV2 & INF A&B AMP PRB: CPT | Performed by: STUDENT IN AN ORGANIZED HEALTH CARE EDUCATION/TRAINING PROGRAM

## 2022-05-30 PROCEDURE — G0378 HOSPITAL OBSERVATION PER HR: HCPCS

## 2022-05-30 PROCEDURE — 93005 ELECTROCARDIOGRAM TRACING: CPT | Performed by: STUDENT IN AN ORGANIZED HEALTH CARE EDUCATION/TRAINING PROGRAM

## 2022-05-30 PROCEDURE — P9016 RBC LEUKOCYTES REDUCED: HCPCS

## 2022-05-30 PROCEDURE — 85014 HEMATOCRIT: CPT | Performed by: NURSE PRACTITIONER

## 2022-05-30 PROCEDURE — 99284 EMERGENCY DEPT VISIT MOD MDM: CPT

## 2022-05-30 PROCEDURE — 83605 ASSAY OF LACTIC ACID: CPT | Performed by: STUDENT IN AN ORGANIZED HEALTH CARE EDUCATION/TRAINING PROGRAM

## 2022-05-30 PROCEDURE — 83690 ASSAY OF LIPASE: CPT | Performed by: STUDENT IN AN ORGANIZED HEALTH CARE EDUCATION/TRAINING PROGRAM

## 2022-05-30 PROCEDURE — 86901 BLOOD TYPING SEROLOGIC RH(D): CPT | Performed by: STUDENT IN AN ORGANIZED HEALTH CARE EDUCATION/TRAINING PROGRAM

## 2022-05-30 PROCEDURE — 83735 ASSAY OF MAGNESIUM: CPT | Performed by: STUDENT IN AN ORGANIZED HEALTH CARE EDUCATION/TRAINING PROGRAM

## 2022-05-30 PROCEDURE — 36430 TRANSFUSION BLD/BLD COMPNT: CPT

## 2022-05-30 PROCEDURE — 85018 HEMOGLOBIN: CPT | Performed by: NURSE PRACTITIONER

## 2022-05-30 PROCEDURE — 81001 URINALYSIS AUTO W/SCOPE: CPT | Performed by: STUDENT IN AN ORGANIZED HEALTH CARE EDUCATION/TRAINING PROGRAM

## 2022-05-30 PROCEDURE — 86923 COMPATIBILITY TEST ELECTRIC: CPT

## 2022-05-30 PROCEDURE — 85610 PROTHROMBIN TIME: CPT

## 2022-05-30 PROCEDURE — 87493 C DIFF AMPLIFIED PROBE: CPT

## 2022-05-30 PROCEDURE — C9803 HOPD COVID-19 SPEC COLLECT: HCPCS

## 2022-05-30 PROCEDURE — 80050 GENERAL HEALTH PANEL: CPT | Performed by: STUDENT IN AN ORGANIZED HEALTH CARE EDUCATION/TRAINING PROGRAM

## 2022-05-30 PROCEDURE — 86900 BLOOD TYPING SEROLOGIC ABO: CPT | Performed by: STUDENT IN AN ORGANIZED HEALTH CARE EDUCATION/TRAINING PROGRAM

## 2022-05-30 PROCEDURE — 80306 DRUG TEST PRSMV INSTRMNT: CPT | Performed by: STUDENT IN AN ORGANIZED HEALTH CARE EDUCATION/TRAINING PROGRAM

## 2022-05-30 PROCEDURE — 82077 ASSAY SPEC XCP UR&BREATH IA: CPT | Performed by: STUDENT IN AN ORGANIZED HEALTH CARE EDUCATION/TRAINING PROGRAM

## 2022-05-30 RX ORDER — SODIUM CHLORIDE 9 MG/ML
30 INJECTION, SOLUTION INTRAVENOUS CONTINUOUS PRN
Status: DISCONTINUED | OUTPATIENT
Start: 2022-05-30 | End: 2022-06-01 | Stop reason: HOSPADM

## 2022-05-30 RX ORDER — VENLAFAXINE HYDROCHLORIDE 37.5 MG/1
37.5 CAPSULE, EXTENDED RELEASE ORAL DAILY
Status: DISCONTINUED | OUTPATIENT
Start: 2022-05-30 | End: 2022-06-01 | Stop reason: HOSPADM

## 2022-05-30 RX ORDER — PANTOPRAZOLE SODIUM 40 MG/10ML
40 INJECTION, POWDER, LYOPHILIZED, FOR SOLUTION INTRAVENOUS
Status: DISCONTINUED | OUTPATIENT
Start: 2022-05-30 | End: 2022-06-01 | Stop reason: HOSPADM

## 2022-05-30 RX ORDER — PANTOPRAZOLE SODIUM 40 MG/10ML
80 INJECTION, POWDER, LYOPHILIZED, FOR SOLUTION INTRAVENOUS ONCE
Status: COMPLETED | OUTPATIENT
Start: 2022-05-30 | End: 2022-05-30

## 2022-05-30 RX ORDER — TOPIRAMATE 50 MG/1
50 TABLET, FILM COATED ORAL DAILY
Status: DISCONTINUED | OUTPATIENT
Start: 2022-05-30 | End: 2022-06-01 | Stop reason: HOSPADM

## 2022-05-30 RX ORDER — SUMATRIPTAN 50 MG/1
100 TABLET, FILM COATED ORAL
Refills: 5 | Status: DISCONTINUED | OUTPATIENT
Start: 2022-05-30 | End: 2022-06-01 | Stop reason: HOSPADM

## 2022-05-30 RX ORDER — TOPIRAMATE 50 MG/1
50 TABLET, FILM COATED ORAL NIGHTLY
Status: DISCONTINUED | OUTPATIENT
Start: 2022-05-30 | End: 2022-05-30

## 2022-05-30 RX ORDER — SULFAMETHOXAZOLE AND TRIMETHOPRIM 800; 160 MG/1; MG/1
1 TABLET ORAL EVERY 12 HOURS SCHEDULED
Status: DISCONTINUED | OUTPATIENT
Start: 2022-05-30 | End: 2022-05-30

## 2022-05-30 RX ORDER — SODIUM CHLORIDE 0.9 % (FLUSH) 0.9 %
10 SYRINGE (ML) INJECTION EVERY 12 HOURS SCHEDULED
Status: DISCONTINUED | OUTPATIENT
Start: 2022-05-30 | End: 2022-06-01 | Stop reason: HOSPADM

## 2022-05-30 RX ORDER — CETIRIZINE HYDROCHLORIDE 10 MG/1
10 TABLET ORAL DAILY
Status: DISCONTINUED | OUTPATIENT
Start: 2022-05-30 | End: 2022-06-01 | Stop reason: HOSPADM

## 2022-05-30 RX ORDER — PREDNISONE 20 MG/1
40 TABLET ORAL
Status: COMPLETED | OUTPATIENT
Start: 2022-05-30 | End: 2022-06-01

## 2022-05-30 RX ORDER — ONDANSETRON 2 MG/ML
4 INJECTION INTRAMUSCULAR; INTRAVENOUS EVERY 6 HOURS PRN
Status: DISCONTINUED | OUTPATIENT
Start: 2022-05-30 | End: 2022-06-01 | Stop reason: HOSPADM

## 2022-05-30 RX ORDER — BUMETANIDE 1 MG/1
2 TABLET ORAL 2 TIMES DAILY
Status: DISCONTINUED | OUTPATIENT
Start: 2022-05-30 | End: 2022-06-01 | Stop reason: HOSPADM

## 2022-05-30 RX ORDER — SODIUM CHLORIDE 0.9 % (FLUSH) 0.9 %
10 SYRINGE (ML) INJECTION AS NEEDED
Status: DISCONTINUED | OUTPATIENT
Start: 2022-05-30 | End: 2022-06-01 | Stop reason: HOSPADM

## 2022-05-30 RX ORDER — METOCLOPRAMIDE HYDROCHLORIDE 5 MG/ML
10 INJECTION INTRAMUSCULAR; INTRAVENOUS EVERY 8 HOURS PRN
Status: DISCONTINUED | OUTPATIENT
Start: 2022-05-30 | End: 2022-06-01 | Stop reason: HOSPADM

## 2022-05-30 RX ORDER — LEVOTHYROXINE SODIUM 0.15 MG/1
150 TABLET ORAL
Status: DISCONTINUED | OUTPATIENT
Start: 2022-05-30 | End: 2022-06-01 | Stop reason: HOSPADM

## 2022-05-30 RX ORDER — GRANULES FOR ORAL 3 G/1
3 POWDER ORAL ONCE
Status: COMPLETED | OUTPATIENT
Start: 2022-05-30 | End: 2022-05-30

## 2022-05-30 RX ADMIN — VENLAFAXINE HYDROCHLORIDE 37.5 MG: 37.5 CAPSULE, EXTENDED RELEASE ORAL at 10:46

## 2022-05-30 RX ADMIN — FOSFOMYCIN TROMETHAMINE 3 G: 3 POWDER ORAL at 10:46

## 2022-05-30 RX ADMIN — LEVOTHYROXINE SODIUM 150 MCG: 150 TABLET ORAL at 07:32

## 2022-05-30 RX ADMIN — Medication 10 ML: at 21:23

## 2022-05-30 RX ADMIN — SODIUM CHLORIDE, POTASSIUM CHLORIDE, SODIUM LACTATE AND CALCIUM CHLORIDE 1000 ML: 600; 310; 30; 20 INJECTION, SOLUTION INTRAVENOUS at 05:56

## 2022-05-30 RX ADMIN — CETIRIZINE HYDROCHLORIDE 10 MG: 10 TABLET, FILM COATED ORAL at 10:46

## 2022-05-30 RX ADMIN — BUMETANIDE 2 MG: 1 TABLET ORAL at 10:46

## 2022-05-30 RX ADMIN — PREDNISONE 40 MG: 20 TABLET ORAL at 07:32

## 2022-05-30 RX ADMIN — BUMETANIDE 2 MG: 1 TABLET ORAL at 21:22

## 2022-05-30 RX ADMIN — PANTOPRAZOLE SODIUM 80 MG: 40 INJECTION, POWDER, FOR SOLUTION INTRAVENOUS at 06:56

## 2022-05-30 RX ADMIN — TOPIRAMATE 50 MG: 50 TABLET, FILM COATED ORAL at 11:01

## 2022-05-31 ENCOUNTER — ANESTHESIA EVENT (OUTPATIENT)
Dept: GASTROENTEROLOGY | Facility: HOSPITAL | Age: 62
End: 2022-05-31

## 2022-05-31 ENCOUNTER — READMISSION MANAGEMENT (OUTPATIENT)
Dept: CALL CENTER | Facility: HOSPITAL | Age: 62
End: 2022-05-31

## 2022-05-31 ENCOUNTER — ANESTHESIA (OUTPATIENT)
Dept: GASTROENTEROLOGY | Facility: HOSPITAL | Age: 62
End: 2022-05-31

## 2022-05-31 LAB
ALBUMIN SERPL-MCNC: 3.8 G/DL (ref 3.5–5.2)
ALBUMIN/GLOB SERPL: 1.2 G/DL
ALP SERPL-CCNC: 112 U/L (ref 39–117)
ALT SERPL W P-5'-P-CCNC: 20 U/L (ref 1–33)
ANION GAP SERPL CALCULATED.3IONS-SCNC: 10 MMOL/L (ref 5–15)
AST SERPL-CCNC: 20 U/L (ref 1–32)
BASOPHILS # BLD AUTO: 0.04 10*3/MM3 (ref 0–0.2)
BASOPHILS NFR BLD AUTO: 0.3 % (ref 0–1.5)
BH BB BLOOD EXPIRATION DATE: NORMAL
BH BB BLOOD TYPE BARCODE: NORMAL
BH BB DISPENSE STATUS: NORMAL
BH BB PRODUCT CODE: NORMAL
BH BB UNIT NUMBER: NORMAL
BILIRUB SERPL-MCNC: 0.4 MG/DL (ref 0–1.2)
BUN SERPL-MCNC: 33 MG/DL (ref 8–23)
BUN/CREAT SERPL: 17.5 (ref 7–25)
CALCIUM SPEC-SCNC: 8.3 MG/DL (ref 8.6–10.5)
CHLORIDE SERPL-SCNC: 102 MMOL/L (ref 98–107)
CO2 SERPL-SCNC: 30 MMOL/L (ref 22–29)
CREAT SERPL-MCNC: 1.89 MG/DL (ref 0.57–1)
CROSSMATCH INTERPRETATION: NORMAL
DEPRECATED RDW RBC AUTO: 54.4 FL (ref 37–54)
EGFRCR SERPLBLD CKD-EPI 2021: 29.6 ML/MIN/1.73
EOSINOPHIL # BLD AUTO: 0.12 10*3/MM3 (ref 0–0.4)
EOSINOPHIL NFR BLD AUTO: 1 % (ref 0.3–6.2)
ERYTHROCYTE [DISTWIDTH] IN BLOOD BY AUTOMATED COUNT: 17.9 % (ref 12.3–15.4)
GLOBULIN UR ELPH-MCNC: 3.1 GM/DL
GLUCOSE SERPL-MCNC: 95 MG/DL (ref 65–99)
HCT VFR BLD AUTO: 26.6 % (ref 34–46.6)
HCT VFR BLD AUTO: 29.1 % (ref 34–46.6)
HGB BLD-MCNC: 8.8 G/DL (ref 12–15.9)
HGB BLD-MCNC: 9.6 G/DL (ref 12–15.9)
IMM GRANULOCYTES # BLD AUTO: 0.07 10*3/MM3 (ref 0–0.05)
IMM GRANULOCYTES NFR BLD AUTO: 0.6 % (ref 0–0.5)
LYMPHOCYTES # BLD AUTO: 0.99 10*3/MM3 (ref 0.7–3.1)
LYMPHOCYTES NFR BLD AUTO: 8.2 % (ref 19.6–45.3)
MCH RBC QN AUTO: 28.2 PG (ref 26.6–33)
MCHC RBC AUTO-ENTMCNC: 33 G/DL (ref 31.5–35.7)
MCV RBC AUTO: 85.6 FL (ref 79–97)
MONOCYTES # BLD AUTO: 0.7 10*3/MM3 (ref 0.1–0.9)
MONOCYTES NFR BLD AUTO: 5.8 % (ref 5–12)
NEUTROPHILS NFR BLD AUTO: 10.11 10*3/MM3 (ref 1.7–7)
NEUTROPHILS NFR BLD AUTO: 84.1 % (ref 42.7–76)
NRBC BLD AUTO-RTO: 0 /100 WBC (ref 0–0.2)
PLATELET # BLD AUTO: 324 10*3/MM3 (ref 140–450)
PMV BLD AUTO: 9.4 FL (ref 6–12)
POTASSIUM SERPL-SCNC: 3.6 MMOL/L (ref 3.5–5.2)
PROT SERPL-MCNC: 6.9 G/DL (ref 6–8.5)
RBC # BLD AUTO: 3.4 10*6/MM3 (ref 3.77–5.28)
SODIUM SERPL-SCNC: 142 MMOL/L (ref 136–145)
UNIT  ABO: NORMAL
UNIT  RH: NORMAL
WBC NRBC COR # BLD: 12.03 10*3/MM3 (ref 3.4–10.8)

## 2022-05-31 PROCEDURE — 96376 TX/PRO/DX INJ SAME DRUG ADON: CPT

## 2022-05-31 PROCEDURE — 63710000001 PREDNISONE PER 1 MG

## 2022-05-31 PROCEDURE — 85018 HEMOGLOBIN: CPT | Performed by: NURSE PRACTITIONER

## 2022-05-31 PROCEDURE — 80053 COMPREHEN METABOLIC PANEL: CPT | Performed by: NURSE PRACTITIONER

## 2022-05-31 PROCEDURE — G0378 HOSPITAL OBSERVATION PER HR: HCPCS

## 2022-05-31 PROCEDURE — 43239 EGD BIOPSY SINGLE/MULTIPLE: CPT | Performed by: INTERNAL MEDICINE

## 2022-05-31 PROCEDURE — 25010000002 PROPOFOL 10 MG/ML EMULSION

## 2022-05-31 PROCEDURE — 85014 HEMATOCRIT: CPT | Performed by: NURSE PRACTITIONER

## 2022-05-31 PROCEDURE — 85025 COMPLETE CBC W/AUTO DIFF WBC: CPT | Performed by: NURSE PRACTITIONER

## 2022-05-31 RX ORDER — LIDOCAINE HYDROCHLORIDE 20 MG/ML
INJECTION, SOLUTION INTRAVENOUS AS NEEDED
Status: DISCONTINUED | OUTPATIENT
Start: 2022-05-31 | End: 2022-05-31 | Stop reason: SURG

## 2022-05-31 RX ORDER — PROPOFOL 10 MG/ML
VIAL (ML) INTRAVENOUS AS NEEDED
Status: DISCONTINUED | OUTPATIENT
Start: 2022-05-31 | End: 2022-05-31 | Stop reason: SURG

## 2022-05-31 RX ADMIN — LIDOCAINE HYDROCHLORIDE 40 MG: 20 INJECTION, SOLUTION INTRAVENOUS at 13:23

## 2022-05-31 RX ADMIN — VENLAFAXINE HYDROCHLORIDE 37.5 MG: 37.5 CAPSULE, EXTENDED RELEASE ORAL at 08:11

## 2022-05-31 RX ADMIN — BUMETANIDE 2 MG: 1 TABLET ORAL at 08:10

## 2022-05-31 RX ADMIN — BUMETANIDE 2 MG: 1 TABLET ORAL at 20:19

## 2022-05-31 RX ADMIN — CETIRIZINE HYDROCHLORIDE 10 MG: 10 TABLET, FILM COATED ORAL at 08:11

## 2022-05-31 RX ADMIN — Medication 10 ML: at 20:19

## 2022-05-31 RX ADMIN — PANTOPRAZOLE SODIUM 40 MG: 40 INJECTION, POWDER, FOR SOLUTION INTRAVENOUS at 17:21

## 2022-05-31 RX ADMIN — LEVOTHYROXINE SODIUM 150 MCG: 150 TABLET ORAL at 08:11

## 2022-05-31 RX ADMIN — PROPOFOL 90 MG: 10 INJECTION, EMULSION INTRAVENOUS at 13:23

## 2022-05-31 RX ADMIN — TOPIRAMATE 50 MG: 50 TABLET, FILM COATED ORAL at 08:11

## 2022-05-31 RX ADMIN — PREDNISONE 40 MG: 20 TABLET ORAL at 08:11

## 2022-05-31 RX ADMIN — SODIUM CHLORIDE: 9 INJECTION, SOLUTION INTRAVENOUS at 13:18

## 2022-05-31 RX ADMIN — PANTOPRAZOLE SODIUM 40 MG: 40 INJECTION, POWDER, FOR SOLUTION INTRAVENOUS at 08:11

## 2022-05-31 RX ADMIN — Medication 10 ML: at 08:16

## 2022-05-31 NOTE — ANESTHESIA POSTPROCEDURE EVALUATION
Patient: Adilene Morgan    Procedure Summary     Date: 05/31/22 Room / Location: [facility] ENDOSCOPY 1 / [facility] ENDOSCOPY    Anesthesia Start: 1318 Anesthesia Stop: 1328    Procedure: ESOPHAGOGASTRODUODENOSCOPY (N/A ) Diagnosis:       Gastrointestinal hemorrhage, unspecified gastrointestinal hemorrhage type      (Gastrointestinal hemorrhage, unspecified gastrointestinal hemorrhage type [K92.2])    Surgeons: Lincoln Banegas MD Provider: Kulwant Neil CRNA    Anesthesia Type: MAC ASA Status: 3          Anesthesia Type: MAC    Vitals  No vitals data found for the desired time range.          Post Anesthesia Care and Evaluation    Patient location during evaluation: bedside  Patient participation: complete - patient cannot participate  Level of consciousness: sleepy but conscious  Pain score: 0  Pain management: adequate  Airway patency: patent  Anesthetic complications: No anesthetic complications  PONV Status: none  Cardiovascular status: acceptable  Respiratory status: acceptable and spontaneous ventilation  Hydration status: acceptable  No anesthesia care post op

## 2022-05-31 NOTE — OUTREACH NOTE
Medical Week 2 Survey    Flowsheet Row Responses   [facility] patient discharged from? [place]   Does the patient have one of the following disease processes/diagnoses(primary or secondary)? Other   Week 2 attempt successful? No   Revoke Readmitted          EREN Caldwell Registered Nurse

## 2022-05-31 NOTE — ANESTHESIA PREPROCEDURE EVALUATION
Anesthesia Evaluation     Patient summary reviewed and Nursing notes reviewed   NPO Solid Status: > 8 hours  NPO Liquid Status: > 8 hours           Airway   Mallampati: II  TM distance: >3 FB  Neck ROM: full  no difficulty expected  Dental - normal exam   (+) poor dentition    Pulmonary - normal exam   (+) pneumonia , lung cancer, asthma,  Cardiovascular - normal exam  Exercise tolerance: poor (<4 METS)        Neuro/Psych  (+) CVA (Aug 2021 left sided weakness, urinary incontinence ) residual symptoms, headaches, syncope, psychiatric history Depression,    GI/Hepatic/Renal/Endo    (+)   renal disease ARF, thyroid problem     Musculoskeletal (-) negative ROS    Abdominal  - normal exam   Substance History - negative use     OB/GYN negative ob/gyn ROS         Other      history of cancer active                      Anesthesia Plan    ASA 3     MAC     intravenous induction     Anesthetic plan, all risks, benefits, and alternatives have been provided, discussed and informed consent has been obtained with: patient.    Plan discussed with CRNA.

## 2022-06-01 ENCOUNTER — READMISSION MANAGEMENT (OUTPATIENT)
Dept: CALL CENTER | Facility: HOSPITAL | Age: 62
End: 2022-06-01

## 2022-06-01 VITALS
WEIGHT: 207 LBS | OXYGEN SATURATION: 100 % | HEART RATE: 57 BPM | HEIGHT: 66 IN | TEMPERATURE: 98 F | BODY MASS INDEX: 33.27 KG/M2 | RESPIRATION RATE: 18 BRPM | SYSTOLIC BLOOD PRESSURE: 119 MMHG | DIASTOLIC BLOOD PRESSURE: 57 MMHG

## 2022-06-01 LAB
BASOPHILS # BLD AUTO: 0.03 10*3/MM3 (ref 0–0.2)
BASOPHILS NFR BLD AUTO: 0.3 % (ref 0–1.5)
DEPRECATED RDW RBC AUTO: 54.7 FL (ref 37–54)
EOSINOPHIL # BLD AUTO: 0.25 10*3/MM3 (ref 0–0.4)
EOSINOPHIL NFR BLD AUTO: 2.6 % (ref 0.3–6.2)
ERYTHROCYTE [DISTWIDTH] IN BLOOD BY AUTOMATED COUNT: 17.8 % (ref 12.3–15.4)
HCT VFR BLD AUTO: 28.5 % (ref 34–46.6)
HGB BLD-MCNC: 9.5 G/DL (ref 12–15.9)
IMM GRANULOCYTES # BLD AUTO: 0.04 10*3/MM3 (ref 0–0.05)
IMM GRANULOCYTES NFR BLD AUTO: 0.4 % (ref 0–0.5)
LYMPHOCYTES # BLD AUTO: 1.04 10*3/MM3 (ref 0.7–3.1)
LYMPHOCYTES NFR BLD AUTO: 10.6 % (ref 19.6–45.3)
MCH RBC QN AUTO: 28.5 PG (ref 26.6–33)
MCHC RBC AUTO-ENTMCNC: 33.3 G/DL (ref 31.5–35.7)
MCV RBC AUTO: 85.6 FL (ref 79–97)
MONOCYTES # BLD AUTO: 0.71 10*3/MM3 (ref 0.1–0.9)
MONOCYTES NFR BLD AUTO: 7.3 % (ref 5–12)
NEUTROPHILS NFR BLD AUTO: 7.72 10*3/MM3 (ref 1.7–7)
NEUTROPHILS NFR BLD AUTO: 78.8 % (ref 42.7–76)
NRBC BLD AUTO-RTO: 0 /100 WBC (ref 0–0.2)
PLATELET # BLD AUTO: 287 10*3/MM3 (ref 140–450)
PMV BLD AUTO: 9.7 FL (ref 6–12)
RBC # BLD AUTO: 3.33 10*6/MM3 (ref 3.77–5.28)
WBC NRBC COR # BLD: 9.79 10*3/MM3 (ref 3.4–10.8)

## 2022-06-01 PROCEDURE — 85025 COMPLETE CBC W/AUTO DIFF WBC: CPT | Performed by: INTERNAL MEDICINE

## 2022-06-01 PROCEDURE — G0378 HOSPITAL OBSERVATION PER HR: HCPCS

## 2022-06-01 PROCEDURE — 63710000001 PREDNISONE PER 1 MG

## 2022-06-01 RX ORDER — PANTOPRAZOLE SODIUM 40 MG/1
40 TABLET, DELAYED RELEASE ORAL DAILY
Qty: 30 TABLET | Refills: 0 | Status: SHIPPED | OUTPATIENT
Start: 2022-06-01

## 2022-06-01 RX ADMIN — TOPIRAMATE 50 MG: 50 TABLET, FILM COATED ORAL at 08:40

## 2022-06-01 RX ADMIN — CETIRIZINE HYDROCHLORIDE 10 MG: 10 TABLET, FILM COATED ORAL at 08:39

## 2022-06-01 RX ADMIN — VENLAFAXINE HYDROCHLORIDE 37.5 MG: 37.5 CAPSULE, EXTENDED RELEASE ORAL at 08:40

## 2022-06-01 RX ADMIN — BUMETANIDE 2 MG: 1 TABLET ORAL at 08:39

## 2022-06-01 RX ADMIN — Medication 10 ML: at 08:40

## 2022-06-01 RX ADMIN — LEVOTHYROXINE SODIUM 150 MCG: 150 TABLET ORAL at 05:46

## 2022-06-01 RX ADMIN — PANTOPRAZOLE SODIUM 40 MG: 40 INJECTION, POWDER, FOR SOLUTION INTRAVENOUS at 08:30

## 2022-06-01 RX ADMIN — PREDNISONE 40 MG: 20 TABLET ORAL at 08:39

## 2022-06-01 NOTE — OUTREACH NOTE
Prep Survey    Flowsheet Row Responses   [facility] patient discharged from? [place]   Is LACE score < 7 ? No   Emergency Room discharge w/ pulse ox? No   Eligibility [facility]   Date of Admission 05/30/22   Date of Discharge 06/01/22   Discharge Disposition Home or Self Care   Discharge diagnosis Syncope and collapse Adenosquamous carcinoma of right lung    Does the patient have one of the following disease processes/diagnoses(primary or secondary)? Other   Does the patient have Home health ordered? No   Is there a DME ordered? No   Prep survey completed? Yes          YANNICK JARQUIN - Registered Nurse

## 2022-06-01 NOTE — DISCHARGE SUMMARY
[facility] Medicine Services  DISCHARGE SUMMARY       Date of Admission: 5/30/2022  Date of Discharge:  6/1/2022  Primary Care Physician: Jen Raymundo MD    Presenting Problem/History of Present Illness:  Lightheaded [R42]  Orthostatic dizziness [R42]  Syncope, unspecified syncope type [R55]  Gastrointestinal hemorrhage, unspecified gastrointestinal hemorrhage type [K92.2]       Final Discharge Diagnoses:    Syncope and collapse    Adenosquamous carcinoma of right lung (HCC)    Gastrointestinal hemorrhage    Acute cystitis without hematuria    CKD (chronic kidney disease) stage 3, GFR 30-59 ml/min (HCC)      Consults:   Consults     Date and Time Order Name Status Description    5/30/2022  8:09 AM Inpatient Gastroenterology Consult Completed     5/12/2022  9:15 AM Inpatient Nephrology Consult Completed     5/9/2022 11:12 PM Inpatient Neurology Consult Stroke Completed     5/9/2022  7:21 PM Inpatient Neurology Consult Stroke Completed           Procedures Performed: Procedure(s):  ESOPHAGOGASTRODUODENOSCOPY           05/31 1315 UPPER GI ENDOSCOPY    Pertinent Test Results:   Adult Transthoracic Echo Complete w/ Color, Spectral and Contrast if necessary per protocol    Result Date: 5/10/2022  · Estimated right ventricular systolic pressure from tricuspid regurgitation is normal (<35 mmHg). · Estimated left ventricular EF = 61% Left ventricular ejection fraction appears to be 61 - 65%. Left ventricular systolic function is normal. · Left ventricular diastolic function is consistent with (grade I) impaired relaxation. · Left atrial volume is mildly increased.      XR Chest 2 View    Addendum Date: 5/5/2022     ADDENDUM ADDENDUM #1 Addendum: Referring clinician was notified of findings by phone at 4:41 PM on 5/5/2022. Electronically signed by:  Trevor Joyce MD  5/5/2022 4:41 PM CDT Workstation: [id number]     Result Date: 5/5/2022  PROCEDURE: Chest PA and lateral  REASON FOR EXAM: sob, lung cancer, C34.91 Malignant neoplasm of unspecified part of right bronchus or lung R06.02 Shortness of breath FINDINGS: Comparison exam dated June 5, 2018. Cardiac and pulmonary vasculature are normal . Lungs are clear. Pleural spaces are normal . T5 anterior vertebral body mild wedge compression fracture of uncertain age with loss of anterior vertebral body height by 10-15%. Otherwise bony structures are unremarkable.     1.  T5 anterior vertebral body mild wedge compression fracture of uncertain age with loss of anterior vertebral body height by 10-15%. Recommend clinical correlation. 2.  Otherwise unremarkable chest. Electronically signed by:  Trevor Joyce MD  5/5/2022 4:34 PM CDT Workstation: [id number]    CT Head Without Contrast    Result Date: 5/30/2022  EXAM DESCRIPTION:  CT HEAD WO CONTRAST RadLex: CT HEAD WITHOUT IV CONTRAST CLINICAL HISTORY: syncope. TECHNIQUE: Noncontrast CT head. All CT scans at this facility use dose modulation, iterative reconstruction, and/or weight based dosing when appropriate to reduce radiation dose to as low as reasonably achievable. COMPARISON: CT brain 5/9/2022. FINDINGS: There is no acute intracranial bleed. No acute major territorial infarct is identified. There is stable volume loss. The ventricles appear stable, with slight asymmetric enlargement of the left lateral ventricle. There is no midline shift or abnormal mass effect. No acute fracture is identified.     1.  No acute intracranial abnormality. Electronically signed by:  Peyman Andrew DO  5/30/2022 6:20 AM CDT Workstation: [id number]    MRI Brain Without Contrast    Result Date: 5/10/2022  Procedure: MRI brain without contrast Reason for exam: TIA. FINDINGS: Multisequence multiplanar MR imaging of the brain was performed without contrast. The diffusion weighted series is normal. There is no abnormal signal to suggest restricted diffusion. Cerebral and cerebellar parenchyma are normal.  Ventricular system and subarachnoid spaces are normal.     Normal MRI of brain without contrast. Electronically signed by:  Trevor Joyce MD  5/10/2022 10:05 AM CDT Workstation: [id number]    XR Chest 1 View    Result Date: 5/30/2022  EXAM DESCRIPTION: XR CHEST 1 VW RadLex: XR CHEST 1 VIEW CLINICAL HISTORY: 62 years Female, syncope COMPARISON: Chest 5/13/2022. FINDINGS: Heart size and pulmonary vascularity appear within normal limits. No pneumothorax is identified. No focal consolidation is seen.     1.  No acute cardiopulmonary process. Electronically signed by:  Peyman Andrew DO  5/30/2022 6:17 AM CDT Workstation: [id number]    XR Chest 1 View    Result Date: 5/9/2022  PORTABLE CHEST HISTORY: Stroke protocol onset greater than 12 hours. Portable AP film of the chest was obtained at 4:43 PM. COMPARISON: May 5, 2022 FINDINGS: Low lung volumes. Linear atelectasis mid lungs and lung bases. The heart is not enlarged. The pulmonary vasculature is not increased. No pleural effusion. No pneumothorax. No acute osseous abnormality. Degenerative changes are present in the thoracic spine. Cholecystectomy.     CONCLUSION: Low lung volumes. Linear atelectasis mid lungs and lung bases. [id number] Electronically signed by:  Talon Tuttle MD  5/9/2022 6:03 PM CDT Workstation: [id number]    US Carotid Bilateral    Result Date: 5/10/2022  ULTRASOUND CAROTID DUPLEX SCAN HISTORY: Right lower extremity weakness. Other visualized disturbances. Presyncope. COMPARISON: None. Duplex ultrasound of the carotid bifurcations was performed. FINDINGS: Real time and color flow images demonstrate bilateral minimal plaque formation. The peak systolic velocity in the right internal carotid artery is 120.0 cm/s. End-diastolic velocity 41.2 cm/s. Ratio peak systolic velocity right internal carotid artery to right common carotid 1.6. Peak systolic velocity right external carotid 149.4 cm/s. The peak systolic velocity in left internal carotid artery is minimally  elevated at 132.3 cm/s. End-diastolic velocity minimally increased to 41.8 cm/s. Normal ratio peak systolic velocity left internal carotid artery to left common carotid artery of 1.4. Peak systolic velocity left external carotid artery 88.7 cm/s. Antegrade flow is present in each vertebral artery.     CONCLUSION: Less than 50% diameter reduction stenosis right internal carotid artery. Minimally elevated peak systolic and end diastolic velocities proximal left internal carotid artery with normal ratio and visually no significant stenosis. Suspect less than 50% diameter reduction stenosis left internal carotid artery. Antegrade flow is present in each vertebral artery. [id number] Electronically signed by:  Talon Tuttle MD  5/10/2022 10:58 AM CDT Workstation: [id number]    US Renal Bilateral    Result Date: 5/12/2022  Ultrasound renal complete HISTORY:  Chronic kidney disease stage IIIa. Acute kidney failure. Ultrasound examination of the kidneys and urinary bladder was performed. COMPARISON: None. FINDINGS: The right kidney measures 10.30 cm in length by 5.56 cm x 4.78 cm transverse. The left kidney measures 11.12 cm in length by 5.51 cm x 5.16 cm transverse. The kidneys are of normal echotexture. No hydronephrosis. No solid or cystic renal mass. Minimal dependent debris in the bladder. Bilateral ureteral jets. Bladder volume 251.50 mL.     CONCLUSION: Normal ultrasound kidneys. Minimal dependent debris in the bladder. [id number] Electronically signed by:  Talon Tuttle MD  5/12/2022 4:24 PM CDT Workstation: [id number]    US Venous Doppler Lower Extremity Bilateral (duplex)    Result Date: 5/11/2022  BILATERAL LOWER EXTREMITY VENOUS IMAGING CLINICAL HISTORY:  62 years Female,rule out DVT, N17.9 Acute kidney failure, unspecified H53.8 Other visual disturbances R29.898 Other symptoms and signs involving the musculoskeletal system Z74.09 Other reduced mobility Z78.9 Other specified health status Z74.09 Other reduced mobility  DESCRIPTION: The right common femoral vein, profunda femoral vein, femoral vein and popliteal vein have normal venous flow on color and duplex Doppler. The veins augment well, compress, and appear to be patent with no filling defects visible on grayscale or color Doppler imaging. The infrapopliteal veins also appear patent with no evidence of thrombus.  The left common femoral vein, profunda femoral vein, femoral vein, and popliteal vein have normal venous flow on color and duplex Doppler. The veins augment well, compress, and appear to be patent with no filling defects visible on grayscale or color Doppler imaging. The infrapopliteal veins also appear patent with no evidence of thrombus. The left popliteal fossa is a 3.6 x 1.4 x 2.7 cm Baker's cyst.      Impression: 1. No evidence for acute deep venous thrombus in the either lower extremity. 2. Baker's cyst in the left popliteal fossa. Electronically signed by:  Ajith Rodriguez MD  5/11/2022 12:14 PM CDT Workstation: [id number]    CT Head Without Contrast Stroke Protocol    Result Date: 5/9/2022  CT Head Without Contrast History: Blurred vision left eye. Right leg giving out. Stroke. Axial scans of the brain were obtained without intravenous contrast.  Coronal and sagital reconstructions were preformed. This exam was performed according to our departmental dose-optimization program, which includes automated exposure control, adjustment of the mA and/or kV according to patient size and/or use of iterative reconstruction technique. DLP: 829.60 Comparison: October 29, 2021 Findings: Bone windows are unremarkable. Minimal mucosal thickening ethmoid sinuses. Small retention cysts sphenoid sinuses. No acute process. Minimal cerebral and cerebellar atrophy. No hemorrhage. No mass. No abnormal areas of increased or decreased attenuation. No midline shift. No abnormal extra-axial fluid collections.     CONCLUSION: No acute process. Minimal cerebral and cerebellar atrophy.  "[id number] Electronically signed by:  Talon Tuttle MD  5/9/2022 5:30 PM CDT Workstation: [id number]    XR Chest PA & Lateral    Result Date: 5/13/2022  TWO VIEW CHEST HISTORY: Shortness of breath. Acute kidney failure. Frontal and lateral films of the chest were obtained. COMPARISON: May 9, 2022 FINDINGS:  EKG leads. Minimal cardiomegaly. The pulmonary vasculature is not increased. Very small bilateral pleural effusions. Linear atelectasis at the lung bases. No pneumothorax. No acute osseous abnormality. Degenerative changes are present in the thoracic and lumbar spine. Old minimal to moderate compression deformity mid thoracic spine. Cholecystectomy.     CONCLUSION: Minimal cardiomegaly. Very small bilateral pleural effusions. Linear atelectasis at the lung bases. [id number] Electronically signed by:  Talon Tuttle MD  5/13/2022 4:30 PM CDT Workstation: [id number]      HPI/Hospital Course:  The patient is a 62 y.o. female with a history of lung cancer on immunotherapy and recurrent TIAs on chronic anticoagulation with xarelto who presented to [facility] with syncope and melena.  She received 1 unit PRBC and Hgb improved and remained stable.  GI consulted and recommended EGD.  EGD revealed gastritis. She is recommended PPI, hold xarerlto until seen in follow-up, and follow-up with GI in 1 week.  She is stable and discharged to home.    Condition on Discharge:  Stable    Physical Exam on Discharge:  /57 (BP Location: Left arm, Patient Position: Sitting)   Pulse 57   Temp 98 °F (36.7 °C) (Temporal)   Resp 18   Ht 167.6 cm (66\")   Wt 93.9 kg (207 lb)   LMP  (LMP Unknown)   SpO2 100%   BMI 33.41 kg/m²   Physical Exam  Vitals reviewed.   Constitutional:       Appearance: Normal appearance.   HENT:      Head: Normocephalic and atraumatic.   Cardiovascular:      Rate and Rhythm: Normal rate and regular rhythm.   Pulmonary:      Effort: Pulmonary effort is normal. No respiratory distress.      Breath " sounds: Normal breath sounds.   Abdominal:      General: There is no distension.      Palpations: Abdomen is soft.   Musculoskeletal:         General: No swelling or deformity.   Skin:     General: Skin is warm and dry.   Neurological:      General: No focal deficit present.      Mental Status: She is alert and oriented to person, place, and time.           Discharge Disposition:  Home or Self Care    Discharge Medications:     Discharge Medications      New Medications      Instructions Start Date   pantoprazole 40 MG EC tablet  Commonly known as: Protonix  Notes to patient: 06/02/2022   40 mg, Oral, Daily         Continue These Medications      Instructions Start Date   aspirin 81 MG EC tablet  Notes to patient: 06/02/2022   81 mg, Oral, Daily      bumetanide 2 MG tablet  Commonly known as: BUMEX  Notes to patient: 05/31/2022   2 mg, Oral, 2 Times Daily      levothyroxine 150 MCG tablet  Commonly known as: SYNTHROID, LEVOTHROID  Notes to patient: 06/02/2022   150 mcg, Oral, Daily      loratadine 10 MG tablet  Commonly known as: CLARITIN  Notes to patient: As needed   10 mg, Oral, As Needed      multivitamin with minerals tablet tablet  Notes to patient: 06/02/2022   1 tablet, Oral, Daily      ondansetron 8 MG tablet  Commonly known as: ZOFRAN  Notes to patient: As needed   8 mg, Oral, Every 8 Hours PRN      predniSONE 20 MG tablet  Commonly known as: DELTASONE  Notes to patient: 06/02/2022   40 mg, Oral, Daily With Breakfast      prochlorperazine 10 MG tablet  Commonly known as: COMPAZINE  Notes to patient: As needed   10 mg, Oral, Every 6 Hours PRN      promethazine 25 MG tablet  Commonly known as: PHENERGAN  Notes to patient: As needed   25 mg, Oral, Every 6 Hours PRN      Rozlytrek 200 MG capsule  Generic drug: entrectinib  Notes to patient: 06/02/2022   600 mg, Oral, Daily      SUMAtriptan 20 MG/ACT nasal spray  Commonly known as: IMITREX  Notes to patient: As needed   INSTILL 1 SPRAY INTO EACH NOSTRIL EVERY 2  HOURS AS NEEDED FOR MIGRAINE      topiramate 50 MG tablet  Commonly known as: TOPAMAX  Notes to patient: 06/01/2022   50 mg, Oral, Nightly      traMADol 50 MG tablet  Commonly known as: ULTRAM  Notes to patient: As needed   50 mg, Oral, Every 8 Hours PRN      venlafaxine XR 37.5 MG 24 hr capsule  Commonly known as: EFFEXOR-XR  Notes to patient: 06/02/2022   TAKE 1 CAPSULE BY MOUTH EVERY DAY         Stop These Medications    rivaroxaban 20 MG tablet  Commonly known as: XARELTO            Discharge Diet:   Diet Instructions     Diet: Regular; Thin      Discharge Diet: Regular    Fluid Consistency: Thin          Activity at Discharge:   Activity Instructions     Activity as Tolerated              Follow-up Appointments:   Future Appointments   Date Time Provider Department [place]   6/7/2022  9:45 AM Jen Raymundo MD [facility] [address]   6/7/2022 11:00 AM Jana Whitley APRN [facility]   8/18/2022  2:30 PM Jen Raymundo MD [facility] [address]         SILVA Carrizales  06/01/22  12:15 CDT

## 2022-06-01 NOTE — PLAN OF CARE
"Goal Outcome Evaluation:              Pt had episode of feeling \"a little dizzy\" while walking with  in hallway. Sat down in chair until symptoms resolved. Ambulated without difficulty to room. No other complaints this shift. Verified with telemetry monitor that pt did not have any arrhythmias during episode.  "

## 2022-06-02 ENCOUNTER — TRANSITIONAL CARE MANAGEMENT TELEPHONE ENCOUNTER (OUTPATIENT)
Dept: CALL CENTER | Facility: HOSPITAL | Age: 62
End: 2022-06-02

## 2022-06-02 NOTE — OUTREACH NOTE
Call Center TCM Note    Flowsheet Row Responses   [facility] patient discharged from? [place]   Does the patient have one of the following disease processes/diagnoses(primary or secondary)? Other   TCM attempt successful? No  [verbal release on file for , son and daughter (contact information is not on demographic)]   Unsuccessful attempts Attempt 1   Comments regarding PCP Hospital PCP FOLLOW UP APPOINTMENT IS 6/7/22@0945am          Sharon Valencia RN    6/2/2022, 14:57 EDT

## 2022-06-02 NOTE — OUTREACH NOTE
Call Center TCM Note    Flowsheet Row Responses   [facility] patient discharged from? [place]   Does the patient have one of the following disease processes/diagnoses(primary or secondary)? Other   TCM attempt successful? No   Unsuccessful attempts Attempt 2   Comments regarding PCP [facility] PCP FOLLOW UP APPOINTMENT IS 6/7/22@0945am          Sharon Valencia RN    6/2/2022, 16:49 EDT

## 2022-06-03 ENCOUNTER — TRANSITIONAL CARE MANAGEMENT TELEPHONE ENCOUNTER (OUTPATIENT)
Dept: CALL CENTER | Facility: HOSPITAL | Age: 62
End: 2022-06-03

## 2022-06-03 NOTE — OUTREACH NOTE
Call Center TCM Note    Flowsheet Row Responses   [facility] patient discharged from? [place]   Does the patient have one of the following disease processes/diagnoses(primary or secondary)? Other   TCM attempt successful? Yes   Call start time 1126   Call end time 1129   Discharge diagnosis Syncope and collapse Adenosquamous carcinoma of right lung    Person spoke with today (if not patient) and relationship Ted,    Medication alerts for this patient Verified that patient is not taking Xarelto as AVS indicates   Meds reviewed with patient/caregiver? Yes   Is the patient having any side effects they believe may be caused by any medication additions or changes? No   Does the patient have all medications ordered at discharge? Yes   Is the patient taking all medications as directed (includes completed medication regime)? Yes   Comments regarding appointments GI 6/7/22   Does the patient have a primary care provider?  Yes   Does the patient have an appointment with their PCP within 7 days of discharge? Yes   Comments regarding PCP Hospital PCP FOLLOW UP APPOINTMENT IS 6/7/22@0945am   Has the patient kept scheduled appointments due by today? N/A   Has home health visited the patient within 72 hours of discharge? N/A   Psychosocial issues? No   Did the patient receive a copy of their discharge instructions? Yes   Nursing interventions Reviewed instructions with patient   What is the patient's perception of their health status since discharge? Improving  [Spoke with  as patient not available--call brief as he had no issues or concerns. Denies melena.  Confirms adherence to med changes and confirmed MD appts. ]   Is the patient/caregiver able to teach back signs and symptoms related to disease process for when to call PCP? Yes   Is the patient/caregiver able to teach back signs and symptoms related to disease process for when to call 911? Yes   TCM call completed? Yes          Sharon Valencia  RN    6/3/2022, 11:31 EDT

## 2022-06-07 ENCOUNTER — OFFICE VISIT (OUTPATIENT)
Dept: GASTROENTEROLOGY | Facility: CLINIC | Age: 62
End: 2022-06-07

## 2022-06-07 ENCOUNTER — OFFICE VISIT (OUTPATIENT)
Dept: FAMILY MEDICINE CLINIC | Facility: CLINIC | Age: 62
End: 2022-06-07

## 2022-06-07 VITALS
BODY MASS INDEX: 34.17 KG/M2 | SYSTOLIC BLOOD PRESSURE: 108 MMHG | DIASTOLIC BLOOD PRESSURE: 64 MMHG | WEIGHT: 212.6 LBS | HEIGHT: 66 IN | HEART RATE: 85 BPM

## 2022-06-07 VITALS
HEART RATE: 89 BPM | OXYGEN SATURATION: 99 % | DIASTOLIC BLOOD PRESSURE: 60 MMHG | HEIGHT: 66 IN | BODY MASS INDEX: 34.23 KG/M2 | WEIGHT: 213 LBS | SYSTOLIC BLOOD PRESSURE: 114 MMHG

## 2022-06-07 DIAGNOSIS — D50.0 IRON DEFICIENCY ANEMIA DUE TO CHRONIC BLOOD LOSS: Primary | ICD-10-CM

## 2022-06-07 DIAGNOSIS — C34.90 PRIMARY MALIGNANT NEOPLASM OF LUNG METASTATIC TO OTHER SITE, UNSPECIFIED LATERALITY: ICD-10-CM

## 2022-06-07 DIAGNOSIS — R53.81 MALAISE AND FATIGUE: ICD-10-CM

## 2022-06-07 DIAGNOSIS — T45.1X5A ANEMIA DUE TO ANTINEOPLASTIC CHEMOTHERAPY: ICD-10-CM

## 2022-06-07 DIAGNOSIS — D64.81 ANEMIA DUE TO ANTINEOPLASTIC CHEMOTHERAPY: ICD-10-CM

## 2022-06-07 DIAGNOSIS — R55 SYNCOPE AND COLLAPSE: Primary | ICD-10-CM

## 2022-06-07 DIAGNOSIS — R53.83 MALAISE AND FATIGUE: ICD-10-CM

## 2022-06-07 PROBLEM — F32.A DEPRESSIVE DISORDER: Status: ACTIVE | Noted: 2021-07-08

## 2022-06-07 PROBLEM — C34.91 NSCLC OF RIGHT LUNG (HCC): Status: ACTIVE | Noted: 2021-08-12

## 2022-06-07 PROBLEM — R59.1 LYMPHADENOPATHY: Status: ACTIVE | Noted: 2021-08-09

## 2022-06-07 PROBLEM — G43.909 MIGRAINE: Status: ACTIVE | Noted: 2021-07-08

## 2022-06-07 PROBLEM — E03.9 ACQUIRED HYPOTHYROIDISM: Status: ACTIVE | Noted: 2021-07-08

## 2022-06-07 PROCEDURE — 99213 OFFICE O/P EST LOW 20 MIN: CPT | Performed by: GENERAL PRACTICE

## 2022-06-07 PROCEDURE — 99213 OFFICE O/P EST LOW 20 MIN: CPT | Performed by: NURSE PRACTITIONER

## 2022-06-07 RX ORDER — SIMETHICONE 20 MG/.3ML
333 EMULSION ORAL ONCE
Status: CANCELLED | OUTPATIENT
Start: 2022-06-20 | End: 2022-06-07

## 2022-06-07 NOTE — PROGRESS NOTES
Transitional Care Follow Up Visit  Subjective     Adilene Morgan is a 62 y.o. female who presents for a transitional care management visit.    Within 48 business hours after discharge our office contacted her via telephone to coordinate her care and needs.      I reviewed and discussed the details of that call along with the discharge summary, hospital problems, inpatient lab results, inpatient diagnostic studies, and consultation reports with Adilene.     Current outpatient and discharge medications have been reconciled for the patient.  Reviewed by: Jen Raymundo MD      Date of TCM Phone Call 6/1/2022   [facility]   Date of Admission 5/30/2022   Date of Discharge 6/1/2022   Discharge Disposition Home or Self Care     Risk for Readmission (LACE) Score: 8 (6/1/2022  5:01 AM)      History of Present Illness   Course During Hospital Stay:  Recent hospitalization, labs, xrays reviewed and medications reconciled.  Has not had any further syncopal episode.      Copied from hospital record:     Final Discharge Diagnoses:    Syncope and collapse    Adenosquamous carcinoma of right lung (HCC)    Gastrointestinal hemorrhage    Acute cystitis without hematuria    CKD (chronic kidney disease) stage 3, GFR 30-59 ml/min (HCC)    HPI/Hospital Course:  The patient is a 62 y.o. female with a history of lung cancer on immunotherapy and recurrent TIAs on chronic anticoagulation with xarelto who presented to [facility] with syncope and melena.  She received 1 unit PRBC and Hgb improved and remained stable.  GI consulted and recommended EGD.  EGD revealed gastritis. She is recommended PPI, hold xarerlto until seen in follow-up, and follow-up with GI in 1 week.  She is stable and discharged to home.     The following portions of the patient's history were reviewed and updated as appropriate: allergies, current medications, past family history, past medical history, past  "social history, past surgical history and problem list.  Outpatient Medications Prior to Visit   Medication Sig Dispense Refill   • aspirin 81 MG EC tablet Take 81 mg by mouth Daily.     • bumetanide (BUMEX) 2 MG tablet Take 1 tablet by mouth 2 (Two) Times a Day for 30 days. 60 tablet 0   • entrectinib (ROZLYTREK) 200 MG capsule Take 600 mg by mouth Daily.     • levothyroxine (SYNTHROID, LEVOTHROID) 150 MCG tablet Take 150 mcg by mouth Daily.     • loratadine (CLARITIN) 10 MG tablet Take 10 mg by mouth As Needed for Allergies.     • multivitamin with minerals tablet tablet Take 1 tablet by mouth Daily.     • ondansetron (ZOFRAN) 8 MG tablet Take 1 tablet by mouth Every 8 (Eight) Hours As Needed for Nausea or Vomiting. 90 tablet 1   • pantoprazole (Protonix) 40 MG EC tablet Take 1 tablet by mouth Daily. 30 tablet 0   • prochlorperazine (COMPAZINE) 10 MG tablet Take 1 tablet by mouth Every 6 (Six) Hours As Needed for Nausea or Vomiting. 120 tablet 1   • promethazine (PHENERGAN) 25 MG tablet Take 1 tablet by mouth Every 6 (Six) Hours As Needed for Nausea or Vomiting. 60 tablet 1   • SUMAtriptan (IMITREX) 20 MG/ACT nasal spray INSTILL 1 SPRAY INTO EACH NOSTRIL EVERY 2 HOURS AS NEEDED FOR MIGRAINE 6 each 5   • topiramate (TOPAMAX) 50 MG tablet TAKE 1 TABLET BY MOUTH EVERY NIGHT. 90 tablet 3   • traMADol (ULTRAM) 50 MG tablet Take 50 mg by mouth Every 8 (Eight) Hours As Needed for Moderate Pain  or Severe Pain .     • venlafaxine XR (EFFEXOR-XR) 37.5 MG 24 hr capsule TAKE 1 CAPSULE BY MOUTH EVERY DAY 90 capsule 3     No facility-administered medications prior to visit.       Review of Systems  I have reviewed 12 systems with patient. Findings were negative except what is noted below and/or in history of present illness.    Objective   Visit Vitals  /60   Pulse 89   Ht 167.6 cm (66\")   Wt 96.6 kg (213 lb)   LMP  (LMP Unknown)   SpO2 99%   BMI 34.38 kg/m²       Physical Exam  Vitals and nursing note reviewed. "   Constitutional:       General: She is not in acute distress.     Appearance: She is well-developed.   HENT:      Head: Normocephalic and atraumatic.      Nose: Nose normal.   Eyes:      General:         Right eye: No discharge.         Left eye: No discharge.      Conjunctiva/sclera: Conjunctivae normal.      Pupils: Pupils are equal, round, and reactive to light.   Neck:      Thyroid: No thyromegaly.   Cardiovascular:      Rate and Rhythm: Normal rate and regular rhythm.      Heart sounds: Normal heart sounds.   Pulmonary:      Effort: Pulmonary effort is normal.      Breath sounds: Normal breath sounds.   Lymphadenopathy:      Cervical: No cervical adenopathy.   Skin:     General: Skin is warm and dry.   Neurological:      Mental Status: She is alert and oriented to person, place, and time.       Assessment & Plan   Diagnoses and all orders for this visit:    1. Syncope and collapse (Primary)    2. Primary malignant neoplasm of lung metastatic to other site, unspecified laterality (HCC)    3. Anemia due to antineoplastic chemotherapy    Continue current medications. Follow up with specialists as scheduled.     No orders of the defined types were placed in this encounter.      Current outpatient and discharge medications have been reconciled for the patient.  Reviewed by: Jen Raymundo MD      Return if symptoms worsen or fail to improve, for Next scheduled follow up.

## 2022-06-07 NOTE — PROGRESS NOTES
Chief Complaint   Patient presents with   • GI Bleeding     Hospital f/u       Subjective    Adilene Morgan is a 62 y.o. female. she is here today for follow-up.    History of Present Illness  62-year-old female presents to discuss EGD results.  She was hospitalized 5/30/2022 through 6/1/2022 due to syncope and GI bleed.  She received 1 unit packed red blood cells hemoglobin improved and remained stable.  Reports she has had no further episodes of melena.  EGD was completed which just noted gastritis no specimens were collected.  She previously had colonoscopy per Dr. Ugarte in 2017.  She has lung cancer followed by the [facility] [place] follow-up they are planned for July 19 and is on immunotherapy Rozlytrek and had 10 days of radiation in August that diagnosis  Plan; plan capsule endoscopy to rule out small bowel bleed.     The following portions of the patient's history were reviewed and updated as appropriate:   Past Medical History:   Diagnosis Date   • Achilles bursitis    • Achilles tendinitis    • Acquired hypothyroidism    • Allergic rhinitis    • Allergy to meat     alphagal   • Allergy to milk products    • Arrhythmia    • Asthmatic bronchitis    • Asthmatic bronchitis    • Bone spur    • Calcaneal spur    • Cancer (HCC)    • Depressive disorder    • Family history of thyroid problem    • Herpes simplex    • Hypermetropia    • Hypothyroidism    • Menopausal flushing    • Menopause    • Migraine    • Otalgia    • Pneumonia    • Presbyopia    • Stroke (HCC)    • Trochanteric bursitis    • UTI (urinary tract infection) 07/03/2018   • Ventricular premature beats      Past Surgical History:   Procedure Laterality Date   • COLONOSCOPY N/A 2/15/2017    Procedure: COLONOSCOPY;  Surgeon: Lupillo Ugarte MD;  Location: [facility] ENDOSCOPY;  Service:    • COSMETIC SURGERY      plastic surgery to face x 4 r/t trauma   • LAPAROSCOPIC CHOLECYSTECTOMY  12/10/1995    Cholecystectomy, laparoscopic (1)      •  OTHER SURGICAL HISTORY      Head surgery procedure (1)    plastic surgery on the face    • OTHER SURGICAL HISTORY  10/17/2014    Repair Superficial Wound TR-EXT 2.5 < CM 65829 (1)        Family History   Problem Relation Age of Onset   • Stroke Mother    • Diabetes Father    • Heart disease Father    • Hypertension Father    • Thyroid disease Sister    • Breast cancer Maternal Aunt    • Cancer Neg Hx         multiple in mothers family     OB History        3    Para   3    Term   3            AB        Living           SAB        IAB        Ectopic        Molar        Multiple        Live Births                  Prior to Admission medications    Medication Sig Start Date End Date Taking? Authorizing Provider   aspirin 81 MG EC tablet Take 81 mg by mouth Daily.   Yes Bing Mcclelland MD   bumetanide (BUMEX) 2 MG tablet Take 1 tablet by mouth 2 (Two) Times a Day for 30 days. 22 Yes Sher Avalos DO   entrectinib (ROZLYTREK) 200 MG capsule Take 600 mg by mouth Daily.   Yes Bing Mcclelland MD   levothyroxine (SYNTHROID, LEVOTHROID) 150 MCG tablet Take 150 mcg by mouth Daily.   Yes Bing Mcclelland MD   loratadine (CLARITIN) 10 MG tablet Take 10 mg by mouth As Needed for Allergies.   Yes ProviderBing MD   multivitamin with minerals tablet tablet Take 1 tablet by mouth Daily.   Yes ProviderBing MD   ondansetron (ZOFRAN) 8 MG tablet Take 1 tablet by mouth Every 8 (Eight) Hours As Needed for Nausea or Vomiting. 3/18/22  Yes Jen Raymundo MD   pantoprazole (Protonix) 40 MG EC tablet Take 1 tablet by mouth Daily. 22  Yes Patel Kevin APRN   prochlorperazine (COMPAZINE) 10 MG tablet Take 1 tablet by mouth Every 6 (Six) Hours As Needed for Nausea or Vomiting. 3/18/22  Yes Jen Raymundo MD   promethazine (PHENERGAN) 25 MG tablet Take 1 tablet by mouth Every 6 (Six) Hours As Needed for Nausea or Vomiting. 22  Yes Jen Raymundo  "MD   SUMAtriptan (IMITREX) 20 MG/ACT nasal spray INSTILL 1 SPRAY INTO EACH NOSTRIL EVERY 2 HOURS AS NEEDED FOR MIGRAINE 8/23/21  Yes Jen Raymundo MD   topiramate (TOPAMAX) 50 MG tablet TAKE 1 TABLET BY MOUTH EVERY NIGHT. 2/9/22  Yes Jen Raymundo MD   traMADol (ULTRAM) 50 MG tablet Take 50 mg by mouth Every 8 (Eight) Hours As Needed for Moderate Pain  or Severe Pain .   Yes ProviderBing MD   venlafaxine XR (EFFEXOR-XR) 37.5 MG 24 hr capsule TAKE 1 CAPSULE BY MOUTH EVERY DAY 3/3/22  Yes Jen Raymundo MD   rivaroxaban (Xarelto) 20 MG tablet Take 20 mg by mouth Daily.    Provider, MD Bing     Allergies   Allergen Reactions   • Azithromycin    • Cefaclor Diarrhea and Nausea Only     Other reaction(s): Nausea Only   • Cephalexin Unknown - High Severity   • Lipitor [Atorvastatin] Nausea And Vomiting   • Penicillin G Unknown - High Severity   • Penicillins    • Zocor [Simvastatin] Nausea And Vomiting     Social History     Socioeconomic History   • Marital status:    Tobacco Use   • Smoking status: Never Smoker   • Smokeless tobacco: Never Used   Vaping Use   • Vaping Use: Never used   Substance and Sexual Activity   • Alcohol use: No   • Drug use: No   • Sexual activity: Defer       Review of Systems  Review of Systems   Constitutional: Positive for fatigue. Negative for activity change, appetite change, chills, diaphoresis, fever and unexpected weight change.   HENT: Negative for sore throat and trouble swallowing.    Respiratory: Negative for shortness of breath.    Gastrointestinal: Positive for abdominal pain. Negative for abdominal distention, anal bleeding, blood in stool, constipation, diarrhea, nausea, rectal pain and vomiting.   Musculoskeletal: Negative for arthralgias.   Skin: Negative for pallor.   Neurological: Negative for light-headedness.        /64 (BP Location: Left arm)   Pulse 85   Ht 167.6 cm (66\")   Wt 96.4 kg (212 lb 9.6 oz)   LMP  (LMP Unknown)   " BMI 34.31 kg/m²     Objective    Physical Exam  Constitutional:       General: She is not in acute distress.     Appearance: Normal appearance. She is normal weight. She is not ill-appearing.   HENT:      Head: Normocephalic and atraumatic.   Pulmonary:      Effort: Pulmonary effort is normal.   Abdominal:      General: Abdomen is flat. Bowel sounds are normal. There is no distension.      Palpations: Abdomen is soft. There is no mass.      Tenderness: There is generalized abdominal tenderness (mild ttp ).   Neurological:      Mental Status: She is alert.       Admission on 05/30/2022, Discharged on 06/01/2022   Component Date Value Ref Range Status   • Glucose 05/30/2022 115 (A) 65 - 99 mg/dL Final   • BUN 05/30/2022 46 (A) 8 - 23 mg/dL Final   • Creatinine 05/30/2022 2.29 (A) 0.57 - 1.00 mg/dL Final   • Sodium 05/30/2022 138  136 - 145 mmol/L Final   • Potassium 05/30/2022 4.0  3.5 - 5.2 mmol/L Final   • Chloride 05/30/2022 101  98 - 107 mmol/L Final   • CO2 05/30/2022 27.0  22.0 - 29.0 mmol/L Final   • Calcium 05/30/2022 8.3 (A) 8.6 - 10.5 mg/dL Final   • Total Protein 05/30/2022 6.2  6.0 - 8.5 g/dL Final   • Albumin 05/30/2022 3.50  3.50 - 5.20 g/dL Final   • ALT (SGPT) 05/30/2022 19  1 - 33 U/L Final   • AST (SGOT) 05/30/2022 19  1 - 32 U/L Final   • Alkaline Phosphatase 05/30/2022 107  39 - 117 U/L Final   • Total Bilirubin 05/30/2022 0.2  0.0 - 1.2 mg/dL Final   • Globulin 05/30/2022 2.7  gm/dL Final   • A/G Ratio 05/30/2022 1.3  g/dL Final   • BUN/Creatinine Ratio 05/30/2022 20.1  7.0 - 25.0 Final   • Anion Gap 05/30/2022 10.0  5.0 - 15.0 mmol/L Final   • eGFR 05/30/2022 23.5 (A) >60.0 mL/min/1.73 Final    National Kidney Foundation and American Society of Nephrology (ASN) Task Force recommended calculation based on the Chronic Kidney Disease Epidemiology Collaboration (CKD-EPI) equation refit without adjustment for race.   • QT Interval 05/30/2022 378  ms Final   • QTC Interval 05/30/2022 430  ms Final   •  Lipase 05/30/2022 27  13 - 60 U/L Final   • Color, UA 05/30/2022 Yellow  Yellow, Straw, Dark Yellow, Charlene Final   • Appearance, UA 05/30/2022 Turbid (A) Clear Final   • pH, UA 05/30/2022 7.0  5.0 - 9.0 Final   • Specific Gravity, UA 05/30/2022 1.012  1.003 - 1.030 Final   • Glucose, UA 05/30/2022 Negative  Negative Final   • Ketones, UA 05/30/2022 Negative  Negative Final   • Bilirubin, UA 05/30/2022 Negative  Negative Final   • Blood, UA 05/30/2022 Large (3+) (A) Negative Final   • Protein, UA 05/30/2022 100 mg/dL (2+) (A) Negative Final   • Leuk Esterase, UA 05/30/2022 Large (3+) (A) Negative Final   • Nitrite, UA 05/30/2022 Positive (A) Negative Final   • Urobilinogen, UA 05/30/2022 0.2 E.U./dL  0.2 - 1.0 E.U./dL Final   • Magnesium 05/30/2022 2.2  1.6 - 2.4 mg/dL Final   • Lactate 05/30/2022 1.9  0.5 - 2.0 mmol/L Final   • THC, Screen, Urine 05/30/2022 Negative  Negative Final   • Phencyclidine (PCP), Urine 05/30/2022 Negative  Negative Final   • Cocaine Screen, Urine 05/30/2022 Negative  Negative Final   • Methamphetamine, Ur 05/30/2022 Negative  Negative Final   • Opiate Screen 05/30/2022 Negative  Negative Final   • Amphetamine Screen, Urine 05/30/2022 Negative  Negative Final   • Benzodiazepine Screen, Urine 05/30/2022 Negative  Negative Final   • Tricyclic Antidepressants Screen 05/30/2022 Negative  Negative Final   • Methadone Screen, Urine 05/30/2022 Negative  Negative Final   • Barbiturates Screen, Urine 05/30/2022 Negative  Negative Final   • Oxycodone Screen, Urine 05/30/2022 Negative  Negative Final   • Propoxyphene Screen 05/30/2022 Negative  Negative Final   • Buprenorphine, Screen, Urine 05/30/2022 Negative  Negative Final   • Ethanol 05/30/2022 <10  0 - 10 mg/dL Final   • Ethanol % 05/30/2022 <0.010  % Final   • WBC 05/30/2022 10.93 (A) 3.40 - 10.80 10*3/mm3 Final   • RBC 05/30/2022 2.69 (A) 3.77 - 5.28 10*6/mm3 Final   • Hemoglobin 05/30/2022 7.6 (A) 12.0 - 15.9 g/dL Final   • Hematocrit  05/30/2022 22.9 (A) 34.0 - 46.6 % Final   • MCV 05/30/2022 85.1  79.0 - 97.0 fL Final   • MCH 05/30/2022 28.3  26.6 - 33.0 pg Final   • MCHC 05/30/2022 33.2  31.5 - 35.7 g/dL Final   • RDW 05/30/2022 17.1 (A) 12.3 - 15.4 % Final   • RDW-SD 05/30/2022 52.4  37.0 - 54.0 fl Final   • MPV 05/30/2022 9.7  6.0 - 12.0 fL Final   • Platelets 05/30/2022 295  140 - 450 10*3/mm3 Final   • Neutrophil % 05/30/2022 83.8 (A) 42.7 - 76.0 % Final   • Lymphocyte % 05/30/2022 6.2 (A) 19.6 - 45.3 % Final   • Monocyte % 05/30/2022 5.9  5.0 - 12.0 % Final   • Eosinophil % 05/30/2022 2.7  0.3 - 6.2 % Final   • Basophil % 05/30/2022 0.4  0.0 - 1.5 % Final   • Immature Grans % 05/30/2022 1.0 (A) 0.0 - 0.5 % Final   • Neutrophils, Absolute 05/30/2022 9.16 (A) 1.70 - 7.00 10*3/mm3 Final   • Lymphocytes, Absolute 05/30/2022 0.68 (A) 0.70 - 3.10 10*3/mm3 Final   • Monocytes, Absolute 05/30/2022 0.64  0.10 - 0.90 10*3/mm3 Final   • Eosinophils, Absolute 05/30/2022 0.30  0.00 - 0.40 10*3/mm3 Final   • Basophils, Absolute 05/30/2022 0.04  0.00 - 0.20 10*3/mm3 Final   • Immature Grans, Absolute 05/30/2022 0.11 (A) 0.00 - 0.05 10*3/mm3 Final   • nRBC 05/30/2022 0.0  0.0 - 0.2 /100 WBC Final   • Extra Tube 05/30/2022 Hold for add-ons.   Final    Auto resulted.   • Extra Tube 05/30/2022 Hold for add-ons.   Final    Auto resulted   • ABO Type 05/30/2022 A   Final   • RH type 05/30/2022 Positive   Final   • Antibody Screen 05/30/2022 Negative   Final   • T&S Expiration Date 05/30/2022 6/2/2022 11:59:59 PM   Final   • RBC, UA 05/30/2022 31-50 (A) None Seen /HPF Final   • WBC, UA 05/30/2022 Too Numerous to Count (A) None Seen, 0-2, 3-5 /HPF Final   • Bacteria, UA 05/30/2022 4+ (A) None Seen /HPF Final   • Squamous Epithelial Cells, UA 05/30/2022 3-5 (A) None Seen, 0-2 /HPF Final   • Transitional Epithelial Cells, UA 05/30/2022 3-6 (A) 0 - 2 /HPF Final   • Hyaline Casts, UA 05/30/2022 Unable to determine due to loaded field  None Seen /LPF Final   •  Methodology 05/30/2022 Manual Light Microscopy   Final   • COVID19 05/30/2022 Not Detected  Not Detected - Ref. Range Final   • Influenza A PCR 05/30/2022 Not Detected  Not Detected Final   • Influenza B PCR 05/30/2022 Not Detected  Not Detected Final   • Protime 05/30/2022 26.7 (A) 11.1 - 15.3 Seconds Final   • INR 05/30/2022 2.48 (A) 0.80 - 1.20 Final   • PTT 05/30/2022 30.2  20.0 - 40.3 seconds Final   • TSH 05/30/2022 8.750 (A) 0.270 - 4.200 uIU/mL Final   • Previous History 05/30/2022 Previous Record on File   Final   • Hemoglobin 05/30/2022 9.5 (A) 12.0 - 15.9 g/dL Final   • Hematocrit 05/30/2022 28.7 (A) 34.0 - 46.6 % Final   • Product Code 05/31/2022 [id number]   Final   • Unit Number 05/31/2022 [id number]   Final   • UNIT  ABO 05/31/2022 A   Final   • UNIT  RH 05/31/2022 POS   Final   • Crossmatch Interpretation 05/31/2022 Compatible   Final   • Dispense Status 05/31/2022 PT   Final   • Blood Expiration Date 05/31/2022 [id number]   Final   • Hemoglobin 05/31/2022 8.8 (A) 12.0 - 15.9 g/dL Final   • Hematocrit 05/31/2022 26.6 (A) 34.0 - 46.6 % Final   • C. Difficile Toxins by PCR 05/30/2022 Negative  Negative Final   • Glucose 05/31/2022 95  65 - 99 mg/dL Final   • BUN 05/31/2022 33 (A) 8 - 23 mg/dL Final   • Creatinine 05/31/2022 1.89 (A) 0.57 - 1.00 mg/dL Final   • Sodium 05/31/2022 142  136 - 145 mmol/L Final   • Potassium 05/31/2022 3.6  3.5 - 5.2 mmol/L Final   • Chloride 05/31/2022 102  98 - 107 mmol/L Final   • CO2 05/31/2022 30.0 (A) 22.0 - 29.0 mmol/L Final   • Calcium 05/31/2022 8.3 (A) 8.6 - 10.5 mg/dL Final   • Total Protein 05/31/2022 6.9  6.0 - 8.5 g/dL Final   • Albumin 05/31/2022 3.80  3.50 - 5.20 g/dL Final   • ALT (SGPT) 05/31/2022 20  1 - 33 U/L Final   • AST (SGOT) 05/31/2022 20  1 - 32 U/L Final   • Alkaline Phosphatase 05/31/2022 112  39 - 117 U/L Final   • Total Bilirubin 05/31/2022 0.4  0.0 - 1.2 mg/dL Final   • Globulin 05/31/2022 3.1  gm/dL Final   • A/G Ratio 05/31/2022 1.2   g/dL Final   • BUN/Creatinine Ratio 05/31/2022 17.5  7.0 - 25.0 Final   • Anion Gap 05/31/2022 10.0  5.0 - 15.0 mmol/L Final   • eGFR 05/31/2022 29.6 (A) >60.0 mL/min/1.73 Final    National Kidney Foundation and American Society of Nephrology (ASN) Task Force recommended calculation based on the Chronic Kidney Disease Epidemiology Collaboration (CKD-EPI) equation refit without adjustment for race.   • WBC 05/31/2022 12.03 (A) 3.40 - 10.80 10*3/mm3 Final   • RBC 05/31/2022 3.40 (A) 3.77 - 5.28 10*6/mm3 Final   • Hemoglobin 05/31/2022 9.6 (A) 12.0 - 15.9 g/dL Final   • Hematocrit 05/31/2022 29.1 (A) 34.0 - 46.6 % Final   • MCV 05/31/2022 85.6  79.0 - 97.0 fL Final   • MCH 05/31/2022 28.2  26.6 - 33.0 pg Final   • MCHC 05/31/2022 33.0  31.5 - 35.7 g/dL Final   • RDW 05/31/2022 17.9 (A) 12.3 - 15.4 % Final   • RDW-SD 05/31/2022 54.4 (A) 37.0 - 54.0 fl Final   • MPV 05/31/2022 9.4  6.0 - 12.0 fL Final   • Platelets 05/31/2022 324  140 - 450 10*3/mm3 Final   • Neutrophil % 05/31/2022 84.1 (A) 42.7 - 76.0 % Final   • Lymphocyte % 05/31/2022 8.2 (A) 19.6 - 45.3 % Final   • Monocyte % 05/31/2022 5.8  5.0 - 12.0 % Final   • Eosinophil % 05/31/2022 1.0  0.3 - 6.2 % Final   • Basophil % 05/31/2022 0.3  0.0 - 1.5 % Final   • Immature Grans % 05/31/2022 0.6 (A) 0.0 - 0.5 % Final   • Neutrophils, Absolute 05/31/2022 10.11 (A) 1.70 - 7.00 10*3/mm3 Final   • Lymphocytes, Absolute 05/31/2022 0.99  0.70 - 3.10 10*3/mm3 Final   • Monocytes, Absolute 05/31/2022 0.70  0.10 - 0.90 10*3/mm3 Final   • Eosinophils, Absolute 05/31/2022 0.12  0.00 - 0.40 10*3/mm3 Final   • Basophils, Absolute 05/31/2022 0.04  0.00 - 0.20 10*3/mm3 Final   • Immature Grans, Absolute 05/31/2022 0.07 (A) 0.00 - 0.05 10*3/mm3 Final   • nRBC 05/31/2022 0.0  0.0 - 0.2 /100 WBC Final   • WBC 06/01/2022 9.79  3.40 - 10.80 10*3/mm3 Final   • RBC 06/01/2022 3.33 (A) 3.77 - 5.28 10*6/mm3 Final   • Hemoglobin 06/01/2022 9.5 (A) 12.0 - 15.9 g/dL Final   • Hematocrit  06/01/2022 28.5 (A) 34.0 - 46.6 % Final   • MCV 06/01/2022 85.6  79.0 - 97.0 fL Final   • MCH 06/01/2022 28.5  26.6 - 33.0 pg Final   • MCHC 06/01/2022 33.3  31.5 - 35.7 g/dL Final   • RDW 06/01/2022 17.8 (A) 12.3 - 15.4 % Final   • RDW-SD 06/01/2022 54.7 (A) 37.0 - 54.0 fl Final   • MPV 06/01/2022 9.7  6.0 - 12.0 fL Final   • Platelets 06/01/2022 287  140 - 450 10*3/mm3 Final   • Neutrophil % 06/01/2022 78.8 (A) 42.7 - 76.0 % Final   • Lymphocyte % 06/01/2022 10.6 (A) 19.6 - 45.3 % Final   • Monocyte % 06/01/2022 7.3  5.0 - 12.0 % Final   • Eosinophil % 06/01/2022 2.6  0.3 - 6.2 % Final   • Basophil % 06/01/2022 0.3  0.0 - 1.5 % Final   • Immature Grans % 06/01/2022 0.4  0.0 - 0.5 % Final   • Neutrophils, Absolute 06/01/2022 7.72 (A) 1.70 - 7.00 10*3/mm3 Final   • Lymphocytes, Absolute 06/01/2022 1.04  0.70 - 3.10 10*3/mm3 Final   • Monocytes, Absolute 06/01/2022 0.71  0.10 - 0.90 10*3/mm3 Final   • Eosinophils, Absolute 06/01/2022 0.25  0.00 - 0.40 10*3/mm3 Final   • Basophils, Absolute 06/01/2022 0.03  0.00 - 0.20 10*3/mm3 Final   • Immature Grans, Absolute 06/01/2022 0.04  0.00 - 0.05 10*3/mm3 Final   • nRBC 06/01/2022 0.0  0.0 - 0.2 /100 WBC Final     Assessment & Plan      1. Iron deficiency anemia due to chronic blood loss    2. Malaise and fatigue    .       Orders placed during this encounter include:  Orders Placed This Encounter   Procedures   • Endoscopy, GI With Capsule     Standing Status:   Future     Standing Expiration Date:   6/7/2023     Order Specific Question:   Release to patient     Answer:   Immediate       * Surgery not found *    Review and/or summary of lab tests, radiology, procedures, medications. Review and summary of old records and obtaining of history. The risks and benefits of my recommendations, as well as other treatment options were discussed with the patient today. Questions were answered.    No orders of the defined types were placed in this encounter.      Follow-up: Return in  about 4 weeks (around 7/5/2022) for Recheck, After test.          This document has been electronically signed by SILVA Patten on June 7, 2022 11:46 CDT           I spent 24 minutes caring for Adilene on this date of service. This time includes time spent by me in the following activities:preparing for the visit, reviewing tests, obtaining and/or reviewing a separately obtained history, performing a medically appropriate examination and/or evaluation , counseling and educating the patient/family/caregiver, ordering medications, tests, or procedures, referring and communicating with other health care professionals , documenting information in the medical record and care coordination    Results for orders placed or performed during the hospital encounter of 05/30/22   PREVIOUS HISTORY    Specimen: Blood   Result Value Ref Range    Previous History Previous Record on File    Gold Top - SST   Result Value Ref Range    Extra Tube Hold for add-ons.    COVID-19 and FLU A/B PCR - Swab, Nasopharynx    Specimen: Nasopharynx; Swab   Result Value Ref Range    COVID19 Not Detected Not Detected - Ref. Range    Influenza A PCR Not Detected Not Detected    Influenza B PCR Not Detected Not Detected   Urinalysis, Microscopic Only - Urine, Clean Catch    Specimen: Urine, Clean Catch   Result Value Ref Range    RBC, UA 31-50 (A) None Seen /HPF    WBC, UA Too Numerous to Count (A) None Seen, 0-2, 3-5 /HPF    Bacteria, UA 4+ (A) None Seen /HPF    Squamous Epithelial Cells, UA 3-5 (A) None Seen, 0-2 /HPF    Transitional Epithelial Cells, UA 3-6 (A) 0 - 2 /HPF    Hyaline Casts, UA Unable to determine due to loaded field None Seen /LPF    Methodology Manual Light Microscopy    Urinalysis With Microscopic If Indicated (No Culture) - Urine, Clean Catch    Specimen: Urine, Clean Catch   Result Value Ref Range    Color, UA Yellow Yellow, Straw, Dark Yellow, Charlene    Appearance, UA Turbid (A) Clear    pH, UA 7.0 5.0 - 9.0    Specific  Gravity, UA 1.012 1.003 - 1.030    Glucose, UA Negative Negative    Ketones, UA Negative Negative    Bilirubin, UA Negative Negative    Blood, UA Large (3+) (A) Negative    Protein,  mg/dL (2+) (A) Negative    Leuk Esterase, UA Large (3+) (A) Negative    Nitrite, UA Positive (A) Negative    Urobilinogen, UA 0.2 E.U./dL 0.2 - 1.0 E.U./dL   CBC Auto Differential    Specimen: Blood   Result Value Ref Range    WBC 9.79 3.40 - 10.80 10*3/mm3    RBC 3.33 (L) 3.77 - 5.28 10*6/mm3    Hemoglobin 9.5 (L) 12.0 - 15.9 g/dL    Hematocrit 28.5 (L) 34.0 - 46.6 %    MCV 85.6 79.0 - 97.0 fL    MCH 28.5 26.6 - 33.0 pg    MCHC 33.3 31.5 - 35.7 g/dL    RDW 17.8 (H) 12.3 - 15.4 %    RDW-SD 54.7 (H) 37.0 - 54.0 fl    MPV 9.7 6.0 - 12.0 fL    Platelets 287 140 - 450 10*3/mm3    Neutrophil % 78.8 (H) 42.7 - 76.0 %    Lymphocyte % 10.6 (L) 19.6 - 45.3 %    Monocyte % 7.3 5.0 - 12.0 %    Eosinophil % 2.6 0.3 - 6.2 %    Basophil % 0.3 0.0 - 1.5 %    Immature Grans % 0.4 0.0 - 0.5 %    Neutrophils, Absolute 7.72 (H) 1.70 - 7.00 10*3/mm3    Lymphocytes, Absolute 1.04 0.70 - 3.10 10*3/mm3    Monocytes, Absolute 0.71 0.10 - 0.90 10*3/mm3    Eosinophils, Absolute 0.25 0.00 - 0.40 10*3/mm3    Basophils, Absolute 0.03 0.00 - 0.20 10*3/mm3    Immature Grans, Absolute 0.04 0.00 - 0.05 10*3/mm3    nRBC 0.0 0.0 - 0.2 /100 WBC   CBC Auto Differential    Specimen: Blood   Result Value Ref Range    WBC 12.03 (H) 3.40 - 10.80 10*3/mm3    RBC 3.40 (L) 3.77 - 5.28 10*6/mm3    Hemoglobin 9.6 (L) 12.0 - 15.9 g/dL    Hematocrit 29.1 (L) 34.0 - 46.6 %    MCV 85.6 79.0 - 97.0 fL    MCH 28.2 26.6 - 33.0 pg    MCHC 33.0 31.5 - 35.7 g/dL    RDW 17.9 (H) 12.3 - 15.4 %    RDW-SD 54.4 (H) 37.0 - 54.0 fl    MPV 9.4 6.0 - 12.0 fL    Platelets 324 140 - 450 10*3/mm3    Neutrophil % 84.1 (H) 42.7 - 76.0 %    Lymphocyte % 8.2 (L) 19.6 - 45.3 %    Monocyte % 5.8 5.0 - 12.0 %    Eosinophil % 1.0 0.3 - 6.2 %    Basophil % 0.3 0.0 - 1.5 %    Immature Grans % 0.6 (H) 0.0 -  0.5 %    Neutrophils, Absolute 10.11 (H) 1.70 - 7.00 10*3/mm3    Lymphocytes, Absolute 0.99 0.70 - 3.10 10*3/mm3    Monocytes, Absolute 0.70 0.10 - 0.90 10*3/mm3    Eosinophils, Absolute 0.12 0.00 - 0.40 10*3/mm3    Basophils, Absolute 0.04 0.00 - 0.20 10*3/mm3    Immature Grans, Absolute 0.07 (H) 0.00 - 0.05 10*3/mm3    nRBC 0.0 0.0 - 0.2 /100 WBC   CBC Auto Differential    Specimen: Blood   Result Value Ref Range    WBC 10.93 (H) 3.40 - 10.80 10*3/mm3    RBC 2.69 (L) 3.77 - 5.28 10*6/mm3    Hemoglobin 7.6 (L) 12.0 - 15.9 g/dL    Hematocrit 22.9 (L) 34.0 - 46.6 %    MCV 85.1 79.0 - 97.0 fL    MCH 28.3 26.6 - 33.0 pg    MCHC 33.2 31.5 - 35.7 g/dL    RDW 17.1 (H) 12.3 - 15.4 %    RDW-SD 52.4 37.0 - 54.0 fl    MPV 9.7 6.0 - 12.0 fL    Platelets 295 140 - 450 10*3/mm3    Neutrophil % 83.8 (H) 42.7 - 76.0 %    Lymphocyte % 6.2 (L) 19.6 - 45.3 %    Monocyte % 5.9 5.0 - 12.0 %    Eosinophil % 2.7 0.3 - 6.2 %    Basophil % 0.4 0.0 - 1.5 %    Immature Grans % 1.0 (H) 0.0 - 0.5 %    Neutrophils, Absolute 9.16 (H) 1.70 - 7.00 10*3/mm3    Lymphocytes, Absolute 0.68 (L) 0.70 - 3.10 10*3/mm3    Monocytes, Absolute 0.64 0.10 - 0.90 10*3/mm3    Eosinophils, Absolute 0.30 0.00 - 0.40 10*3/mm3    Basophils, Absolute 0.04 0.00 - 0.20 10*3/mm3    Immature Grans, Absolute 0.11 (H) 0.00 - 0.05 10*3/mm3    nRBC 0.0 0.0 - 0.2 /100 WBC   Light Blue Top   Result Value Ref Range    Extra Tube Hold for add-ons.    Hemoglobin & Hematocrit, Blood    Specimen: Blood   Result Value Ref Range    Hemoglobin 8.8 (L) 12.0 - 15.9 g/dL    Hematocrit 26.6 (L) 34.0 - 46.6 %   Hemoglobin & Hematocrit, Blood    Specimen: Blood   Result Value Ref Range    Hemoglobin 9.5 (L) 12.0 - 15.9 g/dL    Hematocrit 28.7 (L) 34.0 - 46.6 %     *Note: Due to a large number of results and/or encounters for the requested time period, some results have not been displayed. A complete set of results can be found in Results Review.

## 2022-06-09 ENCOUNTER — DOCUMENTATION (OUTPATIENT)
Dept: GASTROENTEROLOGY | Facility: CLINIC | Age: 62
End: 2022-06-09

## 2022-06-09 NOTE — PROGRESS NOTES
Administered pillcam patency capsule to patient 8:35am. Patient given instructione regarding when she can start to eat and drink. Patient told to go to clinic for the xray Friday 6-10-22 between the hours of 8:45am dan 12:00pm. Patient and  voiced understanding.

## 2022-06-10 ENCOUNTER — APPOINTMENT (OUTPATIENT)
Dept: GENERAL RADIOLOGY | Facility: HOSPITAL | Age: 62
End: 2022-06-10

## 2022-06-10 ENCOUNTER — APPOINTMENT (OUTPATIENT)
Dept: CT IMAGING | Facility: HOSPITAL | Age: 62
End: 2022-06-10

## 2022-06-10 ENCOUNTER — HOSPITAL ENCOUNTER (OUTPATIENT)
Facility: HOSPITAL | Age: 62
Setting detail: OBSERVATION
Discharge: HOME | End: 2022-06-14
Attending: STUDENT IN AN ORGANIZED HEALTH CARE EDUCATION/TRAINING PROGRAM | Admitting: INTERNAL MEDICINE

## 2022-06-10 DIAGNOSIS — R55 SYNCOPE, UNSPECIFIED SYNCOPE TYPE: Primary | ICD-10-CM

## 2022-06-10 DIAGNOSIS — N39.0 URINARY TRACT INFECTION WITH HEMATURIA, SITE UNSPECIFIED: ICD-10-CM

## 2022-06-10 DIAGNOSIS — N17.9 AKI (ACUTE KIDNEY INJURY): ICD-10-CM

## 2022-06-10 DIAGNOSIS — Z74.09 IMPAIRED FUNCTIONAL MOBILITY, BALANCE, GAIT, AND ENDURANCE: ICD-10-CM

## 2022-06-10 DIAGNOSIS — R31.9 URINARY TRACT INFECTION WITH HEMATURIA, SITE UNSPECIFIED: ICD-10-CM

## 2022-06-10 LAB
BACTERIA UR QL AUTO: ABNORMAL /HPF
BASOPHILS # BLD AUTO: 0.03 10*3/MM3 (ref 0–0.2)
BASOPHILS NFR BLD AUTO: 0.4 % (ref 0–1.5)
BILIRUB UR QL STRIP: NEGATIVE
CLARITY UR: ABNORMAL
COLOR UR: YELLOW
DEPRECATED RDW RBC AUTO: 55.3 FL (ref 37–54)
EOSINOPHIL # BLD AUTO: 0.4 10*3/MM3 (ref 0–0.4)
EOSINOPHIL NFR BLD AUTO: 5.9 % (ref 0.3–6.2)
ERYTHROCYTE [DISTWIDTH] IN BLOOD BY AUTOMATED COUNT: 17.4 % (ref 12.3–15.4)
GLUCOSE UR STRIP-MCNC: NEGATIVE MG/DL
HCT VFR BLD AUTO: 29.8 % (ref 34–46.6)
HGB BLD-MCNC: 9.8 G/DL (ref 12–15.9)
HGB UR QL STRIP.AUTO: ABNORMAL
HYALINE CASTS UR QL AUTO: ABNORMAL /LPF
IMM GRANULOCYTES # BLD AUTO: 0.05 10*3/MM3 (ref 0–0.05)
IMM GRANULOCYTES NFR BLD AUTO: 0.7 % (ref 0–0.5)
KETONES UR QL STRIP: NEGATIVE
LEUKOCYTE ESTERASE UR QL STRIP.AUTO: ABNORMAL
LYMPHOCYTES # BLD AUTO: 0.94 10*3/MM3 (ref 0.7–3.1)
LYMPHOCYTES NFR BLD AUTO: 13.8 % (ref 19.6–45.3)
MCH RBC QN AUTO: 29 PG (ref 26.6–33)
MCHC RBC AUTO-ENTMCNC: 32.9 G/DL (ref 31.5–35.7)
MCV RBC AUTO: 88.2 FL (ref 79–97)
MONOCYTES # BLD AUTO: 0.62 10*3/MM3 (ref 0.1–0.9)
MONOCYTES NFR BLD AUTO: 9.1 % (ref 5–12)
NEUTROPHILS NFR BLD AUTO: 4.78 10*3/MM3 (ref 1.7–7)
NEUTROPHILS NFR BLD AUTO: 70.1 % (ref 42.7–76)
NITRITE UR QL STRIP: POSITIVE
NRBC BLD AUTO-RTO: 0 /100 WBC (ref 0–0.2)
PH UR STRIP.AUTO: <=5 [PH] (ref 5–9)
PLATELET # BLD AUTO: 314 10*3/MM3 (ref 140–450)
PMV BLD AUTO: 9.7 FL (ref 6–12)
PROT UR QL STRIP: NEGATIVE
RBC # BLD AUTO: 3.38 10*6/MM3 (ref 3.77–5.28)
RBC # UR STRIP: ABNORMAL /HPF
REF LAB TEST METHOD: ABNORMAL
SP GR UR STRIP: 1.01 (ref 1–1.03)
SQUAMOUS #/AREA URNS HPF: ABNORMAL /HPF
UROBILINOGEN UR QL STRIP: ABNORMAL
WBC # UR STRIP: ABNORMAL /HPF
WBC NRBC COR # BLD: 6.82 10*3/MM3 (ref 3.4–10.8)

## 2022-06-10 PROCEDURE — 87186 SC STD MICRODIL/AGAR DIL: CPT | Performed by: STUDENT IN AN ORGANIZED HEALTH CARE EDUCATION/TRAINING PROGRAM

## 2022-06-10 PROCEDURE — 81001 URINALYSIS AUTO W/SCOPE: CPT | Performed by: STUDENT IN AN ORGANIZED HEALTH CARE EDUCATION/TRAINING PROGRAM

## 2022-06-10 PROCEDURE — 71045 X-RAY EXAM CHEST 1 VIEW: CPT

## 2022-06-10 PROCEDURE — 99285 EMERGENCY DEPT VISIT HI MDM: CPT

## 2022-06-10 PROCEDURE — 86140 C-REACTIVE PROTEIN: CPT

## 2022-06-10 PROCEDURE — 84484 ASSAY OF TROPONIN QUANT: CPT | Performed by: STUDENT IN AN ORGANIZED HEALTH CARE EDUCATION/TRAINING PROGRAM

## 2022-06-10 PROCEDURE — 82043 UR ALBUMIN QUANTITATIVE: CPT

## 2022-06-10 PROCEDURE — 70450 CT HEAD/BRAIN W/O DYE: CPT

## 2022-06-10 PROCEDURE — 83735 ASSAY OF MAGNESIUM: CPT | Performed by: STUDENT IN AN ORGANIZED HEALTH CARE EDUCATION/TRAINING PROGRAM

## 2022-06-10 PROCEDURE — 83880 ASSAY OF NATRIURETIC PEPTIDE: CPT | Performed by: STUDENT IN AN ORGANIZED HEALTH CARE EDUCATION/TRAINING PROGRAM

## 2022-06-10 PROCEDURE — 87077 CULTURE AEROBIC IDENTIFY: CPT | Performed by: STUDENT IN AN ORGANIZED HEALTH CARE EDUCATION/TRAINING PROGRAM

## 2022-06-10 PROCEDURE — 93005 ELECTROCARDIOGRAM TRACING: CPT

## 2022-06-10 PROCEDURE — 82570 ASSAY OF URINE CREATININE: CPT

## 2022-06-10 PROCEDURE — 93005 ELECTROCARDIOGRAM TRACING: CPT | Performed by: STUDENT IN AN ORGANIZED HEALTH CARE EDUCATION/TRAINING PROGRAM

## 2022-06-10 PROCEDURE — 80050 GENERAL HEALTH PANEL: CPT | Performed by: STUDENT IN AN ORGANIZED HEALTH CARE EDUCATION/TRAINING PROGRAM

## 2022-06-10 PROCEDURE — 93010 ELECTROCARDIOGRAM REPORT: CPT | Performed by: INTERNAL MEDICINE

## 2022-06-10 PROCEDURE — 87086 URINE CULTURE/COLONY COUNT: CPT | Performed by: STUDENT IN AN ORGANIZED HEALTH CARE EDUCATION/TRAINING PROGRAM

## 2022-06-10 PROCEDURE — 96375 TX/PRO/DX INJ NEW DRUG ADDON: CPT

## 2022-06-10 RX ADMIN — SODIUM CHLORIDE, POTASSIUM CHLORIDE, SODIUM LACTATE AND CALCIUM CHLORIDE 1000 ML: 600; 310; 30; 20 INJECTION, SOLUTION INTRAVENOUS at 23:39

## 2022-06-11 ENCOUNTER — APPOINTMENT (OUTPATIENT)
Dept: CT IMAGING | Facility: HOSPITAL | Age: 62
End: 2022-06-11

## 2022-06-11 PROBLEM — I95.1 ORTHOSTATIC HYPOTENSION: Status: ACTIVE | Noted: 2022-06-11

## 2022-06-11 PROBLEM — N39.0 ACUTE UTI (URINARY TRACT INFECTION): Status: ACTIVE | Noted: 2022-06-11

## 2022-06-11 PROBLEM — R55 SYNCOPE: Status: RESOLVED | Noted: 2022-06-11 | Resolved: 2022-06-11
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
## 2022-06-11 PROBLEM — R55 SYNCOPE: Status: ACTIVE | Noted: 2022-06-11

## 2022-06-11 LAB
ALBUMIN SERPL-MCNC: 4.4 G/DL (ref 3.5–5.2)
ALBUMIN UR-MCNC: 2.5 MG/DL
ALBUMIN/GLOB SERPL: 1.6 G/DL
ALP SERPL-CCNC: 122 U/L (ref 39–117)
ALT SERPL W P-5'-P-CCNC: 20 U/L (ref 1–33)
ANION GAP SERPL CALCULATED.3IONS-SCNC: 13 MMOL/L (ref 5–15)
ANION GAP SERPL CALCULATED.3IONS-SCNC: 14 MMOL/L (ref 5–15)
AST SERPL-CCNC: 26 U/L (ref 1–32)
ATMOSPHERIC PRESS: ABNORMAL MM[HG]
BASE EXCESS BLDV CALC-SCNC: 3.3 MMOL/L (ref 0–2)
BASOPHILS # BLD AUTO: 0.03 10*3/MM3 (ref 0–0.2)
BASOPHILS NFR BLD AUTO: 0.4 % (ref 0–1.5)
BDY SITE: ABNORMAL
BILIRUB SERPL-MCNC: 0.2 MG/DL (ref 0–1.2)
BUN SERPL-MCNC: 35 MG/DL (ref 8–23)
BUN SERPL-MCNC: 37 MG/DL (ref 8–23)
BUN/CREAT SERPL: 14.9 (ref 7–25)
BUN/CREAT SERPL: 16.6 (ref 7–25)
CALCIUM SPEC-SCNC: 7.8 MG/DL (ref 8.6–10.5)
CALCIUM SPEC-SCNC: 8.5 MG/DL (ref 8.6–10.5)
CHLORIDE SERPL-SCNC: 95 MMOL/L (ref 98–107)
CHLORIDE SERPL-SCNC: 99 MMOL/L (ref 98–107)
CO2 SERPL-SCNC: 22 MMOL/L (ref 22–29)
CO2 SERPL-SCNC: 30 MMOL/L (ref 22–29)
CREAT SERPL-MCNC: 2.11 MG/DL (ref 0.57–1)
CREAT SERPL-MCNC: 2.48 MG/DL (ref 0.57–1)
CREAT UR-MCNC: 40.1 MG/DL
CRP SERPL-MCNC: 1.04 MG/DL (ref 0–0.5)
D-LACTATE SERPL-SCNC: 1.5 MMOL/L (ref 0.5–2)
D-LACTATE SERPL-SCNC: 2.5 MMOL/L (ref 0.5–2)
D-LACTATE SERPL-SCNC: 2.7 MMOL/L (ref 0.5–2)
DEPRECATED RDW RBC AUTO: 53.3 FL (ref 37–54)
EGFRCR SERPLBLD CKD-EPI 2021: 21.3 ML/MIN/1.73
EGFRCR SERPLBLD CKD-EPI 2021: 25.9 ML/MIN/1.73
EOSINOPHIL # BLD AUTO: 0.33 10*3/MM3 (ref 0–0.4)
EOSINOPHIL NFR BLD AUTO: 4.7 % (ref 0.3–6.2)
ERYTHROCYTE [DISTWIDTH] IN BLOOD BY AUTOMATED COUNT: 17.2 % (ref 12.3–15.4)
FLUAV RNA RESP QL NAA+PROBE: NOT DETECTED
FLUBV RNA RESP QL NAA+PROBE: NOT DETECTED
GLOBULIN UR ELPH-MCNC: 2.7 GM/DL
GLUCOSE SERPL-MCNC: 77 MG/DL (ref 65–99)
GLUCOSE SERPL-MCNC: 98 MG/DL (ref 65–99)
HCO3 BLDV-SCNC: 29.3 MMOL/L (ref 18–23)
HCT VFR BLD AUTO: 25.9 % (ref 34–46.6)
HGB BLD-MCNC: 8.8 G/DL (ref 12–15.9)
HOLD SPECIMEN: NORMAL
IMM GRANULOCYTES # BLD AUTO: 0.03 10*3/MM3 (ref 0–0.05)
IMM GRANULOCYTES NFR BLD AUTO: 0.4 % (ref 0–0.5)
LYMPHOCYTES # BLD AUTO: 0.84 10*3/MM3 (ref 0.7–3.1)
LYMPHOCYTES NFR BLD AUTO: 11.9 % (ref 19.6–45.3)
MAGNESIUM SERPL-MCNC: 2.5 MG/DL (ref 1.6–2.4)
MCH RBC QN AUTO: 28.9 PG (ref 26.6–33)
MCHC RBC AUTO-ENTMCNC: 34 G/DL (ref 31.5–35.7)
MCV RBC AUTO: 85.2 FL (ref 79–97)
MICROALBUMIN/CREAT UR: 62.3 MG/G
MODALITY: ABNORMAL
MONOCYTES # BLD AUTO: 0.48 10*3/MM3 (ref 0.1–0.9)
MONOCYTES NFR BLD AUTO: 6.8 % (ref 5–12)
NEUTROPHILS NFR BLD AUTO: 5.33 10*3/MM3 (ref 1.7–7)
NEUTROPHILS NFR BLD AUTO: 75.8 % (ref 42.7–76)
NRBC BLD AUTO-RTO: 0 /100 WBC (ref 0–0.2)
NT-PROBNP SERPL-MCNC: 185.6 PG/ML (ref 0–900)
PCO2 BLDV: 51.2 MM HG (ref 41–51)
PH BLDV: 7.37 PH UNITS (ref 7.29–7.37)
PLATELET # BLD AUTO: 215 10*3/MM3 (ref 140–450)
PMV BLD AUTO: 9.9 FL (ref 6–12)
PO2 BLDV: 460 MM HG (ref 27–53)
POTASSIUM SERPL-SCNC: 3.8 MMOL/L (ref 3.5–5.2)
POTASSIUM SERPL-SCNC: 4.2 MMOL/L (ref 3.5–5.2)
PROCALCITONIN SERPL-MCNC: 0.08 NG/ML (ref 0–0.25)
PROT SERPL-MCNC: 7.1 G/DL (ref 6–8.5)
QT INTERVAL: 326 MS
QT INTERVAL: 344 MS
QTC INTERVAL: 403 MS
QTC INTERVAL: 420 MS
RBC # BLD AUTO: 3.04 10*6/MM3 (ref 3.77–5.28)
SAO2 % BLDCOV: 77.3 % (ref 45–75)
SARS-COV-2 RNA RESP QL NAA+PROBE: NOT DETECTED
SODIUM SERPL-SCNC: 135 MMOL/L (ref 136–145)
SODIUM SERPL-SCNC: 138 MMOL/L (ref 136–145)
TROPONIN T SERPL-MCNC: 0.01 NG/ML (ref 0–0.03)
TROPONIN T SERPL-MCNC: <0.01 NG/ML (ref 0–0.03)
TSH SERPL DL<=0.05 MIU/L-ACNC: 6.44 UIU/ML (ref 0.27–4.2)
WBC NRBC COR # BLD: 7.04 10*3/MM3 (ref 3.4–10.8)
WHOLE BLOOD HOLD COAG: NORMAL

## 2022-06-11 PROCEDURE — 93010 ELECTROCARDIOGRAM REPORT: CPT | Performed by: INTERNAL MEDICINE

## 2022-06-11 PROCEDURE — 74176 CT ABD & PELVIS W/O CONTRAST: CPT

## 2022-06-11 PROCEDURE — 84484 ASSAY OF TROPONIN QUANT: CPT

## 2022-06-11 PROCEDURE — 36415 COLL VENOUS BLD VENIPUNCTURE: CPT | Performed by: STUDENT IN AN ORGANIZED HEALTH CARE EDUCATION/TRAINING PROGRAM

## 2022-06-11 PROCEDURE — 84145 PROCALCITONIN (PCT): CPT

## 2022-06-11 PROCEDURE — 85025 COMPLETE CBC W/AUTO DIFF WBC: CPT

## 2022-06-11 PROCEDURE — 80048 BASIC METABOLIC PNL TOTAL CA: CPT

## 2022-06-11 PROCEDURE — G0378 HOSPITAL OBSERVATION PER HR: HCPCS

## 2022-06-11 PROCEDURE — 87636 SARSCOV2 & INF A&B AMP PRB: CPT | Performed by: STUDENT IN AN ORGANIZED HEALTH CARE EDUCATION/TRAINING PROGRAM

## 2022-06-11 PROCEDURE — 25010000002 LEVOFLOXACIN PER 250 MG

## 2022-06-11 PROCEDURE — 83605 ASSAY OF LACTIC ACID: CPT | Performed by: STUDENT IN AN ORGANIZED HEALTH CARE EDUCATION/TRAINING PROGRAM

## 2022-06-11 PROCEDURE — 93005 ELECTROCARDIOGRAM TRACING: CPT

## 2022-06-11 PROCEDURE — 82803 BLOOD GASES ANY COMBINATION: CPT

## 2022-06-11 PROCEDURE — 87040 BLOOD CULTURE FOR BACTERIA: CPT | Performed by: STUDENT IN AN ORGANIZED HEALTH CARE EDUCATION/TRAINING PROGRAM

## 2022-06-11 PROCEDURE — 96361 HYDRATE IV INFUSION ADD-ON: CPT

## 2022-06-11 RX ORDER — ASPIRIN 81 MG/1
81 TABLET ORAL DAILY
Status: DISCONTINUED | OUTPATIENT
Start: 2022-06-11 | End: 2022-06-14 | Stop reason: HOSPADM

## 2022-06-11 RX ORDER — SODIUM CHLORIDE, SODIUM LACTATE, POTASSIUM CHLORIDE, CALCIUM CHLORIDE 600; 310; 30; 20 MG/100ML; MG/100ML; MG/100ML; MG/100ML
150 INJECTION, SOLUTION INTRAVENOUS CONTINUOUS
Status: DISCONTINUED | OUTPATIENT
Start: 2022-06-11 | End: 2022-06-11

## 2022-06-11 RX ORDER — LEVOFLOXACIN 5 MG/ML
750 INJECTION, SOLUTION INTRAVENOUS
Status: DISCONTINUED | OUTPATIENT
Start: 2022-06-11 | End: 2022-06-14 | Stop reason: HOSPADM

## 2022-06-11 RX ORDER — TOPIRAMATE 50 MG/1
50 TABLET, FILM COATED ORAL NIGHTLY
Status: DISCONTINUED | OUTPATIENT
Start: 2022-06-11 | End: 2022-06-11

## 2022-06-11 RX ORDER — PANTOPRAZOLE SODIUM 40 MG/1
40 TABLET, DELAYED RELEASE ORAL DAILY
Status: DISCONTINUED | OUTPATIENT
Start: 2022-06-11 | End: 2022-06-14 | Stop reason: HOSPADM

## 2022-06-11 RX ORDER — PROCHLORPERAZINE MALEATE 10 MG
10 TABLET ORAL EVERY 6 HOURS PRN
Status: DISCONTINUED | OUTPATIENT
Start: 2022-06-11 | End: 2022-06-14 | Stop reason: HOSPADM

## 2022-06-11 RX ORDER — TOPIRAMATE 50 MG/1
50 TABLET, FILM COATED ORAL DAILY
Status: DISCONTINUED | OUTPATIENT
Start: 2022-06-11 | End: 2022-06-14 | Stop reason: HOSPADM

## 2022-06-11 RX ORDER — SODIUM CHLORIDE 0.9 % (FLUSH) 0.9 %
10 SYRINGE (ML) INJECTION EVERY 12 HOURS SCHEDULED
Status: DISCONTINUED | OUTPATIENT
Start: 2022-06-11 | End: 2022-06-14 | Stop reason: HOSPADM

## 2022-06-11 RX ORDER — LEVOFLOXACIN 5 MG/ML
750 INJECTION, SOLUTION INTRAVENOUS
Status: DISCONTINUED | OUTPATIENT
Start: 2022-06-11 | End: 2022-06-11

## 2022-06-11 RX ORDER — SUMATRIPTAN 50 MG/1
100 TABLET, FILM COATED ORAL ONCE
Refills: 5 | Status: DISCONTINUED | OUTPATIENT
Start: 2022-06-11 | End: 2022-06-14 | Stop reason: HOSPADM

## 2022-06-11 RX ORDER — ONDANSETRON 4 MG/1
8 TABLET, FILM COATED ORAL EVERY 8 HOURS PRN
Status: DISCONTINUED | OUTPATIENT
Start: 2022-06-11 | End: 2022-06-14 | Stop reason: HOSPADM

## 2022-06-11 RX ORDER — SODIUM CHLORIDE 0.9 % (FLUSH) 0.9 %
10 SYRINGE (ML) INJECTION AS NEEDED
Status: DISCONTINUED | OUTPATIENT
Start: 2022-06-11 | End: 2022-06-14 | Stop reason: HOSPADM

## 2022-06-11 RX ORDER — MIDODRINE HYDROCHLORIDE 5 MG/1
5 TABLET ORAL
Status: DISCONTINUED | OUTPATIENT
Start: 2022-06-11 | End: 2022-06-13

## 2022-06-11 RX ORDER — ENOXAPARIN SODIUM 100 MG/ML
30 INJECTION SUBCUTANEOUS EVERY 24 HOURS
Status: DISCONTINUED | OUTPATIENT
Start: 2022-06-11 | End: 2022-06-11

## 2022-06-11 RX ORDER — LEVOTHYROXINE SODIUM 0.15 MG/1
150 TABLET ORAL DAILY
Status: DISCONTINUED | OUTPATIENT
Start: 2022-06-11 | End: 2022-06-14 | Stop reason: HOSPADM

## 2022-06-11 RX ORDER — CETIRIZINE HYDROCHLORIDE 5 MG/1
5 TABLET ORAL DAILY
Status: DISCONTINUED | OUTPATIENT
Start: 2022-06-11 | End: 2022-06-14 | Stop reason: HOSPADM

## 2022-06-11 RX ORDER — VENLAFAXINE HYDROCHLORIDE 37.5 MG/1
37.5 CAPSULE, EXTENDED RELEASE ORAL DAILY
Status: DISCONTINUED | OUTPATIENT
Start: 2022-06-11 | End: 2022-06-14 | Stop reason: HOSPADM

## 2022-06-11 RX ORDER — TRAMADOL HYDROCHLORIDE 50 MG/1
50 TABLET ORAL EVERY 12 HOURS PRN
Status: DISCONTINUED | OUTPATIENT
Start: 2022-06-11 | End: 2022-06-13

## 2022-06-11 RX ADMIN — MIDODRINE HYDROCHLORIDE 5 MG: 5 TABLET ORAL at 17:54

## 2022-06-11 RX ADMIN — Medication 10 ML: at 20:17

## 2022-06-11 RX ADMIN — ASPIRIN 81 MG: 81 TABLET, FILM COATED ORAL at 08:36

## 2022-06-11 RX ADMIN — LEVOTHYROXINE SODIUM 150 MCG: 150 TABLET ORAL at 08:36

## 2022-06-11 RX ADMIN — LEVOFLOXACIN 750 MG: 5 INJECTION, SOLUTION INTRAVENOUS at 06:22

## 2022-06-11 RX ADMIN — TOPIRAMATE 50 MG: 50 TABLET, FILM COATED ORAL at 10:24

## 2022-06-11 RX ADMIN — SODIUM CHLORIDE, POTASSIUM CHLORIDE, SODIUM LACTATE AND CALCIUM CHLORIDE 150 ML/HR: 600; 310; 30; 20 INJECTION, SOLUTION INTRAVENOUS at 08:37

## 2022-06-11 RX ADMIN — VENLAFAXINE HYDROCHLORIDE 37.5 MG: 37.5 CAPSULE, EXTENDED RELEASE ORAL at 08:37

## 2022-06-11 RX ADMIN — SODIUM CHLORIDE, POTASSIUM CHLORIDE, SODIUM LACTATE AND CALCIUM CHLORIDE 150 ML/HR: 600; 310; 30; 20 INJECTION, SOLUTION INTRAVENOUS at 17:24

## 2022-06-11 RX ADMIN — Medication 10 ML: at 08:37

## 2022-06-11 RX ADMIN — SODIUM CHLORIDE, POTASSIUM CHLORIDE, SODIUM LACTATE AND CALCIUM CHLORIDE 1500 ML: 600; 310; 30; 20 INJECTION, SOLUTION INTRAVENOUS at 04:04

## 2022-06-11 RX ADMIN — PANTOPRAZOLE SODIUM 40 MG: 40 TABLET, DELAYED RELEASE ORAL at 08:36

## 2022-06-11 RX ADMIN — CETIRIZINE HYDROCHLORIDE 5 MG: 5 TABLET ORAL at 08:36

## 2022-06-11 RX ADMIN — SODIUM CHLORIDE, POTASSIUM CHLORIDE, SODIUM LACTATE AND CALCIUM CHLORIDE 150 ML/HR: 600; 310; 30; 20 INJECTION, SOLUTION INTRAVENOUS at 05:35

## 2022-06-11 NOTE — ED NOTES
pt reports she stood up from seated position on sofa and started walking across the room and had a syncopal episode. Per pt  pt walked about 20 ft and yelled for him. Per pt she slide down the walk to the floor.

## 2022-06-11 NOTE — H&P
[facility] Medicine Admission      Date of Admission: 6/10/2022      Primary Care Physician: Jen Raymundo MD      Chief Complaint:   Fall    HPI:  Patient presents this evening having had a fall at home.  Thankfully, the patient has not sustained any serious injuries.   is very astute and when the patient was still off to the fall he checked the patient's blood pressure in both lying and standing positions and noted a significant orthostatic drop.  Patient's abdomen did not note any obvious arrhythmias.  The patient did not see any stage and had no seizure activity.  Patient also denies having had any chest pain, palpitations, shortness of breath, neurological deficits, or visual disturbances.  Merely, the patient was trying to walk across the room and suddenly felt lightheaded.  Patient has had 1 or 2 similar episodes in recent weeks.  The only other symptom known systemic review is the patient has some slight abdominal tenderness.    Patient was recently in this hospital undergoing investigation for a GI bleed.  She is actually still meant to be having a capsule endoscopy with Dr. Banegas on subsequent follow-up.    Patient's hemoglobin today is significantly changed from her admission H&Hs.  She does have a slightly raised creatinine compared to her last recorded on file, although this would technically meet the definition for acute kidney injury.    Concurrent Medical History:  has a past medical history of Achilles bursitis, Achilles tendinitis, Acquired hypothyroidism, Allergic rhinitis, Allergy to meat, Allergy to milk products, Arrhythmia, Asthmatic bronchitis, Asthmatic bronchitis, Bone spur, Calcaneal spur, Cancer ([facility]), Depressive disorder, Family history of thyroid problem, Herpes simplex, Hypermetropia, Hypothyroidism, Menopausal flushing, Menopause, Migraine, Otalgia, Pneumonia, Presbyopia, Stroke ([facility]), Trochanteric bursitis, UTI (urinary tract  infection) (07/03/2018), and Ventricular premature beats.    Past Surgical History:  has a past surgical history that includes Laparoscopic cholecystectomy (12/10/1995); Other surgical history; Other surgical history (10/17/2014); Cosmetic surgery; Colonoscopy (N/A, 2/15/2017); and Esophagogastroduodenoscopy (N/A, 5/31/2022).    Family History: family history includes Breast cancer in her maternal aunt; Diabetes in her father; Heart disease in her father; Hypertension in her father; Stroke in her mother; Thyroid disease in her sister.     Social History:  reports that she has never smoked. She has never used smokeless tobacco. She reports that she does not drink alcohol and does not use drugs.    Allergies:   Allergies   Allergen Reactions   • Azithromycin    • Cefaclor Diarrhea and Nausea Only     Other reaction(s): Nausea Only   • Cephalexin Unknown - High Severity   • Lipitor [Atorvastatin] Nausea And Vomiting   • Penicillin G Unknown - High Severity   • Penicillins    • Zocor [Simvastatin] Nausea And Vomiting       Medications:   Prior to Admission medications    Medication Sig Start Date End Date Taking? Authorizing Provider   aspirin 81 MG EC tablet Take 81 mg by mouth Daily.    ProviderBing MD   bumetanide (BUMEX) 2 MG tablet Take 1 tablet by mouth 2 (Two) Times a Day for 30 days. 5/18/22 6/17/22  Sher Avalos DO   entrectinib (ROZLYTREK) 200 MG capsule Take 600 mg by mouth Daily.    Bing Mcclelland MD   levothyroxine (SYNTHROID, LEVOTHROID) 150 MCG tablet Take 150 mcg by mouth Daily.    Bing Mcclelland MD   loratadine (CLARITIN) 10 MG tablet Take 10 mg by mouth As Needed for Allergies.    Bing Mcclelland MD   multivitamin with minerals tablet tablet Take 1 tablet by mouth Daily.    Bing Mcclelland MD   ondansetron (ZOFRAN) 8 MG tablet Take 1 tablet by mouth Every 8 (Eight) Hours As Needed for Nausea or Vomiting. 3/18/22   Jen Raymundo MD   pantoprazole  (Protonix) 40 MG EC tablet Take 1 tablet by mouth Daily. 6/1/22   Patel Kevin APRN   prochlorperazine (COMPAZINE) 10 MG tablet Take 1 tablet by mouth Every 6 (Six) Hours As Needed for Nausea or Vomiting. 3/18/22   Jen Raymundo MD   promethazine (PHENERGAN) 25 MG tablet Take 1 tablet by mouth Every 6 (Six) Hours As Needed for Nausea or Vomiting. 4/26/22   Jen Raymundo MD   rivaroxaban (Xarelto) 20 MG tablet Take 20 mg by mouth Daily.    ProviderBing MD   SUMAtriptan (IMITREX) 20 MG/ACT nasal spray INSTILL 1 SPRAY INTO EACH NOSTRIL EVERY 2 HOURS AS NEEDED FOR MIGRAINE 8/23/21   Jen Raymundo MD   topiramate (TOPAMAX) 50 MG tablet TAKE 1 TABLET BY MOUTH EVERY NIGHT. 2/9/22   Jen Raymundo MD   traMADol (ULTRAM) 50 MG tablet Take 50 mg by mouth Every 8 (Eight) Hours As Needed for Moderate Pain  or Severe Pain .    Provider, MD Bing   venlafaxine XR (EFFEXOR-XR) 37.5 MG 24 hr capsule TAKE 1 CAPSULE BY MOUTH EVERY DAY 3/3/22   Jen Raymundo MD       Review of Systems:  Review of Systems   Constitutional: Negative for appetite change, chills, diaphoresis, fatigue and fever.   HENT: Negative for drooling, hearing loss, tinnitus, trouble swallowing and voice change.    Eyes: Negative for photophobia and visual disturbance.   Respiratory: Negative for cough, choking, shortness of breath, wheezing and stridor.    Cardiovascular: Negative for chest pain, palpitations and leg swelling.   Gastrointestinal: Negative for abdominal distention, abdominal pain, constipation, diarrhea, nausea and vomiting.   Genitourinary: Positive for difficulty urinating and dysuria. Negative for flank pain, frequency, hematuria and urgency.        Patient is frequently incontinent and set for unable to determine the presence or absence of certain urological symptoms (but did onto the question to the best of her ability/knowledge)   Musculoskeletal: Negative for arthralgias, back pain, gait  problem, neck pain and neck stiffness.   Skin: Negative for color change, pallor, rash and wound.   Neurological: Positive for dizziness and light-headedness. Negative for tremors, seizures, syncope, facial asymmetry, speech difficulty, weakness and numbness.   Psychiatric/Behavioral: Negative for agitation, confusion and hallucinations.      Otherwise complete ROS is negative except as mentioned above.    Physical Exam:   Temp:  [98.4 °F (36.9 °C)] 98.4 °F (36.9 °C)  Heart Rate:  [62-80] 77  Resp:  [18] 18  BP: ()/(56-61) 117/57  Physical Exam  Constitutional:       General: She is not in acute distress.     Appearance: She is obese. She is not ill-appearing, toxic-appearing or diaphoretic.   HENT:      Head: Normocephalic and atraumatic.      Nose: Nose normal. No congestion or rhinorrhea.      Mouth/Throat:      Mouth: Mucous membranes are moist.      Pharynx: Oropharynx is clear. No oropharyngeal exudate or posterior oropharyngeal erythema.   Eyes:      General:         Right eye: No discharge.         Left eye: No discharge.      Extraocular Movements: Extraocular movements intact.      Conjunctiva/sclera: Conjunctivae normal.      Pupils: Pupils are equal, round, and reactive to light.   Cardiovascular:      Rate and Rhythm: Normal rate and regular rhythm.      Pulses: Normal pulses.      Heart sounds: Normal heart sounds. No murmur heard.    No gallop.   Pulmonary:      Effort: Pulmonary effort is normal. No respiratory distress.      Breath sounds: Normal breath sounds. No stridor. No wheezing, rhonchi or rales.   Abdominal:      General: Abdomen is flat. There is no distension.      Palpations: Abdomen is soft. There is no mass.      Tenderness: There is abdominal tenderness.      Hernia: No hernia is present.   Musculoskeletal:         General: No swelling, tenderness, deformity or signs of injury.   Skin:     Capillary Refill: Capillary refill takes less than 2 seconds.      Coloration: Skin is not  "jaundiced.      Findings: No bruising, erythema or rash.   Neurological:      General: No focal deficit present.      Mental Status: She is alert and oriented to person, place, and time. Mental status is at baseline.      Cranial Nerves: No cranial nerve deficit.   Psychiatric:         Mood and Affect: Mood normal.         Behavior: Behavior normal.         Thought Content: Thought content normal.         Judgment: Judgment normal.         Results Reviewed:  I have personally reviewed current lab, radiology, and data and agree with results.  Lab Results (last 24 hours)     Procedure Component Value Units Date/Time    COVID-19 and FLU A/B PCR - Swab, Nasopharynx [[id number]]  (Normal) Collected: 06/11/22 0116    Specimen: Swab from Nasopharynx Updated: 06/11/22 0206     COVID19 Not Detected     Influenza A PCR Not Detected     Influenza B PCR Not Detected    Narrative:      Fact sheet for providers: https://www.fda.gov/media/079943/download    Fact sheet for patients: https://www.fda.gov/media/805492/download    Test performed by PCR.    Procalcitonin [[id number]]  (Normal) Collected: 06/11/22 0114    Specimen: Blood Updated: 06/11/22 0150     Procalcitonin 0.08 ng/mL     Narrative:      As a Marker for Sepsis (Non-Neonates):    1. <0.5 ng/mL represents a low risk of severe sepsis and/or septic shock.  2. >2 ng/mL represents a high risk of severe sepsis and/or septic shock.    As a Marker for Lower Respiratory Tract Infections that require antibiotic therapy:    PCT on Admission    Antibiotic Therapy       6-12 Hrs later    >0.5                Strongly Recommended  >0.25 - <0.5        Recommended   0.1 - 0.25          Discouraged              Remeasure/reassess PCT  <0.1                Strongly Discouraged     Remeasure/reassess PCT    As 28 day mortality risk marker: \"Change in Procalcitonin Result\" (>80% or <=80%) if Day 0 (or Day 1) and Day 4 values are available. Refer to " http://www.[facility]-pct-calculator.com    Change in PCT <=80%  A decrease of PCT levels below or equal to 80% defines a positive change in PCT test result representing a higher risk for 28-day all-cause mortality of patients diagnosed with severe sepsis for septic shock.    Change in PCT >80%  A decrease of PCT levels of more than 80% defines a negative change in PCT result representing a lower risk for 28-day all-cause mortality of patients diagnosed with severe sepsis or septic shock.       Lactic Acid, Plasma [[id number]]  (Abnormal) Collected: 06/11/22 0114    Specimen: Blood Updated: 06/11/22 0145     Lactate 2.5 mmol/L     Blood Culture - Blood, Blood, Venous Line [[id number]] Collected: 06/11/22 0114    Specimen: Blood, Venous Line Updated: 06/11/22 0123    C-reactive Protein [[id number]]  (Abnormal) Collected: 06/10/22 2348    Specimen: Blood Updated: 06/11/22 0106     C-Reactive Protein 1.04 mg/dL     Extra Tubes [[id number]] Collected: 06/10/22 2348    Specimen: Blood Updated: 06/11/22 0103    Narrative:      The following orders were created for panel order Extra Tubes.  Procedure                               Abnormality         Status                     ---------                               -----------         ------                     Gold Top - SST[[id number]]                                   Final result               Light Blue Top[[id number]]                                   Final result                 Please view results for these tests on the individual orders.    Gold Top - SST [[id number]] Collected: 06/10/22 2348    Specimen: Blood Updated: 06/11/22 0103     Extra Tube Hold for add-ons.     Comment: Auto resulted.       Light Blue Top [[id number]] Collected: 06/10/22 2348    Specimen: Blood Updated: 06/11/22 0103     Extra Tube Hold for add-ons.     Comment: Auto resulted       Urine Culture - Urine, Urine, Clean Catch [[id number]] Collected: 06/10/22 2335    Specimen: Urine, Clean Catch Updated:  06/11/22 0059    Comprehensive Metabolic Panel [[id number]]  (Abnormal) Collected: 06/10/22 2348    Specimen: Blood Updated: 06/11/22 0025     Glucose 77 mg/dL      BUN 37 mg/dL      Creatinine 2.48 mg/dL      Sodium 138 mmol/L      Potassium 4.2 mmol/L      Comment: Slight hemolysis detected by analyzer. Results may be affected.        Chloride 95 mmol/L      CO2 30.0 mmol/L      Calcium 8.5 mg/dL      Total Protein 7.1 g/dL      Albumin 4.40 g/dL      ALT (SGPT) 20 U/L      AST (SGOT) 26 U/L      Alkaline Phosphatase 122 U/L      Total Bilirubin 0.2 mg/dL      Globulin 2.7 gm/dL      A/G Ratio 1.6 g/dL      BUN/Creatinine Ratio 14.9     Anion Gap 13.0 mmol/L      eGFR 21.3 mL/min/1.73      Comment: National Kidney Foundation and American Society of Nephrology (ASN) Task Force recommended calculation based on the Chronic Kidney Disease Epidemiology Collaboration (CKD-EPI) equation refit without adjustment for race.       Narrative:      GFR Normal >60  Chronic Kidney Disease <60  Kidney Failure <15      Magnesium [[id number]]  (Abnormal) Collected: 06/10/22 2348    Specimen: Blood Updated: 06/11/22 0019     Magnesium 2.5 mg/dL     Troponin [[id number]]  (Normal) Collected: 06/10/22 2348    Specimen: Blood Updated: 06/11/22 0017     Troponin T 0.012 ng/mL     Narrative:      Troponin T Reference Range:  <= 0.03 ng/mL-   Negative for AMI  >0.03 ng/mL-     Abnormal for myocardial necrosis.  Clinicians would have to utilize clinical acumen, EKG, Troponin and serial changes to determine if it is an Acute Myocardial Infarction or myocardial injury due to an underlying chronic condition.       Results may be falsely decreased if patient taking Biotin.      BNP [[id number]]  (Normal) Collected: 06/10/22 2348    Specimen: Blood Updated: 06/11/22 0016     proBNP 185.6 pg/mL     Narrative:      Among patients with dyspnea, NT-proBNP is highly sensitive for the detection of acute congestive heart failure. In addition NT-proBNP of  <300 pg/ml effectively rules out acute congestive heart failure with 99% negative predictive value.    Results may be falsely decreased if patient taking Biotin.      Urinalysis, Microscopic Only - Urine, Clean Catch [[id number]]  (Abnormal) Collected: 06/10/22 2335    Specimen: Urine, Clean Catch Updated: 06/10/22 2355     RBC, UA 6-12 /HPF      WBC, UA Too Numerous to Count /HPF      Bacteria, UA 4+ /HPF      Squamous Epithelial Cells, UA 0-2 /HPF      Hyaline Casts, UA 0-2 /LPF      Methodology Automated Microscopy    Urinalysis With Microscopic If Indicated (No Culture) - Urine, Clean Catch [[id number]]  (Abnormal) Collected: 06/10/22 2335    Specimen: Urine, Clean Catch Updated: 06/10/22 2355     Color, UA Yellow     Appearance, UA Cloudy     pH, UA <=5.0     Specific Gravity, UA 1.007     Glucose, UA Negative     Ketones, UA Negative     Bilirubin, UA Negative     Blood, UA Large (3+)     Protein, UA Negative     Leuk Esterase, UA Large (3+)     Nitrite, UA Positive     Urobilinogen, UA 0.2 E.U./dL    CBC & Differential [[id number]]  (Abnormal) Collected: 06/10/22 2335    Specimen: Blood Updated: 06/10/22 2351    Narrative:      The following orders were created for panel order CBC & Differential.  Procedure                               Abnormality         Status                     ---------                               -----------         ------                     CBC Auto Differential[[id number]]        Abnormal            Final result                 Please view results for these tests on the individual orders.    CBC Auto Differential [[id number]]  (Abnormal) Collected: 06/10/22 2335    Specimen: Blood Updated: 06/10/22 2351     WBC 6.82 10*3/mm3      RBC 3.38 10*6/mm3      Hemoglobin 9.8 g/dL      Hematocrit 29.8 %      MCV 88.2 fL      MCH 29.0 pg      MCHC 32.9 g/dL      RDW 17.4 %      RDW-SD 55.3 fl      MPV 9.7 fL      Platelets 314 10*3/mm3      Neutrophil % 70.1 %      Lymphocyte % 13.8 %       Monocyte % 9.1 %      Eosinophil % 5.9 %      Basophil % 0.4 %      Immature Grans % 0.7 %      Neutrophils, Absolute 4.78 10*3/mm3      Lymphocytes, Absolute 0.94 10*3/mm3      Monocytes, Absolute 0.62 10*3/mm3      Eosinophils, Absolute 0.40 10*3/mm3      Basophils, Absolute 0.03 10*3/mm3      Immature Grans, Absolute 0.05 10*3/mm3      nRBC 0.0 /100 WBC         Imaging Results (Last 24 Hours)     Procedure Component Value Units Date/Time    CT Abdomen Pelvis Without Contrast [[id number]] Resulted: 06/11/22 0305     Updated: 06/11/22 0305    XR Chest 1 View [[id number]] Collected: 06/10/22 2337     Updated: 06/11/22 0032    Narrative:      EXAM: Single portable XR view of the chest  INDICATION: syncope  COMPARISON: Radiograph from 5/30/2020    FINDINGS:    The cardiomediastinal silhouette is within normal limits.   No evidence of pleural effusion, pneumothorax, or focal  consolidation.      Impression:        No acute cardiopulmonary findings.    Electronically signed by:  Oren Crook MD  6/11/2022 12:30 AM  CDT Workstation: [id number]    CT Head Without Contrast [[id number]] Collected: 06/10/22 2342     Updated: 06/11/22 0016    Narrative:      EXAM DESCRIPTION:  CT HEAD WO CONTRAST 6/10/2022 11:42 PM CDT  [facility]: CT HEAD WITHOUT IV CONTRAST     CLINICAL HISTORY:  62 years Female, syncope, headache    COMPARISON:  Brain CT dated 5/30/2022    TECHNIQUE: Axial thin section CT images of the brain were  obtained from the base of the skull to the vertex without  intravenous contrast. Sagittal and coronal reconstructed images  were also obtained.    The protocol utilizes one or more of the following dose reduction  techniques: automated exposure control, adjustment of mA and/or  kV according to patient size, and/or use of iterative  reconstruction technique.    FINDINGS:     BRAIN: Mild extent of chronic microvascular ischemic white matter  disease is again identified. Gray-white matter differentiation is  preserved.  No hyperdense vessel sign is seen. No evidence of an  acute transcortical infarct is noted.    CEREBROSPINAL FLUID SPACES: Mild degree of involutional changes  is again appreciated. Basal cisterns are maintained. No  hydrocephalus or ventricular entrapment is noted. No extraaxial  fluid collection is noted.    MASS EFFECT: There is no mass effect or midline shift.    HEMORRHAGE: No intracranial hemorrhage is noted.    SELLA: The sella and suprasellar cistern appear unremarkable.    PARANASAL SINUSES: The visualized portions of the paranasal  sinuses again demonstrate mild mucosal thickening.    MASTOID AIR CELLS: The visualized portions of the mastoid air  cells are well aerated.    ORBITS: The visualized portions of the orbits appear  unremarkable.    SOFT TISSUES: No scalp soft tissue swelling is noted.    CALVARIUM: No calvarial fracture is noted. No lytic bone lesion  is seen.    ATHEROSCLEROSIS: Mild amount of calcified intracranial  atherosclerosis is again noted.      Impression:        NO ACUTE INTRACRANIAL ABNORMALITY IS NOTED.    Electronically signed by:  Ward Chow MD  6/11/2022 12:14 AM  CDT Workstation: [id number]          Assessment:    Active Hospital Problems    Diagnosis    • **Acute UTI (urinary tract infection)    • Orthostatic hypotension    • Fall          Plan:  1.  IV antibiotics  2.  CT Abdo pelvis without contrast to better visualize the renal tract and assess for any potential obstructive pathology  3.  Home med reconciliation  4.  Daily lab work including infectious markers  5.  VTE prophylaxis with low-dose enoxaparin.  I withheld the patient's rivaroxaban due to the current creatinine clearance  6.  IV fluid maintenance  7.  Microalbumin creatinine urine ratio and consideration for nephrology consultation depending on the trend of the patient's renal function is uncontrolled the patient's creatinine has returned to the baseline from a few months prior    8.  Hold all nephrotoxic  medications  9.  Hold antihypertensives      I confirmed that the patient's Advance Care Plan is present, code status is documented, or surrogate decision maker is listed in the patient's medical record.     I have utilized all available immediate resources to obtain, update, or review the patient's current medications.     I discussed the patient's findings and my recommendations with: Patient and her     Bruno Kelly MD    35 minutes of dedicated clinical time was devoted to this patient's admission H&P and acute medical management    Electronically signed by Bruno Kelly MD, 06/11/22, 2:57 AM CDT.

## 2022-06-11 NOTE — ED PROVIDER NOTES
"Subjective   62-year-old female got up from the couch SCVN[facility] and was walking to the bathroom when she became lightheaded and everything went \"black\".  The patient called out for her  and then passed out and fell to the floor.  Family state the patient did not hit her head when she fell.  The patient was out for maybe \"a second\" or 2.  Patient denies weakness or numbness.  No neck or back pain.  Patient states she had a similar episode a couple weeks ago.      History provided by:  Patient   used: No        Review of Systems   Constitutional: Negative for chills and fever.   HENT: Negative for drooling.    Eyes: Negative for redness.   Respiratory: Negative for cough, chest tightness, shortness of breath and wheezing.    Cardiovascular: Negative for chest pain and palpitations.   Gastrointestinal: Negative for abdominal pain, nausea and vomiting.   Genitourinary: Negative for flank pain.   Skin: Negative for color change.   Neurological: Positive for syncope and light-headedness. Negative for dizziness, seizures, weakness, numbness and headaches.   Psychiatric/Behavioral: Negative for confusion.       Past Medical History:   Diagnosis Date   • Achilles bursitis    • Achilles tendinitis    • Acquired hypothyroidism    • Allergic rhinitis    • Allergy to meat     alphagal   • Allergy to milk products    • Arrhythmia    • Asthmatic bronchitis    • Asthmatic bronchitis    • Bone spur    • Calcaneal spur    • Cancer (HCC)    • Depressive disorder    • Family history of thyroid problem    • Herpes simplex    • Hypermetropia    • Hypothyroidism    • Menopausal flushing    • Menopause    • Migraine    • Otalgia    • Pneumonia    • Presbyopia    • Stroke (HCC)    • Trochanteric bursitis    • UTI (urinary tract infection) 07/03/2018   • Ventricular premature beats        Allergies   Allergen Reactions   • Azithromycin    • Cefaclor Diarrhea and Nausea Only     Other reaction(s): Nausea Only   • " "Cephalexin Unknown - High Severity   • Lipitor [Atorvastatin] Nausea And Vomiting   • Penicillin G Unknown - High Severity   • Penicillins    • Zocor [Simvastatin] Nausea And Vomiting       Past Surgical History:   Procedure Laterality Date   • COLONOSCOPY N/A 2/15/2017    Procedure: COLONOSCOPY;  Surgeon: Lupillo Ugarte MD;  Location: [facility] ENDOSCOPY;  Service:    • COSMETIC SURGERY      plastic surgery to face x 4 r/t trauma   • ENDOSCOPY N/A 5/31/2022    Procedure: ESOPHAGOGASTRODUODENOSCOPY;  Surgeon: Lincoln Banegas MD;  Location: [facility] ENDOSCOPY;  Service: Gastroenterology;  Laterality: N/A;   • LAPAROSCOPIC CHOLECYSTECTOMY  12/10/1995    Cholecystectomy, laparoscopic (1)      • OTHER SURGICAL HISTORY      Head surgery procedure (1)    plastic surgery on the face    • OTHER SURGICAL HISTORY  10/17/2014    Repair Superficial Wound TR-EXT 2.5 < CM 64530 (1)          Family History   Problem Relation Age of Onset   • Stroke Mother    • Diabetes Father    • Heart disease Father    • Hypertension Father    • Thyroid disease Sister    • Breast cancer Maternal Aunt    • Cancer Neg Hx         multiple in mothers family       Social History     Socioeconomic History   • Marital status:    Tobacco Use   • Smoking status: Never Smoker   • Smokeless tobacco: Never Used   Vaping Use   • Vaping Use: Never used   Substance and Sexual Activity   • Alcohol use: No   • Drug use: No   • Sexual activity: Defer           Objective    Vitals:    06/10/22 2219 06/10/22 2334   BP: 98/61 111/56   BP Location: Left arm Left arm   Patient Position: Sitting Sitting   Pulse: 80 75   Resp: 18 18   Temp: 98.4 °F (36.9 °C)    TempSrc: Oral    SpO2: 99% 98%   Weight: 96.2 kg (212 lb)    Height: 167.6 cm (66\")        Physical Exam  Vitals and nursing note reviewed.   Constitutional:       General: She is not in acute distress.     Appearance: She is well-developed. She is obese. She is ill-appearing. She is not toxic-appearing or " diaphoretic.   HENT:      Head: Normocephalic.      Right Ear: External ear normal.      Left Ear: External ear normal.      Nose: No congestion or rhinorrhea.      Mouth/Throat:      Mouth: Mucous membranes are moist.   Eyes:      General: No scleral icterus.     Conjunctiva/sclera: Conjunctivae normal.   Cardiovascular:      Rate and Rhythm: Normal rate.   Pulmonary:      Effort: Pulmonary effort is normal. No accessory muscle usage or respiratory distress.      Breath sounds: No wheezing.   Chest:      Chest wall: No tenderness.   Abdominal:      General: Bowel sounds are normal.      Palpations: Abdomen is soft.      Tenderness: There is no abdominal tenderness (deep palpation).   Musculoskeletal:      Cervical back: No tenderness or bony tenderness.      Thoracic back: No tenderness or bony tenderness.      Lumbar back: No tenderness or bony tenderness.      Right lower leg: No edema.      Left lower leg: No edema.   Skin:     General: Skin is warm and dry.      Capillary Refill: Capillary refill takes less than 2 seconds.   Neurological:      Mental Status: She is alert and oriented to person, place, and time.      GCS: GCS eye subscore is 4. GCS verbal subscore is 5. GCS motor subscore is 6.   Psychiatric:         Behavior: Behavior normal.         ECG 12 Lead      Date/Time: 6/10/2022 10:21 PM  Performed by: Anthony Montgomery MD  Authorized by: Anthony Montgomery MD   Interpreted by physician  Rhythm: sinus rhythm  Rate: normal  QRS axis: normal  ST segment elevation noted on lead: none.  Clinical impression: abnormal ECG and low voltage                 ED Course      Results for orders placed or performed during the hospital encounter of 06/10/22   Comprehensive Metabolic Panel    Specimen: Blood   Result Value Ref Range    Glucose 77 65 - 99 mg/dL    BUN 37 (H) 8 - 23 mg/dL    Creatinine 2.48 (H) 0.57 - 1.00 mg/dL    Sodium 138 136 - 145 mmol/L    Potassium 4.2 3.5 - 5.2 mmol/L    Chloride 95 (L) 98 - 107  mmol/L    CO2 30.0 (H) 22.0 - 29.0 mmol/L    Calcium 8.5 (L) 8.6 - 10.5 mg/dL    Total Protein 7.1 6.0 - 8.5 g/dL    Albumin 4.40 3.50 - 5.20 g/dL    ALT (SGPT) 20 1 - 33 U/L    AST (SGOT) 26 1 - 32 U/L    Alkaline Phosphatase 122 (H) 39 - 117 U/L    Total Bilirubin 0.2 0.0 - 1.2 mg/dL    Globulin 2.7 gm/dL    A/G Ratio 1.6 g/dL    BUN/Creatinine Ratio 14.9 7.0 - 25.0    Anion Gap 13.0 5.0 - 15.0 mmol/L    eGFR 21.3 (L) >60.0 mL/min/1.73   Urinalysis With Microscopic If Indicated (No Culture) - Urine, Clean Catch    Specimen: Urine, Clean Catch   Result Value Ref Range    Color, UA Yellow Yellow, Straw, Dark Yellow, Charlene    Appearance, UA Cloudy (A) Clear    pH, UA <=5.0 5.0 - 9.0    Specific Gravity, UA 1.007 1.003 - 1.030    Glucose, UA Negative Negative    Ketones, UA Negative Negative    Bilirubin, UA Negative Negative    Blood, UA Large (3+) (A) Negative    Protein, UA Negative Negative    Leuk Esterase, UA Large (3+) (A) Negative    Nitrite, UA Positive (A) Negative    Urobilinogen, UA 0.2 E.U./dL 0.2 - 1.0 E.U./dL   Magnesium    Specimen: Blood   Result Value Ref Range    Magnesium 2.5 (H) 1.6 - 2.4 mg/dL   CBC Auto Differential    Specimen: Blood   Result Value Ref Range    WBC 6.82 3.40 - 10.80 10*3/mm3    RBC 3.38 (L) 3.77 - 5.28 10*6/mm3    Hemoglobin 9.8 (L) 12.0 - 15.9 g/dL    Hematocrit 29.8 (L) 34.0 - 46.6 %    MCV 88.2 79.0 - 97.0 fL    MCH 29.0 26.6 - 33.0 pg    MCHC 32.9 31.5 - 35.7 g/dL    RDW 17.4 (H) 12.3 - 15.4 %    RDW-SD 55.3 (H) 37.0 - 54.0 fl    MPV 9.7 6.0 - 12.0 fL    Platelets 314 140 - 450 10*3/mm3    Neutrophil % 70.1 42.7 - 76.0 %    Lymphocyte % 13.8 (L) 19.6 - 45.3 %    Monocyte % 9.1 5.0 - 12.0 %    Eosinophil % 5.9 0.3 - 6.2 %    Basophil % 0.4 0.0 - 1.5 %    Immature Grans % 0.7 (H) 0.0 - 0.5 %    Neutrophils, Absolute 4.78 1.70 - 7.00 10*3/mm3    Lymphocytes, Absolute 0.94 0.70 - 3.10 10*3/mm3    Monocytes, Absolute 0.62 0.10 - 0.90 10*3/mm3    Eosinophils, Absolute 0.40 0.00 -  0.40 10*3/mm3    Basophils, Absolute 0.03 0.00 - 0.20 10*3/mm3    Immature Grans, Absolute 0.05 0.00 - 0.05 10*3/mm3    nRBC 0.0 0.0 - 0.2 /100 WBC   BNP    Specimen: Blood   Result Value Ref Range    proBNP 185.6 0.0 - 900.0 pg/mL   Troponin    Specimen: Blood   Result Value Ref Range    Troponin T 0.012 0.000 - 0.030 ng/mL   Urinalysis, Microscopic Only - Urine, Clean Catch    Specimen: Urine, Clean Catch   Result Value Ref Range    RBC, UA 6-12 (A) None Seen /HPF    WBC, UA Too Numerous to Count (A) None Seen, 0-2, 3-5 /HPF    Bacteria, UA 4+ (A) None Seen /HPF    Squamous Epithelial Cells, UA 0-2 None Seen, 0-2 /HPF    Hyaline Casts, UA 0-2 None Seen /LPF    Methodology Automated Microscopy      XR Chest 1 View   Final Result      No acute cardiopulmonary findings.      Electronically signed by:  Oren Crook MD  6/11/2022 12:30 AM   CDT Workstation: [id number]      CT Head Without Contrast   Final Result      NO ACUTE INTRACRANIAL ABNORMALITY IS NOTED.      Electronically signed by:  Ward Chow MD  6/11/2022 12:14 AM   CDT Workstation: [id number]                                                   MDM  Number of Diagnoses or Management Options  TODD (acute kidney injury) (HCC): new and requires workup  Syncope, unspecified syncope type: new and requires workup  Urinary tract infection with hematuria, site unspecified: new and requires workup  Diagnosis management comments: Vital signs are stable, afebrile.  Labs significant for leukocyte and nitrate positive UA with 4+ bacteria.  This was similar to 12 days ago.  Creatinine is bumped to 2.5 from her baseline of 1.7.  Serum leukocytosis is 7.  Chest x-ray and CT head negative for acute findings.  Patient received IVF bolus and antibiotics ordered.  Spoke with the on-call hospitalist who agrees to admit.       Amount and/or Complexity of Data Reviewed  Decide to obtain previous medical records or to obtain history from someone other than the patient:  yes        Final diagnoses:   Syncope, unspecified syncope type   TODD (acute kidney injury) (HCC)   Urinary tract infection with hematuria, site unspecified       ED Disposition  ED Disposition     ED Disposition   Decision to Admit    Condition   --    Comment   --             No follow-up provider specified.       Medication List      No changes were made to your prescriptions during this visit.          Anthony Montgomery MD  06/11/22 0048

## 2022-06-11 NOTE — PROGRESS NOTES
[facility] Medicine Services  INPATIENT PROGRESS NOTE    Length of Stay: 0  Date of Admission: 6/10/2022  Primary Care Physician: Jen Raymundo MD    Subjective   Chief Complaint: Fall  HPI:  62 year old female with a history of lung cancer on immunotherapy, CKD, and recurrent TIAs who presented with a fall.  She was found to have UTI, which is recurrent.     Review of Systems   Constitutional: Negative for chills and fever.   Respiratory: Negative for shortness of breath.    Cardiovascular: Negative for chest pain.   Gastrointestinal: Negative for abdominal pain, blood in stool and nausea.        All pertinent negatives and positives are as above. All other systems have been reviewed and are negative unless otherwise stated.     Objective    Temp:  [97.8 °F (36.6 °C)-98.5 °F (36.9 °C)] 98.5 °F (36.9 °C)  Heart Rate:  [62-89] 89  Resp:  [18] 18  BP: ()/(54-61) 114/54    Physical Exam  Vitals reviewed.   Constitutional:       General: She is not in acute distress.     Appearance: Normal appearance. She is well-developed. She is not diaphoretic.   HENT:      Head: Normocephalic and atraumatic.   Eyes:      Conjunctiva/sclera: Conjunctivae normal.   Cardiovascular:      Rate and Rhythm: Normal rate and regular rhythm.   Pulmonary:      Effort: Pulmonary effort is normal. No respiratory distress.      Breath sounds: Normal breath sounds.   Abdominal:      General: Bowel sounds are normal. There is no distension.      Palpations: Abdomen is soft.      Tenderness: There is no abdominal tenderness.   Musculoskeletal:         General: No swelling or deformity.   Skin:     General: Skin is warm and dry.   Neurological:      General: No focal deficit present.      Mental Status: She is alert and oriented to person, place, and time.             Results Review:  I have reviewed the labs, radiology results, and diagnostic studies.    Laboratory Data:   Results from  last 7 days   Lab Units 06/11/22  0603 06/10/22  2348 06/08/22  0744   SODIUM mmol/L 135* 138 140   POTASSIUM mmol/L 3.8 4.2 4.3   CHLORIDE mmol/L 99 95* 100   TOTAL CO2 mmol/L  --   --  35*   CO2 mmol/L 22.0 30.0*  --    BUN mg/dL 35* 37* 43*   CREATININE mg/dL 2.11* 2.48* 1.8*   GLUCOSE mg/dL 98 77  --    CALCIUM mg/dL 7.8* 8.5* 9.3   BILIRUBIN mg/dL  --  0.2 0.31   ALK PHOS U/L  --  122* 82   ALT (SGPT) U/L  --  20 18   AST (SGOT) U/L  --  26 15   ANION GAP mmol/L 14.0 13.0  --      Estimated Creatinine Clearance: 31.8 mL/min (A) (by C-G formula based on SCr of 2.11 mg/dL (H)).  Results from last 7 days   Lab Units 06/10/22  2348   MAGNESIUM mg/dL 2.5*         Results from last 7 days   Lab Units 06/11/22  0603 06/10/22  2335   WBC 10*3/mm3 7.04 6.82   HEMOGLOBIN g/dL 8.8* 9.8*   HEMATOCRIT % 25.9* 29.8*   PLATELETS 10*3/mm3 215 314           Culture Data:   No results found for: BLOODCX  No results found for: URINECX  No results found for: RESPCX  No results found for: WOUNDCX  No results found for: STOOLCX  No components found for: BODYFLD    Radiology Data:   Imaging Results (Last 24 Hours)     Procedure Component Value Units Date/Time    CT Abdomen Pelvis Without Contrast [[id number]] Collected: 06/11/22 0300     Updated: 06/11/22 1033    Narrative:      CT abdomen and pelvis without contrast    Indication: Flank pain, kidney stone suspected  , R55 Syncope and collapse N17.9 Acute kidney failure,  unspecified N39.0 Urinary tract infection, site not specified  R31.9 Hematuria, unspecified: UTI, recurrent/complicated (Female)      Comparison: CT abdomen pelvis 7/8/2021    Technique: Axial CT images are obtained through the abdomen and  pelvis. Reformatted coronal and sagittal images were reviewed. IV  contrast was not administered.   One of the following dose optimization techniques was utilized  in the performance of this exam: automated exposure control;  adjustment of the mA and/or kV according to the  patient's size;  or use of an iterative reconstruction technique.  Specific  details can be referenced in the facility's radiology CT exam  operational policy.    Findings:   Lower lung fields: Limited views lower lung field are  unremarkable.     Evaluation of the solid organs of the abdomen is limited without  IV contrast.     Liver: No focal parenchymal abnormality of the liver.  Biliary: Gallbladder is surgically absent. No significant  intrahepatic or extra hepatic ductal dilatation.  Pancreas: Normal appearance.  Spleen: Normal appearance.  Adrenal glands: Unremarkable.   Kidneys / retroperitoneum: No evidence of nephrolithiasis or  hydronephrosis       Bowel / peritoneum / mesenteries: Moderate amount of stool seen  throughout the colon. There are few scattered diverticula  throughout the colon with no evidence of diverticulitis. Focal  calcific density within the region of the cecum may relate to  ingested medications within the enteral stream. Correlate  clinically. This measures approximately 1.6 cm maximum dimension.  The appendix is visualized and appears unremarkable.      Lymph node assessment: The previously seen extensive  lymphadenopathy in retroperitoneum no longer visualized. No  evidence of pathologic enlarged mesenteric adenopathy.    Pelvic  structures: Appear unremarkable.      Vessels: No significant atherosclerotic calcifications seen  throughout a nonaneurysmal abdominal aorta and branches.     Musculoskeletal / Body wall: In comparison to the prior CT  abdomen pelvis, there is no evidence of multiple sclerotic  lesions within the visualized lumbar spine as well as the bones  of the pelvis seen to involve the superior lateral left iliac  bone as well as within the medial right iliac bone and within the  mid superior aspect the right iliac bone and superior right  acetabulum. Along with this, there is chronic irregular lesions  within the T11-T12 vertebral bodies as well as within the L3  and  L5 vertebral bodies. There are smaller sclerotic foci within the  T8 and T9 vertebral bodies also seen. These findings are  concerning for sclerotic metastatic disease. There is no acute  fracture. No acute alignment abnormality lumbar spine.        Impression:      1. Lymphadenopathy no longer identified.  2. No acute intra-abdominal process.  3. Numerous sclerotic bony lesions within the visualized bones of  the pelvis and the visualized lower thoracic and lumbar spine  concerning for bony metastatic disease. Correlate with clinical  history for any known primary malignancy and recommend follow-up  bone scan to further evaluate for other osseous lesions.      Electronically signed by:  Ajith Viveros MD  6/11/2022 10:31 AM  CDT Workstation: [id number]    XR Chest 1 View [[id number]] Collected: 06/10/22 2337     Updated: 06/11/22 0032    Narrative:      EXAM: Single portable XR view of the chest  INDICATION: syncope  COMPARISON: Radiograph from 5/30/2020    FINDINGS:    The cardiomediastinal silhouette is within normal limits.   No evidence of pleural effusion, pneumothorax, or focal  consolidation.      Impression:        No acute cardiopulmonary findings.    Electronically signed by:  Oren Crook MD  6/11/2022 12:30 AM  CDT Workstation: [id number]    CT Head Without Contrast [[id number]] Collected: 06/10/22 2342     Updated: 06/11/22 0016    Narrative:      EXAM DESCRIPTION:  CT HEAD WO CONTRAST 6/10/2022 11:42 PM CDT  [facility]: CT HEAD WITHOUT IV CONTRAST     CLINICAL HISTORY:  62 years Female, syncope, headache    COMPARISON:  Brain CT dated 5/30/2022    TECHNIQUE: Axial thin section CT images of the brain were  obtained from the base of the skull to the vertex without  intravenous contrast. Sagittal and coronal reconstructed images  were also obtained.    The protocol utilizes one or more of the following dose reduction  techniques: automated exposure control, adjustment of mA and/or  kV according to patient  size, and/or use of iterative  reconstruction technique.    FINDINGS:     BRAIN: Mild extent of chronic microvascular ischemic white matter  disease is again identified. Gray-white matter differentiation is  preserved. No hyperdense vessel sign is seen. No evidence of an  acute transcortical infarct is noted.    CEREBROSPINAL FLUID SPACES: Mild degree of involutional changes  is again appreciated. Basal cisterns are maintained. No  hydrocephalus or ventricular entrapment is noted. No extraaxial  fluid collection is noted.    MASS EFFECT: There is no mass effect or midline shift.    HEMORRHAGE: No intracranial hemorrhage is noted.    SELLA: The sella and suprasellar cistern appear unremarkable.    PARANASAL SINUSES: The visualized portions of the paranasal  sinuses again demonstrate mild mucosal thickening.    MASTOID AIR CELLS: The visualized portions of the mastoid air  cells are well aerated.    ORBITS: The visualized portions of the orbits appear  unremarkable.    SOFT TISSUES: No scalp soft tissue swelling is noted.    CALVARIUM: No calvarial fracture is noted. No lytic bone lesion  is seen.    ATHEROSCLEROSIS: Mild amount of calcified intracranial  atherosclerosis is again noted.      Impression:        NO ACUTE INTRACRANIAL ABNORMALITY IS NOTED.    Electronically signed by:  Ward Chow MD  6/11/2022 12:14 AM  CDT Workstation: [id number]          I have reviewed the patient's current medications.     Assessment/Plan     Active Hospital Problems    Diagnosis    • **Acute UTI (urinary tract infection)    • Orthostatic hypotension    • Fall        Plan:    Levaquin day 1/7  Follow cultures  IV fluid: LR 75 ml/hr  Orthostatics q shift  She has a known recent worsening of her creatinine due to AIN.  Creatine stable since last seen by nephrology  PT assessment  VTE PPx: SCD    I confirmed that the patient's Advance Care Plan is present, code status is documented, or surrogate decision maker is listed in the  patient's medical record.     The patient was evaluated during the global COVID-19 pandemic, and the diagnosis was suspected/considered upon their initial presentation.  Evaluation, treatment, and testing were consistent with current guidelines for patients who present with complaints or symptoms that may be related to COVID-19.        This document has been electronically signed by SILVA Carrizales on June 11, 2022 11:45 CDT

## 2022-06-11 NOTE — PLAN OF CARE
Problem: Fall Injury Risk  Goal: Absence of Fall and Fall-Related Injury  Outcome: Ongoing, Progressing     Problem: Fall Injury Risk  Goal: Absence of Fall and Fall-Related Injury  Intervention: Identify and Manage Contributors  Recent Flowsheet Documentation  Taken 6/11/2022 0400 by Ozzie Cruz RN  Medication Review/Management: medications reviewed     Problem: Fall Injury Risk  Goal: Absence of Fall and Fall-Related Injury  Intervention: Promote Injury-Free Environment  Recent Flowsheet Documentation  Taken 6/11/2022 0400 by Ozzie Cruz RN  Safety Promotion/Fall Prevention:   assistive device/personal items within reach   clutter free environment maintained   nonskid shoes/slippers when out of bed   safety round/check completed     Problem: Adult Inpatient Plan of Care  Goal: Plan of Care Review  Outcome: Ongoing, Progressing  Flowsheets (Taken 6/11/2022 0434)  Progress: no change  Plan of Care Reviewed With: patient     Problem: Adult Inpatient Plan of Care  Goal: Patient-Specific Goal (Individualized)  Outcome: Ongoing, Progressing     Problem: Adult Inpatient Plan of Care  Goal: Absence of Hospital-Acquired Illness or Injury  Outcome: Ongoing, Progressing     Problem: Adult Inpatient Plan of Care  Goal: Absence of Hospital-Acquired Illness or Injury  Intervention: Identify and Manage Fall Risk  Flowsheets (Taken 6/11/2022 0400)  Safety Promotion/Fall Prevention:   assistive device/personal items within reach   clutter free environment maintained   nonskid shoes/slippers when out of bed   safety round/check completed     Problem: Adult Inpatient Plan of Care  Goal: Absence of Hospital-Acquired Illness or Injury  Intervention: Prevent Skin Injury  Flowsheets (Taken 6/11/2022 0400)  Body Position: position changed independently     Problem: Adult Inpatient Plan of Care  Goal: Absence of Hospital-Acquired Illness or Injury  Intervention: Prevent and Manage VTE (Venous Thromboembolism) Risk  Flowsheets  (Taken 6/11/2022 0400)  Activity Management: activity adjusted per tolerance  VTE Prevention/Management: patient refused intervention     Problem: Adult Inpatient Plan of Care  Goal: Absence of Hospital-Acquired Illness or Injury  Intervention: Prevent Infection  Flowsheets (Taken 6/11/2022 0400)  Infection Prevention: rest/sleep promoted     Problem: Adult Inpatient Plan of Care  Goal: Optimal Comfort and Wellbeing  Outcome: Ongoing, Progressing     Problem: Adult Inpatient Plan of Care  Goal: Optimal Comfort and Wellbeing  Intervention: Provide Person-Centered Care  Flowsheets (Taken 6/11/2022 0400)  Trust Relationship/Rapport:   care explained   questions encouraged     Problem: Adult Inpatient Plan of Care  Goal: Readiness for Transition of Care  Outcome: Ongoing, Progressing     Problem: Adult Inpatient Plan of Care  Goal: Readiness for Transition of Care  Intervention: Mutually Develop Transition Plan  Flowsheets  Taken 6/11/2022 0317  Transportation Anticipated: family or friend will provide  Patient/Family Anticipated Services at Transition: none  Patient/Family Anticipates Transition to: home with family  Taken 6/11/2022 0315  Equipment Currently Used at Home:   walker, rolling   cane, straight     Problem: Skin Injury Risk Increased  Goal: Skin Health and Integrity  Outcome: Ongoing, Progressing     Problem: Skin Injury Risk Increased  Goal: Skin Health and Integrity  Intervention: Optimize Skin Protection  Flowsheets (Taken 6/11/2022 0400)  Pressure Reduction Techniques: frequent weight shift encouraged  Head of Bed (HOB) Positioning: HOB at 20-30 degrees  Pressure Reduction Devices: specialty bed utilized     Problem: Pain Chronic (Persistent) (Comorbidity Management)  Goal: Acceptable Pain Control and Functional Ability  Outcome: Ongoing, Progressing     Problem: Pain Chronic (Persistent) (Comorbidity Management)  Goal: Acceptable Pain Control and Functional Ability  Intervention: Manage Persistent  Pain  Flowsheets (Taken 6/11/2022 0400)  Sleep/Rest Enhancement: awakenings minimized  Medication Review/Management: medications reviewed     Problem: Pain Chronic (Persistent) (Comorbidity Management)  Goal: Acceptable Pain Control and Functional Ability  Intervention: Optimize Psychosocial Wellbeing  Flowsheets (Taken 6/11/2022 0400)  Supportive Measures: active listening utilized  Diversional Activities: television   Goal Outcome Evaluation:  Plan of Care Reviewed With: patient        Progress: no change

## 2022-06-11 NOTE — PLAN OF CARE
Goal Outcome Evaluation:  Plan of Care Reviewed With: patient        Progress: no change       Pt was positive for both orthostatic blood pressure and hypotension today. About suppertime, Pt said it felt like fluid was beginning to build in her legs. IV fluids were stopped and Pt was placed on Midodrine TID at mealtime. Pt walked in the hallway multiple times with family. She reported slight dizziness at times.

## 2022-06-11 NOTE — ED NOTES
Patient is at an increased risk for falls. Fall light activated, yellow falls bracelet and yellow non-skid socks placed on patient. Call light within reach and patient instructed to call for assistance. Side rails up x2, bed alarm activated, and gait belt readily accessible.

## 2022-06-11 NOTE — NURSING NOTE
Pt's  declined to allow staff to send approved home medication (Entrectinib) to pharmacy for identification/label.  said the immunotherapy drug is too expensive to let out of his sight. He brings in the medication from home one dose at a time.

## 2022-06-12 LAB
ANION GAP SERPL CALCULATED.3IONS-SCNC: 7 MMOL/L (ref 5–15)
BASOPHILS # BLD AUTO: 0.03 10*3/MM3 (ref 0–0.2)
BASOPHILS NFR BLD AUTO: 0.6 % (ref 0–1.5)
BUN SERPL-MCNC: 22 MG/DL (ref 8–23)
BUN/CREAT SERPL: 12.9 (ref 7–25)
CALCIUM SPEC-SCNC: 8 MG/DL (ref 8.6–10.5)
CHLORIDE SERPL-SCNC: 105 MMOL/L (ref 98–107)
CO2 SERPL-SCNC: 27 MMOL/L (ref 22–29)
CREAT SERPL-MCNC: 1.71 MG/DL (ref 0.57–1)
DEPRECATED RDW RBC AUTO: 55.9 FL (ref 37–54)
EGFRCR SERPLBLD CKD-EPI 2021: 33.3 ML/MIN/1.73
EOSINOPHIL # BLD AUTO: 0.35 10*3/MM3 (ref 0–0.4)
EOSINOPHIL NFR BLD AUTO: 6.9 % (ref 0.3–6.2)
ERYTHROCYTE [DISTWIDTH] IN BLOOD BY AUTOMATED COUNT: 17.5 % (ref 12.3–15.4)
GLUCOSE SERPL-MCNC: 95 MG/DL (ref 65–99)
HCT VFR BLD AUTO: 23.9 % (ref 34–46.6)
HGB BLD-MCNC: 7.7 G/DL (ref 12–15.9)
IMM GRANULOCYTES # BLD AUTO: 0.03 10*3/MM3 (ref 0–0.05)
IMM GRANULOCYTES NFR BLD AUTO: 0.6 % (ref 0–0.5)
LYMPHOCYTES # BLD AUTO: 0.76 10*3/MM3 (ref 0.7–3.1)
LYMPHOCYTES NFR BLD AUTO: 14.9 % (ref 19.6–45.3)
MCH RBC QN AUTO: 28.2 PG (ref 26.6–33)
MCHC RBC AUTO-ENTMCNC: 32.2 G/DL (ref 31.5–35.7)
MCV RBC AUTO: 87.5 FL (ref 79–97)
MONOCYTES # BLD AUTO: 0.45 10*3/MM3 (ref 0.1–0.9)
MONOCYTES NFR BLD AUTO: 8.8 % (ref 5–12)
NEUTROPHILS NFR BLD AUTO: 3.48 10*3/MM3 (ref 1.7–7)
NEUTROPHILS NFR BLD AUTO: 68.2 % (ref 42.7–76)
NRBC BLD AUTO-RTO: 0 /100 WBC (ref 0–0.2)
PLATELET # BLD AUTO: 232 10*3/MM3 (ref 140–450)
PMV BLD AUTO: 9.9 FL (ref 6–12)
POTASSIUM SERPL-SCNC: 4.2 MMOL/L (ref 3.5–5.2)
RBC # BLD AUTO: 2.73 10*6/MM3 (ref 3.77–5.28)
SODIUM SERPL-SCNC: 139 MMOL/L (ref 136–145)
WBC NRBC COR # BLD: 5.1 10*3/MM3 (ref 3.4–10.8)

## 2022-06-12 PROCEDURE — 80048 BASIC METABOLIC PNL TOTAL CA: CPT | Performed by: NURSE PRACTITIONER

## 2022-06-12 PROCEDURE — G0378 HOSPITAL OBSERVATION PER HR: HCPCS

## 2022-06-12 PROCEDURE — 85025 COMPLETE CBC W/AUTO DIFF WBC: CPT | Performed by: NURSE PRACTITIONER

## 2022-06-12 PROCEDURE — 97162 PT EVAL MOD COMPLEX 30 MIN: CPT

## 2022-06-12 RX ADMIN — MIDODRINE HYDROCHLORIDE 5 MG: 5 TABLET ORAL at 08:26

## 2022-06-12 RX ADMIN — Medication 10 ML: at 20:35

## 2022-06-12 RX ADMIN — VENLAFAXINE HYDROCHLORIDE 37.5 MG: 37.5 CAPSULE, EXTENDED RELEASE ORAL at 08:26

## 2022-06-12 RX ADMIN — MIDODRINE HYDROCHLORIDE 5 MG: 5 TABLET ORAL at 17:18

## 2022-06-12 RX ADMIN — PANTOPRAZOLE SODIUM 40 MG: 40 TABLET, DELAYED RELEASE ORAL at 08:26

## 2022-06-12 RX ADMIN — LEVOTHYROXINE SODIUM 150 MCG: 150 TABLET ORAL at 08:26

## 2022-06-12 RX ADMIN — ASPIRIN 81 MG: 81 TABLET, FILM COATED ORAL at 08:26

## 2022-06-12 RX ADMIN — CETIRIZINE HYDROCHLORIDE 5 MG: 5 TABLET ORAL at 08:26

## 2022-06-12 RX ADMIN — Medication 10 ML: at 08:26

## 2022-06-12 RX ADMIN — MIDODRINE HYDROCHLORIDE 5 MG: 5 TABLET ORAL at 11:48

## 2022-06-12 RX ADMIN — TOPIRAMATE 50 MG: 50 TABLET, FILM COATED ORAL at 08:26

## 2022-06-12 NOTE — PLAN OF CARE
Goal Outcome Evaluation:  Plan of Care Reviewed With: patient        Progress: improving       Pt's blood pressure has stayed in normal range today. BP has not been orthostatic. Pt denied feeling dizzy or light-head while walking during day shift.

## 2022-06-12 NOTE — PLAN OF CARE
Problem: Fall Injury Risk  Goal: Absence of Fall and Fall-Related Injury  6/12/2022 0022 by Ozzie Cruz RN  Outcome: Ongoing, Progressing     Problem: Fall Injury Risk  Goal: Absence of Fall and Fall-Related Injury  Intervention: Identify and Manage Contributors  Recent Flowsheet Documentation  Taken 6/11/2022 2200 by Ozzie Cruz RN  Medication Review/Management: medications reviewed  Taken 6/11/2022 2100 by Ozzie Cruz RN  Medication Review/Management: medications reviewed  Taken 6/11/2022 2000 by Ozzie Cruz RN  Medication Review/Management: medications reviewed  Taken 6/11/2022 1900 by Ozzie Cruz RN  Medication Review/Management: medications reviewed     Problem: Fall Injury Risk  Goal: Absence of Fall and Fall-Related Injury  Intervention: Promote Injury-Free Environment  Recent Flowsheet Documentation  Taken 6/11/2022 2200 by Ozzie Cruz RN  Safety Promotion/Fall Prevention:   assistive device/personal items within reach   clutter free environment maintained   nonskid shoes/slippers when out of bed   safety round/check completed  Taken 6/11/2022 2100 by Ozzie Cruz RN  Safety Promotion/Fall Prevention:   safety round/check completed   nonskid shoes/slippers when out of bed   clutter free environment maintained   assistive device/personal items within reach  Taken 6/11/2022 2000 by Ozzie Cruz RN  Safety Promotion/Fall Prevention:   assistive device/personal items within reach   clutter free environment maintained   nonskid shoes/slippers when out of bed   safety round/check completed  Taken 6/11/2022 1900 by Ozzie Cruz RN  Safety Promotion/Fall Prevention:   safety round/check completed   nonskid shoes/slippers when out of bed   clutter free environment maintained   assistive device/personal items within reach     Problem: Adult Inpatient Plan of Care  Goal: Plan of Care Review  6/12/2022 0022 by Ozzie Cruz RN  Outcome: Ongoing, Progressing  Flowsheets  (Taken 6/11/2022 1813 by Lisset Marroquin, RN)  Progress: no change  Plan of Care Reviewed With: patient     Problem: Adult Inpatient Plan of Care  Goal: Patient-Specific Goal (Individualized)  6/12/2022 0022 by Ozzie Cruz, RN  Outcome: Ongoing, Progressing     Problem: Adult Inpatient Plan of Care  Goal: Absence of Hospital-Acquired Illness or Injury  6/12/2022 0022 by Ozzie Cruz, RN  Outcome: Ongoing, Progressing     Problem: Adult Inpatient Plan of Care  Goal: Absence of Hospital-Acquired Illness or Injury  Intervention: Identify and Manage Fall Risk  Flowsheets  Taken 6/11/2022 2200  Safety Promotion/Fall Prevention:   assistive device/personal items within reach   clutter free environment maintained   nonskid shoes/slippers when out of bed   safety round/check completed  Taken 6/11/2022 2100  Safety Promotion/Fall Prevention:   safety round/check completed   nonskid shoes/slippers when out of bed   clutter free environment maintained   assistive device/personal items within reach  Taken 6/11/2022 2000  Safety Promotion/Fall Prevention:   assistive device/personal items within reach   clutter free environment maintained   nonskid shoes/slippers when out of bed   safety round/check completed  Taken 6/11/2022 1900  Safety Promotion/Fall Prevention:   safety round/check completed   nonskid shoes/slippers when out of bed   clutter free environment maintained   assistive device/personal items within reach     Problem: Adult Inpatient Plan of Care  Goal: Absence of Hospital-Acquired Illness or Injury  Intervention: Prevent Skin Injury  Flowsheets  Taken 6/11/2022 2200  Body Position: position changed independently  Taken 6/11/2022 2100  Body Position: position changed independently  Taken 6/11/2022 2000  Body Position: position changed independently  Taken 6/11/2022 1900  Body Position: position changed independently     Problem: Adult Inpatient Plan of Care  Goal: Absence of Hospital-Acquired Illness or  Injury  Intervention: Prevent and Manage VTE (Venous Thromboembolism) Risk  Flowsheets  Taken 6/11/2022 2200  Activity Management: activity adjusted per tolerance  VTE Prevention/Management:   bilateral   sequential compression devices on  Taken 6/11/2022 2100  Activity Management: activity adjusted per tolerance  VTE Prevention/Management:   bilateral   sequential compression devices on  Taken 6/11/2022 2000  Activity Management: activity adjusted per tolerance  VTE Prevention/Management:   bilateral   sequential compression devices on  Taken 6/11/2022 1900  Activity Management: activity adjusted per tolerance  VTE Prevention/Management:   bilateral   sequential compression devices on     Problem: Adult Inpatient Plan of Care  Goal: Absence of Hospital-Acquired Illness or Injury  Intervention: Prevent Infection  Flowsheets  Taken 6/11/2022 2200  Infection Prevention: rest/sleep promoted  Taken 6/11/2022 2100  Infection Prevention: rest/sleep promoted  Taken 6/11/2022 2000  Infection Prevention: rest/sleep promoted  Taken 6/11/2022 1900  Infection Prevention: rest/sleep promoted     Problem: Adult Inpatient Plan of Care  Goal: Optimal Comfort and Wellbeing  6/12/2022 0022 by Ozzie Cruz, RN  Outcome: Ongoing, Progressing     Problem: Adult Inpatient Plan of Care  Goal: Optimal Comfort and Wellbeing  Intervention: Monitor Pain and Promote Comfort  Flowsheets (Taken 6/11/2022 0742 by Lisset Marroquin, RN)  Pain Management Interventions: see MAR     Problem: Adult Inpatient Plan of Care  Goal: Optimal Comfort and Wellbeing  Intervention: Provide Person-Centered Care  Flowsheets (Taken 6/11/2022 2000)  Trust Relationship/Rapport:   care explained   questions encouraged     Problem: Adult Inpatient Plan of Care  Goal: Readiness for Transition of Care  6/12/2022 0022 by Ozzie Cruz, RN  Outcome: Ongoing, Progressing     Problem: Adult Inpatient Plan of Care  Goal: Readiness for Transition of Care  Intervention:  Mutually Develop Transition Plan  Flowsheets  Taken 6/11/2022 0317  Transportation Anticipated: family or friend will provide  Patient/Family Anticipated Services at Transition: none  Patient/Family Anticipates Transition to: home with family  Taken 6/11/2022 0315  Equipment Currently Used at Home:   walker, rolling   cane, straight     Problem: Skin Injury Risk Increased  Goal: Skin Health and Integrity  6/12/2022 0022 by Ozzie Cruz, RN  Outcome: Ongoing, Progressing     Problem: Skin Injury Risk Increased  Goal: Skin Health and Integrity  Intervention: Optimize Skin Protection  Flowsheets  Taken 6/11/2022 2200  Pressure Reduction Techniques: frequent weight shift encouraged  Head of Bed (HOB) Positioning: HOB at 20-30 degrees  Pressure Reduction Devices: specialty bed utilized  Taken 6/11/2022 2100  Pressure Reduction Techniques: frequent weight shift encouraged  Head of Bed (HOB) Positioning: HOB at 20-30 degrees  Pressure Reduction Devices: specialty bed utilized  Taken 6/11/2022 2000  Pressure Reduction Techniques: frequent weight shift encouraged  Head of Bed (HOB) Positioning: HOB at 20-30 degrees  Pressure Reduction Devices: specialty bed utilized  Taken 6/11/2022 1900  Pressure Reduction Techniques: frequent weight shift encouraged  Head of Bed (HOB) Positioning: HOB at 20-30 degrees  Pressure Reduction Devices: specialty bed utilized     Problem: Pain Chronic (Persistent) (Comorbidity Management)  Goal: Acceptable Pain Control and Functional Ability  6/12/2022 0022 by Ozzie Cruz, RN  Outcome: Ongoing, Progressing     Problem: Pain Chronic (Persistent) (Comorbidity Management)  Goal: Acceptable Pain Control and Functional Ability  Intervention: Manage Persistent Pain  Flowsheets  Taken 6/11/2022 2200  Medication Review/Management: medications reviewed  Taken 6/11/2022 2100  Medication Review/Management: medications reviewed  Taken 6/11/2022 2000  Sleep/Rest Enhancement: awakenings  minimized  Medication Review/Management: medications reviewed  Taken 6/11/2022 1900  Medication Review/Management: medications reviewed     Problem: Pain Chronic (Persistent) (Comorbidity Management)  Goal: Acceptable Pain Control and Functional Ability  Intervention: Develop Pain Management Plan  Flowsheets (Taken 6/11/2022 0742 by Lisset Marroquin, RN)  Pain Management Interventions: see MAR     Problem: Pain Chronic (Persistent) (Comorbidity Management)  Goal: Acceptable Pain Control and Functional Ability  Intervention: Optimize Psychosocial Wellbeing  Flowsheets (Taken 6/11/2022 2000)  Supportive Measures: active listening utilized  Diversional Activities:   smartphone   television   Goal Outcome Evaluation:  Plan of Care Reviewed With: patient        Progress: no change

## 2022-06-12 NOTE — PROGRESS NOTES
[facility]  INPATIENT PROGRESS NOTE    Length of Stay: 0  Date of Admission: 6/10/2022  Primary Care Physician: Jen Raymundo MD    Subjective   Chief Complaint: Fall  HPI:  62 year old female with a history of lung cancer on immunotherapy, CKD, and recurrent TIAs who presented with a fall.  She was found to have UTI, which is recurrent. Culture is pending.  CT of the abdomen demonstrates likely new bone mets.     Review of Systems   Constitutional: Negative for chills and fever.   Respiratory: Negative for shortness of breath.    Cardiovascular: Negative for chest pain.   Gastrointestinal: Negative for abdominal pain, blood in stool and nausea.        All pertinent negatives and positives are as above. All other systems have been reviewed and are negative unless otherwise stated.     Objective    Temp:  [97.2 °F (36.2 °C)-98 °F (36.7 °C)] 97.2 °F (36.2 °C)  Heart Rate:  [] 73  Resp:  [18] 18  BP: ()/(47-56) 109/53    Physical Exam  Vitals reviewed.   Constitutional:       General: She is not in acute distress.     Appearance: Normal appearance. She is well-developed. She is not diaphoretic.   HENT:      Head: Normocephalic and atraumatic.   Eyes:      Conjunctiva/sclera: Conjunctivae normal.   Cardiovascular:      Rate and Rhythm: Normal rate and regular rhythm.   Pulmonary:      Effort: Pulmonary effort is normal. No respiratory distress.      Breath sounds: Normal breath sounds.   Abdominal:      General: Bowel sounds are normal. There is no distension.      Palpations: Abdomen is soft.      Tenderness: There is no abdominal tenderness.   Musculoskeletal:         General: No swelling or deformity.   Skin:     General: Skin is warm and dry.   Neurological:      General: No focal deficit present.      Mental Status: She is alert and oriented to person, place, and time.             Results Review:  I have reviewed the labs,  radiology results, and diagnostic studies.    Laboratory Data:   Results from last 7 days   Lab Units 06/12/22  0659 06/11/22  0603 06/10/22  2348 06/08/22  0744   SODIUM mmol/L 139 135* 138 140   POTASSIUM mmol/L 4.2 3.8 4.2 4.3   CHLORIDE mmol/L 105 99 95* 100   TOTAL CO2 mmol/L  --   --   --  35*   CO2 mmol/L 27.0 22.0 30.0*  --    BUN mg/dL 22 35* 37* 43*   CREATININE mg/dL 1.71* 2.11* 2.48* 1.8*   GLUCOSE mg/dL 95 98 77  --    CALCIUM mg/dL 8.0* 7.8* 8.5* 9.3   BILIRUBIN mg/dL  --   --  0.2 0.31   ALK PHOS U/L  --   --  122* 82   ALT (SGPT) U/L  --   --  20 18   AST (SGOT) U/L  --   --  26 15   ANION GAP mmol/L 7.0 14.0 13.0  --      Estimated Creatinine Clearance: 39.7 mL/min (A) (by C-G formula based on SCr of 1.71 mg/dL (H)).  Results from last 7 days   Lab Units 06/10/22  2348   MAGNESIUM mg/dL 2.5*         Results from last 7 days   Lab Units 06/12/22  0659 06/11/22  0603 06/10/22  2335   WBC 10*3/mm3 5.10 7.04 6.82   HEMOGLOBIN g/dL 7.7* 8.8* 9.8*   HEMATOCRIT % 23.9* 25.9* 29.8*   PLATELETS 10*3/mm3 232 215 314           Culture Data:   Blood Culture   Date Value Ref Range Status   06/11/2022 No growth at 24 hours  Preliminary     Urine Culture   Date Value Ref Range Status   06/10/2022 Growth present, too young to evaluate  Preliminary     No results found for: RESPCX  No results found for: WOUNDCX  No results found for: STOOLCX  No components found for: BODYFLD    Radiology Data:   Imaging Results (Last 24 Hours)     Procedure Component Value Units Date/Time    CT Abdomen Pelvis Without Contrast [[id number]] Collected: 06/11/22 0300     Updated: 06/11/22 1033    Narrative:      CT abdomen and pelvis without contrast    Indication: Flank pain, kidney stone suspected  , R55 Syncope and collapse N17.9 Acute kidney failure,  unspecified N39.0 Urinary tract infection, site not specified  R31.9 Hematuria, unspecified: UTI, recurrent/complicated (Female)      Comparison: CT abdomen pelvis  7/8/2021    Technique: Axial CT images are obtained through the abdomen and  pelvis. Reformatted coronal and sagittal images were reviewed. IV  contrast was not administered.   One of the following dose optimization techniques was utilized  in the performance of this exam: automated exposure control;  adjustment of the mA and/or kV according to the patient's size;  or use of an iterative reconstruction technique.  Specific  details can be referenced in the facility's radiology CT exam  operational policy.    Findings:   Lower lung fields: Limited views lower lung field are  unremarkable.     Evaluation of the solid organs of the abdomen is limited without  IV contrast.     Liver: No focal parenchymal abnormality of the liver.  Biliary: Gallbladder is surgically absent. No significant  intrahepatic or extra hepatic ductal dilatation.  Pancreas: Normal appearance.  Spleen: Normal appearance.  Adrenal glands: Unremarkable.   Kidneys / retroperitoneum: No evidence of nephrolithiasis or  hydronephrosis       Bowel / peritoneum / mesenteries: Moderate amount of stool seen  throughout the colon. There are few scattered diverticula  throughout the colon with no evidence of diverticulitis. Focal  calcific density within the region of the cecum may relate to  ingested medications within the enteral stream. Correlate  clinically. This measures approximately 1.6 cm maximum dimension.  The appendix is visualized and appears unremarkable.      Lymph node assessment: The previously seen extensive  lymphadenopathy in retroperitoneum no longer visualized. No  evidence of pathologic enlarged mesenteric adenopathy.    Pelvic  structures: Appear unremarkable.      Vessels: No significant atherosclerotic calcifications seen  throughout a nonaneurysmal abdominal aorta and branches.     Musculoskeletal / Body wall: In comparison to the prior CT  abdomen pelvis, there is no evidence of multiple sclerotic  lesions within the visualized  lumbar spine as well as the bones  of the pelvis seen to involve the superior lateral left iliac  bone as well as within the medial right iliac bone and within the  mid superior aspect the right iliac bone and superior right  acetabulum. Along with this, there is chronic irregular lesions  within the T11-T12 vertebral bodies as well as within the L3 and  L5 vertebral bodies. There are smaller sclerotic foci within the  T8 and T9 vertebral bodies also seen. These findings are  concerning for sclerotic metastatic disease. There is no acute  fracture. No acute alignment abnormality lumbar spine.        Impression:      1. Lymphadenopathy no longer identified.  2. No acute intra-abdominal process.  3. Numerous sclerotic bony lesions within the visualized bones of  the pelvis and the visualized lower thoracic and lumbar spine  concerning for bony metastatic disease. Correlate with clinical  history for any known primary malignancy and recommend follow-up  bone scan to further evaluate for other osseous lesions.      Electronically signed by:  Ajith Viveros MD  6/11/2022 10:31 AM  CDT Workstation: [id number]          I have reviewed the patient's current medications.     Assessment/Plan     Active Hospital Problems    Diagnosis    • **Acute UTI (urinary tract infection)    • Orthostatic hypotension    • Fall        Plan:    Levaquin day 2/7  Follow cultures  Stop IV fluid  Added midodrine  Orthostatics q shift  She has a known recent worsening of her creatinine due to AIN.  Creatine stable to improved since last seen by nephrology.   PT assessment  VTE PPx: SCD    I confirmed that the patient's Advance Care Plan is present, code status is documented, or surrogate decision maker is listed in the patient's medical record.     The patient was evaluated during the global COVID-19 pandemic, and the diagnosis was suspected/considered upon their initial presentation.  Evaluation, treatment, and testing were consistent with current  guidelines for patients who present with complaints or symptoms that may be related to COVID-19.        This document has been electronically signed by SILVA Carrizales on June 12, 2022 10:08 CDT

## 2022-06-12 NOTE — PLAN OF CARE
Goal Outcome Evaluation:  Plan of Care Reviewed With: patient, spouse           Outcome Evaluation: Initial PT evaluation complete.  Patient is alert, cooperative, flat affect.  She demonstrates (I) with bed mobility, transfers and gait, ambulates 20'x1 with FWW, no LOB, good use of walker.  Also ambulates bed to bathroom without an AD, no LOB.  BP supine: 102/58, seated: 106/56, standing before gait: 99/57,  standing after gait: 92/55, denies being dizzy or lightheaded. RN updated. Goals established, continue skilled I/P PT.

## 2022-06-12 NOTE — THERAPY EVALUATION
Patient Name: Adilene Morgan  : 1960    MRN: [id number]                              Today's Date: 2022       Admit Date: 6/10/2022    Visit Dx:     ICD-10-CM ICD-9-CM   1. Syncope, unspecified syncope type  R55 780.2   2. TODD (acute kidney injury) (Formerly [facility])  N17.9 584.9   3. Urinary tract infection with hematuria, site unspecified  N39.0 599.0    R31.9 599.70   4. Impaired functional mobility, balance, gait, and endurance  Z74.09 V49.89     Patient Active Problem List   Diagnosis   • Acquired hypothyroidism   • Depressive disorder   • Migraine   • PVC (premature ventricular contraction)   • Palpitation   • Pain of fifth toe   • Adenosquamous carcinoma of right lung (Formerly [facility])   • TODD (acute kidney injury) (Formerly [facility])   • Transient ischemic attack (TIA)   • Obesity (BMI 30.0-34.9)   • Gastrointestinal hemorrhage   • Syncope and collapse   • Acute cystitis without hematuria   • CKD (chronic kidney disease) stage 3, GFR 30-59 ml/min (Formerly [facility])   • Lymphadenopathy   • Migraine   • Depressive disorder   • NSCLC of right lung (Formerly [facility])   • Acquired hypothyroidism   • Orthostatic hypotension   • Fall   • Acute UTI (urinary tract infection)     Past Medical History:   Diagnosis Date   • Achilles bursitis    • Achilles tendinitis    • Acquired hypothyroidism    • Allergic rhinitis    • Allergy to meat     alphagal   • Allergy to milk products    • Arrhythmia    • Asthmatic bronchitis    • Asthmatic bronchitis    • Bone spur    • Calcaneal spur    • Cancer (Formerly [facility])    • Depressive disorder    • Family history of thyroid problem    • Herpes simplex    • Hypermetropia    • Hypothyroidism    • Menopausal flushing    • Menopause    • Migraine    • Otalgia    • Pneumonia    • Presbyopia    • Stroke (Formerly [facility])    • Trochanteric bursitis    • UTI (urinary tract infection) 2018   • Ventricular premature beats      Past Surgical History:   Procedure Laterality Date   • COLONOSCOPY N/A 2/15/2017    Procedure: COLONOSCOPY;  Surgeon: Lupillo BERGER  MD Shanel;  Location: [facility] ENDOSCOPY;  Service:    • COSMETIC SURGERY      plastic surgery to face x 4 r/t trauma   • ENDOSCOPY N/A 5/31/2022    Procedure: ESOPHAGOGASTRODUODENOSCOPY;  Surgeon: Lincoln Banegas MD;  Location: [facility] ENDOSCOPY;  Service: Gastroenterology;  Laterality: N/A;   • LAPAROSCOPIC CHOLECYSTECTOMY  12/10/1995    Cholecystectomy, laparoscopic (1)      • OTHER SURGICAL HISTORY      Head surgery procedure (1)    plastic surgery on the face    • OTHER SURGICAL HISTORY  10/17/2014    Repair Superficial Wound TR-EXT 2.5 < CM 39036 (1)         General Information     Row Name 06/12/22 0915          Physical Therapy Time and Intention    Document Type evaluation  -CZ     Mode of Treatment physical therapy  -CZ     Row Name 06/12/22 0915          General Information    Patient Profile Reviewed yes  -CZ     Prior Level of Function independent:;all household mobility;community mobility  -CZ     Barriers to Rehab medically complex  -CZ     Row Name 06/12/22 0915          Living Environment    People in Home spouse;child(cece), adult  -CZ     Row Name 06/12/22 0915          Home Main Entrance    Number of Stairs, Main Entrance five  -CZ     Stair Railings, Main Entrance railing on left side (ascending)  -CZ     Row Name 06/12/22 0915          Stairs Within Home, Primary    Stairs, Within Home, Primary Ambulates with SPC.  -CZ     Number of Stairs, Within Home, Primary seven  -CZ     Stair Railings, Within Home, Primary railing on right side (ascending)  -CZ     Row Name 06/12/22 0915          Cognition    Orientation Status (Cognition) oriented x 4  -CZ           User Key  (r) = Recorded By, (t) = Taken By, (c) = Cosigned By    Initials Name Provider Type    CZ Vasquez Granados, PT Physical Therapist               Mobility     Row Name 06/12/22 0915          Bed Mobility    Bed Mobility supine-sit;sit-supine  -CZ     Supine-Sit [place] (Bed Mobility) modified independence  -CZ     Sit-Supine  [place] (Bed Mobility) modified independence  -     Assistive Device (Bed Mobility) head of bed elevated  -CZ     Row Name 06/12/22 0915          Sit-Stand Transfer    Sit-Stand [place] (Transfers) independent  -CZ     Row Name 06/12/22 0915          Gait/Stairs (Locomotion)    [place] Level (Gait) modified independence  -CZ     Assistive Device (Gait) walker, front-wheeled  -     Distance in Feet (Gait) 20'x1.  -CZ     Comment, (Gait/Stairs) Good use of walker, no LOB. Ambulates to bathroom without an AD, no LOB.  -CZ           User Key  (r) = Recorded By, (t) = Taken By, (c) = Cosigned By    Initials Name Provider Type    CZ Vasquez Granados, PT Physical Therapist               Obj/Interventions     Row Name 06/12/22 0915          Range of Motion Comprehensive    General Range of Motion bilateral lower extremity ROM WFL  -     Row Name 06/12/22 0915          Strength Comprehensive (MMT)    General Manual Muscle Testing (MMT) Assessment other (see comments)  -CZ     Comment, General Manual Muscle Testing (MMT) Assessment BLEs: 4/5 grossly.  -CZ     Row Name 06/12/22 0915          Sensory Assessment (Somatosensory)    Sensory Assessment (Somatosensory) LE sensation intact  -CZ           User Key  (r) = Recorded By, (t) = Taken By, (c) = Cosigned By    Initials Name Provider Type    CZ Vasquez Granados, PT Physical Therapist               Goals/Plan     Row Name 06/12/22 0915          Bed Mobility Goal 1 (PT)    Activity/Assistive Device (Bed Mobility Goal 1, PT) sit to supine/supine to sit  -CZ     [place] Level/Cues Needed (Bed Mobility Goal 1, PT) independent  -CZ     Time Frame (Bed Mobility Goal 1, PT) by discharge  -CZ     Strategies/Barriers (Bed Mobility Goal 1, PT) HOB flat, no bed rails.  -CZ     Progress/Outcomes (Bed Mobility Goal 1, PT) goal not met  -CZ     Row Name 06/12/22 0915          Gait Training Goal 1 (PT)    Activity/Assistive Device (Gait Training Goal 1, PT) walker,  rolling  -CZ     [place] Level (Gait Training Goal 1, PT) modified independence  -CZ     Distance (Gait Training Goal 1, PT) 150'x1.  -CZ     Time Frame (Gait Training Goal 1, PT) by discharge  -CZ     Strategies/Barriers (Gait Training Goal 1, PT) Hx of orthostatic hypotension, please monitor.  -CZ     Progress/Outcome (Gait Training Goal 1, PT) goal not met  -CZ     Row Name 06/12/22 0915          Stairs Goal 1 (PT)    Activity/Assistive Device (Stairs Goal 1, PT) using handrail, right  -CZ     [place] Level/Cues Needed (Stairs Goal 1, PT) supervision required  -CZ     Number of Stairs (Stairs Goal 1, PT) 7 steps.  -CZ     Time Frame (Stairs Goal 1, PT) by discharge  -CZ     Strategies/Barriers (Stairs Goal 1, PT) Hx of orthostatic hypotension, please monitor.  -CZ     Progress/Outcome (Stairs Goal 1, PT) goal not met  -CZ     Row Name 06/12/22 0915          Problem Specific Goal 1 (PT)    Problem Specific Goal 1 (PT) Score 25/28 on Tinetti fall risk assessment.  -CZ     Time Frame (Problem Specific Goal 1, PT) by discharge  -CZ     Progress/Outcome (Problem Specific Goal 1, PT) goal not met  -CZ     Row Name 06/12/22 0915          Therapy Assessment/Plan (PT)    Planned Therapy Interventions (PT) balance training;bed mobility training;gait training;patient/family education;stair training;strengthening;stretching  -CZ           User Key  (r) = Recorded By, (t) = Taken By, (c) = Cosigned By    Initials Name Provider Type    CZ Vasquez Granados, PT Physical Therapist               Clinical Impression     Row Name 06/12/22 0915          Pain    Pretreatment Pain Rating 0/10 - no pain  -CZ     Posttreatment Pain Rating 0/10 - no pain  -CZ     Row Name 06/12/22 0915          Plan of Care Review    Plan of Care Reviewed With patient;spouse  -CZ     Outcome Evaluation Initial PT evaluation complete.  Patient is alert, cooperative, flat affect.  She demonstrates (I) with bed mobility, transfers and gait,  ambulates 20'x1 with FWW, no LOB, good use of walker.  Also ambulates bed to bathroom without an AD, no LOB.  BP supine: 102/58, seated: 106/56, standing before gait: 99/57,  standing after gait: 92/55, denies being dizzy or lightheaded. RN updated. Goals established, continue skilled I/P PT.  -CZ     Row Name 06/12/22 0915          Therapy Assessment/Plan (PT)    Criteria for Skilled Interventions Met (PT) yes;skilled treatment is necessary  -CZ     Therapy Frequency (PT) 5 times/wk  -CZ     Row Name 06/12/22 0915          Vital Signs    Pre Systolic BP Rehab 102  -CZ     Pre Treatment Diastolic BP 58  -CZ     Intra Systolic BP Rehab 106  -CZ     Intra Treatment Diastolic BP 56  -CZ     Post Systolic BP Rehab 99  -CZ     Post Treatment Diastolic BP 57  -CZ     Pretreatment Heart Rate (beats/min) 83  -CZ     Pre SpO2 (%) 97  -CZ     O2 Delivery Pre Treatment room air  -CZ     Pre Patient Position Supine  -CZ     Intra Patient Position Sitting  -CZ     Post Patient Position Standing  -CZ     Recovery Time 92/55, standing after gait.  -CZ     Row Name 06/12/22 0915          Positioning and Restraints    Pre-Treatment Position in bed  -CZ     Post Treatment Position bathroom  -CZ     Bathroom with family/caregiver  -CZ           User Key  (r) = Recorded By, (t) = Taken By, (c) = Cosigned By    Initials Name Provider Type    CZ Vasquez Granados, PT Physical Therapist               Outcome Measures     Row Name 06/12/22 0945          How much help from another person do you currently need...    Turning from your back to your side while in flat bed without using bedrails? 4  -CZ     Moving from lying on back to sitting on the side of a flat bed without bedrails? 4  -CZ     Moving to and from a bed to a chair (including a wheelchair)? 4  -CZ     Standing up from a chair using your arms (e.g., wheelchair, bedside chair)? 4  -CZ     Climbing 3-5 steps with a railing? 3  -CZ     To walk in hospital room? 4  -CZ     AM-PAC 6  Clicks Score (PT) 23  -     Highest level of mobility 7 --> Walked 25 feet or more  -     Row Name 06/12/22 0989          Functional Assessment    Outcome Measure Options AM-PAC 6 Clicks Basic Mobility (PT)  -           User Key  (r) = Recorded By, (t) = Taken By, (c) = Cosigned By    Initials Name Provider Type    CZ Vasquez Granados, PT Physical Therapist                             Physical Therapy Education                 Title: PT OT SLP Therapies (In Progress)     Topic: Physical Therapy (In Progress)     Point: Mobility training (Done)     Learning Progress Summary           Patient Acceptance, E, VU by  at 6/12/2022 0945    Comment: PT POC, BP with orthostatic hypotension.                   Point: Home exercise program (Not Started)     Learner Progress:  Not documented in this visit.          Point: Body mechanics (Not Started)     Learner Progress:  Not documented in this visit.          Point: Precautions (Not Started)     Learner Progress:  Not documented in this visit.                      User Key     Initials Effective Dates Name Provider Type Discipline     06/16/21 -  Vasquez Granados, PT Physical Therapist PT              PT Recommendation and Plan  Planned Therapy Interventions (PT): balance training, bed mobility training, gait training, patient/family education, stair training, strengthening, stretching  Plan of Care Reviewed With: patient, spouse  Outcome Evaluation: Initial PT evaluation complete.  Patient is alert, cooperative, flat affect.  She demonstrates (I) with bed mobility, transfers and gait, ambulates 20'x1 with FWW, no LOB, good use of walker.  Also ambulates bed to bathroom without an AD, no LOB.  BP supine: 102/58, seated: 106/56, standing before gait: 99/57,  standing after gait: 92/55, denies being dizzy or lightheaded. RN updated. Goals established, continue skilled I/P PT.     Time Calculation:    PT Charges     Row Name 06/12/22 1440             Time Calculation     Start Time 0915  -CZ      Stop Time 0953  -CZ      Time Calculation (min) 38 min  -CZ      PT Received On 06/12/22  -CZ      PT Goal Re-Cert Due Date 06/25/22  -CZ              Untimed Charges    PT Eval/Re-eval Minutes 38  -CZ              Total Minutes    Untimed Charges Total Minutes 38  -CZ       Total Minutes 38  -CZ            User Key  (r) = Recorded By, (t) = Taken By, (c) = Cosigned By    Initials Name Provider Type    CZ Vasquez Granados, PT Physical Therapist              Therapy Charges for Today     Code Description Service Date Service Provider Modifiers Qty    35036035938 HC PT EVAL MOD COMPLEXITY 3 6/12/2022 Vasquez Granados, PT GP 1          PT G-Codes  Outcome Measure Options: AM-PAC 6 Clicks Basic Mobility (PT)  AM-PAC 6 Clicks Score (PT): 23    Vasquez Granados PT  6/12/2022

## 2022-06-13 PROBLEM — C79.51 BONE METASTASES: Status: ACTIVE | Noted: 2022-06-13

## 2022-06-13 LAB
ANION GAP SERPL CALCULATED.3IONS-SCNC: 9 MMOL/L (ref 5–15)
BACTERIA SPEC AEROBE CULT: ABNORMAL
BASOPHILS # BLD AUTO: 0.03 10*3/MM3 (ref 0–0.2)
BASOPHILS NFR BLD AUTO: 0.6 % (ref 0–1.5)
BUN SERPL-MCNC: 19 MG/DL (ref 8–23)
BUN/CREAT SERPL: 11.2 (ref 7–25)
CALCIUM SPEC-SCNC: 8.2 MG/DL (ref 8.6–10.5)
CHLORIDE SERPL-SCNC: 105 MMOL/L (ref 98–107)
CO2 SERPL-SCNC: 24 MMOL/L (ref 22–29)
CREAT SERPL-MCNC: 1.69 MG/DL (ref 0.57–1)
DEPRECATED RDW RBC AUTO: 56.5 FL (ref 37–54)
EGFRCR SERPLBLD CKD-EPI 2021: 33.8 ML/MIN/1.73
EOSINOPHIL # BLD AUTO: 0.29 10*3/MM3 (ref 0–0.4)
EOSINOPHIL NFR BLD AUTO: 6 % (ref 0.3–6.2)
ERYTHROCYTE [DISTWIDTH] IN BLOOD BY AUTOMATED COUNT: 17.2 % (ref 12.3–15.4)
GLUCOSE SERPL-MCNC: 91 MG/DL (ref 65–99)
HCT VFR BLD AUTO: 25.2 % (ref 34–46.6)
HGB BLD-MCNC: 8.1 G/DL (ref 12–15.9)
IMM GRANULOCYTES # BLD AUTO: 0.03 10*3/MM3 (ref 0–0.05)
IMM GRANULOCYTES NFR BLD AUTO: 0.6 % (ref 0–0.5)
LYMPHOCYTES # BLD AUTO: 0.83 10*3/MM3 (ref 0.7–3.1)
LYMPHOCYTES NFR BLD AUTO: 17.3 % (ref 19.6–45.3)
MCH RBC QN AUTO: 28.6 PG (ref 26.6–33)
MCHC RBC AUTO-ENTMCNC: 32.1 G/DL (ref 31.5–35.7)
MCV RBC AUTO: 89 FL (ref 79–97)
MONOCYTES # BLD AUTO: 0.43 10*3/MM3 (ref 0.1–0.9)
MONOCYTES NFR BLD AUTO: 8.9 % (ref 5–12)
NEUTROPHILS NFR BLD AUTO: 3.2 10*3/MM3 (ref 1.7–7)
NEUTROPHILS NFR BLD AUTO: 66.6 % (ref 42.7–76)
NRBC BLD AUTO-RTO: 0 /100 WBC (ref 0–0.2)
PLATELET # BLD AUTO: 263 10*3/MM3 (ref 140–450)
PMV BLD AUTO: 10.1 FL (ref 6–12)
POTASSIUM SERPL-SCNC: 4.9 MMOL/L (ref 3.5–5.2)
RBC # BLD AUTO: 2.83 10*6/MM3 (ref 3.77–5.28)
SODIUM SERPL-SCNC: 138 MMOL/L (ref 136–145)
WBC NRBC COR # BLD: 4.81 10*3/MM3 (ref 3.4–10.8)

## 2022-06-13 PROCEDURE — 80048 BASIC METABOLIC PNL TOTAL CA: CPT | Performed by: NURSE PRACTITIONER

## 2022-06-13 PROCEDURE — 25010000002 LEVOFLOXACIN PER 250 MG

## 2022-06-13 PROCEDURE — G0378 HOSPITAL OBSERVATION PER HR: HCPCS

## 2022-06-13 PROCEDURE — 85025 COMPLETE CBC W/AUTO DIFF WBC: CPT | Performed by: NURSE PRACTITIONER

## 2022-06-13 PROCEDURE — 97530 THERAPEUTIC ACTIVITIES: CPT

## 2022-06-13 PROCEDURE — 96365 THER/PROPH/DIAG IV INF INIT: CPT

## 2022-06-13 PROCEDURE — 96366 THER/PROPH/DIAG IV INF ADDON: CPT

## 2022-06-13 PROCEDURE — 97116 GAIT TRAINING THERAPY: CPT

## 2022-06-13 RX ORDER — BUMETANIDE 1 MG/1
2 TABLET ORAL DAILY
Status: DISCONTINUED | OUTPATIENT
Start: 2022-06-13 | End: 2022-06-14 | Stop reason: HOSPADM

## 2022-06-13 RX ORDER — MIDODRINE HYDROCHLORIDE 5 MG/1
10 TABLET ORAL
Status: DISCONTINUED | OUTPATIENT
Start: 2022-06-13 | End: 2022-06-13

## 2022-06-13 RX ORDER — MIDODRINE HYDROCHLORIDE 5 MG/1
10 TABLET ORAL
Status: DISCONTINUED | OUTPATIENT
Start: 2022-06-13 | End: 2022-06-14 | Stop reason: HOSPADM

## 2022-06-13 RX ORDER — TRAMADOL HYDROCHLORIDE 50 MG/1
50 TABLET ORAL EVERY 8 HOURS PRN
Status: DISCONTINUED | OUTPATIENT
Start: 2022-06-13 | End: 2022-06-14 | Stop reason: HOSPADM

## 2022-06-13 RX ADMIN — ASPIRIN 81 MG: 81 TABLET, FILM COATED ORAL at 07:59

## 2022-06-13 RX ADMIN — TRAMADOL HYDROCHLORIDE 50 MG: 50 TABLET, COATED ORAL at 05:36

## 2022-06-13 RX ADMIN — Medication 10 ML: at 12:36

## 2022-06-13 RX ADMIN — BUMETANIDE 2 MG: 1 TABLET ORAL at 12:29

## 2022-06-13 RX ADMIN — MIDODRINE HYDROCHLORIDE 5 MG: 5 TABLET ORAL at 07:59

## 2022-06-13 RX ADMIN — PANTOPRAZOLE SODIUM 40 MG: 40 TABLET, DELAYED RELEASE ORAL at 08:00

## 2022-06-13 RX ADMIN — MIDODRINE HYDROCHLORIDE 10 MG: 5 TABLET ORAL at 17:52

## 2022-06-13 RX ADMIN — MIDODRINE HYDROCHLORIDE 10 MG: 5 TABLET ORAL at 12:29

## 2022-06-13 RX ADMIN — TOPIRAMATE 50 MG: 50 TABLET, FILM COATED ORAL at 07:59

## 2022-06-13 RX ADMIN — VENLAFAXINE HYDROCHLORIDE 37.5 MG: 37.5 CAPSULE, EXTENDED RELEASE ORAL at 07:58

## 2022-06-13 RX ADMIN — LEVOFLOXACIN 750 MG: 5 INJECTION, SOLUTION INTRAVENOUS at 06:02

## 2022-06-13 RX ADMIN — Medication 10 ML: at 21:49

## 2022-06-13 RX ADMIN — CETIRIZINE HYDROCHLORIDE 5 MG: 5 TABLET ORAL at 08:00

## 2022-06-13 RX ADMIN — TRAMADOL HYDROCHLORIDE 50 MG: 50 TABLET, COATED ORAL at 14:43

## 2022-06-13 RX ADMIN — LEVOTHYROXINE SODIUM 150 MCG: 150 TABLET ORAL at 07:59

## 2022-06-13 NOTE — PLAN OF CARE
SBP in 90's, Midodrine dose increased, D/C pending tomorrow   Goal Outcome Evaluation:

## 2022-06-13 NOTE — THERAPY TREATMENT NOTE
Acute Care - Physical Therapy Treatment Note  [facility]     Patient Name: Adilene Morgan  : 1960  MRN: [id number]  Today's Date: 2022      Visit Dx:     ICD-10-CM ICD-9-CM   1. Syncope, unspecified syncope type  R55 780.2   2. TODD (acute kidney injury) (HCC)  N17.9 584.9   3. Urinary tract infection with hematuria, site unspecified  N39.0 599.0    R31.9 599.70   4. Impaired functional mobility, balance, gait, and endurance  Z74.09 V49.89     Patient Active Problem List   Diagnosis   • Acquired hypothyroidism   • Depressive disorder   • Migraine   • PVC (premature ventricular contraction)   • Palpitation   • Pain of fifth toe   • Adenosquamous carcinoma of right lung (HCC)   • TODD (acute kidney injury) (HCC)   • Transient ischemic attack (TIA)   • Obesity (BMI 30.0-34.9)   • Gastrointestinal hemorrhage   • Syncope and collapse   • Acute cystitis without hematuria   • CKD (chronic kidney disease) stage 3, GFR 30-59 ml/min (HCC)   • Lymphadenopathy   • Migraine   • Depressive disorder   • NSCLC of right lung (HCC)   • Acquired hypothyroidism   • Orthostatic hypotension   • Fall   • Acute UTI (urinary tract infection)   • Bone metastases (HCC)     Past Medical History:   Diagnosis Date   • Achilles bursitis    • Achilles tendinitis    • Acquired hypothyroidism    • Allergic rhinitis    • Allergy to meat     alphagal   • Allergy to milk products    • Arrhythmia    • Asthmatic bronchitis    • Asthmatic bronchitis    • Bone spur    • Calcaneal spur    • Cancer (HCC)    • Depressive disorder    • Family history of thyroid problem    • Herpes simplex    • Hypermetropia    • Hypothyroidism    • Menopausal flushing    • Menopause    • Migraine    • Otalgia    • Pneumonia    • Presbyopia    • Stroke (HCC)    • Trochanteric bursitis    • UTI (urinary tract infection) 2018   • Ventricular premature beats      Past Surgical History:   Procedure Laterality Date   • COLONOSCOPY N/A 2/15/2017    Procedure:  COLONOSCOPY;  Surgeon: Lupillo Ugarte MD;  Location: [facility] ENDOSCOPY;  Service:    • COSMETIC SURGERY      plastic surgery to face x 4 r/t trauma   • ENDOSCOPY N/A 5/31/2022    Procedure: ESOPHAGOGASTRODUODENOSCOPY;  Surgeon: Lincoln Banegas MD;  Location: [facility] ENDOSCOPY;  Service: Gastroenterology;  Laterality: N/A;   • LAPAROSCOPIC CHOLECYSTECTOMY  12/10/1995    Cholecystectomy, laparoscopic (1)      • OTHER SURGICAL HISTORY      Head surgery procedure (1)    plastic surgery on the face    • OTHER SURGICAL HISTORY  10/17/2014    Repair Superficial Wound TR-EXT 2.5 < CM 56807 (1)        PT Assessment (last 12 hours)     PT Evaluation and Treatment     Row Name 06/13/22 1645          Physical Therapy Time and Intention    Subjective Information no complaints  -LN     Document Type evaluation;therapy note (daily note)  -LN     Mode of Treatment physical therapy  -LN     Row Name 06/13/22 1645          General Information    Patient Profile Reviewed yes  -LN     Row Name 06/13/22 1645          Pain    Pretreatment Pain Rating 0/10 - no pain  -LN     Posttreatment Pain Rating 0/10 - no pain  -LN     Row Name 06/13/22 1645          Cognition    Orientation Status (Cognition) oriented x 4  -LN     Row Name 06/13/22 1645          Range of Motion Comprehensive    General Range of Motion bilateral lower extremity ROM WFL  -LN     Row Name 06/13/22 1645          Strength Comprehensive (MMT)    General Manual Muscle Testing (MMT) Assessment other (see comments)  -LN     Row Name 06/13/22 1645          Bed Mobility    Bed Mobility supine-sit;sit-supine  -LN     Supine-Sit [place] (Bed Mobility) modified independence  -LN     Sit-Supine [place] (Bed Mobility) modified independence  -LN     Assistive Device (Bed Mobility) head of bed elevated  -LN     Row Name 06/13/22 1645          Transfers    Bed-Chair [place] (Transfers) standby assist  -LN     Chair-Bed [place] (Transfers) supervision  -LN      Assistive Device (Bed-Chair Transfers) walker, front-wheeled  -LN     Sit-Stand [place] (Transfers) independent  -LN     Stand-Sit [place] (Transfers) independent  -LN     Row Name 06/13/22 1645          Chair-Bed Transfer    Assistive Device (Chair-Bed Transfers) walker, front-wheeled  -LN     Row Name 06/13/22 1645          Gait/Stairs (Locomotion)    [place] Level (Gait) standby assist  -LN     Assistive Device (Gait) walker, front-wheeled  -LN     Distance in Feet (Gait) 120'x2  -LN     Bilateral Gait Deviations forward flexed posture  -LN     Row Name 06/13/22 1645          Plan of Care Review    Plan of Care Reviewed With patient  -LN     Outcome Evaluation sup-sit-sup mod ind,sit-stand-sit ind;amb with rw and sba of 1-120'x2.pt with 1 lob requiring cga of 1 to correct-no c/o's dizziness or soa with activity  -[facility] Name 06/13/22 1645          Vital Signs    Pre Systolic BP Rehab 114  -LN     Pre Treatment Diastolic BP 56  -LN     Post Systolic BP Rehab 116  -LN     Post Treatment Diastolic BP 55  -LN     Pretreatment Heart Rate (beats/min) 86  -LN     Intratreatment Heart Rate (beats/min) 87  -LN     Posttreatment Heart Rate (beats/min) 87  -LN     Pre SpO2 (%) 99  -LN     O2 Delivery Pre Treatment room air  -LN     Intra SpO2 (%) 99  -LN     Post SpO2 (%) 99  -LN     Pre Patient Position Sitting  -LN     Intra Patient Position Standing  -LN     Post Patient Position Sitting  -LN     [facility] Name 06/13/22 1645          Positioning and Restraints    Post Treatment Position bed  -LN     In Bed fowlers;notified nsg;call light within reach;encouraged to call for assist;with family/caregiver  -[facility] Name 06/13/22 1645          Therapy Assessment/Plan (PT)    Criteria for Skilled Interventions Met (PT) yes;skilled treatment is necessary  -LN     Therapy Frequency (PT) 5 times/wk  -     Row Name 06/13/22 1645          Bed Mobility Goal 1 (PT)    Activity/Assistive Device (Bed Mobility Goal 1,  PT) sit to supine/supine to sit  -LN     [place] Level/Cues Needed (Bed Mobility Goal 1, PT) independent  -LN     Time Frame (Bed Mobility Goal 1, PT) by discharge  -LN     Strategies/Barriers (Bed Mobility Goal 1, PT) HOB flat, no bed rails.  -LN     Progress/Outcomes (Bed Mobility Goal 1, PT) goal not met  -LN     Row Name 06/13/22 1645          Gait Training Goal 1 (PT)    Activity/Assistive Device (Gait Training Goal 1, PT) walker, rolling  -LN     [place] Level (Gait Training Goal 1, PT) modified independence  -LN     Distance (Gait Training Goal 1, PT) 150'x1.  -LN     Time Frame (Gait Training Goal 1, PT) by discharge  -LN     Strategies/Barriers (Gait Training Goal 1, PT) Hx of orthostatic hypotension, please monitor.  -LN     Progress/Outcome (Gait Training Goal 1, PT) goal not met  -LN     Row Name 06/13/22 1645          Stairs Goal 1 (PT)    Activity/Assistive Device (Stairs Goal 1, PT) using handrail, right  -LN     [place] Level/Cues Needed (Stairs Goal 1, PT) supervision required  -LN     Number of Stairs (Stairs Goal 1, PT) 7 steps.  -LN     Time Frame (Stairs Goal 1, PT) by discharge  -LN     Strategies/Barriers (Stairs Goal 1, PT) Hx of orthostatic hypotension, please monitor.  -LN     Progress/Outcome (Stairs Goal 1, PT) goal not met  -LN     Row Name 06/13/22 6279          Problem Specific Goal 1 (PT)    Problem Specific Goal 1 (PT) Score 25/28 on Tinetti fall risk assessment.  -LN     Time Frame (Problem Specific Goal 1, PT) by discharge  -LN     Progress/Outcome (Problem Specific Goal 1, PT) goal not met  -LN           User Key  (r) = Recorded By, (t) = Taken By, (c) = Cosigned By    Initials Name Provider Type    LN Romana Ramos PTA Physical Therapist Assistant                Physical Therapy Education                 Title: PT OT SLP Therapies (In Progress)     Topic: Physical Therapy (In Progress)     Point: Mobility training (Done)     Learning Progress Summary            Patient Acceptance, E, VU by  at 6/12/2022 0945    Comment: PT POC, BP with orthostatic hypotension.                   Point: Home exercise program (Not Started)     Learner Progress:  Not documented in this visit.          Point: Body mechanics (Not Started)     Learner Progress:  Not documented in this visit.          Point: Precautions (Not Started)     Learner Progress:  Not documented in this visit.                      User Key     Initials Effective Dates Name Provider Type Discipline     06/16/21 -  Vasquez Granados, PT Physical Therapist PT              PT Recommendation and Plan  Anticipated Discharge Disposition (PT): home  Therapy Frequency (PT): 5 times/wk  Plan of Care Reviewed With: patient  Outcome Evaluation: sup-sit-sup mod ind,sit-stand-sit ind;amb with rw and sba of 1-120'x2.pt with 1 lob requiring cga of 1 to correct-no c/o's dizziness or soa with activity       Time Calculation:    PT Charges     Row Name 06/13/22 1745             Time Calculation    Start Time 1645  -LN      Stop Time 1710  -LN      Time Calculation (min) 25 min  -LN      PT Received On 06/13/22  -LN              Time Calculation- PT    Total Timed Code Minutes- PT 25 minute(s)  -LN            User Key  (r) = Recorded By, (t) = Taken By, (c) = Cosigned By    Initials Name Provider Type    LN Romana Ramos PTA Physical Therapist Assistant              Therapy Charges for Today     Code Description Service Date Service Provider Modifiers Qty    84516955989 HC GAIT TRAINING EA 15 MIN 6/13/2022 Romana Ramos PTA GP 1    16057043303 HC PT THERAPEUTIC ACT EA 15 MIN 6/13/2022 Romana Ramos PTA GP 1          PT G-Codes  Outcome Measure Options: AM-PAC 6 Clicks Basic Mobility (PT)  AM-PAC 6 Clicks Score (PT): 23    Romana Ramos PTA  6/13/2022

## 2022-06-13 NOTE — PROGRESS NOTES
[facility] Medicine Services  INPATIENT PROGRESS NOTE    Length of Stay: 0  Date of Admission: 6/10/2022  Primary Care Physician: Jen Raymundo MD    Subjective   Chief Complaint: Fall  HPI:  62 year old female with a history of lung cancer on immunotherapy, CKD, and recurrent TIAs who presented with a fall.  She was found to have UTI, which is recurrent. Culture is pending.  CT of the abdomen demonstrates likely new bone mets. Orthostatics have improved with midodrine.  Urine culture remains pending. No new complaints.     Review of Systems   Constitutional: Negative for chills and fever.   Respiratory: Negative for shortness of breath.    Cardiovascular: Negative for chest pain.   Gastrointestinal: Negative for abdominal pain, blood in stool and nausea.        All pertinent negatives and positives are as above. All other systems have been reviewed and are negative unless otherwise stated.     Objective    Temp:  [97.4 °F (36.3 °C)-98.7 °F (37.1 °C)] 98.1 °F (36.7 °C)  Heart Rate:  [66-82] 74  Resp:  [18] 18  BP: ()/(48-73) 90/48    Physical Exam  Vitals reviewed.   Constitutional:       General: She is not in acute distress.     Appearance: Normal appearance. She is well-developed. She is not diaphoretic.   HENT:      Head: Normocephalic and atraumatic.   Eyes:      Conjunctiva/sclera: Conjunctivae normal.   Cardiovascular:      Rate and Rhythm: Normal rate and regular rhythm.   Pulmonary:      Effort: Pulmonary effort is normal. No respiratory distress.      Breath sounds: Normal breath sounds.   Abdominal:      General: Bowel sounds are normal. There is no distension.      Palpations: Abdomen is soft.      Tenderness: There is no abdominal tenderness.   Musculoskeletal:         General: No swelling or deformity.   Skin:     General: Skin is warm and dry.   Neurological:      General: No focal deficit present.      Mental Status: She is alert and  oriented to person, place, and time.             Results Review:  I have reviewed the labs, radiology results, and diagnostic studies.    Laboratory Data:   Results from last 7 days   Lab Units 06/13/22  0544 06/12/22  0659 06/11/22  0603 06/10/22  2348 06/10/22  2348 06/08/22  0744   SODIUM mmol/L 138 139 135*   < > 138 140   POTASSIUM mmol/L 4.9 4.2 3.8   < > 4.2 4.3   CHLORIDE mmol/L 105 105 99   < > 95* 100   TOTAL CO2 mmol/L  --   --   --   --   --  35*   CO2 mmol/L 24.0 27.0 22.0   < > 30.0*  --    BUN mg/dL 19 22 35*   < > 37* 43*   CREATININE mg/dL 1.69* 1.71* 2.11*   < > 2.48* 1.8*   GLUCOSE mg/dL 91 95 98   < > 77  --    CALCIUM mg/dL 8.2* 8.0* 7.8*   < > 8.5* 9.3   BILIRUBIN mg/dL  --   --   --   --  0.2 0.31   ALK PHOS U/L  --   --   --   --  122* 82   ALT (SGPT) U/L  --   --   --   --  20 18   AST (SGOT) U/L  --   --   --   --  26 15   ANION GAP mmol/L 9.0 7.0 14.0   < > 13.0  --     < > = values in this interval not displayed.     Estimated Creatinine Clearance: 40.3 mL/min (A) (by C-G formula based on SCr of 1.69 mg/dL (H)).  Results from last 7 days   Lab Units 06/10/22  2348   MAGNESIUM mg/dL 2.5*         Results from last 7 days   Lab Units 06/13/22  0544 06/12/22  0659 06/11/22  0603 06/10/22  2335   WBC 10*3/mm3 4.81 5.10 7.04 6.82   HEMOGLOBIN g/dL 8.1* 7.7* 8.8* 9.8*   HEMATOCRIT % 25.2* 23.9* 25.9* 29.8*   PLATELETS 10*3/mm3 263 232 215 314           Culture Data:   Blood Culture   Date Value Ref Range Status   06/11/2022 No growth at 24 hours  Preliminary   06/11/2022 No growth at 2 days  Preliminary     Urine Culture   Date Value Ref Range Status   06/10/2022 Growth present, too young to evaluate  Preliminary     No results found for: RESPCX  No results found for: WOUNDCX  No results found for: STOOLCX  No components found for: BODYFLD    Radiology Data:   Imaging Results (Last 24 Hours)     ** No results found for the last 24 hours. **          I have reviewed the patient's current  medications.     Assessment/Plan     Active Hospital Problems    Diagnosis    • **Acute UTI (urinary tract infection)    • Orthostatic hypotension    • Bone metastases (HCC)    • Fall    • NSCLC of right lung (HCC)        Plan:    Levaquin day 3/7  Follow cultures  Midodrine  Orthostatics q shift, improved  She has a known recent worsening of her creatinine due to AIN.  Creatine stable to improved since last seen by nephrology.   VTE PPx: SCD    I confirmed that the patient's Advance Care Plan is present, code status is documented, or surrogate decision maker is listed in the patient's medical record.     The patient was evaluated during the global COVID-19 pandemic, and the diagnosis was suspected/considered upon their initial presentation.  Evaluation, treatment, and testing were consistent with current guidelines for patients who present with complaints or symptoms that may be related to COVID-19.        This document has been electronically signed by SILVA Carrizales on June 13, 2022 10:53 CDT

## 2022-06-13 NOTE — PLAN OF CARE
Goal Outcome Evaluation:  Plan of Care Reviewed With: patient           Outcome Evaluation: sup-sit-sup mod ind,sit-stand-sit ind;amb with rw and sba of 1-120'x2.pt with 1 lob requiring cga of 1 to correct-no c/o's dizziness or soa with activity

## 2022-06-13 NOTE — PLAN OF CARE
Problem: Fall Injury Risk  Goal: Absence of Fall and Fall-Related Injury  Outcome: Ongoing, Progressing     Problem: Fall Injury Risk  Goal: Absence of Fall and Fall-Related Injury  Intervention: Identify and Manage Contributors  Recent Flowsheet Documentation  Taken 6/13/2022 0000 by Ozzie Cruz RN  Medication Review/Management: medications reviewed  Taken 6/12/2022 2200 by Ozzie Cruz RN  Medication Review/Management: medications reviewed  Taken 6/12/2022 2100 by Ozzie Cruz RN  Medication Review/Management: medications reviewed  Taken 6/12/2022 2000 by Ozzie Cruz RN  Medication Review/Management: medications reviewed  Taken 6/12/2022 1900 by Ozzie Cruz RN  Medication Review/Management: medications reviewed     Problem: Fall Injury Risk  Goal: Absence of Fall and Fall-Related Injury  Intervention: Promote Injury-Free Environment  Recent Flowsheet Documentation  Taken 6/12/2022 2100 by Ozzie Cruz RN  Safety Promotion/Fall Prevention:   safety round/check completed   nonskid shoes/slippers when out of bed   clutter free environment maintained   assistive device/personal items within reach  Taken 6/12/2022 1900 by Ozzie Cruz RN  Safety Promotion/Fall Prevention:   assistive device/personal items within reach   clutter free environment maintained   nonskid shoes/slippers when out of bed   safety round/check completed     Problem: Adult Inpatient Plan of Care  Goal: Plan of Care Review  Outcome: Ongoing, Progressing  Flowsheets (Taken 6/12/2022 1828 by Lisset Marroquin RN)  Progress: improving  Plan of Care Reviewed With: patient     Problem: Adult Inpatient Plan of Care  Goal: Patient-Specific Goal (Individualized)  Outcome: Ongoing, Progressing     Problem: Adult Inpatient Plan of Care  Goal: Absence of Hospital-Acquired Illness or Injury  Outcome: Ongoing, Progressing     Problem: Adult Inpatient Plan of Care  Goal: Absence of Hospital-Acquired Illness or Injury  Intervention:  Identify and Manage Fall Risk  Flowsheets  Taken 6/13/2022 0000 by Codi Queen  Safety Promotion/Fall Prevention: safety round/check completed  Taken 6/12/2022 2100 by Ozzie Cruz RN  Safety Promotion/Fall Prevention:   safety round/check completed   nonskid shoes/slippers when out of bed   clutter free environment maintained   assistive device/personal items within reach  Taken 6/12/2022 1900 by Ozzie Cruz RN  Safety Promotion/Fall Prevention:   assistive device/personal items within reach   clutter free environment maintained   nonskid shoes/slippers when out of bed   safety round/check completed     Problem: Adult Inpatient Plan of Care  Goal: Absence of Hospital-Acquired Illness or Injury  Intervention: Prevent Skin Injury  Flowsheets  Taken 6/13/2022 0000 by Codi Queen  Body Position: position changed independently  Taken 6/12/2022 2100 by Ozzie Cruz RN  Body Position: position changed independently  Taken 6/12/2022 1900 by Ozzie Cruz RN  Body Position: position changed independently     Problem: Adult Inpatient Plan of Care  Goal: Absence of Hospital-Acquired Illness or Injury  Intervention: Prevent and Manage VTE (Venous Thromboembolism) Risk  Flowsheets  Taken 6/13/2022 0000 by Codi Queen  Activity Management: activity adjusted per tolerance  Taken 6/13/2022 0000 by Ozzie Cruz RN  VTE Prevention/Management:   bilateral   compression stockings on  Taken 6/12/2022 2200 by Ozzie Cruz RN  VTE Prevention/Management:   bilateral   sequential compression devices on  Taken 6/12/2022 2100 by Ozzie Cruz RN  Activity Management: activity adjusted per tolerance  VTE Prevention/Management:   bilateral   sequential compression devices on  Taken 6/12/2022 2000 by Ozzie Cruz RN  VTE Prevention/Management:   bilateral   sequential compression devices on  Taken 6/12/2022 1900 by Ozzie Cruz RN  Activity Management: activity adjusted per tolerance  VTE  Prevention/Management:   bilateral   sequential compression devices on     Problem: Adult Inpatient Plan of Care  Goal: Absence of Hospital-Acquired Illness or Injury  Intervention: Prevent Infection  Flowsheets  Taken 6/13/2022 0000  Infection Prevention: rest/sleep promoted  Taken 6/12/2022 2200  Infection Prevention: rest/sleep promoted  Taken 6/12/2022 2100  Infection Prevention: rest/sleep promoted  Taken 6/12/2022 2000  Infection Prevention: rest/sleep promoted  Taken 6/12/2022 1900  Infection Prevention: rest/sleep promoted     Problem: Adult Inpatient Plan of Care  Goal: Optimal Comfort and Wellbeing  Outcome: Ongoing, Progressing     Problem: Adult Inpatient Plan of Care  Goal: Optimal Comfort and Wellbeing  Intervention: Monitor Pain and Promote Comfort  Flowsheets (Taken 6/11/2022 0742 by Lisset Marroquin RN)  Pain Management Interventions: see MAR     Problem: Adult Inpatient Plan of Care  Goal: Optimal Comfort and Wellbeing  Intervention: Provide Person-Centered Care  Flowsheets (Taken 6/12/2022 2000)  Trust Relationship/Rapport:   care explained   questions encouraged     Problem: Adult Inpatient Plan of Care  Goal: Readiness for Transition of Care  Outcome: Ongoing, Progressing     Problem: Adult Inpatient Plan of Care  Goal: Readiness for Transition of Care  Intervention: Mutually Develop Transition Plan  Flowsheets  Taken 6/11/2022 0317  Transportation Anticipated: family or friend will provide  Patient/Family Anticipated Services at Transition: none  Patient/Family Anticipates Transition to: home with family  Taken 6/11/2022 0315  Equipment Currently Used at Home:   walker, rolling   cane, straight     Problem: Skin Injury Risk Increased  Goal: Skin Health and Integrity  Outcome: Ongoing, Progressing     Problem: Skin Injury Risk Increased  Goal: Skin Health and Integrity  Intervention: Optimize Skin Protection  Flowsheets  Taken 6/13/2022 0000  Pressure Reduction Techniques: frequent weight shift  encouraged  Head of Bed (HOB) Positioning: HOB at 30-45 degrees  Pressure Reduction Devices: specialty bed utilized  Taken 6/12/2022 2200  Pressure Reduction Techniques: frequent weight shift encouraged  Head of Bed (HOB) Positioning: HOB at 30-45 degrees  Pressure Reduction Devices: specialty bed utilized  Taken 6/12/2022 2100  Pressure Reduction Techniques: frequent weight shift encouraged  Head of Bed (HOB) Positioning: HOB at 30-45 degrees  Pressure Reduction Devices: specialty bed utilized  Taken 6/12/2022 2000  Pressure Reduction Techniques: frequent weight shift encouraged  Pressure Reduction Devices: specialty bed utilized  Taken 6/12/2022 1900  Pressure Reduction Techniques: frequent weight shift encouraged  Head of Bed (HOB) Positioning: HOB at 30-45 degrees  Pressure Reduction Devices: specialty bed utilized     Problem: Pain Chronic (Persistent) (Comorbidity Management)  Goal: Acceptable Pain Control and Functional Ability  Outcome: Ongoing, Progressing     Problem: Pain Chronic (Persistent) (Comorbidity Management)  Goal: Acceptable Pain Control and Functional Ability  Intervention: Manage Persistent Pain  Flowsheets  Taken 6/13/2022 0000  Medication Review/Management: medications reviewed  Taken 6/12/2022 2200  Medication Review/Management: medications reviewed  Taken 6/12/2022 2100  Medication Review/Management: medications reviewed  Taken 6/12/2022 2000  Sleep/Rest Enhancement: awakenings minimized  Medication Review/Management: medications reviewed  Taken 6/12/2022 1900  Medication Review/Management: medications reviewed     Problem: Pain Chronic (Persistent) (Comorbidity Management)  Goal: Acceptable Pain Control and Functional Ability  Intervention: Develop Pain Management Plan  Flowsheets (Taken 6/11/2022 0742 by Lisste Marroquin RN)  Pain Management Interventions: see MAR     Problem: Pain Chronic (Persistent) (Comorbidity Management)  Goal: Acceptable Pain Control and Functional  Ability  Intervention: Optimize Psychosocial Wellbeing  Flowsheets (Taken 6/12/2022 2000)  Supportive Measures: active listening utilized  Diversional Activities:   smartphone   television   Goal Outcome Evaluation:  Plan of Care Reviewed With: patient        Progress: no change

## 2022-06-14 ENCOUNTER — READMISSION MANAGEMENT (OUTPATIENT)
Dept: CALL CENTER | Facility: HOSPITAL | Age: 62
End: 2022-06-14

## 2022-06-14 VITALS
HEIGHT: 66 IN | BODY MASS INDEX: 34.55 KG/M2 | HEART RATE: 66 BPM | DIASTOLIC BLOOD PRESSURE: 49 MMHG | TEMPERATURE: 97.8 F | WEIGHT: 215 LBS | OXYGEN SATURATION: 94 % | RESPIRATION RATE: 18 BRPM | SYSTOLIC BLOOD PRESSURE: 91 MMHG

## 2022-06-14 LAB
ANION GAP SERPL CALCULATED.3IONS-SCNC: 8 MMOL/L (ref 5–15)
BASOPHILS # BLD AUTO: 0.02 10*3/MM3 (ref 0–0.2)
BASOPHILS NFR BLD AUTO: 0.4 % (ref 0–1.5)
BUN SERPL-MCNC: 23 MG/DL (ref 8–23)
BUN/CREAT SERPL: 12.5 (ref 7–25)
CALCIUM SPEC-SCNC: 8.1 MG/DL (ref 8.6–10.5)
CHLORIDE SERPL-SCNC: 106 MMOL/L (ref 98–107)
CO2 SERPL-SCNC: 25 MMOL/L (ref 22–29)
CREAT SERPL-MCNC: 1.84 MG/DL (ref 0.57–1)
DEPRECATED RDW RBC AUTO: 55.1 FL (ref 37–54)
EGFRCR SERPLBLD CKD-EPI 2021: 30.5 ML/MIN/1.73
EOSINOPHIL # BLD AUTO: 0.35 10*3/MM3 (ref 0–0.4)
EOSINOPHIL NFR BLD AUTO: 6.8 % (ref 0.3–6.2)
ERYTHROCYTE [DISTWIDTH] IN BLOOD BY AUTOMATED COUNT: 17.2 % (ref 12.3–15.4)
GLUCOSE SERPL-MCNC: 93 MG/DL (ref 65–99)
HCT VFR BLD AUTO: 23.8 % (ref 34–46.6)
HGB BLD-MCNC: 7.8 G/DL (ref 12–15.9)
IMM GRANULOCYTES # BLD AUTO: 0.03 10*3/MM3 (ref 0–0.05)
IMM GRANULOCYTES NFR BLD AUTO: 0.6 % (ref 0–0.5)
LYMPHOCYTES # BLD AUTO: 0.93 10*3/MM3 (ref 0.7–3.1)
LYMPHOCYTES NFR BLD AUTO: 18 % (ref 19.6–45.3)
MCH RBC QN AUTO: 28.8 PG (ref 26.6–33)
MCHC RBC AUTO-ENTMCNC: 32.8 G/DL (ref 31.5–35.7)
MCV RBC AUTO: 87.8 FL (ref 79–97)
MONOCYTES # BLD AUTO: 0.51 10*3/MM3 (ref 0.1–0.9)
MONOCYTES NFR BLD AUTO: 9.9 % (ref 5–12)
NEUTROPHILS NFR BLD AUTO: 3.33 10*3/MM3 (ref 1.7–7)
NEUTROPHILS NFR BLD AUTO: 64.3 % (ref 42.7–76)
NRBC BLD AUTO-RTO: 0 /100 WBC (ref 0–0.2)
PLATELET # BLD AUTO: 237 10*3/MM3 (ref 140–450)
PMV BLD AUTO: 9.8 FL (ref 6–12)
POTASSIUM SERPL-SCNC: 4.2 MMOL/L (ref 3.5–5.2)
RBC # BLD AUTO: 2.71 10*6/MM3 (ref 3.77–5.28)
SODIUM SERPL-SCNC: 139 MMOL/L (ref 136–145)
WBC NRBC COR # BLD: 5.17 10*3/MM3 (ref 3.4–10.8)

## 2022-06-14 PROCEDURE — 85025 COMPLETE CBC W/AUTO DIFF WBC: CPT | Performed by: NURSE PRACTITIONER

## 2022-06-14 PROCEDURE — G0378 HOSPITAL OBSERVATION PER HR: HCPCS

## 2022-06-14 PROCEDURE — 80048 BASIC METABOLIC PNL TOTAL CA: CPT | Performed by: NURSE PRACTITIONER

## 2022-06-14 RX ORDER — LEVOFLOXACIN 750 MG/1
750 TABLET ORAL EVERY OTHER DAY
Qty: 3 TABLET | Refills: 0 | Status: SHIPPED | OUTPATIENT
Start: 2022-06-15 | End: 2022-06-20

## 2022-06-14 RX ORDER — MIDODRINE HYDROCHLORIDE 10 MG/1
10 TABLET ORAL
Qty: 90 TABLET | Refills: 0 | Status: SHIPPED | OUTPATIENT
Start: 2022-06-14 | End: 2022-07-06 | Stop reason: SDUPTHER

## 2022-06-14 RX ORDER — BUMETANIDE 2 MG/1
2 TABLET ORAL DAILY
Qty: 30 TABLET | Refills: 0
Start: 2022-06-14 | End: 2022-07-06

## 2022-06-14 RX ADMIN — BUMETANIDE 2 MG: 1 TABLET ORAL at 07:28

## 2022-06-14 RX ADMIN — PANTOPRAZOLE SODIUM 40 MG: 40 TABLET, DELAYED RELEASE ORAL at 07:28

## 2022-06-14 RX ADMIN — CETIRIZINE HYDROCHLORIDE 5 MG: 5 TABLET ORAL at 07:29

## 2022-06-14 RX ADMIN — VENLAFAXINE HYDROCHLORIDE 37.5 MG: 37.5 CAPSULE, EXTENDED RELEASE ORAL at 07:29

## 2022-06-14 RX ADMIN — MIDODRINE HYDROCHLORIDE 10 MG: 5 TABLET ORAL at 07:28

## 2022-06-14 RX ADMIN — TOPIRAMATE 50 MG: 50 TABLET, FILM COATED ORAL at 07:27

## 2022-06-14 RX ADMIN — ASPIRIN 81 MG: 81 TABLET, FILM COATED ORAL at 07:28

## 2022-06-14 RX ADMIN — LEVOTHYROXINE SODIUM 150 MCG: 150 TABLET ORAL at 07:29

## 2022-06-14 NOTE — DISCHARGE SUMMARY
[facility] Medicine Services  DISCHARGE SUMMARY       Date of Admission: 6/10/2022  Date of Discharge:  6/14/2022  Primary Care Physician: Jen Raymundo MD    Presenting Problem/History of Present Illness:  TODD (acute kidney injury) (HCC) [N17.9]  Urinary tract infection with hematuria, site unspecified [N39.0, R31.9]  Syncope, unspecified syncope type [R55]       Final Discharge Diagnoses:    Acute UTI (urinary tract infection)    Orthostatic hypotension    NSCLC of right lung (HCC)    Fall    Bone metastases (HCC)      Consults:   Consults     Date and Time Order Name Status Description    5/30/2022  8:09 AM Inpatient Gastroenterology Consult Completed     5/12/2022  9:15 AM Inpatient Nephrology Consult Completed     5/9/2022 11:12 PM Inpatient Neurology Consult Stroke Completed     5/9/2022  7:21 PM Inpatient Neurology Consult Stroke Completed           Pertinent Test Results:   CT Abdomen Pelvis Without Contrast    Result Date: 6/11/2022  CT abdomen and pelvis without contrast Indication: Flank pain, kidney stone suspected , R55 Syncope and collapse N17.9 Acute kidney failure, unspecified N39.0 Urinary tract infection, site not specified R31.9 Hematuria, unspecified: UTI, recurrent/complicated (Female) Comparison: CT abdomen pelvis 7/8/2021 Technique: Axial CT images are obtained through the abdomen and pelvis. Reformatted coronal and sagittal images were reviewed. IV contrast was not administered.  One of the following dose optimization techniques was utilized in the performance of this exam: automated exposure control; adjustment of the mA and/or kV according to the patient's size; or use of an iterative reconstruction technique.  Specific details can be referenced in the facility's radiology CT exam operational policy. Findings: Lower lung fields: Limited views lower lung field are unremarkable. Evaluation of the solid organs of the abdomen is limited  without IV contrast. Liver: No focal parenchymal abnormality of the liver. Biliary: Gallbladder is surgically absent. No significant intrahepatic or extra hepatic ductal dilatation. Pancreas: Normal appearance. Spleen: Normal appearance. Adrenal glands: Unremarkable. Kidneys / retroperitoneum: No evidence of nephrolithiasis or hydronephrosis Bowel / peritoneum / mesenteries: Moderate amount of stool seen throughout the colon. There are few scattered diverticula throughout the colon with no evidence of diverticulitis. Focal calcific density within the region of the cecum may relate to ingested medications within the enteral stream. Correlate clinically. This measures approximately 1.6 cm maximum dimension. The appendix is visualized and appears unremarkable. Lymph node assessment: The previously seen extensive lymphadenopathy in retroperitoneum no longer visualized. No evidence of pathologic enlarged mesenteric adenopathy. Pelvic  structures: Appear unremarkable. Vessels: No significant atherosclerotic calcifications seen throughout a nonaneurysmal abdominal aorta and branches. Musculoskeletal / Body wall: In comparison to the prior CT abdomen pelvis, there is no evidence of multiple sclerotic lesions within the visualized lumbar spine as well as the bones of the pelvis seen to involve the superior lateral left iliac bone as well as within the medial right iliac bone and within the mid superior aspect the right iliac bone and superior right acetabulum. Along with this, there is chronic irregular lesions within the T11-T12 vertebral bodies as well as within the L3 and L5 vertebral bodies. There are smaller sclerotic foci within the T8 and T9 vertebral bodies also seen. These findings are concerning for sclerotic metastatic disease. There is no acute fracture. No acute alignment abnormality lumbar spine.     1. Lymphadenopathy no longer identified. 2. No acute intra-abdominal process. 3. Numerous sclerotic bony  lesions within the visualized bones of the pelvis and the visualized lower thoracic and lumbar spine concerning for bony metastatic disease. Correlate with clinical history for any known primary malignancy and recommend follow-up bone scan to further evaluate for other osseous lesions. Electronically signed by:  Ajith Viveros MD  6/11/2022 10:31 AM CDT Workstation: [id number]    CT Head Without Contrast    Result Date: 6/11/2022  EXAM DESCRIPTION: CT HEAD WO CONTRAST 6/10/2022 11:42 PM CDT RadLex: CT HEAD WITHOUT IV CONTRAST CLINICAL HISTORY: 62 years Female, syncope, headache COMPARISON: Brain CT dated 5/30/2022 TECHNIQUE: Axial thin section CT images of the brain were obtained from the base of the skull to the vertex without intravenous contrast. Sagittal and coronal reconstructed images were also obtained. The protocol utilizes one or more of the following dose reduction techniques: automated exposure control, adjustment of mA and/or kV according to patient size, and/or use of iterative reconstruction technique. FINDINGS: BRAIN: Mild extent of chronic microvascular ischemic white matter disease is again identified. Gray-white matter differentiation is preserved. No hyperdense vessel sign is seen. No evidence of an acute transcortical infarct is noted. CEREBROSPINAL FLUID SPACES: Mild degree of involutional changes is again appreciated. Basal cisterns are maintained. No hydrocephalus or ventricular entrapment is noted. No extraaxial fluid collection is noted. MASS EFFECT: There is no mass effect or midline shift. HEMORRHAGE: No intracranial hemorrhage is noted. SELLA: The sella and suprasellar cistern appear unremarkable. PARANASAL SINUSES: The visualized portions of the paranasal sinuses again demonstrate mild mucosal thickening. MASTOID AIR CELLS: The visualized portions of the mastoid air cells are well aerated. ORBITS: The visualized portions of the orbits appear unremarkable. SOFT TISSUES: No scalp soft tissue  swelling is noted. CALVARIUM: No calvarial fracture is noted. No lytic bone lesion is seen. ATHEROSCLEROSIS: Mild amount of calcified intracranial atherosclerosis is again noted.     NO ACUTE INTRACRANIAL ABNORMALITY IS NOTED. Electronically signed by:  Ward Chow MD  6/11/2022 12:14 AM CDT Workstation: [id number]    CT Head Without Contrast    Result Date: 5/30/2022  EXAM DESCRIPTION:  CT HEAD WO CONTRAST RadLex: CT HEAD WITHOUT IV CONTRAST CLINICAL HISTORY: syncope. TECHNIQUE: Noncontrast CT head. All CT scans at this facility use dose modulation, iterative reconstruction, and/or weight based dosing when appropriate to reduce radiation dose to as low as reasonably achievable. COMPARISON: CT brain 5/9/2022. FINDINGS: There is no acute intracranial bleed. No acute major territorial infarct is identified. There is stable volume loss. The ventricles appear stable, with slight asymmetric enlargement of the left lateral ventricle. There is no midline shift or abnormal mass effect. No acute fracture is identified.     1.  No acute intracranial abnormality. Electronically signed by:  Peyman Andrew DO  5/30/2022 6:20 AM CDT Workstation: [id number]    XR Chest 1 View    Result Date: 6/11/2022  EXAM: Single portable XR view of the chest INDICATION: syncope COMPARISON: Radiograph from 5/30/2020 FINDINGS: The cardiomediastinal silhouette is within normal limits. No evidence of pleural effusion, pneumothorax, or focal consolidation.     No acute cardiopulmonary findings. Electronically signed by:  Oren Crook MD  6/11/2022 12:30 AM CDT Workstation: [id number]    XR Chest 1 View    Result Date: 5/30/2022  EXAM DESCRIPTION: XR CHEST 1 VW RadLex: XR CHEST 1 VIEW CLINICAL HISTORY: 62 years Female, syncope COMPARISON: Chest 5/13/2022. FINDINGS: Heart size and pulmonary vascularity appear within normal limits. No pneumothorax is identified. No focal consolidation is seen.     1.  No acute cardiopulmonary process. Electronically  "signed by:  Peyman Lorrie ZUÑIGA  5/30/2022 6:17 AM CDT Workstation: [id number]    XR Abdomen KUB    Result Date: 6/10/2022  Exam:  KUB History:  24 hours after capsule administration. Iron deficiency anemia secondary to blood loss. Malaise. Fatigue. Supine film of the abdomen was obtained. Comparison: July 3, 2018 2 cm radiopaque rectangular density projected over the distal stomach. No mechanical bowel obstruction. No organomegaly. Pelvic phleboliths. No acute osseous abnormality. Degenerative changes lower thoracic spine and lumbar spine. Degenerative change right iliac crest. Cholecystectomy.     Conclusion: 2 cm radiopaque rectangular density projected over the distal stomach. [id number] Electronically signed by:  Talon Tuttle MD  6/10/2022 8:29 PM CDT Workstation: [id number]      HPI/Hospital Course:  The patient is a 62 y.o. female with a history of lung cancer on immunotherapy, CKD, and recurrent TIAs who presented to [facility] with a fall.  She was found to have UTI, which is recurrent.  She was initiated on Levaquin.  Cultures grew Klebsiella, which is Levaquin sensitive.  She is recommended to complete a 1 week course of Levaquin.  She was also found to be orthostatic.  She received IV fluid and remained orthostatic. Midodrine was initiated and titrated up.  Orthostatic hypotension resolved. She denies dizziness with standing. She is stable and discharged to home.       Condition on Discharge:  Stable    Physical Exam on Discharge:  BP 91/49 (BP Location: Right arm, Patient Position: Lying)   Pulse 66   Temp 97.8 °F (36.6 °C) (Temporal)   Resp 18   Ht 167.6 cm (66\")   Wt 97.5 kg (215 lb)   LMP  (LMP Unknown)   SpO2 94%   BMI 34.70 kg/m²   Physical Exam  Vitals reviewed.   Constitutional:       Appearance: Normal appearance.   HENT:      Head: Normocephalic and atraumatic.   Cardiovascular:      Rate and Rhythm: Normal rate and regular rhythm.   Pulmonary:      Effort: Pulmonary effort is " normal. No respiratory distress.      Breath sounds: Normal breath sounds.   Abdominal:      General: There is no distension.      Palpations: Abdomen is soft.      Tenderness: There is no abdominal tenderness.   Musculoskeletal:         General: No swelling or deformity.   Skin:     General: Skin is warm and dry.   Neurological:      General: No focal deficit present.      Mental Status: She is alert and oriented to person, place, and time.           Discharge Disposition:  Home or Self Care    Discharge Medications:     Discharge Medications      New Medications      Instructions Start Date   levoFLOXacin 750 MG tablet  Commonly known as: Levaquin   750 mg, Oral, Every Other Day   Start Date: Raissa 15, 2022     midodrine 10 MG tablet  Commonly known as: PROAMATINE   10 mg, Oral, 3 Times Daily Before Meals         Changes to Medications      Instructions Start Date   bumetanide 2 MG tablet  Commonly known as: BUMEX  What changed: when to take this   2 mg, Oral, Daily         Continue These Medications      Instructions Start Date   aspirin 81 MG EC tablet   81 mg, Oral, Daily      entrectinib 200 MG capsule  Commonly known as: ROZLYTREK   600 mg, Oral, Daily      levothyroxine 150 MCG tablet  Commonly known as: SYNTHROID, LEVOTHROID   150 mcg, Oral, Daily      loratadine 10 MG tablet  Commonly known as: CLARITIN   10 mg, Oral, As Needed      multivitamin with minerals tablet tablet   1 tablet, Oral, Daily      ondansetron 8 MG tablet  Commonly known as: ZOFRAN   8 mg, Oral, Every 8 Hours PRN      pantoprazole 40 MG EC tablet  Commonly known as: Protonix   40 mg, Oral, Daily      prochlorperazine 10 MG tablet  Commonly known as: COMPAZINE   10 mg, Oral, Every 6 Hours PRN      promethazine 25 MG tablet  Commonly known as: PHENERGAN   25 mg, Oral, Every 6 Hours PRN      SUMAtriptan 20 MG/ACT nasal spray  Commonly known as: IMITREX   INSTILL 1 SPRAY INTO EACH NOSTRIL EVERY 2 HOURS AS NEEDED FOR MIGRAINE      topiramate  50 MG tablet  Commonly known as: TOPAMAX   50 mg, Oral, Nightly      traMADol 50 MG tablet  Commonly known as: ULTRAM   50 mg, Oral, Every 8 Hours PRN      venlafaxine XR 37.5 MG 24 hr capsule  Commonly known as: EFFEXOR-XR   TAKE 1 CAPSULE BY MOUTH EVERY DAY         Stop These Medications    rivaroxaban 20 MG tablet  Commonly known as: XARELTO            Discharge Diet:   Diet Instructions     Diet: Regular; Thin      Discharge Diet: Regular    Fluid Consistency: Thin          Activity at Discharge:   Activity Instructions     Activity as Tolerated            Follow-up Appointments:   Future Appointments   Date Time Provider Department [place]   6/20/2022  7:30 AM MAD ENDO PROCEDURE [facility]   6/27/2022  2:45 PM Jen Raymundo MD [address]   6/28/2022 11:00 AM [facility]   8/18/2022  2:30 PM Jen Raymundo MD [facility] [address]       Test Results Pending at Discharge:   Pending Labs     Order Current Status    Blood Culture - Blood, Arm, Right Preliminary result    Blood Culture - Blood, Blood, Venous Line Preliminary result          SILVA Carrizales  06/14/22  10:38 CDT

## 2022-06-14 NOTE — OUTREACH NOTE
Prep Survey    Flowsheet Row Responses   [facility] facility patient discharged from? [place]   Is LACE score < 7 ? No   Emergency Room discharge w/ pulse ox? No   Eligibility [facility]   Date of Admission 06/10/22   Date of Discharge 06/14/22   Discharge Disposition Home or Self Care   Discharge diagnosis acute UTI   Does the patient have one of the following disease processes/diagnoses(primary or secondary)? Other   Does the patient have Home health ordered? No   Is there a DME ordered? No   Prep survey completed? Yes          SALENA A - Registered Nurse

## 2022-06-14 NOTE — PLAN OF CARE
Goal Outcome Evaluation:  Plan of Care Reviewed With: patient        Progress: no change   Orthostatic complete. See previous note. Vss. No changes

## 2022-06-15 ENCOUNTER — TRANSITIONAL CARE MANAGEMENT TELEPHONE ENCOUNTER (OUTPATIENT)
Dept: CALL CENTER | Facility: HOSPITAL | Age: 62
End: 2022-06-15

## 2022-06-15 NOTE — OUTREACH NOTE
Call Center TCM Note    Flowsheet Row Responses   [facility] patient discharged from? [place]   Does the patient have one of the following disease processes/diagnoses(primary or secondary)? Other   TCM attempt successful? 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 Yes   Person spoke with today (if not patient) and relationship Ted,    Meds reviewed with patient/caregiver? Yes   Is the patient having any side effects they believe may be caused by any medication additions or changes? No   Does the patient have all medications ordered at discharge? Yes   Is the patient taking all medications as directed (includes completed medication regime)? Yes   Does the patient have a primary care provider?  Yes   Does the patient have an appointment with their PCP within 7 days of discharge? Greater than 7 days   Comments regarding PCP Hospital d/c follow up 6/27/22   What is preventing the patient from scheduling follow up appointments within 7 days of discharge? Earlier appointment not available   Nursing Interventions Verified appointment date/time/provider   Has the patient kept scheduled appointments due by today? N/A   Has home health visited the patient within 72 hours of discharge? N/A   Psychosocial issues? No   Did the patient receive a copy of their discharge instructions? Yes   Nursing interventions Reviewed instructions with patient   What is the patient's perception of their health status since discharge? Same  [Spouse reports patient is still weak.]   Is the patient/caregiver able to teach back signs and symptoms related to disease process for when to call PCP? Yes   Is the patient/caregiver able to teach back signs and symptoms related to disease process for when to call 911? Yes   Is the patient/caregiver able to teach back the hierarchy of who  to call/visit for symptoms/problems? PCP, Specialist, Home health nurse, Urgent Care, ED, 911 Yes   If the patient is a current smoker, are they able to teach back resources for cessation? Not a smoker   TCM call completed? Yes          Lucy Kovacs RN    6/15/2022, 13:48 CDT

## 2022-06-16 ENCOUNTER — TELEPHONE (OUTPATIENT)
Dept: FAMILY MEDICINE CLINIC | Facility: CLINIC | Age: 62
End: 2022-06-16

## 2022-06-16 LAB
BACTERIA SPEC AEROBE CULT: NORMAL
BACTERIA SPEC AEROBE CULT: NORMAL

## 2022-06-16 NOTE — TELEPHONE ENCOUNTER
She states she is wanting to see someone that can do something about her dropping B/P.  She did not have anyone particular Provider in mind.  She said just someone to help with her low B/P problem.    She has an appointment with you on 06/27/2022, I did remind the patient of her appointment and ask that she please keep that appointment.    She is aware that you are out of office and it will be next week before the referral is sent.

## 2022-06-16 NOTE — TELEPHONE ENCOUNTER
PT IS EXPERIENCING LOW BP AND HAS BEEN HOSPITALIZED SEVERAL TIMES DUE TO THE ISSUE. SHE IS REQUESTING A REFERRAL.

## 2022-06-20 ENCOUNTER — HOSPITAL ENCOUNTER (OUTPATIENT)
Dept: GASTROENTEROLOGY | Facility: HOSPITAL | Age: 62
End: 2022-06-20

## 2022-06-20 DIAGNOSIS — R55 SYNCOPE, UNSPECIFIED SYNCOPE TYPE: ICD-10-CM

## 2022-06-20 DIAGNOSIS — I95.89 OTHER SPECIFIED HYPOTENSION: Primary | ICD-10-CM

## 2022-06-22 ENCOUNTER — READMISSION MANAGEMENT (OUTPATIENT)
Dept: CALL CENTER | Facility: HOSPITAL | Age: 62
End: 2022-06-22

## 2022-06-22 NOTE — OUTREACH NOTE
Medical Week 2 Survey    Flowsheet Row Responses   [facility] patient discharged from? [place]   Does the patient have one of the following disease processes/diagnoses(primary or secondary)? Other   Week 2 attempt successful? Yes   Call start time 1223   Discharge diagnosis acute UTI   Call end time 1228   Person spoke with today (if not patient) and relationship patient   Meds reviewed with patient/caregiver? Yes   Does the patient have all medications ordered at discharge? Yes   Is the patient taking all medications as directed (includes completed medication regime)? Yes   Does the patient have a primary care provider?  Yes   Does the patient have an appointment with their PCP within 7 days of discharge? Greater than 7 days   Comments regarding PCP Hospital Follow Up with Jen Raymundo MD Monday Jun 27, 2022 2:45 PM   Nursing Interventions Verified appointment date/time/provider   Has the patient kept scheduled appointments due by today? N/A   Has home health visited the patient within 72 hours of discharge? N/A   Psychosocial issues? No   Did the patient receive a copy of their discharge instructions? Yes   Nursing interventions Reviewed instructions with patient   What is the patient's perception of their health status since discharge? Improving   Is the patient/caregiver able to teach back signs and symptoms related to disease process for when to call PCP? Yes   Is the patient/caregiver able to teach back signs and symptoms related to disease process for when to call 911? Yes   Is the patient/caregiver able to teach back the hierarchy of who to call/visit for symptoms/problems? PCP, Specialist, Home health nurse, Urgent Care, ED, 911 Yes   If the patient is a current smoker, are they able to teach back resources for cessation? Not a smoker   Week 2 Call Completed? Yes   Is the patient interested in additional calls from an ambulatory ?  NOTE:  applies to high risk patients requiring  additional follow-up. 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           CANDELARIA SLATER - Registered Nurse

## 2022-06-27 ENCOUNTER — OFFICE VISIT (OUTPATIENT)
Dept: FAMILY MEDICINE CLINIC | Facility: CLINIC | Age: 62
End: 2022-06-27

## 2022-06-27 VITALS
HEART RATE: 96 BPM | DIASTOLIC BLOOD PRESSURE: 80 MMHG | BODY MASS INDEX: 35.17 KG/M2 | HEIGHT: 66 IN | SYSTOLIC BLOOD PRESSURE: 104 MMHG | OXYGEN SATURATION: 98 % | WEIGHT: 218.8 LBS

## 2022-06-27 DIAGNOSIS — N39.0 UTI (URINARY TRACT INFECTION), UNCOMPLICATED: Primary | ICD-10-CM

## 2022-06-27 DIAGNOSIS — I95.2 HYPOTENSION DUE TO DRUGS: ICD-10-CM

## 2022-06-27 DIAGNOSIS — C34.90 ADENOSQUAMOUS CARCINOMA OF LUNG, UNSPECIFIED LATERALITY: ICD-10-CM

## 2022-06-27 PROCEDURE — 99495 TRANSJ CARE MGMT MOD F2F 14D: CPT | Performed by: GENERAL PRACTICE

## 2022-06-27 NOTE — PROGRESS NOTES
Transitional Care Follow Up Visit  Subjective     Adilene Morgan is a 62 y.o. female who presents for a transitional care management visit.    Within 48 business hours after discharge our office contacted her via telephone to coordinate her care and needs.      I reviewed and discussed the details of that call along with the discharge summary, hospital problems, inpatient lab results, inpatient diagnostic studies, and consultation reports with Adilene.     Current outpatient and discharge medications have been reconciled for the patient.  Reviewed by: Jen Raymundo MD      Date of TCM Phone Call 6/14/2022   [facility]   Date of Admission 6/10/2022   Date of Discharge 6/14/2022   Discharge Disposition Home or Self Care     Risk for Readmission (LACE) Score: 12 (6/14/2022  5:01 AM)      History of Present Illness   Course During Hospital Stay: Recent hospitalization, labs, xrays reviewed and medications reconciled.  Was admitted with UTI as well and has hypotension.  Unfortunately this could be a side effect of her lung cancer treatment.  She was started on midodrine and blood pressure did improve.  She has seen nephrology and urology and has follow-up visits with them.    Copied from hospital record:     HPI/Hospital Course:  The patient is a 62 y.o. female with a history of lung cancer on immunotherapy, CKD, and recurrent TIAs who presented to [facility] with a fall.  She was found to have UTI, which is recurrent.  She was initiated on Levaquin.  Cultures grew Klebsiella, which is Levaquin sensitive.  She is recommended to complete a 1 week course of Levaquin.  She was also found to be orthostatic.  She received IV fluid and remained orthostatic. Midodrine was initiated and titrated up.  Orthostatic hypotension resolved. She denies dizziness with standing. She is stable and discharged to home.      The following portions of the patient's history were reviewed and updated as  appropriate: allergies, current medications, past family history, past medical history, past social history, past surgical history and problem list.  Outpatient Medications Prior to Visit   Medication Sig Dispense Refill   • aspirin 81 MG EC tablet Take 81 mg by mouth Daily.     • bumetanide (BUMEX) 2 MG tablet Take 1 tablet by mouth Daily for 30 days. (Patient taking differently: Take 1 mg by mouth Daily.) 30 tablet 0   • entrectinib (ROZLYTREK) 200 MG capsule Take 600 mg by mouth Daily.     • levothyroxine (SYNTHROID, LEVOTHROID) 150 MCG tablet Take 150 mcg by mouth Daily.     • loratadine (CLARITIN) 10 MG tablet Take 10 mg by mouth As Needed for Allergies.     • midodrine (PROAMATINE) 10 MG tablet Take 1 tablet by mouth 3 (Three) Times a Day Before Meals. 90 tablet 0   • multivitamin with minerals tablet tablet Take 1 tablet by mouth Daily.     • ondansetron (ZOFRAN) 8 MG tablet Take 1 tablet by mouth Every 8 (Eight) Hours As Needed for Nausea or Vomiting. 90 tablet 1   • pantoprazole (Protonix) 40 MG EC tablet Take 1 tablet by mouth Daily. 30 tablet 0   • prochlorperazine (COMPAZINE) 10 MG tablet Take 1 tablet by mouth Every 6 (Six) Hours As Needed for Nausea or Vomiting. 120 tablet 1   • promethazine (PHENERGAN) 25 MG tablet Take 1 tablet by mouth Every 6 (Six) Hours As Needed for Nausea or Vomiting. 60 tablet 1   • SUMAtriptan (IMITREX) 20 MG/ACT nasal spray INSTILL 1 SPRAY INTO EACH NOSTRIL EVERY 2 HOURS AS NEEDED FOR MIGRAINE 6 each 5   • topiramate (TOPAMAX) 50 MG tablet TAKE 1 TABLET BY MOUTH EVERY NIGHT. 90 tablet 3   • traMADol (ULTRAM) 50 MG tablet Take 50 mg by mouth Every 8 (Eight) Hours As Needed for Moderate Pain  or Severe Pain .     • venlafaxine XR (EFFEXOR-XR) 37.5 MG 24 hr capsule TAKE 1 CAPSULE BY MOUTH EVERY DAY 90 capsule 3     No facility-administered medications prior to visit.       Review of Systems  I have reviewed 12 systems with patient. Findings were negative except what is noted  "below and/or in history of present illness.    Objective   Visit Vitals  /80   Pulse 96   Ht 167.6 cm (66\")   Wt 99.2 kg (218 lb 12.8 oz)   LMP  (LMP Unknown)   SpO2 98%   BMI 35.32 kg/m²         Physical Exam  Vitals and nursing note reviewed.   Constitutional:       General: She is not in acute distress.     Appearance: She is well-developed.   HENT:      Head: Normocephalic and atraumatic.      Nose: Nose normal.   Eyes:      General:         Right eye: No discharge.         Left eye: No discharge.      Conjunctiva/sclera: Conjunctivae normal.      Pupils: Pupils are equal, round, and reactive to light.   Neck:      Thyroid: No thyromegaly.   Cardiovascular:      Rate and Rhythm: Normal rate and regular rhythm.      Heart sounds: Normal heart sounds.   Pulmonary:      Effort: Pulmonary effort is normal.      Breath sounds: Normal breath sounds.   Musculoskeletal:      Right knee: Swelling present. Tenderness present.      Left knee: Swelling present. Tenderness present.   Lymphadenopathy:      Cervical: No cervical adenopathy.   Skin:     General: Skin is warm and dry.   Neurological:      Mental Status: She is alert and oriented to person, place, and time.       Assessment & Plan   Diagnoses and all orders for this visit:    1. UTI (urinary tract infection), uncomplicated (Primary)    2. Hypotension due to drugs    3. Adenosquamous carcinoma of lung, unspecified laterality (HCC)      Continue current medications.  Follow-up with specialists as scheduled.    No orders of the defined types were placed in this encounter.      Current outpatient and discharge medications have been reconciled for the patient.  Reviewed by: Jen Raymundo MD      Return if symptoms worsen or fail to improve, for Next scheduled follow up.  "

## 2022-06-30 ENCOUNTER — HOSPITAL ENCOUNTER (OUTPATIENT)
Dept: GASTROENTEROLOGY | Facility: HOSPITAL | Age: 62
Discharge: HOME | End: 2022-06-30
Admitting: INTERNAL MEDICINE

## 2022-06-30 DIAGNOSIS — D50.0 IRON DEFICIENCY ANEMIA DUE TO CHRONIC BLOOD LOSS: ICD-10-CM

## 2022-06-30 DIAGNOSIS — R53.83 MALAISE AND FATIGUE: ICD-10-CM

## 2022-06-30 DIAGNOSIS — R53.81 MALAISE AND FATIGUE: ICD-10-CM

## 2022-06-30 PROCEDURE — 91110 GI TRC IMG INTRAL ESOPH-ILE: CPT

## 2022-06-30 PROCEDURE — C1889 IMPLANT/INSERT DEVICE, NOC: HCPCS

## 2022-07-06 ENCOUNTER — OFFICE VISIT (OUTPATIENT)
Dept: CARDIOLOGY | Facility: CLINIC | Age: 62
End: 2022-07-06

## 2022-07-06 VITALS
DIASTOLIC BLOOD PRESSURE: 62 MMHG | BODY MASS INDEX: 35.03 KG/M2 | HEIGHT: 66 IN | OXYGEN SATURATION: 98 % | HEART RATE: 80 BPM | WEIGHT: 218 LBS | SYSTOLIC BLOOD PRESSURE: 116 MMHG

## 2022-07-06 DIAGNOSIS — G45.9 TRANSIENT ISCHEMIC ATTACK (TIA): ICD-10-CM

## 2022-07-06 DIAGNOSIS — R00.2 PALPITATION: ICD-10-CM

## 2022-07-06 DIAGNOSIS — I49.3 PVC (PREMATURE VENTRICULAR CONTRACTION): ICD-10-CM

## 2022-07-06 DIAGNOSIS — D64.81 ANEMIA DUE TO ANTINEOPLASTIC CHEMOTHERAPY: Primary | ICD-10-CM

## 2022-07-06 DIAGNOSIS — T45.1X5A ANEMIA DUE TO ANTINEOPLASTIC CHEMOTHERAPY: Primary | ICD-10-CM

## 2022-07-06 DIAGNOSIS — I95.1 ORTHOSTATIC HYPOTENSION: Primary | ICD-10-CM

## 2022-07-06 PROCEDURE — 99204 OFFICE O/P NEW MOD 45 MIN: CPT | Performed by: INTERNAL MEDICINE

## 2022-07-06 RX ORDER — MIDODRINE HYDROCHLORIDE 10 MG/1
10 TABLET ORAL
Qty: 270 TABLET | Refills: 3 | Status: SHIPPED | OUTPATIENT
Start: 2022-07-06 | End: 2023-01-10

## 2022-07-06 RX ORDER — SOLIFENACIN SUCCINATE 10 MG/1
10 TABLET, FILM COATED ORAL DAILY
COMMUNITY
Start: 2022-04-11 | End: 2022-07-14

## 2022-07-06 RX ORDER — SODIUM CHLORIDE 9 MG/ML
250 INJECTION, SOLUTION INTRAVENOUS AS NEEDED
Status: CANCELLED | OUTPATIENT
Start: 2022-07-06

## 2022-07-06 NOTE — PROGRESS NOTES
Adilene Morgan  62 y.o. female    07/06/2022  1. Orthostatic hypotension    2. PVC (premature ventricular contraction)    3. Palpitation    4. Transient ischemic attack (TIA)        History of Present Illness:  Body mass index is 35.19 kg/m². BMI is above normal parameters. Recommendations include: exercise counseling, nutrition counseling and referral to primary care.    62 years old patient last was evaluated in 2018 or 2019 at that time [facility] unfortunately she has a diagnosis of rare form of lung cancer with mets to the bone getting immunotherapy with history of stroke on oral anticoagulation and for the last 2-month having trouble maintaining systolic blood pressure and significant orthostasis with history of pelvic passing out and dropping.  Patient was started on midodrine during previous hospitalization at 10 mg 3 times a day with significant provement in postural dizziness.  Patient with documented history of premature ventricular complex with small r waves in lead V1 and V2 the possibility of coronary sinus origin cannot be excluded.  Significant bradyarrhythmia was noted on the monitor at that time       STRESS TEST 7/2018  Patient exercised according to Leo protocol for 8 minutes and 24 second with METs achieved 10.  This reflects normal functional capacity.  Baseline heart rate 64 and increased to 164 bpm representing 100% of age matched projected heart rate.  The patient base line blood pressure 144/79 and increased to 195 representing hypertensive blood pressure response.  Test was stopped due to target heart rate achieved and leg discomfort.  No ST-T wave changes suggesting of ischemia.  No arrhythmia noted.     Echo 7/2018  · Mild mitral valve regurgitation is present  · Mild tricuspid valve regurgitation is present.  · Left ventricular systolic function is normal. Estimated EF = 60%.  · Left atrial cavity size is borderline dilated.     7/2018  Holter  forInterpretations 48-hour      Indictation palpitations     Description     Rhythm is sinus with average heart rate of 67 minimum 45 the minimum heart to beat.  5:30 and 6 AM with a maximum 130 bpm.  No significant bradyarrhythmia or any significant pause noted.  There are premature ventricular complex total #7416 with some bigeminy and 115 couplet  rare3 beat runs of nonsustained wide complex rhythm with a maximum heart rate range from 130 to 150 bpm.  No sustained ventricular or supraventricular rhythm noted.      SUBJECTIVE:    Allergies   Allergen Reactions   • Azithromycin    • Cefaclor Diarrhea and Nausea Only     Other reaction(s): Nausea Only   • Cephalexin Unknown - High Severity   • Lipitor [Atorvastatin] Nausea And Vomiting   • Penicillin G Unknown - High Severity   • Penicillins    • Zocor [Simvastatin] Nausea And Vomiting         Past Medical History:   Diagnosis Date   • Achilles bursitis    • Achilles tendinitis    • Acquired hypothyroidism    • Allergic rhinitis    • Allergy to meat     alphagal   • Allergy to milk products    • Arrhythmia    • Asthmatic bronchitis    • Asthmatic bronchitis    • Bone spur    • Calcaneal spur    • Cancer (HCC)    • Depressive disorder    • Family history of thyroid problem    • Herpes simplex    • Hypermetropia    • Hypothyroidism    • Menopausal flushing    • Menopause    • Migraine    • Otalgia    • Pneumonia    • Presbyopia    • Stroke (HCC)    • Trochanteric bursitis    • UTI (urinary tract infection) 07/03/2018   • Ventricular premature beats          Past Surgical History:   Procedure Laterality Date   • COLONOSCOPY N/A 2/15/2017    Procedure: COLONOSCOPY;  Surgeon: Lupillo Ugarte MD;  Location: [facility] ENDOSCOPY;  Service:    • COSMETIC SURGERY      plastic surgery to face x 4 r/t trauma   • ENDOSCOPY N/A 5/31/2022    Procedure: ESOPHAGOGASTRODUODENOSCOPY;  Surgeon: Lincoln Banegas MD;  Location: [facility] ENDOSCOPY;  Service: Gastroenterology;  Laterality:  N/A;   • LAPAROSCOPIC CHOLECYSTECTOMY  12/10/1995    Cholecystectomy, laparoscopic (1)      • OTHER SURGICAL HISTORY      Head surgery procedure (1)    plastic surgery on the face    • OTHER SURGICAL HISTORY  10/17/2014    Repair Superficial Wound TR-EXT 2.5 < CM 59350 (1)            Family History   Problem Relation Age of Onset   • Stroke Mother    • Diabetes Father    • Heart disease Father    • Hypertension Father    • Thyroid disease Sister    • Breast cancer Maternal Aunt    • Cancer Neg Hx         multiple in mothers family         Social History     Socioeconomic History   • Marital status:    Tobacco Use   • Smoking status: Never Smoker   • Smokeless tobacco: Never Used   Vaping Use   • Vaping Use: Never used   Substance and Sexual Activity   • Alcohol use: No   • Drug use: No   • Sexual activity: Defer         Current Outpatient Medications   Medication Sig Dispense Refill   • aspirin 81 MG EC tablet Take 81 mg by mouth Daily.     • entrectinib (ROZLYTREK) 200 MG capsule Take 600 mg by mouth Daily.     • levothyroxine (SYNTHROID, LEVOTHROID) 150 MCG tablet Take 150 mcg by mouth Daily.     • midodrine (PROAMATINE) 10 MG tablet Take 1 tablet by mouth 3 (Three) Times a Day Before Meals. 90 tablet 0   • multivitamin with minerals tablet tablet Take 1 tablet by mouth Daily.     • ondansetron (ZOFRAN) 8 MG tablet Take 1 tablet by mouth Every 8 (Eight) Hours As Needed for Nausea or Vomiting. 90 tablet 1   • pantoprazole (Protonix) 40 MG EC tablet Take 1 tablet by mouth Daily. 30 tablet 0   • prochlorperazine (COMPAZINE) 10 MG tablet Take 1 tablet by mouth Every 6 (Six) Hours As Needed for Nausea or Vomiting. 120 tablet 1   • promethazine (PHENERGAN) 25 MG tablet Take 1 tablet by mouth Every 6 (Six) Hours As Needed for Nausea or Vomiting. 60 tablet 1   • topiramate (TOPAMAX) 50 MG tablet TAKE 1 TABLET BY MOUTH EVERY NIGHT. 90 tablet 3   • venlafaxine XR (EFFEXOR-XR) 37.5 MG 24 hr capsule TAKE 1 CAPSULE BY  "MOUTH EVERY DAY 90 capsule 3   • bumetanide (BUMEX) 2 MG tablet Take 1 tablet by mouth Daily for 30 days. (Patient taking differently: Take 1 mg by mouth Daily.) 30 tablet 0   • loratadine (CLARITIN) 10 MG tablet Take 10 mg by mouth As Needed for Allergies.     • SUMAtriptan (IMITREX) 20 MG/ACT nasal spray INSTILL 1 SPRAY INTO EACH NOSTRIL EVERY 2 HOURS AS NEEDED FOR MIGRAINE 6 each 5   • traMADol (ULTRAM) 50 MG tablet Take 50 mg by mouth Every 8 (Eight) Hours As Needed for Moderate Pain  or Severe Pain .       No current facility-administered medications for this visit.           Review of Systems:     Constitutional:  Denies recent weight loss, weight gain.  Positive generalized weakness     HENT:  Denies any hearing loss, epistaxis      Eyes: No blurring    Respiratory: Lung cancer getting immunotherapy in the [facility]    Cardiovascular: See H&P    Gastrointestinal:  Denies change in bowel habits and dyspepsia    Endocrine: Negative for cold intolerance, heat intolerance, polydipsia    Genitourinary: Negative.      Musculoskeletal: History of osteoarthritis    Skin:  Denies rashes, or skin lesions.     Allergic/Immunologic: Negative.  Negative for environmental allergies    Neurological: History of stroke/TIA on long-term oral anticoagulation    Hematological: Denies any food allergies, seasonal allergies    Psychiatric/Behavioral: Denies any history of depression        OBJECTIVE:    Ht 167.6 cm (66\")   Wt 98.9 kg (218 lb)   LMP  (LMP Unknown)   BMI 35.19 kg/m²     Physical Exam:     Constitutional: Cooperative, alert and oriented, well-developed, well-nourished, in no acute distress.     HENT:   Head: Normocephalic, conjunctive is a pink, thyroid is nonpalpable no carotid bruit and trachea central.     Cardiovascular: Regular rhythm, S1 and S2 normal, no S3 or S4. Apical impulse not displaced. No murmurs    Pulmonary/Chest: Chest: No chest wall tenderness no rales and wheezing    Abdominal: Abdomen " soft, bowel sounds normoactive, no masses,    Musculoskeletal: No deformities, clubbing, cyanosis, erythema.  Neurological: No gross motor or sensory deficits noted    Skin: Warm and dry to the touch, no apparent skin lesions .     Psychiatric: He has a normal mood and affect. His behavior is normal        Procedures      Lab Results   Component Value Date    WBC 5.17 06/14/2022    HGB 7.8 (L) 06/14/2022    HCT 23.8 (L) 06/14/2022    MCV 87.8 06/14/2022     06/14/2022     Lab Results   Component Value Date    GLUCOSE 93 06/14/2022    BUN 30 (H) 07/05/2022    CREATININE 2.5 (H) 07/05/2022    EGFRIFNONA 41 (L) 12/14/2021    EGFRIFAFRI 55 10/22/2021    BCR 12.5 06/14/2022    CO2 24 07/05/2022    CALCIUM 8.6 07/05/2022    ALBUMIN 3.5 07/05/2022    AST 16 07/05/2022    ALT 10 07/05/2022     Lab Results   Component Value Date    CHOL 170 05/10/2022    CHOL 148 12/14/2021    CHOL 176 12/08/2020     Lab Results   Component Value Date    TRIG 131 05/10/2022    TRIG 120 12/14/2021    TRIG 166 (H) 12/08/2020     Lab Results   Component Value Date    HDL 61 (H) 05/10/2022    HDL 50 12/14/2021    HDL 55 12/08/2020     No components found for: LDLCALC  Lab Results   Component Value Date    LDL 86 05/10/2022    LDL 77 12/14/2021    LDL 93 12/08/2020     No results found for: HDLLDLRATIO  No components found for: CHOLHDL  Lab Results   Component Value Date    HGBA1C 4.80 05/10/2022     Lab Results   Component Value Date    TSH 6.440 (H) 06/10/2022    Y4FIUYY 167.0 07/25/2017           ASSESSMENT AND PLAN:      Orthostatic hypotension    62 years old patient with a diagnosis of rare form of lung cancer palpation mets to the bone receiving immunotherapy instead of her and her history of stroke on long-term oral anticoagulation and for the last close to 2-month having problem in maintaining systolic blood pressure with a significant orthostasis.  The patient was started on midodrine and dose was titrated up to 10 mg 3 times a  day with a significant provement in orthostasis.  Her blood pressure in the office today 1/16/1963 heart rate 80 bpm.      Patient was counseled educated regarding risk factor lifestyle modification.    Encouraged the patient to increase water intake at least 60 to 64 ounces in 24-hour with a salt palatable to taste    #2 exercises were demonstrated to increase the tone of upper and lower extremity    #3 patient was counseled educated regarding taking some time in changing the postures    #4 Mario stocking was recommended    #5 if you have any further episodes in spite of moderate in 10 mg p.o. 3 times daily and recommendations discussed above droxidopa can be contemplated        Metastatic lung cancer per patient rare form receiving immunotherapy in the [facility]      History of TIA/stroke on long-term oral anticoagulation      Premature ventricular complex    No further recurrence of palpitation    Will arrange echocardiogram to assess the biventricular function      I spent 45 minutes caring for Adilene on this date of service. This time includes time spent by me of counseling/coordination of care as relates to the presenting problem and any ordered procedures/tests as outlined above.         This document has been electronically signed by Clement Palacio MD on July 6, 2022 09:04 CDT      Diagnoses and all orders for this visit:    1. Orthostatic hypotension (Primary)    2. PVC (premature ventricular contraction)    3. Palpitation    4. Transient ischemic attack (TIA)          Clement Palacio MD  7/6/2022  08:38 CDT

## 2022-07-07 ENCOUNTER — TELEPHONE (OUTPATIENT)
Dept: FAMILY MEDICINE CLINIC | Facility: CLINIC | Age: 62
End: 2022-07-07

## 2022-07-07 ENCOUNTER — INFUSION (OUTPATIENT)
Dept: ONCOLOGY | Facility: HOSPITAL | Age: 62
End: 2022-07-07

## 2022-07-07 VITALS
HEART RATE: 70 BPM | DIASTOLIC BLOOD PRESSURE: 66 MMHG | SYSTOLIC BLOOD PRESSURE: 137 MMHG | TEMPERATURE: 97.4 F | RESPIRATION RATE: 18 BRPM | OXYGEN SATURATION: 98 %

## 2022-07-07 DIAGNOSIS — T45.1X5A ANEMIA DUE TO ANTINEOPLASTIC CHEMOTHERAPY: ICD-10-CM

## 2022-07-07 DIAGNOSIS — D64.81 ANEMIA DUE TO ANTINEOPLASTIC CHEMOTHERAPY: ICD-10-CM

## 2022-07-07 LAB
ABO GROUP BLD: NORMAL
BLD GP AB SCN SERPL QL: NEGATIVE
Lab: NORMAL
RH BLD: POSITIVE
T&S EXPIRATION DATE: NORMAL

## 2022-07-07 PROCEDURE — P9016 RBC LEUKOCYTES REDUCED: HCPCS

## 2022-07-07 PROCEDURE — 86900 BLOOD TYPING SEROLOGIC ABO: CPT

## 2022-07-07 PROCEDURE — 86850 RBC ANTIBODY SCREEN: CPT

## 2022-07-07 PROCEDURE — 86923 COMPATIBILITY TEST ELECTRIC: CPT

## 2022-07-07 PROCEDURE — 36430 TRANSFUSION BLD/BLD COMPNT: CPT

## 2022-07-07 PROCEDURE — 86901 BLOOD TYPING SEROLOGIC RH(D): CPT

## 2022-07-07 RX ORDER — SODIUM CHLORIDE 9 MG/ML
250 INJECTION, SOLUTION INTRAVENOUS AS NEEDED
Status: DISCONTINUED | OUTPATIENT
Start: 2022-07-07 | End: 2022-07-07 | Stop reason: HOSPADM

## 2022-07-07 NOTE — NURSING NOTE
Upon removal of IV line, patient reported mild wheezing.  Asked patient how long this has been happening and she stated a couple of hours.  Informed I needed to call her provider to report symptom, but she stated she did not want to do that and wanted to leave.  She stated she has had similar wheezing before and has a history of mild asthma.  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  Patient stated she would go to ER if symptoms did not improve or worsened.  Dr Raymundo office informed.

## 2022-07-08 ENCOUNTER — TELEPHONE (OUTPATIENT)
Dept: FAMILY MEDICINE CLINIC | Facility: CLINIC | Age: 62
End: 2022-07-08

## 2022-07-08 NOTE — TELEPHONE ENCOUNTER
"----- Message from Jen Raymundo MD sent at 7/7/2022  4:56 PM CDT -----  Can you check on her    Jen Raymundo MD   ----- Message -----  From: Kayden Lewis MA  Sent: 7/7/2022   4:09 PM CDT  To: MD Selena Cuevas RN called from the [facility] to advise that Patient - Adilene Morgan, received 2 units of blood today. Pt developed a wheeze post infusion. When RADHA Walters asked the pt to stay for further monitoring the pt stated \"This isn't a reaction to the blood, it's unrelated.\" RADHA Walters advised the pt was told to go to the ER if wheezing gets worse or she develops any other symptoms, The Pt stated she would go to the ER if needed.     I tried to call the Pt to follow up, no answer and voicemail box is full.       "

## 2022-07-09 LAB
BH BB BLOOD EXPIRATION DATE: NORMAL
BH BB BLOOD EXPIRATION DATE: NORMAL
BH BB BLOOD TYPE BARCODE: NORMAL
BH BB BLOOD TYPE BARCODE: NORMAL
BH BB DISPENSE STATUS: NORMAL
BH BB DISPENSE STATUS: NORMAL
BH BB PRODUCT CODE: NORMAL
BH BB PRODUCT CODE: NORMAL
BH BB UNIT NUMBER: NORMAL
BH BB UNIT NUMBER: NORMAL
CROSSMATCH INTERPRETATION: NORMAL
CROSSMATCH INTERPRETATION: NORMAL
UNIT  ABO: NORMAL
UNIT  ABO: NORMAL
UNIT  RH: NORMAL
UNIT  RH: NORMAL

## 2022-07-11 ENCOUNTER — OFFICE VISIT (OUTPATIENT)
Dept: GASTROENTEROLOGY | Facility: CLINIC | Age: 62
End: 2022-07-11

## 2022-07-11 VITALS
HEART RATE: 99 BPM | BODY MASS INDEX: 35.74 KG/M2 | HEIGHT: 66 IN | WEIGHT: 222.4 LBS | DIASTOLIC BLOOD PRESSURE: 87 MMHG | SYSTOLIC BLOOD PRESSURE: 146 MMHG

## 2022-07-11 DIAGNOSIS — R53.81 MALAISE AND FATIGUE: ICD-10-CM

## 2022-07-11 DIAGNOSIS — R53.83 MALAISE AND FATIGUE: ICD-10-CM

## 2022-07-11 DIAGNOSIS — D50.0 IRON DEFICIENCY ANEMIA DUE TO CHRONIC BLOOD LOSS: Primary | ICD-10-CM

## 2022-07-11 PROCEDURE — 99213 OFFICE O/P EST LOW 20 MIN: CPT | Performed by: NURSE PRACTITIONER

## 2022-07-11 NOTE — PROGRESS NOTES
Chief Complaint   Patient presents with   • Anemia       Subjective    Adilene Morgan is a 62 y.o. female. she is here today for follow-up.    History of Present Illness  62-year-old female presents for follow-up after capsule endoscopy done due to anemia.  She has adenosquamous carcinoma of right lung followed by [facility] [place] on treatment with entrectinib.  She denies any abdominal pain or visible blood in stool.  She has CBC done weekly recent hemoglobin dropped to 7.2 she was given 2 units of packed red blood cells July 7 has follow-up lab work complete tomorrow.  She had EGD May 30, 2022 which showed gastritis no specimens were collected.  Capsule endoscopy was completed 6/30/2020 2 capsule passed in 5 hours there were no lesions seen in the small bowel.  Patient states she has not seen capsule and is concerned it may not have left her colon.  She had colonoscopy in 2017.       The following portions of the patient's history were reviewed and updated as appropriate:   Past Medical History:   Diagnosis Date   • Achilles bursitis    • Achilles tendinitis    • Acquired hypothyroidism    • Allergic rhinitis    • Allergy to meat     alphagal   • Allergy to milk products    • Arrhythmia    • Asthmatic bronchitis    • Asthmatic bronchitis    • Bone spur    • Calcaneal spur    • Cancer (HCC)    • Depressive disorder    • Family history of thyroid problem    • Herpes simplex    • Hypermetropia    • Hypothyroidism    • Menopausal flushing    • Menopause    • Migraine    • Otalgia    • Pneumonia    • Presbyopia    • Stroke (HCC)    • Trochanteric bursitis    • UTI (urinary tract infection) 07/03/2018   • Ventricular premature beats      Past Surgical History:   Procedure Laterality Date   • COLONOSCOPY N/A 2/15/2017    Procedure: COLONOSCOPY;  Surgeon: Lupillo Ugarte MD;  Location: [facility] ENDOSCOPY;  Service:    • COSMETIC SURGERY      plastic surgery to face x 4 r/t trauma   • ENDOSCOPY N/A 5/31/2022     Procedure: ESOPHAGOGASTRODUODENOSCOPY;  Surgeon: Lincoln Banegas MD;  Location: [facility] ENDOSCOPY;  Service: Gastroenterology;  Laterality: N/A;   • LAPAROSCOPIC CHOLECYSTECTOMY  12/10/1995    Cholecystectomy, laparoscopic (1)      • OTHER SURGICAL HISTORY      Head surgery procedure (1)    plastic surgery on the face    • OTHER SURGICAL HISTORY  10/17/2014    Repair Superficial Wound TR-EXT 2.5 < CM 14351 (1)        Family History   Problem Relation Age of Onset   • Stroke Mother    • Diabetes Father    • Heart disease Father    • Hypertension Father    • Thyroid disease Sister    • Breast cancer Maternal Aunt    • Cancer Neg Hx         multiple in mothers family     OB History        3    Para   3    Term   3            AB        Living           SAB        IAB        Ectopic        Molar        Multiple        Live Births                  Prior to Admission medications    Medication Sig Start Date End Date Taking? Authorizing Provider   aspirin 81 MG EC tablet Take 81 mg by mouth Daily.   Yes Bing Mcclelland MD   entrectinib (ROZLYTREK) 200 MG capsule Take 600 mg by mouth Daily.   Yes Bing Mcclelland MD   levothyroxine (SYNTHROID, LEVOTHROID) 150 MCG tablet Take 150 mcg by mouth Daily.   Yes Bing Mcclelland MD   midodrine (PROAMATINE) 10 MG tablet Take 1 tablet by mouth 3 (Three) Times a Day Before Meals. 22  Yes Clement Palacio MD   multivitamin with minerals tablet tablet Take 1 tablet by mouth Daily.   Yes Bing Mcclelland MD   ondansetron (ZOFRAN) 8 MG tablet Take 1 tablet by mouth Every 8 (Eight) Hours As Needed for Nausea or Vomiting. 3/18/22  Yes Jen Raymundo MD   pantoprazole (Protonix) 40 MG EC tablet Take 1 tablet by mouth Daily. 22  Yes Patel Kevin APRN   prochlorperazine (COMPAZINE) 10 MG tablet Take 1 tablet by mouth Every 6 (Six) Hours As Needed for Nausea or Vomiting. 3/18/22  Yes Jen Raymundo MD   promethazine (PHENERGAN) 25  MG tablet Take 1 tablet by mouth Every 6 (Six) Hours As Needed for Nausea or Vomiting. 4/26/22  Yes Jen Raymundo MD   rivaroxaban (XARELTO) 20 MG tablet Take 20 mg by mouth Daily. 5/4/22  Yes Bing Mcclelland MD   solifenacin (VESICARE) 10 MG tablet Take 10 mg by mouth Daily. 4/11/22  Yes Bing Mcclelland MD   topiramate (TOPAMAX) 50 MG tablet TAKE 1 TABLET BY MOUTH EVERY NIGHT. 2/9/22  Yes Jen Raymundo MD   Unable to find 1 each 1 (One) Time. Med Name: relizen 320mg   Yes Bing Mcclelland MD   Unable to find 1 each 1 (One) Time. Med Name: arsenicum album 30 c   Yes Bing Mcclelland MD   venlafaxine XR (EFFEXOR-XR) 37.5 MG 24 hr capsule TAKE 1 CAPSULE BY MOUTH EVERY DAY 3/3/22  Yes Jen Raymundo MD     Allergies   Allergen Reactions   • Azithromycin    • Cefaclor Diarrhea and Nausea Only     Other reaction(s): Nausea Only   • Cephalexin Unknown - High Severity   • Lipitor [Atorvastatin] Nausea And Vomiting   • Penicillin G Unknown - High Severity   • Penicillins    • Zocor [Simvastatin] Nausea And Vomiting     Social History     Socioeconomic History   • Marital status:    Tobacco Use   • Smoking status: Never Smoker   • Smokeless tobacco: Never Used   Vaping Use   • Vaping Use: Never used   Substance and Sexual Activity   • Alcohol use: No   • Drug use: No   • Sexual activity: Defer       Review of Systems  Review of Systems   Constitutional: Positive for fatigue. Negative for activity change, appetite change, chills, diaphoresis, fever and unexpected weight change.   HENT: Negative for sore throat and trouble swallowing.    Respiratory: Negative for shortness of breath.    Gastrointestinal: Negative for abdominal distention, abdominal pain, anal bleeding, blood in stool, constipation, diarrhea, nausea, rectal pain and vomiting.   Musculoskeletal: Negative for arthralgias.   Skin: Negative for pallor.   Neurological: Negative for light-headedness.        /87 (BP  "Location: Left arm)   Pulse 99   Ht 167.6 cm (66\")   Wt 101 kg (222 lb 6.4 oz)   LMP  (LMP Unknown)   BMI 35.90 kg/m²     Objective    Physical Exam  Constitutional:       General: She is not in acute distress.     Appearance: Normal appearance. She is normal weight. She is not ill-appearing.   HENT:      Head: Normocephalic and atraumatic.   Pulmonary:      Effort: Pulmonary effort is normal.   Abdominal:      General: Abdomen is flat. Bowel sounds are normal. There is no distension.      Palpations: Abdomen is soft. There is no mass.      Tenderness: There is no abdominal tenderness.   Neurological:      Mental Status: She is alert.       Infusion on 07/07/2022   Component Date Value Ref Range Status   • ABO Type 07/07/2022 A   Final   • RH type 07/07/2022 Positive   Final   • Antibody Screen 07/07/2022 Negative   Final   • T&S Expiration Date 07/07/2022 7/10/2022 11:59:59 PM   Final   • Previous History 07/07/2022 Previous Record on File   Final   • Product Code 07/09/2022 [id number]   Final   • Unit Number 07/09/2022 [id number]   Final   • UNIT  ABO 07/09/2022 A   Final   • UNIT  RH 07/09/2022 POS   Final   • Crossmatch Interpretation 07/09/2022 Compatible   Final   • Dispense Status 07/09/2022 PT   Final   • Blood Expiration Date 07/09/2022 202207282359   Final   • Product Code 07/09/2022 I1266W39   Final   • Unit Number 07/09/2022 [id number]   Final   • UNIT  ABO 07/09/2022 A   Final   • UNIT  RH 07/09/2022 POS   Final   • Crossmatch Interpretation 07/09/2022 Compatible   Final   • Dispense Status 07/09/2022 PT   Final   • Blood Expiration Date 07/09/2022 202207292359   Final     Assessment & Plan      1. Iron deficiency anemia due to chronic blood loss    2. Malaise and fatigue    .   Will obtain x-ray due to concern that Has Not Left Her Colon Will Call Patient with Results When Available Complete Lab Work Tomorrow As Planned.  Discussed Planning Colonoscopy If Worsening Anemia since it has been " 5 years since previous procedure.    Orders placed during this encounter include:  Orders Placed This Encounter   Procedures   • XR Abdomen KUB     Standing Status:   Future     Standing Expiration Date:   7/11/2023     Scheduling Instructions:      Concerned capsule has not passed     Order Specific Question:   Reason for Exam:     Answer:   capsule     Order Specific Question:   Does this patient have a diabetic monitoring/medication delivering device on?     Answer:   No     Order Specific Question:   Release to patient     Answer:   Immediate       * Surgery not found *    Review and/or summary of lab tests, radiology, procedures, medications. Review and summary of old records and obtaining of history. The risks and benefits of my recommendations, as well as other treatment options were discussed with the patient today. Questions were answered.    No orders of the defined types were placed in this encounter.      Follow-up: Return in about 4 weeks (around 8/8/2022) for Recheck, After test.          This document has been electronically signed by SILVA Patten on July 11, 2022 16:27 CDT           I spent 20 minutes caring for Adilene on this date of service. This time includes time spent by me in the following activities:preparing for the visit, reviewing tests, obtaining and/or reviewing a separately obtained history, performing a medically appropriate examination and/or evaluation , counseling and educating the patient/family/caregiver, ordering medications, tests, or procedures, referring and communicating with other health care professionals , documenting information in the medical record and care coordination    Results for orders placed or performed in visit on 07/07/22   PREVIOUS HISTORY    Specimen: Blood   Result Value Ref Range    Previous History Previous Record on File    Prepare RBC, 2 Units   Result Value Ref Range    Product Code O0839U27     Unit Number [id number]     UNIT  ABO A     UNIT   RH POS     Crossmatch Interpretation Compatible     Dispense Status PT     Blood Expiration Date 202207282359     Blood Type Barcode      Product Code J1847K20     Unit Number [id number]     UNIT  ABO A     UNIT  RH POS     Crossmatch Interpretation Compatible     Dispense Status PT     Blood Expiration Date 202207292359     Blood Type Barcode     Type and screen    Specimen: Blood   Result Value Ref Range    ABO Type A     RH type Positive     Antibody Screen Negative     T&S Expiration Date 7/10/2022 11:59:59 PM    Results for orders placed or performed during the hospital encounter of 06/10/22   STAT Lactic Acid, Reflex    Specimen: Blood   Result Value Ref Range    Lactate 1.5 0.5 - 2.0 mmol/L   STAT Lactic Acid, Reflex    Specimen: Blood   Result Value Ref Range    Lactate 2.7 (C) 0.5 - 2.0 mmol/L   Gold Top - SST   Result Value Ref Range    Extra Tube Hold for add-ons.    COVID-19 and FLU A/B PCR - Swab, Nasopharynx    Specimen: Nasopharynx; Swab   Result Value Ref Range    COVID19 Not Detected Not Detected - Ref. Range    Influenza A PCR Not Detected Not Detected    Influenza B PCR Not Detected Not Detected   Urinalysis, Microscopic Only - Urine, Clean Catch    Specimen: Urine, Clean Catch   Result Value Ref Range    RBC, UA 6-12 (A) None Seen /HPF    WBC, UA Too Numerous to Count (A) None Seen, 0-2, 3-5 /HPF    Bacteria, UA 4+ (A) None Seen /HPF    Squamous Epithelial Cells, UA 0-2 None Seen, 0-2 /HPF    Hyaline Casts, UA 0-2 None Seen /LPF    Methodology Automated Microscopy    Urinalysis With Microscopic If Indicated (No Culture) - Urine, Clean Catch    Specimen: Urine, Clean Catch   Result Value Ref Range    Color, UA Yellow Yellow, Straw, Dark Yellow, Charlene    Appearance, UA Cloudy (A) Clear    pH, UA <=5.0 5.0 - 9.0    Specific Gravity, UA 1.007 1.003 - 1.030    Glucose, UA Negative Negative    Ketones, UA Negative Negative    Bilirubin, UA Negative Negative    Blood, UA Large (3+) (A) Negative     Protein, UA Negative Negative    Leuk Esterase, UA Large (3+) (A) Negative    Nitrite, UA Positive (A) Negative    Urobilinogen, UA 0.2 E.U./dL 0.2 - 1.0 E.U./dL   Procalcitonin    Specimen: Blood   Result Value Ref Range    Procalcitonin 0.08 0.00 - 0.25 ng/mL   CBC Auto Differential    Specimen: Blood   Result Value Ref Range    WBC 5.17 3.40 - 10.80 10*3/mm3    RBC 2.71 (L) 3.77 - 5.28 10*6/mm3    Hemoglobin 7.8 (L) 12.0 - 15.9 g/dL    Hematocrit 23.8 (L) 34.0 - 46.6 %    MCV 87.8 79.0 - 97.0 fL    MCH 28.8 26.6 - 33.0 pg    MCHC 32.8 31.5 - 35.7 g/dL    RDW 17.2 (H) 12.3 - 15.4 %    RDW-SD 55.1 (H) 37.0 - 54.0 fl    MPV 9.8 6.0 - 12.0 fL    Platelets 237 140 - 450 10*3/mm3    Neutrophil % 64.3 42.7 - 76.0 %    Lymphocyte % 18.0 (L) 19.6 - 45.3 %    Monocyte % 9.9 5.0 - 12.0 %    Eosinophil % 6.8 (H) 0.3 - 6.2 %    Basophil % 0.4 0.0 - 1.5 %    Immature Grans % 0.6 (H) 0.0 - 0.5 %    Neutrophils, Absolute 3.33 1.70 - 7.00 10*3/mm3    Lymphocytes, Absolute 0.93 0.70 - 3.10 10*3/mm3    Monocytes, Absolute 0.51 0.10 - 0.90 10*3/mm3    Eosinophils, Absolute 0.35 0.00 - 0.40 10*3/mm3    Basophils, Absolute 0.02 0.00 - 0.20 10*3/mm3    Immature Grans, Absolute 0.03 0.00 - 0.05 10*3/mm3    nRBC 0.0 0.0 - 0.2 /100 WBC   CBC Auto Differential    Specimen: Blood   Result Value Ref Range    WBC 4.81 3.40 - 10.80 10*3/mm3    RBC 2.83 (L) 3.77 - 5.28 10*6/mm3    Hemoglobin 8.1 (L) 12.0 - 15.9 g/dL    Hematocrit 25.2 (L) 34.0 - 46.6 %    MCV 89.0 79.0 - 97.0 fL    MCH 28.6 26.6 - 33.0 pg    MCHC 32.1 31.5 - 35.7 g/dL    RDW 17.2 (H) 12.3 - 15.4 %    RDW-SD 56.5 (H) 37.0 - 54.0 fl    MPV 10.1 6.0 - 12.0 fL    Platelets 263 140 - 450 10*3/mm3    Neutrophil % 66.6 42.7 - 76.0 %    Lymphocyte % 17.3 (L) 19.6 - 45.3 %    Monocyte % 8.9 5.0 - 12.0 %    Eosinophil % 6.0 0.3 - 6.2 %    Basophil % 0.6 0.0 - 1.5 %    Immature Grans % 0.6 (H) 0.0 - 0.5 %    Neutrophils, Absolute 3.20 1.70 - 7.00 10*3/mm3    Lymphocytes, Absolute 0.83  0.70 - 3.10 10*3/mm3    Monocytes, Absolute 0.43 0.10 - 0.90 10*3/mm3    Eosinophils, Absolute 0.29 0.00 - 0.40 10*3/mm3    Basophils, Absolute 0.03 0.00 - 0.20 10*3/mm3    Immature Grans, Absolute 0.03 0.00 - 0.05 10*3/mm3    nRBC 0.0 0.0 - 0.2 /100 WBC   CBC Auto Differential    Specimen: Blood   Result Value Ref Range    WBC 5.10 3.40 - 10.80 10*3/mm3    RBC 2.73 (L) 3.77 - 5.28 10*6/mm3    Hemoglobin 7.7 (L) 12.0 - 15.9 g/dL    Hematocrit 23.9 (L) 34.0 - 46.6 %    MCV 87.5 79.0 - 97.0 fL    MCH 28.2 26.6 - 33.0 pg    MCHC 32.2 31.5 - 35.7 g/dL    RDW 17.5 (H) 12.3 - 15.4 %    RDW-SD 55.9 (H) 37.0 - 54.0 fl    MPV 9.9 6.0 - 12.0 fL    Platelets 232 140 - 450 10*3/mm3    Neutrophil % 68.2 42.7 - 76.0 %    Lymphocyte % 14.9 (L) 19.6 - 45.3 %    Monocyte % 8.8 5.0 - 12.0 %    Eosinophil % 6.9 (H) 0.3 - 6.2 %    Basophil % 0.6 0.0 - 1.5 %    Immature Grans % 0.6 (H) 0.0 - 0.5 %    Neutrophils, Absolute 3.48 1.70 - 7.00 10*3/mm3    Lymphocytes, Absolute 0.76 0.70 - 3.10 10*3/mm3    Monocytes, Absolute 0.45 0.10 - 0.90 10*3/mm3    Eosinophils, Absolute 0.35 0.00 - 0.40 10*3/mm3    Basophils, Absolute 0.03 0.00 - 0.20 10*3/mm3    Immature Grans, Absolute 0.03 0.00 - 0.05 10*3/mm3    nRBC 0.0 0.0 - 0.2 /100 WBC     *Note: Due to a large number of results and/or encounters for the requested time period, some results have not been displayed. A complete set of results can be found in Results Review.

## 2022-07-14 DIAGNOSIS — N39.41 URGE INCONTINENCE: ICD-10-CM

## 2022-07-14 RX ORDER — SOLIFENACIN SUCCINATE 10 MG/1
TABLET, FILM COATED ORAL
Qty: 90 TABLET | Refills: 0 | Status: SHIPPED | OUTPATIENT
Start: 2022-07-14 | End: 2022-10-20

## 2022-07-15 ENCOUNTER — TELEPHONE (OUTPATIENT)
Dept: GASTROENTEROLOGY | Facility: CLINIC | Age: 62
End: 2022-07-15

## 2022-07-15 NOTE — TELEPHONE ENCOUNTER
A voicemail was left with results         ----- Message from SILVA Barkley sent at 7/12/2022  4:25 PM CDT -----  Please call patient with results. I attempted to call and line was busy. Capsule has passed

## 2022-08-10 ENCOUNTER — LAB (OUTPATIENT)
Dept: LAB | Facility: HOSPITAL | Age: 62
End: 2022-08-10

## 2022-08-10 DIAGNOSIS — R89.9 ABNORMAL LABORATORY TEST RESULT: ICD-10-CM

## 2022-08-10 DIAGNOSIS — R89.9 ABNORMAL LABORATORY TEST RESULT: Primary | ICD-10-CM

## 2022-08-10 LAB
ALBUMIN SERPL-MCNC: 3.7 G/DL (ref 3.5–5.2)
ALBUMIN/GLOB SERPL: 1.1 G/DL
ALP SERPL-CCNC: 88 U/L (ref 39–117)
ALT SERPL W P-5'-P-CCNC: 15 U/L (ref 1–33)
ANION GAP SERPL CALCULATED.3IONS-SCNC: 13 MMOL/L (ref 5–15)
AST SERPL-CCNC: 22 U/L (ref 1–32)
BASOPHILS # BLD AUTO: 0.04 10*3/MM3 (ref 0–0.2)
BASOPHILS NFR BLD AUTO: 0.5 % (ref 0–1.5)
BILIRUB SERPL-MCNC: <0.2 MG/DL (ref 0–1.2)
BUN SERPL-MCNC: 20 MG/DL (ref 8–23)
BUN/CREAT SERPL: 13.2 (ref 7–25)
CALCIUM SPEC-SCNC: 9.1 MG/DL (ref 8.6–10.5)
CHLORIDE SERPL-SCNC: 110 MMOL/L (ref 98–107)
CO2 SERPL-SCNC: 20 MMOL/L (ref 22–29)
CREAT SERPL-MCNC: 1.52 MG/DL (ref 0.57–1)
DEPRECATED RDW RBC AUTO: 43.9 FL (ref 37–54)
EGFRCR SERPLBLD CKD-EPI 2021: 38.4 ML/MIN/1.73
EOSINOPHIL # BLD AUTO: 0.56 10*3/MM3 (ref 0–0.4)
EOSINOPHIL NFR BLD AUTO: 7.4 % (ref 0.3–6.2)
ERYTHROCYTE [DISTWIDTH] IN BLOOD BY AUTOMATED COUNT: 14.1 % (ref 12.3–15.4)
GLOBULIN UR ELPH-MCNC: 3.4 GM/DL
GLUCOSE SERPL-MCNC: 88 MG/DL (ref 65–99)
HCT VFR BLD AUTO: 27.3 % (ref 34–46.6)
HGB BLD-MCNC: 9.3 G/DL (ref 12–15.9)
IMM GRANULOCYTES # BLD AUTO: 0.06 10*3/MM3 (ref 0–0.05)
IMM GRANULOCYTES NFR BLD AUTO: 0.8 % (ref 0–0.5)
LYMPHOCYTES # BLD AUTO: 0.78 10*3/MM3 (ref 0.7–3.1)
LYMPHOCYTES NFR BLD AUTO: 10.3 % (ref 19.6–45.3)
MCH RBC QN AUTO: 29.2 PG (ref 26.6–33)
MCHC RBC AUTO-ENTMCNC: 34.1 G/DL (ref 31.5–35.7)
MCV RBC AUTO: 85.6 FL (ref 79–97)
MONOCYTES # BLD AUTO: 0.62 10*3/MM3 (ref 0.1–0.9)
MONOCYTES NFR BLD AUTO: 8.2 % (ref 5–12)
NEUTROPHILS NFR BLD AUTO: 5.5 10*3/MM3 (ref 1.7–7)
NEUTROPHILS NFR BLD AUTO: 72.8 % (ref 42.7–76)
NRBC BLD AUTO-RTO: 0 /100 WBC (ref 0–0.2)
PLATELET # BLD AUTO: 241 10*3/MM3 (ref 140–450)
PMV BLD AUTO: 10.7 FL (ref 6–12)
POTASSIUM SERPL-SCNC: 4.4 MMOL/L (ref 3.5–5.2)
PROT SERPL-MCNC: 7.1 G/DL (ref 6–8.5)
RBC # BLD AUTO: 3.19 10*6/MM3 (ref 3.77–5.28)
SODIUM SERPL-SCNC: 143 MMOL/L (ref 136–145)
WBC NRBC COR # BLD: 7.56 10*3/MM3 (ref 3.4–10.8)

## 2022-08-10 PROCEDURE — 36415 COLL VENOUS BLD VENIPUNCTURE: CPT

## 2022-08-10 PROCEDURE — 80053 COMPREHEN METABOLIC PANEL: CPT

## 2022-08-10 PROCEDURE — 85025 COMPLETE CBC W/AUTO DIFF WBC: CPT

## 2022-08-17 ENCOUNTER — OFFICE VISIT (OUTPATIENT)
Dept: GASTROENTEROLOGY | Facility: CLINIC | Age: 62
End: 2022-08-17

## 2022-08-17 VITALS
HEART RATE: 97 BPM | HEIGHT: 66 IN | WEIGHT: 217.6 LBS | DIASTOLIC BLOOD PRESSURE: 77 MMHG | BODY MASS INDEX: 34.97 KG/M2 | SYSTOLIC BLOOD PRESSURE: 144 MMHG

## 2022-08-17 DIAGNOSIS — R53.83 MALAISE AND FATIGUE: ICD-10-CM

## 2022-08-17 DIAGNOSIS — R53.81 MALAISE AND FATIGUE: ICD-10-CM

## 2022-08-17 DIAGNOSIS — D50.0 IRON DEFICIENCY ANEMIA DUE TO CHRONIC BLOOD LOSS: Primary | ICD-10-CM

## 2022-08-17 PROCEDURE — 99213 OFFICE O/P EST LOW 20 MIN: CPT | Performed by: NURSE PRACTITIONER

## 2022-08-17 RX ORDER — FUROSEMIDE 20 MG/1
TABLET ORAL
COMMUNITY
Start: 2022-08-12 | End: 2023-01-03

## 2022-08-17 RX ORDER — LORLATINIB 100 MG/1
TABLET, FILM COATED ORAL
COMMUNITY
Start: 2022-08-15 | End: 2023-01-03

## 2022-08-17 NOTE — PROGRESS NOTES
Chief Complaint   Patient presents with   • Anemia       Subjective    Adilene Morgan is a 62 y.o. female. she is here today for follow-up.    History of Present Illness  62-year-old female presents with her  for 1 month recheck to review lab results.  Denies any visible blood in stool or melena.  She has adenosquamous carcinoma of right lung followed by the [facility] in [place] she was previously on treatment with entrectinib and recently that was discontinued and she started immunotherapy with Lorbrena.  Reports she still feels tired but denies any significant abdominal pain or changes in her bowel movement.  She had lab work today which noted hemoglobin increased from 9.3-9.6.  It is checked weekly at [facility] per her oncologist in [place].  Her EGD showed gastritis but no specimens were collected no signs of bleeding were identified.  Capsule endoscopy noted no lesions or bleeding in the small bowel.  She had colonoscopy in 2017 has declined to repeat colonoscopy at this time.       The following portions of the patient's history were reviewed and updated as appropriate:   Past Medical History:   Diagnosis Date   • Achilles bursitis    • Achilles tendinitis    • Acquired hypothyroidism    • Allergic rhinitis    • Allergy to meat     alphagal   • Allergy to milk products    • Arrhythmia    • Asthmatic bronchitis    • Asthmatic bronchitis    • Bone spur    • Calcaneal spur    • Cancer (HCC)    • Depressive disorder    • Family history of thyroid problem    • Herpes simplex    • Hypermetropia    • Hypothyroidism    • Menopausal flushing    • Menopause    • Migraine    • Otalgia    • Pneumonia    • Presbyopia    • Stroke (HCC)    • Trochanteric bursitis    • UTI (urinary tract infection) 07/03/2018   • Ventricular premature beats      Past Surgical History:   Procedure Laterality Date   • COLONOSCOPY N/A 2/15/2017    Procedure: COLONOSCOPY;  Surgeon: Lupillo Ugarte MD;  Location: [facility]  ENDOSCOPY;  Service:    • COSMETIC SURGERY      plastic surgery to face x 4 r/t trauma   • ENDOSCOPY N/A 2022    Procedure: ESOPHAGOGASTRODUODENOSCOPY;  Surgeon: Lincoln Banegas MD;  Location: [facility] ENDOSCOPY;  Service: Gastroenterology;  Laterality: N/A;   • LAPAROSCOPIC CHOLECYSTECTOMY  12/10/1995    Cholecystectomy, laparoscopic (1)      • OTHER SURGICAL HISTORY      Head surgery procedure (1)    plastic surgery on the face    • OTHER SURGICAL HISTORY  10/17/2014    Repair Superficial Wound TR-EXT 2.5 < CM 37409 (1)        Family History   Problem Relation Age of Onset   • Stroke Mother    • Diabetes Father    • Heart disease Father    • Hypertension Father    • Thyroid disease Sister    • Breast cancer Maternal Aunt    • Cancer Neg Hx         multiple in mothers family     OB History        3    Para   3    Term   3            AB        Living           SAB        IAB        Ectopic        Molar        Multiple        Live Births                  Prior to Admission medications    Medication Sig Start Date End Date Taking? Authorizing Provider   aspirin 81 MG EC tablet Take 81 mg by mouth Daily.   Yes Bing Mcclelland MD   furosemide (LASIX) 20 MG tablet TAKE 1 TAB(S) ORALLY ONCE A DAY X 30 DAYS HOLD IF DIZZINESS OR LOW BLOOD PRESSURE 22  Yes Bing Mcclelland MD   levothyroxine (SYNTHROID, LEVOTHROID) 150 MCG tablet Take 150 mcg by mouth Daily.   Yes Bing Mcclelland MD   Lorbrena 100 MG tablet  8/15/22  Yes Bing Mcclelland MD   midodrine (PROAMATINE) 10 MG tablet Take 1 tablet by mouth 3 (Three) Times a Day Before Meals. 22  Yes Clement Palacio MD   multivitamin with minerals tablet tablet Take 1 tablet by mouth Daily.   Yes Bing Mcclelland MD   ondansetron (ZOFRAN) 8 MG tablet Take 1 tablet by mouth Every 8 (Eight) Hours As Needed for Nausea or Vomiting. 3/18/22  Yes Jen Raymundo MD   pantoprazole (Protonix) 40 MG EC tablet Take 1 tablet by mouth  Daily. 6/1/22  Yes Patel Kevin APRN   prochlorperazine (COMPAZINE) 10 MG tablet Take 1 tablet by mouth Every 6 (Six) Hours As Needed for Nausea or Vomiting. 3/18/22  Yes Jen Raymundo MD   promethazine (PHENERGAN) 25 MG tablet Take 1 tablet by mouth Every 6 (Six) Hours As Needed for Nausea or Vomiting. 4/26/22  Yes Jen Raymundo MD   rivaroxaban (XARELTO) 20 MG tablet Take 20 mg by mouth Daily. 5/4/22  Yes Bing Mcclelland MD   solifenacin (VESICARE) 10 MG tablet TAKE 1 TABLET BY MOUTH EVERY DAY 7/14/22  Yes Jen Raymundo MD   topiramate (TOPAMAX) 50 MG tablet TAKE 1 TABLET BY MOUTH EVERY NIGHT.  Patient taking differently: Take 25 mg by mouth Every Night. 2/9/22  Yes Jen Raymundo MD   Unable to find 1 each 1 (One) Time. Med Name: relizen 320mg   Yes Bing Mcclelland MD   venlafaxine XR (EFFEXOR-XR) 37.5 MG 24 hr capsule TAKE 1 CAPSULE BY MOUTH EVERY DAY 3/3/22  Yes Jen Raymundo MD   entrectinib (ROZLYTREK) 200 MG capsule Take 600 mg by mouth Daily.  8/17/22  Bing Mcclelland MD   Unable to find 1 each 1 (One) Time. Med Name: arsenicum album 30 c  8/17/22  Bing Mcclelland MD     Allergies   Allergen Reactions   • Azithromycin    • Cefaclor Diarrhea and Nausea Only     Other reaction(s): Nausea Only   • Cephalexin Unknown - High Severity   • Lipitor [Atorvastatin] Nausea And Vomiting   • Penicillin G Unknown - High Severity   • Penicillins    • Zocor [Simvastatin] Nausea And Vomiting     Social History     Socioeconomic History   • Marital status:    Tobacco Use   • Smoking status: Never Smoker   • Smokeless tobacco: Never Used   Vaping Use   • Vaping Use: Never used   Substance and Sexual Activity   • Alcohol use: No   • Drug use: No   • Sexual activity: Defer       Review of Systems  Review of Systems   Constitutional: Positive for fatigue. Negative for activity change, appetite change, chills, diaphoresis, fever and unexpected weight change.  "  HENT: Negative for sore throat and trouble swallowing.    Respiratory: Negative for shortness of breath.    Gastrointestinal: Positive for constipation. Negative for abdominal distention, abdominal pain, anal bleeding, blood in stool, diarrhea, nausea, rectal pain and vomiting.   Musculoskeletal: Negative for arthralgias.   Skin: Negative for pallor.   Neurological: Negative for light-headedness.        /77 (BP Location: Right arm, Patient Position: Sitting, Cuff Size: Other (Comment))   Pulse 97   Ht 167.6 cm (66\")   Wt 98.7 kg (217 lb 9.6 oz)   LMP  (LMP Unknown)   BMI 35.12 kg/m²     Objective    Physical Exam  Constitutional:       General: She is not in acute distress.     Appearance: Normal appearance. She is obese. She is not ill-appearing.   HENT:      Head: Normocephalic and atraumatic.   Pulmonary:      Effort: Pulmonary effort is normal.   Abdominal:      General: Abdomen is flat. Bowel sounds are normal. There is no distension.      Palpations: Abdomen is soft. There is no mass.      Tenderness: There is no abdominal tenderness.   Neurological:      Mental Status: She is alert.       Lab on 08/10/2022   Component Date Value Ref Range Status   • Glucose 08/10/2022 88  65 - 99 mg/dL Final   • BUN 08/10/2022 20  8 - 23 mg/dL Final   • Creatinine 08/10/2022 1.52 (A) 0.57 - 1.00 mg/dL Final   • Sodium 08/10/2022 143  136 - 145 mmol/L Final   • Potassium 08/10/2022 4.4  3.5 - 5.2 mmol/L Final   • Chloride 08/10/2022 110 (A) 98 - 107 mmol/L Final   • CO2 08/10/2022 20.0 (A) 22.0 - 29.0 mmol/L Final   • Calcium 08/10/2022 9.1  8.6 - 10.5 mg/dL Final   • Total Protein 08/10/2022 7.1  6.0 - 8.5 g/dL Final   • Albumin 08/10/2022 3.70  3.50 - 5.20 g/dL Final   • ALT (SGPT) 08/10/2022 15  1 - 33 U/L Final   • AST (SGOT) 08/10/2022 22  1 - 32 U/L Final   • Alkaline Phosphatase 08/10/2022 88  39 - 117 U/L Final   • Total Bilirubin 08/10/2022 <0.2  0.0 - 1.2 mg/dL Final   • Globulin 08/10/2022 3.4  gm/dL " Final   • A/G Ratio 08/10/2022 1.1  g/dL Final   • BUN/Creatinine Ratio 08/10/2022 13.2  7.0 - 25.0 Final   • Anion Gap 08/10/2022 13.0  5.0 - 15.0 mmol/L Final   • eGFR 08/10/2022 38.4 (A) >60.0 mL/min/1.73 Final    National Kidney Foundation and American Society of Nephrology (ASN) Task Force recommended calculation based on the Chronic Kidney Disease Epidemiology Collaboration (CKD-EPI) equation refit without adjustment for race.   • WBC 08/10/2022 7.56  3.40 - 10.80 10*3/mm3 Final   • RBC 08/10/2022 3.19 (A) 3.77 - 5.28 10*6/mm3 Final   • Hemoglobin 08/10/2022 9.3 (A) 12.0 - 15.9 g/dL Final   • Hematocrit 08/10/2022 27.3 (A) 34.0 - 46.6 % Final   • MCV 08/10/2022 85.6  79.0 - 97.0 fL Final   • MCH 08/10/2022 29.2  26.6 - 33.0 pg Final   • MCHC 08/10/2022 34.1  31.5 - 35.7 g/dL Final   • RDW 08/10/2022 14.1  12.3 - 15.4 % Final   • RDW-SD 08/10/2022 43.9  37.0 - 54.0 fl Final   • MPV 08/10/2022 10.7  6.0 - 12.0 fL Final   • Platelets 08/10/2022 241  140 - 450 10*3/mm3 Final   • Neutrophil % 08/10/2022 72.8  42.7 - 76.0 % Final   • Lymphocyte % 08/10/2022 10.3 (A) 19.6 - 45.3 % Final   • Monocyte % 08/10/2022 8.2  5.0 - 12.0 % Final   • Eosinophil % 08/10/2022 7.4 (A) 0.3 - 6.2 % Final   • Basophil % 08/10/2022 0.5  0.0 - 1.5 % Final   • Immature Grans % 08/10/2022 0.8 (A) 0.0 - 0.5 % Final   • Neutrophils, Absolute 08/10/2022 5.50  1.70 - 7.00 10*3/mm3 Final   • Lymphocytes, Absolute 08/10/2022 0.78  0.70 - 3.10 10*3/mm3 Final   • Monocytes, Absolute 08/10/2022 0.62  0.10 - 0.90 10*3/mm3 Final   • Eosinophils, Absolute 08/10/2022 0.56 (A) 0.00 - 0.40 10*3/mm3 Final   • Basophils, Absolute 08/10/2022 0.04  0.00 - 0.20 10*3/mm3 Final   • Immature Grans, Absolute 08/10/2022 0.06 (A) 0.00 - 0.05 10*3/mm3 Final   • nRBC 08/10/2022 0.0  0.0 - 0.2 /100 WBC Final     Assessment & Plan      1. Iron deficiency anemia due to chronic blood loss    2. Malaise and fatigue    .   Continue to follow with oncology team.   Follow-up in GI office as needed.  Recommend colonoscopy if there is any further decline in hemoglobin patient has been verbalized understanding will follow-up if any further endoscopic procedures are needed or for any GI needs    Orders placed during this encounter include:  No orders of the defined types were placed in this encounter.      * Surgery not found *    Review and/or summary of lab tests, radiology, procedures, medications. Review and summary of old records and obtaining of history. The risks and benefits of my recommendations, as well as other treatment options were discussed with the patient today. Questions were answered.    No orders of the defined types were placed in this encounter.      Follow-up: Return in about 4 weeks (around 9/14/2022) for Recheck.          This document has been electronically signed by SILVA Patten on August 18, 2022 08:43 CDT           I spent 24 minutes caring for Adilene on this date of service. This time includes time spent by me in the following activities:preparing for the visit, reviewing tests, obtaining and/or reviewing a separately obtained history, performing a medically appropriate examination and/or evaluation , counseling and educating the patient/family/caregiver, ordering medications, tests, or procedures, referring and communicating with other health care professionals , documenting information in the medical record and care coordination    Results for orders placed or performed in visit on 08/10/22   CBC Auto Differential    Specimen: Blood   Result Value Ref Range    WBC 7.56 3.40 - 10.80 10*3/mm3    RBC 3.19 (L) 3.77 - 5.28 10*6/mm3    Hemoglobin 9.3 (L) 12.0 - 15.9 g/dL    Hematocrit 27.3 (L) 34.0 - 46.6 %    MCV 85.6 79.0 - 97.0 fL    MCH 29.2 26.6 - 33.0 pg    MCHC 34.1 31.5 - 35.7 g/dL    RDW 14.1 12.3 - 15.4 %    RDW-SD 43.9 37.0 - 54.0 fl    MPV 10.7 6.0 - 12.0 fL    Platelets 241 140 - 450 10*3/mm3    Neutrophil % 72.8 42.7 - 76.0 %     Lymphocyte % 10.3 (L) 19.6 - 45.3 %    Monocyte % 8.2 5.0 - 12.0 %    Eosinophil % 7.4 (H) 0.3 - 6.2 %    Basophil % 0.5 0.0 - 1.5 %    Immature Grans % 0.8 (H) 0.0 - 0.5 %    Neutrophils, Absolute 5.50 1.70 - 7.00 10*3/mm3    Lymphocytes, Absolute 0.78 0.70 - 3.10 10*3/mm3    Monocytes, Absolute 0.62 0.10 - 0.90 10*3/mm3    Eosinophils, Absolute 0.56 (H) 0.00 - 0.40 10*3/mm3    Basophils, Absolute 0.04 0.00 - 0.20 10*3/mm3    Immature Grans, Absolute 0.06 (H) 0.00 - 0.05 10*3/mm3    nRBC 0.0 0.0 - 0.2 /100 WBC   Comprehensive metabolic panel    Specimen: Blood   Result Value Ref Range    Glucose 88 65 - 99 mg/dL    BUN 20 8 - 23 mg/dL    Creatinine 1.52 (H) 0.57 - 1.00 mg/dL    Sodium 143 136 - 145 mmol/L    Potassium 4.4 3.5 - 5.2 mmol/L    Chloride 110 (H) 98 - 107 mmol/L    CO2 20.0 (L) 22.0 - 29.0 mmol/L    Calcium 9.1 8.6 - 10.5 mg/dL    Total Protein 7.1 6.0 - 8.5 g/dL    Albumin 3.70 3.50 - 5.20 g/dL    ALT (SGPT) 15 1 - 33 U/L    AST (SGOT) 22 1 - 32 U/L    Alkaline Phosphatase 88 39 - 117 U/L    Total Bilirubin <0.2 0.0 - 1.2 mg/dL    Globulin 3.4 gm/dL    A/G Ratio 1.1 g/dL    BUN/Creatinine Ratio 13.2 7.0 - 25.0    Anion Gap 13.0 5.0 - 15.0 mmol/L    eGFR 38.4 (L) >60.0 mL/min/1.73   Results for orders placed or performed in visit on 07/07/22   PREVIOUS HISTORY    Specimen: Blood   Result Value Ref Range    Previous History Previous Record on File    Prepare RBC, 2 Units   Result Value Ref Range    Product Code D1583V76     Unit Number [id number]     UNIT  ABO A     UNIT  RH POS     Crossmatch Interpretation Compatible     Dispense Status PT     Blood Expiration Date 202207282359     Blood Type Barcode      Product Code E2510Z37     Unit Number [id number]     UNIT  ABO A     UNIT  RH POS     Crossmatch Interpretation Compatible     Dispense Status PT     Blood Expiration Date 202207292359     Blood Type Barcode     Type and screen    Specimen: Blood   Result Value Ref Range    ABO Type A     RH  type Positive     Antibody Screen Negative     T&S Expiration Date 7/10/2022 11:59:59 PM    Results for orders placed or performed during the hospital encounter of 06/10/22   STAT Lactic Acid, Reflex    Specimen: Blood   Result Value Ref Range    Lactate 1.5 0.5 - 2.0 mmol/L   STAT Lactic Acid, Reflex    Specimen: Blood   Result Value Ref Range    Lactate 2.7 (C) 0.5 - 2.0 mmol/L   Gold Top - SST   Result Value Ref Range    Extra Tube Hold for add-ons.    COVID-19 and FLU A/B PCR - Swab, Nasopharynx    Specimen: Nasopharynx; Swab   Result Value Ref Range    COVID19 Not Detected Not Detected - Ref. Range    Influenza A PCR Not Detected Not Detected    Influenza B PCR Not Detected Not Detected   Urinalysis, Microscopic Only - Urine, Clean Catch    Specimen: Urine, Clean Catch   Result Value Ref Range    RBC, UA 6-12 (A) None Seen /HPF    WBC, UA Too Numerous to Count (A) None Seen, 0-2, 3-5 /HPF    Bacteria, UA 4+ (A) None Seen /HPF    Squamous Epithelial Cells, UA 0-2 None Seen, 0-2 /HPF    Hyaline Casts, UA 0-2 None Seen /LPF    Methodology Automated Microscopy    Urinalysis With Microscopic If Indicated (No Culture) - Urine, Clean Catch    Specimen: Urine, Clean Catch   Result Value Ref Range    Color, UA Yellow Yellow, Straw, Dark Yellow, Charlene    Appearance, UA Cloudy (A) Clear    pH, UA <=5.0 5.0 - 9.0    Specific Gravity, UA 1.007 1.003 - 1.030    Glucose, UA Negative Negative    Ketones, UA Negative Negative    Bilirubin, UA Negative Negative    Blood, UA Large (3+) (A) Negative    Protein, UA Negative Negative    Leuk Esterase, UA Large (3+) (A) Negative    Nitrite, UA Positive (A) Negative    Urobilinogen, UA 0.2 E.U./dL 0.2 - 1.0 E.U./dL   Procalcitonin    Specimen: Blood   Result Value Ref Range    Procalcitonin 0.08 0.00 - 0.25 ng/mL   CBC Auto Differential    Specimen: Blood   Result Value Ref Range    WBC 5.17 3.40 - 10.80 10*3/mm3    RBC 2.71 (L) 3.77 - 5.28 10*6/mm3    Hemoglobin 7.8 (L) 12.0 - 15.9  g/dL    Hematocrit 23.8 (L) 34.0 - 46.6 %    MCV 87.8 79.0 - 97.0 fL    MCH 28.8 26.6 - 33.0 pg    MCHC 32.8 31.5 - 35.7 g/dL    RDW 17.2 (H) 12.3 - 15.4 %    RDW-SD 55.1 (H) 37.0 - 54.0 fl    MPV 9.8 6.0 - 12.0 fL    Platelets 237 140 - 450 10*3/mm3    Neutrophil % 64.3 42.7 - 76.0 %    Lymphocyte % 18.0 (L) 19.6 - 45.3 %    Monocyte % 9.9 5.0 - 12.0 %    Eosinophil % 6.8 (H) 0.3 - 6.2 %    Basophil % 0.4 0.0 - 1.5 %    Immature Grans % 0.6 (H) 0.0 - 0.5 %    Neutrophils, Absolute 3.33 1.70 - 7.00 10*3/mm3    Lymphocytes, Absolute 0.93 0.70 - 3.10 10*3/mm3    Monocytes, Absolute 0.51 0.10 - 0.90 10*3/mm3    Eosinophils, Absolute 0.35 0.00 - 0.40 10*3/mm3    Basophils, Absolute 0.02 0.00 - 0.20 10*3/mm3    Immature Grans, Absolute 0.03 0.00 - 0.05 10*3/mm3    nRBC 0.0 0.0 - 0.2 /100 WBC     *Note: Due to a large number of results and/or encounters for the requested time period, some results have not been displayed. A complete set of results can be found in Results Review.

## 2022-08-18 ENCOUNTER — OFFICE VISIT (OUTPATIENT)
Dept: FAMILY MEDICINE CLINIC | Facility: CLINIC | Age: 62
End: 2022-08-18

## 2022-08-18 VITALS
WEIGHT: 217.1 LBS | DIASTOLIC BLOOD PRESSURE: 78 MMHG | BODY MASS INDEX: 50.24 KG/M2 | HEIGHT: 55 IN | SYSTOLIC BLOOD PRESSURE: 134 MMHG | HEART RATE: 82 BPM | OXYGEN SATURATION: 99 %

## 2022-08-18 DIAGNOSIS — I95.1 ORTHOSTATIC HYPOTENSION: Primary | ICD-10-CM

## 2022-08-18 DIAGNOSIS — N18.30 STAGE 3 CHRONIC KIDNEY DISEASE, UNSPECIFIED WHETHER STAGE 3A OR 3B CKD: ICD-10-CM

## 2022-08-18 DIAGNOSIS — E03.9 ACQUIRED HYPOTHYROIDISM: ICD-10-CM

## 2022-08-18 DIAGNOSIS — C34.91 ADENOSQUAMOUS CARCINOMA OF RIGHT LUNG: ICD-10-CM

## 2022-08-18 PROCEDURE — 99213 OFFICE O/P EST LOW 20 MIN: CPT | Performed by: GENERAL PRACTICE

## 2022-08-18 NOTE — PROGRESS NOTES
Subjective   Adilene Morgan is a 62 y.o. female.   Chief Complaint   Patient presents with   • Follow-up     Gurpreet meds   • Hypotension     Had a change in her immunotherapy for her lung cancer, has had some swelling but is a side effect of it. Had a cystoscopy in [place] and incontinence has resolved.  Is on midodrine and is no longer having any fainting.  Hypotension  This is a recurrent problem. The current episode started more than 1 month ago. The problem occurs intermittently. The problem has been resolved. Exacerbated by: Dehydration, medications. Treatments tried: Midodrine. The treatment provided significant relief.      The following portions of the patient's history were reviewed and updated as appropriate: allergies, current medications, past social history and problem list.    Outpatient Medications Prior to Visit   Medication Sig Dispense Refill   • aspirin 81 MG EC tablet Take 81 mg by mouth Daily.     • furosemide (LASIX) 20 MG tablet TAKE 1 TAB(S) ORALLY ONCE A DAY X 30 DAYS HOLD IF DIZZINESS OR LOW BLOOD PRESSURE     • levothyroxine (SYNTHROID, LEVOTHROID) 150 MCG tablet Take 150 mcg by mouth Daily.     • Lorbrena 100 MG tablet      • midodrine (PROAMATINE) 10 MG tablet Take 1 tablet by mouth 3 (Three) Times a Day Before Meals. 270 tablet 3   • multivitamin with minerals tablet tablet Take 1 tablet by mouth Daily.     • ondansetron (ZOFRAN) 8 MG tablet Take 1 tablet by mouth Every 8 (Eight) Hours As Needed for Nausea or Vomiting. 90 tablet 1   • pantoprazole (Protonix) 40 MG EC tablet Take 1 tablet by mouth Daily. 30 tablet 0   • prochlorperazine (COMPAZINE) 10 MG tablet Take 1 tablet by mouth Every 6 (Six) Hours As Needed for Nausea or Vomiting. 120 tablet 1   • promethazine (PHENERGAN) 25 MG tablet Take 1 tablet by mouth Every 6 (Six) Hours As Needed for Nausea or Vomiting. 60 tablet 1   • rivaroxaban (XARELTO) 20 MG tablet Take 20 mg by mouth Daily.     • solifenacin (VESICARE) 10 MG tablet  "TAKE 1 TABLET BY MOUTH EVERY DAY 90 tablet 0   • topiramate (TOPAMAX) 50 MG tablet TAKE 1 TABLET BY MOUTH EVERY NIGHT. (Patient taking differently: Take 25 mg by mouth Every Night.) 90 tablet 3   • Unable to find 1 each 1 (One) Time. Med Name: relizen 320mg     • venlafaxine XR (EFFEXOR-XR) 37.5 MG 24 hr capsule TAKE 1 CAPSULE BY MOUTH EVERY DAY 90 capsule 3     No facility-administered medications prior to visit.       Review of Systems  I have reviewed 12 systems with patient. Findings were negative except what is noted below and/or in history of present illness.     Objective   Visit Vitals  /78   Pulse 82   Ht 66 cm (25.98\")   Wt 98.5 kg (217 lb 1.6 oz)   LMP  (LMP Unknown)   SpO2 99%   .07 kg/m²     Physical Exam  Vitals and nursing note reviewed.   Constitutional:       General: She is not in acute distress.     Appearance: She is well-developed.   HENT:      Head: Normocephalic and atraumatic.      Nose: Nose normal.   Eyes:      General:         Right eye: No discharge.         Left eye: No discharge.      Conjunctiva/sclera: Conjunctivae normal.      Pupils: Pupils are equal, round, and reactive to light.   Neck:      Thyroid: No thyromegaly.   Cardiovascular:      Rate and Rhythm: Normal rate and regular rhythm.      Heart sounds: Normal heart sounds.   Pulmonary:      Effort: Pulmonary effort is normal.      Breath sounds: Normal breath sounds.   Lymphadenopathy:      Cervical: No cervical adenopathy.   Skin:     General: Skin is warm and dry.   Neurological:      Mental Status: She is alert and oriented to person, place, and time.         Notes brought forward are reviewed and updated if indicated.     Assessment & Plan   Problems Addressed this Visit        Cardiac and Vasculature    Orthostatic hypotension - Primary       Endocrine and Metabolic    Acquired hypothyroidism (Chronic)    Relevant Orders    CBC & Differential    Comprehensive Metabolic Panel    Lipid Panel    TSH    T4, Free "    Urinalysis With Culture If Indicated - Urine, Clean Catch       Genitourinary and Reproductive     CKD (chronic kidney disease) stage 3, GFR 30-59 ml/min (HCC)    Relevant Orders    CBC & Differential    Comprehensive Metabolic Panel    Lipid Panel    TSH    T4, Free    Urinalysis With Culture If Indicated - Urine, Clean Catch       Hematology and Neoplasia    Adenosquamous carcinoma of right lung (HCC)    Relevant Orders    CBC & Differential    Comprehensive Metabolic Panel    Lipid Panel    TSH    T4, Free    Urinalysis With Culture If Indicated - Urine, Clean Catch      Diagnoses       Codes Comments    Orthostatic hypotension    -  Primary ICD-10-CM: I95.1  ICD-9-CM: 458.0     Adenosquamous carcinoma of right lung (HCC)     ICD-10-CM: C34.91  ICD-9-CM: 162.9     Acquired hypothyroidism     ICD-10-CM: E03.9  ICD-9-CM: 244.9     Stage 3 chronic kidney disease, unspecified whether stage 3a or 3b CKD (HCC)     ICD-10-CM: N18.30  ICD-9-CM: 585.3           Continue current medications.  Follow-up with specialty care as scheduled.    No orders of the defined types were placed in this encounter.    Return in about 4 months (around 12/19/2022) for Annual physical.        This document has been electronically signed by Jen Raymundo MD on August 18, 2022 17:28 CDT

## 2022-08-26 ENCOUNTER — TELEPHONE (OUTPATIENT)
Dept: FAMILY MEDICINE CLINIC | Facility: CLINIC | Age: 62
End: 2022-08-26

## 2022-08-26 DIAGNOSIS — N39.0 UTI (URINARY TRACT INFECTION), UNCOMPLICATED: Primary | ICD-10-CM

## 2022-08-26 RX ORDER — LEVOFLOXACIN 250 MG/1
250 TABLET ORAL DAILY
Qty: 7 TABLET | Refills: 0 | Status: SHIPPED | OUTPATIENT
Start: 2022-08-26 | End: 2022-12-20

## 2022-08-26 NOTE — TELEPHONE ENCOUNTER
Pt has a bladder infection again and needs a script called in please     [facility]   Pt [phone]

## 2022-08-26 NOTE — TELEPHONE ENCOUNTER
Patients  called stating patient missed a call because she was on the phone with her oncologist in [place]. Pt's  ask to call [phone] or [phone]

## 2022-08-29 DIAGNOSIS — Z12.31 ENCOUNTER FOR SCREENING MAMMOGRAM FOR MALIGNANT NEOPLASM OF BREAST: Primary | ICD-10-CM

## 2022-09-14 DIAGNOSIS — C34.90 ADENOSQUAMOUS CARCINOMA OF LUNG, UNSPECIFIED LATERALITY: ICD-10-CM

## 2022-09-14 RX ORDER — PROMETHAZINE HYDROCHLORIDE 25 MG/1
25 TABLET ORAL EVERY 6 HOURS PRN
Qty: 60 TABLET | Refills: 1 | Status: SHIPPED | OUTPATIENT
Start: 2022-09-14 | End: 2022-12-12

## 2022-09-14 NOTE — TELEPHONE ENCOUNTER
Incoming Refill Request      Medication requested (name and dose): promethazine (PHENERGAN) 25 MG tablet    Pharmacy where request should be sent: [facility]    Additional details provided by patient:NONE    Best call back number: [phone]    Does the patient have less than a 3 day supply:  [x] Yes  [] No    Berlin Ley Rep  09/14/22, 10:14 CDT

## 2022-10-19 ENCOUNTER — TELEPHONE (OUTPATIENT)
Dept: FAMILY MEDICINE CLINIC | Facility: CLINIC | Age: 62
End: 2022-10-19

## 2022-10-19 DIAGNOSIS — T45.1X5A ANEMIA DUE TO ANTINEOPLASTIC CHEMOTHERAPY: Primary | ICD-10-CM

## 2022-10-19 DIAGNOSIS — D64.81 ANEMIA DUE TO ANTINEOPLASTIC CHEMOTHERAPY: Primary | ICD-10-CM

## 2022-10-19 RX ORDER — SODIUM CHLORIDE 9 MG/ML
250 INJECTION, SOLUTION INTRAVENOUS AS NEEDED
Status: CANCELLED | OUTPATIENT
Start: 2022-10-19

## 2022-10-19 NOTE — TELEPHONE ENCOUNTER
Pt had labs drawn today and needs Dr Raymundo to see what she says about this as Dr Raymundo works with her cancer Dr on her   Hemoglobin 7.7 and preatinine was 1.9  Pt [phone]

## 2022-10-20 ENCOUNTER — DOCUMENTATION (OUTPATIENT)
Dept: FAMILY MEDICINE CLINIC | Facility: CLINIC | Age: 62
End: 2022-10-20

## 2022-10-20 ENCOUNTER — INFUSION (OUTPATIENT)
Dept: ONCOLOGY | Facility: HOSPITAL | Age: 62
End: 2022-10-20

## 2022-10-20 VITALS
TEMPERATURE: 97.9 F | OXYGEN SATURATION: 96 % | HEART RATE: 89 BPM | SYSTOLIC BLOOD PRESSURE: 142 MMHG | RESPIRATION RATE: 18 BRPM | DIASTOLIC BLOOD PRESSURE: 82 MMHG

## 2022-10-20 DIAGNOSIS — D64.81 ANEMIA DUE TO ANTINEOPLASTIC CHEMOTHERAPY: Primary | ICD-10-CM

## 2022-10-20 DIAGNOSIS — T45.1X5A ANEMIA DUE TO ANTINEOPLASTIC CHEMOTHERAPY: Primary | ICD-10-CM

## 2022-10-20 DIAGNOSIS — N39.41 URGE INCONTINENCE: ICD-10-CM

## 2022-10-20 LAB
ABO GROUP BLD: NORMAL
BLD GP AB SCN SERPL QL: NEGATIVE
Lab: NORMAL
RH BLD: POSITIVE
T&S EXPIRATION DATE: NORMAL
WHOLE BLOOD HOLD SPECIMEN: NORMAL

## 2022-10-20 PROCEDURE — 86900 BLOOD TYPING SEROLOGIC ABO: CPT

## 2022-10-20 PROCEDURE — 86901 BLOOD TYPING SEROLOGIC RH(D): CPT

## 2022-10-20 PROCEDURE — 36430 TRANSFUSION BLD/BLD COMPNT: CPT

## 2022-10-20 PROCEDURE — 86923 COMPATIBILITY TEST ELECTRIC: CPT

## 2022-10-20 PROCEDURE — 86850 RBC ANTIBODY SCREEN: CPT

## 2022-10-20 PROCEDURE — P9016 RBC LEUKOCYTES REDUCED: HCPCS

## 2022-10-20 PROCEDURE — 36415 COLL VENOUS BLD VENIPUNCTURE: CPT

## 2022-10-20 RX ORDER — SODIUM CHLORIDE 9 MG/ML
250 INJECTION, SOLUTION INTRAVENOUS AS NEEDED
Status: DISCONTINUED | OUTPATIENT
Start: 2022-10-20 | End: 2022-10-20 | Stop reason: HOSPADM

## 2022-10-20 RX ORDER — EZETIMIBE 10 MG/1
10 TABLET ORAL
COMMUNITY
Start: 2022-09-23

## 2022-10-20 RX ORDER — SOLIFENACIN SUCCINATE 10 MG/1
TABLET, FILM COATED ORAL
Qty: 90 TABLET | Refills: 0 | Status: SHIPPED | OUTPATIENT
Start: 2022-10-20 | End: 2023-01-18

## 2022-10-20 NOTE — PROGRESS NOTES
The risks and benefits of blood transfusion was reviewed with Adilene and she understands and agrees to proceed.

## 2022-11-17 ENCOUNTER — TELEPHONE (OUTPATIENT)
Dept: FAMILY MEDICINE CLINIC | Facility: CLINIC | Age: 62
End: 2022-11-17

## 2022-11-17 ENCOUNTER — LAB (OUTPATIENT)
Dept: LAB | Facility: HOSPITAL | Age: 62
End: 2022-11-17

## 2022-11-17 DIAGNOSIS — R39.9 UTI SYMPTOMS: ICD-10-CM

## 2022-11-17 DIAGNOSIS — R39.9 UTI SYMPTOMS: Primary | ICD-10-CM

## 2022-11-17 LAB
BACTERIA UR QL AUTO: ABNORMAL /HPF
BILIRUB UR QL STRIP: NEGATIVE
CLARITY UR: ABNORMAL
COLOR UR: YELLOW
GLUCOSE UR STRIP-MCNC: NEGATIVE MG/DL
HGB UR QL STRIP.AUTO: ABNORMAL
HYALINE CASTS UR QL AUTO: ABNORMAL /LPF
KETONES UR QL STRIP: NEGATIVE
LEUKOCYTE ESTERASE UR QL STRIP.AUTO: ABNORMAL
NITRITE UR QL STRIP: NEGATIVE
PH UR STRIP.AUTO: 5.5 [PH] (ref 5–9)
PROT UR QL STRIP: ABNORMAL
RBC # UR STRIP: ABNORMAL /HPF
REF LAB TEST METHOD: ABNORMAL
SP GR UR STRIP: 1.01 (ref 1–1.03)
SQUAMOUS #/AREA URNS HPF: ABNORMAL /HPF
UROBILINOGEN UR QL STRIP: ABNORMAL
WBC # UR STRIP: ABNORMAL /HPF

## 2022-11-17 PROCEDURE — 87086 URINE CULTURE/COLONY COUNT: CPT

## 2022-11-17 PROCEDURE — 87186 SC STD MICRODIL/AGAR DIL: CPT

## 2022-11-17 PROCEDURE — 87088 URINE BACTERIA CULTURE: CPT

## 2022-11-17 PROCEDURE — 81001 URINALYSIS AUTO W/SCOPE: CPT

## 2022-11-17 RX ORDER — SULFAMETHOXAZOLE AND TRIMETHOPRIM 400; 80 MG/1; MG/1
1 TABLET ORAL 2 TIMES DAILY
Qty: 14 TABLET | Refills: 0 | Status: SHIPPED | OUTPATIENT
Start: 2022-11-17 | End: 2022-12-20

## 2022-11-17 NOTE — TELEPHONE ENCOUNTER
Talked with patient, advised that I have put in a UA, Pt can go to the lab anytime and they will collect, We will call her with results as soon as we get them. Patient voiced understanding.

## 2022-11-17 NOTE — TELEPHONE ENCOUNTER
Patient called wanting to know if Dr. Raymundo could call her in something for a UTI. Patient said has been having burning and frequent urinating for the past 4 days. Please advise [phone]    [facility]

## 2022-11-19 LAB — BACTERIA SPEC AEROBE CULT: ABNORMAL

## 2022-12-11 DIAGNOSIS — C34.90 ADENOSQUAMOUS CARCINOMA OF LUNG, UNSPECIFIED LATERALITY: ICD-10-CM

## 2022-12-12 RX ORDER — PROMETHAZINE HYDROCHLORIDE 25 MG/1
TABLET ORAL
Qty: 60 TABLET | Refills: 1 | Status: SHIPPED | OUTPATIENT
Start: 2022-12-12 | End: 2023-03-30

## 2022-12-14 ENCOUNTER — LAB (OUTPATIENT)
Dept: LAB | Facility: HOSPITAL | Age: 62
End: 2022-12-14

## 2022-12-14 DIAGNOSIS — E03.9 ACQUIRED HYPOTHYROIDISM: ICD-10-CM

## 2022-12-14 DIAGNOSIS — C34.91 ADENOSQUAMOUS CARCINOMA OF RIGHT LUNG: ICD-10-CM

## 2022-12-14 DIAGNOSIS — N18.30 STAGE 3 CHRONIC KIDNEY DISEASE, UNSPECIFIED WHETHER STAGE 3A OR 3B CKD: ICD-10-CM

## 2022-12-14 LAB
ALBUMIN SERPL-MCNC: 4.4 G/DL (ref 3.5–5.2)
ALBUMIN/GLOB SERPL: 1.4 G/DL
ALP SERPL-CCNC: 74 U/L (ref 39–117)
ALT SERPL W P-5'-P-CCNC: 22 U/L (ref 1–33)
ANION GAP SERPL CALCULATED.3IONS-SCNC: 14.9 MMOL/L (ref 5–15)
AST SERPL-CCNC: 43 U/L (ref 1–32)
BASOPHILS # BLD AUTO: 0.02 10*3/MM3 (ref 0–0.2)
BASOPHILS NFR BLD AUTO: 0.4 % (ref 0–1.5)
BILIRUB SERPL-MCNC: <0.2 MG/DL (ref 0–1.2)
BUN SERPL-MCNC: 41 MG/DL (ref 8–23)
BUN/CREAT SERPL: 17.4 (ref 7–25)
CALCIUM SPEC-SCNC: 9.1 MG/DL (ref 8.6–10.5)
CHLORIDE SERPL-SCNC: 99 MMOL/L (ref 98–107)
CHOLEST SERPL-MCNC: 299 MG/DL (ref 0–200)
CO2 SERPL-SCNC: 22.1 MMOL/L (ref 22–29)
CREAT SERPL-MCNC: 2.36 MG/DL (ref 0.57–1)
DEPRECATED RDW RBC AUTO: 48 FL (ref 37–54)
EGFRCR SERPLBLD CKD-EPI 2021: 22.6 ML/MIN/1.73
EOSINOPHIL # BLD AUTO: 0.11 10*3/MM3 (ref 0–0.4)
EOSINOPHIL NFR BLD AUTO: 2.3 % (ref 0.3–6.2)
ERYTHROCYTE [DISTWIDTH] IN BLOOD BY AUTOMATED COUNT: 15.7 % (ref 12.3–15.4)
GLOBULIN UR ELPH-MCNC: 3.2 GM/DL
GLUCOSE SERPL-MCNC: 101 MG/DL (ref 65–99)
HCT VFR BLD AUTO: 21.6 % (ref 34–46.6)
HDLC SERPL-MCNC: 51 MG/DL (ref 40–60)
HGB BLD-MCNC: 7 G/DL (ref 12–15.9)
IMM GRANULOCYTES # BLD AUTO: 0.04 10*3/MM3 (ref 0–0.05)
IMM GRANULOCYTES NFR BLD AUTO: 0.8 % (ref 0–0.5)
LDLC SERPL CALC-MCNC: 195 MG/DL (ref 0–100)
LDLC/HDLC SERPL: 3.8 {RATIO}
LYMPHOCYTES # BLD AUTO: 0.64 10*3/MM3 (ref 0.7–3.1)
LYMPHOCYTES NFR BLD AUTO: 13.6 % (ref 19.6–45.3)
MCH RBC QN AUTO: 27.5 PG (ref 26.6–33)
MCHC RBC AUTO-ENTMCNC: 32.4 G/DL (ref 31.5–35.7)
MCV RBC AUTO: 84.7 FL (ref 79–97)
MONOCYTES # BLD AUTO: 0.75 10*3/MM3 (ref 0.1–0.9)
MONOCYTES NFR BLD AUTO: 15.9 % (ref 5–12)
NEUTROPHILS NFR BLD AUTO: 3.16 10*3/MM3 (ref 1.7–7)
NEUTROPHILS NFR BLD AUTO: 67 % (ref 42.7–76)
NRBC BLD AUTO-RTO: 0 /100 WBC (ref 0–0.2)
PLATELET # BLD AUTO: 182 10*3/MM3 (ref 140–450)
PMV BLD AUTO: 10.6 FL (ref 6–12)
POTASSIUM SERPL-SCNC: 4.2 MMOL/L (ref 3.5–5.2)
PROT SERPL-MCNC: 7.6 G/DL (ref 6–8.5)
RBC # BLD AUTO: 2.55 10*6/MM3 (ref 3.77–5.28)
SODIUM SERPL-SCNC: 136 MMOL/L (ref 136–145)
T4 FREE SERPL-MCNC: 1.07 NG/DL (ref 0.93–1.7)
TRIGL SERPL-MCNC: 271 MG/DL (ref 0–150)
TSH SERPL DL<=0.05 MIU/L-ACNC: 2.15 UIU/ML (ref 0.27–4.2)
VLDLC SERPL-MCNC: 53 MG/DL (ref 5–40)
WBC NRBC COR # BLD: 4.72 10*3/MM3 (ref 3.4–10.8)

## 2022-12-14 PROCEDURE — 84439 ASSAY OF FREE THYROXINE: CPT | Performed by: GENERAL PRACTICE

## 2022-12-14 PROCEDURE — 36415 COLL VENOUS BLD VENIPUNCTURE: CPT

## 2022-12-14 PROCEDURE — 84443 ASSAY THYROID STIM HORMONE: CPT | Performed by: GENERAL PRACTICE

## 2022-12-14 PROCEDURE — 80053 COMPREHEN METABOLIC PANEL: CPT

## 2022-12-14 PROCEDURE — 85025 COMPLETE CBC W/AUTO DIFF WBC: CPT

## 2022-12-14 PROCEDURE — 80061 LIPID PANEL: CPT

## 2022-12-15 ENCOUNTER — TELEPHONE (OUTPATIENT)
Dept: FAMILY MEDICINE CLINIC | Facility: CLINIC | Age: 62
End: 2022-12-15
Payer: COMMERCIAL

## 2022-12-15 ENCOUNTER — LAB (OUTPATIENT)
Dept: LAB | Facility: HOSPITAL | Age: 62
End: 2022-12-15

## 2022-12-15 DIAGNOSIS — N18.30 STAGE 3 CHRONIC KIDNEY DISEASE, UNSPECIFIED WHETHER STAGE 3A OR 3B CKD: ICD-10-CM

## 2022-12-15 DIAGNOSIS — C34.91 ADENOSQUAMOUS CARCINOMA OF RIGHT LUNG: ICD-10-CM

## 2022-12-15 DIAGNOSIS — D64.81 ANEMIA DUE TO ANTINEOPLASTIC CHEMOTHERAPY: ICD-10-CM

## 2022-12-15 DIAGNOSIS — E03.9 ACQUIRED HYPOTHYROIDISM: ICD-10-CM

## 2022-12-15 DIAGNOSIS — T45.1X5A ANEMIA DUE TO ANTINEOPLASTIC CHEMOTHERAPY: ICD-10-CM

## 2022-12-15 DIAGNOSIS — C34.91 ADENOSQUAMOUS CARCINOMA OF RIGHT LUNG: Primary | ICD-10-CM

## 2022-12-15 PROCEDURE — 87086 URINE CULTURE/COLONY COUNT: CPT

## 2022-12-15 PROCEDURE — 81001 URINALYSIS AUTO W/SCOPE: CPT

## 2022-12-15 PROCEDURE — 87147 CULTURE TYPE IMMUNOLOGIC: CPT

## 2022-12-15 RX ORDER — SODIUM CHLORIDE 9 MG/ML
250 INJECTION, SOLUTION INTRAVENOUS AS NEEDED
Status: CANCELLED | OUTPATIENT
Start: 2022-12-15

## 2022-12-16 ENCOUNTER — INFUSION (OUTPATIENT)
Dept: ONCOLOGY | Facility: HOSPITAL | Age: 62
End: 2022-12-16

## 2022-12-16 VITALS
SYSTOLIC BLOOD PRESSURE: 149 MMHG | TEMPERATURE: 98 F | DIASTOLIC BLOOD PRESSURE: 73 MMHG | RESPIRATION RATE: 18 BRPM | OXYGEN SATURATION: 96 % | HEART RATE: 82 BPM

## 2022-12-16 DIAGNOSIS — C34.91 ADENOSQUAMOUS CARCINOMA OF RIGHT LUNG: ICD-10-CM

## 2022-12-16 LAB
ABO GROUP BLD: NORMAL
BACTERIA UR QL AUTO: ABNORMAL /HPF
BILIRUB UR QL STRIP: NEGATIVE
BLD GP AB SCN SERPL QL: NEGATIVE
CLARITY UR: ABNORMAL
COLOR UR: YELLOW
GLUCOSE UR STRIP-MCNC: NEGATIVE MG/DL
HGB UR QL STRIP.AUTO: ABNORMAL
HYALINE CASTS UR QL AUTO: ABNORMAL /LPF
KETONES UR QL STRIP: NEGATIVE
LEUKOCYTE ESTERASE UR QL STRIP.AUTO: ABNORMAL
Lab: NORMAL
NITRITE UR QL STRIP: NEGATIVE
PH UR STRIP.AUTO: 6.5 [PH] (ref 5–8)
PROT UR QL STRIP: ABNORMAL
RBC # UR STRIP: ABNORMAL /HPF
REF LAB TEST METHOD: ABNORMAL
RH BLD: POSITIVE
SP GR UR STRIP: 1.01 (ref 1–1.03)
SQUAMOUS #/AREA URNS HPF: ABNORMAL /HPF
T&S EXPIRATION DATE: NORMAL
UROBILINOGEN UR QL STRIP: ABNORMAL
WBC # UR STRIP: ABNORMAL /HPF

## 2022-12-16 PROCEDURE — 86850 RBC ANTIBODY SCREEN: CPT

## 2022-12-16 PROCEDURE — 36430 TRANSFUSION BLD/BLD COMPNT: CPT

## 2022-12-16 PROCEDURE — 86900 BLOOD TYPING SEROLOGIC ABO: CPT

## 2022-12-16 PROCEDURE — 86923 COMPATIBILITY TEST ELECTRIC: CPT

## 2022-12-16 PROCEDURE — P9016 RBC LEUKOCYTES REDUCED: HCPCS

## 2022-12-16 PROCEDURE — 86901 BLOOD TYPING SEROLOGIC RH(D): CPT

## 2022-12-16 RX ORDER — SODIUM CHLORIDE 9 MG/ML
250 INJECTION, SOLUTION INTRAVENOUS AS NEEDED
Status: DISCONTINUED | OUTPATIENT
Start: 2022-12-16 | End: 2022-12-16 | Stop reason: HOSPADM

## 2022-12-17 LAB — BACTERIA SPEC AEROBE CULT: ABNORMAL

## 2022-12-20 ENCOUNTER — OFFICE VISIT (OUTPATIENT)
Dept: FAMILY MEDICINE CLINIC | Facility: CLINIC | Age: 62
End: 2022-12-20

## 2022-12-20 VITALS
OXYGEN SATURATION: 99 % | SYSTOLIC BLOOD PRESSURE: 118 MMHG | WEIGHT: 210 LBS | RESPIRATION RATE: 18 BRPM | HEART RATE: 96 BPM | HEIGHT: 66 IN | DIASTOLIC BLOOD PRESSURE: 68 MMHG | BODY MASS INDEX: 33.75 KG/M2

## 2022-12-20 DIAGNOSIS — Z13.820 ENCOUNTER FOR SCREENING FOR OSTEOPOROSIS: ICD-10-CM

## 2022-12-20 DIAGNOSIS — Z00.00 ANNUAL PHYSICAL EXAM: Primary | ICD-10-CM

## 2022-12-20 DIAGNOSIS — Z13.820 SPECIAL SCREENING FOR OSTEOPOROSIS: Primary | ICD-10-CM

## 2022-12-20 DIAGNOSIS — C34.91 ADENOSQUAMOUS CARCINOMA OF RIGHT LUNG: ICD-10-CM

## 2022-12-20 DIAGNOSIS — E03.9 ACQUIRED HYPOTHYROIDISM: Chronic | ICD-10-CM

## 2022-12-20 PROCEDURE — 99396 PREV VISIT EST AGE 40-64: CPT | Performed by: GENERAL PRACTICE

## 2022-12-20 RX ORDER — TROSPIUM CHLORIDE 20 MG/1
20 TABLET, FILM COATED ORAL 2 TIMES DAILY
COMMUNITY
Start: 2022-11-09

## 2022-12-20 RX ORDER — LEVOTHYROXINE SODIUM 175 UG/1
175 TABLET ORAL DAILY
Qty: 90 TABLET | Refills: 3 | Status: SHIPPED | OUTPATIENT
Start: 2022-12-20 | End: 2023-01-10

## 2022-12-20 RX ORDER — LEVOFLOXACIN 250 MG/1
250 TABLET ORAL DAILY
Qty: 7 TABLET | Refills: 0 | Status: SHIPPED | OUTPATIENT
Start: 2022-12-20 | End: 2023-01-10

## 2022-12-20 RX ORDER — LEVOTHYROXINE SODIUM 175 UG/1
175 TABLET ORAL DAILY
Qty: 30 TABLET | Refills: 0 | Status: SHIPPED | OUTPATIENT
Start: 2022-12-20 | End: 2022-12-20 | Stop reason: SDUPTHER

## 2022-12-20 RX ORDER — TRAMADOL HYDROCHLORIDE 50 MG/1
50 TABLET ORAL EVERY 6 HOURS PRN
COMMUNITY
Start: 2022-12-09

## 2022-12-20 RX ORDER — PROPRANOLOL HYDROCHLORIDE 60 MG/1
TABLET ORAL EVERY 12 HOURS SCHEDULED
COMMUNITY
End: 2023-03-20

## 2022-12-20 NOTE — PROGRESS NOTES
Subjective   Adilene Morgan is a 62 y.o. female.     Chief Complaint   Patient presents with   • Annual Exam     M & D today        History of Present Illness     For annual wellness exam. Records reviewed. Recent labs, xrays reviewed and medications reconciled.   Due for mammogram and bone density.  Is having some chronic BX from her cancer.  Sees her oncologist tomorrow.  Recent required 2 units of blood.  Does complain of extreme fatigue.    The following portions of the patient's history were reviewed and updated as appropriate: allergies, current medications, past family and social history and problem list.    Outpatient Medications Prior to Visit   Medication Sig Dispense Refill   • aspirin 81 MG EC tablet Take 81 mg by mouth Daily.     • ezetimibe (ZETIA) 10 MG tablet Take 1 tablet by mouth every night at bedtime.     • furosemide (LASIX) 20 MG tablet TAKE 1 TAB(S) ORALLY ONCE A DAY X 30 DAYS HOLD IF DIZZINESS OR LOW BLOOD PRESSURE     • levothyroxine (SYNTHROID, LEVOTHROID) 150 MCG tablet Take 175 mcg by mouth Daily.     • midodrine (PROAMATINE) 10 MG tablet Take 1 tablet by mouth 3 (Three) Times a Day Before Meals. 270 tablet 3   • multivitamin with minerals tablet tablet Take 1 tablet by mouth Daily.     • ondansetron (ZOFRAN) 8 MG tablet Take 1 tablet by mouth Every 8 (Eight) Hours As Needed for Nausea or Vomiting. 90 tablet 1   • pantoprazole (Protonix) 40 MG EC tablet Take 1 tablet by mouth Daily. 30 tablet 0   • prochlorperazine (COMPAZINE) 10 MG tablet Take 1 tablet by mouth Every 6 (Six) Hours As Needed for Nausea or Vomiting. 120 tablet 1   • promethazine (PHENERGAN) 25 MG tablet TAKE 1 TABLET BY MOUTH EVERY 6 HOURS AS NEEDED FOR NAUSEA OR VOMITING. 60 tablet 1   • propranolol (INDERAL) 60 MG tablet Every 12 (Twelve) Hours.     • rivaroxaban (XARELTO) 20 MG tablet Take 20 mg by mouth Daily.     • topiramate (TOPAMAX) 50 MG tablet TAKE 1 TABLET BY MOUTH EVERY NIGHT. (Patient taking differently:  "Take 25 mg by mouth Every Night.) 90 tablet 3   • traMADol (ULTRAM) 50 MG tablet Take 50 mg by mouth Every 6 (Six) Hours As Needed. for pain     • trospium (SANCTURA) 20 MG tablet Take 20 mg by mouth 2 (Two) Times a Day.     • Unable to find 1 each 1 (One) Time. Med Name: relizen 320mg     • venlafaxine XR (EFFEXOR-XR) 37.5 MG 24 hr capsule TAKE 1 CAPSULE BY MOUTH EVERY DAY 90 capsule 3   • Lorbrena 100 MG tablet      • solifenacin (VESICARE) 10 MG tablet TAKE 1 TABLET BY MOUTH EVERY DAY 90 tablet 0   • levoFLOXacin (Levaquin) 250 MG tablet Take 1 tablet by mouth Daily. 7 tablet 0   • sulfamethoxazole-trimethoprim (Bactrim) 400-80 MG tablet Take 1 tablet by mouth 2 (Two) Times a Day. 14 tablet 0     No facility-administered medications prior to visit.       Review of Systems  I have reviewed 12 systems with patient. Findings were negative except what is noted below and/or in history of present illness.    Objective     Visit Vitals  /68   Pulse 96   Resp 18   Ht 167.6 cm (66\")   Wt 95.3 kg (210 lb)   LMP  (LMP Unknown)   SpO2 99%   BMI 33.89 kg/m²     Physical Exam  Vitals and nursing note reviewed.   Constitutional:       General: She is not in acute distress.     Appearance: She is well-developed.   HENT:      Head: Normocephalic and atraumatic.      Nose: Nose normal.   Eyes:      General:         Right eye: No discharge.         Left eye: No discharge.      Conjunctiva/sclera: Conjunctivae normal.      Pupils: Pupils are equal, round, and reactive to light.   Neck:      Thyroid: No thyromegaly.      Trachea: No tracheal deviation.   Cardiovascular:      Rate and Rhythm: Normal rate and regular rhythm.      Heart sounds: Normal heart sounds. No murmur heard.  Pulmonary:      Effort: Pulmonary effort is normal. No respiratory distress.      Breath sounds: Normal breath sounds. No wheezing or rales.   Chest:      Chest wall: No tenderness.   Breasts:     Right: No inverted nipple, mass, nipple discharge, skin " change or tenderness.      Left: No inverted nipple, mass, nipple discharge, skin change or tenderness.   Abdominal:      General: Bowel sounds are normal. There is no distension.      Palpations: Abdomen is soft. There is no mass.      Tenderness: There is no abdominal tenderness.      Hernia: No hernia is present.   Musculoskeletal:         General: No deformity. Normal range of motion.   Lymphadenopathy:      Cervical: No cervical adenopathy.   Skin:     General: Skin is warm and dry.      Coloration: Skin is pale.   Neurological:      Mental Status: She is alert and oriented to person, place, and time.      Deep Tendon Reflexes: Reflexes are normal and symmetric.   Psychiatric:         Behavior: Behavior normal.         Thought Content: Thought content normal.         Judgment: Judgment normal.          Notes brought forward are reviewed and updated if indicated.     Assessment & Plan   Problems Addressed this Visit        Endocrine and Metabolic    Acquired hypothyroidism (Chronic)    Relevant Medications    propranolol (INDERAL) 60 MG tablet    levothyroxine (Synthroid) 175 MCG tablet       Hematology and Neoplasia    Adenosquamous carcinoma of right lung (HCC)   Other Visit Diagnoses     Annual physical exam    -  Primary      Diagnoses       Codes Comments    Annual physical exam    -  Primary ICD-10-CM: Z00.00  ICD-9-CM: V70.0     Acquired hypothyroidism     ICD-10-CM: E03.9  ICD-9-CM: 244.9     Adenosquamous carcinoma of right lung (HCC)     ICD-10-CM: C34.91  ICD-9-CM: 162.9           Will notify regarding results. Continue current medications. See oncology as scheduled.     Age-appropriate counseling is provided.   New Medications Ordered This Visit   Medications   • levothyroxine (Synthroid) 175 MCG tablet     Sig: Take 1 tablet by mouth Daily.     Dispense:  90 tablet     Refill:  3   • levoFLOXacin (Levaquin) 250 MG tablet     Sig: Take 1 tablet by mouth Daily.     Dispense:  7 tablet     Refill:  0      Return in about 3 months (around 3/20/2023) for Recheck.        This document has been electronically signed by Jen Raymundo MD on December 20, 2022 16:59 CST

## 2023-01-03 ENCOUNTER — TELEPHONE (OUTPATIENT)
Dept: ONCOLOGY | Facility: CLINIC | Age: 63
End: 2023-01-03

## 2023-01-03 ENCOUNTER — CONSULT (OUTPATIENT)
Dept: ONCOLOGY | Facility: CLINIC | Age: 63
End: 2023-01-03
Payer: COMMERCIAL

## 2023-01-03 ENCOUNTER — LAB (OUTPATIENT)
Dept: ONCOLOGY | Facility: HOSPITAL | Age: 63
End: 2023-01-03
Payer: COMMERCIAL

## 2023-01-03 VITALS
OXYGEN SATURATION: 99 % | DIASTOLIC BLOOD PRESSURE: 86 MMHG | WEIGHT: 210 LBS | RESPIRATION RATE: 18 BRPM | BODY MASS INDEX: 33.89 KG/M2 | SYSTOLIC BLOOD PRESSURE: 175 MMHG | HEART RATE: 106 BPM | TEMPERATURE: 96.6 F

## 2023-01-03 DIAGNOSIS — D64.9 ANEMIA, UNSPECIFIED TYPE: ICD-10-CM

## 2023-01-03 DIAGNOSIS — N18.4 STAGE 4 CHRONIC KIDNEY DISEASE: ICD-10-CM

## 2023-01-03 DIAGNOSIS — C34.90 PRIMARY MALIGNANT NEOPLASM OF LUNG METASTATIC TO OTHER SITE, UNSPECIFIED LATERALITY: ICD-10-CM

## 2023-01-03 DIAGNOSIS — D64.9 ANEMIA, UNSPECIFIED TYPE: Primary | ICD-10-CM

## 2023-01-03 LAB
ALBUMIN SERPL-MCNC: 4.4 G/DL (ref 3.5–5.2)
ALBUMIN/GLOB SERPL: 1.2 G/DL
ALP SERPL-CCNC: 134 U/L (ref 39–117)
ALT SERPL W P-5'-P-CCNC: 13 U/L (ref 1–33)
ANION GAP SERPL CALCULATED.3IONS-SCNC: 12 MMOL/L (ref 5–15)
AST SERPL-CCNC: 17 U/L (ref 1–32)
BASOPHILS # BLD AUTO: 0.04 10*3/MM3 (ref 0–0.2)
BASOPHILS NFR BLD AUTO: 0.5 % (ref 0–1.5)
BILIRUB SERPL-MCNC: 0.2 MG/DL (ref 0–1.2)
BUN SERPL-MCNC: 29 MG/DL (ref 8–23)
BUN/CREAT SERPL: 17.1 (ref 7–25)
CALCIUM SPEC-SCNC: 9.6 MG/DL (ref 8.6–10.5)
CHLORIDE SERPL-SCNC: 104 MMOL/L (ref 98–107)
CO2 SERPL-SCNC: 24 MMOL/L (ref 22–29)
CREAT SERPL-MCNC: 1.7 MG/DL (ref 0.57–1)
DEPRECATED RDW RBC AUTO: 51.2 FL (ref 37–54)
EGFRCR SERPLBLD CKD-EPI 2021: 33.6 ML/MIN/1.73
EOSINOPHIL # BLD AUTO: 0.31 10*3/MM3 (ref 0–0.4)
EOSINOPHIL NFR BLD AUTO: 4.1 % (ref 0.3–6.2)
ERYTHROCYTE [DISTWIDTH] IN BLOOD BY AUTOMATED COUNT: 16.3 % (ref 12.3–15.4)
FERRITIN SERPL-MCNC: 218.3 NG/ML (ref 13–150)
FOLATE SERPL-MCNC: >20 NG/ML (ref 4.78–24.2)
GLOBULIN UR ELPH-MCNC: 3.7 GM/DL
GLUCOSE SERPL-MCNC: 87 MG/DL (ref 65–99)
HCT VFR BLD AUTO: 29.4 % (ref 34–46.6)
HGB BLD-MCNC: 9.1 G/DL (ref 12–15.9)
IMM GRANULOCYTES # BLD AUTO: 0.02 10*3/MM3 (ref 0–0.05)
IMM GRANULOCYTES NFR BLD AUTO: 0.3 % (ref 0–0.5)
IRON 24H UR-MRATE: 44 MCG/DL (ref 37–145)
IRON SATN MFR SERPL: 15 % (ref 20–50)
LYMPHOCYTES # BLD AUTO: 0.84 10*3/MM3 (ref 0.7–3.1)
LYMPHOCYTES NFR BLD AUTO: 11.1 % (ref 19.6–45.3)
MCH RBC QN AUTO: 26.6 PG (ref 26.6–33)
MCHC RBC AUTO-ENTMCNC: 31 G/DL (ref 31.5–35.7)
MCV RBC AUTO: 86 FL (ref 79–97)
MONOCYTES # BLD AUTO: 0.63 10*3/MM3 (ref 0.1–0.9)
MONOCYTES NFR BLD AUTO: 8.3 % (ref 5–12)
NEUTROPHILS NFR BLD AUTO: 5.72 10*3/MM3 (ref 1.7–7)
NEUTROPHILS NFR BLD AUTO: 75.7 % (ref 42.7–76)
NRBC BLD AUTO-RTO: 0 /100 WBC (ref 0–0.2)
PLATELET # BLD AUTO: 240 10*3/MM3 (ref 140–450)
PMV BLD AUTO: 9.8 FL (ref 6–12)
POTASSIUM SERPL-SCNC: 4.3 MMOL/L (ref 3.5–5.2)
PROT SERPL-MCNC: 8.1 G/DL (ref 6–8.5)
RBC # BLD AUTO: 3.42 10*6/MM3 (ref 3.77–5.28)
SODIUM SERPL-SCNC: 140 MMOL/L (ref 136–145)
TIBC SERPL-MCNC: 294 MCG/DL (ref 298–536)
TRANSFERRIN SERPL-MCNC: 197 MG/DL (ref 200–360)
VIT B12 BLD-MCNC: 1509 PG/ML (ref 211–946)
WBC NRBC COR # BLD: 7.56 10*3/MM3 (ref 3.4–10.8)

## 2023-01-03 PROCEDURE — 82746 ASSAY OF FOLIC ACID SERUM: CPT | Performed by: INTERNAL MEDICINE

## 2023-01-03 PROCEDURE — 99204 OFFICE O/P NEW MOD 45 MIN: CPT | Performed by: INTERNAL MEDICINE

## 2023-01-03 PROCEDURE — 83540 ASSAY OF IRON: CPT | Performed by: INTERNAL MEDICINE

## 2023-01-03 PROCEDURE — 80053 COMPREHEN METABOLIC PANEL: CPT | Performed by: INTERNAL MEDICINE

## 2023-01-03 PROCEDURE — 84165 PROTEIN E-PHORESIS SERUM: CPT | Performed by: INTERNAL MEDICINE

## 2023-01-03 PROCEDURE — 82784 ASSAY IGA/IGD/IGG/IGM EACH: CPT | Performed by: INTERNAL MEDICINE

## 2023-01-03 PROCEDURE — 82607 VITAMIN B-12: CPT | Performed by: INTERNAL MEDICINE

## 2023-01-03 PROCEDURE — 85025 COMPLETE CBC W/AUTO DIFF WBC: CPT | Performed by: INTERNAL MEDICINE

## 2023-01-03 PROCEDURE — 86334 IMMUNOFIX E-PHORESIS SERUM: CPT | Performed by: INTERNAL MEDICINE

## 2023-01-03 PROCEDURE — G0463 HOSPITAL OUTPT CLINIC VISIT: HCPCS | Performed by: INTERNAL MEDICINE

## 2023-01-03 PROCEDURE — 82728 ASSAY OF FERRITIN: CPT | Performed by: INTERNAL MEDICINE

## 2023-01-03 PROCEDURE — 84466 ASSAY OF TRANSFERRIN: CPT | Performed by: INTERNAL MEDICINE

## 2023-01-03 PROCEDURE — 83521 IG LIGHT CHAINS FREE EACH: CPT | Performed by: INTERNAL MEDICINE

## 2023-01-03 PROCEDURE — 85060 BLOOD SMEAR INTERPRETATION: CPT | Performed by: INTERNAL MEDICINE

## 2023-01-03 RX ORDER — ROSUVASTATIN CALCIUM 5 MG/1
5 TABLET, COATED ORAL DAILY
COMMUNITY
Start: 2022-12-23

## 2023-01-03 NOTE — TELEPHONE ENCOUNTER
----- Message from Lucio Betancourt MD sent at 1/3/2023  2:42 PM CST -----  Iron is low. Arrange for 3 venofer infusions.

## 2023-01-04 ENCOUNTER — TELEPHONE (OUTPATIENT)
Dept: ONCOLOGY | Facility: CLINIC | Age: 63
End: 2023-01-04
Payer: COMMERCIAL

## 2023-01-04 LAB
ALBUMIN SERPL ELPH-MCNC: 3.7 G/DL (ref 2.9–4.4)
ALBUMIN/GLOB SERPL: 0.9 {RATIO} (ref 0.7–1.7)
ALPHA1 GLOB SERPL ELPH-MCNC: 0.3 G/DL (ref 0–0.4)
ALPHA2 GLOB SERPL ELPH-MCNC: 1.1 G/DL (ref 0.4–1)
B-GLOBULIN SERPL ELPH-MCNC: 1.2 G/DL (ref 0.7–1.3)
CYTOLOGIST CVX/VAG CYTO: NORMAL
GAMMA GLOB SERPL ELPH-MCNC: 1.5 G/DL (ref 0.4–1.8)
GLOBULIN SER CALC-MCNC: 4.1 G/DL (ref 2.2–3.9)
KAPPA LC FREE SER-MCNC: 60.5 MG/L (ref 3.3–19.4)
KAPPA LC FREE/LAMBDA FREE SER: 1.66 {RATIO} (ref 0.26–1.65)
LABORATORY COMMENT REPORT: ABNORMAL
LAMBDA LC FREE SERPL-MCNC: 36.4 MG/L (ref 5.7–26.3)
M PROTEIN SERPL ELPH-MCNC: ABNORMAL G/DL
PATH INTERP BLD-IMP: NORMAL
PROT PATTERN SERPL ELPH-IMP: ABNORMAL
PROT SERPL-MCNC: 7.8 G/DL (ref 6–8.5)

## 2023-01-04 RX ORDER — DIPHENHYDRAMINE HYDROCHLORIDE 50 MG/ML
50 INJECTION INTRAMUSCULAR; INTRAVENOUS AS NEEDED
Status: CANCELLED | OUTPATIENT
Start: 2023-01-11

## 2023-01-04 RX ORDER — FAMOTIDINE 10 MG/ML
20 INJECTION, SOLUTION INTRAVENOUS AS NEEDED
Status: CANCELLED | OUTPATIENT
Start: 2023-01-11

## 2023-01-04 RX ORDER — SODIUM CHLORIDE 9 MG/ML
250 INJECTION, SOLUTION INTRAVENOUS ONCE
Status: CANCELLED | OUTPATIENT
Start: 2023-01-11

## 2023-01-04 NOTE — PROGRESS NOTES
Chief Complaint  Anemia     Subjective        Adilene Morgan presents to [facility]  History of Present Illness     This is a pleasant 62-year-old female who was seen in consultation at the request of Jen Raymundo MD for evaluation of anemia.  Patient has history of metastatic lung cancer for which he is alert.  Has remained.  I do not have any of these records available.  Over last 1 year patient's hemoglobin is progressively getting worse and she is required blood transfusion periodically.  She denies any obvious bleeding.  Denies any bright red blood per rectum or melena.  She does take Xarelto given her history of mini stroke however denies any obvious bleeding.  Over last 1 year her overall kidney function has also deteriorated.  She has stage IV chronic kidney disease and follows with nephrology.  I been asked to assist with evaluation and management of her anemia.    Objective   Vital Signs:  /86   Pulse 106   Temp 96.6 °F (35.9 °C)   Resp 18   Wt 95.3 kg (210 lb)   SpO2 99%   BMI 33.89 kg/m²   Estimated body mass index is 33.89 kg/m² as calculated from the following:    Height as of 12/20/22: 167.6 cm (66\").    Weight as of this encounter: 95.3 kg (210 lb).          Physical Exam  Vitals and nursing note reviewed.   Constitutional:       Appearance: Normal appearance.   Skin:     General: Skin is dry.      Coloration: Skin is pale.   Neurological:      General: No focal deficit present.      Mental Status: She is alert and oriented to person, place, and time. Mental status is at baseline.   Psychiatric:         Mood and Affect: Mood normal.         Behavior: Behavior normal.         Thought Content: Thought content normal.        Result Review :  The following data was reviewed by: Lucio Betancourt MD on 01/03/2023:  Common labs    Common Labs 12/2/22 12/14/22 12/14/22 12/14/22 12/14/22 1/3/23 1/3/23     1007 1007 1007 1141 1210  1210   Glucose   101 (A)    87   Glucose 100    112 (A)     BUN 43 (A)  41 (A)  40 (A)  29 (A)   Creatinine 1.9 (A)  2.36 (A)  2.4 (A)  1.70 (A)   Sodium 141  136  137  140   Potassium 3.9  4.2  4.2  4.3   Chloride 104  99  101  104   Calcium 8.6  9.1  7.9 (A)  9.6   Albumin 3.9  4.40  3.7  4.4   Total Bilirubin 0.25 (A)  <0.2  0.25 (A)  0.2   Alkaline Phosphatase 80  74  60  134 (A)   AST (SGOT) 17  43 (A)  37  17   ALT (SGPT) 14  22  23  13   WBC  4.72    7.56    Hemoglobin  7.0 (A)    9.1 (A)    Hematocrit  21.6 (A)    29.4 (A)    Platelets  182    240    Total Cholesterol    299 (A)      Triglycerides    271 (A)      HDL Cholesterol    51      LDL Cholesterol     195 (A)      (A) Abnormal value            CMP    CMP 12/2/22 12/14/22 12/14/22 1/3/23     1007 1141    Glucose  101 (A)  87   Glucose 100  112 (A)    BUN 43 (A) 41 (A) 40 (A) 29 (A)   Creatinine 1.9 (A) 2.36 (A) 2.4 (A) 1.70 (A)   eGFR  22.6 (A)  33.6 (A)   Sodium 141 136 137 140   Potassium 3.9 4.2 4.2 4.3   Chloride 104 99 101 104   Calcium 8.6 9.1 7.9 (A) 9.6   Total Protein  7.6  8.1   Albumin 3.9 4.40 3.7 4.4   Globulin  3.2  3.7   Total Bilirubin 0.25 (A) <0.2 0.25 (A) 0.2   Alkaline Phosphatase 80 74 60 134 (A)   AST (SGOT) 17 43 (A) 37 17   ALT (SGPT) 14 22 23 13   Albumin/Globulin Ratio  1.4  1.2   BUN/Creatinine Ratio  17.4  17.1   Anion Gap 12 14.9 15 (A) 12.0   (A) Abnormal value       Comments are available for some flowsheets but are not being displayed.           CBC    CBC 8/10/22 12/14/22 1/3/23   WBC 7.56 4.72 7.56   RBC 3.19 (A) 2.55 (A) 3.42 (A)   Hemoglobin 9.3 (A) 7.0 (A) 9.1 (A)   Hematocrit 27.3 (A) 21.6 (A) 29.4 (A)   MCV 85.6 84.7 86.0   MCH 29.2 27.5 26.6   MCHC 34.1 32.4 31.0 (A)   RDW 14.1 15.7 (A) 16.3 (A)   Platelets 241 182 240   (A) Abnormal value            CBC w/diff    CBC w/Diff 8/10/22 12/14/22 1/3/23   WBC 7.56 4.72 7.56   RBC 3.19 (A) 2.55 (A) 3.42 (A)   Hemoglobin 9.3 (A) 7.0 (A) 9.1 (A)   Hematocrit 27.3 (A)  21.6 (A) 29.4 (A)   MCV 85.6 84.7 86.0   MCH 29.2 27.5 26.6   MCHC 34.1 32.4 31.0 (A)   RDW 14.1 15.7 (A) 16.3 (A)   Platelets 241 182 240   Neutrophil Rel % 72.8 67.0 75.7   Immature Granulocyte Rel % 0.8 (A) 0.8 (A) 0.3   Lymphocyte Rel % 10.3 (A) 13.6 (A) 11.1 (A)   Monocyte Rel % 8.2 15.9 (A) 8.3   Eosinophil Rel % 7.4 (A) 2.3 4.1   Basophil Rel % 0.5 0.4 0.5   (A) Abnormal value            Anemia labs:      Lab 01/03/23  1210   IRON 44   IRON SATURATION 15*   TIBC 294*   TRANSFERRIN 197*   FERRITIN 218.30*       Adilene Morgan reports a pain score of 5.  Given her pain assessment as noted, treatment options were discussed and the following options were decided upon as a follow-up plan to address the patient's pain: continuation of current treatment plan for pain.  Patient screened negative for depression based on a PHQ-9 score of 0 on 1/3/2023.       Assessment and Plan   Diagnoses and all orders for this visit:    1. Anemia, unspecified type (Primary)  -     CBC & Differential; Future  -     Comprehensive Metabolic Panel; Future  -     Ferritin; Future  -     Folate; Future  -     Iron Profile; Future  -     Immunofixation, Serum; Future  -     Peripheral Blood Smear; Future  -     Immunoglobulin Free LT Chains Blood; Future  -     Protein Elec + Interp, Serum; Future  -     Vitamin B12; Future  -     CBC (No Diff); Future  -     Ferritin; Future  -     Iron Profile; Future    This is a new diagnosis for me.  Patient with normocytic anemia that is progressively getting worse over last 1 year.  Her overall kidney disease is also getting worse.  It is quite possible that her anemia is likely secondary to underlying chronic kidney disease.  However this is a diagnosis of exclusion and other causes of anemia needs to be ruled out.  I checked her CBC to see if anemia is persistent after 2 units of blood.  I will check CMP to evaluate her renal and liver function.  I checked ferritin, iron profile, folic acid and  B12 level to rule out nutritional deficiencies.    Her iron saturation came back low at 15.  She is unable to tolerate oral iron.  Due to this reason I believe she will benefit from IV iron treatment.    I had an extensive discussion with patient about diagnosis and treatment options. I recommend that we replace their iron with IV Venofer - 200 x 3 infusions.     I had an extensive discussion with the patient about risk versus benefits of IV iron treatment.    I discussed about various risks associated with IV iron such as allergic reaction, hypersensitivity reaction, headache, flushing, joint aches or pains, local IV infiltration and skin discoloration.  After our discussion the patient was in agreement in  proceeding with IV iron treatment for  anemia.    If her hemoglobin does not improve with IV iron I will start her on erythropoietin stimulating agent to keep her hemoglobin above 10.    I will also check SPEP, immunofixation, free light chains to rule out plasma cell disorder.    I will see her back in 3 weeks with CBC, ferritin, iron profile.      2. Stage 4 chronic kidney disease (HCC)    Patient follows with nephrology.  Stage IV chronic kidney disease.  Avoid any nephrotoxic agent.  Control blood pressure aggressively.    3. Primary malignant neoplasm of lung metastatic to other site, unspecified laterality (HCC)    New diagnosis for me.  Patient has metastatic lung cancer for which she is following with cancer center with [facility] in [facility].  I do not have these records available however I will ask my staff to reach out and get all the necessary records available to us.  She was on entrectinib however was changed to a different oral agent earlier in the year.  Her last scan showed overall favorable response to treatment as per patient/family.  We will obtain her records from [place] to confirm.    Discussed with patient that since they have a good relationship with the oncologist in [place] I  recommend she continue to follow their for her cancer care and I will be only responsible for managing her anemia locally.  Patient family understood and were agreeable to this plan.             Follow Up   No follow-ups on file.  Patient was given instructions and counseling regarding her condition or for health maintenance advice. Please see specific information pulled into the AVS if appropriate.

## 2023-01-05 LAB
IGA SERPL-MCNC: 411 MG/DL (ref 87–352)
IGG SERPL-MCNC: 1318 MG/DL (ref 586–1602)
IGM SERPL-MCNC: 60 MG/DL (ref 26–217)
PROT PATTERN SERPL IFE-IMP: ABNORMAL

## 2023-01-06 ENCOUNTER — DOCUMENTATION (OUTPATIENT)
Dept: ONCOLOGY | Facility: HOSPITAL | Age: 63
End: 2023-01-06
Payer: COMMERCIAL

## 2023-01-10 ENCOUNTER — OFFICE VISIT (OUTPATIENT)
Dept: CARDIOLOGY | Facility: CLINIC | Age: 63
End: 2023-01-10
Payer: COMMERCIAL

## 2023-01-10 VITALS
HEART RATE: 107 BPM | SYSTOLIC BLOOD PRESSURE: 124 MMHG | HEIGHT: 66 IN | WEIGHT: 205 LBS | OXYGEN SATURATION: 99 % | BODY MASS INDEX: 32.95 KG/M2 | DIASTOLIC BLOOD PRESSURE: 82 MMHG

## 2023-01-10 DIAGNOSIS — G45.9 TRANSIENT ISCHEMIC ATTACK (TIA): ICD-10-CM

## 2023-01-10 DIAGNOSIS — I49.3 PVC (PREMATURE VENTRICULAR CONTRACTION): Primary | ICD-10-CM

## 2023-01-10 DIAGNOSIS — I95.1 ORTHOSTATIC HYPOTENSION: ICD-10-CM

## 2023-01-10 PROCEDURE — 99214 OFFICE O/P EST MOD 30 MIN: CPT | Performed by: INTERNAL MEDICINE

## 2023-01-10 RX ORDER — MIDODRINE HYDROCHLORIDE 10 MG/1
10 TABLET ORAL
Qty: 270 TABLET | Refills: 3 | Status: SHIPPED | OUTPATIENT
Start: 2023-01-10

## 2023-01-10 RX ORDER — LEVOTHYROXINE SODIUM 0.2 MG/1
200 TABLET ORAL DAILY
COMMUNITY
End: 2023-01-11 | Stop reason: SDUPTHER

## 2023-01-10 RX ORDER — LORLATINIB 25 MG/1
75 TABLET, FILM COATED ORAL DAILY
COMMUNITY

## 2023-01-10 NOTE — PROGRESS NOTES
Adilene Morgan  62 y.o. female    07/06/2022  1. PVC (premature ventricular contraction)    2. Transient ischemic attack (TIA)    3. Orthostatic hypotension    4       mitral regurgitation    History of Present Illness:  Body mass index is 33.09 kg/m². BMI is above normal parameters. Recommendations include: exercise counseling, nutrition counseling and referral to primary care.  62 years old patient walks with the help of the walker who presented today for routine follow-up.  Echocardiogram preserved left ventricle systolic function she has moderate mitral regurgitation.  Right ventricle cavity is mildly dilated.  No significant right ventricular systolic pressure.  She was [facility] at the [facility] unfortunately she has a diagnosis of rare form of lung cancer with mets to the bone getting immunotherapy with history of stroke on oral anticoagulation and rare isolated premature ventricular complex of small R waves in lead V1.  She had a positive dizziness and dropping blood pressure started on midodrine with significant improvement she was taking 10 mg 3 times and sometimes she takes 10 mg twice a day with a significant improvement in clinical conditions.  Echo finding discussed with the patient.    Echo November 2022       •  Left ventricular ejection fraction appears to be 51 - 55%.  •  Left ventricular diastolic function is consistent with (grade I) impaired relaxation.  •  The right ventricular cavity is mildly dilated.  •  Moderate mitral valve regurgitation is present.  •  Estimated right ventricular systolic pressure from tricuspid regurgitation is normal (<35 mmHg).    STRESS TEST 7/2018  Patient exercised according to Leo protocol for 8 minutes and 24 second with METs achieved 10.  This reflects normal functional capacity.  Baseline heart rate 64 and increased to 164 bpm representing 100% of age matched projected heart rate.  The patient base line blood pressure 144/79 and increased to 195 representing  hypertensive blood pressure response.  Test was stopped due to target heart rate achieved and leg discomfort.  No ST-T wave changes suggesting of ischemia.  No arrhythmia noted.     Echo 7/2018  · Mild mitral valve regurgitation is present  · Mild tricuspid valve regurgitation is present.  · Left ventricular systolic function is normal. Estimated EF = 60%.  · Left atrial cavity size is borderline dilated.     7/2018  Holter forInterpretations 48-hour      Indictation palpitations     Description     Rhythm is sinus with average heart rate of 67 minimum 45 the minimum heart to beat.  5:30 and 6 AM with a maximum 130 bpm.  No significant bradyarrhythmia or any significant pause noted.  There are premature ventricular complex total #7416 with some bigeminy and 115 couplet  rare3 beat runs of nonsustained wide complex rhythm with a maximum heart rate range from 130 to 150 bpm.  No sustained ventricular or supraventricular rhythm noted.      SUBJECTIVE:    Allergies   Allergen Reactions   • Azithromycin    • Cefaclor Diarrhea and Nausea Only     Other reaction(s): Nausea Only   • Cephalexin Unknown - High Severity   • Lipitor [Atorvastatin] Nausea And Vomiting   • Penicillin G Unknown - High Severity   • Penicillins    • Zocor [Simvastatin] Nausea And Vomiting         Past Medical History:   Diagnosis Date   • Achilles bursitis    • Achilles tendinitis    • Acquired hypothyroidism    • Allergic rhinitis    • Allergy to meat     alphagal   • Allergy to milk products    • Arrhythmia    • Asthmatic bronchitis    • Asthmatic bronchitis    • Bone spur    • Calcaneal spur    • Cancer (HCC)    • Depressive disorder    • Family history of thyroid problem    • Herpes simplex    • Hypermetropia    • Hypothyroidism    • Menopausal flushing    • Menopause    • Migraine    • Otalgia    • Pneumonia    • Presbyopia    • Stroke (HCC)    • Trochanteric bursitis    • UTI (urinary tract infection) 07/03/2018   • Ventricular premature beats           Past Surgical History:   Procedure Laterality Date   • COLONOSCOPY N/A 2/15/2017    Procedure: COLONOSCOPY;  Surgeon: Lupillo Ugarte MD;  Location: [facility] ENDOSCOPY;  Service:    • COSMETIC SURGERY      plastic surgery to face x 4 r/t trauma   • ENDOSCOPY N/A 5/31/2022    Procedure: ESOPHAGOGASTRODUODENOSCOPY;  Surgeon: Lincoln Banegas MD;  Location: [facility] ENDOSCOPY;  Service: Gastroenterology;  Laterality: N/A;   • LAPAROSCOPIC CHOLECYSTECTOMY  12/10/1995    Cholecystectomy, laparoscopic (1)      • OTHER SURGICAL HISTORY      Head surgery procedure (1)    plastic surgery on the face    • OTHER SURGICAL HISTORY  10/17/2014    Repair Superficial Wound TR-EXT 2.5 < CM 10150 (1)            Family History   Problem Relation Age of Onset   • Stroke Mother    • Diabetes Father    • Heart disease Father    • Hypertension Father    • Thyroid disease Sister    • Breast cancer Maternal Aunt    • Cancer Neg Hx         multiple in mothers family         Social History     Socioeconomic History   • Marital status:    Tobacco Use   • Smoking status: Never   • Smokeless tobacco: Never   Vaping Use   • Vaping Use: Never used   Substance and Sexual Activity   • Alcohol use: No   • Drug use: No   • Sexual activity: Defer         Current Outpatient Medications   Medication Sig Dispense Refill   • aspirin 81 MG EC tablet Take 81 mg by mouth Daily.     • ezetimibe (ZETIA) 10 MG tablet Take 1 tablet by mouth every night at bedtime.     • levothyroxine (SYNTHROID, LEVOTHROID) 200 MCG tablet Take 200 mcg by mouth Daily.     • Lorlatinib (Lorbrena) 25 MG tablet Take 75 mg by mouth. Will start back 1/23 due to being held currently     • midodrine (PROAMATINE) 10 MG tablet Take 1 tablet by mouth 3 (Three) Times a Day Before Meals. 270 tablet 3   • multivitamin with minerals tablet tablet Take 1 tablet by mouth Daily.     • ondansetron (ZOFRAN) 8 MG tablet Take 1 tablet by mouth Every 8 (Eight) Hours As Needed for Nausea  or Vomiting. 90 tablet 1   • pantoprazole (Protonix) 40 MG EC tablet Take 1 tablet by mouth Daily. 30 tablet 0   • prochlorperazine (COMPAZINE) 10 MG tablet Take 1 tablet by mouth Every 6 (Six) Hours As Needed for Nausea or Vomiting. 120 tablet 1   • promethazine (PHENERGAN) 25 MG tablet TAKE 1 TABLET BY MOUTH EVERY 6 HOURS AS NEEDED FOR NAUSEA OR VOMITING. 60 tablet 1   • propranolol (INDERAL) 60 MG tablet Every 12 (Twelve) Hours.     • rivaroxaban (XARELTO) 20 MG tablet Take 20 mg by mouth Daily.     • rosuvastatin (CRESTOR) 5 MG tablet Take 5 mg by mouth Daily.     • solifenacin (VESICARE) 10 MG tablet TAKE 1 TABLET BY MOUTH EVERY DAY 90 tablet 0   • topiramate (TOPAMAX) 50 MG tablet TAKE 1 TABLET BY MOUTH EVERY NIGHT. (Patient taking differently: Take 25 mg by mouth Every Night.) 90 tablet 3   • traMADol (ULTRAM) 50 MG tablet Take 50 mg by mouth Every 6 (Six) Hours As Needed. for pain     • trospium (SANCTURA) 20 MG tablet Take 20 mg by mouth 2 (Two) Times a Day.     • Unable to find 1 each 1 (One) Time. Med Name: relizen 320mg     • venlafaxine XR (EFFEXOR-XR) 37.5 MG 24 hr capsule TAKE 1 CAPSULE BY MOUTH EVERY DAY 90 capsule 3     No current facility-administered medications for this visit.           Review of Systems:   No significant change was noted in the review of system she walks/ambulate with the help of walker  Constitutional:  Denies recent weight loss, weight gain.  Positive generalized weakness     HENT:  Denies any hearing loss, epistaxis      Eyes: No blurring    Respiratory: Lung cancer getting immunotherapy     Cardiovascular: See H&P    Gastrointestinal:  Denies change in bowel habits and dyspepsia    Endocrine: Negative for cold intolerance, heat intolerance, polydipsia    Genitourinary: Negative.      Musculoskeletal: History of osteoarthritis    Skin:  Denies rashes, or skin lesions.     Allergic/Immunologic: Negative.  Negative for environmental allergies    Neurological: History of  stroke/TIA on long-term oral anticoagulation    Hematological: Denies any food allergies, seasonal allergies    Psychiatric/Behavioral: Denies any history of depression        OBJECTIVE:    /82 (BP Location: Left arm, Patient Position: Sitting, Cuff Size: Adult)   Pulse 107   Ht 167.6 cm (66\")   Wt 93 kg (205 lb)   LMP  (LMP Unknown)   SpO2 99%   BMI 33.09 kg/m²     Physical Exam:     Constitutional: Cooperative, alert and oriented, well-developed, well-nourished, in no acute distress.     HENT:   Head: Normocephalic, conjunctive is a pink, thyroid is nonpalpable no carotid bruit and trachea central.     Cardiovascular: Regular rhythm, S1 and S2 normal, no S3 or S4. Apical impulse not displaced. No murmurs    Pulmonary/Chest: Chest: No chest wall tenderness no rales and wheezing    Abdominal: Abdomen soft, bowel sounds normoactive, no masses,    Musculoskeletal: No deformities, clubbing, cyanosis, erythema.  Neurological: No gross motor or sensory deficits noted    Skin: Warm and dry to the touch, no apparent skin lesions .     Psychiatric: He has a normal mood and affect. His behavior is normal        Procedures      Lab Results   Component Value Date    WBC 7.56 01/03/2023    HGB 9.1 (L) 01/03/2023    HCT 29.4 (L) 01/03/2023    MCV 86.0 01/03/2023     01/03/2023     Lab Results   Component Value Date    GLUCOSE 87 01/03/2023    BUN 29 (H) 01/03/2023    CREATININE 1.70 (H) 01/03/2023    EGFRIFNONA 41 (L) 12/14/2021    EGFRIFAFRI 55 10/22/2021    BCR 17.1 01/03/2023    CO2 24.0 01/03/2023    CALCIUM 9.6 01/03/2023    PROTENTOTREF 7.8 01/03/2023    ALBUMIN 4.4 01/03/2023    ALBUMIN 3.7 01/03/2023    LABIL2 0.9 01/03/2023    AST 17 01/03/2023    ALT 13 01/03/2023     Lab Results   Component Value Date    CHOL 299 (H) 12/14/2022    CHOL 170 05/10/2022    CHOL 148 12/14/2021     Lab Results   Component Value Date    TRIG 271 (H) 12/14/2022    TRIG 131 05/10/2022    TRIG 120 12/14/2021     Lab Results    Component Value Date    HDL 51 12/14/2022    HDL 61 (H) 05/10/2022    HDL 50 12/14/2021     No components found for: LDLCALC  Lab Results   Component Value Date     (H) 12/14/2022    LDL 86 05/10/2022    LDL 77 12/14/2021     No results found for: HDLLDLRATIO  No components found for: CHOLHDL  Lab Results   Component Value Date    HGBA1C 4.80 05/10/2022     Lab Results   Component Value Date    TSH 2.150 12/14/2022    R6WDQRN 167.0 07/25/2017           ASSESSMENT AND PLAN:      #1 orthostatic hypotension    Significant provement in orthostasis after initiation of midodrine she is a pleased with clinical outcome she able to ambulate more      Patient was counseled educated regarding risk factor lifestyle modification.    Encouraged the patient to increase water intake at least 60 to 64 ounces in 24-hour with a salt palatable to taste    2 exercises were demonstrated to increase the tone of upper and lower extremity    3 patient was counseled educated regarding taking some time in changing the postures    4 Mario stocking was recommended   if you have any further episodes in spite of moderate in 10 mg p.o. 3 times daily and recommendations discussed above droxidopa can be contemplated      #2 premature ventricular complex no further records continue beta-blocker      #3 mitral regurgitation repeat echo revealed moderate mitral regurgitation there is no surgical indication at this stage we will continue clinical surveillance      #4 TIA on long-term oral anticoagulation      Metastatic lung cancer per patient rare form receiving immunotherapy in the [facility]        I spent 16 minutes caring for Adilene on this date of service. This time includes time spent by me of counseling/coordination of care as relates to the presenting problem and any ordered procedures/tests as outlined above.           This document has been electronically signed by Clement Palacio MD on January 10, 2023 14:58 CST      Diagnoses  and all orders for this visit:    1. PVC (premature ventricular contraction) (Primary)    2. Transient ischemic attack (TIA)    3. Orthostatic hypotension          Clement Palacio MD  1/10/2023  14:58 CST

## 2023-01-11 DIAGNOSIS — E03.9 ACQUIRED HYPOTHYROIDISM: Chronic | ICD-10-CM

## 2023-01-11 RX ORDER — LEVOTHYROXINE SODIUM 0.2 MG/1
200 TABLET ORAL DAILY
Qty: 30 TABLET | Refills: 0 | Status: SHIPPED | OUTPATIENT
Start: 2023-01-11 | End: 2023-02-07

## 2023-01-11 RX ORDER — LEVOTHYROXINE SODIUM 175 MCG
TABLET ORAL
Qty: 30 TABLET | Refills: 0 | OUTPATIENT
Start: 2023-01-11

## 2023-01-13 ENCOUNTER — HOSPITAL ENCOUNTER (OUTPATIENT)
Dept: ULTRASOUND IMAGING | Facility: HOSPITAL | Age: 63
Discharge: HOME | End: 2023-01-13
Payer: COMMERCIAL

## 2023-01-13 ENCOUNTER — HOSPITAL ENCOUNTER (OUTPATIENT)
Dept: INTERVENTIONAL RADIOLOGY/VASCULAR | Facility: HOSPITAL | Age: 63
Discharge: HOME | End: 2023-01-13
Payer: COMMERCIAL

## 2023-01-13 ENCOUNTER — INFUSION (OUTPATIENT)
Dept: ONCOLOGY | Facility: HOSPITAL | Age: 63
End: 2023-01-13
Payer: COMMERCIAL

## 2023-01-13 VITALS
HEART RATE: 94 BPM | RESPIRATION RATE: 20 BRPM | DIASTOLIC BLOOD PRESSURE: 71 MMHG | TEMPERATURE: 97.2 F | SYSTOLIC BLOOD PRESSURE: 147 MMHG

## 2023-01-13 DIAGNOSIS — D64.9 ANEMIA, UNSPECIFIED TYPE: ICD-10-CM

## 2023-01-13 DIAGNOSIS — D64.9 ANEMIA, UNSPECIFIED TYPE: Primary | ICD-10-CM

## 2023-01-13 PROCEDURE — 36410 VNPNXR 3YR/> PHY/QHP DX/THER: CPT

## 2023-01-13 PROCEDURE — C1751 CATH, INF, PER/CENT/MIDLINE: HCPCS

## 2023-01-13 PROCEDURE — 25010000002 IRON SUCROSE PER 1 MG: Performed by: INTERNAL MEDICINE

## 2023-01-13 PROCEDURE — 96365 THER/PROPH/DIAG IV INF INIT: CPT | Performed by: INTERNAL MEDICINE

## 2023-01-13 PROCEDURE — 76937 US GUIDE VASCULAR ACCESS: CPT

## 2023-01-13 RX ORDER — SODIUM CHLORIDE 9 MG/ML
250 INJECTION, SOLUTION INTRAVENOUS ONCE
Status: CANCELLED | OUTPATIENT
Start: 2023-01-13

## 2023-01-13 RX ORDER — FAMOTIDINE 10 MG/ML
20 INJECTION, SOLUTION INTRAVENOUS AS NEEDED
Status: CANCELLED | OUTPATIENT
Start: 2023-01-13

## 2023-01-13 RX ORDER — FAMOTIDINE 10 MG/ML
20 INJECTION, SOLUTION INTRAVENOUS AS NEEDED
Status: DISCONTINUED | OUTPATIENT
Start: 2023-01-13 | End: 2023-01-13 | Stop reason: HOSPADM

## 2023-01-13 RX ORDER — DIPHENHYDRAMINE HYDROCHLORIDE 50 MG/ML
50 INJECTION INTRAMUSCULAR; INTRAVENOUS AS NEEDED
Status: DISCONTINUED | OUTPATIENT
Start: 2023-01-13 | End: 2023-01-13 | Stop reason: HOSPADM

## 2023-01-13 RX ORDER — LANOLIN ALCOHOL/MO/W.PET/CERES
800 CREAM (GRAM) TOPICAL DAILY
COMMUNITY

## 2023-01-13 RX ORDER — SODIUM CHLORIDE 9 MG/ML
250 INJECTION, SOLUTION INTRAVENOUS ONCE
Status: COMPLETED | OUTPATIENT
Start: 2023-01-13 | End: 2023-01-13

## 2023-01-13 RX ORDER — DIPHENHYDRAMINE HYDROCHLORIDE 50 MG/ML
50 INJECTION INTRAMUSCULAR; INTRAVENOUS AS NEEDED
Status: CANCELLED | OUTPATIENT
Start: 2023-01-13

## 2023-01-13 RX ORDER — FUROSEMIDE 20 MG/1
20 TABLET ORAL DAILY
COMMUNITY
End: 2023-03-20

## 2023-01-13 RX ORDER — CHOLECALCIFEROL (VITAMIN D3) 125 MCG
500 CAPSULE ORAL DAILY
COMMUNITY

## 2023-01-13 RX ADMIN — SODIUM CHLORIDE 250 ML: 9 INJECTION, SOLUTION INTRAVENOUS at 09:24

## 2023-01-13 RX ADMIN — IRON SUCROSE 200 MG: 20 INJECTION, SOLUTION INTRAVENOUS at 09:24

## 2023-01-13 NOTE — PROGRESS NOTES
TWO PATIENT IDENTIFIERS WERE USED. THE PATIENT WAS DRAPED WITH A FULL BODY DRAPE AND THE PATIENT'S RIGHT ARM WAS PREPPED WITH CHLORA PREP. ULTRASOUND WAS USED TO LOCALIZE THERIGHT BASILIC VEIN. SUBCUTANEOUS TISSUE AT THE CATHETER SITE WAS INFILTRATED WITH 2% LIDOCAINE. UNDER ULTRASOUND GUIDANCE, THE VEIN WAS ACCESSED WITH A 21 GAUGE  NEEDLE. AN 0.018 WIRE WAS THEN THREADED THROUGH THE NEEDLE. THE 21 GAUGE NEEDLE WAS REMOVED AND A 4 Portuguese SHEATH WAS PLACED OVER THE WIRE INTO THE VEIN.THE MIDLINE CATHETER WAS TRIMMED TO 20CM. THE MIDLINE CATHETER WAS THEN PLACED OVER THE WIRE INTO THE VEIN, THE SHEATH WAS PEELED AWAY, WIRE WAS REMOVED. CATHETER WAS FLUSHED WITH NORMAL SALINE AND CATHETER TIP APPLIED. BIOPATCH PLACED. CATHETER SECURED WITH STAT LOCK AND TEGADERM. PATIENT TOLERATED PROCEDURE WELL. THIS WAS DONE IN THE ANGIOSUITE      IMPRESSION:SUCCESSFUL PLACEMENT OF SINGLE LUMEN MIDLINE.           Natalie Santana  1/13/2023  09:17 CST

## 2023-01-16 ENCOUNTER — TRANSCRIBE ORDERS (OUTPATIENT)
Dept: LAB | Facility: HOSPITAL | Age: 63
End: 2023-01-16
Payer: COMMERCIAL

## 2023-01-16 ENCOUNTER — LAB (OUTPATIENT)
Dept: LAB | Facility: HOSPITAL | Age: 63
End: 2023-01-16
Payer: COMMERCIAL

## 2023-01-16 DIAGNOSIS — I83.893 SYMPTOMATIC VARICOSE VEINS OF BOTH LOWER EXTREMITIES: Primary | ICD-10-CM

## 2023-01-16 DIAGNOSIS — N05.9 RENAL GLOMERULAR DISEASE: ICD-10-CM

## 2023-01-16 DIAGNOSIS — D64.9 ANEMIA, UNSPECIFIED TYPE: ICD-10-CM

## 2023-01-16 DIAGNOSIS — N18.32 CHRONIC KIDNEY DISEASE (CKD) STAGE G3B/A1, MODERATELY DECREASED GLOMERULAR FILTRATION RATE (GFR) BETWEEN 30-44 ML/MIN/1.73 SQUARE METER AND ALBUMINURIA CREATININE RATIO LESS THAN 30 MG/G (CMS/H*: ICD-10-CM

## 2023-01-16 DIAGNOSIS — Z87.440 PERSONAL HISTORY OF URINARY (TRACT) INFECTION: ICD-10-CM

## 2023-01-16 LAB
FERRITIN SERPL-MCNC: 411.8 NG/ML (ref 13–150)
IRON 24H UR-MRATE: 41 MCG/DL (ref 37–145)
IRON SATN MFR SERPL: 15 % (ref 20–50)
TIBC SERPL-MCNC: 271 MCG/DL (ref 298–536)
TRANSFERRIN SERPL-MCNC: 182 MG/DL (ref 200–360)

## 2023-01-16 PROCEDURE — 82306 VITAMIN D 25 HYDROXY: CPT | Performed by: INTERNAL MEDICINE

## 2023-01-16 PROCEDURE — 80053 COMPREHEN METABOLIC PANEL: CPT | Performed by: INTERNAL MEDICINE

## 2023-01-16 PROCEDURE — 36415 COLL VENOUS BLD VENIPUNCTURE: CPT | Performed by: INTERNAL MEDICINE

## 2023-01-16 PROCEDURE — 85025 COMPLETE CBC W/AUTO DIFF WBC: CPT | Performed by: INTERNAL MEDICINE

## 2023-01-16 PROCEDURE — 84466 ASSAY OF TRANSFERRIN: CPT

## 2023-01-16 PROCEDURE — 82728 ASSAY OF FERRITIN: CPT

## 2023-01-16 PROCEDURE — 83735 ASSAY OF MAGNESIUM: CPT | Performed by: INTERNAL MEDICINE

## 2023-01-16 PROCEDURE — 83540 ASSAY OF IRON: CPT

## 2023-01-16 PROCEDURE — 83970 ASSAY OF PARATHORMONE: CPT | Performed by: INTERNAL MEDICINE

## 2023-01-17 ENCOUNTER — INFUSION (OUTPATIENT)
Dept: ONCOLOGY | Facility: HOSPITAL | Age: 63
End: 2023-01-17
Payer: COMMERCIAL

## 2023-01-17 VITALS
HEART RATE: 95 BPM | SYSTOLIC BLOOD PRESSURE: 163 MMHG | DIASTOLIC BLOOD PRESSURE: 80 MMHG | RESPIRATION RATE: 8 BRPM | TEMPERATURE: 96.4 F

## 2023-01-17 DIAGNOSIS — D64.9 ANEMIA, UNSPECIFIED TYPE: Primary | ICD-10-CM

## 2023-01-17 LAB
25(OH)D3 SERPL-MCNC: 75.8 NG/ML (ref 30–100)
ALBUMIN SERPL-MCNC: 4.3 G/DL (ref 3.5–5.2)
ALBUMIN/GLOB SERPL: 1.4 G/DL
ALP SERPL-CCNC: 128 U/L (ref 39–117)
ALT SERPL W P-5'-P-CCNC: 16 U/L (ref 1–33)
ANION GAP SERPL CALCULATED.3IONS-SCNC: 12 MMOL/L (ref 5–15)
AST SERPL-CCNC: 19 U/L (ref 1–32)
BASOPHILS # BLD AUTO: 0.05 10*3/MM3 (ref 0–0.2)
BASOPHILS NFR BLD AUTO: 0.6 % (ref 0–1.5)
BILIRUB SERPL-MCNC: 0.2 MG/DL (ref 0–1.2)
BUN SERPL-MCNC: 25 MG/DL (ref 8–23)
BUN/CREAT SERPL: 14.1 (ref 7–25)
CALCIUM SPEC-SCNC: 9.5 MG/DL (ref 8.6–10.5)
CHLORIDE SERPL-SCNC: 104 MMOL/L (ref 98–107)
CO2 SERPL-SCNC: 22 MMOL/L (ref 22–29)
CREAT SERPL-MCNC: 1.77 MG/DL (ref 0.57–1)
DEPRECATED RDW RBC AUTO: 47.6 FL (ref 37–54)
EGFRCR SERPLBLD CKD-EPI 2021: 32 ML/MIN/1.73
EOSINOPHIL # BLD AUTO: 0.71 10*3/MM3 (ref 0–0.4)
EOSINOPHIL NFR BLD AUTO: 7.9 % (ref 0.3–6.2)
ERYTHROCYTE [DISTWIDTH] IN BLOOD BY AUTOMATED COUNT: 15.8 % (ref 12.3–15.4)
GLOBULIN UR ELPH-MCNC: 3.1 GM/DL
GLUCOSE SERPL-MCNC: 90 MG/DL (ref 65–99)
HCT VFR BLD AUTO: 27.3 % (ref 34–46.6)
HGB BLD-MCNC: 9.1 G/DL (ref 12–15.9)
IMM GRANULOCYTES # BLD AUTO: 0.14 10*3/MM3 (ref 0–0.05)
IMM GRANULOCYTES NFR BLD AUTO: 1.5 % (ref 0–0.5)
LYMPHOCYTES # BLD AUTO: 1 10*3/MM3 (ref 0.7–3.1)
LYMPHOCYTES NFR BLD AUTO: 11.1 % (ref 19.6–45.3)
MAGNESIUM SERPL-MCNC: 2.1 MG/DL (ref 1.6–2.4)
MCH RBC QN AUTO: 28 PG (ref 26.6–33)
MCHC RBC AUTO-ENTMCNC: 33.3 G/DL (ref 31.5–35.7)
MCV RBC AUTO: 84 FL (ref 79–97)
MONOCYTES # BLD AUTO: 0.6 10*3/MM3 (ref 0.1–0.9)
MONOCYTES NFR BLD AUTO: 6.6 % (ref 5–12)
NEUTROPHILS NFR BLD AUTO: 6.54 10*3/MM3 (ref 1.7–7)
NEUTROPHILS NFR BLD AUTO: 72.3 % (ref 42.7–76)
NRBC BLD AUTO-RTO: 0.1 /100 WBC (ref 0–0.2)
PLATELET # BLD AUTO: 270 10*3/MM3 (ref 140–450)
PMV BLD AUTO: 10.1 FL (ref 6–12)
POTASSIUM SERPL-SCNC: 4.2 MMOL/L (ref 3.5–5.2)
PROT SERPL-MCNC: 7.4 G/DL (ref 6–8.5)
PTH-INTACT SERPL-MCNC: 18.2 PG/ML (ref 15–65)
RBC # BLD AUTO: 3.25 10*6/MM3 (ref 3.77–5.28)
SODIUM SERPL-SCNC: 138 MMOL/L (ref 136–145)
WBC NRBC COR # BLD: 9.04 10*3/MM3 (ref 3.4–10.8)

## 2023-01-17 PROCEDURE — 25010000002 IRON SUCROSE PER 1 MG: Performed by: INTERNAL MEDICINE

## 2023-01-17 PROCEDURE — 96365 THER/PROPH/DIAG IV INF INIT: CPT | Performed by: INTERNAL MEDICINE

## 2023-01-17 RX ORDER — FAMOTIDINE 10 MG/ML
20 INJECTION, SOLUTION INTRAVENOUS AS NEEDED
Status: DISCONTINUED | OUTPATIENT
Start: 2023-01-17 | End: 2023-01-17 | Stop reason: HOSPADM

## 2023-01-17 RX ORDER — SODIUM CHLORIDE 9 MG/ML
250 INJECTION, SOLUTION INTRAVENOUS ONCE
Status: COMPLETED | OUTPATIENT
Start: 2023-01-17 | End: 2023-01-17

## 2023-01-17 RX ORDER — DIPHENHYDRAMINE HYDROCHLORIDE 50 MG/ML
50 INJECTION INTRAMUSCULAR; INTRAVENOUS AS NEEDED
Status: CANCELLED | OUTPATIENT
Start: 2023-01-17

## 2023-01-17 RX ORDER — FAMOTIDINE 10 MG/ML
20 INJECTION, SOLUTION INTRAVENOUS AS NEEDED
Status: CANCELLED | OUTPATIENT
Start: 2023-01-17

## 2023-01-17 RX ORDER — SODIUM CHLORIDE 9 MG/ML
250 INJECTION, SOLUTION INTRAVENOUS ONCE
Status: CANCELLED | OUTPATIENT
Start: 2023-01-17

## 2023-01-17 RX ORDER — DIPHENHYDRAMINE HYDROCHLORIDE 50 MG/ML
50 INJECTION INTRAMUSCULAR; INTRAVENOUS AS NEEDED
Status: DISCONTINUED | OUTPATIENT
Start: 2023-01-17 | End: 2023-01-17 | Stop reason: HOSPADM

## 2023-01-17 RX ADMIN — SODIUM CHLORIDE 250 ML: 9 INJECTION, SOLUTION INTRAVENOUS at 14:07

## 2023-01-17 RX ADMIN — IRON SUCROSE 200 MG: 20 INJECTION, SOLUTION INTRAVENOUS at 14:07

## 2023-01-18 DIAGNOSIS — N39.41 URGE INCONTINENCE: ICD-10-CM

## 2023-01-18 RX ORDER — SOLIFENACIN SUCCINATE 10 MG/1
TABLET, FILM COATED ORAL
Qty: 90 TABLET | Refills: 0 | Status: SHIPPED | OUTPATIENT
Start: 2023-01-18

## 2023-01-19 ENCOUNTER — INFUSION (OUTPATIENT)
Dept: ONCOLOGY | Facility: HOSPITAL | Age: 63
End: 2023-01-19
Payer: COMMERCIAL

## 2023-01-19 VITALS
TEMPERATURE: 97 F | DIASTOLIC BLOOD PRESSURE: 72 MMHG | SYSTOLIC BLOOD PRESSURE: 158 MMHG | RESPIRATION RATE: 18 BRPM | HEART RATE: 104 BPM

## 2023-01-19 DIAGNOSIS — D64.9 ANEMIA, UNSPECIFIED TYPE: Primary | ICD-10-CM

## 2023-01-19 PROCEDURE — 96365 THER/PROPH/DIAG IV INF INIT: CPT | Performed by: INTERNAL MEDICINE

## 2023-01-19 PROCEDURE — 25010000002 IRON SUCROSE PER 1 MG: Performed by: INTERNAL MEDICINE

## 2023-01-19 RX ORDER — DIPHENHYDRAMINE HYDROCHLORIDE 50 MG/ML
50 INJECTION INTRAMUSCULAR; INTRAVENOUS AS NEEDED
Status: CANCELLED | OUTPATIENT
Start: 2023-01-19

## 2023-01-19 RX ORDER — SODIUM CHLORIDE 9 MG/ML
250 INJECTION, SOLUTION INTRAVENOUS ONCE
Status: CANCELLED | OUTPATIENT
Start: 2023-01-19

## 2023-01-19 RX ORDER — FAMOTIDINE 10 MG/ML
20 INJECTION, SOLUTION INTRAVENOUS AS NEEDED
Status: CANCELLED | OUTPATIENT
Start: 2023-01-19

## 2023-01-19 RX ORDER — FAMOTIDINE 10 MG/ML
20 INJECTION, SOLUTION INTRAVENOUS AS NEEDED
Status: DISCONTINUED | OUTPATIENT
Start: 2023-01-19 | End: 2023-01-19 | Stop reason: HOSPADM

## 2023-01-19 RX ORDER — SODIUM CHLORIDE 9 MG/ML
250 INJECTION, SOLUTION INTRAVENOUS ONCE
Status: COMPLETED | OUTPATIENT
Start: 2023-01-19 | End: 2023-01-19

## 2023-01-19 RX ORDER — DIPHENHYDRAMINE HYDROCHLORIDE 50 MG/ML
50 INJECTION INTRAMUSCULAR; INTRAVENOUS AS NEEDED
Status: DISCONTINUED | OUTPATIENT
Start: 2023-01-19 | End: 2023-01-19 | Stop reason: HOSPADM

## 2023-01-19 RX ADMIN — IRON SUCROSE 200 MG: 20 INJECTION, SOLUTION INTRAVENOUS at 14:04

## 2023-01-19 RX ADMIN — SODIUM CHLORIDE 250 ML: 9 INJECTION, SOLUTION INTRAVENOUS at 14:04

## 2023-01-24 ENCOUNTER — TELEPHONE (OUTPATIENT)
Dept: ONCOLOGY | Facility: HOSPITAL | Age: 63
End: 2023-01-24
Payer: COMMERCIAL

## 2023-01-24 ENCOUNTER — APPOINTMENT (OUTPATIENT)
Dept: ONCOLOGY | Facility: HOSPITAL | Age: 63
End: 2023-01-24
Payer: COMMERCIAL

## 2023-01-24 ENCOUNTER — OFFICE VISIT (OUTPATIENT)
Dept: ONCOLOGY | Facility: CLINIC | Age: 63
End: 2023-01-24
Payer: COMMERCIAL

## 2023-01-24 ENCOUNTER — LAB (OUTPATIENT)
Dept: ONCOLOGY | Facility: HOSPITAL | Age: 63
End: 2023-01-24
Payer: COMMERCIAL

## 2023-01-24 VITALS
BODY MASS INDEX: 31.81 KG/M2 | RESPIRATION RATE: 16 BRPM | OXYGEN SATURATION: 100 % | WEIGHT: 197.1 LBS | HEART RATE: 100 BPM | DIASTOLIC BLOOD PRESSURE: 71 MMHG | SYSTOLIC BLOOD PRESSURE: 130 MMHG | TEMPERATURE: 97.1 F

## 2023-01-24 DIAGNOSIS — N18.4 STAGE 4 CHRONIC KIDNEY DISEASE: ICD-10-CM

## 2023-01-24 DIAGNOSIS — R53.81 PHYSICAL DECONDITIONING: ICD-10-CM

## 2023-01-24 DIAGNOSIS — N18.4 ANEMIA OF CHRONIC KIDNEY FAILURE, STAGE 4 (SEVERE): ICD-10-CM

## 2023-01-24 DIAGNOSIS — C34.90 PRIMARY MALIGNANT NEOPLASM OF LUNG METASTATIC TO OTHER SITE, UNSPECIFIED LATERALITY: ICD-10-CM

## 2023-01-24 DIAGNOSIS — D63.1 ANEMIA OF CHRONIC KIDNEY FAILURE, STAGE 4 (SEVERE): ICD-10-CM

## 2023-01-24 DIAGNOSIS — N18.4 ANEMIA OF CHRONIC KIDNEY FAILURE, STAGE 4 (SEVERE): Primary | ICD-10-CM

## 2023-01-24 DIAGNOSIS — D63.1 ANEMIA OF CHRONIC KIDNEY FAILURE, STAGE 4 (SEVERE): Primary | ICD-10-CM

## 2023-01-24 LAB
DEPRECATED RDW RBC AUTO: 52.1 FL (ref 37–54)
ERYTHROCYTE [DISTWIDTH] IN BLOOD BY AUTOMATED COUNT: 16 % (ref 12.3–15.4)
FERRITIN SERPL-MCNC: 483 NG/ML (ref 13–150)
HCT VFR BLD AUTO: 30.2 % (ref 34–46.6)
HGB BLD-MCNC: 9.4 G/DL (ref 12–15.9)
HOLD SPECIMEN: NORMAL
IRON 24H UR-MRATE: 35 MCG/DL (ref 37–145)
IRON SATN MFR SERPL: 14 % (ref 20–50)
MCH RBC QN AUTO: 27.3 PG (ref 26.6–33)
MCHC RBC AUTO-ENTMCNC: 31.1 G/DL (ref 31.5–35.7)
MCV RBC AUTO: 87.8 FL (ref 79–97)
PLATELET # BLD AUTO: 218 10*3/MM3 (ref 140–450)
PMV BLD AUTO: 9.8 FL (ref 6–12)
RBC # BLD AUTO: 3.44 10*6/MM3 (ref 3.77–5.28)
TIBC SERPL-MCNC: 253 MCG/DL (ref 298–536)
TRANSFERRIN SERPL-MCNC: 170 MG/DL (ref 200–360)
WBC NRBC COR # BLD: 10.84 10*3/MM3 (ref 3.4–10.8)

## 2023-01-24 PROCEDURE — 83540 ASSAY OF IRON: CPT

## 2023-01-24 PROCEDURE — 99214 OFFICE O/P EST MOD 30 MIN: CPT | Performed by: INTERNAL MEDICINE

## 2023-01-24 PROCEDURE — 85027 COMPLETE CBC AUTOMATED: CPT

## 2023-01-24 PROCEDURE — G0463 HOSPITAL OUTPT CLINIC VISIT: HCPCS | Performed by: INTERNAL MEDICINE

## 2023-01-24 PROCEDURE — 84466 ASSAY OF TRANSFERRIN: CPT

## 2023-01-24 PROCEDURE — 82728 ASSAY OF FERRITIN: CPT

## 2023-01-24 NOTE — TELEPHONE ENCOUNTER
----- Message from Lucio Betancourt MD sent at 1/24/2023 10:47 AM CST -----  Hg has improved to 9.4.. Iron remains mildy low. Recheck iron studies and hg in 4 weeks. If persistently low iron in 4 weeks will consider IV iron.

## 2023-01-24 NOTE — TELEPHONE ENCOUNTER
Contacted pt regarding lab work and on 2/24. PT verbalizes understanding and denies any further questions.

## 2023-01-24 NOTE — PROGRESS NOTES
Hg has improved to 9.4.. Iron remains mildy low. Recheck iron studies and hg in 4 weeks. If persistently low iron in 4 weeks will consider IV iron.

## 2023-01-24 NOTE — PROGRESS NOTES
"Chief Complaint  Follow-up -anemia, lung cancer    Subjective        Adilene Morgan presents to [facility]  History of Present Illness     No new health issues since last visit.   No new medications.   No new hospitalization.   Feels well.       Objective   Vital Signs:  /71   Pulse 100   Temp 97.1 °F (36.2 °C)   Resp 16   Wt 89.4 kg (197 lb 1.6 oz)   SpO2 100%   BMI 31.81 kg/m²   Estimated body mass index is 31.81 kg/m² as calculated from the following:    Height as of 1/10/23: 167.6 cm (66\").    Weight as of this encounter: 89.4 kg (197 lb 1.6 oz).             Physical Exam  Vitals and nursing note reviewed.   Constitutional:       Appearance: Normal appearance.   Musculoskeletal:      Right lower leg: Edema present.      Left lower leg: Edema present.   Neurological:      General: No focal deficit present.      Mental Status: She is alert and oriented to person, place, and time. Mental status is at baseline.   Psychiatric:         Mood and Affect: Mood normal.         Behavior: Behavior normal.         Thought Content: Thought content normal.        Result Review :  The following data was reviewed by: Lucio Betancourt MD on 01/24/2023:  Common labs    Common Labs 1/3/23 1/3/23 1/3/23 1/16/23 1/16/23 1/24/23    1210 1210 1210 1217 1217    Glucose  87   90    BUN  29 (A)   25 (A)    Creatinine  1.70 (A)   1.77 (A)    Sodium  140   138    Potassium  4.3   4.2    Chloride  104   104    Calcium  9.6   9.5    Total Protein   7.8      Albumin  4.4 3.7  4.3    Total Bilirubin  0.2   0.2    Alkaline Phosphatase  134 (A)   128 (A)    AST (SGOT)  17   19    ALT (SGPT)  13   16    WBC 7.56   9.04  10.84 (A)   Hemoglobin 9.1 (A)   9.1 (A)  9.4 (A)   Hematocrit 29.4 (A)   27.3 (A)  30.2 (A)   Platelets 240   270  218   (A) Abnormal value            CMP    CMP 12/14/22 12/14/22 1/3/23 1/3/23 1/16/23    1007 1141 1210 1210    Glucose 101 (A)  87  90 "   Glucose  112 (A)      BUN 41 (A) 40 (A) 29 (A)  25 (A)   Creatinine 2.36 (A) 2.4 (A) 1.70 (A)  1.77 (A)   eGFR 22.6 (A)  33.6 (A)  32.0 (A)   Sodium 136 137 140  138   Potassium 4.2 4.2 4.3  4.2   Chloride 99 101 104  104   Calcium 9.1 7.9 (A) 9.6  9.5   Total Protein    7.8    Total Protein 7.6  8.1  7.4   Albumin 4.40 3.7 4.4 3.7 4.3   Globulin    4.1 (A)    Globulin 3.2  3.7  3.1   Total Bilirubin <0.2 0.25 (A) 0.2  0.2   Alkaline Phosphatase 74 60 134 (A)  128 (A)   AST (SGOT) 43 (A) 37 17  19   ALT (SGPT) 22 23 13  16   Albumin/Globulin Ratio 1.4  1.2  1.4   BUN/Creatinine Ratio 17.4  17.1  14.1   Anion Gap 14.9 15 (A) 12.0  12.0   (A) Abnormal value       Comments are available for some flowsheets but are not being displayed.           CBC    CBC 1/3/23 1/16/23 1/24/23   WBC 7.56 9.04 10.84 (A)   RBC 3.42 (A) 3.25 (A) 3.44 (A)   Hemoglobin 9.1 (A) 9.1 (A) 9.4 (A)   Hematocrit 29.4 (A) 27.3 (A) 30.2 (A)   MCV 86.0 84.0 87.8   MCH 26.6 28.0 27.3   MCHC 31.0 (A) 33.3 31.1 (A)   RDW 16.3 (A) 15.8 (A) 16.0 (A)   Platelets 240 270 218   (A) Abnormal value            CBC w/diff    CBC w/Diff 1/3/23 1/16/23 1/24/23   WBC 7.56 9.04 10.84 (A)   RBC 3.42 (A) 3.25 (A) 3.44 (A)   Hemoglobin 9.1 (A) 9.1 (A) 9.4 (A)   Hematocrit 29.4 (A) 27.3 (A) 30.2 (A)   MCV 86.0 84.0 87.8   MCH 26.6 28.0 27.3   MCHC 31.0 (A) 33.3 31.1 (A)   RDW 16.3 (A) 15.8 (A) 16.0 (A)   Platelets 240 270 218   Neutrophil Rel % 75.7 72.3    Immature Granulocyte Rel % 0.3 1.5 (A)    Lymphocyte Rel % 11.1 (A) 11.1 (A)    Monocyte Rel % 8.3 6.6    Eosinophil Rel % 4.1 7.9 (A)    Basophil Rel % 0.5 0.6    (A) Abnormal value            Anemia labs:      Lab 01/24/23  1006   IRON 35*   IRON SATURATION 14*   TIBC 253*   TRANSFERRIN 170*   FERRITIN 483.00*       Adilene Morgan reports a pain score of 3.  Given her pain assessment as noted, treatment options were discussed and the following options were decided upon as a follow-up plan to address the  patient's pain: continuation of current treatment plan for pain.    Patient screened negative for depression based on a PHQ-9 score of 0 on 1/24/2023.       Assessment and Plan   Diagnoses and all orders for this visit:    1. Anemia of chronic kidney failure, stage 4 (severe) (HCC) (Primary)  -     CBC (No Diff); Standing  -     Ferritin; Future  -     Iron Profile; Future  -     CBC (No Diff); Future  -     Ferritin; Future  -     Iron Profile; Future      Patient is s/p IV iron treatment.  Hemoglobin has improved to 9.4.  Iron studies are still low however given she just finished her iron infusion last week recommend rechecking CBC, ferritin, iron profile in a month.  If iron studies remain low at 1 month we will repeat iron infusions.  If iron studies are adequate at 1 month and if her CBC remains less than 10 we will start her on Retacrit injections.      2. Stage 4 chronic kidney disease (HCC)    Chronic, stable.  Follows with nephrology.  Avoid any nephrotoxic agent.  Control blood pressure aggressively.    3. Primary malignant neoplasm of lung metastatic to other site, unspecified laterality (HCC)  -     Ambulatory Referral to Physical Therapy Evaluate and treat    Patient following with [facility].  She will continue to receive her oral targeted therapy with them.      4. Physical deconditioning  -     Ambulatory Referral to Physical Therapy Evaluate and treat    Referral to physical therapy arranged           Follow Up   No follow-ups on file.  Patient was given instructions and counseling regarding her condition or for health maintenance advice. Please see specific information pulled into the AVS if appropriate.

## 2023-02-07 RX ORDER — LEVOTHYROXINE SODIUM 200 MCG
TABLET ORAL
Qty: 30 TABLET | Refills: 3 | Status: SHIPPED | OUTPATIENT
Start: 2023-02-07

## 2023-02-11 DIAGNOSIS — G43.009 NONINTRACTABLE MIGRAINE, UNSPECIFIED MIGRAINE TYPE: ICD-10-CM

## 2023-02-13 RX ORDER — TOPIRAMATE 50 MG/1
TABLET, FILM COATED ORAL
Qty: 90 TABLET | Refills: 3 | Status: SHIPPED | OUTPATIENT
Start: 2023-02-13

## 2023-02-21 ENCOUNTER — TELEPHONE (OUTPATIENT)
Dept: ONCOLOGY | Facility: CLINIC | Age: 63
End: 2023-02-21
Payer: COMMERCIAL

## 2023-03-06 RX ORDER — VENLAFAXINE HYDROCHLORIDE 37.5 MG/1
CAPSULE, EXTENDED RELEASE ORAL
Qty: 90 CAPSULE | Refills: 3 | Status: SHIPPED | OUTPATIENT
Start: 2023-03-06

## 2023-03-20 ENCOUNTER — OFFICE VISIT (OUTPATIENT)
Dept: FAMILY MEDICINE CLINIC | Facility: CLINIC | Age: 63
End: 2023-03-20
Payer: COMMERCIAL

## 2023-03-20 VITALS
WEIGHT: 206.5 LBS | HEIGHT: 66 IN | BODY MASS INDEX: 33.19 KG/M2 | DIASTOLIC BLOOD PRESSURE: 70 MMHG | SYSTOLIC BLOOD PRESSURE: 130 MMHG | HEART RATE: 87 BPM | OXYGEN SATURATION: 98 %

## 2023-03-20 DIAGNOSIS — G43.809 OTHER MIGRAINE WITHOUT STATUS MIGRAINOSUS, NOT INTRACTABLE: Chronic | ICD-10-CM

## 2023-03-20 DIAGNOSIS — N18.4 STAGE 4 CHRONIC KIDNEY DISEASE: Chronic | ICD-10-CM

## 2023-03-20 DIAGNOSIS — T45.1X5A ANEMIA DUE TO ANTINEOPLASTIC CHEMOTHERAPY: ICD-10-CM

## 2023-03-20 DIAGNOSIS — D64.81 ANEMIA DUE TO ANTINEOPLASTIC CHEMOTHERAPY: ICD-10-CM

## 2023-03-20 DIAGNOSIS — C34.91 ADENOSQUAMOUS CARCINOMA OF RIGHT LUNG: Primary | Chronic | ICD-10-CM

## 2023-03-20 PROCEDURE — 99214 OFFICE O/P EST MOD 30 MIN: CPT | Performed by: GENERAL PRACTICE

## 2023-03-20 RX ORDER — SUMATRIPTAN 20 MG/1
1 SPRAY NASAL
Qty: 6 EACH | Refills: 2 | Status: SHIPPED | OUTPATIENT
Start: 2023-03-20

## 2023-03-20 NOTE — PATIENT INSTRUCTIONS
Calorie Counting for Weight Loss  Calories are units of energy. Your body needs a certain number of calories from food to keep going throughout the day. When you eat or drink more calories than your body needs, your body stores the extra calories mostly as fat. When you eat or drink fewer calories than your body needs, your body burns fat to get the energy it needs.  Calorie counting means keeping track of how many calories you eat and drink each day. Calorie counting can be helpful if you need to lose weight. If you eat fewer calories than your body needs, you should lose weight. Ask your health care provider what a healthy weight is for you.  For calorie counting to work, you will need to eat the right number of calories each day to lose a healthy amount of weight per week. A dietitian can help you figure out how many calories you need in a day and will suggest ways to reach your calorie goal.  A healthy amount of weight to lose each week is usually 1-2 lb (0.5-0.9 kg). This usually means that your daily calorie intake should be reduced by 500-750 calories.  Eating 1,200-1,500 calories a day can help most women lose weight.  Eating 1,500-1,800 calories a day can help most men lose weight.  What do I need to know about calorie counting?  Work with your health care provider or dietitian to determine how many calories you should get each day. To meet your daily calorie goal, you will need to:  Find out how many calories are in each food that you would like to eat. Try to do this before you eat.  Decide how much of the food you plan to eat.  Keep a food log. Do this by writing down what you ate and how many calories it had.  To successfully lose weight, it is important to balance calorie counting with a healthy lifestyle that includes regular activity.  Where do I find calorie information?  The number of calories in a food can be found on a Nutrition Facts label. If a food does not have a Nutrition Facts label, try to  look up the calories online or ask your dietitian for help.  Remember that calories are listed per serving. If you choose to have more than one serving of a food, you will have to multiply the calories per serving by the number of servings you plan to eat. For example, the label on a package of bread might say that a serving size is 1 slice and that there are 90 calories in a serving. If you eat 1 slice, you will have eaten 90 calories. If you eat 2 slices, you will have eaten 180 calories.  How do I keep a food log?  After each time that you eat, record the following in your food log as soon as possible:  What you ate. Be sure to include toppings, sauces, and other extras on the food.  How much you ate. This can be measured in cups, ounces, or number of items.  How many calories were in each food and drink.  The total number of calories in the food you ate.  Keep your food log near you, such as in a pocket-sized notebook or on an rhea or website on your mobile phone. Some programs will calculate calories for you and show you how many calories you have left to meet your daily goal.  What are some portion-control tips?  Know how many calories are in a serving. This will help you know how many servings you can have of a certain food.  Use a measuring cup to measure serving sizes. You could also try weighing out portions on a kitchen scale. With time, you will be able to estimate serving sizes for some foods.  Take time to put servings of different foods on your favorite plates or in your favorite bowls and cups so you know what a serving looks like.  Try not to eat straight from a food's packaging, such as from a bag or box. Eating straight from the package makes it hard to see how much you are eating and can lead to overeating. Put the amount you would like to eat in a cup or on a plate to make sure you are eating the right portion.  Use smaller plates, glasses, and bowls for smaller portions and to prevent  overeating.  Try not to multitask. For example, avoid watching TV or using your computer while eating. If it is time to eat, sit down at a table and enjoy your food. This will help you recognize when you are full. It will also help you be more mindful of what and how much you are eating.  What are tips for following this plan?  Reading food labels  Check the calorie count compared with the serving size. The serving size may be smaller than what you are used to eating.  Check the source of the calories. Try to choose foods that are high in protein, fiber, and vitamins, and low in saturated fat, trans fat, and sodium.  Shopping  Read nutrition labels while you shop. This will help you make healthy decisions about which foods to buy.  Pay attention to nutrition labels for low-fat or fat-free foods. These foods sometimes have the same number of calories or more calories than the full-fat versions. They also often have added sugar, starch, or salt to make up for flavor that was removed with the fat.  Make a grocery list of lower-calorie foods and stick to it.  Cooking  Try to cook your favorite foods in a healthier way. For example, try baking instead of frying.  Use low-fat dairy products.  Meal planning  Use more fruits and vegetables. One-half of your plate should be fruits and vegetables.  Include lean proteins, such as chicken, turkey, and fish.  Lifestyle  Each week, aim to do one of the followin minutes of moderate exercise, such as walking.  75 minutes of vigorous exercise, such as running.  General information  Know how many calories are in the foods you eat most often. This will help you calculate calorie counts faster.  Find a way of tracking calories that works for you. Get creative. Try different apps or programs if writing down calories does not work for you.  What foods should I eat?    Eat nutritious foods. It is better to have a nutritious, high-calorie food, such as an avocado, than a food with  few nutrients, such as a bag of potato chips.  Use your calories on foods and drinks that will fill you up and will not leave you hungry soon after eating.  Examples of foods that fill you up are nuts and nut butters, vegetables, lean proteins, and high-fiber foods such as whole grains. High-fiber foods are foods with more than 5 g of fiber per serving.  Pay attention to calories in drinks. Low-calorie drinks include water and unsweetened drinks.  The items listed above may not be a complete list of foods and beverages you can eat. Contact a dietitian for more information.  What foods should I limit?  Limit foods or drinks that are not good sources of vitamins, minerals, or protein or that are high in unhealthy fats. These include:  Candy.  Other sweets.  Sodas, specialty coffee drinks, alcohol, and juice.  The items listed above may not be a complete list of foods and beverages you should avoid. Contact a dietitian for more information.  How do I count calories when eating out?  Pay attention to portions. Often, portions are much larger when eating out. Try these tips to keep portions smaller:  Consider sharing a meal instead of getting your own.  If you get your own meal, eat only half of it. Before you start eating, ask for a container and put half of your meal into it.  When available, consider ordering smaller portions from the menu instead of full portions.  Pay attention to your food and drink choices. Knowing the way food is cooked and what is included with the meal can help you eat fewer calories.  If calories are listed on the menu, choose the lower-calorie options.  Choose dishes that include vegetables, fruits, whole grains, low-fat dairy products, and lean proteins.  Choose items that are boiled, broiled, grilled, or steamed. Avoid items that are buttered, battered, fried, or served with cream sauce. Items labeled as crispy are usually fried, unless stated otherwise.  Choose water, low-fat milk,  unsweetened iced tea, or other drinks without added sugar. If you want an alcoholic beverage, choose a lower-calorie option, such as a glass of wine or light beer.  Ask for dressings, sauces, and syrups on the side. These are usually high in calories, so you should limit the amount you eat.  If you want a salad, choose a garden salad and ask for grilled meats. Avoid extra toppings such as lee, cheese, or fried items. Ask for the dressing on the side, or ask for olive oil and vinegar or lemon to use as dressing.  Estimate how many servings of a food you are given. Knowing serving sizes will help you be aware of how much food you are eating at restaurants.  Where to find more information  Centers for Disease Control and Prevention: www.cdc.gov  [place] Department of Agriculture: myplate.gov  Summary  Calorie counting means keeping track of how many calories you eat and drink each day. If you eat fewer calories than your body needs, you should lose weight.  A healthy amount of weight to lose per week is usually 1-2 lb (0.5-0.9 kg). This usually means reducing your daily calorie intake by 500-750 calories.  The number of calories in a food can be found on a Nutrition Facts label. If a food does not have a Nutrition Facts label, try to look up the calories online or ask your dietitian for help.  Use smaller plates, glasses, and bowls for smaller portions and to prevent overeating.  Use your calories on foods and drinks that will fill you up and not leave you hungry shortly after a meal.  This information is not intended to replace advice given to you by your health care provider. Make sure you discuss any questions you have with your health care provider.  Document Revised: 01/28/2021 Document Reviewed: 01/28/2021  Elsevier Patient Education © 2022 [employer]

## 2023-03-20 NOTE — PROGRESS NOTES
Subjective   Aidlene Morgan is a 62 y.o. female.   Chief Complaint   Patient presents with   • Follow-up     Having nose bleeding rt foot pain     For review and evaluation of management of chronic medical problems. Records reviewed. Any recent labs, xrays reviewed and medications reconciled.  Has been having some nosebleeds from both nostrils.  Uses humidifier at night although has not had this for the last 5 days.  Is tolerating chemotherapy and anemia is remaining more stable.  Needs a refill on her Imitrex, migraines are stable.  Chronic Kidney Disease  This is a chronic problem. The current episode started more than 1 year ago. The problem occurs constantly. The problem has been waxing and waning. Exacerbated by: lung cancer treatment. Treatments tried: prednisone. The treatment provided moderate relief.   Anemia  Presents for follow-up (Secondary to chemotherapy) visit. Symptoms include malaise/fatigue. Signs of blood loss that are not present include hematemesis, hematochezia and melena. There are no compliance problems.      The following portions of the patient's history were reviewed and updated as appropriate: allergies, current medications, past social history and problem list.    Outpatient Medications Prior to Visit   Medication Sig Dispense Refill   • aspirin 81 MG EC tablet Take 1 tablet by mouth Daily.     • Calcium Carbonate (CALCIUM 500 PO) Take  by mouth Daily.     • ezetimibe (ZETIA) 10 MG tablet Take 1 tablet by mouth every night at bedtime.     • folic acid (FOLVITE) 400 MCG tablet Take 2 tablets by mouth Daily.     • Lorlatinib (Lorbrena) 25 MG tablet Take 75 mg by mouth Daily. Will start back 1/23 due to being held currently     • midodrine (PROAMATINE) 10 MG tablet Take 1 tablet by mouth 3 (Three) Times a Day Before Meals. (Patient taking differently: Take 5 mg by mouth 3 (Three) Times a Day Before Meals.) 270 tablet 3   • multivitamin with minerals tablet tablet Take 1 tablet by mouth  Daily.     • ondansetron (ZOFRAN) 8 MG tablet Take 1 tablet by mouth Every 8 (Eight) Hours As Needed for Nausea or Vomiting. 90 tablet 1   • pantoprazole (Protonix) 40 MG EC tablet Take 1 tablet by mouth Daily. 30 tablet 0   • prochlorperazine (COMPAZINE) 10 MG tablet Take 1 tablet by mouth Every 6 (Six) Hours As Needed for Nausea or Vomiting. (Patient taking differently: Take 1 tablet by mouth Every 6 (Six) Hours As Needed for Nausea or Vomiting. Pt reports taking in evening) 120 tablet 1   • promethazine (PHENERGAN) 25 MG tablet TAKE 1 TABLET BY MOUTH EVERY 6 HOURS AS NEEDED FOR NAUSEA OR VOMITING. 60 tablet 1   • rivaroxaban (XARELTO) 20 MG tablet Take 1 tablet by mouth Daily.     • rosuvastatin (CRESTOR) 5 MG tablet Take 1 tablet by mouth Daily.     • solifenacin (VESICARE) 10 MG tablet TAKE 1 TABLET BY MOUTH EVERY DAY 90 tablet 0   • Synthroid 200 MCG tablet TAKE 1 TABLET BY MOUTH EVERY DAY 30 tablet 3   • topiramate (TOPAMAX) 50 MG tablet TAKE 1 TABLET BY MOUTH EVERY DAY AT NIGHT 90 tablet 3   • traMADol (ULTRAM) 50 MG tablet Take 1 tablet by mouth Every 6 (Six) Hours As Needed. for pain     • trospium (SANCTURA) 20 MG tablet Take 1 tablet by mouth 2 (Two) Times a Day.     • venlafaxine XR (EFFEXOR-XR) 37.5 MG 24 hr capsule TAKE 1 CAPSULE BY MOUTH EVERY DAY 90 capsule 3   • vitamin B-12 (CYANOCOBALAMIN) 500 MCG tablet Take 1 tablet by mouth Daily.     • furosemide (LASIX) 20 MG tablet Take 20 mg by mouth Daily. (Patient not taking: Reported on 3/20/2023)     • propranolol (INDERAL) 60 MG tablet Every 12 (Twelve) Hours. (Patient not taking: Reported on 3/20/2023)     • Unable to find 1 each 1 (One) Time. Med Name: relizen 320mg (Patient not taking: Reported on 3/20/2023)       No facility-administered medications prior to visit.       Review of Systems   Constitutional: Positive for malaise/fatigue.   Gastrointestinal: Negative for hematemesis, hematochezia and melena.     I have reviewed 12 systems with  "patient. Findings were negative except what is noted below and/or in history of present illness.     Objective   Visit Vitals  /70   Pulse 87   Ht 167.6 cm (66\")   Wt 93.7 kg (206 lb 8 oz)   LMP  (LMP Unknown)   SpO2 98%   BMI 33.33 kg/m²     Physical Exam  Vitals and nursing note reviewed.   Constitutional:       General: She is not in acute distress.     Appearance: She is well-developed.   HENT:      Head: Normocephalic and atraumatic.      Nose: Nose normal.      Comments: Small amount of blood seen high up mouth in both nostrils, no obvious bleeding vessel  Eyes:      General:         Right eye: No discharge.         Left eye: No discharge.      Conjunctiva/sclera: Conjunctivae normal.      Pupils: Pupils are equal, round, and reactive to light.   Neck:      Thyroid: No thyromegaly.   Cardiovascular:      Rate and Rhythm: Normal rate and regular rhythm.      Heart sounds: Normal heart sounds.   Pulmonary:      Effort: Pulmonary effort is normal.      Breath sounds: Normal breath sounds.   Lymphadenopathy:      Cervical: No cervical adenopathy.   Skin:     General: Skin is warm and dry.   Neurological:      Mental Status: She is alert and oriented to person, place, and time.       Notes brought forward are reviewed and updated if indicated.     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  Follow-up with specialists as planned.  Has appoint with oncology in 2 days.  If nosebleeds continue may need to see ENT.    New Medications Ordered This Visit   Medications   • SUMAtriptan (IMITREX) 20 MG/ACT nasal spray     Si spray into the nostril(s) as directed by provider Every 2 (Two) Hours As Needed for Migraine.     Dispense:  6 each     Refill:  2     Return in about 3 months (around 2023) for Recheck.        This document has been electronically signed by Jen Raymundo MD on 2023 16:47 CDT

## 2023-03-27 PROBLEM — D63.1 ANEMIA OF CHRONIC RENAL FAILURE, STAGE 4 (SEVERE): Status: ACTIVE | Noted: 2023-01-03

## 2023-03-29 ENCOUNTER — OFFICE VISIT (OUTPATIENT)
Dept: ONCOLOGY | Facility: CLINIC | Age: 63
End: 2023-03-29
Payer: COMMERCIAL

## 2023-03-29 ENCOUNTER — LAB (OUTPATIENT)
Dept: ONCOLOGY | Facility: HOSPITAL | Age: 63
End: 2023-03-29
Payer: COMMERCIAL

## 2023-03-29 VITALS
HEART RATE: 99 BPM | DIASTOLIC BLOOD PRESSURE: 69 MMHG | TEMPERATURE: 97.1 F | WEIGHT: 207.3 LBS | SYSTOLIC BLOOD PRESSURE: 140 MMHG | BODY MASS INDEX: 33.46 KG/M2 | OXYGEN SATURATION: 99 % | RESPIRATION RATE: 18 BRPM

## 2023-03-29 DIAGNOSIS — N18.4 ANEMIA OF CHRONIC KIDNEY FAILURE, STAGE 4 (SEVERE): ICD-10-CM

## 2023-03-29 DIAGNOSIS — N18.4 STAGE 4 CHRONIC KIDNEY DISEASE: ICD-10-CM

## 2023-03-29 DIAGNOSIS — N18.4 ANEMIA OF CHRONIC RENAL FAILURE, STAGE 4 (SEVERE): Primary | ICD-10-CM

## 2023-03-29 DIAGNOSIS — D63.1 ANEMIA OF CHRONIC RENAL FAILURE, STAGE 4 (SEVERE): Primary | ICD-10-CM

## 2023-03-29 DIAGNOSIS — E03.9 ACQUIRED HYPOTHYROIDISM: Chronic | ICD-10-CM

## 2023-03-29 DIAGNOSIS — R05.8 OTHER COUGH: ICD-10-CM

## 2023-03-29 DIAGNOSIS — C34.90 PRIMARY MALIGNANT NEOPLASM OF LUNG METASTATIC TO OTHER SITE, UNSPECIFIED LATERALITY: ICD-10-CM

## 2023-03-29 DIAGNOSIS — D63.1 ANEMIA OF CHRONIC KIDNEY FAILURE, STAGE 4 (SEVERE): ICD-10-CM

## 2023-03-29 DIAGNOSIS — C34.90 ADENOSQUAMOUS CARCINOMA OF LUNG, UNSPECIFIED LATERALITY: ICD-10-CM

## 2023-03-29 PROBLEM — R05.9 COUGH: Status: ACTIVE | Noted: 2023-03-29

## 2023-03-29 LAB
ALBUMIN SERPL-MCNC: 3.7 G/DL (ref 3.5–5.2)
ALBUMIN/GLOB SERPL: 1.1 G/DL
ALP SERPL-CCNC: 91 U/L (ref 39–117)
ALT SERPL W P-5'-P-CCNC: 13 U/L (ref 1–33)
ANION GAP SERPL CALCULATED.3IONS-SCNC: 13 MMOL/L (ref 5–15)
AST SERPL-CCNC: 17 U/L (ref 1–32)
BILIRUB SERPL-MCNC: 0.2 MG/DL (ref 0–1.2)
BUN SERPL-MCNC: 41 MG/DL (ref 8–23)
BUN/CREAT SERPL: 19.7 (ref 7–25)
CALCIUM SPEC-SCNC: 8.8 MG/DL (ref 8.6–10.5)
CHLORIDE SERPL-SCNC: 102 MMOL/L (ref 98–107)
CO2 SERPL-SCNC: 19 MMOL/L (ref 22–29)
CREAT SERPL-MCNC: 2.08 MG/DL (ref 0.57–1)
DEPRECATED RDW RBC AUTO: 47.6 FL (ref 37–54)
EGFRCR SERPLBLD CKD-EPI 2021: 26.3 ML/MIN/1.73
ERYTHROCYTE [DISTWIDTH] IN BLOOD BY AUTOMATED COUNT: 14.7 % (ref 12.3–15.4)
FERRITIN SERPL-MCNC: 430.5 NG/ML (ref 13–150)
GLOBULIN UR ELPH-MCNC: 3.5 GM/DL
GLUCOSE SERPL-MCNC: 101 MG/DL (ref 65–99)
HCT VFR BLD AUTO: 26.8 % (ref 34–46.6)
HGB BLD-MCNC: 8.4 G/DL (ref 12–15.9)
IRON 24H UR-MRATE: 33 MCG/DL (ref 37–145)
IRON SATN MFR SERPL: 17 % (ref 20–50)
MCH RBC QN AUTO: 28.1 PG (ref 26.6–33)
MCHC RBC AUTO-ENTMCNC: 31.3 G/DL (ref 31.5–35.7)
MCV RBC AUTO: 89.6 FL (ref 79–97)
PLATELET # BLD AUTO: 180 10*3/MM3 (ref 140–450)
PMV BLD AUTO: 9.8 FL (ref 6–12)
POTASSIUM SERPL-SCNC: 4.4 MMOL/L (ref 3.5–5.2)
PROT SERPL-MCNC: 7.2 G/DL (ref 6–8.5)
RBC # BLD AUTO: 2.99 10*6/MM3 (ref 3.77–5.28)
SODIUM SERPL-SCNC: 134 MMOL/L (ref 136–145)
TIBC SERPL-MCNC: 195 MCG/DL (ref 298–536)
TRANSFERRIN SERPL-MCNC: 131 MG/DL (ref 200–360)
WBC NRBC COR # BLD: 4.91 10*3/MM3 (ref 3.4–10.8)

## 2023-03-29 PROCEDURE — 80053 COMPREHEN METABOLIC PANEL: CPT

## 2023-03-29 PROCEDURE — 83540 ASSAY OF IRON: CPT

## 2023-03-29 PROCEDURE — 84466 ASSAY OF TRANSFERRIN: CPT

## 2023-03-29 PROCEDURE — G0463 HOSPITAL OUTPT CLINIC VISIT: HCPCS | Performed by: INTERNAL MEDICINE

## 2023-03-29 PROCEDURE — 85027 COMPLETE CBC AUTOMATED: CPT

## 2023-03-29 PROCEDURE — 82728 ASSAY OF FERRITIN: CPT

## 2023-03-29 RX ORDER — DIPHENHYDRAMINE HYDROCHLORIDE 50 MG/ML
50 INJECTION INTRAMUSCULAR; INTRAVENOUS AS NEEDED
OUTPATIENT
Start: 2023-03-29

## 2023-03-29 RX ORDER — FAMOTIDINE 10 MG/ML
20 INJECTION, SOLUTION INTRAVENOUS AS NEEDED
OUTPATIENT
Start: 2023-03-29

## 2023-03-29 RX ORDER — SODIUM CHLORIDE 9 MG/ML
250 INJECTION, SOLUTION INTRAVENOUS ONCE
OUTPATIENT
Start: 2023-03-29

## 2023-03-29 RX ORDER — GUAIFENESIN AND CODEINE PHOSPHATE 100; 10 MG/5ML; MG/5ML
5 SOLUTION ORAL 3 TIMES DAILY PRN
Qty: 180 ML | Refills: 0 | Status: SHIPPED | OUTPATIENT
Start: 2023-03-29

## 2023-03-29 NOTE — PROGRESS NOTES
"Chief Complaint  Follow-up -anemia    Subjective        Adilene Morgan presents to [facility]  History of Present Illness     Adilene Morgan was seen in follow-up for anemia.  She is overall stable.  Did develop URI since last visit and been struggling with intractable cough.  Remains on chemotherapy.  No evidence of progressive disease.    Objective   Vital Signs:  /69   Pulse 99   Temp 97.1 °F (36.2 °C)   Resp 18   Wt 94 kg (207 lb 4.8 oz)   SpO2 99%   BMI 33.46 kg/m²   Estimated body mass index is 33.46 kg/m² as calculated from the following:    Height as of 3/20/23: 167.6 cm (66\").    Weight as of this encounter: 94 kg (207 lb 4.8 oz).             Physical Exam  Vitals and nursing note reviewed.   Constitutional:       Appearance: Normal appearance.   Neurological:      General: No focal deficit present.      Mental Status: She is alert and oriented to person, place, and time. Mental status is at baseline.   Psychiatric:         Mood and Affect: Mood normal.         Behavior: Behavior normal.         Thought Content: Thought content normal.        Result Review :  The following data was reviewed by: Lucio Betancourt MD on 03/29/2023:  Common labs    Common Labs 3/1/23 3/8/23 3/29/23   Glucose 93 98    BUN 38 (A) 36 (A)    Creatinine 2.1 (A) 1.9 (A)    Sodium 140 139    Potassium 4.8 4.6    Chloride 106 108 (A)    Calcium 9.3 9.0    Albumin 4.0 4.0    Total Bilirubin 0.21 (A) 0.21 (A)    Alkaline Phosphatase 71 85    AST (SGOT) 15 17    ALT (SGPT) 12 14    WBC   4.91   Hemoglobin   8.4 (A)   Hematocrit   26.8 (A)   Platelets   180   (A) Abnormal value            CMP    CMP 2/22/23 3/1/23 3/8/23   Glucose 88 93 98   BUN 42 (A) 38 (A) 36 (A)   Creatinine 2.2 (A) 2.1 (A) 1.9 (A)   Sodium 139 140 139   Potassium 4.7 4.8 4.6   Chloride 104 106 108 (A)   Calcium 9.8 9.3 9.0   Albumin 3.9 4.0 4.0   Total Bilirubin 0.21 (A) 0.21 (A) 0.21 (A) "   Alkaline Phosphatase 75 71 85   AST (SGOT) 15 15 17   ALT (SGPT) 13 12 14   Anion Gap 11 11 11   (A) Abnormal value            CBC    CBC 1/16/23 1/24/23 3/29/23   WBC 9.04 10.84 (A) 4.91   RBC 3.25 (A) 3.44 (A) 2.99 (A)   Hemoglobin 9.1 (A) 9.4 (A) 8.4 (A)   Hematocrit 27.3 (A) 30.2 (A) 26.8 (A)   MCV 84.0 87.8 89.6   MCH 28.0 27.3 28.1   MCHC 33.3 31.1 (A) 31.3 (A)   RDW 15.8 (A) 16.0 (A) 14.7   Platelets 270 218 180   (A) Abnormal value            CBC w/diff    CBC w/Diff 1/16/23 1/24/23 3/29/23   WBC 9.04 10.84 (A) 4.91   RBC 3.25 (A) 3.44 (A) 2.99 (A)   Hemoglobin 9.1 (A) 9.4 (A) 8.4 (A)   Hematocrit 27.3 (A) 30.2 (A) 26.8 (A)   MCV 84.0 87.8 89.6   MCH 28.0 27.3 28.1   MCHC 33.3 31.1 (A) 31.3 (A)   RDW 15.8 (A) 16.0 (A) 14.7   Platelets 270 218 180   Neutrophil Rel % 72.3     Immature Granulocyte Rel % 1.5 (A)     Lymphocyte Rel % 11.1 (A)     Monocyte Rel % 6.6     Eosinophil Rel % 7.9 (A)     Basophil Rel % 0.6     (A) Abnormal value            Anemia labs:      Lab 03/29/23  1451   IRON 33*   IRON SATURATION 17*   TIBC 195*   TRANSFERRIN 131*   FERRITIN 430.50*       Adilene Morgan reports a pain score of 0.  Given her pain assessment as noted, treatment options were discussed and the following options were decided upon as a follow-up plan to address the patient's pain: continuation of current treatment plan for pain.    Patient screened negative for depression based on a PHQ-9 score of 0 on 3/29/2023.     Assessment and Plan   Diagnoses and all orders for this visit:    1. Anemia of chronic renal failure, stage 4 (severe) (HCC) (Primary)  -     CBC & Differential; Future  -     Ferritin; Future  -     Iron Profile; Future    Chronic issue with exacerbation.  Anemia is worse.  Iron studies showed iron saturation of 17.  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  Due to this reason I believe she will benefit from IV iron treatment.    I had an extensive discussion with patient about diagnosis and treatment  options. I recommend that we replace their iron with IV Venofer - 200 mg x 4 infusions.     I had an extensive discussion with the patient about risk versus benefits of IV iron treatment.    I discussed about various risks associated with IV iron such as allergic reaction, hypersensitivity reaction, headache, flushing, joint aches or pains, local IV infiltration and skin discoloration.  After our discussion the patient was in agreement in  proceeding with IV iron treatment for  anemia.      I will recheck her CBC, ferritin, iron profile in 4 weeks and if persistent anemia with hemoglobin of less than 10 I will start her on Retacrit injection 10,000 international unit monthly.    2. Stage 4 chronic kidney disease (HCC)  -     Comprehensive Metabolic Panel; Future  -     Comprehensive Metabolic Panel; Future    Chronic, stable.   Avoid nephrotoxic agents.   Control BP and DM aggressively.   Avoid nephrotoxic agents.   Avoid NSAIDs.   Check CMP in 1 month.       3. Primary malignant neoplasm of lung metastatic to other site, unspecified laterality (HCC)  4. Other cough  -     guaiFENesin-codeine (GUAIFENESIN AC) 100-10 MG/5ML liquid; Take 5 mL by mouth 3 (Three) Times a Day As Needed for Cough.  Dispense: 180 mL; Refill: 0    Patient with intractable cough in the setting of lung cancer.  She has been using Mucinex which is not helping.  She is unable to sleep at night due to intractable coughing.  I will give her guaifenesin AC cough syrup to assist with her intractable cough.  New prescription sent.    Patient follows with [facility].  She will continue to receive her oral targeted therapy for ALK positive metastatic colon cancer.  She is currently on Lorlatinib. Management per her oncologist at [facility]. We will help her with anemia locally.     Other orders  -     sodium chloride 0.9 % infusion 250 mL  -     iron sucrose (VENOFER) 200 mg in sodium chloride 0.9 % 250 mL IVPB  -      Hydrocortisone Sod Suc (PF) (Solu-CORTEF) injection 50 mg  -     Hydrocortisone Sod Suc (PF) (Solu-CORTEF) injection 100 mg  -     diphenhydrAMINE (BENADRYL) injection 50 mg  -     famotidine (PEPCID) injection 20 mg  -     Epoetin James-epbx (RETACRIT) injection 10,000 Units             Follow Up   No follow-ups on file.  Patient was given instructions and counseling regarding her condition or for health maintenance advice. Please see specific information pulled into the AVS if appropriate.

## 2023-03-30 RX ORDER — LEVOTHYROXINE SODIUM 175 MCG
TABLET ORAL
Qty: 30 TABLET | Refills: 0 | OUTPATIENT
Start: 2023-03-30

## 2023-03-30 RX ORDER — PROMETHAZINE HYDROCHLORIDE 25 MG/1
TABLET ORAL
Qty: 60 TABLET | Refills: 1 | Status: SHIPPED | OUTPATIENT
Start: 2023-03-30

## 2023-04-11 ENCOUNTER — TELEPHONE (OUTPATIENT)
Dept: ONCOLOGY | Facility: CLINIC | Age: 63
End: 2023-04-11
Payer: COMMERCIAL

## 2023-04-14 ENCOUNTER — INFUSION (OUTPATIENT)
Dept: ONCOLOGY | Facility: HOSPITAL | Age: 63
End: 2023-04-14
Payer: COMMERCIAL

## 2023-04-14 VITALS
RESPIRATION RATE: 18 BRPM | HEART RATE: 90 BPM | SYSTOLIC BLOOD PRESSURE: 136 MMHG | TEMPERATURE: 97.2 F | DIASTOLIC BLOOD PRESSURE: 63 MMHG

## 2023-04-14 DIAGNOSIS — N18.4 ANEMIA OF CHRONIC RENAL FAILURE, STAGE 4 (SEVERE): Primary | ICD-10-CM

## 2023-04-14 DIAGNOSIS — D63.1 ANEMIA OF CHRONIC RENAL FAILURE, STAGE 4 (SEVERE): Primary | ICD-10-CM

## 2023-04-14 DIAGNOSIS — N39.41 URGE INCONTINENCE: ICD-10-CM

## 2023-04-14 PROCEDURE — 25010000002 IRON SUCROSE PER 1 MG: Performed by: INTERNAL MEDICINE

## 2023-04-14 RX ORDER — SODIUM CHLORIDE 9 MG/ML
250 INJECTION, SOLUTION INTRAVENOUS ONCE
Status: CANCELLED | OUTPATIENT
Start: 2023-04-14

## 2023-04-14 RX ORDER — DIPHENHYDRAMINE HYDROCHLORIDE 50 MG/ML
50 INJECTION INTRAMUSCULAR; INTRAVENOUS AS NEEDED
Status: CANCELLED | OUTPATIENT
Start: 2023-04-14

## 2023-04-14 RX ORDER — SOLIFENACIN SUCCINATE 10 MG/1
TABLET, FILM COATED ORAL
Qty: 90 TABLET | Refills: 0 | Status: SHIPPED | OUTPATIENT
Start: 2023-04-14

## 2023-04-14 RX ORDER — FAMOTIDINE 10 MG/ML
20 INJECTION, SOLUTION INTRAVENOUS AS NEEDED
Status: CANCELLED | OUTPATIENT
Start: 2023-04-14

## 2023-04-14 RX ORDER — SODIUM CHLORIDE 9 MG/ML
250 INJECTION, SOLUTION INTRAVENOUS ONCE
Status: COMPLETED | OUTPATIENT
Start: 2023-04-14 | End: 2023-04-14

## 2023-04-14 RX ORDER — FAMOTIDINE 10 MG/ML
20 INJECTION, SOLUTION INTRAVENOUS AS NEEDED
Status: DISCONTINUED | OUTPATIENT
Start: 2023-04-14 | End: 2023-04-14 | Stop reason: HOSPADM

## 2023-04-14 RX ORDER — DIPHENHYDRAMINE HYDROCHLORIDE 50 MG/ML
50 INJECTION INTRAMUSCULAR; INTRAVENOUS AS NEEDED
Status: DISCONTINUED | OUTPATIENT
Start: 2023-04-14 | End: 2023-04-14 | Stop reason: HOSPADM

## 2023-04-14 RX ADMIN — SODIUM CHLORIDE 250 ML: 9 INJECTION, SOLUTION INTRAVENOUS at 10:41

## 2023-04-14 RX ADMIN — IRON SUCROSE 200 MG: 20 INJECTION, SOLUTION INTRAVENOUS at 10:41

## 2023-04-17 ENCOUNTER — INFUSION (OUTPATIENT)
Dept: ONCOLOGY | Facility: HOSPITAL | Age: 63
End: 2023-04-17
Payer: COMMERCIAL

## 2023-04-17 VITALS
RESPIRATION RATE: 18 BRPM | SYSTOLIC BLOOD PRESSURE: 146 MMHG | DIASTOLIC BLOOD PRESSURE: 67 MMHG | HEART RATE: 95 BPM | TEMPERATURE: 97.7 F

## 2023-04-17 DIAGNOSIS — N18.4 ANEMIA OF CHRONIC RENAL FAILURE, STAGE 4 (SEVERE): Primary | ICD-10-CM

## 2023-04-17 DIAGNOSIS — D63.1 ANEMIA OF CHRONIC RENAL FAILURE, STAGE 4 (SEVERE): Primary | ICD-10-CM

## 2023-04-17 PROCEDURE — 25010000002 IRON SUCROSE PER 1 MG: Performed by: INTERNAL MEDICINE

## 2023-04-17 PROCEDURE — 96365 THER/PROPH/DIAG IV INF INIT: CPT | Performed by: NURSE PRACTITIONER

## 2023-04-17 RX ORDER — FAMOTIDINE 10 MG/ML
20 INJECTION, SOLUTION INTRAVENOUS AS NEEDED
Status: CANCELLED | OUTPATIENT
Start: 2023-04-17

## 2023-04-17 RX ORDER — SODIUM CHLORIDE 9 MG/ML
250 INJECTION, SOLUTION INTRAVENOUS ONCE
Status: CANCELLED | OUTPATIENT
Start: 2023-04-17

## 2023-04-17 RX ORDER — SODIUM CHLORIDE 9 MG/ML
250 INJECTION, SOLUTION INTRAVENOUS ONCE
Status: COMPLETED | OUTPATIENT
Start: 2023-04-17 | End: 2023-04-17

## 2023-04-17 RX ORDER — DIPHENHYDRAMINE HYDROCHLORIDE 50 MG/ML
50 INJECTION INTRAMUSCULAR; INTRAVENOUS AS NEEDED
Status: CANCELLED | OUTPATIENT
Start: 2023-04-17

## 2023-04-17 RX ORDER — FAMOTIDINE 10 MG/ML
20 INJECTION, SOLUTION INTRAVENOUS AS NEEDED
Status: DISCONTINUED | OUTPATIENT
Start: 2023-04-17 | End: 2023-04-17 | Stop reason: HOSPADM

## 2023-04-17 RX ORDER — DIPHENHYDRAMINE HYDROCHLORIDE 50 MG/ML
50 INJECTION INTRAMUSCULAR; INTRAVENOUS AS NEEDED
Status: DISCONTINUED | OUTPATIENT
Start: 2023-04-17 | End: 2023-04-17 | Stop reason: HOSPADM

## 2023-04-17 RX ADMIN — IRON SUCROSE 200 MG: 20 INJECTION, SOLUTION INTRAVENOUS at 14:03

## 2023-04-17 RX ADMIN — SODIUM CHLORIDE 250 ML: 9 INJECTION, SOLUTION INTRAVENOUS at 14:02

## 2023-04-19 ENCOUNTER — INFUSION (OUTPATIENT)
Dept: ONCOLOGY | Facility: HOSPITAL | Age: 63
End: 2023-04-19
Payer: COMMERCIAL

## 2023-04-19 VITALS
HEART RATE: 87 BPM | RESPIRATION RATE: 18 BRPM | TEMPERATURE: 97.5 F | SYSTOLIC BLOOD PRESSURE: 138 MMHG | DIASTOLIC BLOOD PRESSURE: 65 MMHG

## 2023-04-19 DIAGNOSIS — D63.1 ANEMIA OF CHRONIC RENAL FAILURE, STAGE 4 (SEVERE): Primary | ICD-10-CM

## 2023-04-19 DIAGNOSIS — N18.4 ANEMIA OF CHRONIC RENAL FAILURE, STAGE 4 (SEVERE): Primary | ICD-10-CM

## 2023-04-19 PROCEDURE — 25010000002 IRON SUCROSE PER 1 MG: Performed by: INTERNAL MEDICINE

## 2023-04-19 RX ORDER — FAMOTIDINE 10 MG/ML
20 INJECTION, SOLUTION INTRAVENOUS AS NEEDED
Status: CANCELLED | OUTPATIENT
Start: 2023-04-19

## 2023-04-19 RX ORDER — SODIUM CHLORIDE 9 MG/ML
250 INJECTION, SOLUTION INTRAVENOUS ONCE
Status: COMPLETED | OUTPATIENT
Start: 2023-04-19 | End: 2023-04-19

## 2023-04-19 RX ORDER — DIPHENHYDRAMINE HYDROCHLORIDE 50 MG/ML
50 INJECTION INTRAMUSCULAR; INTRAVENOUS AS NEEDED
Status: CANCELLED | OUTPATIENT
Start: 2023-04-19

## 2023-04-19 RX ORDER — DIPHENHYDRAMINE HYDROCHLORIDE 50 MG/ML
50 INJECTION INTRAMUSCULAR; INTRAVENOUS AS NEEDED
Status: DISCONTINUED | OUTPATIENT
Start: 2023-04-19 | End: 2023-04-19 | Stop reason: HOSPADM

## 2023-04-19 RX ORDER — SODIUM CHLORIDE 9 MG/ML
250 INJECTION, SOLUTION INTRAVENOUS ONCE
Status: CANCELLED | OUTPATIENT
Start: 2023-04-19

## 2023-04-19 RX ORDER — FAMOTIDINE 10 MG/ML
20 INJECTION, SOLUTION INTRAVENOUS AS NEEDED
Status: DISCONTINUED | OUTPATIENT
Start: 2023-04-19 | End: 2023-04-19 | Stop reason: HOSPADM

## 2023-04-19 RX ADMIN — IRON SUCROSE 200 MG: 20 INJECTION, SOLUTION INTRAVENOUS at 13:36

## 2023-04-19 RX ADMIN — SODIUM CHLORIDE 250 ML: 9 INJECTION, SOLUTION INTRAVENOUS at 13:36

## 2023-04-20 RX ORDER — METHYLPREDNISOLONE SODIUM SUCCINATE 125 MG/2ML
125 INJECTION, POWDER, LYOPHILIZED, FOR SOLUTION INTRAMUSCULAR; INTRAVENOUS AS NEEDED
Status: CANCELLED
Start: 2023-04-20

## 2023-04-21 ENCOUNTER — INFUSION (OUTPATIENT)
Dept: ONCOLOGY | Facility: HOSPITAL | Age: 63
End: 2023-04-21
Payer: COMMERCIAL

## 2023-04-21 DIAGNOSIS — D63.1 ANEMIA OF CHRONIC RENAL FAILURE, STAGE 4 (SEVERE): Primary | ICD-10-CM

## 2023-04-21 DIAGNOSIS — N18.4 ANEMIA OF CHRONIC RENAL FAILURE, STAGE 4 (SEVERE): Primary | ICD-10-CM

## 2023-04-21 PROCEDURE — 25010000002 IRON SUCROSE PER 1 MG: Performed by: INTERNAL MEDICINE

## 2023-04-21 RX ORDER — SODIUM CHLORIDE 9 MG/ML
250 INJECTION, SOLUTION INTRAVENOUS ONCE
Status: CANCELLED | OUTPATIENT
Start: 2023-04-21

## 2023-04-21 RX ORDER — FAMOTIDINE 10 MG/ML
20 INJECTION, SOLUTION INTRAVENOUS AS NEEDED
Status: CANCELLED | OUTPATIENT
Start: 2023-04-21

## 2023-04-21 RX ORDER — FAMOTIDINE 10 MG/ML
20 INJECTION, SOLUTION INTRAVENOUS AS NEEDED
Status: DISCONTINUED | OUTPATIENT
Start: 2023-04-21 | End: 2023-04-21 | Stop reason: HOSPADM

## 2023-04-21 RX ORDER — DIPHENHYDRAMINE HYDROCHLORIDE 50 MG/ML
50 INJECTION INTRAMUSCULAR; INTRAVENOUS AS NEEDED
Status: DISCONTINUED | OUTPATIENT
Start: 2023-04-21 | End: 2023-04-21 | Stop reason: HOSPADM

## 2023-04-21 RX ORDER — METHYLPREDNISOLONE SODIUM SUCCINATE 125 MG/2ML
125 INJECTION, POWDER, LYOPHILIZED, FOR SOLUTION INTRAMUSCULAR; INTRAVENOUS AS NEEDED
Start: 2023-04-21

## 2023-04-21 RX ORDER — SODIUM CHLORIDE 9 MG/ML
250 INJECTION, SOLUTION INTRAVENOUS ONCE
Status: COMPLETED | OUTPATIENT
Start: 2023-04-21 | End: 2023-04-21

## 2023-04-21 RX ORDER — DIPHENHYDRAMINE HYDROCHLORIDE 50 MG/ML
50 INJECTION INTRAMUSCULAR; INTRAVENOUS AS NEEDED
Status: CANCELLED | OUTPATIENT
Start: 2023-04-21

## 2023-04-21 RX ADMIN — SODIUM CHLORIDE 250 ML: 9 INJECTION, SOLUTION INTRAVENOUS at 11:14

## 2023-04-21 RX ADMIN — IRON SUCROSE 200 MG: 20 INJECTION, SOLUTION INTRAVENOUS at 11:14

## 2023-04-25 ENCOUNTER — DOCUMENTATION (OUTPATIENT)
Dept: ONCOLOGY | Facility: HOSPITAL | Age: 63
End: 2023-04-25
Payer: COMMERCIAL

## 2023-04-25 ENCOUNTER — TELEPHONE (OUTPATIENT)
Dept: ONCOLOGY | Facility: HOSPITAL | Age: 63
End: 2023-04-25
Payer: COMMERCIAL

## 2023-04-25 NOTE — TELEPHONE ENCOUNTER
Spoke with patient regarding labs for tomorrow. Informed her iron profile will need to be resulted prior to epo inj.   She stated she will have lab work today. She prefers [facility]. Faxed orders and canceled lab appointment for tomorrow.  Pt aware and verbalized understanding.

## 2023-04-26 ENCOUNTER — INFUSION (OUTPATIENT)
Dept: ONCOLOGY | Facility: HOSPITAL | Age: 63
End: 2023-04-26
Payer: COMMERCIAL

## 2023-04-26 ENCOUNTER — APPOINTMENT (OUTPATIENT)
Dept: ONCOLOGY | Facility: HOSPITAL | Age: 63
End: 2023-04-26
Payer: COMMERCIAL

## 2023-04-26 ENCOUNTER — OFFICE VISIT (OUTPATIENT)
Dept: ONCOLOGY | Facility: CLINIC | Age: 63
End: 2023-04-26
Payer: COMMERCIAL

## 2023-04-26 VITALS
SYSTOLIC BLOOD PRESSURE: 153 MMHG | WEIGHT: 201 LBS | DIASTOLIC BLOOD PRESSURE: 71 MMHG | HEART RATE: 95 BPM | RESPIRATION RATE: 18 BRPM | BODY MASS INDEX: 32.44 KG/M2 | OXYGEN SATURATION: 97 %

## 2023-04-26 DIAGNOSIS — D63.1 ANEMIA OF CHRONIC RENAL FAILURE, STAGE 4 (SEVERE): Primary | ICD-10-CM

## 2023-04-26 DIAGNOSIS — N18.4 ANEMIA OF CHRONIC RENAL FAILURE, STAGE 4 (SEVERE): Primary | ICD-10-CM

## 2023-04-26 DIAGNOSIS — C34.90 PRIMARY MALIGNANT NEOPLASM OF LUNG METASTATIC TO OTHER SITE, UNSPECIFIED LATERALITY: ICD-10-CM

## 2023-04-26 NOTE — PROGRESS NOTES
"Chief Complaint  Follow-up - anemia     Subjective        Adilene Morgan presents to [facility] HEMATOLOGY & ONCOLOGY     History of Present Illness     No new health issues since last visit.   No new medications.   No new hospitalization.   Feels well.       Objective   Vital Signs:  /71   Pulse 95   Resp 18   Wt 91.2 kg (201 lb)   SpO2 97%   BMI 32.44 kg/m²   Estimated body mass index is 32.44 kg/m² as calculated from the following:    Height as of 3/20/23: 167.6 cm (66\").    Weight as of this encounter: 91.2 kg (201 lb).             Physical Exam  Vitals and nursing note reviewed.   Constitutional:       Appearance: Normal appearance.   Neurological:      General: No focal deficit present.      Mental Status: She is alert and oriented to person, place, and time. Mental status is at baseline.   Psychiatric:         Mood and Affect: Mood normal.         Behavior: Behavior normal.         Thought Content: Thought content normal.        Result Review :  The following data was reviewed by: Lucio Betancourt MD on 04/26/2023:  Common labs        3/29/2023    14:51 4/12/2023    13:53 4/25/2023    11:51   Common Labs   Glucose 101       Glucose  121      95        BUN 41   35      39        Creatinine 2.08   2.4      2.1        Sodium 134   141      140        Potassium 4.4   4.4      4.5        Chloride 102   109      106        Calcium 8.8   8.9      9.3        Albumin 3.7   3.9      4.0        Total Bilirubin 0.2   0.20      0.24        Alkaline Phosphatase 91   83      73        AST (SGOT) 17   15      17        ALT (SGPT) 13   11      15        WBC 4.91       Hemoglobin 8.4       Hematocrit 26.8       Platelets 180           This result is from an external source.     CMP        3/29/2023    14:51 4/12/2023    13:53 4/25/2023    11:51   CMP   Glucose 101       Glucose  121      95        BUN 41   35      39        Creatinine 2.08   2.4      2.1        EGFR 26.3     "   Sodium 134   141      140        Potassium 4.4   4.4      4.5        Chloride 102   109      106        Calcium 8.8   8.9      9.3        Total Protein 7.2       Albumin 3.7   3.9      4.0        Globulin 3.5       Total Bilirubin 0.2   0.20      0.24        Alkaline Phosphatase 91   83      73        AST (SGOT) 17   15      17        ALT (SGPT) 13   11      15        Albumin/Globulin Ratio 1.1       BUN/Creatinine Ratio 19.7       Anion Gap 13.0   8      12            This result is from an external source.     CBC        1/16/2023    12:17 1/24/2023    10:06 3/29/2023    14:51   CBC   WBC 9.04   10.84   4.91     RBC 3.25   3.44   2.99     Hemoglobin 9.1   9.4   8.4     Hematocrit 27.3   30.2   26.8     MCV 84.0   87.8   89.6     MCH 28.0   27.3   28.1     MCHC 33.3   31.1   31.3     RDW 15.8   16.0   14.7     Platelets 270   218   180       CBC w/diff        1/16/2023    12:17 1/24/2023    10:06 3/29/2023    14:51   CBC w/Diff   WBC 9.04   10.84   4.91     RBC 3.25   3.44   2.99     Hemoglobin 9.1   9.4   8.4     Hematocrit 27.3   30.2   26.8     MCV 84.0   87.8   89.6     MCH 28.0   27.3   28.1     MCHC 33.3   31.1   31.3     RDW 15.8   16.0   14.7     Platelets 270   218   180     Neutrophil Rel % 72.3       Immature Granulocyte Rel % 1.5       Lymphocyte Rel % 11.1       Monocyte Rel % 6.6       Eosinophil Rel % 7.9       Basophil Rel % 0.6         Anemia labs:      Lab 04/25/23  1151   IRON 51   IRON SATURATION 26   TIBC 196*   FERRITIN 985*       Adilene Morgan reports a pain score of 0.  Given her pain assessment as noted, treatment options were discussed and the following options were decided upon as a follow-up plan to address the patient's pain: continuation of current treatment plan for pain.    Patient screened negative for depression based on a PHQ-9 score of 0 on 4/26/2023.     Assessment and Plan   Diagnoses and all orders for this visit:    1. Anemia of chronic renal failure, stage 4 (severe)  (Primary)  -     CBC (No Diff); Future  -     Comprehensive Metabolic Panel; Future  -     Ferritin; Future  -     Iron Profile; Future    Chronic, stable.   She is s/p IV iron.  Hemoglobin improved but still remain less than 10.  Iron studies adequate.  We will start her on Retacrit injection every 4 weeks for hemoglobin less than 10.  Risk versus benefit discussed.  She understood and was agreeable.  Retacrit injection given in the clinic today.  We will check hemoglobin every 4 weeks and give Retacrit injection for hemoglobin less than 10.  I will check CBC, CMP, ferritin, iron profile in 3 months.      2. Primary malignant neoplasm of lung metastatic to other site, unspecified laterality      Chronic, stable.   Patient follows with [facility].  She will continue to receive her oral targeted therapy for ALK positive metastatic colon cancer.  She is currently on Lorlatinib. Management per her oncologist at [place]. We will help her with anemia locally.         Follow Up   No follow-ups on file.  Patient was given instructions and counseling regarding her condition or for health maintenance advice. Please see specific information pulled into the AVS if appropriate.

## 2023-05-06 DIAGNOSIS — I63.9 CEREBRAL INFARCTION, UNSPECIFIED: ICD-10-CM

## 2023-05-10 RX ORDER — RIVAROXABAN 20 MG/1
TABLET, FILM COATED ORAL
Qty: 90 TABLET | Refills: 3 | Status: SHIPPED | OUTPATIENT
Start: 2023-05-10

## 2023-05-18 ENCOUNTER — TELEPHONE (OUTPATIENT)
Dept: FAMILY MEDICINE CLINIC | Facility: CLINIC | Age: 63
End: 2023-05-18
Payer: COMMERCIAL

## 2023-05-18 ENCOUNTER — LAB (OUTPATIENT)
Dept: LAB | Facility: HOSPITAL | Age: 63
End: 2023-05-18
Payer: COMMERCIAL

## 2023-05-18 DIAGNOSIS — N39.0 ACUTE UTI (URINARY TRACT INFECTION): ICD-10-CM

## 2023-05-18 DIAGNOSIS — R39.9 UTI SYMPTOMS: ICD-10-CM

## 2023-05-18 DIAGNOSIS — N39.0 ACUTE UTI (URINARY TRACT INFECTION): Primary | ICD-10-CM

## 2023-05-18 LAB
BACTERIA UR QL AUTO: ABNORMAL /HPF
BILIRUB UR QL STRIP: NEGATIVE
CLARITY UR: ABNORMAL
COLOR UR: ABNORMAL
GLUCOSE UR STRIP-MCNC: NEGATIVE MG/DL
HGB UR QL STRIP.AUTO: ABNORMAL
HYALINE CASTS UR QL AUTO: ABNORMAL /LPF
KETONES UR QL STRIP: NEGATIVE
LEUKOCYTE ESTERASE UR QL STRIP.AUTO: ABNORMAL
NITRITE UR QL STRIP: POSITIVE
PH UR STRIP.AUTO: <=5 [PH] (ref 5–9)
PROT UR QL STRIP: ABNORMAL
RBC # UR STRIP: ABNORMAL /HPF
REF LAB TEST METHOD: ABNORMAL
SP GR UR STRIP: 1.01 (ref 1–1.03)
SQUAMOUS #/AREA URNS HPF: ABNORMAL /HPF
UROBILINOGEN UR QL STRIP: ABNORMAL
WBC # UR STRIP: ABNORMAL /HPF

## 2023-05-18 PROCEDURE — 81001 URINALYSIS AUTO W/SCOPE: CPT

## 2023-05-18 PROCEDURE — 87088 URINE BACTERIA CULTURE: CPT

## 2023-05-18 PROCEDURE — 87186 SC STD MICRODIL/AGAR DIL: CPT

## 2023-05-18 PROCEDURE — 87086 URINE CULTURE/COLONY COUNT: CPT

## 2023-05-18 RX ORDER — SULFAMETHOXAZOLE AND TRIMETHOPRIM 400; 80 MG/1; MG/1
1 TABLET ORAL 2 TIMES DAILY
Qty: 14 TABLET | Refills: 0 | Status: SHIPPED | OUTPATIENT
Start: 2023-05-18

## 2023-05-18 NOTE — TELEPHONE ENCOUNTER
----- Message from Jen Raymundo MD sent at 5/18/2023 12:03 PM CDT -----  Has a UTI, have sent a prescription for Bactrim to [facility] pharmacy for her.

## 2023-05-18 NOTE — TELEPHONE ENCOUNTER
Called and left message that pt had uti and antibotic had been called in if any questions give me a call back left my phone number.

## 2023-05-20 LAB — BACTERIA SPEC AEROBE CULT: ABNORMAL

## 2023-05-23 ENCOUNTER — TELEPHONE (OUTPATIENT)
Dept: FAMILY MEDICINE CLINIC | Facility: CLINIC | Age: 63
End: 2023-05-23
Payer: COMMERCIAL

## 2023-05-23 NOTE — PROGRESS NOTES
Per Dr. Nielsen, Ms. Morgan has been called with recent lab results & recommendations.  Continue current medications and follow-up as planned or sooner if any problems.

## 2023-05-23 NOTE — TELEPHONE ENCOUNTER
----- Message from Yuli Nielsen MD sent at 5/22/2023 12:42 PM CDT -----  Covering for Dr. Raymundo.  Urine culture shows that Bactrim sent in should treat urinary tract infection.  Complete full course of antibiotic.  ThanksYVES

## 2023-05-24 ENCOUNTER — INFUSION (OUTPATIENT)
Dept: ONCOLOGY | Facility: HOSPITAL | Age: 63
End: 2023-05-24
Payer: COMMERCIAL

## 2023-05-24 VITALS
TEMPERATURE: 97.6 F | DIASTOLIC BLOOD PRESSURE: 68 MMHG | SYSTOLIC BLOOD PRESSURE: 152 MMHG | RESPIRATION RATE: 20 BRPM | HEART RATE: 96 BPM | OXYGEN SATURATION: 100 %

## 2023-05-24 DIAGNOSIS — D63.1 ANEMIA OF CHRONIC RENAL FAILURE, STAGE 4 (SEVERE): ICD-10-CM

## 2023-05-24 DIAGNOSIS — D63.1 ANEMIA OF CHRONIC KIDNEY FAILURE, STAGE 4 (SEVERE): Primary | ICD-10-CM

## 2023-05-24 DIAGNOSIS — N18.4 ANEMIA OF CHRONIC KIDNEY FAILURE, STAGE 4 (SEVERE): Primary | ICD-10-CM

## 2023-05-24 DIAGNOSIS — N18.4 ANEMIA OF CHRONIC RENAL FAILURE, STAGE 4 (SEVERE): ICD-10-CM

## 2023-05-24 LAB
DEPRECATED RDW RBC AUTO: 48 FL (ref 37–54)
ERYTHROCYTE [DISTWIDTH] IN BLOOD BY AUTOMATED COUNT: 14.2 % (ref 12.3–15.4)
HCT VFR BLD AUTO: 26 % (ref 34–46.6)
HGB BLD-MCNC: 8.4 G/DL (ref 12–15.9)
MCH RBC QN AUTO: 29.8 PG (ref 26.6–33)
MCHC RBC AUTO-ENTMCNC: 32.3 G/DL (ref 31.5–35.7)
MCV RBC AUTO: 92.2 FL (ref 79–97)
PLATELET # BLD AUTO: 219 10*3/MM3 (ref 140–450)
PMV BLD AUTO: 9.7 FL (ref 6–12)
RBC # BLD AUTO: 2.82 10*6/MM3 (ref 3.77–5.28)
WBC NRBC COR # BLD: 8.2 10*3/MM3 (ref 3.4–10.8)

## 2023-06-19 ENCOUNTER — OFFICE VISIT (OUTPATIENT)
Dept: FAMILY MEDICINE CLINIC | Facility: CLINIC | Age: 63
End: 2023-06-19
Payer: COMMERCIAL

## 2023-06-19 VITALS
OXYGEN SATURATION: 98 % | DIASTOLIC BLOOD PRESSURE: 70 MMHG | HEIGHT: 66 IN | BODY MASS INDEX: 32.44 KG/M2 | SYSTOLIC BLOOD PRESSURE: 130 MMHG | HEART RATE: 75 BPM

## 2023-06-19 DIAGNOSIS — N18.4 ANEMIA OF CHRONIC RENAL FAILURE, STAGE 4 (SEVERE): ICD-10-CM

## 2023-06-19 DIAGNOSIS — C34.91 ADENOSQUAMOUS CARCINOMA OF RIGHT LUNG: Primary | ICD-10-CM

## 2023-06-19 DIAGNOSIS — D63.1 ANEMIA OF CHRONIC RENAL FAILURE, STAGE 4 (SEVERE): ICD-10-CM

## 2023-06-19 DIAGNOSIS — N18.4 STAGE 4 CHRONIC KIDNEY DISEASE: ICD-10-CM

## 2023-06-19 PROBLEM — N39.0 ACUTE UTI (URINARY TRACT INFECTION): Status: RESOLVED | Noted: 2022-06-11 | Resolved: 2023-06-19

## 2023-06-19 PROBLEM — N18.30 CKD (CHRONIC KIDNEY DISEASE) STAGE 3, GFR 30-59 ML/MIN: Status: RESOLVED | Noted: 2022-05-30 | Resolved: 2023-06-19

## 2023-06-19 PROCEDURE — 99213 OFFICE O/P EST LOW 20 MIN: CPT | Performed by: GENERAL PRACTICE

## 2023-06-19 RX ORDER — SODIUM BICARBONATE 650 MG/1
650 TABLET ORAL EVERY MORNING
COMMUNITY
Start: 2023-05-23

## 2023-06-19 NOTE — PROGRESS NOTES
Subjective   Adilene Morgan is a 63 y.o. female.   Chief Complaint   Patient presents with    Hypotension     For review and evaluation of management of chronic medical problems. Records reviewed. Any recent labs, xrays reviewed and medications reconciled. Has been started on injection for anemia due to CKD.  Chronic Kidney Disease  This is a chronic problem. The current episode started more than 1 year ago. The problem occurs constantly. The problem has been waxing and waning. Exacerbated by: lung cancer treatment. Treatments tried: prednisone. The treatment provided moderate relief.   Anemia  Presents for follow-up (Secondary to chemotherapy) visit. Symptoms include malaise/fatigue. Signs of blood loss that are not present include hematemesis, hematochezia and melena. There are no compliance problems.       The following portions of the patient's history were reviewed and updated as appropriate: allergies, current medications, past social history and problem list.    Outpatient Medications Prior to Visit   Medication Sig Dispense Refill    aspirin 81 MG EC tablet Take 1 tablet by mouth Daily.      Calcium Carbonate (CALCIUM 500 PO) Take  by mouth Daily.      ezetimibe (ZETIA) 10 MG tablet Take 1 tablet by mouth every night at bedtime.      folic acid (FOLVITE) 400 MCG tablet Take 2 tablets by mouth Daily.      Lorlatinib (Lorbrena) 25 MG tablet Take 75 mg by mouth Daily. Will start back 1/23 due to being held currently      midodrine (PROAMATINE) 10 MG tablet Take 1 tablet by mouth 3 (Three) Times a Day Before Meals. (Patient taking differently: Take 5 mg by mouth 3 (Three) Times a Day Before Meals.) 270 tablet 3    multivitamin with minerals tablet tablet Take 1 tablet by mouth Daily.      ondansetron (ZOFRAN) 8 MG tablet Take 1 tablet by mouth Every 8 (Eight) Hours As Needed for Nausea or Vomiting. 90 tablet 1    pantoprazole (Protonix) 40 MG EC tablet Take 1 tablet by mouth Daily. 30 tablet 0     prochlorperazine (COMPAZINE) 10 MG tablet Take 1 tablet by mouth Every 6 (Six) Hours As Needed for Nausea or Vomiting. (Patient taking differently: Take 1 tablet by mouth Every 6 (Six) Hours As Needed for Nausea or Vomiting. Pt reports taking in evening) 120 tablet 1    promethazine (PHENERGAN) 25 MG tablet TAKE 1 TABLET BY MOUTH EVERY 6 HOURS AS NEEDED FOR NAUSEA OR VOMITING. 60 tablet 1    rosuvastatin (CRESTOR) 5 MG tablet Take 1 tablet by mouth Daily.      sodium bicarbonate 650 MG tablet Take 1 tablet by mouth Every Morning.      SUMAtriptan (IMITREX) 20 MG/ACT nasal spray 1 spray into the nostril(s) as directed by provider Every 2 (Two) Hours As Needed for Migraine. 6 each 2    Synthroid 200 MCG tablet TAKE 1 TABLET BY MOUTH EVERY DAY 30 tablet 3    topiramate (TOPAMAX) 50 MG tablet TAKE 1 TABLET BY MOUTH EVERY DAY AT NIGHT 90 tablet 3    traMADol (ULTRAM) 50 MG tablet Take 1 tablet by mouth Every 6 (Six) Hours As Needed. for pain      trospium (SANCTURA) 20 MG tablet Take 1 tablet by mouth 2 (Two) Times a Day.      venlafaxine XR (EFFEXOR-XR) 37.5 MG 24 hr capsule TAKE 1 CAPSULE BY MOUTH EVERY DAY 90 capsule 3    vitamin B-12 (CYANOCOBALAMIN) 500 MCG tablet Take 1 tablet by mouth Daily.      Xarelto 20 MG tablet TAKE 1 TABLET BY MOUTH DAILY. AFTER FINISHED WITH LOWER DOSE FOR 21 DAYS 90 tablet 3    guaiFENesin-codeine (GUAIFENESIN AC) 100-10 MG/5ML liquid Take 5 mL by mouth 3 (Three) Times a Day As Needed for Cough. (Patient not taking: Reported on 6/19/2023) 180 mL 0    solifenacin (VESICARE) 10 MG tablet TAKE 1 TABLET BY MOUTH EVERY DAY (Patient not taking: Reported on 6/19/2023) 90 tablet 0    sulfamethoxazole-trimethoprim (Bactrim) 400-80 MG tablet Take 1 tablet by mouth 2 (Two) Times a Day. (Patient not taking: Reported on 6/19/2023) 14 tablet 0     No facility-administered medications prior to visit.       Review of Systems   Constitutional:  Positive for malaise/fatigue.   Gastrointestinal:  Negative  "for hematemesis, hematochezia and melena.   I have reviewed 12 systems with patient. Findings were negative except what is noted below and/or in history of present illness.     Objective   Visit Vitals  /70   Pulse 75   Ht 167.6 cm (66\")   LMP  (LMP Unknown)   SpO2 98%   BMI 32.44 kg/m²     Physical Exam  Vitals and nursing note reviewed.   Constitutional:       General: She is not in acute distress.     Appearance: She is well-developed.   HENT:      Head: Normocephalic and atraumatic.      Nose: Nose normal.   Eyes:      General:         Right eye: No discharge.         Left eye: No discharge.      Conjunctiva/sclera: Conjunctivae normal.      Pupils: Pupils are equal, round, and reactive to light.   Neck:      Thyroid: No thyromegaly.   Cardiovascular:      Rate and Rhythm: Normal rate and regular rhythm.      Heart sounds: Normal heart sounds.   Pulmonary:      Effort: Pulmonary effort is normal.      Breath sounds: Normal breath sounds.   Lymphadenopathy:      Cervical: No cervical adenopathy.   Skin:     General: Skin is warm and dry.   Neurological:      Mental Status: She is alert and oriented to person, place, and time.     Notes brought forward are reviewed and updated if indicated.     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 Follow up with specialists as scheduled.    No orders of the defined types were placed in this encounter.    Return in " about 3 months (around 9/19/2023) for Recheck.        This document has been electronically signed by Jen Raymundo MD on June 19, 2023 12:08 CDT

## 2023-07-28 ENCOUNTER — TELEPHONE (OUTPATIENT)
Dept: ONCOLOGY | Facility: CLINIC | Age: 63
End: 2023-07-28
Payer: COMMERCIAL

## 2023-08-11 ENCOUNTER — OFFICE VISIT (OUTPATIENT)
Dept: ONCOLOGY | Facility: CLINIC | Age: 63
End: 2023-08-11
Payer: COMMERCIAL

## 2023-08-11 ENCOUNTER — INFUSION (OUTPATIENT)
Dept: ONCOLOGY | Facility: HOSPITAL | Age: 63
End: 2023-08-11
Payer: COMMERCIAL

## 2023-08-11 ENCOUNTER — LAB (OUTPATIENT)
Dept: ONCOLOGY | Facility: HOSPITAL | Age: 63
End: 2023-08-11
Payer: COMMERCIAL

## 2023-08-11 VITALS
WEIGHT: 208 LBS | BODY MASS INDEX: 33.57 KG/M2 | RESPIRATION RATE: 18 BRPM | SYSTOLIC BLOOD PRESSURE: 156 MMHG | OXYGEN SATURATION: 98 % | DIASTOLIC BLOOD PRESSURE: 75 MMHG | HEART RATE: 89 BPM

## 2023-08-11 DIAGNOSIS — C34.90 PRIMARY MALIGNANT NEOPLASM OF LUNG METASTATIC TO OTHER SITE, UNSPECIFIED LATERALITY: ICD-10-CM

## 2023-08-11 DIAGNOSIS — N18.4 STAGE 4 CHRONIC KIDNEY DISEASE: ICD-10-CM

## 2023-08-11 DIAGNOSIS — D63.1 ANEMIA OF CHRONIC RENAL FAILURE, STAGE 4 (SEVERE): ICD-10-CM

## 2023-08-11 DIAGNOSIS — D63.1 ANEMIA OF CHRONIC RENAL FAILURE, STAGE 4 (SEVERE): Primary | ICD-10-CM

## 2023-08-11 DIAGNOSIS — N18.4 ANEMIA OF CHRONIC RENAL FAILURE, STAGE 4 (SEVERE): Primary | ICD-10-CM

## 2023-08-11 DIAGNOSIS — N18.4 ANEMIA OF CHRONIC RENAL FAILURE, STAGE 4 (SEVERE): ICD-10-CM

## 2023-08-11 LAB
FERRITIN SERPL-MCNC: 466.4 NG/ML (ref 13–150)
HOLD SPECIMEN: NORMAL
IRON 24H UR-MRATE: 49 MCG/DL (ref 37–145)
IRON SATN MFR SERPL: 23 % (ref 20–50)
TIBC SERPL-MCNC: 216 MCG/DL (ref 298–536)
TRANSFERRIN SERPL-MCNC: 145 MG/DL (ref 200–360)

## 2023-08-12 NOTE — PROGRESS NOTES
"Chief Complaint  Follow-up - anemia, lung cancer       Subjective        Adilene Morgan presents to [facility]EMATOLOGY [facility]COLOG[facility]  History of Present Illness    No new health issues since last visit.   No new medications.   No new hospitalization.   Feels well.       Objective   Vital Signs:  /75   Pulse 89   Resp 18   Wt 94.3 kg (208 lb)   SpO2 98%   BMI 33.57 kg/mý   Estimated body mass index is 33.57 kg/mý as calculated from the following:    Height as of 6/19/23: 167.6 cm (66\").    Weight as of this encounter: 94.3 kg (208 lb).             Physical Exam  Vitals and nursing note reviewed.   Constitutional:       Appearance: Normal appearance.   Neurological:      General: No focal deficit present.      Mental Status: She is alert and oriented to person, place, and time. Mental status is at baseline.   Psychiatric:         Mood and Affect: Mood normal.         Behavior: Behavior normal.         Thought Content: Thought content normal.      Result Review :  The following data was reviewed by: Lucio Betancourt MD on 08/11/2023:  Common labs          7/5/2023    12:43 7/28/2023    12:49 8/2/2023    12:07   Common Labs   Glucose 97     107     103       BUN 46     44     32       Creatinine 2.0     2.3     1.9       Sodium 139     141     140       Potassium 4.3     4.2     4.5       Chloride 107     107     107       Calcium 9.0     9.1     9.0       Albumin 4.1     4.1     4.0       Total Bilirubin 0.19     0.16     0.20       Alkaline Phosphatase 88     83     76       AST (SGOT) 16     14     18       ALT (SGPT) 15     15     16          Details          This result is from an external source.             CMP          7/5/2023    12:43 7/28/2023    12:49 8/2/2023    12:07   CMP   Glucose 97     107     103       BUN 46     44     32       Creatinine 2.0     2.3     1.9       Sodium 139     141     140       Potassium 4.3     4.2     4.5       Chloride 107     107 "     107       Calcium 9.0     9.1     9.0       Albumin 4.1     4.1     4.0       Total Bilirubin 0.19     0.16     0.20       Alkaline Phosphatase 88     83     76       AST (SGOT) 16     14     18       ALT (SGPT) 15     15     16       Anion Gap 10     11     10          Details          This result is from an external source.             CBC          3/29/2023    14:51 5/24/2023    14:32 6/26/2023    11:00   CBC   WBC 4.91  8.20  10.59    RBC 2.99  2.82  3.20    Hemoglobin 8.4  8.4  9.3    Hematocrit 26.8  26.0  29.2    MCV 89.6  92.2  91.3    MCH 28.1  29.8  29.1    MCHC 31.3  32.3  31.8    RDW 14.7  14.2  13.7    Platelets 180  219  233      CBC w/diff          3/29/2023    14:51 5/24/2023    14:32 6/26/2023    11:00   CBC w/Diff   WBC 4.91  8.20  10.59    RBC 2.99  2.82  3.20    Hemoglobin 8.4  8.4  9.3    Hematocrit 26.8  26.0  29.2    MCV 89.6  92.2  91.3    MCH 28.1  29.8  29.1    MCHC 31.3  32.3  31.8    RDW 14.7  14.2  13.7    Platelets 180  219  233      Anemia labs:      Lab 08/11/23  1420   IRON 49   IRON SATURATION (TSAT) 23   TIBC 216*   TRANSFERRIN 145*   FERRITIN 466.40*       Adilene Morgan reports a pain score of 0.  Given her pain assessment as noted, treatment options were discussed and the following options were decided upon as a follow-up plan to address the patient's pain: continuation of current treatment plan for pain.    Patient screened negative for depression based on a PHQ-9 score of 0 on 8/11/2023.         Assessment and Plan   Diagnoses and all orders for this visit:    1. Anemia of chronic renal failure, stage 4 (severe) (Primary)  -     Ferritin; Future  -     Iron Profile; Future  -     CBC (No Diff); Standing  -     Ferritin; Future  -     Iron Profile; Future  -     CBC (No Diff); Future  -     Ferritin; Future  -     Iron Profile; Future        Chronic, stable.   Continue retacrit every 4 weeks for Hg < 10.   Iron studies checked today and is normal.   Continue CBC every 4  weeks and retacrit for Hg < 10.   CBC, ferritin, iron profile in 3 months.     2. Stage 4 chronic kidney disease  -     Comprehensive Metabolic Panel; Future    Chronic, stable.   Avoid nephrotoxic agents.   Control BP and DM aggressively.   Avoid nephrotoxic agents.   Avoid NSAIDs.   Check CMP in 3 months.       3. Primary malignant neoplasm of lung metastatic to other site, unspecified laterality    Chronic, stable.   Patient follows with [facility] in [place] in [place].   She will continue to receive her oral targeted therapy fpr ALK positive for metastatic lung cancer.   She is currently on Lorlatinib.   Management per her oncologist in [place].   We will help her with anemia locally.          Follow Up   No follow-ups on file.  Patient was given instructions and counseling regarding her condition or for health maintenance advice. Please see specific information pulled into the AVS if appropriate.

## 2023-08-14 ENCOUNTER — INFUSION (OUTPATIENT)
Dept: ONCOLOGY | Facility: HOSPITAL | Age: 63
End: 2023-08-14
Payer: COMMERCIAL

## 2023-08-14 VITALS — HEART RATE: 105 BPM | TEMPERATURE: 98 F | DIASTOLIC BLOOD PRESSURE: 75 MMHG | SYSTOLIC BLOOD PRESSURE: 136 MMHG

## 2023-08-14 DIAGNOSIS — N18.4 ANEMIA OF CHRONIC RENAL FAILURE, STAGE 4 (SEVERE): Primary | ICD-10-CM

## 2023-08-14 DIAGNOSIS — D63.1 ANEMIA OF CHRONIC RENAL FAILURE, STAGE 4 (SEVERE): Primary | ICD-10-CM

## 2023-08-14 LAB
DEPRECATED RDW RBC AUTO: 44.9 FL (ref 37–54)
ERYTHROCYTE [DISTWIDTH] IN BLOOD BY AUTOMATED COUNT: 13.5 % (ref 12.3–15.4)
HCT VFR BLD AUTO: 29.6 % (ref 34–46.6)
HGB BLD-MCNC: 9.3 G/DL (ref 12–15.9)
MCH RBC QN AUTO: 28.5 PG (ref 26.6–33)
MCHC RBC AUTO-ENTMCNC: 31.4 G/DL (ref 31.5–35.7)
MCV RBC AUTO: 90.8 FL (ref 79–97)
PLATELET # BLD AUTO: 223 10*3/MM3 (ref 140–450)
PMV BLD AUTO: 9.9 FL (ref 6–12)
RBC # BLD AUTO: 3.26 10*6/MM3 (ref 3.77–5.28)
WBC NRBC COR # BLD: 8.72 10*3/MM3 (ref 3.4–10.8)

## 2023-08-14 PROCEDURE — 85027 COMPLETE CBC AUTOMATED: CPT

## 2023-08-14 PROCEDURE — 25010000002 EPOETIN ALFA-EPBX 10000 UNIT/ML SOLUTION: Performed by: INTERNAL MEDICINE

## 2023-08-14 PROCEDURE — 96372 THER/PROPH/DIAG INJ SC/IM: CPT | Performed by: INTERNAL MEDICINE

## 2023-08-14 RX ADMIN — EPOETIN ALFA-EPBX 10000 UNITS: 10000 INJECTION, SOLUTION INTRAVENOUS; SUBCUTANEOUS at 14:23

## 2023-09-04 ENCOUNTER — HOSPITAL ENCOUNTER (EMERGENCY)
Facility: HOSPITAL | Age: 63
Discharge: HOME | End: 2023-09-05
Attending: EMERGENCY MEDICINE | Admitting: EMERGENCY MEDICINE
Payer: COMMERCIAL

## 2023-09-04 ENCOUNTER — APPOINTMENT (OUTPATIENT)
Dept: GENERAL RADIOLOGY | Facility: HOSPITAL | Age: 63
End: 2023-09-04
Payer: COMMERCIAL

## 2023-09-04 VITALS
SYSTOLIC BLOOD PRESSURE: 163 MMHG | OXYGEN SATURATION: 100 % | DIASTOLIC BLOOD PRESSURE: 67 MMHG | TEMPERATURE: 98.9 F | HEIGHT: 66 IN | BODY MASS INDEX: 32.14 KG/M2 | HEART RATE: 88 BPM | WEIGHT: 200 LBS | RESPIRATION RATE: 18 BRPM

## 2023-09-04 DIAGNOSIS — N30.01 ACUTE CYSTITIS WITH HEMATURIA: Primary | ICD-10-CM

## 2023-09-04 LAB
ALBUMIN SERPL-MCNC: 4.1 G/DL (ref 3.5–5.2)
ALBUMIN/GLOB SERPL: 1 G/DL
ALP SERPL-CCNC: 75 U/L (ref 39–117)
ALT SERPL W P-5'-P-CCNC: 16 U/L (ref 1–33)
ANION GAP SERPL CALCULATED.3IONS-SCNC: 15 MMOL/L (ref 5–15)
AST SERPL-CCNC: 18 U/L (ref 1–32)
BACTERIA UR QL AUTO: ABNORMAL /HPF
BASOPHILS # BLD AUTO: 0.07 10*3/MM3 (ref 0–0.2)
BASOPHILS NFR BLD AUTO: 0.9 % (ref 0–1.5)
BILIRUB SERPL-MCNC: 0.2 MG/DL (ref 0–1.2)
BILIRUB UR QL STRIP: NEGATIVE
BUN SERPL-MCNC: 59 MG/DL (ref 8–23)
BUN/CREAT SERPL: 29.1 (ref 7–25)
CALCIUM SPEC-SCNC: 9 MG/DL (ref 8.6–10.5)
CHLORIDE SERPL-SCNC: 99 MMOL/L (ref 98–107)
CLARITY UR: ABNORMAL
CO2 SERPL-SCNC: 20 MMOL/L (ref 22–29)
COLOR UR: YELLOW
CREAT SERPL-MCNC: 2.03 MG/DL (ref 0.57–1)
DEPRECATED RDW RBC AUTO: 45 FL (ref 37–54)
EGFRCR SERPLBLD CKD-EPI 2021: 27 ML/MIN/1.73
EOSINOPHIL # BLD AUTO: 0.22 10*3/MM3 (ref 0–0.4)
EOSINOPHIL NFR BLD AUTO: 2.7 % (ref 0.3–6.2)
ERYTHROCYTE [DISTWIDTH] IN BLOOD BY AUTOMATED COUNT: 13.7 % (ref 12.3–15.4)
FLUAV RNA RESP QL NAA+PROBE: NOT DETECTED
FLUBV RNA RESP QL NAA+PROBE: NOT DETECTED
GEN 5 2HR TROPONIN T REFLEX: 13 NG/L
GLOBULIN UR ELPH-MCNC: 4.1 GM/DL
GLUCOSE SERPL-MCNC: 112 MG/DL (ref 65–99)
GLUCOSE UR STRIP-MCNC: NEGATIVE MG/DL
HCT VFR BLD AUTO: 26.6 % (ref 34–46.6)
HGB BLD-MCNC: 8.8 G/DL (ref 12–15.9)
HGB UR QL STRIP.AUTO: ABNORMAL
HOLD SPECIMEN: NORMAL
HOLD SPECIMEN: NORMAL
HYALINE CASTS UR QL AUTO: ABNORMAL /LPF
IMM GRANULOCYTES # BLD AUTO: 0.05 10*3/MM3 (ref 0–0.05)
IMM GRANULOCYTES NFR BLD AUTO: 0.6 % (ref 0–0.5)
KETONES UR QL STRIP: NEGATIVE
LEUKOCYTE ESTERASE UR QL STRIP.AUTO: ABNORMAL
LYMPHOCYTES # BLD AUTO: 0.85 10*3/MM3 (ref 0.7–3.1)
LYMPHOCYTES NFR BLD AUTO: 10.5 % (ref 19.6–45.3)
MCH RBC QN AUTO: 29.5 PG (ref 26.6–33)
MCHC RBC AUTO-ENTMCNC: 33.1 G/DL (ref 31.5–35.7)
MCV RBC AUTO: 89.3 FL (ref 79–97)
MONOCYTES # BLD AUTO: 0.72 10*3/MM3 (ref 0.1–0.9)
MONOCYTES NFR BLD AUTO: 8.9 % (ref 5–12)
NEUTROPHILS NFR BLD AUTO: 6.21 10*3/MM3 (ref 1.7–7)
NEUTROPHILS NFR BLD AUTO: 76.4 % (ref 42.7–76)
NITRITE UR QL STRIP: NEGATIVE
NRBC BLD AUTO-RTO: 0 /100 WBC (ref 0–0.2)
NT-PROBNP SERPL-MCNC: 236 PG/ML (ref 0–900)
PH UR STRIP.AUTO: 6 [PH] (ref 5–9)
PLATELET # BLD AUTO: 173 10*3/MM3 (ref 140–450)
PMV BLD AUTO: 9.8 FL (ref 6–12)
POTASSIUM SERPL-SCNC: 4.1 MMOL/L (ref 3.5–5.2)
PROT SERPL-MCNC: 8.2 G/DL (ref 6–8.5)
PROT UR QL STRIP: ABNORMAL
RBC # BLD AUTO: 2.98 10*6/MM3 (ref 3.77–5.28)
RBC # UR STRIP: ABNORMAL /HPF
REF LAB TEST METHOD: ABNORMAL
SARS-COV-2 RNA RESP QL NAA+PROBE: NOT DETECTED
SODIUM SERPL-SCNC: 134 MMOL/L (ref 136–145)
SP GR UR STRIP: 1.01 (ref 1–1.03)
SQUAMOUS #/AREA URNS HPF: ABNORMAL /HPF
TROPONIN T DELTA: 0 NG/L
TROPONIN T SERPL HS-MCNC: 13 NG/L
UROBILINOGEN UR QL STRIP: ABNORMAL
WBC # UR STRIP: ABNORMAL /HPF
WBC NRBC COR # BLD: 8.12 10*3/MM3 (ref 3.4–10.8)
WHOLE BLOOD HOLD COAG: NORMAL
WHOLE BLOOD HOLD SPECIMEN: NORMAL

## 2023-09-04 PROCEDURE — 83880 ASSAY OF NATRIURETIC PEPTIDE: CPT | Performed by: EMERGENCY MEDICINE

## 2023-09-04 PROCEDURE — 84484 ASSAY OF TROPONIN QUANT: CPT | Performed by: EMERGENCY MEDICINE

## 2023-09-04 PROCEDURE — 93010 ELECTROCARDIOGRAM REPORT: CPT | Performed by: INTERNAL MEDICINE

## 2023-09-04 PROCEDURE — 87636 SARSCOV2 & INF A&B AMP PRB: CPT | Performed by: EMERGENCY MEDICINE

## 2023-09-04 PROCEDURE — 99284 EMERGENCY DEPT VISIT MOD MDM: CPT

## 2023-09-04 PROCEDURE — 80053 COMPREHEN METABOLIC PANEL: CPT | Performed by: EMERGENCY MEDICINE

## 2023-09-04 PROCEDURE — 71045 X-RAY EXAM CHEST 1 VIEW: CPT

## 2023-09-04 PROCEDURE — 93005 ELECTROCARDIOGRAM TRACING: CPT

## 2023-09-04 PROCEDURE — 93005 ELECTROCARDIOGRAM TRACING: CPT | Performed by: EMERGENCY MEDICINE

## 2023-09-04 PROCEDURE — 85025 COMPLETE CBC W/AUTO DIFF WBC: CPT | Performed by: EMERGENCY MEDICINE

## 2023-09-04 PROCEDURE — 87086 URINE CULTURE/COLONY COUNT: CPT | Performed by: EMERGENCY MEDICINE

## 2023-09-04 PROCEDURE — 81001 URINALYSIS AUTO W/SCOPE: CPT | Performed by: EMERGENCY MEDICINE

## 2023-09-04 PROCEDURE — 36415 COLL VENOUS BLD VENIPUNCTURE: CPT

## 2023-09-04 RX ORDER — SODIUM CHLORIDE 0.9 % (FLUSH) 0.9 %
10 SYRINGE (ML) INJECTION AS NEEDED
Status: DISCONTINUED | OUTPATIENT
Start: 2023-09-04 | End: 2023-09-05 | Stop reason: HOSPADM

## 2023-09-04 RX ORDER — NITROFURANTOIN 25; 75 MG/1; MG/1
100 CAPSULE ORAL ONCE
Status: COMPLETED | OUTPATIENT
Start: 2023-09-05 | End: 2023-09-05

## 2023-09-05 LAB — HOLD SPECIMEN: NORMAL

## 2023-09-05 RX ORDER — NITROFURANTOIN 25; 75 MG/1; MG/1
100 CAPSULE ORAL 2 TIMES DAILY
Qty: 14 CAPSULE | Refills: 0 | Status: SHIPPED | OUTPATIENT
Start: 2023-09-05 | End: 2023-09-12

## 2023-09-05 RX ADMIN — NITROFURANTOIN MONOHYDRATE/MACROCRYSTALLINE 100 MG: 25; 75 CAPSULE ORAL at 00:09

## 2023-09-05 NOTE — DISCHARGE INSTRUCTIONS
Please return for new or worsening symptoms.  Follow-up with primary care.  Urine has been sent for culture and will be contacted should an antibiotic change be needed.

## 2023-09-05 NOTE — ED PROVIDER NOTES
Subjective   History of Present Illness  63-year-old female currently on immunotherapy for which she believes is very rare lung cancer presents with complaint of 1 day of cough, chest pain, generalized weakness and fatigue.  No fever at home.  No reported vomiting or diarrhea.  Review of documentation shows that she has metastatic adenocarcinoma of the colon.  She also has a history of chronic kidney disease with anemia of chronic disease.  She does get frequent urinary tract infections and is concerned that may be cause.    Family history, surgical history, social history, current medications and allergies are reviewed with the patient and triage documentation and vitals are reviewed.     History provided by:  Patient and medical records   used: No      Review of Systems   Constitutional:  Positive for fatigue. Negative for chills and fever.   HENT:  Negative for congestion and sore throat.    Eyes:  Negative for photophobia and visual disturbance.   Respiratory:  Positive for cough. Negative for shortness of breath and wheezing.    Cardiovascular:  Positive for chest pain. Negative for palpitations and leg swelling.   Gastrointestinal:  Negative for abdominal pain, diarrhea, nausea and vomiting.   Endocrine: Negative for polydipsia, polyphagia and polyuria.   Genitourinary:  Positive for dysuria.   Musculoskeletal:  Negative for arthralgias, back pain, myalgias and neck pain.   Skin:  Negative for color change, pallor and wound.   Allergic/Immunologic: Negative.    Neurological:  Positive for weakness. Negative for syncope, light-headedness, numbness and headaches.   Hematological: Negative.    Psychiatric/Behavioral: Negative.       Past Medical History:   Diagnosis Date    Achilles bursitis     Achilles tendinitis     Acquired hypothyroidism     Allergic rhinitis     Allergy to meat     alphagal    Allergy to milk products     Arrhythmia     Asthmatic bronchitis     Asthmatic bronchitis      Bone spur     Calcaneal spur     Cancer     Depressive disorder     Family history of thyroid problem     Herpes simplex     Hypermetropia     Hypothyroidism     Menopausal flushing     Menopause     Migraine     Otalgia     Pneumonia     Presbyopia     Stroke     Trochanteric bursitis     UTI (urinary tract infection) 07/03/2018    Ventricular premature beats        Allergies   Allergen Reactions    Azithromycin Other (See Comments)     Pt and spouse unsure    Cefaclor Diarrhea and Nausea Only     Other reaction(s): Nausea Only    Cephalexin Unknown - High Severity    Lipitor [Atorvastatin] Nausea And Vomiting    Penicillin G Unknown - High Severity    Penicillins     Zocor [Simvastatin] Nausea And Vomiting       Past Surgical History:   Procedure Laterality Date    COLONOSCOPY N/A 2/15/2017    Procedure: COLONOSCOPY;  Surgeon: Lupillo Ugarte MD;  Location: [facility] ENDOSCOPY;  Service:     COSMETIC SURGERY      plastic surgery to face x 4 r/t trauma    ENDOSCOPY N/A 5/31/2022    Procedure: ESOPHAGOGASTRODUODENOSCOPY;  Surgeon: Lincoln Banegas MD;  Location: [facility] ENDOSCOPY;  Service: Gastroenterology;  Laterality: N/A;    LAPAROSCOPIC CHOLECYSTECTOMY  12/10/1995    Cholecystectomy, laparoscopic (1)       OTHER SURGICAL HISTORY      Head surgery procedure (1)    plastic surgery on the face     OTHER SURGICAL HISTORY  10/17/2014    Repair Superficial Wound TR-EXT 2.5 < CM 20563 (1)          Family History   Problem Relation Age of Onset    Stroke Mother     Diabetes Father     Heart disease Father     Hypertension Father     Thyroid disease Sister     Breast cancer Maternal Aunt     Cancer Neg Hx         multiple in mothers family       Social History     Socioeconomic History    Marital status:    Tobacco Use    Smoking status: Never    Smokeless tobacco: Never   Vaping Use    Vaping Use: Never used   Substance and Sexual Activity    Alcohol use: No    Drug use: No    Sexual activity: Defer            Objective   Physical Exam  Vitals and nursing note reviewed.   Constitutional:       Appearance: She is obese. She is ill-appearing.   HENT:      Head: Normocephalic.   Eyes:      Pupils: Pupils are equal, round, and reactive to light.   Neck:      Vascular: No JVD.   Cardiovascular:      Rate and Rhythm: Normal rate and regular rhythm. No extrasystoles are present.     Heart sounds: Normal heart sounds. No murmur heard.  Pulmonary:      Effort: Pulmonary effort is normal. No tachypnea, accessory muscle usage or respiratory distress.      Breath sounds: No decreased breath sounds, wheezing, rhonchi or rales.   Chest:      Chest wall: No tenderness or crepitus.   Abdominal:      General: Bowel sounds are normal.      Palpations: Abdomen is soft. There is no hepatomegaly or splenomegaly.      Tenderness: There is no abdominal tenderness. There is no guarding or rebound.   Musculoskeletal:         General: Normal range of motion.      Cervical back: Neck supple.      Right lower leg: No tenderness. No edema.      Left lower leg: No tenderness. No edema.   Skin:     General: Skin is warm and dry.      Capillary Refill: Capillary refill takes less than 2 seconds.      Coloration: Skin is pale.   Neurological:      General: No focal deficit present.      Mental Status: She is alert and oriented to person, place, and time.   Psychiatric:         Mood and Affect: Mood normal.         Behavior: Behavior normal.       ECG 12 Lead      Date/Time: 9/4/2023 8:41 PM  Performed by: Augusto Colby DO  Authorized by: Augusto Colby DO   Interpreted by physician  Comments: EKG September 4, 2023 at 2041 reveals sinus rhythm with frequent PVCs at a rate of 99 bpm.  No obvious ST elevation or depression.  No evidence of acute ischemia.  QTc 467.             ED Course      Labs Reviewed   TROPONIN - Abnormal; Notable for the following components:       Result Value    HS Troponin T 13 (*)     All other components  within normal limits    Narrative:     High Sensitive Troponin T Reference Range:  <10.0 ng/L- Negative Female for AMI  <15.0 ng/L- Negative Male for AMI  >=10 - Abnormal Female indicating possible myocardial injury.  >=15 - Abnormal Male indicating possible myocardial injury.   Clinicians would have to utilize clinical acumen, EKG, Troponin, and serial changes to determine if it is an Acute Myocardial Infarction or myocardial injury due to an underlying chronic condition.        COMPREHENSIVE METABOLIC PANEL - Abnormal; Notable for the following components:    Glucose 112 (*)     BUN 59 (*)     Creatinine 2.03 (*)     Sodium 134 (*)     CO2 20.0 (*)     BUN/Creatinine Ratio 29.1 (*)     eGFR 27.0 (*)     All other components within normal limits    Narrative:     GFR Normal >60  Chronic Kidney Disease <60  Kidney Failure <15     CBC WITH AUTO DIFFERENTIAL - Abnormal; Notable for the following components:    RBC 2.98 (*)     Hemoglobin 8.8 (*)     Hematocrit 26.6 (*)     Neutrophil % 76.4 (*)     Lymphocyte % 10.5 (*)     Immature Grans % 0.6 (*)     All other components within normal limits   URINALYSIS W/ MICROSCOPIC IF INDICATED (NO CULTURE) - Abnormal; Notable for the following components:    Appearance, UA Turbid (*)     Blood, UA Large (3+) (*)     Protein, UA 30 mg/dL (1+) (*)     Leuk Esterase, UA Large (3+) (*)     All other components within normal limits   URINALYSIS, MICROSCOPIC ONLY - Abnormal; Notable for the following components:    RBC, UA Too Numerous to Count (*)     WBC, UA Too Numerous to Count (*)     All other components within normal limits   HIGH SENSITIVITIY TROPONIN T 2HR - Abnormal; Notable for the following components:    HS Troponin T 13 (*)     All other components within normal limits    Narrative:     High Sensitive Troponin T Reference Range:  <10.0 ng/L- Negative Female for AMI  <15.0 ng/L- Negative Male for AMI  >=10 - Abnormal Female indicating possible myocardial injury.  >=15 -  Abnormal Male indicating possible myocardial injury.   Clinicians would have to utilize clinical acumen, EKG, Troponin, and serial changes to determine if it is an Acute Myocardial Infarction or myocardial injury due to an underlying chronic condition.        COVID-19 AND FLU A/B, NP SWAB IN TRANSPORT MEDIA 8-12 HR TAT - Normal    Narrative:     Fact sheet for providers: https://www.fda.gov/media/619087/download    Fact sheet for patients: https://www.fda.gov/media/022142/download    Test performed by PCR.   BNP (IN-HOUSE) - Normal    Narrative:     Among patients with dyspnea, NT-proBNP is highly sensitive for the detection of acute congestive heart failure. In addition NT-proBNP of <300 pg/ml effectively rules out acute congestive heart failure with 99% negative predictive value.     URINE CULTURE   RAINBOW DRAW    Narrative:     The following orders were created for panel order [place] Draw.  Procedure                               Abnormality         Status                     ---------                               -----------         ------                     Green Top (Gel)[[id number]]                                  Final result               Lavender Top[[id number]]                                     Final result               Gold Top - SST[[id number]]                                   Final result               Light Blue Top[[id number]]                                   Final result                 Please view results for these tests on the individual orders.   CBC AND DIFFERENTIAL    Narrative:     The following orders were created for panel order CBC & Differential.  Procedure                               Abnormality         Status                     ---------                               -----------         ------                     CBC Auto Differential[[id number]]        Abnormal            Final result                 Please view results for these tests on the individual orders.   GREEN TOP    LAVENDER TOP   GOLD TOP - SST   LIGHT BLUE TOP   EXTRA TUBES    Narrative:     The following orders were created for panel order Extra Tubes.  Procedure                               Abnormality         Status                     ---------                               -----------         ------                     [place] Top[[id number]]                                         In process                   Please view results for these tests on the individual orders.   GRAY TOP     XR Chest 1 View    Result Date: 9/4/2023  Narrative: INDICATION: Chest Pain triage protocolChest Pain Triage Protocol. COMPARISON: None relevant. FINDINGS: Heart size, lungs, and pleural spaces are within normal limits.          Medical Decision Making  Problems Addressed:  Acute cystitis with hematuria: complicated acute illness or injury    Amount and/or Complexity of Data Reviewed  Independent Historian: spouse  Labs: ordered. Decision-making details documented in ED Course.  Radiology: ordered. Decision-making details documented in ED Course.  ECG/medicine tests: ordered and independent interpretation performed. Decision-making details documented in ED Course.    Risk  Prescription drug management.  Decision regarding hospitalization.    Chest x-ray reveals no acute abnormality.  Patient does have evidence of urinary tract infection likely cause of her symptoms.  Too numerous to count whites and reds.  Urine is sent for culture.  COVID and flu were negative.  Chronic kidney disease at patient's baseline.  Slightly worsened anemia with hemoglobin of 8.8 but within recent baseline.  Liver function unremarkable.  Patient is not neutropenic.  Vital signs remained stable throughout stay and she is afebrile.  Patient has multiple antibiotic allergies.  Previous urine culture has revealed susceptibility to most medications and we will start Macrobid at this time.  She reports she tends to use Bactrim but this is a poor choice in the setting of  her chronic kidney disease.  She will be contacted should antibiotic choice need to be changed.  Patient has been acknowledge understanding of treatment, plan and agreeable to discharge with outpatient management and follow-up.    Final diagnoses:   Acute cystitis with hematuria          ED Disposition  ED Disposition       ED Disposition   Discharge    Condition   Stable    Comment   --               Jen Raymundo MD  [address]  [address]  [place] [id number] [phone]               Medication List        New Prescriptions      nitrofurantoin (macrocrystal-monohydrate) 100 MG capsule  Commonly known as: MACROBID  Take 1 capsule by mouth 2 (Two) Times a Day for 7 days.            Changed      midodrine 10 MG tablet  Commonly known as: PROAMATINE  Take 1 tablet by mouth 3 (Three) Times a Day Before Meals.  What changed: how much to take            Stop      prochlorperazine 10 MG tablet  Commonly known as: COMPAZINE     promethazine 25 MG tablet  Commonly known as: PHENERGAN               Where to Get Your Medications        These medications were sent to [facility] #[id number] - [place], [place] - [address] [place] [address] - [phone]  - [phone] [address] [address]      Phone: [phone]   nitrofurantoin (macrocrystal-monohydrate) 100 MG capsule            Augusto Colby DO  09/05/23 0014

## 2023-09-06 LAB — BACTERIA SPEC AEROBE CULT: NORMAL

## 2023-09-09 LAB
QT INTERVAL: 364 MS
QTC INTERVAL: 467 MS

## 2023-09-11 ENCOUNTER — INFUSION (OUTPATIENT)
Dept: ONCOLOGY | Facility: HOSPITAL | Age: 63
End: 2023-09-11
Payer: COMMERCIAL

## 2023-09-11 VITALS
DIASTOLIC BLOOD PRESSURE: 68 MMHG | TEMPERATURE: 97.8 F | HEART RATE: 79 BPM | SYSTOLIC BLOOD PRESSURE: 144 MMHG | RESPIRATION RATE: 18 BRPM

## 2023-09-11 DIAGNOSIS — D63.1 ANEMIA OF CHRONIC RENAL FAILURE, STAGE 4 (SEVERE): Primary | ICD-10-CM

## 2023-09-11 DIAGNOSIS — N18.4 ANEMIA OF CHRONIC RENAL FAILURE, STAGE 4 (SEVERE): Primary | ICD-10-CM

## 2023-09-11 LAB
DEPRECATED RDW RBC AUTO: 43.9 FL (ref 37–54)
ERYTHROCYTE [DISTWIDTH] IN BLOOD BY AUTOMATED COUNT: 13.7 % (ref 12.3–15.4)
HCT VFR BLD AUTO: 25.9 % (ref 34–46.6)
HGB BLD-MCNC: 8.5 G/DL (ref 12–15.9)
MCH RBC QN AUTO: 28.7 PG (ref 26.6–33)
MCHC RBC AUTO-ENTMCNC: 32.8 G/DL (ref 31.5–35.7)
MCV RBC AUTO: 87.5 FL (ref 79–97)
PLATELET # BLD AUTO: 200 10*3/MM3 (ref 140–450)
PMV BLD AUTO: 10.4 FL (ref 6–12)
RBC # BLD AUTO: 2.96 10*6/MM3 (ref 3.77–5.28)
WBC NRBC COR # BLD: 9.04 10*3/MM3 (ref 3.4–10.8)

## 2023-09-11 PROCEDURE — 85027 COMPLETE CBC AUTOMATED: CPT

## 2023-09-11 PROCEDURE — 36415 COLL VENOUS BLD VENIPUNCTURE: CPT | Performed by: INTERNAL MEDICINE

## 2023-09-11 PROCEDURE — 25010000002 EPOETIN ALFA PER 1000 UNITS: Performed by: INTERNAL MEDICINE

## 2023-09-11 PROCEDURE — 96372 THER/PROPH/DIAG INJ SC/IM: CPT | Performed by: INTERNAL MEDICINE

## 2023-09-11 RX ADMIN — ERYTHROPOIETIN 10000 UNITS: 10000 INJECTION, SOLUTION INTRAVENOUS; SUBCUTANEOUS at 13:00

## 2023-09-12 ENCOUNTER — OFFICE VISIT (OUTPATIENT)
Dept: CARDIOLOGY | Facility: CLINIC | Age: 63
End: 2023-09-12
Payer: COMMERCIAL

## 2023-09-12 VITALS
OXYGEN SATURATION: 96 % | WEIGHT: 204 LBS | HEART RATE: 74 BPM | HEIGHT: 66 IN | DIASTOLIC BLOOD PRESSURE: 68 MMHG | SYSTOLIC BLOOD PRESSURE: 114 MMHG | BODY MASS INDEX: 32.78 KG/M2

## 2023-09-12 DIAGNOSIS — G45.9 TRANSIENT ISCHEMIC ATTACK (TIA): ICD-10-CM

## 2023-09-12 DIAGNOSIS — I95.1 ORTHOSTATIC HYPOTENSION: Primary | ICD-10-CM

## 2023-09-12 DIAGNOSIS — I49.3 PVC (PREMATURE VENTRICULAR CONTRACTION): ICD-10-CM

## 2023-09-12 PROCEDURE — 99213 OFFICE O/P EST LOW 20 MIN: CPT | Performed by: INTERNAL MEDICINE

## 2023-09-12 RX ORDER — MIDODRINE HYDROCHLORIDE 10 MG/1
10 TABLET ORAL
Qty: 270 TABLET | Refills: 3 | Status: SHIPPED | OUTPATIENT
Start: 2023-09-12

## 2023-09-12 RX ORDER — SOLIFENACIN SUCCINATE 10 MG/1
10 TABLET, FILM COATED ORAL DAILY
COMMUNITY

## 2023-09-12 NOTE — PROGRESS NOTES
Adilene Morgan  63 y.o. female  9/12/2023   1. Orthostatic hypotension    2. PVC (premature ventricular contraction)    3. Transient ischemic attack (TIA)    4       mitral regurgitation    History of Present Illness:  Body mass index is 32.93 kg/m². BMI is above normal parameters. Recommendations include: exercise counseling, nutrition counseling and referral to primary care.  63 years old patient of wheelchair with history of stroke on long-term oral anticoagulation history of PVCs and orthostasis.  She recently evaluated in the ER with a diagnosis of bronchitis and UTI troponin is nonsignificant had a renal insufficiency with creatinine 2.0 and history of chronic anemia.  No orthopnea PND or dizziness reported.  We will send the prescriptions of midodrine  Echocardiogram preserved left ventricle systolic function she has moderate mitral regurgitation.  Right ventricle cavity is mildly dilated.  No significant right ventricular systolic pressure.  [facility] unfortunately she has a diagnosis of rare form of lung cancer with mets to the bone getting immunotherapy with history of stroke on oral anticoagulation and rare isolated premature ventricular complex of small R waves in lead V1.  She had a positive dizziness and dropping blood pressure started on midodrine with significant improvement she was taking 10 mg 3 times and sometimes she takes 10 mg twice a day with a significant improvement in clinical conditions.  Echo finding discussed with the patient.    September 2023 evaluated at [facility]      63-year-old female currently on immunotherapy for which she believes is very rare lung cancer presents with complaint of 1 day of cough, chest pain, generalized weakness and fatigue. No fever at home. No reported vomiting or diarrhea. Review of documentation shows that she has metastatic adenocarcinoma of the colon. She also has a history of chronic kidney disease with anemia  of chronic disease. She does get frequent urinary tract infections and is concerned that may be cause.   Echo November 2022         Left ventricular ejection fraction appears to be 51 - 55%.    Left ventricular diastolic function is consistent with (grade I) impaired relaxation.    The right ventricular cavity is mildly dilated.    Moderate mitral valve regurgitation is present.    Estimated right ventricular systolic pressure from tricuspid regurgitation is normal (<35 mmHg).    STRESS TEST 7/2018  Patient exercised according to Leo protocol for 8 minutes and 24 second with METs achieved 10.  This reflects normal functional capacity.  Baseline heart rate 64 and increased to 164 bpm representing 100% of age matched projected heart rate.  The patient base line blood pressure 144/79 and increased to 195 representing hypertensive blood pressure response.  Test was stopped due to target heart rate achieved and leg discomfort.  No ST-T wave changes suggesting of ischemia.  No arrhythmia noted.     Echo 7/2018  Mild mitral valve regurgitation is present  Mild tricuspid valve regurgitation is present.  Left ventricular systolic function is normal. Estimated EF = 60%.  Left atrial cavity size is borderline dilated.     7/2018  Holter forInterpretations 48-hour      Indictation palpitations     Description     Rhythm is sinus with average heart rate of 67 minimum 45 the minimum heart to beat.  5:30 and 6 AM with a maximum 130 bpm.  No significant bradyarrhythmia or any significant pause noted.  There are premature ventricular complex total #7416 with some bigeminy and 115 couplet  rare3 beat runs of nonsustained wide complex rhythm with a maximum heart rate range from 130 to 150 bpm.  No sustained ventricular or supraventricular rhythm noted.      SUBJECTIVE:    Allergies   Allergen Reactions    Azithromycin Other (See Comments)     Pt and spouse unsure    Cefaclor Diarrhea and Nausea Only     Other reaction(s): Nausea  Only    Cephalexin Unknown - High Severity    Lipitor [Atorvastatin] Nausea And Vomiting    Penicillin G Unknown - High Severity    Penicillins     Zocor [Simvastatin] Nausea And Vomiting         Past Medical History:   Diagnosis Date    Achilles bursitis     Achilles tendinitis     Acquired hypothyroidism     Allergic rhinitis     Allergy to meat     alphagal    Allergy to milk products     Arrhythmia     Asthmatic bronchitis     Asthmatic bronchitis     Bone spur     Calcaneal spur     Cancer     Depressive disorder     Family history of thyroid problem     Herpes simplex     Hypermetropia     Hypothyroidism     Menopausal flushing     Menopause     Migraine     Otalgia     Pneumonia     Presbyopia     Stroke     Trochanteric bursitis     UTI (urinary tract infection) 07/03/2018    Ventricular premature beats          Past Surgical History:   Procedure Laterality Date    COLONOSCOPY N/A 2/15/2017    Procedure: COLONOSCOPY;  Surgeon: Lupillo Ugarte MD;  Location: [facility] ENDOSCOPY;  Service:     COSMETIC SURGERY      plastic surgery to face x 4 r/t trauma    ENDOSCOPY N/A 5/31/2022    Procedure: ESOPHAGOGASTRODUODENOSCOPY;  Surgeon: Lincoln Banegas MD;  Location: [facility] ENDOSCOPY;  Service: Gastroenterology;  Laterality: N/A;    LAPAROSCOPIC CHOLECYSTECTOMY  12/10/1995    Cholecystectomy, laparoscopic (1)       OTHER SURGICAL HISTORY      Head surgery procedure (1)    plastic surgery on the face     OTHER SURGICAL HISTORY  10/17/2014    Repair Superficial Wound TR-EXT 2.5 < CM 26390 (1)            Family History   Problem Relation Age of Onset    Stroke Mother     Diabetes Father     Heart disease Father     Hypertension Father     Thyroid disease Sister     Breast cancer Maternal Aunt     Cancer Neg Hx         multiple in mothers family         Social History     Socioeconomic History    Marital status:    Tobacco Use    Smoking status: Never    Smokeless tobacco: Never   Vaping Use    Vaping Use: Never  used   Substance and Sexual Activity    Alcohol use: No    Drug use: No    Sexual activity: Defer         Current Outpatient Medications   Medication Sig Dispense Refill    aspirin 81 MG EC tablet Take 1 tablet by mouth Daily.      Calcium Carbonate (CALCIUM 500 PO) Take  by mouth Daily.      ezetimibe (ZETIA) 10 MG tablet Take 1 tablet by mouth every night at bedtime.      folic acid (FOLVITE) 400 MCG tablet Take 2 tablets by mouth Daily.      Lorlatinib (Lorbrena) 25 MG tablet Take 75 mg by mouth Daily. Will start back 1/23 due to being held currently      midodrine (PROAMATINE) 10 MG tablet Take 1 tablet by mouth 3 (Three) Times a Day Before Meals. (Patient taking differently: Take 0.5 tablets by mouth 3 (Three) Times a Day Before Meals.) 270 tablet 3    multivitamin with minerals tablet tablet Take 1 tablet by mouth Daily.      NON FORMULARY Prochloroperateine 10mg      Nutritional Supplements (ESTROVEN NIGHTTIME PO) Take 200 mg by mouth. For night sweats      ondansetron (ZOFRAN) 8 MG tablet Take 1 tablet by mouth Every 8 (Eight) Hours As Needed for Nausea or Vomiting. 90 tablet 1    pantoprazole (Protonix) 40 MG EC tablet Take 1 tablet by mouth Daily. 30 tablet 0    rosuvastatin (CRESTOR) 5 MG tablet Take 1 tablet by mouth Daily.      sodium bicarbonate 650 MG tablet Take 1 tablet by mouth Every Morning.      solifenacin (VESICARE) 10 MG tablet Take 1 tablet by mouth Daily.      SUMAtriptan (IMITREX) 20 MG/ACT nasal spray 1 spray into the nostril(s) as directed by provider Every 2 (Two) Hours As Needed for Migraine. 6 each 2    Synthroid 200 MCG tablet TAKE 1 TABLET BY MOUTH EVERY DAY 30 tablet 3    topiramate (TOPAMAX) 50 MG tablet TAKE 1 TABLET BY MOUTH EVERY DAY AT NIGHT 90 tablet 3    traMADol (ULTRAM) 50 MG tablet Take 1 tablet by mouth Every 6 (Six) Hours As Needed. for pain      trospium (SANCTURA) 20 MG tablet Take 1 tablet by mouth 2 (Two) Times a Day.      venlafaxine XR (EFFEXOR-XR) 37.5 MG 24 hr  "capsule TAKE 1 CAPSULE BY MOUTH EVERY DAY 90 capsule 3    vitamin B-12 (CYANOCOBALAMIN) 500 MCG tablet Take 1 tablet by mouth Daily.      Xarelto 20 MG tablet TAKE 1 TABLET BY MOUTH DAILY. AFTER FINISHED WITH LOWER DOSE FOR 21 DAYS 90 tablet 3     No current facility-administered medications for this visit.           Review of Systems:     Constitutional:  Denies recent weight loss, weight gain.  Positive generalized weakness     HENT:  Denies any hearing loss, epistaxis      Eyes: No blurring    Respiratory: Lung cancer getting immunotherapy     Cardiovascular: See H&P    Gastrointestinal:  Denies change in bowel habits and dyspepsia    Endocrine: Negative for cold intolerance, heat intolerance, polydipsia    Genitourinary: Negative.      Musculoskeletal: History of osteoarthritis    Skin:  Denies rashes, or skin lesions.     Allergic/Immunologic: Negative.  Negative for environmental allergies    Neurological: History of stroke/TIA on long-term oral anticoagulation    Hematological: Denies any food allergies, seasonal allergies    Psychiatric/Behavioral: Denies any history of depression        OBJECTIVE:    /68 (BP Location: Left arm, Patient Position: Sitting, Cuff Size: Adult)   Pulse 74   Ht 167.6 cm (66\")   Wt 92.5 kg (204 lb)   LMP  (LMP Unknown)   SpO2 96%   BMI 32.93 kg/m²     Physical Exam:     Constitutional: Cooperative, alert and oriented, well-developed, well-nourished, in no acute distress.     HENT:   Head: Normocephalic, conjunctive is a pink, thyroid is nonpalpable no carotid bruit and trachea central.     Cardiovascular: Regular rhythm, S1 and S2 normal, no S3 or S4. Apical impulse not displaced. No murmurs    Pulmonary/Chest: Chest: No chest wall tenderness no rales and wheezing    Abdominal: Abdomen soft, bowel sounds normoactive, no masses,    Musculoskeletal: No deformities, clubbing, cyanosis, erythema.  Neurological: No gross motor or sensory deficits noted    Skin: Warm and dry " to the touch, no apparent skin lesions .     Psychiatric: He has a normal mood and affect. His behavior is normal        Procedures      Lab Results   Component Value Date    WBC 9.04 09/11/2023    HGB 8.5 (L) 09/11/2023    HCT 25.9 (L) 09/11/2023    MCV 87.5 09/11/2023     09/11/2023     Lab Results   Component Value Date    GLUCOSE 112 (H) 09/04/2023    BUN 59 (H) 09/04/2023    CREATININE 2.03 (H) 09/04/2023    EGFRIFNONA 41 (L) 12/14/2021    EGFRIFAFRI 55 10/22/2021    BCR 29.1 (H) 09/04/2023    CO2 20.0 (L) 09/04/2023    CALCIUM 9.0 09/04/2023    PROTENTOTREF 7.8 01/03/2023    ALBUMIN 4.1 09/04/2023    LABIL2 0.9 01/03/2023    AST 18 09/04/2023    ALT 16 09/04/2023     Lab Results   Component Value Date    CHOL 299 (H) 12/14/2022    CHOL 170 05/10/2022    CHOL 148 12/14/2021     Lab Results   Component Value Date    TRIG 271 (H) 12/14/2022    TRIG 131 05/10/2022    TRIG 120 12/14/2021     Lab Results   Component Value Date    HDL 51 12/14/2022    HDL 61 (H) 05/10/2022    HDL 50 12/14/2021     No components found for: LDLCALC  Lab Results   Component Value Date     (H) 12/14/2022    LDL 86 05/10/2022    LDL 77 12/14/2021     No results found for: HDLLDLRATIO  No components found for: CHOLHDL  Lab Results   Component Value Date    HGBA1C 4.80 05/10/2022     Lab Results   Component Value Date    TSH 2.150 12/14/2022    [id number] 167.0 07/25/2017           ASSESSMENT AND PLAN:      #1 orthostatic hypotension    Significant provement in orthostasis after initiation of midodrine she is a pleased with clinical outcome she able to ambulate more      Patient was counseled educated regarding risk factor lifestyle modification.    Encouraged the patient to increase water intake at least 60 to 64 ounces in 24-hour with a salt palatable to taste     exercises were demonstrated to increase the tone of upper and lower extremity   Mario stocking was recommended   if you have any further episodes in spite of moderate  in 10 mg p.o. 3 times daily and recommendations discussed above droxidopa can be contemplated      #2 premature ventricular complex no further records continue beta-blocker      #3 mitral regurgitation repeat echo revealed moderate mitral regurgitation there is no surgical indication at this stage we will continue clinical surveillance      #4 TIA on long-term oral anticoagulation      Metastatic lung cancer per patient rare form receiving immunotherapy in the [facility]        I spent 16 minutes caring for Adilene on this date of service. This time includes time spent by me of counseling/coordination of care as relates to the presenting problem and any ordered procedures/tests as outlined above.           This document has been electronically signed by Clement Palacio MD on September 12, 2023 11:05 CDT      Diagnoses and all orders for this visit:    1. Orthostatic hypotension (Primary)    2. PVC (premature ventricular contraction)    3. Transient ischemic attack (TIA)          Clement Palacio MD  9/12/2023  11:05 CDT

## 2023-09-19 ENCOUNTER — OFFICE VISIT (OUTPATIENT)
Dept: FAMILY MEDICINE CLINIC | Facility: CLINIC | Age: 63
End: 2023-09-19
Payer: COMMERCIAL

## 2023-09-19 VITALS
HEART RATE: 76 BPM | DIASTOLIC BLOOD PRESSURE: 70 MMHG | SYSTOLIC BLOOD PRESSURE: 120 MMHG | BODY MASS INDEX: 32.38 KG/M2 | OXYGEN SATURATION: 94 % | WEIGHT: 201.5 LBS | HEIGHT: 66 IN

## 2023-09-19 DIAGNOSIS — N18.4 ANEMIA OF CHRONIC RENAL FAILURE, STAGE 4 (SEVERE): ICD-10-CM

## 2023-09-19 DIAGNOSIS — C34.91 ADENOSQUAMOUS CARCINOMA OF RIGHT LUNG: Primary | Chronic | ICD-10-CM

## 2023-09-19 DIAGNOSIS — Z00.00 ANNUAL PHYSICAL EXAM: ICD-10-CM

## 2023-09-19 DIAGNOSIS — E03.9 ACQUIRED HYPOTHYROIDISM: Chronic | ICD-10-CM

## 2023-09-19 DIAGNOSIS — R53.81 PHYSICAL DECONDITIONING: ICD-10-CM

## 2023-09-19 DIAGNOSIS — N18.4 STAGE 4 CHRONIC KIDNEY DISEASE: Chronic | ICD-10-CM

## 2023-09-19 DIAGNOSIS — D63.1 ANEMIA OF CHRONIC RENAL FAILURE, STAGE 4 (SEVERE): ICD-10-CM

## 2023-09-19 PROCEDURE — 99214 OFFICE O/P EST MOD 30 MIN: CPT | Performed by: GENERAL PRACTICE

## 2023-09-19 RX ORDER — DEXTROMETHORPHAN HYDROBROMIDE AND PROMETHAZINE HYDROCHLORIDE 15; 6.25 MG/5ML; MG/5ML
5 SYRUP ORAL 4 TIMES DAILY PRN
Qty: 240 ML | Refills: 0 | Status: SHIPPED | OUTPATIENT
Start: 2023-09-19

## 2023-09-19 NOTE — PROGRESS NOTES
Subjective   Adilene Morgan is a 63 y.o. female.   Chief Complaint   Patient presents with    Lung Cancer     For review and evaluation of management of chronic medical problems. Records reviewed. Any recent labs, xrays reviewed and medications reconciled. Has had a cough and recently treated with doxycycline. Receiving immunotherapy for her lung cancer. Is weak and having difficulty walking. Fatigued so hard for her to exercise. Requesting physical therapy. Also requesting scooter for mobility. Having increasing incontinence. Vesicare not helping. Wondering about the [facility] system.   Chronic Kidney Disease  This is a chronic problem. The current episode started more than 1 year ago. The problem occurs constantly. The problem has been waxing and waning. Exacerbated by: lung cancer treatment. Treatments tried: prednisone. The treatment provided moderate relief.   Anemia  Presents for follow-up (Secondary to chemotherapy) visit. Symptoms include malaise/fatigue. Signs of blood loss that are not present include hematemesis, hematochezia and melena. There are no compliance problems.       The following portions of the patient's history were reviewed and updated as appropriate: allergies, current medications, past social history and problem list.    Outpatient Medications Prior to Visit   Medication Sig Dispense Refill    aspirin 81 MG EC tablet Take 1 tablet by mouth Daily.      Calcium Carbonate (CALCIUM 500 PO) Take  by mouth Daily.      ezetimibe (ZETIA) 10 MG tablet Take 1 tablet by mouth every night at bedtime.      folic acid (FOLVITE) 400 MCG tablet Take 2 tablets by mouth Daily.      Lorlatinib (Lorbrena) 25 MG tablet Take 75 mg by mouth Daily.      midodrine (PROAMATINE) 10 MG tablet Take 1 tablet by mouth 3 (Three) Times a Day Before Meals. 270 tablet 3    multivitamin with minerals tablet tablet Take 1 tablet by mouth Daily.      NON FORMULARY Prochloroperateine 10mg      Nutritional Supplements  "(ESTROVEN NIGHTTIME PO) Take 200 mg by mouth. For night sweats      ondansetron (ZOFRAN) 8 MG tablet Take 1 tablet by mouth Every 8 (Eight) Hours As Needed for Nausea or Vomiting. 90 tablet 1    pantoprazole (Protonix) 40 MG EC tablet Take 1 tablet by mouth Daily. 30 tablet 0    rosuvastatin (CRESTOR) 5 MG tablet Take 1 tablet by mouth Daily.      sodium bicarbonate 650 MG tablet Take 1 tablet by mouth Every Morning.      SUMAtriptan (IMITREX) 20 MG/ACT nasal spray 1 spray into the nostril(s) as directed by provider Every 2 (Two) Hours As Needed for Migraine. 6 each 2    Synthroid 200 MCG tablet TAKE 1 TABLET BY MOUTH EVERY DAY 30 tablet 3    topiramate (TOPAMAX) 50 MG tablet TAKE 1 TABLET BY MOUTH EVERY DAY AT NIGHT 90 tablet 3    traMADol (ULTRAM) 50 MG tablet Take 1 tablet by mouth Every 6 (Six) Hours As Needed. for pain      venlafaxine XR (EFFEXOR-XR) 37.5 MG 24 hr capsule TAKE 1 CAPSULE BY MOUTH EVERY DAY 90 capsule 3    vitamin B-12 (CYANOCOBALAMIN) 500 MCG tablet Take 1 tablet by mouth Daily.      Xarelto 20 MG tablet TAKE 1 TABLET BY MOUTH DAILY. AFTER FINISHED WITH LOWER DOSE FOR 21 DAYS 90 tablet 3    solifenacin (VESICARE) 10 MG tablet Take 1 tablet by mouth Daily.      trospium (SANCTURA) 20 MG tablet Take 1 tablet by mouth 2 (Two) Times a Day.       No facility-administered medications prior to visit.       Review of Systems   Constitutional:  Positive for malaise/fatigue.   Gastrointestinal:  Negative for hematemesis, hematochezia and melena.   I have reviewed 12 systems with patient. Findings were negative except what is noted below and/or in history of present illness.     Objective   Visit Vitals  /70   Pulse 76   Ht 167.6 cm (66\")   Wt 91.4 kg (201 lb 8 oz)   LMP  (LMP Unknown)   SpO2 94%   BMI 32.52 kg/m²     Physical Exam  Vitals and nursing note reviewed.   Constitutional:       General: She is not in acute distress.     Appearance: She is well-developed.   HENT:      Head: Normocephalic " and atraumatic.      Nose: Nose normal.   Eyes:      General:         Right eye: No discharge.         Left eye: No discharge.      Conjunctiva/sclera: Conjunctivae normal.      Pupils: Pupils are equal, round, and reactive to light.   Neck:      Thyroid: No thyromegaly.   Cardiovascular:      Rate and Rhythm: Normal rate and regular rhythm.      Heart sounds: Normal heart sounds.   Pulmonary:      Effort: Pulmonary effort is normal.      Breath sounds: Normal breath sounds.   Lymphadenopathy:      Cervical: No cervical adenopathy.   Skin:     General: Skin is warm and dry.   Neurological:      Mental Status: She is alert and oriented to person, place, and time.     Notes brought forward are reviewed and updated if indicated.     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    Relevant Medications    promethazine-dextromethorphan (PROMETHAZINE-DM) 6.25-15 MG/5ML syrup    Other Relevant Orders    Ambulatory Referral to Physical Therapy Evaluate and treat    CBC & Differential    Comprehensive Metabolic Panel    Lipid Panel    Urinalysis With Culture If Indicated - Urine, Clean Catch    TSH    Anemia of chronic renal failure, stage 4 (severe)    Relevant Orders    CBC & Differential    Comprehensive Metabolic Panel    Lipid Panel    Urinalysis With Culture If Indicated - Urine, Clean Catch    TSH     Other Visit Diagnoses       Physical deconditioning        Relevant Orders    Ambulatory Referral to Physical Therapy Evaluate and treat    Annual physical exam        Relevant Orders    CBC & Differential     Comprehensive Metabolic Panel    Lipid Panel    Urinalysis With Culture If Indicated - Urine, Clean Catch    TSH          Diagnoses         Codes Comments    Adenosquamous carcinoma of right lung    -  Primary ICD-10-CM: C34.91  ICD-9-CM: 162.9     Physical deconditioning     ICD-10-CM: R53.81  ICD-9-CM: 799.3     Stage 4 chronic kidney disease     ICD-10-CM: N18.4  ICD-9-CM: 585.4     Anemia of chronic renal failure, stage 4 (severe)     ICD-10-CM: N18.4, D63.1  ICD-9-CM: 285.21, 585.4     Annual physical exam     ICD-10-CM: Z00.00  ICD-9-CM: V70.0     Acquired hypothyroidism     ICD-10-CM: E03.9  ICD-9-CM: 244.9            Continue current medications.  Switch from Vesicare to Myrbetriq.  Prescription given for [employer] system.  They will check with insurance to see if covered.  We will also have to check with insurance to see if they will cover a scooter.  This will probably require a one-on-one evaluation.  Referral to physical therapy.    New Medications Ordered This Visit   Medications    promethazine-dextromethorphan (PROMETHAZINE-DM) 6.25-15 MG/5ML syrup     Sig: Take 5 mL by mouth 4 (Four) Times a Day As Needed for Cough.     Dispense:  240 mL     Refill:  0    Mirabegron ER (Myrbetriq) 50 MG tablet sustained-release 24 hour 24 hr tablet     Sig: Take 50 mg by mouth Daily.     Dispense:  56 tablet     Refill:  0     Return in about 4 months (around 1/19/2024) for Annual physical.        This document has been electronically signed by Jen Raymundo MD on September 19, 2023 12:01 CDT

## 2023-09-21 ENCOUNTER — HOSPITAL ENCOUNTER (INPATIENT)
Facility: HOSPITAL | Age: 63
LOS: 4 days | Discharge: HOME | DRG: 683 | End: 2023-09-25
Attending: EMERGENCY MEDICINE | Admitting: STUDENT IN AN ORGANIZED HEALTH CARE EDUCATION/TRAINING PROGRAM
Payer: COMMERCIAL

## 2023-09-21 ENCOUNTER — APPOINTMENT (OUTPATIENT)
Dept: ULTRASOUND IMAGING | Facility: HOSPITAL | Age: 63
DRG: 683 | End: 2023-09-21
Payer: COMMERCIAL

## 2023-09-21 DIAGNOSIS — N30.00 ACUTE CYSTITIS WITHOUT HEMATURIA: ICD-10-CM

## 2023-09-21 DIAGNOSIS — R05.8 OTHER COUGH: ICD-10-CM

## 2023-09-21 DIAGNOSIS — I49.3 PVC (PREMATURE VENTRICULAR CONTRACTION): ICD-10-CM

## 2023-09-21 DIAGNOSIS — N18.4 ANEMIA OF CHRONIC RENAL FAILURE, STAGE 4 (SEVERE): ICD-10-CM

## 2023-09-21 DIAGNOSIS — R00.2 PALPITATION: ICD-10-CM

## 2023-09-21 DIAGNOSIS — T45.1X5A ANEMIA DUE TO ANTINEOPLASTIC CHEMOTHERAPY: ICD-10-CM

## 2023-09-21 DIAGNOSIS — N18.4 STAGE 4 CHRONIC KIDNEY DISEASE: ICD-10-CM

## 2023-09-21 DIAGNOSIS — N17.9 AKI (ACUTE KIDNEY INJURY): Primary | ICD-10-CM

## 2023-09-21 DIAGNOSIS — I95.1 ORTHOSTATIC HYPOTENSION: ICD-10-CM

## 2023-09-21 DIAGNOSIS — F32.A DEPRESSIVE DISORDER: ICD-10-CM

## 2023-09-21 DIAGNOSIS — Z74.09 IMPAIRED FUNCTIONAL MOBILITY, BALANCE, GAIT, AND ENDURANCE: ICD-10-CM

## 2023-09-21 DIAGNOSIS — M79.676 PAIN OF FIFTH TOE: ICD-10-CM

## 2023-09-21 DIAGNOSIS — D64.81 ANEMIA DUE TO ANTINEOPLASTIC CHEMOTHERAPY: ICD-10-CM

## 2023-09-21 DIAGNOSIS — C34.91 ADENOSQUAMOUS CARCINOMA OF RIGHT LUNG: ICD-10-CM

## 2023-09-21 DIAGNOSIS — E66.9 OBESITY (BMI 30.0-34.9): ICD-10-CM

## 2023-09-21 DIAGNOSIS — R55 SYNCOPE AND COLLAPSE: ICD-10-CM

## 2023-09-21 DIAGNOSIS — G45.9 TRANSIENT ISCHEMIC ATTACK (TIA): ICD-10-CM

## 2023-09-21 DIAGNOSIS — C79.51 MALIGNANT NEOPLASM METASTATIC TO BONE: ICD-10-CM

## 2023-09-21 DIAGNOSIS — C34.90 PRIMARY MALIGNANT NEOPLASM OF LUNG METASTATIC TO OTHER SITE, UNSPECIFIED LATERALITY: ICD-10-CM

## 2023-09-21 DIAGNOSIS — D63.1 ANEMIA OF CHRONIC RENAL FAILURE, STAGE 4 (SEVERE): ICD-10-CM

## 2023-09-21 DIAGNOSIS — E03.9 ACQUIRED HYPOTHYROIDISM: ICD-10-CM

## 2023-09-21 DIAGNOSIS — R59.1 LYMPHADENOPATHY: ICD-10-CM

## 2023-09-21 LAB
% EOS HANSEL STAIN: <1 %
ALBUMIN SERPL-MCNC: 3.6 G/DL (ref 3.5–5.2)
ALBUMIN/GLOB SERPL: 1 G/DL
ALP SERPL-CCNC: 69 U/L (ref 39–117)
ALT SERPL W P-5'-P-CCNC: 13 U/L (ref 1–33)
ANION GAP SERPL CALCULATED.3IONS-SCNC: 15 MMOL/L (ref 5–15)
AST SERPL-CCNC: 21 U/L (ref 1–32)
BACTERIA UR QL AUTO: ABNORMAL /HPF
BASOPHILS # BLD AUTO: 0.05 10*3/MM3 (ref 0–0.2)
BASOPHILS NFR BLD AUTO: 0.7 % (ref 0–1.5)
BILIRUB SERPL-MCNC: 0.2 MG/DL (ref 0–1.2)
BILIRUB UR QL STRIP: NEGATIVE
BUN SERPL-MCNC: 82 MG/DL (ref 8–23)
BUN/CREAT SERPL: 21.8 (ref 7–25)
CALCIUM SPEC-SCNC: 8.3 MG/DL (ref 8.6–10.5)
CHLORIDE SERPL-SCNC: 105 MMOL/L (ref 98–107)
CK SERPL-CCNC: 42 U/L (ref 20–180)
CLARITY UR: ABNORMAL
CO2 SERPL-SCNC: 19 MMOL/L (ref 22–29)
COLOR UR: ABNORMAL
CREAT SERPL-MCNC: 3.76 MG/DL (ref 0.57–1)
DEPRECATED RDW RBC AUTO: 48.2 FL (ref 37–54)
EGFRCR SERPLBLD CKD-EPI 2021: 12.9 ML/MIN/1.73
EOSINOPHIL # BLD AUTO: 0.32 10*3/MM3 (ref 0–0.4)
EOSINOPHIL NFR BLD AUTO: 4.5 % (ref 0.3–6.2)
ERYTHROCYTE [DISTWIDTH] IN BLOOD BY AUTOMATED COUNT: 14.8 % (ref 12.3–15.4)
GLOBULIN UR ELPH-MCNC: 3.6 GM/DL
GLUCOSE SERPL-MCNC: 89 MG/DL (ref 65–99)
GLUCOSE UR STRIP-MCNC: NEGATIVE MG/DL
HANSEL STAIN: POSITIVE
HCT VFR BLD AUTO: 26.6 % (ref 34–46.6)
HGB BLD-MCNC: 8.3 G/DL (ref 12–15.9)
HGB UR QL STRIP.AUTO: ABNORMAL
HYALINE CASTS UR QL AUTO: ABNORMAL /LPF
IMM GRANULOCYTES # BLD AUTO: 0.07 10*3/MM3 (ref 0–0.05)
IMM GRANULOCYTES NFR BLD AUTO: 1 % (ref 0–0.5)
KETONES UR QL STRIP: NEGATIVE
LEUKOCYTE ESTERASE UR QL STRIP.AUTO: ABNORMAL
LYMPHOCYTES # BLD AUTO: 1 10*3/MM3 (ref 0.7–3.1)
LYMPHOCYTES NFR BLD AUTO: 14 % (ref 19.6–45.3)
MCH RBC QN AUTO: 28.5 PG (ref 26.6–33)
MCHC RBC AUTO-ENTMCNC: 31.2 G/DL (ref 31.5–35.7)
MCV RBC AUTO: 91.4 FL (ref 79–97)
MONOCYTES # BLD AUTO: 0.62 10*3/MM3 (ref 0.1–0.9)
MONOCYTES NFR BLD AUTO: 8.7 % (ref 5–12)
NEUTROPHILS NFR BLD AUTO: 5.09 10*3/MM3 (ref 1.7–7)
NEUTROPHILS NFR BLD AUTO: 71.1 % (ref 42.7–76)
NITRITE UR QL STRIP: NEGATIVE
NRBC BLD AUTO-RTO: 0 /100 WBC (ref 0–0.2)
PH UR STRIP.AUTO: 6 [PH] (ref 5–9)
PLATELET # BLD AUTO: 229 10*3/MM3 (ref 140–450)
PMV BLD AUTO: 9.8 FL (ref 6–12)
POTASSIUM SERPL-SCNC: 5.4 MMOL/L (ref 3.5–5.2)
PROT SERPL-MCNC: 7.2 G/DL (ref 6–8.5)
PROT UR QL STRIP: ABNORMAL
RBC # BLD AUTO: 2.91 10*6/MM3 (ref 3.77–5.28)
RBC # UR STRIP: ABNORMAL /HPF
REF LAB TEST METHOD: ABNORMAL
SODIUM SERPL-SCNC: 139 MMOL/L (ref 136–145)
SODIUM UR-SCNC: 62 MMOL/L
SP GR UR STRIP: 1.01 (ref 1–1.03)
SQUAMOUS #/AREA URNS HPF: ABNORMAL /HPF
UROBILINOGEN UR QL STRIP: ABNORMAL
WBC # UR STRIP: ABNORMAL /HPF
WBC NRBC COR # BLD: 7.15 10*3/MM3 (ref 3.4–10.8)

## 2023-09-21 PROCEDURE — 84156 ASSAY OF PROTEIN URINE: CPT | Performed by: NURSE PRACTITIONER

## 2023-09-21 PROCEDURE — 84165 PROTEIN E-PHORESIS SERUM: CPT | Performed by: NURSE PRACTITIONER

## 2023-09-21 PROCEDURE — 83516 IMMUNOASSAY NONANTIBODY: CPT | Performed by: NURSE PRACTITIONER

## 2023-09-21 PROCEDURE — 76775 US EXAM ABDO BACK WALL LIM: CPT

## 2023-09-21 PROCEDURE — 82570 ASSAY OF URINE CREATININE: CPT | Performed by: NURSE PRACTITIONER

## 2023-09-21 PROCEDURE — 82550 ASSAY OF CK (CPK): CPT | Performed by: NURSE PRACTITIONER

## 2023-09-21 PROCEDURE — 87205 SMEAR GRAM STAIN: CPT | Performed by: NURSE PRACTITIONER

## 2023-09-21 PROCEDURE — G0378 HOSPITAL OBSERVATION PER HR: HCPCS

## 2023-09-21 PROCEDURE — 80053 COMPREHEN METABOLIC PANEL: CPT | Performed by: EMERGENCY MEDICINE

## 2023-09-21 PROCEDURE — 86037 ANCA TITER EACH ANTIBODY: CPT | Performed by: NURSE PRACTITIONER

## 2023-09-21 PROCEDURE — 87086 URINE CULTURE/COLONY COUNT: CPT | Performed by: STUDENT IN AN ORGANIZED HEALTH CARE EDUCATION/TRAINING PROGRAM

## 2023-09-21 PROCEDURE — 84300 ASSAY OF URINE SODIUM: CPT | Performed by: NURSE PRACTITIONER

## 2023-09-21 PROCEDURE — 85025 COMPLETE CBC W/AUTO DIFF WBC: CPT | Performed by: EMERGENCY MEDICINE

## 2023-09-21 PROCEDURE — 83521 IG LIGHT CHAINS FREE EACH: CPT | Performed by: NURSE PRACTITIONER

## 2023-09-21 PROCEDURE — 36415 COLL VENOUS BLD VENIPUNCTURE: CPT | Performed by: NURSE PRACTITIONER

## 2023-09-21 PROCEDURE — 99285 EMERGENCY DEPT VISIT HI MDM: CPT

## 2023-09-21 PROCEDURE — 81001 URINALYSIS AUTO W/SCOPE: CPT | Performed by: NURSE PRACTITIONER

## 2023-09-21 RX ORDER — SODIUM CHLORIDE 9 MG/ML
40 INJECTION, SOLUTION INTRAVENOUS AS NEEDED
Status: DISCONTINUED | OUTPATIENT
Start: 2023-09-21 | End: 2023-09-25 | Stop reason: HOSPADM

## 2023-09-21 RX ORDER — SODIUM CHLORIDE 0.9 % (FLUSH) 0.9 %
10 SYRINGE (ML) INJECTION EVERY 12 HOURS SCHEDULED
Status: DISCONTINUED | OUTPATIENT
Start: 2023-09-21 | End: 2023-09-25 | Stop reason: HOSPADM

## 2023-09-21 RX ORDER — ROSUVASTATIN CALCIUM 5 MG/1
5 TABLET, COATED ORAL DAILY
Status: DISCONTINUED | OUTPATIENT
Start: 2023-09-22 | End: 2023-09-25 | Stop reason: HOSPADM

## 2023-09-21 RX ORDER — FOLIC ACID 1 MG/1
1000 TABLET ORAL DAILY
Status: DISCONTINUED | OUTPATIENT
Start: 2023-09-22 | End: 2023-09-25 | Stop reason: HOSPADM

## 2023-09-21 RX ORDER — POLYETHYLENE GLYCOL 3350 17 G/17G
17 POWDER, FOR SOLUTION ORAL DAILY PRN
Status: DISCONTINUED | OUTPATIENT
Start: 2023-09-21 | End: 2023-09-25 | Stop reason: HOSPADM

## 2023-09-21 RX ORDER — VENLAFAXINE HYDROCHLORIDE 37.5 MG/1
37.5 CAPSULE, EXTENDED RELEASE ORAL DAILY
Status: DISCONTINUED | OUTPATIENT
Start: 2023-09-22 | End: 2023-09-25 | Stop reason: HOSPADM

## 2023-09-21 RX ORDER — TROSPIUM CHLORIDE ER 60 MG/1
20 CAPSULE ORAL NIGHTLY
COMMUNITY
End: 2023-09-25 | Stop reason: HOSPADM

## 2023-09-21 RX ORDER — BISACODYL 10 MG
10 SUPPOSITORY, RECTAL RECTAL DAILY PRN
Status: DISCONTINUED | OUTPATIENT
Start: 2023-09-21 | End: 2023-09-25 | Stop reason: HOSPADM

## 2023-09-21 RX ORDER — ONDANSETRON 4 MG/1
8 TABLET, FILM COATED ORAL EVERY 8 HOURS PRN
Status: DISCONTINUED | OUTPATIENT
Start: 2023-09-21 | End: 2023-09-25 | Stop reason: HOSPADM

## 2023-09-21 RX ORDER — CHOLECALCIFEROL (VITAMIN D3) 125 MCG
500 CAPSULE ORAL DAILY
Status: DISCONTINUED | OUTPATIENT
Start: 2023-09-22 | End: 2023-09-25 | Stop reason: HOSPADM

## 2023-09-21 RX ORDER — SOLIFENACIN SUCCINATE 10 MG/1
10 TABLET, FILM COATED ORAL DAILY
COMMUNITY
End: 2023-09-25 | Stop reason: HOSPADM

## 2023-09-21 RX ORDER — MIDODRINE HYDROCHLORIDE 5 MG/1
10 TABLET ORAL
Status: DISCONTINUED | OUTPATIENT
Start: 2023-09-22 | End: 2023-09-25 | Stop reason: HOSPADM

## 2023-09-21 RX ORDER — PANTOPRAZOLE SODIUM 40 MG/1
40 TABLET, DELAYED RELEASE ORAL DAILY
Status: DISCONTINUED | OUTPATIENT
Start: 2023-09-22 | End: 2023-09-25

## 2023-09-21 RX ORDER — SODIUM CHLORIDE 9 MG/ML
75 INJECTION, SOLUTION INTRAVENOUS CONTINUOUS
Status: DISCONTINUED | OUTPATIENT
Start: 2023-09-21 | End: 2023-09-21

## 2023-09-21 RX ORDER — AMOXICILLIN 250 MG
2 CAPSULE ORAL 2 TIMES DAILY
Status: DISCONTINUED | OUTPATIENT
Start: 2023-09-21 | End: 2023-09-25 | Stop reason: HOSPADM

## 2023-09-21 RX ORDER — DOXYCYCLINE HYCLATE 100 MG/1
200 CAPSULE ORAL 2 TIMES DAILY
Status: ON HOLD | COMMUNITY
End: 2023-09-25 | Stop reason: SDUPTHER

## 2023-09-21 RX ORDER — LEVOTHYROXINE SODIUM 0.1 MG/1
200 TABLET ORAL
Status: DISCONTINUED | OUTPATIENT
Start: 2023-09-22 | End: 2023-09-25 | Stop reason: HOSPADM

## 2023-09-21 RX ORDER — BISACODYL 5 MG/1
5 TABLET, DELAYED RELEASE ORAL DAILY PRN
Status: DISCONTINUED | OUTPATIENT
Start: 2023-09-21 | End: 2023-09-25 | Stop reason: HOSPADM

## 2023-09-21 RX ORDER — SODIUM CHLORIDE 0.9 % (FLUSH) 0.9 %
10 SYRINGE (ML) INJECTION AS NEEDED
Status: DISCONTINUED | OUTPATIENT
Start: 2023-09-21 | End: 2023-09-25 | Stop reason: HOSPADM

## 2023-09-21 RX ORDER — SODIUM BICARBONATE 650 MG/1
650 TABLET ORAL EVERY MORNING
Status: DISCONTINUED | OUTPATIENT
Start: 2023-09-22 | End: 2023-09-25 | Stop reason: HOSPADM

## 2023-09-21 RX ORDER — ASPIRIN 81 MG/1
81 TABLET ORAL DAILY
Status: DISCONTINUED | OUTPATIENT
Start: 2023-09-22 | End: 2023-09-25 | Stop reason: HOSPADM

## 2023-09-21 RX ORDER — TOPIRAMATE 50 MG/1
50 TABLET, FILM COATED ORAL NIGHTLY
Status: DISCONTINUED | OUTPATIENT
Start: 2023-09-21 | End: 2023-09-25 | Stop reason: HOSPADM

## 2023-09-21 RX ORDER — ACETAMINOPHEN 325 MG/1
650 TABLET ORAL EVERY 4 HOURS PRN
Status: DISCONTINUED | OUTPATIENT
Start: 2023-09-21 | End: 2023-09-25 | Stop reason: HOSPADM

## 2023-09-21 RX ORDER — TRAMADOL HYDROCHLORIDE 50 MG/1
25 TABLET ORAL EVERY 12 HOURS PRN
Status: DISCONTINUED | OUTPATIENT
Start: 2023-09-21 | End: 2023-09-25 | Stop reason: HOSPADM

## 2023-09-21 RX ORDER — DOXYCYCLINE 100 MG/1
100 CAPSULE ORAL EVERY 12 HOURS SCHEDULED
Status: COMPLETED | OUTPATIENT
Start: 2023-09-21 | End: 2023-09-22

## 2023-09-21 RX ADMIN — DOXYCYCLINE 100 MG: 100 CAPSULE ORAL at 21:41

## 2023-09-21 RX ADMIN — TOPIRAMATE 50 MG: 50 TABLET, FILM COATED ORAL at 21:41

## 2023-09-21 RX ADMIN — DOCUSATE SODIUM 50 MG AND SENNOSIDES 8.6 MG 2 TABLET: 8.6; 5 TABLET, FILM COATED ORAL at 21:41

## 2023-09-21 RX ADMIN — SODIUM BICARBONATE 75 MEQ: 84 INJECTION INTRAVENOUS at 14:46

## 2023-09-21 RX ADMIN — Medication 10 ML: at 21:42

## 2023-09-21 NOTE — PLAN OF CARE
Goal Outcome Evaluation:           Pt denies having any pain. A bicarbonate drip was started due to TODD. She is an oncology Pt with a daily immunotherapy pill. Vital signs stable.

## 2023-09-21 NOTE — Clinical Note
Level of Care: Telemetry [5]   Diagnosis: TODD (acute kidney injury) [840042]   Admitting Physician: BYRON LEE [[id number]]   Attending Physician: BYRON LEE [[id number]]   Certification: I Certify That Inpatient Hospital Services Are Medically Necessary For Greater Than 2 Midnights

## 2023-09-21 NOTE — ED NOTES
"Nursing report ED to floor  Adilene Morgan  63 y.o.  female    HPI:   Chief Complaint   Patient presents with    Abnormal Lab       Admitting doctor:   Lou Lima MD    Consulting provider(s):  Consults       Date and Time Order Name Status Description    9/21/2023 12:11 PM Inpatient Nephrology Consult               Admitting diagnosis:   The encounter diagnosis was TODD (acute kidney injury).    Code status:   Current Code Status       Date Active Code Status Order ID Comments User Context       Prior            Allergies:   Azithromycin, Cefaclor, Cephalexin, Lipitor [atorvastatin], Penicillin g, Penicillins, and Zocor [simvastatin]    Intake and Output  No intake or output data in the 24 hours ending 09/21/23 1223    Weight:       09/21/23  1005   Weight: 90.7 kg (200 lb)       Most recent vitals:   Vitals:    09/21/23 1005 09/21/23 1200   BP: (!) 184/79 169/67   BP Location: Left arm    Patient Position: Sitting    Pulse: 80 70   Resp: 18    Temp: 97.7 °F (36.5 °C)    TempSrc: Oral    SpO2: 98% 99%   Weight: 90.7 kg (200 lb)    Height: 167.6 cm (66\")      Oxygen Therapy: .    Active LDAs/IV Access:   Lines, Drains & Airways       Active LDAs       Name Placement date Placement time Site Days    Peripheral IV 09/21/23 1046 Anterior;Right Forearm 09/21/23  1046  Forearm  less than 1                    Labs (abnormal labs have a star):   Labs Reviewed   COMPREHENSIVE METABOLIC PANEL - Abnormal; Notable for the following components:       Result Value    BUN 82 (*)     Creatinine 3.76 (*)     Potassium 5.4 (*)     CO2 19.0 (*)     Calcium 8.3 (*)     eGFR 12.9 (*)     All other components within normal limits    Narrative:     GFR Normal >60  Chronic Kidney Disease <60  Kidney Failure <15     CBC WITH AUTO DIFFERENTIAL - Abnormal; Notable for the following components:    RBC 2.91 (*)     Hemoglobin 8.3 (*)     Hematocrit 26.6 (*)     MCHC 31.2 (*)     Lymphocyte % 14.0 (*)     Immature Grans % 1.0 (*)  "    Immature Grans, Absolute 0.07 (*)     All other components within normal limits   CBC AND DIFFERENTIAL    Narrative:     The following orders were created for panel order CBC & Differential.  Procedure                               Abnormality         Status                     ---------                               -----------         ------                     CBC Auto Differential[[id number]]        Abnormal            Final result                 Please view results for these tests on the individual orders.       Meds given in ED:   Medications   sodium chloride 0.9 % infusion (has no administration in time range)     sodium chloride, 75 mL/hr         NIH Stroke Scale:       Isolation/Infection(s):  No active isolations   No active infections     COVID Testing  Collected .  Resulted .    Nursing report ED to floor:  Mental status: A&O  Ambulatory status: standby  Precautions: none    ED nurse phone [phone]- [phone]

## 2023-09-21 NOTE — CONSULTS
[facility]  [address] [place] [address]. [address]  T - [phone]  F - [phone]     Consultation         PATIENT  DEMOGRAPHICS   PATIENT NAME: Adilene Morgan                      PHYSICIAN: SILVA Archer  : 1960  MRN: [id number]    Subjective   SUBJECTIVE   Referring Provider: Dr. Lima  Reason for Consultation: TODD on CKD4  History of present illness: This is a 63-year-old female with a past medical history significant for CKD 4, started on immunotherapy, hypothyroidism, previous DVT, anemia who presented to the hospital with complaints of worsening renal function found on her routine outpatient labs.  She has recently had a baseline creatinine around 2 and follows with Dr. Rios in the outpatient setting.  Creatinine yesterday was up to 4.  Here creatinine is rechecked at 3.76.  She does have some mild hyperkalemia as well as acidosis.  She also has anemia.  She is admitted for further management and nephrology is consulted to help manage.    Past Medical History:   Diagnosis Date    Achilles bursitis     Achilles tendinitis     Acquired hypothyroidism     Allergic rhinitis     Allergy to meat     alphagal    Allergy to milk products     Arrhythmia     Asthmatic bronchitis     Asthmatic bronchitis     Bone spur     Calcaneal spur     Depressive disorder     Family history of thyroid problem     Herpes simplex     Hypermetropia     Hypothyroidism     Menopausal flushing     Menopause     Migraine     Otalgia     Pneumonia     Presbyopia     Stroke     Trochanteric bursitis     UTI (urinary tract infection) 2018    Ventricular premature beats      Past Surgical History:   Procedure Laterality Date    COLONOSCOPY N/A 2/15/2017    Procedure: COLONOSCOPY;  Surgeon: Lupillo Ugarte MD;  Location: [facility] ENDOSCOPY;  Service:     COSMETIC SURGERY      plastic surgery to face x 4 r/t trauma    ENDOSCOPY N/A 2022    Procedure: ESOPHAGOGASTRODUODENOSCOPY;  Surgeon:  "Lincoln Banegas MD;  Location: [facility] ENDOSCOPY;  Service: Gastroenterology;  Laterality: N/A;    LAPAROSCOPIC CHOLECYSTECTOMY  12/10/1995    Cholecystectomy, laparoscopic (1)       OTHER SURGICAL HISTORY      Head surgery procedure (1)    plastic surgery on the face     OTHER SURGICAL HISTORY  10/17/2014    Repair Superficial Wound TR-EXT 2.5 < CM 01962 (1)        Family History   Problem Relation Age of Onset    Stroke Mother     Diabetes Father     Heart disease Father     Hypertension Father     Thyroid disease Sister     Breast cancer Maternal Aunt     Cancer Neg Hx         multiple in mothers family     Social History     Tobacco Use    Smoking status: Never    Smokeless tobacco: Never   Vaping Use    Vaping Use: Never used   Substance Use Topics    Alcohol use: No    Drug use: No     Allergies:  Azithromycin, Cefaclor, Cephalexin, Lipitor [atorvastatin], Penicillin g, Penicillins, and Zocor [simvastatin]     REVIEW OF SYSTEMS    Review of Systems   All other systems reviewed and are negative.    Objective   OBJECTIVE   Vital Signs  Temp:  [97.7 °F (36.5 °C)] 97.7 °F (36.5 °C)  Heart Rate:  [70-80] 70  Resp:  [18] 18  BP: (169-184)/(67-79) 169/67    Flowsheet Rows      Flowsheet Row First Filed Value   Admission Height 167.6 cm (66\") Documented at 09/21/2023 1005   Admission Weight 90.7 kg (200 lb) Documented at 09/21/2023 1005             No intake/output data recorded.    PHYSICAL EXAM    Physical Exam  Constitutional:       Appearance: She is well-developed.   HENT:      Head: Normocephalic and atraumatic.   Eyes:      Conjunctiva/sclera: Conjunctivae normal.      Pupils: Pupils are equal, round, and reactive to light.   Cardiovascular:      Rate and Rhythm: Normal rate and regular rhythm.   Pulmonary:      Effort: Pulmonary effort is normal.      Breath sounds: Normal breath sounds.   Abdominal:      Palpations: Abdomen is soft.   Musculoskeletal:      Cervical back: Neck supple.      Right lower leg: No " edema.      Left lower leg: No edema.   Skin:     General: Skin is warm and dry.   Neurological:      Mental Status: She is alert and oriented to person, place, and time.   Psychiatric:         Mood and Affect: Mood normal.         Behavior: Behavior normal.       RESULTS   Results Review:    Results from last 7 days   Lab Units 09/21/23  1042 09/20/23  1419   SODIUM mmol/L 139 141   POTASSIUM mmol/L 5.4* 4.9   CHLORIDE mmol/L 105 109*   TOTAL CO2 mmol/L  --  21   CO2 mmol/L 19.0*  --    BUN mg/dL 82* 84*   CREATININE mg/dL 3.76* 4.2*   CALCIUM mg/dL 8.3* 8.4*   BILIRUBIN mg/dL 0.2 0.21*   ALK PHOS U/L 69 70   ALT (SGPT) U/L 13 14   AST (SGOT) U/L 21 13   GLUCOSE mg/dL 89  --        Estimated Creatinine Clearance: 17.2 mL/min (A) (by C-G formula based on SCr of 3.76 mg/dL (H)).                Results from last 7 days   Lab Units 09/21/23  1042   WBC 10*3/mm3 7.15   HEMOGLOBIN g/dL 8.3*   PLATELETS 10*3/mm3 229              MEDICATIONS       sodium bicarbonate drip (greater than 75 mEq/bag), 75 mEq      Medications Prior to Admission   Medication Sig Dispense Refill Last Dose    aspirin 81 MG EC tablet Take 1 tablet by mouth Daily.   9/21/2023    Calcium Carbonate (CALCIUM 500 PO) Take  by mouth Daily.   9/21/2023    doxycycline (VIBRAMYCIN) 100 MG capsule Take 2 capsules by mouth 2 (Two) Times a Day. 3 doses remaining - 1 tonight and morning/night on Friday 9/20/2023    ezetimibe (ZETIA) 10 MG tablet Take 1 tablet by mouth every night at bedtime.   9/20/2023    folic acid (FOLVITE) 400 MCG tablet Take 2 tablets by mouth Daily.   9/21/2023    Lorlatinib (Lorbrena) 25 MG tablet Take 75 mg by mouth Daily.   9/20/2023    midodrine (PROAMATINE) 10 MG tablet Take 1 tablet by mouth 3 (Three) Times a Day Before Meals. 270 tablet 3 9/21/2023    Mirabegron ER (Myrbetriq) 50 MG tablet sustained-release 24 hour 24 hr tablet Take 50 mg by mouth Daily. 56 tablet 0 9/21/2023    multivitamin with minerals tablet tablet Take 1  tablet by mouth Daily.   9/21/2023    NON FORMULARY Prochloroperateine 10mg   9/20/2023    Nutritional Supplements (ESTROVEN NIGHTTIME PO) Take 200 mg by mouth. For night sweats   9/20/2023    ondansetron (ZOFRAN) 8 MG tablet Take 1 tablet by mouth Every 8 (Eight) Hours As Needed for Nausea or Vomiting. 90 tablet 1 Past Week    pantoprazole (Protonix) 40 MG EC tablet Take 1 tablet by mouth Daily. 30 tablet 0 9/21/2023    promethazine-dextromethorphan (PROMETHAZINE-DM) 6.25-15 MG/5ML syrup Take 5 mL by mouth 4 (Four) Times a Day As Needed for Cough. 240 mL 0 9/20/2023    rosuvastatin (CRESTOR) 5 MG tablet Take 1 tablet by mouth Daily.   9/20/2023    sodium bicarbonate 650 MG tablet Take 1 tablet by mouth Every Morning.   9/21/2023    solifenacin (VESICARE) 10 MG tablet Take 1 tablet by mouth Daily.   9/20/2023    SUMAtriptan (IMITREX) 20 MG/ACT nasal spray 1 spray into the nostril(s) as directed by provider Every 2 (Two) Hours As Needed for Migraine. 6 each 2 9/20/2023    Synthroid 200 MCG tablet TAKE 1 TABLET BY MOUTH EVERY DAY 30 tablet 3 9/21/2023    topiramate (TOPAMAX) 50 MG tablet TAKE 1 TABLET BY MOUTH EVERY DAY AT NIGHT 90 tablet 3 9/21/2023    traMADol (ULTRAM) 50 MG tablet Take 1 tablet by mouth Every 6 (Six) Hours As Needed. for pain   Past Week    trospium 60 MG capsule sustained-release 24 hr capsule Take 20 mg by mouth Every Night.   9/20/2023    venlafaxine XR (EFFEXOR-XR) 37.5 MG 24 hr capsule TAKE 1 CAPSULE BY MOUTH EVERY DAY 90 capsule 3 9/21/2023    vitamin B-12 (CYANOCOBALAMIN) 500 MCG tablet Take 1 tablet by mouth Daily.   9/21/2023    Xarelto 20 MG tablet TAKE 1 TABLET BY MOUTH DAILY. AFTER FINISHED WITH LOWER DOSE FOR 21 DAYS 90 tablet 3 9/20/2023     Assessment & Plan   ASSESSMENT / PLAN      TODD (acute kidney injury)    1.  TODD on CKD 4- baseline creatinine around 2, up to 3.7 at present with peak creatinine 4.2 on the 20th.  Her only recent medication changes were starting Myrbetriq for  incontinence.  No etiology is immediately apparent.  She takes lorlatinib which is associated with increased creatinine, has incontinence which may point towards some urinary retention.    -Check urine studies, renal ultrasound, Hanzel stain, protein to creatinine ratio, uric acid, free light chains, SPEP, ANCA panel, CK.    -Patient has acidosis and will start her on a sodium bicarbonate drip.    2.  Metabolic acidosis- sodium bicarb drip as above    3.  Anemia- we will check anemia profile and follow CBC, patient sees Dr. Kitchen in the outpatient setting for anemia, usually gets Retacrit every 4 weeks    4.  Lung cancer- with metastasis, follows with [facility] in [place], has been on lorlatinib    5.  History of DVT      Thank you for the consult, we will continue to follow patient.         I discussed the patients findings and my recommendations with patient      This document has been electronically signed by SILVA Archer on September 21, 2023 13:05 CDT

## 2023-09-21 NOTE — ED PROVIDER NOTES
Subjective   History of Present Illness  This is a 63-year-old female lung cancer patient who presents for evaluation of worsening renal function on screening weekly labs.  Typical baseline creatinine is around 2.  Her most recent creatinine was approximately 4.  The patient denies decreased p.o. intake.  She denies nausea vomiting diarrhea.  She denies recent medication changes other than Myrbetriq which she feels is helping.  She still feels that she is getting good urine output.  She denies flank pain.  She denies hematuria.      Review of Systems   All other systems reviewed and are negative.    Past Medical History:   Diagnosis Date    Achilles bursitis     Achilles tendinitis     Acquired hypothyroidism     Allergic rhinitis     Allergy to meat     alphagal    Allergy to milk products     Arrhythmia     Asthmatic bronchitis     Asthmatic bronchitis     Bone spur     Calcaneal spur     Depressive disorder     Family history of thyroid problem     Herpes simplex     Hypermetropia     Hypothyroidism     Menopausal flushing     Menopause     Migraine     Otalgia     Pneumonia     Presbyopia     Stroke     Trochanteric bursitis     UTI (urinary tract infection) 07/03/2018    Ventricular premature beats        Allergies   Allergen Reactions    Azithromycin Other (See Comments)     Pt and spouse unsure    Cefaclor Diarrhea and Nausea Only     Other reaction(s): Nausea Only    Cephalexin Unknown - High Severity    Lipitor [Atorvastatin] Nausea And Vomiting    Penicillin G Unknown - High Severity    Penicillins     Zocor [Simvastatin] Nausea And Vomiting       Past Surgical History:   Procedure Laterality Date    COLONOSCOPY N/A 2/15/2017    Procedure: COLONOSCOPY;  Surgeon: Lupillo Ugarte MD;  Location: [facility] ENDOSCOPY;  Service:     COSMETIC SURGERY      plastic surgery to face x 4 r/t trauma    ENDOSCOPY N/A 5/31/2022    Procedure: ESOPHAGOGASTRODUODENOSCOPY;  Surgeon: Lincoln Banegas MD;  Location: [facility]  "ENDOSCOPY;  Service: Gastroenterology;  Laterality: N/A;    LAPAROSCOPIC CHOLECYSTECTOMY  12/10/1995    Cholecystectomy, laparoscopic (1)       OTHER SURGICAL HISTORY      Head surgery procedure (1)    plastic surgery on the face     OTHER SURGICAL HISTORY  10/17/2014    Repair Superficial Wound TR-EXT 2.5 < CM [address]3244 (1)          Family History   Problem Relation Age of Onset    Stroke Mother     Diabetes Father     Heart disease Father     Hypertension Father     Thyroid disease Sister     Breast cancer Maternal Aunt     Cancer Neg Hx         multiple in mothers family       Social History     Socioeconomic History    Marital status:    Tobacco Use    Smoking status: Never    Smokeless tobacco: Never   Vaping Use    Vaping Use: Never used   Substance and Sexual Activity    Alcohol use: No    Drug use: No    Sexual activity: Defer           Objective   Physical Exam  Vitals and nursing note reviewed.   Constitutional:       General: She is not in acute distress.  HENT:      Head: Normocephalic and atraumatic.      Nose: Nose normal.   Eyes:      General: No scleral icterus.  Cardiovascular:      Rate and Rhythm: Normal rate.   Pulmonary:      Effort: Pulmonary effort is normal.   Musculoskeletal:         General: No signs of injury.   Skin:     General: Skin is dry.   Neurological:      Mental Status: She is alert and oriented to person, place, and time.   Psychiatric:         Behavior: Behavior normal.       Procedures           ED Course  ED Course as of 09/21/23 1712   Thu Sep 21, 2023   1206 CBC & Differential(!)  Stable chronic anemia [JV]   1206 Comprehensive Metabolic Panel(!)  Worsening renal function.  Slightly elevated potassium however hemolyzed. [JV]      ED Course User Index  [JV] Lincoln Jefferson, DO                                           Medical Decision Making  The patient's recent past medical charts for this facility as well as outside facilities via the \"care everywhere\" application of " epic reviewed.    The differential diagnosis includes medication side effect, dehydration, contrast nephropathy, and ureteral obstruction, among others.    On final reevaluation the patient is in stable condition and agreeable to admission.      Problems Addressed:  TODD (acute kidney injury): complicated acute illness or injury    Amount and/or Complexity of Data Reviewed  Labs: ordered. Decision-making details documented in ED Course.  Discussion of management or test interpretation with external provider(s): Case discussed with admitting hospitalist physician.    Risk  OTC drugs.  Prescription drug management.  Decision regarding hospitalization.        Final diagnoses:   TODD (acute kidney injury)       ED Disposition  ED Disposition       ED Disposition   Decision to Admit    Condition   --    Comment   Level of Care: Telemetry [5]   Diagnosis: TODD (acute kidney injury) [312654]   Admitting Physician: BYRON LEE [[id number]]   Attending Physician: BYRON LEE [[id number]]                 No follow-up provider specified.       Medication List      No changes were made to your prescriptions during this visit.            Lincoln Jefferson,   09/21/23 1717

## 2023-09-22 PROBLEM — N17.9 ACUTE KIDNEY FAILURE, UNSPECIFIED: Status: ACTIVE | Noted: 2023-09-22

## 2023-09-22 LAB
ANION GAP SERPL CALCULATED.3IONS-SCNC: 13 MMOL/L (ref 5–15)
BASOPHILS # BLD AUTO: 0.04 10*3/MM3 (ref 0–0.2)
BASOPHILS NFR BLD AUTO: 0.5 % (ref 0–1.5)
BUN SERPL-MCNC: 76 MG/DL (ref 8–23)
BUN/CREAT SERPL: 19 (ref 7–25)
C3 SERPL-MCNC: 126 MG/DL (ref 82–167)
C4 SERPL-MCNC: 33 MG/DL (ref 14–44)
CALCIUM SPEC-SCNC: 8.3 MG/DL (ref 8.6–10.5)
CHLORIDE SERPL-SCNC: 106 MMOL/L (ref 98–107)
CO2 SERPL-SCNC: 21 MMOL/L (ref 22–29)
CREAT SERPL-MCNC: 4 MG/DL (ref 0.57–1)
CREAT UR-MCNC: 33.1 MG/DL
DEPRECATED RDW RBC AUTO: 46.7 FL (ref 37–54)
EGFRCR SERPLBLD CKD-EPI 2021: 11.9 ML/MIN/1.73
EOSINOPHIL # BLD AUTO: 0.29 10*3/MM3 (ref 0–0.4)
EOSINOPHIL NFR BLD AUTO: 3.9 % (ref 0.3–6.2)
ERYTHROCYTE [DISTWIDTH] IN BLOOD BY AUTOMATED COUNT: 14.7 % (ref 12.3–15.4)
GLUCOSE SERPL-MCNC: 88 MG/DL (ref 65–99)
HCT VFR BLD AUTO: 25.6 % (ref 34–46.6)
HGB BLD-MCNC: 8.3 G/DL (ref 12–15.9)
IMM GRANULOCYTES # BLD AUTO: 0.05 10*3/MM3 (ref 0–0.05)
IMM GRANULOCYTES NFR BLD AUTO: 0.7 % (ref 0–0.5)
LYMPHOCYTES # BLD AUTO: 1.17 10*3/MM3 (ref 0.7–3.1)
LYMPHOCYTES NFR BLD AUTO: 15.9 % (ref 19.6–45.3)
MCH RBC QN AUTO: 29 PG (ref 26.6–33)
MCHC RBC AUTO-ENTMCNC: 32.4 G/DL (ref 31.5–35.7)
MCV RBC AUTO: 89.5 FL (ref 79–97)
MONOCYTES # BLD AUTO: 0.63 10*3/MM3 (ref 0.1–0.9)
MONOCYTES NFR BLD AUTO: 8.6 % (ref 5–12)
NEUTROPHILS NFR BLD AUTO: 5.18 10*3/MM3 (ref 1.7–7)
NEUTROPHILS NFR BLD AUTO: 70.4 % (ref 42.7–76)
NRBC BLD AUTO-RTO: 0 /100 WBC (ref 0–0.2)
PLATELET # BLD AUTO: 207 10*3/MM3 (ref 140–450)
PMV BLD AUTO: 10.1 FL (ref 6–12)
POTASSIUM SERPL-SCNC: 4.4 MMOL/L (ref 3.5–5.2)
PROT ?TM UR-MCNC: 52.7 MG/DL
PROT/CREAT UR: 1592.1 MG/G CREA (ref 0–200)
RBC # BLD AUTO: 2.86 10*6/MM3 (ref 3.77–5.28)
SODIUM SERPL-SCNC: 140 MMOL/L (ref 136–145)
URATE SERPL-MCNC: 6.3 MG/DL (ref 2.4–5.7)
WBC NRBC COR # BLD: 7.36 10*3/MM3 (ref 3.4–10.8)

## 2023-09-22 PROCEDURE — 80048 BASIC METABOLIC PNL TOTAL CA: CPT | Performed by: STUDENT IN AN ORGANIZED HEALTH CARE EDUCATION/TRAINING PROGRAM

## 2023-09-22 PROCEDURE — 86160 COMPLEMENT ANTIGEN: CPT | Performed by: NURSE PRACTITIONER

## 2023-09-22 PROCEDURE — 84550 ASSAY OF BLOOD/URIC ACID: CPT | Performed by: NURSE PRACTITIONER

## 2023-09-22 PROCEDURE — 96365 THER/PROPH/DIAG IV INF INIT: CPT

## 2023-09-22 PROCEDURE — 85025 COMPLETE CBC W/AUTO DIFF WBC: CPT | Performed by: STUDENT IN AN ORGANIZED HEALTH CARE EDUCATION/TRAINING PROGRAM

## 2023-09-22 RX ADMIN — RIVAROXABAN 15 MG: 15 TABLET, FILM COATED ORAL at 18:22

## 2023-09-22 RX ADMIN — FOLIC ACID 1000 MCG: 1 TABLET ORAL at 10:52

## 2023-09-22 RX ADMIN — SODIUM BICARBONATE 650 MG TABLET 650 MG: at 06:00

## 2023-09-22 RX ADMIN — DOXYCYCLINE 100 MG: 100 CAPSULE ORAL at 20:15

## 2023-09-22 RX ADMIN — VENLAFAXINE HYDROCHLORIDE 37.5 MG: 37.5 CAPSULE, EXTENDED RELEASE ORAL at 12:07

## 2023-09-22 RX ADMIN — LEVOTHYROXINE SODIUM 200 MCG: 100 TABLET ORAL at 05:57

## 2023-09-22 RX ADMIN — SODIUM BICARBONATE 75 MEQ: 84 INJECTION INTRAVENOUS at 05:52

## 2023-09-22 RX ADMIN — ASPIRIN 81 MG: 81 TABLET, COATED ORAL at 10:52

## 2023-09-22 RX ADMIN — DOXYCYCLINE 100 MG: 100 CAPSULE ORAL at 10:51

## 2023-09-22 RX ADMIN — ROSUVASTATIN CALCIUM 5 MG: 5 TABLET, FILM COATED ORAL at 12:07

## 2023-09-22 RX ADMIN — Medication 10 ML: at 10:52

## 2023-09-22 RX ADMIN — CYANOCOBALAMIN TAB 500 MCG 500 MCG: 500 TAB at 10:50

## 2023-09-22 RX ADMIN — PANTOPRAZOLE SODIUM 40 MG: 40 TABLET, DELAYED RELEASE ORAL at 10:52

## 2023-09-22 RX ADMIN — SODIUM BICARBONATE 75 MEQ: 84 INJECTION INTRAVENOUS at 18:22

## 2023-09-22 RX ADMIN — TOPIRAMATE 50 MG: 50 TABLET, FILM COATED ORAL at 20:15

## 2023-09-22 NOTE — PROGRESS NOTES
"[facility]  [address] [place] [address]. [address]  T - [phone]  F - [phone]     Progress Note          PATIENT  DEMOGRAPHICS   PATIENT NAME: Adilene Morgan                      PHYSICIAN: RejiSILVA Ramirez  : 1960  MRN: [id number]   LOS: 1 day    Patient Care Team:  Jen Raymundo MD as PCP - General  [facility] as Physician Assistant (Family Medicine)  Lucio Betancourt MD as Consulting Physician (Hematology and Oncology)  Subjective   SUBJECTIVE   Feeling okay         Objective   OBJECTIVE   Vital Signs  Temp:  [97.7 °F (36.5 °C)-98.2 °F (36.8 °C)] 98 °F (36.7 °C)  Heart Rate:  [70-85] 70  Resp:  [16-18] 16  BP: (110-184)/(56-84) 151/72    Flowsheet Rows      Flowsheet Row First Filed Value   Admission Height 167.6 cm (66\") Documented at 2023 1005   Admission Weight 90.7 kg (200 lb) Documented at 2023 1005             I/O last 3 completed shifts:  In: 480 [P.O.:480]  Out: 1500 [Urine:1500]    PHYSICAL EXAM    Physical Exam  Constitutional:       Appearance: She is well-developed.   HENT:      Head: Normocephalic and atraumatic.   Eyes:      Conjunctiva/sclera: Conjunctivae normal.      Pupils: Pupils are equal, round, and reactive to light.   Cardiovascular:      Rate and Rhythm: Normal rate and regular rhythm.   Pulmonary:      Effort: Pulmonary effort is normal.      Breath sounds: Normal breath sounds.   Abdominal:      Palpations: Abdomen is soft.   Musculoskeletal:      Cervical back: Neck supple.      Right lower leg: No edema.      Left lower leg: No edema.   Skin:     General: Skin is warm and dry.   Neurological:      Mental Status: She is alert and oriented to person, place, and time.   Psychiatric:         Mood and Affect: Mood normal.         Behavior: Behavior normal.       RESULTS   Results Review:    Results from last 7 days   Lab Units 23  0523 23  1042 23  1419   SODIUM mmol/L 140 139 141   POTASSIUM " mmol/L 4.4 5.4* 4.9   CHLORIDE mmol/L 106 105 109*   TOTAL CO2 mmol/L  --   --  21   CO2 mmol/L 21.0* 19.0*  --    BUN mg/dL 76* 82* 84*   CREATININE mg/dL 4.00* 3.76* 4.2*   CALCIUM mg/dL 8.3* 8.3* 8.4*   BILIRUBIN mg/dL  --  0.2 0.21*   ALK PHOS U/L  --  69 70   ALT (SGPT) U/L  --  13 14   AST (SGOT) U/L  --  21 13   GLUCOSE mg/dL 88 89  --        Estimated Creatinine Clearance: 16.2 mL/min (A) (by C-G formula based on SCr of 4 mg/dL (H)).          Results from last 7 days   Lab Units 09/22/23  0523   URIC ACID mg/dL 6.3*       Results from last 7 days   Lab Units 09/22/23  0523 09/21/23  1042   WBC 10*3/mm3 7.36 7.15   HEMOGLOBIN g/dL 8.3* 8.3*   PLATELETS 10*3/mm3 207 229               Imaging Results (Last 24 Hours)       Procedure Component Value Units Date/Time    US Renal Bilateral [[id number]] Collected: 09/21/23 1545     Updated: 09/21/23 1608    Narrative:      INDICATION:  TODD.    COMPARISON:  None relevant.    TECHNIQUE:  High-resolution grayscale and color Doppler ultrasound was performed of the  bilateral kidneys and the urinary bladder.    FINDINGS:  Both kidneys are echogenic.  No pelvocaliectasis or concerning mass.  Renal  lengths measure 10.4 cm on the right and 10.7 cm on the left.    Urinary bladder appears normal.      Impression:      Bilateral medical renal disease.                   MEDICATIONS    aspirin, 81 mg, Oral, Daily  doxycycline, 100 mg, Oral, Q12H  folic acid, 1,000 mcg, Oral, Daily  levothyroxine, 200 mcg, Oral, Q AM  Lorlatinib, 75 mg, Oral, Q PM  midodrine, 10 mg, Oral, TID AC  pantoprazole, 40 mg, Oral, Daily  rivaroxaban, 20 mg, Oral, Daily With Dinner  rosuvastatin, 5 mg, Oral, Daily  senna-docusate sodium, 2 tablet, Oral, BID  sodium bicarbonate, 650 mg, Oral, QAM  sodium chloride, 10 mL, Intravenous, Q12H  topiramate, 50 mg, Oral, Nightly  venlafaxine XR, 37.5 mg, Oral, Daily  vitamin B-12, 500 mcg, Oral, Daily      sodium bicarbonate drip (greater than 75 mEq/bag), 75 mEq,  Last Rate: 75 mEq (09/22/23 0552)        Assessment & Plan   ASSESSMENT / PLAN      TODD (acute kidney injury)    1.  TODD on CKD 4- baseline creatinine around 2, up to 3.7 at present with peak creatinine 4.2 on the 20th.  Her only recent medication changes were starting Myrbetriq for incontinence.  No etiology is immediately apparent.  She takes lorlatinib which is associated with increased creatinine, has incontinence which may point towards some urinary retention.     -Check urine studies, renal ultrasound, Hanzel stain, protein to creatinine ratio, uric acid, free light chains, SPEP, ANCA panel, CK.     -Creatinine back to 4.0, ultrasound shows medical renal disease, urinalysis shows many RBCs as well as WBCs, Hanzel is positive but likely secondary to hematuria.  No clear source identified as of yet.  Continue sodium bicarbonate drip.     2.  Metabolic acidosis- sodium bicarb drip as above     3.  Anemia- we will follow CBC, patient sees Dr. Kitchen in the outpatient setting for anemia, usually gets Retacrit every 4 weeks     4.  Lung cancer- with metastasis, follows with [facility] in [place], has been on lorlatinib     5.  History of DVT      Copied text in this note has been reviewed and is accurate as of 9/22/2023           This document has been electronically signed by SILVA Archer on September 22, 2023 07:54 CDT

## 2023-09-22 NOTE — PAYOR COMM NOTE
"[facility]  Case Managment Extender   Miranda Mcintosh  (P) [phone]  (F) [phone]        REF# [id number]       Adilene Cat (63 y.o. Female)       Date of Birth   1960    Social Security Number       Address   [address] [place] [address] [address] [place] [address]    Home Phone   [phone]    MRN   [id number]       Mandaeism   Sikhism    Marital Status                               Admission Date   9/21/23    Admission Type   Emergency    Admitting Provider   Lou Lima MD    Attending Provider   Lou Lima MD    Department, Room/Bed   [address], 318/1       Discharge Date       Discharge Disposition       Discharge Destination                                 Attending Provider: Lou Lima MD    Allergies: Azithromycin, Cefaclor, Cephalexin, Lipitor [Atorvastatin], Penicillin G, Penicillins, Zocor [Simvastatin]    Isolation: None   Infection: None   Code Status: CPR    Ht: 167.6 cm (66\")   Wt: 91.1 kg (200 lb 12.8 oz)    Admission Cmt: None   Principal Problem: TODD (acute kidney injury) [N17.9]                   Active Insurance as of 9/21/2023       Primary Coverage       Payor Plan Insurance Group Employer/Plan Group    [employer] EMPLOYEE [id number]       Payor Plan Address Payor Plan Phone Number Payor Plan Fax Number Effective Dates    [address] [phone]  1/1/2022 - None Entered    [place] [address]         Subscriber Name Subscriber Birth Date Member ID       ADILENE CAT 1960 [id number]                     Emergency Contacts        (Rel.) Home Phone Work Phone Mobile Phone    TED CAT III (Son) [phone] -- [phone]    Ted Cat Jr (Spouse) [phone] -- [phone]                 History & Physical        Lou Lima MD at 09/21/23 2110                [facility]   [facility] " Hospital Medicine Admission      Date of Admission: 9/21/2023      Primary Care Physician: Jen Raymundo MD      Chief Complaint: TODD      HPI:    She is a 63 year old that presents with abnormal labs. She gets her labs checked every week and her renal function has worsened. She states she is being treated for UTI. No vomiting or diarrhea. Does have some hematuria but no pain.     Concurrent Medical History:  has a past medical history of Achilles bursitis, Achilles tendinitis, Acquired hypothyroidism, Allergic rhinitis, Allergy to meat, Allergy to milk products, Arrhythmia, Asthmatic bronchitis, Asthmatic bronchitis, Bone spur, Calcaneal spur, Depressive disorder, Family history of thyroid problem, Herpes simplex, Hypermetropia, Hypothyroidism, Menopausal flushing, Menopause, Migraine, Otalgia, Pneumonia, Presbyopia, Stroke, Trochanteric bursitis, UTI (urinary tract infection) (07/03/2018), and Ventricular premature beats.    Past Surgical History:  has a past surgical history that includes Laparoscopic cholecystectomy (12/10/1995); Other surgical history; Other surgical history (10/17/2014); Cosmetic surgery; Colonoscopy (N/A, 2/15/2017); and Esophagogastroduodenoscopy (N/A, 5/31/2022).    Family History: family history includes Breast cancer in her maternal aunt; Diabetes in her father; Heart disease in her father; Hypertension in her father; Stroke in her mother; Thyroid disease in her sister.     Social History:  reports that she has never smoked. She has never used smokeless tobacco. She reports that she does not drink alcohol and does not use drugs.    Allergies:   Allergies   Allergen Reactions    Azithromycin Other (See Comments)     Pt and spouse unsure    Cefaclor Diarrhea and Nausea Only     Other reaction(s): Nausea Only    Cephalexin Unknown - High Severity    Lipitor [Atorvastatin] Nausea And Vomiting    Penicillin G Unknown - High Severity    Penicillins     Zocor [Simvastatin] Nausea And  Vomiting       Medications:   Prior to Admission medications    Medication Sig Start Date End Date Taking? Authorizing Provider   aspirin 81 MG EC tablet Take 1 tablet by mouth Daily.   Yes Bing Mcclelland MD   Calcium Carbonate (CALCIUM 500 PO) Take  by mouth Daily.   Yes Bing Mcclelland MD   doxycycline (VIBRAMYCIN) 100 MG capsule Take 2 capsules by mouth 2 (Two) Times a Day. 3 doses remaining - 1 tonight and morning/night on Friday   Yes Bing Mcclelland MD   ezetimibe (ZETIA) 10 MG tablet Take 1 tablet by mouth every night at bedtime. 9/23/22  Yes Bing Mcclelland MD   folic acid (FOLVITE) 400 MCG tablet Take 2 tablets by mouth Daily.   Yes Bing Mcclelland MD   Lorlatinib (Lorbrena) 25 MG tablet Take 75 mg by mouth Daily.   Yes Bing Mcclelland MD   midodrine (PROAMATINE) 10 MG tablet Take 1 tablet by mouth 3 (Three) Times a Day Before Meals. 9/12/23  Yes Clement Palacio MD   Mirabegron ER (Myrbetriq) 50 MG tablet sustained-release 24 hour 24 hr tablet Take 50 mg by mouth Daily. 9/19/23  Yes Jen Raymundo MD   multivitamin with minerals tablet tablet Take 1 tablet by mouth Daily.   Yes Bing Mcclelland MD   NON FORMULARY Prochloroperateine 10mg   Yes Bing Mcclelland MD   Nutritional Supplements (ESTROVEN NIGHTTIME PO) Take 200 mg by mouth. For night sweats   Yes Bing Mcclelland MD   ondansetron (ZOFRAN) 8 MG tablet Take 1 tablet by mouth Every 8 (Eight) Hours As Needed for Nausea or Vomiting. 3/18/22  Yes Jen Raymundo MD   pantoprazole (Protonix) 40 MG EC tablet Take 1 tablet by mouth Daily. 6/1/22  Yes Patel Kevin APRN   promethazine-dextromethorphan (PROMETHAZINE-DM) 6.25-15 MG/5ML syrup Take 5 mL by mouth 4 (Four) Times a Day As Needed for Cough. 9/19/23  Yes Jen Raymundo MD   rosuvastatin (CRESTOR) 5 MG tablet Take 1 tablet by mouth Daily. 12/23/22  Yes Bing Mcclelland MD   sodium bicarbonate 650 MG tablet Take 1 tablet  by mouth Every Morning. 5/23/23  Yes Bing Mcclelland MD   solifenacin (VESICARE) 10 MG tablet Take 1 tablet by mouth Daily.   Yes Bing Mcclelland MD   SUMAtriptan (IMITREX) 20 MG/ACT nasal spray 1 spray into the nostril(s) as directed by provider Every 2 (Two) Hours As Needed for Migraine. 3/20/23  Yes Jen Raymundo MD   Synthroid 200 MCG tablet TAKE 1 TABLET BY MOUTH EVERY DAY 2/7/23  Yes Jen Raymundo MD   topiramate (TOPAMAX) 50 MG tablet TAKE 1 TABLET BY MOUTH EVERY DAY AT NIGHT 2/13/23  Yes Jen Raymundo MD   traMADol (ULTRAM) 50 MG tablet Take 1 tablet by mouth Every 6 (Six) Hours As Needed. for pain 12/9/22  Yes Bing Mcclelland MD   trospium 60 MG capsule sustained-release 24 hr capsule Take 20 mg by mouth Every Night.   Yes Bing Mcclelland MD   venlafaxine XR (EFFEXOR-XR) 37.5 MG 24 hr capsule TAKE 1 CAPSULE BY MOUTH EVERY DAY 3/6/23  Yes Jen Raymundo MD   vitamin B-12 (CYANOCOBALAMIN) 500 MCG tablet Take 1 tablet by mouth Daily.   Yes Bing Mcclelland MD   Xarelto 20 MG tablet TAKE 1 TABLET BY MOUTH DAILY. AFTER FINISHED WITH LOWER DOSE FOR 21 DAYS 5/10/23  Yes Jen Raymundo MD       I have utilized all available immediate resources to obtain, update, or review the patient's current medications (including all prescriptions, over-the-counter products, herbals, cannabis/cannabidiol products, and vitamin/mineral/dietary (nutritional) supplements).     Review of Systems:  Review of Systems   Otherwise complete ROS is negative except as mentioned above.    Physical Exam:   Temp:  [97.7 °F (36.5 °C)-98.2 °F (36.8 °C)] 98.2 °F (36.8 °C)  Heart Rate:  [70-80] 76  Resp:  [16-18] 16  BP: (168-184)/(67-84) 168/84  Physical Exam  Vitals reviewed.   Constitutional:       General: She is not in acute distress.     Appearance: She is well-developed.   HENT:      Head: Normocephalic and atraumatic.      Nose: Nose normal.   Eyes:      Conjunctiva/sclera: Conjunctivae  normal.   Cardiovascular:      Rate and Rhythm: Normal rate and regular rhythm.   Pulmonary:      Effort: Pulmonary effort is normal. No respiratory distress.      Breath sounds: Normal breath sounds. No wheezing or rales.   Musculoskeletal:         General: Normal range of motion.      Cervical back: Normal range of motion and neck supple.   Skin:     General: Skin is warm and dry.   Neurological:      Mental Status: She is alert and oriented to person, place, and time.   Psychiatric:         Behavior: Behavior normal.         Results Reviewed:  I have personally reviewed current lab, radiology, and data and agree with results.  Lab Results (last 24 hours)       Procedure Component Value Units Date/Time    Urine Eosinophils, Liang's Stain - Urine, Clean Catch [[id number]]  (Abnormal) Collected: 09/21/23 1547    Specimen: Urine, Clean Catch Updated: 09/21/23 2110     Liang Stain Positive     % EOS Liang Stain <1 %     Urinalysis, Microscopic Only - Urine, Clean Catch [[id number]]  (Abnormal) Collected: 09/21/23 1547    Specimen: Urine, Clean Catch Updated: 09/21/23 1617     RBC, UA Too Numerous to Count /HPF      WBC, UA Too Numerous to Count /HPF      Bacteria, UA 2+ /HPF      Squamous Epithelial Cells, UA 3-5 /HPF      Hyaline Casts, UA None Seen /LPF      Methodology Manual Light Microscopy    Sodium, Urine, Random - Urine, Clean Catch [[id number]] Collected: 09/21/23 1547    Specimen: Urine, Clean Catch Updated: 09/21/23 1604     Sodium, Urine 62 mmol/L     Narrative:      Reference intervals for random urine have not been established.  Clinical usage is dependent upon physician's interpretation in combination with other laboratory tests.       Urinalysis With Microscopic If Indicated (No Culture) - Urine, Clean Catch [[id number]]  (Abnormal) Collected: 09/21/23 1547    Specimen: Urine, Clean Catch Updated: 09/21/23 1604     Color, UA [place]     Appearance, UA Cloudy     pH, UA 6.0     Specific Gravity, UA 1.010      Glucose, UA Negative     Ketones, UA Negative     Bilirubin, UA Negative     Blood, UA Large (3+)     Protein,  mg/dL (2+)     Leuk Esterase, UA Moderate (2+)     Nitrite, UA Negative     Urobilinogen, UA 0.2 E.U./dL    Protein / Creatinine Ratio, Urine - Urine, Clean Catch [[id number]] Collected: 09/21/23 1547    Specimen: Urine, Clean Catch Updated: 09/21/23 1557    Protein Elec + Interp, Serum [[id number]] Collected: 09/21/23 1554    Specimen: Blood Updated: 09/21/23 1557    Immunoglobulin Free LT Chains Blood [[id number]] Collected: 09/21/23 1554    Specimen: Blood Updated: 09/21/23 1557    ANCA Panel [[id number]] Collected: 09/21/23 1554    Specimen: Blood Updated: 09/21/23 1557    CK [[id number]]  (Normal) Collected: 09/21/23 1042    Specimen: Blood Updated: 09/21/23 1334     Creatine Kinase 42 U/L     Comprehensive Metabolic Panel [[id number]]  (Abnormal) Collected: 09/21/23 1042    Specimen: Blood Updated: 09/21/23 1115     Glucose 89 mg/dL      BUN 82 mg/dL      Creatinine 3.76 mg/dL      Sodium 139 mmol/L      Potassium 5.4 mmol/L      Comment: Slight hemolysis detected by analyzer. Results may be affected.        Chloride 105 mmol/L      CO2 19.0 mmol/L      Calcium 8.3 mg/dL      Total Protein 7.2 g/dL      Albumin 3.6 g/dL      ALT (SGPT) 13 U/L      AST (SGOT) 21 U/L      Comment: Slight hemolysis detected by analyzer. Results may be affected.        Alkaline Phosphatase 69 U/L      Total Bilirubin 0.2 mg/dL      Globulin 3.6 gm/dL      A/G Ratio 1.0 g/dL      BUN/Creatinine Ratio 21.8     Anion Gap 15.0 mmol/L      eGFR 12.9 mL/min/1.73      Comment: <15 Indicative of kidney failure       Narrative:      GFR Normal >60  Chronic Kidney Disease <60  Kidney Failure <15      CBC & Differential [[id number]]  (Abnormal) Collected: 09/21/23 1042    Specimen: Blood Updated: 09/21/23 1110    Narrative:      The following orders were created for panel order CBC & Differential.  Procedure                                Abnormality         Status                     ---------                               -----------         ------                     CBC Auto Differential[[id number]]        Abnormal            Final result                 Please view results for these tests on the individual orders.    CBC Auto Differential [[id number]]  (Abnormal) Collected: 09/21/23 1042    Specimen: Blood Updated: 09/21/23 1110     WBC 7.15 10*3/mm3      RBC 2.91 10*6/mm3      Hemoglobin 8.3 g/dL      Hematocrit 26.6 %      MCV 91.4 fL      MCH 28.5 pg      MCHC 31.2 g/dL      RDW 14.8 %      RDW-SD 48.2 fl      MPV 9.8 fL      Platelets 229 10*3/mm3      Neutrophil % 71.1 %      Lymphocyte % 14.0 %      Monocyte % 8.7 %      Eosinophil % 4.5 %      Basophil % 0.7 %      Immature Grans % 1.0 %      Neutrophils, Absolute 5.09 10*3/mm3      Lymphocytes, Absolute 1.00 10*3/mm3      Monocytes, Absolute 0.62 10*3/mm3      Eosinophils, Absolute 0.32 10*3/mm3      Basophils, Absolute 0.05 10*3/mm3      Immature Grans, Absolute 0.07 10*3/mm3      nRBC 0.0 /100 WBC           Imaging Results (Last 24 Hours)       Procedure Component Value Units Date/Time    US Renal Bilateral [[id number]] Collected: 09/21/23 1545     Updated: 09/21/23 1608    Narrative:      INDICATION:  TODD.    COMPARISON:  None relevant.    TECHNIQUE:  High-resolution grayscale and color Doppler ultrasound was performed of the  bilateral kidneys and the urinary bladder.    FINDINGS:  Both kidneys are echogenic.  No pelvocaliectasis or concerning mass.  Renal  lengths measure 10.4 cm on the right and 10.7 cm on the left.    Urinary bladder appears normal.      Impression:      Bilateral medical renal disease.                    Assessment and Treatment Plan:     Active Hospital Problems    Diagnosis     **TODD (acute kidney injury)          Creatinine on admission is 2  Peak is 4.2  Nephrology consulted  Continue doxycyline for UTI to complete treatment, 3 more doses   IVF  Repeat  "labs in AM  Monitor urine output   Given her painless hematuria, I will ask Dr. Pollard to see her tomorrow.     Medical Decision Making  Number and Complexity of problems one complex     Conditions and Status:        Condition is improving.         Care Planning  Full Code     I confirmed that the patient's Advance Care Plan is present, code status is documented, or surrogate decision maker is listed in the patient's medical record.    Disposition  TBD      I discussed the patients findings and my recommendations with: nephrology         This document has been electronically signed by Lou Lima MD on September 21, 2023 21:10 CDT                  Electronically signed by Lou Lima MD at 09/21/23 2122          Emergency Department Notes        Zakiya Colby, RN at 09/21/23 1223          Nursing report ED to floor  Adilene Morgan  63 y.o.  female    HPI:   Chief Complaint   Patient presents with    Abnormal Lab       Admitting doctor:   Lou Lima MD    Consulting provider(s):  Consults       Date and Time Order Name Status Description    9/21/2023 12:11 PM Inpatient Nephrology Consult               Admitting diagnosis:   The encounter diagnosis was TODD (acute kidney injury).    Code status:   Current Code Status       Date Active Code Status Order ID Comments User Context       Prior            Allergies:   Azithromycin, Cefaclor, Cephalexin, Lipitor [atorvastatin], Penicillin g, Penicillins, and Zocor [simvastatin]    Intake and Output  No intake or output data in the 24 hours ending 09/21/23 1223    Weight:       09/21/23  1005   Weight: 90.7 kg (200 lb)       Most recent vitals:   Vitals:    09/21/23 1005 09/21/23 1200   BP: (!) 184/79 169/67   BP Location: Left arm    Patient Position: Sitting    Pulse: 80 70   Resp: 18    Temp: 97.7 °F (36.5 °C)    TempSrc: Oral    SpO2: 98% 99%   Weight: 90.7 kg (200 lb)    Height: 167.6 cm (66\")      Oxygen Therapy: .    Active " LDAs/IV Access:   Lines, Drains & Airways       Active LDAs       Name Placement date Placement time Site Days    Peripheral IV 09/21/23 1046 Anterior;Right Forearm 09/21/23  1046  Forearm  less than 1                    Labs (abnormal labs have a star):   Labs Reviewed   COMPREHENSIVE METABOLIC PANEL - Abnormal; Notable for the following components:       Result Value    BUN 82 (*)     Creatinine 3.76 (*)     Potassium 5.4 (*)     CO2 19.0 (*)     Calcium 8.3 (*)     eGFR 12.9 (*)     All other components within normal limits    Narrative:     GFR Normal >60  Chronic Kidney Disease <60  Kidney Failure <15     CBC WITH AUTO DIFFERENTIAL - Abnormal; Notable for the following components:    RBC 2.91 (*)     Hemoglobin 8.3 (*)     Hematocrit 26.6 (*)     MCHC 31.2 (*)     Lymphocyte % 14.0 (*)     Immature Grans % 1.0 (*)     Immature Grans, Absolute 0.07 (*)     All other components within normal limits   CBC AND DIFFERENTIAL    Narrative:     The following orders were created for panel order CBC & Differential.  Procedure                               Abnormality         Status                     ---------                               -----------         ------                     CBC Auto Differential[[id number]]        Abnormal            Final result                 Please view results for these tests on the individual orders.       Meds given in ED:   Medications   sodium chloride 0.9 % infusion (has no administration in time range)     sodium chloride, 75 mL/hr         NIH Stroke Scale:       Isolation/Infection(s):  No active isolations   No active infections     COVID Testing  Collected .  Resulted .    Nursing report ED to floor:  Mental status: A&O  Ambulatory status: standby  Precautions: none    ED nurse phone extentsilp- [phone]    Electronically signed by Zakiya Colby RN at 09/21/23 1223       Lincoln Jefferson DO at 09/21/23 1022          Subjective   History of Present Illness  This is a  63-year-old female lung cancer patient who presents for evaluation of worsening renal function on screening weekly labs.  Typical baseline creatinine is around 2.  Her most recent creatinine was approximately 4.  The patient denies decreased p.o. intake.  She denies nausea vomiting diarrhea.  She denies recent medication changes other than Myrbetriq which she feels is helping.  She still feels that she is getting good urine output.  She denies flank pain.  She denies hematuria.      Review of Systems   All other systems reviewed and are negative.    Past Medical History:   Diagnosis Date    Achilles bursitis     Achilles tendinitis     Acquired hypothyroidism     Allergic rhinitis     Allergy to meat     alphagal    Allergy to milk products     Arrhythmia     Asthmatic bronchitis     Asthmatic bronchitis     Bone spur     Calcaneal spur     Depressive disorder     Family history of thyroid problem     Herpes simplex     Hypermetropia     Hypothyroidism     Menopausal flushing     Menopause     Migraine     Otalgia     Pneumonia     Presbyopia     Stroke     Trochanteric bursitis     UTI (urinary tract infection) 07/03/2018    Ventricular premature beats        Allergies   Allergen Reactions    Azithromycin Other (See Comments)     Pt and spouse unsure    Cefaclor Diarrhea and Nausea Only     Other reaction(s): Nausea Only    Cephalexin Unknown - High Severity    Lipitor [Atorvastatin] Nausea And Vomiting    Penicillin G Unknown - High Severity    Penicillins     Zocor [Simvastatin] Nausea And Vomiting       Past Surgical History:   Procedure Laterality Date    COLONOSCOPY N/A 2/15/2017    Procedure: COLONOSCOPY;  Surgeon: Lupillo Ugarte MD;  Location: [facility] ENDOSCOPY;  Service:     COSMETIC SURGERY      plastic surgery to face x 4 r/t trauma    ENDOSCOPY N/A 5/31/2022    Procedure: ESOPHAGOGASTRODUODENOSCOPY;  Surgeon: Lincoln Banegas MD;  Location: [facility] ENDOSCOPY;  Service: Gastroenterology;  Laterality:  "N/A;    LAPAROSCOPIC CHOLECYSTECTOMY  12/10/1995    Cholecystectomy, laparoscopic (1)       OTHER SURGICAL HISTORY      Head surgery procedure (1)    plastic surgery on the face     OTHER SURGICAL HISTORY  10/17/2014    Repair Superficial Wound TR-EXT 2.5 < CM 09250 (1)          Family History   Problem Relation Age of Onset    Stroke Mother     Diabetes Father     Heart disease Father     Hypertension Father     Thyroid disease Sister     Breast cancer Maternal Aunt     Cancer Neg Hx         multiple in mothers family       Social History     Socioeconomic History    Marital status:    Tobacco Use    Smoking status: Never    Smokeless tobacco: Never   Vaping Use    Vaping Use: Never used   Substance and Sexual Activity    Alcohol use: No    Drug use: No    Sexual activity: Defer           Objective   Physical Exam  Vitals and nursing note reviewed.   Constitutional:       General: She is not in acute distress.  HENT:      Head: Normocephalic and atraumatic.      Nose: Nose normal.   Eyes:      General: No scleral icterus.  Cardiovascular:      Rate and Rhythm: Normal rate.   Pulmonary:      Effort: Pulmonary effort is normal.   Musculoskeletal:         General: No signs of injury.   Skin:     General: Skin is dry.   Neurological:      Mental Status: She is alert and oriented to person, place, and time.   Psychiatric:         Behavior: Behavior normal.       Procedures          ED Course  ED Course as of 09/21/23 1712   Thu Sep 21, 2023   1206 CBC & Differential(!)  Stable chronic anemia [JV]   1206 Comprehensive Metabolic Panel(!)  Worsening renal function.  Slightly elevated potassium however hemolyzed. [JV]      ED Course User Index  [JV] Lincoln Jefferson, DO                                           Medical Decision Making  The patient's recent past medical charts for this facility as well as outside facilities via the \"care everywhere\" application of epic reviewed.    The differential diagnosis includes " medication side effect, dehydration, contrast nephropathy, and ureteral obstruction, among others.    On final reevaluation the patient is in stable condition and agreeable to admission.      Problems Addressed:  TODD (acute kidney injury): complicated acute illness or injury    Amount and/or Complexity of Data Reviewed  Labs: ordered. Decision-making details documented in ED Course.  Discussion of management or test interpretation with external provider(s): Case discussed with admitting hospitalist physician.    Risk  OTC drugs.  Prescription drug management.  Decision regarding hospitalization.        Final diagnoses:   TODD (acute kidney injury)       ED Disposition  ED Disposition       ED Disposition   Decision to Admit    Condition   --    Comment   Level of Care: Telemetry [5]   Diagnosis: TODD (acute kidney injury) [263811]   Admitting Physician: BYRON LEE [[id number]]   Attending Physician: BYRON LEE [[id number]]                 No follow-up provider specified.       Medication List      No changes were made to your prescriptions during this visit.            Lincoln Jefferson DO  09/21/23 1712      Electronically signed by Lincoln Jefferson DO at 09/21/23 1712       Lab Results (last 24 hours)       Procedure Component Value Units Date/Time    C4+C3 [[id number]]  (Normal) Collected: 09/22/23 0523    Specimen: Blood Updated: 09/22/23 1222     C3 Complement 126.0 mg/dl      C4 Complement 33.0 mg/dl     Basic Metabolic Panel [[id number]]  (Abnormal) Collected: 09/22/23 0523    Specimen: Blood Updated: 09/22/23 0739     Glucose 88 mg/dL      BUN 76 mg/dL      Creatinine 4.00 mg/dL      Sodium 140 mmol/L      Potassium 4.4 mmol/L      Chloride 106 mmol/L      CO2 21.0 mmol/L      Calcium 8.3 mg/dL      BUN/Creatinine Ratio 19.0     Anion Gap 13.0 mmol/L      eGFR 11.9 mL/min/1.73      Comment: <15 Indicative of kidney failure       Narrative:      GFR Normal >60  Chronic Kidney Disease <60  Kidney Failure  <15      Uric Acid [[id number]]  (Abnormal) Collected: 09/22/23 0523    Specimen: Blood Updated: 09/22/23 0739     Uric Acid 6.3 mg/dL     CBC Auto Differential [[id number]]  (Abnormal) Collected: 09/22/23 0523    Specimen: Blood Updated: 09/22/23 0713     WBC 7.36 10*3/mm3      RBC 2.86 10*6/mm3      Hemoglobin 8.3 g/dL      Hematocrit 25.6 %      MCV 89.5 fL      MCH 29.0 pg      MCHC 32.4 g/dL      RDW 14.7 %      RDW-SD 46.7 fl      MPV 10.1 fL      Platelets 207 10*3/mm3      Neutrophil % 70.4 %      Lymphocyte % 15.9 %      Monocyte % 8.6 %      Eosinophil % 3.9 %      Basophil % 0.5 %      Immature Grans % 0.7 %      Neutrophils, Absolute 5.18 10*3/mm3      Lymphocytes, Absolute 1.17 10*3/mm3      Monocytes, Absolute 0.63 10*3/mm3      Eosinophils, Absolute 0.29 10*3/mm3      Basophils, Absolute 0.04 10*3/mm3      Immature Grans, Absolute 0.05 10*3/mm3      nRBC 0.0 /100 WBC     Protein / Creatinine Ratio, Urine - Urine, Clean Catch [[id number]]  (Abnormal) Collected: 09/21/23 1547    Specimen: Urine, Clean Catch Updated: 09/22/23 0136     Protein/Creatinine Ratio, Urine 1,592.1 mg/G Crea      Creatinine, Urine 33.1 mg/dL      Total Protein, Urine 52.7 mg/dL     Urine Culture - Urine, Urine, Clean Catch [[id number]] Collected: 09/21/23 1547    Specimen: Urine, Clean Catch Updated: 09/21/23 2114    Urine Eosinophils, Liang's Stain - Urine, Clean Catch [[id number]]  (Abnormal) Collected: 09/21/23 1547    Specimen: Urine, Clean Catch Updated: 09/21/23 2110     Liang Stain Positive     % EOS Liang Stain <1 %     Urinalysis, Microscopic Only - Urine, Clean Catch [[id number]]  (Abnormal) Collected: 09/21/23 1547    Specimen: Urine, Clean Catch Updated: 09/21/23 1617     RBC, UA Too Numerous to Count /HPF      WBC, UA Too Numerous to Count /HPF      Bacteria, UA 2+ /HPF      Squamous Epithelial Cells, UA 3-5 /HPF      Hyaline Casts, UA None Seen /LPF      Methodology Manual Light Microscopy    Sodium, Urine, Random -  Urine, Clean Catch [[id number]] Collected: 09/21/23 1547    Specimen: Urine, Clean Catch Updated: 09/21/23 1604     Sodium, Urine 62 mmol/L     Narrative:      Reference intervals for random urine have not been established.  Clinical usage is dependent upon physician's interpretation in combination with other laboratory tests.       Urinalysis With Microscopic If Indicated (No Culture) - Urine, Clean Catch [[id number]]  (Abnormal) Collected: 09/21/23 1547    Specimen: Urine, Clean Catch Updated: 09/21/23 1604     Color, UA [place]     Appearance, UA Cloudy     pH, UA 6.0     Specific Gravity, UA 1.010     Glucose, UA Negative     Ketones, UA Negative     Bilirubin, UA Negative     Blood, UA Large (3+)     Protein,  mg/dL (2+)     Leuk Esterase, UA Moderate (2+)     Nitrite, UA Negative     Urobilinogen, UA 0.2 E.U./dL    Protein Elec + Interp, Serum [[id number]] Collected: 09/21/23 1554    Specimen: Blood Updated: 09/21/23 1557    Immunoglobulin Free LT Chains Blood [[id number]] Collected: 09/21/23 1554    Specimen: Blood Updated: 09/21/23 1557    ANCA Panel [[id number]] Collected: 09/21/23 1554    Specimen: Blood Updated: 09/21/23 1557          Imaging Results (Last 24 Hours)       Procedure Component Value Units Date/Time    US Renal Bilateral [[id number]] Collected: 09/21/23 1545     Updated: 09/21/23 1608    Narrative:      INDICATION:  TODD.    COMPARISON:  None relevant.    TECHNIQUE:  High-resolution grayscale and color Doppler ultrasound was performed of the  bilateral kidneys and the urinary bladder.    FINDINGS:  Both kidneys are echogenic.  No pelvocaliectasis or concerning mass.  Renal  lengths measure 10.4 cm on the right and 10.7 cm on the left.    Urinary bladder appears normal.      Impression:      Bilateral medical renal disease.                ECG/EMG Results (last 24 hours)       ** No results found for the last 24 hours. **          Physician Progress Notes (last 24 hours)  Notes from 09/21/23  1354 through 09/22/23 1354   No notes of this type exist for this encounter.       Medical Student Notes (last 24 hours)  Notes from 09/21/23 1354 through 09/22/23 1354   No notes of this type exist for this encounter.       Consult Notes (last 24 hours)  Notes from 09/21/23 1354 through 09/22/23 1354   No notes of this type exist for this encounter.

## 2023-09-22 NOTE — H&P
[facility] Medicine Admission      Date of Admission: 9/21/2023      Primary Care Physician: Jen Raymundo MD      Chief Complaint: TODD      HPI:    She is a 63 year old that presents with abnormal labs. She gets her labs checked every week and her renal function has worsened. She states she is being treated for UTI. No vomiting or diarrhea. Does have some hematuria but no pain.     Concurrent Medical History:  has a past medical history of Achilles bursitis, Achilles tendinitis, Acquired hypothyroidism, Allergic rhinitis, Allergy to meat, Allergy to milk products, Arrhythmia, Asthmatic bronchitis, Asthmatic bronchitis, Bone spur, Calcaneal spur, Depressive disorder, Family history of thyroid problem, Herpes simplex, Hypermetropia, Hypothyroidism, Menopausal flushing, Menopause, Migraine, Otalgia, Pneumonia, Presbyopia, Stroke, Trochanteric bursitis, UTI (urinary tract infection) (07/03/2018), and Ventricular premature beats.    Past Surgical History:  has a past surgical history that includes Laparoscopic cholecystectomy (12/10/1995); Other surgical history; Other surgical history (10/17/2014); Cosmetic surgery; Colonoscopy (N/A, 2/15/2017); and Esophagogastroduodenoscopy (N/A, 5/31/2022).    Family History: family history includes Breast cancer in her maternal aunt; Diabetes in her father; Heart disease in her father; Hypertension in her father; Stroke in her mother; Thyroid disease in her sister.     Social History:  reports that she has never smoked. She has never used smokeless tobacco. She reports that she does not drink alcohol and does not use drugs.    Allergies:   Allergies   Allergen Reactions    Azithromycin Other (See Comments)     Pt and spouse unsure    Cefaclor Diarrhea and Nausea Only     Other reaction(s): Nausea Only    Cephalexin Unknown - High Severity    Lipitor [Atorvastatin] Nausea And Vomiting    Penicillin G Unknown - High  Severity    Penicillins     Zocor [Simvastatin] Nausea And Vomiting       Medications:   Prior to Admission medications    Medication Sig Start Date End Date Taking? Authorizing Provider   aspirin 81 MG EC tablet Take 1 tablet by mouth Daily.   Yes Bing Mcclelland MD   Calcium Carbonate (CALCIUM 500 PO) Take  by mouth Daily.   Yes Bing Mcclelland MD   doxycycline (VIBRAMYCIN) 100 MG capsule Take 2 capsules by mouth 2 (Two) Times a Day. 3 doses remaining - 1 tonight and morning/night on Friday   Yes Bing Mcclelland MD   ezetimibe (ZETIA) 10 MG tablet Take 1 tablet by mouth every night at bedtime. 9/23/22  Yes Bing Mcclelland MD   folic acid (FOLVITE) 400 MCG tablet Take 2 tablets by mouth Daily.   Yes Bing Mcclelland MD   Lorlatinib (Lorbrena) 25 MG tablet Take 75 mg by mouth Daily.   Yes Bing Mcclelland MD   midodrine (PROAMATINE) 10 MG tablet Take 1 tablet by mouth 3 (Three) Times a Day Before Meals. 9/12/23  Yes Clement Palacio MD   Mirabegron ER (Myrbetriq) 50 MG tablet sustained-release 24 hour 24 hr tablet Take 50 mg by mouth Daily. 9/19/23  Yes Jen Raymundo MD   multivitamin with minerals tablet tablet Take 1 tablet by mouth Daily.   Yes Bing Mcclelland MD   NON FORMULARY Prochloroperateine 10mg   Yes Bing Mcclelland MD   Nutritional Supplements (ESTROVEN NIGHTTIME PO) Take 200 mg by mouth. For night sweats   Yes Bing Mcclelland MD   ondansetron (ZOFRAN) 8 MG tablet Take 1 tablet by mouth Every 8 (Eight) Hours As Needed for Nausea or Vomiting. 3/18/22  Yes Jen Raymundo MD   pantoprazole (Protonix) 40 MG EC tablet Take 1 tablet by mouth Daily. 6/1/22  Yes Patel Kevin APRN   promethazine-dextromethorphan (PROMETHAZINE-DM) 6.25-15 MG/5ML syrup Take 5 mL by mouth 4 (Four) Times a Day As Needed for Cough. 9/19/23  Yes Jen Raymundo MD   rosuvastatin (CRESTOR) 5 MG tablet Take 1 tablet by mouth Daily. 12/23/22  Yes Krystin  MD Bing   sodium bicarbonate 650 MG tablet Take 1 tablet by mouth Every Morning. 5/23/23  Yes Bing Mcclelland MD   solifenacin (VESICARE) 10 MG tablet Take 1 tablet by mouth Daily.   Yes Bing Mcclelland MD   SUMAtriptan (IMITREX) 20 MG/ACT nasal spray 1 spray into the nostril(s) as directed by provider Every 2 (Two) Hours As Needed for Migraine. 3/20/23  Yes Jen Raymundo MD   Synthroid 200 MCG tablet TAKE 1 TABLET BY MOUTH EVERY DAY 2/7/23  Yes Jen Raymundo MD   topiramate (TOPAMAX) 50 MG tablet TAKE 1 TABLET BY MOUTH EVERY DAY AT NIGHT 2/13/23  Yes Jen Raymundo MD   traMADol (ULTRAM) 50 MG tablet Take 1 tablet by mouth Every 6 (Six) Hours As Needed. for pain 12/9/22  Yes Bing Mcclelland MD   trospium 60 MG capsule sustained-release 24 hr capsule Take 20 mg by mouth Every Night.   Yes Bing Mcclelland MD   venlafaxine XR (EFFEXOR-XR) 37.5 MG 24 hr capsule TAKE 1 CAPSULE BY MOUTH EVERY DAY 3/6/23  Yes Jen Raymundo MD   vitamin B-12 (CYANOCOBALAMIN) 500 MCG tablet Take 1 tablet by mouth Daily.   Yes ProviderBing MD   Xarelto 20 MG tablet TAKE 1 TABLET BY MOUTH DAILY. AFTER FINISHED WITH LOWER DOSE FOR 21 DAYS 5/10/23  Yes Jen Raymundo MD       I have utilized all available immediate resources to obtain, update, or review the patient's current medications (including all prescriptions, over-the-counter products, herbals, cannabis/cannabidiol products, and vitamin/mineral/dietary (nutritional) supplements).     Review of Systems:  Review of Systems   Otherwise complete ROS is negative except as mentioned above.    Physical Exam:   Temp:  [97.7 °F (36.5 °C)-98.2 °F (36.8 °C)] 98.2 °F (36.8 °C)  Heart Rate:  [70-80] 76  Resp:  [16-18] 16  BP: (168-184)/(67-84) 168/84  Physical Exam  Vitals reviewed.   Constitutional:       General: She is not in acute distress.     Appearance: She is well-developed.   HENT:      Head: Normocephalic and atraumatic.       Nose: Nose normal.   Eyes:      Conjunctiva/sclera: Conjunctivae normal.   Cardiovascular:      Rate and Rhythm: Normal rate and regular rhythm.   Pulmonary:      Effort: Pulmonary effort is normal. No respiratory distress.      Breath sounds: Normal breath sounds. No wheezing or rales.   Musculoskeletal:         General: Normal range of motion.      Cervical back: Normal range of motion and neck supple.   Skin:     General: Skin is warm and dry.   Neurological:      Mental Status: She is alert and oriented to person, place, and time.   Psychiatric:         Behavior: Behavior normal.         Results Reviewed:  I have personally reviewed current lab, radiology, and data and agree with results.  Lab Results (last 24 hours)       Procedure Component Value Units Date/Time    Urine Eosinophils, Liang's Stain - Urine, Clean Catch [[id number]]  (Abnormal) Collected: 09/21/23 1547    Specimen: Urine, Clean Catch Updated: 09/21/23 2110     Liang Stain Positive     % EOS Liang Stain <1 %     Urinalysis, Microscopic Only - Urine, Clean Catch [[id number]]  (Abnormal) Collected: 09/21/23 1547    Specimen: Urine, Clean Catch Updated: 09/21/23 1617     RBC, UA Too Numerous to Count /HPF      WBC, UA Too Numerous to Count /HPF      Bacteria, UA 2+ /HPF      Squamous Epithelial Cells, UA 3-5 /HPF      Hyaline Casts, UA None Seen /LPF      Methodology Manual Light Microscopy    Sodium, Urine, Random - Urine, Clean Catch [[id number]] Collected: 09/21/23 1547    Specimen: Urine, Clean Catch Updated: 09/21/23 1604     Sodium, Urine 62 mmol/L     Narrative:      Reference intervals for random urine have not been established.  Clinical usage is dependent upon physician's interpretation in combination with other laboratory tests.       Urinalysis With Microscopic If Indicated (No Culture) - Urine, Clean Catch [[id number]]  (Abnormal) Collected: 09/21/23 1547    Specimen: Urine, Clean Catch Updated: 09/21/23 1604     Color, UA [place]      Appearance, UA Cloudy     pH, UA 6.0     Specific Gravity, UA 1.010     Glucose, UA Negative     Ketones, UA Negative     Bilirubin, UA Negative     Blood, UA Large (3+)     Protein,  mg/dL (2+)     Leuk Esterase, UA Moderate (2+)     Nitrite, UA Negative     Urobilinogen, UA 0.2 E.U./dL    Protein / Creatinine Ratio, Urine - Urine, Clean Catch [[id number]] Collected: 09/21/23 1547    Specimen: Urine, Clean Catch Updated: 09/21/23 1557    Protein Elec + Interp, Serum [[id number]] Collected: 09/21/23 1554    Specimen: Blood Updated: 09/21/23 1557    Immunoglobulin Free LT Chains Blood [[id number]] Collected: 09/21/23 1554    Specimen: Blood Updated: 09/21/23 1557    ANCA Panel [[id number]] Collected: 09/21/23 1554    Specimen: Blood Updated: 09/21/23 1557    CK [[id number]]  (Normal) Collected: 09/21/23 1042    Specimen: Blood Updated: 09/21/23 1334     Creatine Kinase 42 U/L     Comprehensive Metabolic Panel [[id number]]  (Abnormal) Collected: 09/21/23 1042    Specimen: Blood Updated: 09/21/23 1115     Glucose 89 mg/dL      BUN 82 mg/dL      Creatinine 3.76 mg/dL      Sodium 139 mmol/L      Potassium 5.4 mmol/L      Comment: Slight hemolysis detected by analyzer. Results may be affected.        Chloride 105 mmol/L      CO2 19.0 mmol/L      Calcium 8.3 mg/dL      Total Protein 7.2 g/dL      Albumin 3.6 g/dL      ALT (SGPT) 13 U/L      AST (SGOT) 21 U/L      Comment: Slight hemolysis detected by analyzer. Results may be affected.        Alkaline Phosphatase 69 U/L      Total Bilirubin 0.2 mg/dL      Globulin 3.6 gm/dL      A/G Ratio 1.0 g/dL      BUN/Creatinine Ratio 21.8     Anion Gap 15.0 mmol/L      eGFR 12.9 mL/min/1.73      Comment: <15 Indicative of kidney failure       Narrative:      GFR Normal >60  Chronic Kidney Disease <60  Kidney Failure <15      CBC & Differential [[id number]]  (Abnormal) Collected: 09/21/23 1042    Specimen: Blood Updated: 09/21/23 1110    Narrative:      The following orders were created  for panel order CBC & Differential.  Procedure                               Abnormality         Status                     ---------                               -----------         ------                     CBC Auto Differential[[id number]]        Abnormal            Final result                 Please view results for these tests on the individual orders.    CBC Auto Differential [[id number]]  (Abnormal) Collected: 09/21/23 1042    Specimen: Blood Updated: 09/21/23 1110     WBC 7.15 10*3/mm3      RBC 2.91 10*6/mm3      Hemoglobin 8.3 g/dL      Hematocrit 26.6 %      MCV 91.4 fL      MCH 28.5 pg      MCHC 31.2 g/dL      RDW 14.8 %      RDW-SD 48.2 fl      MPV 9.8 fL      Platelets 229 10*3/mm3      Neutrophil % 71.1 %      Lymphocyte % 14.0 %      Monocyte % 8.7 %      Eosinophil % 4.5 %      Basophil % 0.7 %      Immature Grans % 1.0 %      Neutrophils, Absolute 5.09 10*3/mm3      Lymphocytes, Absolute 1.00 10*3/mm3      Monocytes, Absolute 0.62 10*3/mm3      Eosinophils, Absolute 0.32 10*3/mm3      Basophils, Absolute 0.05 10*3/mm3      Immature Grans, Absolute 0.07 10*3/mm3      nRBC 0.0 /100 WBC           Imaging Results (Last 24 Hours)       Procedure Component Value Units Date/Time    US Renal Bilateral [[id number]] Collected: 09/21/23 1545     Updated: 09/21/23 1608    Narrative:      INDICATION:  TODD.    COMPARISON:  None relevant.    TECHNIQUE:  High-resolution grayscale and color Doppler ultrasound was performed of the  bilateral kidneys and the urinary bladder.    FINDINGS:  Both kidneys are echogenic.  No pelvocaliectasis or concerning mass.  Renal  lengths measure 10.4 cm on the right and 10.7 cm on the left.    Urinary bladder appears normal.      Impression:      Bilateral medical renal disease.                    Assessment and Treatment Plan:     Active Hospital Problems    Diagnosis     **TODD (acute kidney injury)          Creatinine on admission is 2  Peak is 4.2  Nephrology consulted  Continue  doxycyline for UTI to complete treatment, 3 more doses   IVF  Repeat labs in AM  Monitor urine output   Given her painless hematuria, I will ask Dr. Pollard to see her tomorrow.     Medical Decision Making  Number and Complexity of problems one complex     Conditions and Status:        Condition is improving.         Care Planning  Full Code     I confirmed that the patient's Advance Care Plan is present, code status is documented, or surrogate decision maker is listed in the patient's medical record.    Disposition  TBD      I discussed the patients findings and my recommendations with: nephrology         This document has been electronically signed by Lou Lima MD on September 21, 2023 21:10 CDT

## 2023-09-22 NOTE — PROGRESS NOTES
[facility] Medicine Services  INPATIENT PROGRESS NOTE    Length of Stay: 1  Date of Admission: 9/21/2023  Primary Care Physician: Jen Raymundo MD    Subjective   Chief Complaint: No complaints    HPI: This is a 63-year-old female with past medical history of depression, lung cancer (patient is receiving immunotherapy), hypothyroidism, and stroke that presented to [facility] on 9/21/2023 secondary to an acute kidney injury.    Patient is currently being treated for a urinary tract infection.  Creatinine baseline is around 2 and was noted to be 4.2 prior to admission.    Review of Systems   Constitutional:  Negative for activity change and fatigue.   HENT:  Negative for ear pain and sore throat.    Eyes:  Negative for pain and discharge.   Respiratory:  Negative for cough and shortness of breath.    Cardiovascular:  Negative for chest pain and palpitations.   Gastrointestinal:  Negative for abdominal pain and nausea.   Endocrine: Negative for cold intolerance and heat intolerance.   Genitourinary:  Negative for difficulty urinating and dysuria.   Musculoskeletal:  Negative for arthralgias and gait problem.   Skin:  Negative for color change and rash.   Neurological:  Negative for dizziness and weakness.   Psychiatric/Behavioral:  Negative for agitation and confusion.           Objective    Temp:  [97.8 °F (36.6 °C)-98.2 °F (36.8 °C)] 98.2 °F (36.8 °C)  Heart Rate:  [70-85] 82  Resp:  [16] 16  BP: (110-168)/(56-84) 126/63    Physical Exam  Constitutional:       Appearance: She is well-developed.   HENT:      Head: Normocephalic and atraumatic.   Eyes:      Pupils: Pupils are equal, round, and reactive to light.   Cardiovascular:      Rate and Rhythm: Normal rate and regular rhythm.   Pulmonary:      Effort: Pulmonary effort is normal.      Breath sounds: Normal breath sounds.   Abdominal:      General: Bowel sounds are normal.      Palpations: Abdomen is soft.   Musculoskeletal:          General: Normal range of motion.      Cervical back: Normal range of motion and neck supple.   Skin:     General: Skin is warm and dry.   Neurological:      Mental Status: She is alert and oriented to person, place, and time.   Psychiatric:         Behavior: Behavior normal.     Results Review:  I have reviewed the labs, radiology results, and diagnostic studies.    Laboratory Data:   Results from last 7 days   Lab Units 09/22/23  0523 09/21/23  1042 09/20/23  1419   SODIUM mmol/L 140 139 141   POTASSIUM mmol/L 4.4 5.4* 4.9   CHLORIDE mmol/L 106 105 109*   TOTAL CO2 mmol/L  --   --  21   CO2 mmol/L 21.0* 19.0*  --    BUN mg/dL 76* 82* 84*   CREATININE mg/dL 4.00* 3.76* 4.2*   GLUCOSE mg/dL 88 89  --    CALCIUM mg/dL 8.3* 8.3* 8.4*   BILIRUBIN mg/dL  --  0.2 0.21*   ALK PHOS U/L  --  69 70   ALT (SGPT) U/L  --  13 14   AST (SGOT) U/L  --  21 13   ANION GAP mmol/L 13.0 15.0 11     Estimated Creatinine Clearance: 16.2 mL/min (A) (by C-G formula based on SCr of 4 mg/dL (H)).      Results from last 7 days   Lab Units 09/22/23  0523   URIC ACID mg/dL 6.3*     Results from last 7 days   Lab Units 09/22/23  0523 09/21/23  1042   WBC 10*3/mm3 7.36 7.15   HEMOGLOBIN g/dL 8.3* 8.3*   HEMATOCRIT % 25.6* 26.6*   PLATELETS 10*3/mm3 207 229           Culture Data:   No results found for: BLOODCX  No results found for: URINECX  No results found for: RESPCX  No results found for: WOUNDCX  No results found for: STOOLCX  No components found for: BODYFLD    Radiology Data:   Imaging Results (Last 24 Hours)       Procedure Component Value Units Date/Time    US Renal Bilateral [[id number]] Collected: 09/21/23 1545     Updated: 09/21/23 1608    Narrative:      INDICATION:  TODD.    COMPARISON:  None relevant.    TECHNIQUE:  High-resolution grayscale and color Doppler ultrasound was performed of the  bilateral kidneys and the urinary bladder.    FINDINGS:  Both kidneys are echogenic.  No pelvocaliectasis or concerning mass.  Renal  lengths  measure 10.4 cm on the right and 10.7 cm on the left.    Urinary bladder appears normal.      Impression:      Bilateral medical renal disease.                  I have reviewed the patient's current medications.     Assessment/Plan     Active Hospital Problems    Diagnosis     **TODD (acute kidney injury)     Acute kidney failure, unspecified        Plan:    Acute on chronic kidney disease, stage IV: Nephrology consult appreciated.  Creatinine today is 4.  Metabolic acidosis: Sodium bicarb drip per nephrology.  Anemia, chronic: Hemoglobin today is 8.3.  The patient receives Retacrit every 4 weeks.  History of cancer with metastasis: Patient follows with [facility] in [place] and currently is on immunotherapy with lorlatinub.   History of TIA: Xarelto dosage decreased from 20 mg daily to 15 mg daily per pharmacy recommendation due to TODD.  Continue home statin.  GERD: Continue PPI.  Hypothyroidism: Continue home Synthroid.  Mood disorder: Continue home Effexor.  Urinary tract infection:  Continue Doxycycline.  Urine culture pending.     VTE prophylaxis: Xarelto.      Medical Decision Making  Number and Complexity of problems: 9/high  Differential Diagnosis: N/A    Conditions and Status:        Condition is improving.     [facility] Data  External documents reviewed: Yes  My EKG interpretation: N/A  My US interpretation: As per radiology.  Tests considered but not ordered: None     Decision rules/scores evaluated (example [id number], [place], etc): N/A     Discussed with: Patient     Treatment Plan  As above    Care Planning  Shared decision making: Yes  Code status and discussions: Full    Disposition  Social Determinants of Health that impact treatment or disposition: None   I expect the patient to be discharged to home in 2-3 days.     I confirmed that the patient's Advance Care Plan is present, code status is documented, or surrogate decision maker is listed in the patient's medical record.      I have  utilized all available immediate resources to obtain, update, or review the patient's current medications.         This document has been electronically signed by SILVA Dailey on September 22, 2023 14:05 CDT

## 2023-09-22 NOTE — PLAN OF CARE
Problem: Adult Inpatient Plan of Care  Goal: Absence of Hospital-Acquired Illness or Injury  Intervention: Identify and Manage Fall Risk  Recent Flowsheet Documentation  Taken 9/22/2023 0400 by Pamela Botello RN  Safety Promotion/Fall Prevention: safety round/check completed  Taken 9/22/2023 0200 by Pamela Botello RN  Safety Promotion/Fall Prevention: safety round/check completed  Taken 9/22/2023 0000 by Pamela Botello RN  Safety Promotion/Fall Prevention: safety round/check completed  Taken 9/21/2023 2200 by Pamela Botello RN  Safety Promotion/Fall Prevention: safety round/check completed  Taken 9/21/2023 2100 by Pamela Botello RN  Safety Promotion/Fall Prevention: safety round/check completed  Taken 9/21/2023 2000 by Pamela Botello RN  Safety Promotion/Fall Prevention: safety round/check completed  Taken 9/21/2023 1900 by Pamela Botello RN  Safety Promotion/Fall Prevention: safety round/check completed     Problem: Adult Inpatient Plan of Care  Goal: Absence of Hospital-Acquired Illness or Injury  Intervention: Prevent Skin Injury  Recent Flowsheet Documentation  Taken 9/22/2023 0200 by Pamela Botello RN  Body Position: position changed independently  Taken 9/22/2023 0000 by Pamela Botello RN  Body Position: position changed independently  Taken 9/21/2023 2200 by Pamela Botello RN  Body Position: position changed independently  Taken 9/21/2023 2100 by Pamela Botello RN  Body Position: position changed independently  Taken 9/21/2023 2000 by Pamela Botello RN  Body Position: position changed independently  Taken 9/21/2023 1900 by Pamela Botello RN  Body Position: position changed independently     Problem: Adult Inpatient Plan of Care  Goal: Absence of Hospital-Acquired Illness or Injury  Intervention: Prevent and Manage VTE (Venous Thromboembolism) Risk  Recent Flowsheet Documentation  Taken 9/22/2023 0400 by Pamela Botello RN  Activity Management: up ad marsha  Taken  9/22/2023 0200 by Pamela Botello, RN  Activity Management: up ad marsha  Taken 9/22/2023 0000 by Pamela Botello, RN  Activity Management: up ad marsha  Taken 9/21/2023 2200 by Pamela Botello RN  Activity Management: up ad marsha  Taken 9/21/2023 2129 by Pamela Botello, RN  Range of Motion: ROM (range of motion) performed  Taken 9/21/2023 2100 by Pamela Botello, RN  Activity Management: up ad marsha  Taken 9/21/2023 2000 by Pamela Botello RN  Activity Management: up ad marsha  Taken 9/21/2023 1900 by Pamela Botello, RN  Activity Management: activity encouraged   Goal Outcome Evaluation:  Plan of Care Reviewed With: patient        Progress: no change

## 2023-09-23 LAB
ANION GAP SERPL CALCULATED.3IONS-SCNC: 14 MMOL/L (ref 5–15)
BACTERIA SPEC AEROBE CULT: NORMAL
BASOPHILS # BLD AUTO: 0.05 10*3/MM3 (ref 0–0.2)
BASOPHILS NFR BLD AUTO: 0.6 % (ref 0–1.5)
BUN SERPL-MCNC: 65 MG/DL (ref 8–23)
BUN/CREAT SERPL: 16.2 (ref 7–25)
CALCIUM SPEC-SCNC: 8.6 MG/DL (ref 8.6–10.5)
CHLORIDE SERPL-SCNC: 107 MMOL/L (ref 98–107)
CO2 SERPL-SCNC: 23 MMOL/L (ref 22–29)
CREAT SERPL-MCNC: 4.01 MG/DL (ref 0.57–1)
DEPRECATED RDW RBC AUTO: 47.8 FL (ref 37–54)
EGFRCR SERPLBLD CKD-EPI 2021: 11.9 ML/MIN/1.73
EOSINOPHIL # BLD AUTO: 0.34 10*3/MM3 (ref 0–0.4)
EOSINOPHIL NFR BLD AUTO: 4.1 % (ref 0.3–6.2)
ERYTHROCYTE [DISTWIDTH] IN BLOOD BY AUTOMATED COUNT: 14.6 % (ref 12.3–15.4)
FERRITIN SERPL-MCNC: 534.7 NG/ML (ref 13–150)
FOLATE SERPL-MCNC: >20 NG/ML (ref 4.78–24.2)
GLUCOSE SERPL-MCNC: 88 MG/DL (ref 65–99)
HCT VFR BLD AUTO: 27.7 % (ref 34–46.6)
HGB BLD-MCNC: 8.6 G/DL (ref 12–15.9)
IMM GRANULOCYTES # BLD AUTO: 0.04 10*3/MM3 (ref 0–0.05)
IMM GRANULOCYTES NFR BLD AUTO: 0.5 % (ref 0–0.5)
IRON 24H UR-MRATE: 47 MCG/DL (ref 37–145)
IRON SATN MFR SERPL: 27 % (ref 20–50)
LYMPHOCYTES # BLD AUTO: 1.48 10*3/MM3 (ref 0.7–3.1)
LYMPHOCYTES NFR BLD AUTO: 18 % (ref 19.6–45.3)
MCH RBC QN AUTO: 28.3 PG (ref 26.6–33)
MCHC RBC AUTO-ENTMCNC: 31 G/DL (ref 31.5–35.7)
MCV RBC AUTO: 91.1 FL (ref 79–97)
MONOCYTES # BLD AUTO: 0.6 10*3/MM3 (ref 0.1–0.9)
MONOCYTES NFR BLD AUTO: 7.3 % (ref 5–12)
NEUTROPHILS NFR BLD AUTO: 5.69 10*3/MM3 (ref 1.7–7)
NEUTROPHILS NFR BLD AUTO: 69.5 % (ref 42.7–76)
NRBC BLD AUTO-RTO: 0 /100 WBC (ref 0–0.2)
PLATELET # BLD AUTO: 223 10*3/MM3 (ref 140–450)
PMV BLD AUTO: 10.5 FL (ref 6–12)
POTASSIUM SERPL-SCNC: 4.6 MMOL/L (ref 3.5–5.2)
RBC # BLD AUTO: 3.04 10*6/MM3 (ref 3.77–5.28)
SODIUM SERPL-SCNC: 144 MMOL/L (ref 136–145)
TIBC SERPL-MCNC: 177 MCG/DL (ref 298–536)
TRANSFERRIN SERPL-MCNC: 119 MG/DL (ref 200–360)
VIT B12 BLD-MCNC: 1250 PG/ML (ref 211–946)
WBC NRBC COR # BLD: 8.2 10*3/MM3 (ref 3.4–10.8)

## 2023-09-23 PROCEDURE — 84466 ASSAY OF TRANSFERRIN: CPT | Performed by: NURSE PRACTITIONER

## 2023-09-23 PROCEDURE — 96366 THER/PROPH/DIAG IV INF ADDON: CPT

## 2023-09-23 PROCEDURE — 82728 ASSAY OF FERRITIN: CPT | Performed by: NURSE PRACTITIONER

## 2023-09-23 PROCEDURE — 80048 BASIC METABOLIC PNL TOTAL CA: CPT | Performed by: STUDENT IN AN ORGANIZED HEALTH CARE EDUCATION/TRAINING PROGRAM

## 2023-09-23 PROCEDURE — 82607 VITAMIN B-12: CPT | Performed by: NURSE PRACTITIONER

## 2023-09-23 PROCEDURE — 85025 COMPLETE CBC W/AUTO DIFF WBC: CPT | Performed by: STUDENT IN AN ORGANIZED HEALTH CARE EDUCATION/TRAINING PROGRAM

## 2023-09-23 PROCEDURE — 83540 ASSAY OF IRON: CPT | Performed by: NURSE PRACTITIONER

## 2023-09-23 PROCEDURE — 97162 PT EVAL MOD COMPLEX 30 MIN: CPT

## 2023-09-23 PROCEDURE — 82746 ASSAY OF FOLIC ACID SERUM: CPT | Performed by: NURSE PRACTITIONER

## 2023-09-23 RX ADMIN — DOCUSATE SODIUM 50 MG AND SENNOSIDES 8.6 MG 2 TABLET: 8.6; 5 TABLET, FILM COATED ORAL at 20:14

## 2023-09-23 RX ADMIN — ASPIRIN 81 MG: 81 TABLET, COATED ORAL at 08:55

## 2023-09-23 RX ADMIN — RIVAROXABAN 15 MG: 15 TABLET, FILM COATED ORAL at 17:41

## 2023-09-23 RX ADMIN — SODIUM BICARBONATE 75 MEQ: 84 INJECTION INTRAVENOUS at 21:37

## 2023-09-23 RX ADMIN — VENLAFAXINE HYDROCHLORIDE 37.5 MG: 37.5 CAPSULE, EXTENDED RELEASE ORAL at 08:56

## 2023-09-23 RX ADMIN — CYANOCOBALAMIN TAB 500 MCG 500 MCG: 500 TAB at 08:56

## 2023-09-23 RX ADMIN — DOCUSATE SODIUM 50 MG AND SENNOSIDES 8.6 MG 2 TABLET: 8.6; 5 TABLET, FILM COATED ORAL at 08:56

## 2023-09-23 RX ADMIN — ROSUVASTATIN CALCIUM 5 MG: 5 TABLET, FILM COATED ORAL at 08:56

## 2023-09-23 RX ADMIN — SODIUM BICARBONATE 75 MEQ: 84 INJECTION INTRAVENOUS at 09:13

## 2023-09-23 RX ADMIN — SODIUM BICARBONATE 650 MG TABLET 650 MG: at 05:35

## 2023-09-23 RX ADMIN — LEVOTHYROXINE SODIUM 200 MCG: 100 TABLET ORAL at 05:35

## 2023-09-23 RX ADMIN — PANTOPRAZOLE SODIUM 40 MG: 40 TABLET, DELAYED RELEASE ORAL at 08:55

## 2023-09-23 RX ADMIN — TOPIRAMATE 50 MG: 50 TABLET, FILM COATED ORAL at 20:14

## 2023-09-23 RX ADMIN — FOLIC ACID 1000 MCG: 1 TABLET ORAL at 08:55

## 2023-09-23 RX ADMIN — Medication 10 ML: at 08:56

## 2023-09-23 NOTE — PROGRESS NOTES
LOS: 2 days     Patient Care Team:  Jen Raymundo MD as PCP - General  Santa Chiu PA as Physician Assistant (Family Medicine)  Lucio Betancourt MD as Consulting Physician (Hematology and Oncology)      Subjective     Hematuria    Objective       Vital Signs  Temp:  [98 °F (36.7 °C)-98.3 °F (36.8 °C)] 98.2 °F (36.8 °C)  Heart Rate:  [73-90] 77  Resp:  [16-18] 16  BP: (125-158)/(60-80) 125/63    Physical Exam:        General Appearance:  No acute distress     Respiratory:    UNLABORED RESPIRATIONS.     Abdomen:     SOFT.       Genitourinary: Urine clear no additional hematuria     Rectal:     DEFERRED       Results Review:       Imaging Results (Last 24 Hours)       ** No results found for the last 24 hours. **          Lab Results (last 24 hours)       Procedure Component Value Units Date/Time    Folate [[id number]]  (Normal) Collected: 09/23/23 0542    Specimen: Blood Updated: 09/23/23 1230     Folate >20.00 ng/mL     Narrative:      Results may be falsely increased if patient taking Biotin.      Vitamin B12 [[id number]]  (Abnormal) Collected: 09/23/23 0542    Specimen: Blood Updated: 09/23/23 1230     Vitamin B-12 1,250 pg/mL     Narrative:      Results may be falsely increased if patient taking Biotin.      Ferritin [[id number]]  (Abnormal) Collected: 09/23/23 0542    Specimen: Blood Updated: 09/23/23 0704     Ferritin 534.70 ng/mL     Narrative:      Results may be falsely decreased if patient taking Biotin.      Basic Metabolic Panel [[id number]]  (Abnormal) Collected: 09/23/23 0542    Specimen: Blood Updated: 09/23/23 0700     Glucose 88 mg/dL      BUN 65 mg/dL      Creatinine 4.01 mg/dL      Sodium 144 mmol/L      Potassium 4.6 mmol/L      Chloride 107 mmol/L      CO2 23.0 mmol/L      Calcium 8.6 mg/dL      BUN/Creatinine Ratio 16.2     Anion Gap 14.0 mmol/L      eGFR 11.9 mL/min/1.73      Comment: <15 Indicative of kidney failure       Narrative:      GFR Normal >60  Chronic Kidney Disease  <60  Kidney Failure <15      Iron Profile [[id number]]  (Abnormal) Collected: 09/23/23 0542    Specimen: Blood Updated: 09/23/23 0700     Iron 47 mcg/dL      Iron Saturation (TSAT) 27 %      Transferrin 119 mg/dL      TIBC 177 mcg/dL     CBC Auto Differential [[id number]]  (Abnormal) Collected: 09/23/23 0542    Specimen: Blood Updated: 09/23/23 0637     WBC 8.20 10*3/mm3      RBC 3.04 10*6/mm3      Hemoglobin 8.6 g/dL      Hematocrit 27.7 %      MCV 91.1 fL      MCH 28.3 pg      MCHC 31.0 g/dL      RDW 14.6 %      RDW-SD 47.8 fl      MPV 10.5 fL      Platelets 223 10*3/mm3      Neutrophil % 69.5 %      Lymphocyte % 18.0 %      Monocyte % 7.3 %      Eosinophil % 4.1 %      Basophil % 0.6 %      Immature Grans % 0.5 %      Neutrophils, Absolute 5.69 10*3/mm3      Lymphocytes, Absolute 1.48 10*3/mm3      Monocytes, Absolute 0.60 10*3/mm3      Eosinophils, Absolute 0.34 10*3/mm3      Basophils, Absolute 0.05 10*3/mm3      Immature Grans, Absolute 0.04 10*3/mm3      nRBC 0.0 /100 WBC               I reviewed the patient's new clinical results.  I reviewed the patient's new imaging results and agree with the interpretation.  I reviewed the patient's other test results and agree with the interpretation        Assessment history of hematuria    Plan:     Cystoscopy next      Russel Pollard MD  09/23/23  13:19 CDT

## 2023-09-23 NOTE — PLAN OF CARE
Problem: Adult Inpatient Plan of Care  Goal: Plan of Care Review  Outcome: Ongoing, Progressing  Flowsheets  Taken 9/23/2023 0638 by Katina Bardales RN  Progress: no change  Outcome Evaluation: pt vs stable, no pain overnight, monitoring labs.  Taken 9/22/2023 0442 by Pamela Botello RN  Plan of Care Reviewed With: patient  Goal: Patient-Specific Goal (Individualized)  Outcome: Ongoing, Progressing  Goal: Absence of Hospital-Acquired Illness or Injury  Outcome: Ongoing, Progressing  Intervention: Identify and Manage Fall Risk  Recent Flowsheet Documentation  Taken 9/23/2023 0534 by Katina Bardales RN  Safety Promotion/Fall Prevention:   safety round/check completed   nonskid shoes/slippers when out of bed  Taken 9/23/2023 0130 by Katina Bardales RN  Safety Promotion/Fall Prevention:   safety round/check completed   nonskid shoes/slippers when out of bed  Taken 9/22/2023 2322 by Katina Bardales RN  Safety Promotion/Fall Prevention:   safety round/check completed   nonskid shoes/slippers when out of bed  Taken 9/22/2023 2128 by Katina Bardales RN  Safety Promotion/Fall Prevention:   safety round/check completed   nonskid shoes/slippers when out of bed  Taken 9/22/2023 1924 by Katina Bardales RN  Safety Promotion/Fall Prevention:   safety round/check completed   nonskid shoes/slippers when out of bed  Intervention: Prevent and Manage VTE (Venous Thromboembolism) Risk  Recent Flowsheet Documentation  Taken 9/22/2023 1924 by Katina Bardales RN  Activity Management: up ad marsha  VTE Prevention/Management: (xarelto) other (see comments)  Goal: Optimal Comfort and Wellbeing  Outcome: Ongoing, Progressing  Intervention: Provide Person-Centered Care  Recent Flowsheet Documentation  Taken 9/22/2023 1924 by Katina Bardales RN  Trust Relationship/Rapport:   care explained   questions answered   thoughts/feelings acknowledged  Goal: Readiness for Transition of Care  Outcome: Ongoing,  Progressing   Goal Outcome Evaluation:           Progress: no change  Outcome Evaluation: pt vs stable, no pain overnight, monitoring labs.

## 2023-09-23 NOTE — PROGRESS NOTES
"[facility]  [address] [place] [address]. [address]  T - [phone]  F - [phone]     Progress Note          PATIENT  DEMOGRAPHICS   PATIENT NAME: Adilene Morgan                      PHYSICIAN: SILVA Hawley  : 1960  MRN: [id number]   LOS: 2 days    Patient Care Team:  Jen Raymundo MD as PCP - General  [facility] as Physician Assistant (Family Medicine)  Lucio Betancourt MD as Consulting Physician (Hematology and Oncology)  Subjective   SUBJECTIVE   Feeling okay today         Objective   OBJECTIVE   Vital Signs  Temp:  [98 °F (36.7 °C)-98.3 °F (36.8 °C)] 98.2 °F (36.8 °C)  Heart Rate:  [73-90] 77  Resp:  [16-18] 16  BP: (125-158)/(60-80) 125/63    Flowsheet Rows      Flowsheet Row First Filed Value   Admission Height 167.6 cm (66\") Documented at 2023 1005   Admission Weight 90.7 kg (200 lb) Documented at 2023 1005             I/O last 3 completed shifts:  In: 720 [P.O.:720]  Out: 2500 [Urine:2500]    PHYSICAL EXAM    Physical Exam  Constitutional:       Appearance: She is well-developed.   HENT:      Head: Normocephalic and atraumatic.   Eyes:      Conjunctiva/sclera: Conjunctivae normal.      Pupils: Pupils are equal, round, and reactive to light.   Cardiovascular:      Rate and Rhythm: Normal rate and regular rhythm.   Pulmonary:      Effort: Pulmonary effort is normal.      Breath sounds: Normal breath sounds.   Abdominal:      Palpations: Abdomen is soft.   Musculoskeletal:      Cervical back: Neck supple.      Right lower leg: No edema.      Left lower leg: No edema.   Skin:     General: Skin is warm and dry.   Neurological:      Mental Status: She is alert and oriented to person, place, and time.   Psychiatric:         Mood and Affect: Mood normal.         Behavior: Behavior normal.       RESULTS   Results Review:    Results from last 7 days   Lab Units 23  0542 23  0523 23  1042 23  1419   SODIUM mmol/L 144 " 140 139 141   POTASSIUM mmol/L 4.6 4.4 5.4* 4.9   CHLORIDE mmol/L 107 106 105 109*   TOTAL CO2 mmol/L  --   --   --  21   CO2 mmol/L 23.0 21.0* 19.0*  --    BUN mg/dL 65* 76* 82* 84*   CREATININE mg/dL 4.01* 4.00* 3.76* 4.2*   CALCIUM mg/dL 8.6 8.3* 8.3* 8.4*   BILIRUBIN mg/dL  --   --  0.2 0.21*   ALK PHOS U/L  --   --  69 70   ALT (SGPT) U/L  --   --  13 14   AST (SGOT) U/L  --   --  21 13   GLUCOSE mg/dL 88 88 89  --          Estimated Creatinine Clearance: 16.2 mL/min (A) (by C-G formula based on SCr of 4.01 mg/dL (H)).          Results from last 7 days   Lab Units 09/22/23  0523   URIC ACID mg/dL 6.3*         Results from last 7 days   Lab Units 09/23/23  0542 09/22/23  0523 09/21/23  1042   WBC 10*3/mm3 8.20 7.36 7.15   HEMOGLOBIN g/dL 8.6* 8.3* 8.3*   PLATELETS 10*3/mm3 223 207 229                 Imaging Results (Last 24 Hours)       ** No results found for the last 24 hours. **             MEDICATIONS    aspirin, 81 mg, Oral, Daily  folic acid, 1,000 mcg, Oral, Daily  levothyroxine, 200 mcg, Oral, Q AM  Lorlatinib, 75 mg, Oral, Q PM  midodrine, 10 mg, Oral, TID AC  pantoprazole, 40 mg, Oral, Daily  rivaroxaban, 15 mg, Oral, Daily With Dinner  rosuvastatin, 5 mg, Oral, Daily  senna-docusate sodium, 2 tablet, Oral, BID  sodium bicarbonate, 650 mg, Oral, QAM  sodium chloride, 10 mL, Intravenous, Q12H  topiramate, 50 mg, Oral, Nightly  venlafaxine XR, 37.5 mg, Oral, Daily  vitamin B-12, 500 mcg, Oral, Daily      sodium bicarbonate drip (greater than 75 mEq/bag), 75 mEq, Last Rate: 75 mEq (09/23/23 0913)        Assessment & Plan   ASSESSMENT / PLAN      TODD (acute kidney injury)    Acute kidney failure, unspecified    1.  TODD on CKD 4- baseline creatinine around 2, up to 4 at present with peak creatinine 4.2 on the 20th.  Her only recent medication changes were starting Myrbetriq for incontinence.  No etiology is immediately apparent.  She takes lorlatinib which is associated with increased creatinine, has  incontinence which may point towards some urinary retention.     -Check urine studies, renal ultrasound, Hanzel stain, protein to creatinine ratio, uric acid, free light chains, SPEP, ANCA panel, CK.     -Creatinine back to 4.0, ultrasound shows medical renal disease, urinalysis shows many RBCs as well as WBCs, Hanzel is positive but likely secondary to hematuria.  No clear source identified as of yet.  Continue sodium bicarbonate drip.     2.  Metabolic acidosis- sodium bicarb drip as above     3.  Anemia- we will follow CBC, patient sees Dr. Kitchen in the outpatient setting for anemia, usually gets Retacrit every 4 weeks     4.  Lung cancer- with metastasis, follows with cancer center of [facility] in [place], has been on lorlatinib     5.  History of DVT      Copied text in this note has been reviewed and is accurate as of 9/23/2023           This document has been electronically signed by SILVA Hawley on September 23, 2023 13:16 CDT

## 2023-09-23 NOTE — CONSULTS
Referring Provider: Hospitalist  Reason for Consultation: Hematuria    Patient Care Team:  Jen Raymundo MD as PCP - General  Santa Chiu PA as Physician Assistant (Family Medicine)  Lucio Betancourt MD as Consulting Physician (Hematology and Oncology)    Chief complaint: Hematuria renal failure    Subjective .     History of present illness: Patient brought into the hospital with some renal dysfunction in addition has had some hematuria.  No recent history of tobacco we have been asked to evaluate.  Patient's creatinine is 4 and her renal ultrasound revealed medical parenchymal disease    Review of Systems:  Pertinent items are noted in HPI    History:  Past Medical History:   Diagnosis Date    Achilles bursitis     Achilles tendinitis     Acquired hypothyroidism     Allergic rhinitis     Allergy to meat     alphagal    Allergy to milk products     Arrhythmia     Asthmatic bronchitis     Asthmatic bronchitis     Bone spur     Calcaneal spur     Depressive disorder     Family history of thyroid problem     Herpes simplex     Hypermetropia     Hypothyroidism     Menopausal flushing     Menopause     Migraine     Otalgia     Pneumonia     Presbyopia     Stroke     Trochanteric bursitis     UTI (urinary tract infection) 07/03/2018    Ventricular premature beats      Past Surgical History:   Procedure Laterality Date    COLONOSCOPY N/A 2/15/2017    Procedure: COLONOSCOPY;  Surgeon: Lupillo Ugarte MD;  Location: [facility] ENDOSCOPY;  Service:     COSMETIC SURGERY      plastic surgery to face x 4 r/t trauma    ENDOSCOPY N/A 5/31/2022    Procedure: ESOPHAGOGASTRODUODENOSCOPY;  Surgeon: Lincoln Banegas MD;  Location: [facility] ENDOSCOPY;  Service: Gastroenterology;  Laterality: N/A;    LAPAROSCOPIC CHOLECYSTECTOMY  12/10/1995    Cholecystectomy, laparoscopic (1)       OTHER SURGICAL HISTORY      Head surgery procedure (1)    plastic surgery on the face     OTHER SURGICAL HISTORY  10/17/2014    Repair  Superficial Wound TR-EXT 2.5 < CM 61927 (1)        Family History   Problem Relation Age of Onset    Stroke Mother     Diabetes Father     Heart disease Father     Hypertension Father     Thyroid disease Sister     Breast cancer Maternal Aunt     Cancer Neg Hx         multiple in mothers family     Social History     Tobacco Use    Smoking status: Never    Smokeless tobacco: Never   Vaping Use    Vaping Use: Never used   Substance Use Topics    Alcohol use: No    Drug use: No     Medications Prior to Admission   Medication Sig Dispense Refill Last Dose    aspirin 81 MG EC tablet Take 1 tablet by mouth Daily.   9/21/2023    Calcium Carbonate (CALCIUM 500 PO) Take  by mouth Daily.   9/21/2023    doxycycline (VIBRAMYCIN) 100 MG capsule Take 2 capsules by mouth 2 (Two) Times a Day. 3 doses remaining - 1 tonight and morning/night on Friday 9/20/2023    ezetimibe (ZETIA) 10 MG tablet Take 1 tablet by mouth every night at bedtime.   9/20/2023    folic acid (FOLVITE) 400 MCG tablet Take 2 tablets by mouth Daily.   9/21/2023    Lorlatinib (Lorbrena) 25 MG tablet Take 75 mg by mouth Daily.   9/20/2023    midodrine (PROAMATINE) 10 MG tablet Take 1 tablet by mouth 3 (Three) Times a Day Before Meals. 270 tablet 3 9/21/2023    Mirabegron ER (Myrbetriq) 50 MG tablet sustained-release 24 hour 24 hr tablet Take 50 mg by mouth Daily. 56 tablet 0 9/21/2023    multivitamin with minerals tablet tablet Take 1 tablet by mouth Daily.   9/21/2023    NON FORMULARY Prochloroperateine 10mg   9/20/2023    Nutritional Supplements (ESTROVEN NIGHTTIME PO) Take 200 mg by mouth. For night sweats   9/20/2023    ondansetron (ZOFRAN) 8 MG tablet Take 1 tablet by mouth Every 8 (Eight) Hours As Needed for Nausea or Vomiting. 90 tablet 1 Past Week    pantoprazole (Protonix) 40 MG EC tablet Take 1 tablet by mouth Daily. 30 tablet 0 9/21/2023    promethazine-dextromethorphan (PROMETHAZINE-DM) 6.25-15 MG/5ML syrup Take 5 mL by mouth 4 (Four) Times a Day As  Needed for Cough. 240 mL 0 9/20/2023    rosuvastatin (CRESTOR) 5 MG tablet Take 1 tablet by mouth Daily.   9/20/2023    sodium bicarbonate 650 MG tablet Take 1 tablet by mouth Every Morning.   9/21/2023    solifenacin (VESICARE) 10 MG tablet Take 1 tablet by mouth Daily.   9/20/2023    SUMAtriptan (IMITREX) 20 MG/ACT nasal spray 1 spray into the nostril(s) as directed by provider Every 2 (Two) Hours As Needed for Migraine. 6 each 2 9/20/2023    Synthroid 200 MCG tablet TAKE 1 TABLET BY MOUTH EVERY DAY 30 tablet 3 9/21/2023    topiramate (TOPAMAX) 50 MG tablet TAKE 1 TABLET BY MOUTH EVERY DAY AT NIGHT 90 tablet 3 9/21/2023    traMADol (ULTRAM) 50 MG tablet Take 1 tablet by mouth Every 6 (Six) Hours As Needed. for pain   Past Week    trospium 60 MG capsule sustained-release 24 hr capsule Take 20 mg by mouth Every Night.   9/20/2023    venlafaxine XR (EFFEXOR-XR) 37.5 MG 24 hr capsule TAKE 1 CAPSULE BY MOUTH EVERY DAY 90 capsule 3 9/21/2023    vitamin B-12 (CYANOCOBALAMIN) 500 MCG tablet Take 1 tablet by mouth Daily.   9/21/2023    Xarelto 20 MG tablet TAKE 1 TABLET BY MOUTH DAILY. AFTER FINISHED WITH LOWER DOSE FOR 21 DAYS 90 tablet 3 9/20/2023        Allergies: Azithromycin, Cefaclor, Cephalexin, Lipitor [atorvastatin], Penicillin g, Penicillins, and Zocor [simvastatin]    Objective     Vital Signs:   Temp:  [97.8 °F (36.6 °C)-98.2 °F (36.8 °C)] 98.2 °F (36.8 °C)  Heart Rate:  [70-85] 83  Resp:  [16-18] 18  BP: (110-151)/(56-72) 145/68    Physical Exam:     General Appearance:    Alert, cooperative, in no acute distress.   Head:    Normocephalic, without obvious abnormality, atraumatic.   Eyes:            Lids and lashes normal, conjunctivae and sclerae normal, no   icterus, no pallor, corneas clear, PERRLA.   Ears:    Ears appear intact with no abnormalities noted.   Throat:   No oral lesions, no thrush, oral mucosa moist.   Lungs:     Clear to auscultation, respirations regular, even and                  unlabored.     Heart:    Regular rhythm and normal rate, normal S1 and S2, no            murmur, no gallop, no rub, no click.   Abdomen:     Normal bowel sounds, no masses, no organomegaly, soft        nontender, nondistended, no guarding, no rebound          tenderness.   Genitourinary: Urine now clear.   Rectal:   Deferred.   Extremities:   Moves all extremities well, no edema, no cyanosis, no             redness.   Pulses:   Pulses palpable and equal bilaterally.   Skin:   No bleeding, bruising or rash.   Neurologic:   Cranial nerves 2 - 12 grossly intact, sensation intact, DTR       present and equal bilaterally.       Results Review:    Lab Results (last 24 hours)       Procedure Component Value Units Date/Time    C4+C3 [[id number]]  (Normal) Collected: 09/22/23 0523    Specimen: Blood Updated: 09/22/23 1222     C3 Complement 126.0 mg/dl      C4 Complement 33.0 mg/dl     Basic Metabolic Panel [[id number]]  (Abnormal) Collected: 09/22/23 0523    Specimen: Blood Updated: 09/22/23 0739     Glucose 88 mg/dL      BUN 76 mg/dL      Creatinine 4.00 mg/dL      Sodium 140 mmol/L      Potassium 4.4 mmol/L      Chloride 106 mmol/L      CO2 21.0 mmol/L      Calcium 8.3 mg/dL      BUN/Creatinine Ratio 19.0     Anion Gap 13.0 mmol/L      eGFR 11.9 mL/min/1.73      Comment: <15 Indicative of kidney failure       Narrative:      GFR Normal >60  Chronic Kidney Disease <60  Kidney Failure <15      Uric Acid [[id number]]  (Abnormal) Collected: 09/22/23 0523    Specimen: Blood Updated: 09/22/23 0739     Uric Acid 6.3 mg/dL     CBC Auto Differential [[id number]]  (Abnormal) Collected: 09/22/23 0523    Specimen: Blood Updated: 09/22/23 0713     WBC 7.36 10*3/mm3      RBC 2.86 10*6/mm3      Hemoglobin 8.3 g/dL      Hematocrit 25.6 %      MCV 89.5 fL      MCH 29.0 pg      MCHC 32.4 g/dL      RDW 14.7 %      RDW-SD 46.7 fl      MPV 10.1 fL      Platelets 207 10*3/mm3      Neutrophil % 70.4 %      Lymphocyte % 15.9 %      Monocyte % 8.6 %       Eosinophil % 3.9 %      Basophil % 0.5 %      Immature Grans % 0.7 %      Neutrophils, Absolute 5.18 10*3/mm3      Lymphocytes, Absolute 1.17 10*3/mm3      Monocytes, Absolute 0.63 10*3/mm3      Eosinophils, Absolute 0.29 10*3/mm3      Basophils, Absolute 0.04 10*3/mm3      Immature Grans, Absolute 0.05 10*3/mm3      nRBC 0.0 /100 WBC     Protein / Creatinine Ratio, Urine - Urine, Clean Catch [[id number]]  (Abnormal) Collected: 09/21/23 1547    Specimen: Urine, Clean Catch Updated: 09/22/23 0136     Protein/Creatinine Ratio, Urine 1,592.1 mg/G Crea      Creatinine, Urine 33.1 mg/dL      Total Protein, Urine 52.7 mg/dL     Urine Culture - Urine, Urine, Clean Catch [[id number]] Collected: 09/21/23 1547    Specimen: Urine, Clean Catch Updated: 09/21/23 2114    Urine Eosinophils, Liang's Stain - Urine, Clean Catch [[id number]]  (Abnormal) Collected: 09/21/23 1547    Specimen: Urine, Clean Catch Updated: 09/21/23 2110     Liang Stain Positive     % EOS Liang Stain <1 %            Imaging Results (Last 24 Hours)       ** No results found for the last 24 hours. **            I reviewed the patient's new clinical results.  I reviewed the patient's new imaging results and agree with the interpretation.  I reviewed the patient's other test results and agree with the interpretation.      Assessment:    Hematuria renal failure    Plan:    Cystoscopy once medically clear    I discussed the patient's findings and my recommendations with patient.     Russel Pollard MD  09/22/23  21:02 CDT

## 2023-09-23 NOTE — PROGRESS NOTES
[facility] Medicine Services  INPATIENT PROGRESS NOTE    Length of Stay: 2  Date of Admission: 9/21/2023  Primary Care Physician: Jen Raymundo MD    Subjective   Chief Complaint: No complaints    HPI: This is a 63-year-old female with past medical history of depression, lung cancer (patient is receiving immunotherapy), hypothyroidism, and stroke that presented to [facility] on 9/21/2023 secondary to an acute kidney injury.    Patient is currently being treated for a urinary tract infection.  Creatinine baseline is around 2 and was noted to be 4.2 prior to admission.    Review of Systems   Constitutional:  Negative for activity change and fatigue.   HENT:  Negative for ear pain and sore throat.    Eyes:  Negative for pain and discharge.   Respiratory:  Negative for cough and shortness of breath.    Cardiovascular:  Negative for chest pain and palpitations.   Gastrointestinal:  Negative for abdominal pain and nausea.   Endocrine: Negative for cold intolerance and heat intolerance.   Genitourinary:  Negative for difficulty urinating and dysuria.   Musculoskeletal:  Negative for arthralgias and gait problem.   Skin:  Negative for color change and rash.   Neurological:  Negative for dizziness and weakness.   Psychiatric/Behavioral:  Negative for agitation and confusion.           Objective    Temp:  [98 °F (36.7 °C)-98.3 °F (36.8 °C)] 98 °F (36.7 °C)  Heart Rate:  [73-90] 73  Resp:  [16-18] 16  BP: (126-158)/(60-80) 158/80    Physical Exam  Constitutional:       Appearance: She is well-developed.   HENT:      Head: Normocephalic and atraumatic.   Eyes:      Pupils: Pupils are equal, round, and reactive to light.   Cardiovascular:      Rate and Rhythm: Normal rate and regular rhythm.   Pulmonary:      Effort: Pulmonary effort is normal.      Breath sounds: Normal breath sounds.   Abdominal:      General: Bowel sounds are normal.      Palpations: Abdomen is soft.   Musculoskeletal:          General: Normal range of motion.      Cervical back: Normal range of motion and neck supple.   Skin:     General: Skin is warm and dry.   Neurological:      Mental Status: She is alert and oriented to person, place, and time.   Psychiatric:         Behavior: Behavior normal.     Results Review:  I have reviewed the labs, radiology results, and diagnostic studies.    Laboratory Data:   Results from last 7 days   Lab Units 09/23/23  0542 09/22/23  0523 09/21/23  1042 09/20/23  1419   SODIUM mmol/L 144 140 139 141   POTASSIUM mmol/L 4.6 4.4 5.4* 4.9   CHLORIDE mmol/L 107 106 105 109*   TOTAL CO2 mmol/L  --   --   --  21   CO2 mmol/L 23.0 21.0* 19.0*  --    BUN mg/dL 65* 76* 82* 84*   CREATININE mg/dL 4.01* 4.00* 3.76* 4.2*   GLUCOSE mg/dL 88 88 89  --    CALCIUM mg/dL 8.6 8.3* 8.3* 8.4*   BILIRUBIN mg/dL  --   --  0.2 0.21*   ALK PHOS U/L  --   --  69 70   ALT (SGPT) U/L  --   --  13 14   AST (SGOT) U/L  --   --  21 13   ANION GAP mmol/L 14.0 13.0 15.0 11       Estimated Creatinine Clearance: 16.2 mL/min (A) (by C-G formula based on SCr of 4.01 mg/dL (H)).      Results from last 7 days   Lab Units 09/22/23  0523   URIC ACID mg/dL 6.3*       Results from last 7 days   Lab Units 09/23/23  0542 09/22/23  0523 09/21/23  1042   WBC 10*3/mm3 8.20 7.36 7.15   HEMOGLOBIN g/dL 8.6* 8.3* 8.3*   HEMATOCRIT % 27.7* 25.6* 26.6*   PLATELETS 10*3/mm3 223 207 229             Culture Data:   No results found for: BLOODCX  No results found for: URINECX  No results found for: RESPCX  No results found for: WOUNDCX  No results found for: STOOLCX  No components found for: BODYFLD    Radiology Data:   Imaging Results (Last 24 Hours)       ** No results found for the last 24 hours. **            I have reviewed the patient's current medications.     Assessment/Plan     Active Hospital Problems    Diagnosis     **TODD (acute kidney injury)     Acute kidney failure, unspecified        Plan:    Acute on chronic kidney disease, stage IV:  Nephrology consult appreciated.  Creatinine today is 4.1.  Metabolic acidosis: Sodium bicarb drip per nephrology.  Anemia, chronic: Hemoglobin today is 8.6.  The patient receives Retacrit every 4 weeks.  History of cancer with metastasis: Patient follows with [facility] in [place] and currently is on immunotherapy with lorlatinub.   History of TIA: Xarelto dosage decreased from 20 mg daily to 15 mg daily per pharmacy recommendation due to TODD.  Continue home statin.  GERD: Continue PPI.  Hypothyroidism: Continue home Synthroid.  Mood disorder: Continue home Effexor.  Urinary tract infection:  Continue Doxycycline.  Urine culture pending.     VTE prophylaxis: Xarelto.      Medical Decision Making  Number and Complexity of problems: 9/high  Differential Diagnosis: N/A    Conditions and Status:        Condition is improving.     [facility] Data  External documents reviewed: Yes  My EKG interpretation: N/A  My US interpretation: As per radiology.  Tests considered but not ordered: None     Decision rules/scores evaluated (example [id number], Wells, etc): N/A     Discussed with: Patient     Treatment Plan  As above    Care Planning  Shared decision making: Yes  Code status and discussions: Full    Disposition  Social Determinants of Health that impact treatment or disposition: None   I expect the patient to be discharged to home in 2-3 days.     I confirmed that the patient's Advance Care Plan is present, code status is documented, or surrogate decision maker is listed in the patient's medical record.      I have utilized all available immediate resources to obtain, update, or review the patient's current medications.         This document has been electronically signed by SILVA Dailey on September 23, 2023 11:04 CDT

## 2023-09-24 LAB
ANION GAP SERPL CALCULATED.3IONS-SCNC: 14 MMOL/L (ref 5–15)
BASOPHILS # BLD AUTO: 0.03 10*3/MM3 (ref 0–0.2)
BASOPHILS NFR BLD AUTO: 0.4 % (ref 0–1.5)
BUN SERPL-MCNC: 60 MG/DL (ref 8–23)
BUN/CREAT SERPL: 16 (ref 7–25)
CALCIUM SPEC-SCNC: 8.1 MG/DL (ref 8.6–10.5)
CHLORIDE SERPL-SCNC: 104 MMOL/L (ref 98–107)
CO2 SERPL-SCNC: 21 MMOL/L (ref 22–29)
CREAT SERPL-MCNC: 3.75 MG/DL (ref 0.57–1)
DEPRECATED RDW RBC AUTO: 50.3 FL (ref 37–54)
EGFRCR SERPLBLD CKD-EPI 2021: 12.9 ML/MIN/1.73
EOSINOPHIL # BLD AUTO: 0.31 10*3/MM3 (ref 0–0.4)
EOSINOPHIL NFR BLD AUTO: 4.6 % (ref 0.3–6.2)
ERYTHROCYTE [DISTWIDTH] IN BLOOD BY AUTOMATED COUNT: 14.6 % (ref 12.3–15.4)
GLUCOSE SERPL-MCNC: 89 MG/DL (ref 65–99)
HCT VFR BLD AUTO: 35.4 % (ref 34–46.6)
HGB BLD-MCNC: 10.7 G/DL (ref 12–15.9)
IMM GRANULOCYTES # BLD AUTO: 0.02 10*3/MM3 (ref 0–0.05)
IMM GRANULOCYTES NFR BLD AUTO: 0.3 % (ref 0–0.5)
LYMPHOCYTES # BLD AUTO: 1.15 10*3/MM3 (ref 0.7–3.1)
LYMPHOCYTES NFR BLD AUTO: 17 % (ref 19.6–45.3)
MCH RBC QN AUTO: 28.5 PG (ref 26.6–33)
MCHC RBC AUTO-ENTMCNC: 30.2 G/DL (ref 31.5–35.7)
MCV RBC AUTO: 94.1 FL (ref 79–97)
MONOCYTES # BLD AUTO: 0.45 10*3/MM3 (ref 0.1–0.9)
MONOCYTES NFR BLD AUTO: 6.7 % (ref 5–12)
NEUTROPHILS NFR BLD AUTO: 4.8 10*3/MM3 (ref 1.7–7)
NEUTROPHILS NFR BLD AUTO: 71 % (ref 42.7–76)
NRBC BLD AUTO-RTO: 0 /100 WBC (ref 0–0.2)
PLATELET # BLD AUTO: 142 10*3/MM3 (ref 140–450)
PMV BLD AUTO: 10.8 FL (ref 6–12)
POTASSIUM SERPL-SCNC: 4.3 MMOL/L (ref 3.5–5.2)
RBC # BLD AUTO: 3.76 10*6/MM3 (ref 3.77–5.28)
SODIUM SERPL-SCNC: 139 MMOL/L (ref 136–145)
WBC NRBC COR # BLD: 6.76 10*3/MM3 (ref 3.4–10.8)

## 2023-09-24 PROCEDURE — 85025 COMPLETE CBC W/AUTO DIFF WBC: CPT | Performed by: STUDENT IN AN ORGANIZED HEALTH CARE EDUCATION/TRAINING PROGRAM

## 2023-09-24 PROCEDURE — 80048 BASIC METABOLIC PNL TOTAL CA: CPT | Performed by: STUDENT IN AN ORGANIZED HEALTH CARE EDUCATION/TRAINING PROGRAM

## 2023-09-24 PROCEDURE — 96366 THER/PROPH/DIAG IV INF ADDON: CPT

## 2023-09-24 RX ORDER — SODIUM BICARBONATE 650 MG/1
650 TABLET ORAL 2 TIMES DAILY
Status: DISCONTINUED | OUTPATIENT
Start: 2023-09-25 | End: 2023-09-24

## 2023-09-24 RX ADMIN — CYANOCOBALAMIN TAB 500 MCG 500 MCG: 500 TAB at 09:33

## 2023-09-24 RX ADMIN — DOCUSATE SODIUM 50 MG AND SENNOSIDES 8.6 MG 2 TABLET: 8.6; 5 TABLET, FILM COATED ORAL at 09:32

## 2023-09-24 RX ADMIN — TOPIRAMATE 50 MG: 50 TABLET, FILM COATED ORAL at 20:04

## 2023-09-24 RX ADMIN — SODIUM BICARBONATE 75 MEQ: 84 INJECTION INTRAVENOUS at 10:27

## 2023-09-24 RX ADMIN — ROSUVASTATIN CALCIUM 5 MG: 5 TABLET, FILM COATED ORAL at 09:33

## 2023-09-24 RX ADMIN — VENLAFAXINE HYDROCHLORIDE 37.5 MG: 37.5 CAPSULE, EXTENDED RELEASE ORAL at 09:33

## 2023-09-24 RX ADMIN — RIVAROXABAN 15 MG: 15 TABLET, FILM COATED ORAL at 17:10

## 2023-09-24 RX ADMIN — Medication 10 ML: at 20:04

## 2023-09-24 RX ADMIN — LEVOTHYROXINE SODIUM 200 MCG: 100 TABLET ORAL at 06:20

## 2023-09-24 RX ADMIN — PANTOPRAZOLE SODIUM 40 MG: 40 TABLET, DELAYED RELEASE ORAL at 09:33

## 2023-09-24 RX ADMIN — Medication 10 ML: at 09:33

## 2023-09-24 RX ADMIN — FOLIC ACID 1000 MCG: 1 TABLET ORAL at 09:33

## 2023-09-24 RX ADMIN — ASPIRIN 81 MG: 81 TABLET, COATED ORAL at 09:33

## 2023-09-24 RX ADMIN — SODIUM BICARBONATE 650 MG TABLET 650 MG: at 06:20

## 2023-09-24 NOTE — PROGRESS NOTES
[facility] Medicine Services  INPATIENT PROGRESS NOTE    Length of Stay: 3  Date of Admission: 9/21/2023  Primary Care Physician: Jen Raymundo MD    Subjective   Chief Complaint: No complaints    HPI: This is a 63-year-old female with past medical history of depression, lung cancer (patient is receiving immunotherapy), hypothyroidism, and stroke that presented to [facility] on 9/21/2023 secondary to an acute kidney injury.    Patient is currently being treated for a urinary tract infection.  Creatinine baseline is around 2 and was noted to be 4.2 prior to admission.    Review of Systems   Constitutional:  Negative for activity change and fatigue.   HENT:  Negative for ear pain and sore throat.    Eyes:  Negative for pain and discharge.   Respiratory:  Negative for cough and shortness of breath.    Cardiovascular:  Negative for chest pain and palpitations.   Gastrointestinal:  Negative for abdominal pain and nausea.   Endocrine: Negative for cold intolerance and heat intolerance.   Genitourinary:  Negative for difficulty urinating and dysuria.   Musculoskeletal:  Negative for arthralgias and gait problem.   Skin:  Negative for color change and rash.   Neurological:  Negative for dizziness and weakness.   Psychiatric/Behavioral:  Negative for agitation and confusion.           Objective    Temp:  [97 °F (36.1 °C)-98.3 °F (36.8 °C)] 97.9 °F (36.6 °C)  Heart Rate:  [73-83] 75  Resp:  [16-18] 18  BP: (123-146)/(60-79) 123/60    Physical Exam  Constitutional:       Appearance: She is well-developed.   HENT:      Head: Normocephalic and atraumatic.   Eyes:      Pupils: Pupils are equal, round, and reactive to light.   Cardiovascular:      Rate and Rhythm: Normal rate and regular rhythm.   Pulmonary:      Effort: Pulmonary effort is normal.      Breath sounds: Normal breath sounds.   Abdominal:      General: Bowel sounds are normal.      Palpations: Abdomen is soft.   Musculoskeletal:          General: Normal range of motion.      Cervical back: Normal range of motion and neck supple.   Skin:     General: Skin is warm and dry.   Neurological:      Mental Status: She is alert and oriented to person, place, and time.   Psychiatric:         Behavior: Behavior normal.     Results Review:  I have reviewed the labs, radiology results, and diagnostic studies.    Laboratory Data:   Results from last 7 days   Lab Units 09/24/23  0532 09/23/23  0542 09/22/23  0523 09/21/23  1042 09/21/23  1042 09/20/23  1419   SODIUM mmol/L 139 144 140   < > 139 141   POTASSIUM mmol/L 4.3 4.6 4.4   < > 5.4* 4.9   CHLORIDE mmol/L 104 107 106   < > 105 109*   TOTAL CO2 mmol/L  --   --   --   --   --  21   CO2 mmol/L 21.0* 23.0 21.0*   < > 19.0*  --    BUN mg/dL 60* 65* 76*   < > 82* 84*   CREATININE mg/dL 3.75* 4.01* 4.00*   < > 3.76* 4.2*   GLUCOSE mg/dL 89 88 88   < > 89  --    CALCIUM mg/dL 8.1* 8.6 8.3*   < > 8.3* 8.4*   BILIRUBIN mg/dL  --   --   --   --  0.2 0.21*   ALK PHOS U/L  --   --   --   --  69 70   ALT (SGPT) U/L  --   --   --   --  13 14   AST (SGOT) U/L  --   --   --   --  21 13   ANION GAP mmol/L 14.0 14.0 13.0   < > 15.0 11    < > = values in this interval not displayed.       Estimated Creatinine Clearance: 17.3 mL/min (A) (by C-G formula based on SCr of 3.75 mg/dL (H)).      Results from last 7 days   Lab Units 09/22/23 0523   URIC ACID mg/dL 6.3*       Results from last 7 days   Lab Units 09/24/23  0532 09/23/23  0542 09/22/23  0523 09/21/23  1042   WBC 10*3/mm3 6.76 8.20 7.36 7.15   HEMOGLOBIN g/dL 10.7* 8.6* 8.3* 8.3*   HEMATOCRIT % 35.4 27.7* 25.6* 26.6*   PLATELETS 10*3/mm3 142 223 207 229             Culture Data:   No results found for: BLOODCX  Urine Culture   Date Value Ref Range Status   09/21/2023 50,000 CFU/mL Mixed Anjelica Isolated  Final     No results found for: RESPCX  No results found for: WOUNDCX  No results found for: STOOLCX  No components found for: BODYFLD    Radiology Data:   Imaging  Results (Last 24 Hours)       ** No results found for the last 24 hours. **            I have reviewed the patient's current medications.     Assessment/Plan     Active Hospital Problems    Diagnosis     **TODD (acute kidney injury)     Acute kidney failure, unspecified        Plan:    Acute on chronic kidney disease, stage IV: Nephrology consult appreciated.  Creatinine today is improved at 3.75.  Metabolic acidosis: Sodium bicarb drip per nephrology.  Anemia, chronic: Hemoglobin today is 10.7.  The patient receives Retacrit every 4 weeks.  History of lung cancer with metastasis: Patient follows with [facility] in [place] and currently is on immunotherapy with lorlatinub.   History of TIA: Xarelto dosage decreased from 20 mg daily to 15 mg daily per pharmacy recommendation due to TODD.  Continue home statin.  GERD: Continue PPI.  Hypothyroidism: Continue home Synthroid.  Mood disorder: Continue home Effexor.  Urinary tract infection:  Continue Doxycycline.       VTE prophylaxis: Xarelto.      Medical Decision Making  Number and Complexity of problems: 9/high  Differential Diagnosis: N/A    Conditions and Status:        Condition is improving.     [facility] Data  External documents reviewed: Yes  My EKG interpretation: N/A  My US interpretation: As per radiology.  Tests considered but not ordered: None     Decision rules/scores evaluated (example [id number], Wells, etc): N/A     Discussed with: Patient     Treatment Plan  As above    Care Planning  Shared decision making: Yes  Code status and discussions: Full    Disposition  Social Determinants of Health that impact treatment or disposition: None   I expect the patient to be discharged to home in 2-3 days.     I confirmed that the patient's Advance Care Plan is present, code status is documented, or surrogate decision maker is listed in the patient's medical record.      I have utilized all available immediate resources to obtain, update, or review the  patient's current medications.         This document has been electronically signed by SILVA Dailey on September 24, 2023 10:29 CDT

## 2023-09-24 NOTE — PROGRESS NOTES
"[facility]  [address] [place] [address]. [address]  T - [phone]  F - [phone]     Progress Note          PATIENT  DEMOGRAPHICS   PATIENT NAME: Adilene Morgan                      PHYSICIAN: SILVA Hawley  : 1960  MRN: [id number]   LOS: 3 days    Patient Care Team:  Jen Raymundo MD as PCP - General  [facility] (Family Medicine)  Lucio Betancourt MD as Consulting Physician (Hematology and Oncology)  Subjective   SUBJECTIVE   Feeling okay today         Objective   OBJECTIVE   Vital Signs  Temp:  [97 °F (36.1 °C)-98.3 °F (36.8 °C)] 97.9 °F (36.6 °C)  Heart Rate:  [73-83] 75  Resp:  [16-18] 18  BP: (123-146)/(60-79) 123/60    Flowsheet Rows      Flowsheet Row First Filed Value   Admission Height 167.6 cm (66\") Documented at 2023 1005   Admission Weight 90.7 kg (200 lb) Documented at 2023 1005             I/O last 3 completed shifts:  In: 1040 [P.O.:1040]  Out: 2400 [Urine:2400]    PHYSICAL EXAM    Physical Exam  Constitutional:       Appearance: She is well-developed.   HENT:      Head: Normocephalic and atraumatic.   Eyes:      Conjunctiva/sclera: Conjunctivae normal.      Pupils: Pupils are equal, round, and reactive to light.   Cardiovascular:      Rate and Rhythm: Normal rate and regular rhythm.   Pulmonary:      Effort: Pulmonary effort is normal.      Breath sounds: Normal breath sounds.   Abdominal:      Palpations: Abdomen is soft.   Musculoskeletal:      Cervical back: Neck supple.      Right lower leg: Edema present.      Left lower leg: Edema present.   Skin:     General: Skin is warm and dry.   Neurological:      Mental Status: She is alert and oriented to person, place, and time.   Psychiatric:         Mood and Affect: Mood normal.         Behavior: Behavior normal.       RESULTS   Results Review:    Results from last 7 days   Lab Units 23  0532 23  0542 23  0523 23  1042 " 09/21/23  1042 09/20/23  1419   SODIUM mmol/L 139 144 140   < > 139 141   POTASSIUM mmol/L 4.3 4.6 4.4   < > 5.4* 4.9   CHLORIDE mmol/L 104 107 106   < > 105 109*   TOTAL CO2 mmol/L  --   --   --   --   --  21   CO2 mmol/L 21.0* 23.0 21.0*   < > 19.0*  --    BUN mg/dL 60* 65* 76*   < > 82* 84*   CREATININE mg/dL 3.75* 4.01* 4.00*   < > 3.76* 4.2*   CALCIUM mg/dL 8.1* 8.6 8.3*   < > 8.3* 8.4*   BILIRUBIN mg/dL  --   --   --   --  0.2 0.21*   ALK PHOS U/L  --   --   --   --  69 70   ALT (SGPT) U/L  --   --   --   --  13 14   AST (SGOT) U/L  --   --   --   --  21 13   GLUCOSE mg/dL 89 88 88   < > 89  --     < > = values in this interval not displayed.         Estimated Creatinine Clearance: 17.3 mL/min (A) (by C-G formula based on SCr of 3.75 mg/dL (H)).          Results from last 7 days   Lab Units 09/22/23  0523   URIC ACID mg/dL 6.3*         Results from last 7 days   Lab Units 09/24/23  0532 09/23/23  0542 09/22/23  0523 09/21/23  1042   WBC 10*3/mm3 6.76 8.20 7.36 7.15   HEMOGLOBIN g/dL 10.7* 8.6* 8.3* 8.3*   PLATELETS 10*3/mm3 142 223 207 229                 Imaging Results (Last 24 Hours)       ** No results found for the last 24 hours. **             MEDICATIONS    aspirin, 81 mg, Oral, Daily  folic acid, 1,000 mcg, Oral, Daily  levothyroxine, 200 mcg, Oral, Q AM  Lorlatinib, 75 mg, Oral, Q PM  midodrine, 10 mg, Oral, TID AC  pantoprazole, 40 mg, Oral, Daily  rivaroxaban, 15 mg, Oral, Daily With Dinner  rosuvastatin, 5 mg, Oral, Daily  senna-docusate sodium, 2 tablet, Oral, BID  sodium bicarbonate, 650 mg, Oral, QAM  sodium chloride, 10 mL, Intravenous, Q12H  topiramate, 50 mg, Oral, Nightly  venlafaxine XR, 37.5 mg, Oral, Daily  vitamin B-12, 500 mcg, Oral, Daily      sodium bicarbonate drip (greater than 75 mEq/bag), 75 mEq, Last Rate: 75 mEq (09/23/23 2137)        Assessment & Plan   ASSESSMENT / PLAN      TODD (acute kidney injury)    Acute kidney failure, unspecified    1.  TODD on CKD 4- baseline creatinine  around 2, down to 3.75 from 4.01 at present with peak creatinine 4.2 on the 20th.  Her only recent medication changes were starting Myrbetriq for incontinence.  No etiology is immediately apparent.  She takes lorlatinib which is associated with increased creatinine, has incontinence which may point towards some urinary retention.     -Check urine studies, renal ultrasound, Hanzel stain, protein to creatinine ratio, uric acid, free light chains, SPEP, ANCA panel, CK.     -Creatinine back to 4.0, ultrasound shows medical renal disease, urinalysis shows many RBCs as well as WBCs, Hanzel is positive but likely secondary to hematuria.  No clear source identified as of yet.  Stop sodium bicarb drip     2.  Metabolic acidosis- bicarb 21, will add sodium bicarb 650mg BID. Off IVF     3.  Anemia- we will follow CBC, patient sees Dr. Kitchen in the outpatient setting for anemia, usually gets Retacrit every 4 weeks     4.  Lung cancer- with metastasis, follows with [facility] in [place], has been on lorlatinib     5.  History of DVT      Copied text in this note has been reviewed and is accurate as of 9/24/2023           This document has been electronically signed by SILVA Hawley on September 24, 2023 10:04 CDT

## 2023-09-24 NOTE — PLAN OF CARE
Problem: Adult Inpatient Plan of Care  Goal: Plan of Care Review  Recent Flowsheet Documentation  Taken 9/23/2023 1514 by Saida Rivera, PT  Plan of Care Reviewed With:   patient   spouse   daughter  Outcome Evaluation: PT evaluation completed. Patient alert, oriented x4 and agreeable to therapy. Patient used rollator inside and used w/c for community distances. Patient performed supine to sit to supine with modified independence and sit to stand to sith with FWW and SBA. Patient ambulated with  ft with CGA. Function limited by decreased strength, balance, and tolerance for functional mobility and activities. Patient will benefit from PT to regain lost function as patient improves medically Anticipate home with assist at discharge. Patient may benefit from outpatient PT for conditioning and balance rehab.   Goal Outcome Evaluation:  Plan of Care Reviewed With: patient, spouse, daughter           Outcome Evaluation: PT evaluation completed. Patient alert, oriented x4 and agreeable to therapy. Patient used rollator inside and used w/c for community distances. Patient performed supine to sit to supine with modified independence and sit to stand to sith with FWW and SBA. Patient ambulated with  ft with CGA. Function limited by decreased strength, balance, and tolerance for functional mobility and activities. Patient will benefit from PT to regain lost function as patient improves medically Anticipate home with assist at discharge. Patient may benefit from outpatient PT for conditioning and balance rehab.      Anticipated Discharge Disposition (PT): home with assist, home with outpatient therapy services

## 2023-09-24 NOTE — PLAN OF CARE
Problem: Adult Inpatient Plan of Care  Goal: Plan of Care Review  Outcome: Ongoing, Progressing  Flowsheets (Taken 9/24/2023 0630)  Progress: no change  Plan of Care Reviewed With: patient  Outcome Evaluation: pt vs stable, still has bicarb going creatine still elevated, no pain noted.  Goal: Patient-Specific Goal (Individualized)  Outcome: Ongoing, Progressing  Goal: Absence of Hospital-Acquired Illness or Injury  Outcome: Ongoing, Progressing  Intervention: Identify and Manage Fall Risk  Recent Flowsheet Documentation  Taken 9/24/2023 0430 by Katina Bardales RN  Safety Promotion/Fall Prevention:   safety round/check completed   nonskid shoes/slippers when out of bed  Taken 9/24/2023 0244 by Katina Bardales RN  Safety Promotion/Fall Prevention:   safety round/check completed   nonskid shoes/slippers when out of bed  Taken 9/24/2023 0020 by Katina Bardales RN  Safety Promotion/Fall Prevention:   assistive device/personal items within reach   clutter free environment maintained   fall prevention program maintained   safety round/check completed   nonskid shoes/slippers when out of bed  Taken 9/23/2023 2205 by Katina Bardales RN  Safety Promotion/Fall Prevention:   activity supervised   assistive device/personal items within reach   clutter free environment maintained   fall prevention program maintained   safety round/check completed   nonskid shoes/slippers when out of bed  Taken 9/23/2023 2137 by Katina Bardales RN  Safety Promotion/Fall Prevention:   safety round/check completed   muscle strengthening facilitated  Taken 9/23/2023 2013 by Katina Bardales RN  Safety Promotion/Fall Prevention:   safety round/check completed   muscle strengthening facilitated  Taken 9/23/2023 1904 by Katina Bardales RN  Safety Promotion/Fall Prevention:   safety round/check completed   nonskid shoes/slippers when out of bed  Intervention: Prevent Skin Injury  Recent Flowsheet Documentation  Taken  9/24/2023 0619 by Katina Bardales RN  Body Position:   position changed independently   supine  Taken 9/24/2023 0430 by Katina Bardales RN  Body Position:   position changed independently   left   side-lying  Taken 9/24/2023 0244 by Katina Bardales RN  Body Position:   position changed independently   left   side-lying  Taken 9/24/2023 0020 by Katina Bardales RN  Body Position:   position changed independently   supine  Taken 9/23/2023 2205 by Katina Bardales RN  Body Position:   position changed independently   supine, legs elevated  Taken 9/23/2023 2013 by Katina Bardales RN  Body Position:   position changed independently   sitting up in bed  Intervention: Prevent and Manage VTE (Venous Thromboembolism) Risk  Recent Flowsheet Documentation  Taken 9/23/2023 1904 by Katina Bardales RN  Activity Management: up ad marsha  VTE Prevention/Management: (xarelto) other (see comments)  Goal: Optimal Comfort and Wellbeing  Outcome: Ongoing, Progressing  Intervention: Provide Person-Centered Care  Recent Flowsheet Documentation  Taken 9/23/2023 1904 by Katina Bardales RN  Trust Relationship/Rapport:   care explained   questions answered   thoughts/feelings acknowledged  Goal: Readiness for Transition of Care  Outcome: Ongoing, Progressing   Goal Outcome Evaluation:  Plan of Care Reviewed With: patient        Progress: no change  Outcome Evaluation: pt vs stable, still has bicarb going creatine still elevated, no pain noted.

## 2023-09-24 NOTE — THERAPY EVALUATION
Patient Name: Adilene Morgan  : 1960    MRN: [id number]                              Today's Date: 2023       Admit Date: 2023    Visit Dx:     ICD-10-CM ICD-9-CM   1. TODD (acute kidney injury)  N17.9 584.9   2. Impaired functional mobility, balance, gait, and endurance [Z74.09 (ICD-10-CM)]  Z74.09 V49.89     Patient Active Problem List   Diagnosis    Acquired hypothyroidism    Depressive disorder    Migraine    PVC (premature ventricular contraction)    Palpitation    Pain of fifth toe    Adenosquamous carcinoma of right lung    TODD (acute kidney injury)    Transient ischemic attack (TIA)    Obesity (BMI 30.0-34.9)    Gastrointestinal hemorrhage    Syncope and collapse    Acute cystitis without hematuria    Lymphadenopathy    Migraine    Depressive disorder    Primary malignant neoplasm of lung metastatic to other site    Acquired hypothyroidism    Orthostatic hypotension    Fall    Bone metastases    Anemia    Stage 4 chronic kidney disease    Anemia of chronic renal failure, stage 4 (severe)    Cough    Acute kidney failure, unspecified     Past Medical History:   Diagnosis Date    Achilles bursitis     Achilles tendinitis     Acquired hypothyroidism     Allergic rhinitis     Allergy to meat     alphagal    Allergy to milk products     Arrhythmia     Asthmatic bronchitis     Asthmatic bronchitis     Bone spur     Calcaneal spur     Depressive disorder     Family history of thyroid problem     Herpes simplex     Hypermetropia     Hypothyroidism     Menopausal flushing     Menopause     Migraine     Otalgia     Pneumonia     Presbyopia     Stroke     Trochanteric bursitis     UTI (urinary tract infection) 2018    Ventricular premature beats      Past Surgical History:   Procedure Laterality Date    COLONOSCOPY N/A 2/15/2017    Procedure: COLONOSCOPY;  Surgeon: Lupillo Ugarte MD;  Location: [facility] ENDOSCOPY;  Service:     COSMETIC SURGERY      plastic surgery to face x 4 r/t trauma     ENDOSCOPY N/A 5/31/2022    Procedure: ESOPHAGOGASTRODUODENOSCOPY;  Surgeon: Lincoln Banegas MD;  Location: [facility] ENDOSCOPY;  Service: Gastroenterology;  Laterality: N/A;    LAPAROSCOPIC CHOLECYSTECTOMY  12/10/1995    Cholecystectomy, laparoscopic (1)       OTHER SURGICAL HISTORY      Head surgery procedure (1)    plastic surgery on the face     OTHER SURGICAL HISTORY  10/17/2014    Repair Superficial Wound TR-EXT 2.5 < CM 90351 (1)         General Information       Row Name 09/23/23 1514          Physical Therapy Time and Intention    Document Type evaluation  -     Mode of Treatment individual therapy;physical therapy  -       Row Name 09/23/23 1514          General Information    Patient Profile Reviewed yes  -SHIELA     Prior Level of Function independent:;all household mobility;gait;transfer;ADL's;max assist:;cooking;cleaning;driving  -       Row Name 09/23/23 1514          Living Environment    People in Home spouse  dtr lives nearby anmd assists as needed  -       Row Name 09/23/23 1514          Home Main Entrance    Number of Stairs, Main Entrance five  -SHIELA     Stair Railings, Main Entrance railing on left side (ascending)  -       Row Name 09/23/23 1514          Stairs Within Home, Primary    Stairs, Within Home, Primary 7  -SHIELA     Stair Railings, Within Home, Primary railing on right side (ascending)  -SHIELA       Row Name 09/23/23 1514          Cognition    Orientation Status (Cognition) oriented x 4  -SHIELA       Row Name 09/23/23 1514          Safety Issues, Functional Mobility    Impairments Affecting Function (Mobility) balance;endurance/activity tolerance;strength  -               User Key  (r) = Recorded By, (t) = Taken By, (c) = Cosigned By      Initials Name Provider Type    Saida Nielsen PT Physical Therapist                   Mobility       Row Name 09/23/23 1514          Bed Mobility    Bed Mobility supine-sit;sit-supine  -SHIELA     Supine-Sit [place] (Bed Mobility) modified  independence  -     Sit-Supine [place] (Bed Mobility) modified independence  -     Assistive Device (Bed Mobility) head of bed elevated;bed rails  -       Row Name 09/23/23 1514          Sit-Stand Transfer    Sit-Stand [place] (Transfers) standby assist  -     Assistive Device (Sit-Stand Transfers) walker, front-wheeled  -[facility] Name 09/23/23 1514          Gait/Stairs (Locomotion)    [place] Level (Gait) contact guard  -     Assistive Device (Gait) walker, front-wheeled  -     Distance in Feet (Gait) 120ft  -     Deviations/Abnormal Patterns (Gait) makc decreased;stride length decreased  -               User Key  (r) = Recorded By, (t) = Taken By, (c) = Cosigned By      Initials Name Provider Type    Saida Nielsen PT Physical Therapist                   Obj/Interventions       Row Name 09/23/23 1514          Range of Motion Comprehensive    Comment, General Range of Motion AROM WFL all extremities  -[facility] Name 09/23/23 1514          Strength Comprehensive (MMT)    Comment, General Manual Muscle Testing (MMT) Assessment BUE: grossly 4/5; BLE: grossly 4-/5  -[facility] Name 09/23/23 1514          Sensory Assessment (Somatosensory)    Sensory Assessment (Somatosensory) sensation intact  -               User Key  (r) = Recorded By, (t) = Taken By, (c) = Cosigned By      Initials Name Provider Type    Saida Nielsen PT Physical Therapist                   Goals/Plan       [facility] Name 09/23/23 1514          Transfer Goal 1 (PT)    Activity/Assistive Device (Transfer Goal 1, PT) sit-to-stand/stand-to-sit;bed-to-chair/chair-to-bed  -     [place] Level/Cues Needed (Transfer Goal 1, PT) modified independence  -     Time Frame (Transfer Goal 1, PT) by discharge  -     Progress/Outcome (Transfer Goal 1, PT) goal not met  -       Row Name 09/23/23 1514          Gait Training Goal 1 (PT)    Activity/Assistive Device (Gait Training Goal 1, PT) gait (walking  locomotion);decrease fall risk;increase endurance/gait distance;walker, rolling  -SHIELA     [place] Level (Gait Training Goal 1, PT) standby assist;modified independence  -SHIELA     Distance (Gait Training Goal 1, PT) 150ft each trip  -SHIELA     Time Frame (Gait Training Goal 1, PT) by discharge  -SHIELA     Progress/Outcome (Gait Training Goal 1, PT) goal not met  -       Row Name 09/23/23 1514          Stairs Goal 1 (PT)    Activity/Assistive Device (Stairs Goal 1, PT) ascending stairs;descending stairs;using handrail, right  -SHIELA     [place] Level/Cues Needed (Stairs Goal 1, PT) standby assist;modified independence  -SHIELA     Number of Stairs (Stairs Goal 1, PT) 7  -SHIELA     Time Frame (Stairs Goal 1, PT) by discharge  -SHIELA     Progress/Outcome (Stairs Goal 1, PT) goal not met  -       Row Name 09/23/23 1514          Therapy Assessment/Plan (PT)    Planned Therapy Interventions (PT) balance training;bed mobility training;gait training;home exercise program;patient/family education;stair training;strengthening;transfer training  -               User Key  (r) = Recorded By, (t) = Taken By, (c) = Cosigned By      Initials Name Provider Type    Saida Nielsen, PT Physical Therapist                   Clinical Impression       Row Name 09/23/23 1519          Pain    Pretreatment Pain Rating 0/10 - no pain  -     Posttreatment Pain Rating 0/10 - no pain  -     Additional Documentation Pain Scale: Numbers Pre/Post-Treatment (Group)  -       Row Name 09/23/23 1515          Plan of Care Review    Plan of Care Reviewed With patient;spouse;daughter  -     Outcome Evaluation PT evaluation completed. Patient alert, oriented x4 and agreeable to therapy. Patient used rollator inside and used w/c for community distances. Patient performed supine to sit to supine with modified independence and sit to stand to sith with FWW and SBA. Patient ambulated with  ft with CGA. Function limited by decreased strength, balance,  and tolerance for functional mobility and activities. Patient will benefit from PT to regain lost function as patient improves medically Anticipate home with assist at discharge. Patient may benefit from outpatient PT for conditioning and balance rehab.  -       Row Name 09/23/23 1514          Therapy Assessment/Plan (PT)    Patient/Family Therapy Goals Statement (PT) return home  -     Rehab Potential (PT) good, to achieve stated therapy goals  -     Criteria for Skilled Interventions Met (PT) yes;meets criteria  -     Therapy Frequency (PT) other (see comments)  3-7 days/wk  -     Predicted Duration of Therapy Intervention (PT) until discharge or goals met  -       Row Name 09/23/23 1514          Vital Signs    Pre Systolic BP Rehab 138  -SHIELA     Pre Treatment Diastolic BP 76  -SHIELA     Post Systolic BP Rehab 126  -SHIELA     Post Treatment Diastolic BP 78  -SHIELA     Pretreatment Heart Rate (beats/min) 76  -SHIELA     Posttreatment Heart Rate (beats/min) 81  -SHIELA     Pre SpO2 (%) 97  -SHIELA     O2 Delivery Pre Treatment room air  -SHIELA     Post SpO2 (%) 98  -SHIELA     O2 Delivery Post Treatment room air  -SHIELA     Pre Patient Position Supine  -SHIELA     Post Patient Position Supine  -       Row Name 09/23/23 1514          Positioning and Restraints    Pre-Treatment Position in bed  -SHIELA     Post Treatment Position bed  -SHIELA     In Bed fowlers;call light within reach;encouraged to call for assist;with family/caregiver  -               User Key  (r) = Recorded By, (t) = Taken By, (c) = Cosigned By      Initials Name Provider Type    Saida Nielsen, PT Physical Therapist                   Outcome Measures       Row Name 09/23/23 1514 09/23/23 0914       How much help from another person do you currently need...    Turning from your back to your side while in flat bed without using bedrails? 4  -SHIELA 4  -JA    Moving from lying on back to sitting on the side of a flat bed without bedrails? 4  -SHIELA 4  -JA    Moving to and from a  bed to a chair (including a wheelchair)? 3  -SHIELA 4  -JA    Standing up from a chair using your arms (e.g., wheelchair, bedside chair)? 3  -SHIELA 4  -JA    Climbing 3-5 steps with a railing? 3  -SHIELA 4  -JA    To walk in hospital room? 3  -SHIELA 4  -JA    AM-PAC 6 Clicks Score (PT) 20  - 24  -    Highest level of mobility 6 --> Walked 10 steps or more  - 8 --> Walked 250 feet or more  -      Row Name 09/23/23 1514          Functional Assessment    Outcome Measure Options AM-PAC 6 Clicks Basic Mobility (PT)  -               User Key  (r) = Recorded By, (t) = Taken By, (c) = Cosigned By      Initials Name Provider Type    SHIELA Saiad Rivera, PT Physical Therapist    Josias Arroyo RN Registered Nurse                                 Physical Therapy Education       Title: PT OT SLP Therapies (In Progress)       Topic: Physical Therapy (In Progress)       Point: Mobility training (In Progress)       Learning Progress Summary             Patient Acceptance, E, NR by  at 9/23/2023 2033                         Point: Home exercise program (Not Started)       Learner Progress:  Not documented in this visit.              Point: Body mechanics (In Progress)       Learning Progress Summary             Patient Acceptance, E, NR by  at 9/23/2023 2033                         Point: Precautions (In Progress)       Learning Progress Summary             Patient Acceptance, E, NR by  at 9/23/2023 2033                                         User Key       Initials Effective Dates Name Provider Type Discipline     06/16/21 -  Saida Rivera, PT Physical Therapist PT                  PT Recommendation and Plan  Planned Therapy Interventions (PT): balance training, bed mobility training, gait training, home exercise program, patient/family education, stair training, strengthening, transfer training  Plan of Care Reviewed With: patient, spouse, daughter  Outcome Evaluation: PT evaluation completed. Patient alert, oriented  x4 and agreeable to therapy. Patient used rollator inside and used w/c for community distances. Patient performed supine to sit to supine with modified independence and sit to stand to sith with FWW and SBA. Patient ambulated with  ft with CGA. Function limited by decreased strength, balance, and tolerance for functional mobility and activities. Patient will benefit from PT to regain lost function as patient improves medically Anticipate home with assist at discharge. Patient may benefit from outpatient PT for conditioning and balance rehab.     Time Calculation:   PT Evaluation Complexity  History, PT Evaluation Complexity: 3 or more personal factors and/or comorbidities  Examination of Body Systems (PT Eval Complexity): total of 3 or more elements  Clinical Presentation (PT Evaluation Complexity): evolving  Clinical Decision Making (PT Evaluation Complexity): moderate complexity  Overall Complexity (PT Evaluation Complexity): moderate complexity     PT Charges       Row Name 09/23/23 2034             Time Calculation    Start Time 1514  -SHIELA      Stop Time 1552  -SHIELA      Time Calculation (min) 38 min  -SHIELA      PT Received On 09/23/23  -      PT Goal Re-Cert Due Date 10/06/23  -         Time Calculation- PT    Total Timed Code Minutes- PT 0 minute(s)  -SHIELA         Untimed Charges    PT Eval/Re-eval Minutes 38  -SHIELA         Total Minutes    Untimed Charges Total Minutes 38  -SHIELA       Total Minutes 38  -SHIELA                User Key  (r) = Recorded By, (t) = Taken By, (c) = Cosigned By      Initials Name Provider Type    Saida Nielsen, PT Physical Therapist                  Therapy Charges for Today       Code Description Service Date Service Provider Modifiers Qty    43348846672 HC PT EVAL MOD COMPLEXITY 3 9/23/2023 Saida Rivera, PT GP 1            PT G-Codes  Outcome Measure Options: AM-PAC 6 Clicks Basic Mobility (PT)  AM-PAC 6 Clicks Score (PT): 20  PT Discharge Summary  Anticipated Discharge  Disposition (PT): home with assist, home with outpatient therapy services    Saida Rivera, PT  9/23/2023

## 2023-09-25 ENCOUNTER — READMISSION MANAGEMENT (OUTPATIENT)
Dept: CALL CENTER | Facility: HOSPITAL | Age: 63
End: 2023-09-25

## 2023-09-25 VITALS
BODY MASS INDEX: 32.4 KG/M2 | TEMPERATURE: 97.1 F | RESPIRATION RATE: 18 BRPM | WEIGHT: 201.6 LBS | HEIGHT: 66 IN | SYSTOLIC BLOOD PRESSURE: 126 MMHG | HEART RATE: 67 BPM | OXYGEN SATURATION: 98 % | DIASTOLIC BLOOD PRESSURE: 59 MMHG

## 2023-09-25 LAB
ALBUMIN SERPL ELPH-MCNC: 3.9 G/DL (ref 2.9–4.4)
ALBUMIN/GLOB SERPL: 1.3 {RATIO} (ref 0.7–1.7)
ALPHA1 GLOB SERPL ELPH-MCNC: 0.2 G/DL (ref 0–0.4)
ALPHA2 GLOB SERPL ELPH-MCNC: 0.9 G/DL (ref 0.4–1)
ANION GAP SERPL CALCULATED.3IONS-SCNC: 12 MMOL/L (ref 5–15)
B-GLOBULIN SERPL ELPH-MCNC: 1 G/DL (ref 0.7–1.3)
BASOPHILS # BLD AUTO: 0.03 10*3/MM3 (ref 0–0.2)
BASOPHILS NFR BLD AUTO: 0.5 % (ref 0–1.5)
BUN SERPL-MCNC: 57 MG/DL (ref 8–23)
BUN/CREAT SERPL: 15.4 (ref 7–25)
CALCIUM SPEC-SCNC: 8.4 MG/DL (ref 8.6–10.5)
CHLORIDE SERPL-SCNC: 105 MMOL/L (ref 98–107)
CO2 SERPL-SCNC: 22 MMOL/L (ref 22–29)
CREAT SERPL-MCNC: 3.7 MG/DL (ref 0.57–1)
DEPRECATED RDW RBC AUTO: 48.9 FL (ref 37–54)
EGFRCR SERPLBLD CKD-EPI 2021: 13.1 ML/MIN/1.73
EOSINOPHIL # BLD AUTO: 0.28 10*3/MM3 (ref 0–0.4)
EOSINOPHIL NFR BLD AUTO: 4.4 % (ref 0.3–6.2)
ERYTHROCYTE [DISTWIDTH] IN BLOOD BY AUTOMATED COUNT: 14.6 % (ref 12.3–15.4)
GAMMA GLOB SERPL ELPH-MCNC: 1 G/DL (ref 0.4–1.8)
GLOBULIN SER CALC-MCNC: 3.1 G/DL (ref 2.2–3.9)
GLUCOSE SERPL-MCNC: 90 MG/DL (ref 65–99)
HCT VFR BLD AUTO: 25.2 % (ref 34–46.6)
HCT VFR BLD AUTO: 26 % (ref 34–46.6)
HGB BLD-MCNC: 7.9 G/DL (ref 12–15.9)
HGB BLD-MCNC: 8.1 G/DL (ref 12–15.9)
IMM GRANULOCYTES # BLD AUTO: 0.02 10*3/MM3 (ref 0–0.05)
IMM GRANULOCYTES NFR BLD AUTO: 0.3 % (ref 0–0.5)
KAPPA LC FREE SER-MCNC: 59.2 MG/L (ref 3.3–19.4)
KAPPA LC FREE/LAMBDA FREE SER: 1.47 {RATIO} (ref 0.26–1.65)
LABORATORY COMMENT REPORT: NORMAL
LAMBDA LC FREE SERPL-MCNC: 40.2 MG/L (ref 5.7–26.3)
LYMPHOCYTES # BLD AUTO: 1.32 10*3/MM3 (ref 0.7–3.1)
LYMPHOCYTES NFR BLD AUTO: 20.6 % (ref 19.6–45.3)
M PROTEIN SERPL ELPH-MCNC: NORMAL G/DL
MCH RBC QN AUTO: 29.2 PG (ref 26.6–33)
MCHC RBC AUTO-ENTMCNC: 31.3 G/DL (ref 31.5–35.7)
MCV RBC AUTO: 93 FL (ref 79–97)
MONOCYTES # BLD AUTO: 0.54 10*3/MM3 (ref 0.1–0.9)
MONOCYTES NFR BLD AUTO: 8.4 % (ref 5–12)
NEUTROPHILS NFR BLD AUTO: 4.21 10*3/MM3 (ref 1.7–7)
NEUTROPHILS NFR BLD AUTO: 65.8 % (ref 42.7–76)
NRBC BLD AUTO-RTO: 0 /100 WBC (ref 0–0.2)
PLATELET # BLD AUTO: 159 10*3/MM3 (ref 140–450)
PMV BLD AUTO: 10.5 FL (ref 6–12)
POTASSIUM SERPL-SCNC: 4.5 MMOL/L (ref 3.5–5.2)
PROT PATTERN SERPL ELPH-IMP: NORMAL
PROT SERPL-MCNC: 7 G/DL (ref 6–8.5)
RBC # BLD AUTO: 2.71 10*6/MM3 (ref 3.77–5.28)
SODIUM SERPL-SCNC: 139 MMOL/L (ref 136–145)
WBC NRBC COR # BLD: 6.4 10*3/MM3 (ref 3.4–10.8)

## 2023-09-25 PROCEDURE — 25010000002 EPOETIN ALFA PER 1000 UNITS: Performed by: INTERNAL MEDICINE

## 2023-09-25 PROCEDURE — 85014 HEMATOCRIT: CPT | Performed by: NURSE PRACTITIONER

## 2023-09-25 PROCEDURE — 85025 COMPLETE CBC W/AUTO DIFF WBC: CPT | Performed by: STUDENT IN AN ORGANIZED HEALTH CARE EDUCATION/TRAINING PROGRAM

## 2023-09-25 PROCEDURE — 80048 BASIC METABOLIC PNL TOTAL CA: CPT | Performed by: STUDENT IN AN ORGANIZED HEALTH CARE EDUCATION/TRAINING PROGRAM

## 2023-09-25 PROCEDURE — 96372 THER/PROPH/DIAG INJ SC/IM: CPT

## 2023-09-25 PROCEDURE — 85018 HEMOGLOBIN: CPT | Performed by: NURSE PRACTITIONER

## 2023-09-25 RX ORDER — FAMOTIDINE 20 MG/1
20 TABLET, FILM COATED ORAL DAILY
Qty: 30 TABLET | Refills: 3 | Status: SHIPPED | OUTPATIENT
Start: 2023-09-26

## 2023-09-25 RX ORDER — FAMOTIDINE 20 MG/1
20 TABLET, FILM COATED ORAL DAILY
Status: DISCONTINUED | OUTPATIENT
Start: 2023-09-26 | End: 2023-09-25 | Stop reason: HOSPADM

## 2023-09-25 RX ORDER — DOXYCYCLINE HYCLATE 100 MG/1
100 CAPSULE ORAL 2 TIMES DAILY
Qty: 6 CAPSULE | Refills: 0 | Status: SHIPPED | OUTPATIENT
Start: 2023-09-25 | End: 2023-09-25 | Stop reason: HOSPADM

## 2023-09-25 RX ORDER — SODIUM BICARBONATE 650 MG/1
650 TABLET ORAL EVERY MORNING
Qty: 30 TABLET | Refills: 3 | Status: SHIPPED | OUTPATIENT
Start: 2023-09-26

## 2023-09-25 RX ADMIN — ROSUVASTATIN CALCIUM 5 MG: 5 TABLET, FILM COATED ORAL at 08:26

## 2023-09-25 RX ADMIN — LEVOTHYROXINE SODIUM 200 MCG: 100 TABLET ORAL at 06:36

## 2023-09-25 RX ADMIN — EPOETIN ALFA 10000 UNITS: 10000 SOLUTION INTRAVENOUS; SUBCUTANEOUS at 11:19

## 2023-09-25 RX ADMIN — SODIUM BICARBONATE 650 MG TABLET 650 MG: at 06:35

## 2023-09-25 RX ADMIN — VENLAFAXINE HYDROCHLORIDE 37.5 MG: 37.5 CAPSULE, EXTENDED RELEASE ORAL at 08:26

## 2023-09-25 RX ADMIN — PANTOPRAZOLE SODIUM 40 MG: 40 TABLET, DELAYED RELEASE ORAL at 08:22

## 2023-09-25 RX ADMIN — CYANOCOBALAMIN TAB 500 MCG 500 MCG: 500 TAB at 08:22

## 2023-09-25 RX ADMIN — ASPIRIN 81 MG: 81 TABLET, COATED ORAL at 08:26

## 2023-09-25 RX ADMIN — MIDODRINE HYDROCHLORIDE 10 MG: 5 TABLET ORAL at 08:22

## 2023-09-25 RX ADMIN — FOLIC ACID 1000 MCG: 1 TABLET ORAL at 08:22

## 2023-09-25 RX ADMIN — Medication 10 ML: at 08:22

## 2023-09-25 NOTE — PROGRESS NOTES
"[facility]  [address] [place] [address]. [address]  T - [phone]  F - [phone]     Progress Note          PATIENT  DEMOGRAPHICS   PATIENT NAME: Adilene Morgan                      PHYSICIAN: Alexa Rios MD  : 1960  MRN: [id number]   LOS: 4 days    Patient Care Team:  Jen Raymundo MD as PCP - General  [facility] (Family Medicine)  Lucio Betancourt MD as Consulting Physician (Hematology and Oncology)  Subjective   SUBJECTIVE   Feeling okay today. Good appetite, no n/v/d          Objective   OBJECTIVE   Vital Signs  Temp:  [97.1 °F (36.2 °C)-98.2 °F (36.8 °C)] 97.1 °F (36.2 °C)  Heart Rate:  [67-85] 67  Resp:  [16-18] 18  BP: (118-141)/(56-82) 126/59    Flowsheet Rows      Flowsheet Row First Filed Value   Admission Height 167.6 cm (66\") Documented at 2023 1005   Admission Weight 90.7 kg (200 lb) Documented at 2023 1005             I/O last 3 completed shifts:  In: 800 [P.O.:800]  Out: 2000 [Urine:2000]    PHYSICAL EXAM    Physical Exam  Constitutional:       Appearance: She is well-developed.   HENT:      Head: Normocephalic and atraumatic.   Eyes:      Conjunctiva/sclera: Conjunctivae normal.      Pupils: Pupils are equal, round, and reactive to light.   Cardiovascular:      Rate and Rhythm: Normal rate and regular rhythm.   Pulmonary:      Effort: Pulmonary effort is normal.      Breath sounds: Normal breath sounds.   Abdominal:      Palpations: Abdomen is soft.   Musculoskeletal:      Cervical back: Neck supple.      Right lower leg: Edema present.      Left lower leg: Edema present.   Skin:     General: Skin is warm and dry.   Neurological:      Mental Status: She is alert and oriented to person, place, and time.   Psychiatric:         Mood and Affect: Mood normal.         Behavior: Behavior normal.       RESULTS   Results Review:    Results from last 7 days   Lab Units 23  0631 23  0532 23  0542 " 09/22/23  0523 09/21/23  1042 09/20/23  1419   SODIUM mmol/L 139 139 144   < > 139 141   POTASSIUM mmol/L 4.5 4.3 4.6   < > 5.4* 4.9   CHLORIDE mmol/L 105 104 107   < > 105 109*   TOTAL CO2 mmol/L  --   --   --   --   --  21   CO2 mmol/L 22.0 21.0* 23.0   < > 19.0*  --    BUN mg/dL 57* 60* 65*   < > 82* 84*   CREATININE mg/dL 3.70* 3.75* 4.01*   < > 3.76* 4.2*   CALCIUM mg/dL 8.4* 8.1* 8.6   < > 8.3* 8.4*   BILIRUBIN mg/dL  --   --   --   --  0.2 0.21*   ALK PHOS U/L  --   --   --   --  69 70   ALT (SGPT) U/L  --   --   --   --  13 14   AST (SGOT) U/L  --   --   --   --  21 13   GLUCOSE mg/dL 90 89 88   < > 89  --     < > = values in this interval not displayed.         Estimated Creatinine Clearance: 17.5 mL/min (A) (by C-G formula based on SCr of 3.7 mg/dL (H)).          Results from last 7 days   Lab Units 09/22/23  0523   URIC ACID mg/dL 6.3*         Results from last 7 days   Lab Units 09/25/23  0840 09/25/23  0631 09/24/23  0532 09/23/23  0542 09/22/23  0523 09/21/23  1042   WBC 10*3/mm3  --  6.40 6.76 8.20 7.36 7.15   HEMOGLOBIN g/dL 8.1* 7.9* 10.7* 8.6* 8.3* 8.3*   PLATELETS 10*3/mm3  --  159 142 223 207 229                 Imaging Results (Last 24 Hours)       ** No results found for the last 24 hours. **             MEDICATIONS    aspirin, 81 mg, Oral, Daily  folic acid, 1,000 mcg, Oral, Daily  levothyroxine, 200 mcg, Oral, Q AM  Lorlatinib, 75 mg, Oral, Q PM  midodrine, 10 mg, Oral, TID AC  pantoprazole, 40 mg, Oral, Daily  rivaroxaban, 15 mg, Oral, Daily With Dinner  rosuvastatin, 5 mg, Oral, Daily  senna-docusate sodium, 2 tablet, Oral, BID  sodium bicarbonate, 650 mg, Oral, QAM  sodium chloride, 10 mL, Intravenous, Q12H  topiramate, 50 mg, Oral, Nightly  venlafaxine XR, 37.5 mg, Oral, Daily  vitamin B-12, 500 mcg, Oral, Daily             Assessment & Plan   ASSESSMENT / PLAN      TODD (acute kidney injury)    Acute kidney failure, unspecified    1.  TODD on CKD 4- baseline creatinine around 2, down to  3.7 at present with peak creatinine 4.2 on the 20th.  Her only recent medication changes were starting Myrbetriq for incontinence.  No etiology is immediately apparent.  She takes lorlatinib which is associated with increased creatinine,     Fena consistent with intrinsic nature. U/s no hydro. Urine protein 1.5 gm   - awaiting free light chains, SPEP, ANCA panel, CK.     -off ivf     2.  Metabolic acidosis- on sodium bicarb 650mg BID.      3.  Anemia- we will follow CBC, patient sees Dr. Kitchen in the outpatient setting for anemia, usually gets Retacrit every 4 weeks. Add epogen today     4.  Lung cancer- with metastasis, follows with [facility] in [place], has been on lorlatinib     5.  History of DVT    Copied text in this note has been reviewed and is accurate as of 9/25/2023           This document has been electronically signed by Alexa Rios MD on September 25, 2023 10:40 CDT

## 2023-09-25 NOTE — PLAN OF CARE
Problem: Adult Inpatient Plan of Care  Goal: Plan of Care Review  Outcome: Ongoing, Progressing  Flowsheets  Taken 9/25/2023 0018  Progress: improving  Outcome Evaluation: pt vs stable, stopped bicarb drip today, no pain or discomfort noted at this time, discharge planning in progress.  Taken 9/24/2023 0630  Plan of Care Reviewed With: patient  Goal: Patient-Specific Goal (Individualized)  Outcome: Ongoing, Progressing  Goal: Absence of Hospital-Acquired Illness or Injury  Outcome: Ongoing, Progressing  Intervention: Identify and Manage Fall Risk  Recent Flowsheet Documentation  Taken 9/25/2023 0017 by Katina Bardales RN  Safety Promotion/Fall Prevention:   safety round/check completed   nonskid shoes/slippers when out of bed  Taken 9/24/2023 2209 by Katina Bardales RN  Safety Promotion/Fall Prevention:   safety round/check completed   nonskid shoes/slippers when out of bed  Taken 9/24/2023 2109 by Katina Bardales RN  Safety Promotion/Fall Prevention:   safety round/check completed   nonskid shoes/slippers when out of bed  Taken 9/24/2023 2005 by Katina Bardales RN  Safety Promotion/Fall Prevention:   safety round/check completed   nonskid shoes/slippers when out of bed  Taken 9/24/2023 1907 by Katina Bardales RN  Safety Promotion/Fall Prevention:   safety round/check completed   nonskid shoes/slippers when out of bed  Intervention: Prevent Skin Injury  Recent Flowsheet Documentation  Taken 9/25/2023 0017 by Katina Bardales RN  Body Position:   position changed independently   left   side-lying  Taken 9/24/2023 2209 by Katina Bardales RN  Body Position:   position changed independently   sitting up in bed  Taken 9/24/2023 2005 by Katina Bardales RN  Body Position:   position changed independently   sitting up in bed  Intervention: Prevent and Manage VTE (Venous Thromboembolism) Risk  Recent Flowsheet Documentation  Taken 9/24/2023 1907 by Katina Bardales RN  Activity Management:  up ad marsha  VTE Prevention/Management: (xarelto) other (see comments)  Goal: Optimal Comfort and Wellbeing  Outcome: Ongoing, Progressing  Intervention: Provide Person-Centered Care  Recent Flowsheet Documentation  Taken 9/24/2023 1907 by Katina Bardales, RN  Trust Relationship/Rapport:   care explained   questions answered   thoughts/feelings acknowledged  Goal: Readiness for Transition of Care  Outcome: Ongoing, Progressing   Goal Outcome Evaluation:  Plan of Care Reviewed With: patient        Progress: improving  Outcome Evaluation: pt vs stable, stopped bicarb drip today, no pain or discomfort noted at this time, discharge planning in progress.

## 2023-09-25 NOTE — DISCHARGE SUMMARY
[facility] Medicine Services  DISCHARGE SUMMARY       Date of Admission: 9/21/2023  Date of Discharge:  9/25/2023  Primary Care Physician: Jen Raymundo MD    Presenting Problem/History of Present Illness:  TODD (acute kidney injury) [N17.9]  Acute kidney failure, unspecified [N17.9]     Final Discharge Diagnoses:  Active Hospital Problems    Diagnosis     **TODD (acute kidney injury)     Acute kidney failure, unspecified        Consults:   Consults       Date and Time Order Name Status Description    9/21/2023  9:21 PM Inpatient Urology Consult      9/21/2023 12:11 PM Inpatient Nephrology Consult Completed           Pertinent Test Results:   Lab Results (last 24 hours)       Procedure Component Value Units Date/Time    Hemoglobin & Hematocrit, Blood [[id number]]  (Abnormal) Collected: 09/25/23 0840    Specimen: Blood Updated: 09/25/23 0859     Hemoglobin 8.1 g/dL      Hematocrit 26.0 %     CBC Auto Differential [[id number]]  (Abnormal) Collected: 09/25/23 0631    Specimen: Blood Updated: 09/25/23 0740     WBC 6.40 10*3/mm3      RBC 2.71 10*6/mm3      Hemoglobin 7.9 g/dL      Comment: Matches previous history        Hematocrit 25.2 %      MCV 93.0 fL      MCH 29.2 pg      MCHC 31.3 g/dL      RDW 14.6 %      RDW-SD 48.9 fl      MPV 10.5 fL      Platelets 159 10*3/mm3      Neutrophil % 65.8 %      Lymphocyte % 20.6 %      Monocyte % 8.4 %      Eosinophil % 4.4 %      Basophil % 0.5 %      Immature Grans % 0.3 %      Neutrophils, Absolute 4.21 10*3/mm3      Lymphocytes, Absolute 1.32 10*3/mm3      Monocytes, Absolute 0.54 10*3/mm3      Eosinophils, Absolute 0.28 10*3/mm3      Basophils, Absolute 0.03 10*3/mm3      Immature Grans, Absolute 0.02 10*3/mm3      nRBC 0.0 /100 WBC     Basic Metabolic Panel [[id number]]  (Abnormal) Collected: 09/25/23 0631    Specimen: Blood Updated: 09/25/23 0737     Glucose 90 mg/dL      BUN 57 mg/dL      Creatinine 3.70 mg/dL      Sodium 139 mmol/L      Potassium 4.5 mmol/L   "    Chloride 105 mmol/L      CO2 22.0 mmol/L      Calcium 8.4 mg/dL      BUN/Creatinine Ratio 15.4     Anion Gap 12.0 mmol/L      eGFR 13.1 mL/min/1.73      Comment: <15 Indicative of kidney failure       Narrative:      GFR Normal >60  Chronic Kidney Disease <60  Kidney Failure <15            Imaging Results (Last 24 Hours)       ** No results found for the last 24 hours. **          Chief Complaint on Day of Discharge: No complaints    Hospital Course:    This is a 63-year-old female with past medical history of depression, lung cancer (patient is receiving immunotherapy), hypothyroidism, and stroke that presented to [facility] on 9/21/2023 secondary to an acute kidney injury.     Patient was receiving doxycycline for a urinary tract infection.  Creatinine baseline is around 2 and was noted to be 4.2 on admission.  The patient was followed by nephrology.  She received IVF, sodium bicarb gtt and nephrotoxic medications were held.  Creatinine trended down to 3.7 at discharge.  She will keep her upcoming appointment with nephrology and follow with PCP in one week.     Condition on Discharge:  Stable.     Physical Exam on Discharge:  /59 (BP Location: Left arm, Patient Position: Lying)   Pulse 67   Temp 97.1 °F (36.2 °C) (Temporal)   Resp 18   Ht 167.6 cm (66\")   Wt 91.4 kg (201 lb 9.6 oz)   LMP  (LMP Unknown)   SpO2 98%   BMI 32.54 kg/m²   Physical Exam  Constitutional:       Appearance: She is well-developed.   HENT:      Head: Normocephalic and atraumatic.   Eyes:      Pupils: Pupils are equal, round, and reactive to light.   Cardiovascular:      Rate and Rhythm: Normal rate and regular rhythm.   Pulmonary:      Effort: Pulmonary effort is normal.      Breath sounds: Normal breath sounds.   Abdominal:      General: Bowel sounds are normal.      Palpations: Abdomen is soft.   Musculoskeletal:         General: Normal range of motion.      Cervical back: Normal range of motion and neck " supple.   Skin:     General: Skin is warm and dry.   Neurological:      Mental Status: She is alert and oriented to person, place, and time.   Psychiatric:         Behavior: Behavior normal.     Discharge Disposition:  Home or Self Care    Discharge Medications:     Discharge Medications        Changes to Medications        Instructions Start Date   doxycycline 100 MG capsule  Commonly known as: VIBRAMYCIN  What changed: how much to take   100 mg, Oral, 2 Times Daily, 3 doses remaining - 1 tonight and morning/night on Friday      rivaroxaban 15 MG tablet  Commonly known as: XARELTO  What changed:   medication strength  See the new instructions.   15 mg, Oral, Daily With Dinner      sodium bicarbonate 650 MG tablet  What changed: Another medication with the same name was added. Make sure you understand how and when to take each.   650 mg, Oral, Every Morning      sodium bicarbonate 650 MG tablet  What changed: You were already taking a medication with the same name, and this prescription was added. Make sure you understand how and when to take each.   650 mg, Oral, Every Morning   Start Date: September 26, 2023            Continue These Medications        Instructions Start Date   aspirin 81 MG EC tablet   81 mg, Oral, Daily      CALCIUM 500 PO   Oral, Daily      ESTROVEN NIGHTTIME PO   200 mg, Oral, For night sweats      ezetimibe 10 MG tablet  Commonly known as: ZETIA   10 mg, Oral, Every Night at Bedtime      folic acid 400 MCG tablet  Commonly known as: FOLVITE   800 mcg, Oral, Daily      Lorbrena 25 MG tablet  Generic drug: Lorlatinib   75 mg, Oral, Daily      midodrine 10 MG tablet  Commonly known as: PROAMATINE   10 mg, Oral, 3 Times Daily Before Meals      multivitamin with minerals tablet tablet   1 tablet, Oral, Daily      NON FORMULARY   Prochloroperateine 10mg       ondansetron 8 MG tablet  Commonly known as: ZOFRAN   8 mg, Oral, Every 8 Hours PRN      pantoprazole 40 MG EC tablet  Commonly known as:  Protonix   40 mg, Oral, Daily      promethazine-dextromethorphan 6.25-15 MG/5ML syrup  Commonly known as: PROMETHAZINE-DM   5 mL, Oral, 4 Times Daily PRN      rosuvastatin 5 MG tablet  Commonly known as: CRESTOR   5 mg, Oral, Daily      SUMAtriptan 20 MG/ACT nasal spray  Commonly known as: IMITREX   20 mg, Nasal, Every 2 Hours PRN      Synthroid 200 MCG tablet  Generic drug: levothyroxine   TAKE 1 TABLET BY MOUTH EVERY DAY      topiramate 50 MG tablet  Commonly known as: TOPAMAX   TAKE 1 TABLET BY MOUTH EVERY DAY AT NIGHT      traMADol 50 MG tablet  Commonly known as: ULTRAM   50 mg, Oral, Every 6 Hours PRN, for pain      venlafaxine XR 37.5 MG 24 hr capsule  Commonly known as: EFFEXOR-XR   TAKE 1 CAPSULE BY MOUTH EVERY DAY      vitamin B-12 500 MCG tablet  Commonly known as: CYANOCOBALAMIN   500 mcg, Oral, Daily             Stop These Medications      Mirabegron ER 50 MG tablet sustained-release 24 hour 24 hr tablet  Commonly known as: Myrbetriq     solifenacin 10 MG tablet  Commonly known as: VESICARE     trospium 60 MG capsule sustained-release 24 hr capsule              Discharge Diet:   Diet Instructions       Diet: Renal Diets; Low Sodium (2-3g), Low Potassium; Thin (IDDSI 0)      Discharge Diet: Renal Diets    Renal Diet:  Low Sodium (2-3g)  Low Potassium       Fluid Consistency: Thin (IDDSI 0)            Activity at Discharge:   Activity Instructions       Activity as Tolerated              Discharge Care Plan/Instructions: As above.     Follow-up Appointment:  Additional Instructions for the Follow-ups that You Need to Schedule       Ambulatory Referral to Physical Therapy   As directed      Follow-up needed: Yes        Referral to Occupational Therapy   As directed      Follow-up needed: Yes               Follow-up Information       Jen Raymundo MD. Go on 9/29/2023.    Specialties: Family Medicine, Hospitalist, Emergency Medicine, Urgent Care  Why: FRIDAY SEPTEMBER 29TH 1:30 HOSPITAL FOLLOW UP  APPOINTMENT  Contact information:  [address]  [address]  [facility] [address] [phone]               Gravelly, Russel POWER MD Follow up.    Specialty: Urology  Why: OFFICE WILL CALL TO SCHEDULE HOSPITAL FOLLOW UP APPOINTMENT  Contact information:  [address]  [place] [id number] [phone]               Alexa Rios MD Follow up.    Specialty: Nephrology  Why: keep upcoming appointment  Contact information:  [address]  [facility] [address] [phone]               [facility]. Go in 1 day(s).    Specialties: Physical Therapy, Chiropractic Medicine  Why: Appointment time is 1:45 p.m. 9/26/2023  Contact information:  [address]   [place] [id number] [phone]                           Test Results Pending at Discharge:   Pending Labs       Order Current Status    ANCA Panel In process    Immunoglobulin Free LT Chains Blood In process    Protein Elec + Interp, Serum In process                 This document has been electronically signed by SILVA Dailey on September 25, 2023 10:38 CDT        Time: Greater than 30 minutes.

## 2023-09-25 NOTE — OUTREACH NOTE
Prep Survey      Flowsheet Row Responses   [facility] facility patient discharged from? [place]   Is LACE score < 7 ? No   Eligibility [facility]   Date of Admission 09/21/23   Date of Discharge 09/25/23   Discharge Disposition Home or Self Care   Discharge diagnosis TODD   Does the patient have one of the following disease processes/diagnoses(primary or secondary)? Other   Does the patient have Home health ordered? No   Is there a DME ordered? No   Prep survey completed? Yes            SALENA ALVARADO - Registered Nurse

## 2023-09-26 ENCOUNTER — TRANSITIONAL CARE MANAGEMENT TELEPHONE ENCOUNTER (OUTPATIENT)
Dept: CALL CENTER | Facility: HOSPITAL | Age: 63
End: 2023-09-26
Payer: COMMERCIAL

## 2023-09-26 LAB
C-ANCA TITR SER IF: NORMAL TITER
MYELOPEROXIDASE AB SER IA-ACNC: <0.2 UNITS (ref 0–0.9)
P-ANCA ATYPICAL TITR SER IF: NORMAL TITER
P-ANCA TITR SER IF: NORMAL TITER
PROTEINASE3 AB SER IA-ACNC: <0.2 UNITS (ref 0–0.9)

## 2023-09-26 NOTE — OUTREACH NOTE
Call Center TCM Note      Flowsheet Row Responses   [facility] patient discharged from? [place]   Does the patient have one of the following disease processes/diagnoses(primary or secondary)? Other   TCM attempt successful? No   Unsuccessful attempts Attempt 1            Sonu Chin RN    9/26/2023, 12:34 CDT

## 2023-09-26 NOTE — OUTREACH NOTE
Call Center TCM Note      Flowsheet Row Responses   [facility] patient discharged from? [place]   Does the patient have one of the following disease processes/diagnoses(primary or secondary)? Other   TCM attempt successful? No   Unsuccessful attempts Attempt 2            Sonu Chin RN    9/26/2023, 13:13 CDT

## 2023-09-27 ENCOUNTER — TRANSITIONAL CARE MANAGEMENT TELEPHONE ENCOUNTER (OUTPATIENT)
Dept: CALL CENTER | Facility: HOSPITAL | Age: 63
End: 2023-09-27
Payer: COMMERCIAL

## 2023-09-27 NOTE — OUTREACH NOTE
Call Center TCM Note      Flowsheet Row Responses   [facility] patient discharged from? [place]   Does the patient have one of the following disease processes/diagnoses(primary or secondary)? Other   TCM attempt successful? No   Unsuccessful attempts Attempt 3            Clari Zimmer LPN    9/27/2023, 13:11 CDT

## 2023-09-29 ENCOUNTER — OFFICE VISIT (OUTPATIENT)
Dept: FAMILY MEDICINE CLINIC | Facility: CLINIC | Age: 63
End: 2023-09-29
Payer: COMMERCIAL

## 2023-09-29 VITALS
SYSTOLIC BLOOD PRESSURE: 160 MMHG | DIASTOLIC BLOOD PRESSURE: 80 MMHG | HEIGHT: 66 IN | WEIGHT: 201 LBS | OXYGEN SATURATION: 96 % | BODY MASS INDEX: 32.3 KG/M2 | HEART RATE: 84 BPM

## 2023-09-29 DIAGNOSIS — C34.90 PRIMARY MALIGNANT NEOPLASM OF LUNG METASTATIC TO OTHER SITE, UNSPECIFIED LATERALITY: ICD-10-CM

## 2023-09-29 DIAGNOSIS — N17.9 ACUTE RENAL FAILURE SUPERIMPOSED ON STAGE 4 CHRONIC KIDNEY DISEASE, UNSPECIFIED ACUTE RENAL FAILURE TYPE: Primary | ICD-10-CM

## 2023-09-29 DIAGNOSIS — N18.4 ACUTE RENAL FAILURE SUPERIMPOSED ON STAGE 4 CHRONIC KIDNEY DISEASE, UNSPECIFIED ACUTE RENAL FAILURE TYPE: Primary | ICD-10-CM

## 2023-09-29 PROCEDURE — 99495 TRANSJ CARE MGMT MOD F2F 14D: CPT | Performed by: GENERAL PRACTICE

## 2023-09-29 RX ORDER — PREDNISONE 20 MG/1
40 TABLET ORAL DAILY
COMMUNITY
Start: 2023-09-28 | End: 2023-10-11

## 2023-09-29 NOTE — PROGRESS NOTES
Transitional Care Follow Up Visit  Subjective     Adilene Mrogan is a 63 y.o. female who presents for a transitional care management visit.    Within 48 business hours after discharge our office contacted her via telephone to coordinate her care and needs.      I reviewed and discussed the details of that call along with the discharge summary, hospital problems, inpatient lab results, inpatient diagnostic studies, and consultation reports with Adilene.     Current outpatient and discharge medications have been reconciled for the patient.  Reviewed by: Jen Raymundo MD          9/25/2023     4:21 PM   Date of TCM Phone Call   [facility]   Date of Admission 9/21/2023   Date of Discharge 9/25/2023   Discharge Disposition Home or Self Care     Risk for Readmission (LACE) Score: 14 (9/25/2023  5:00 AM)      History of Present Illness   Course During Hospital Stay: Recent hospitalization, labs, xrays reviewed and medications reconciled. Found to be in acute renal failure. May have been related to some of her medications.  Has been back to her oncologist and there are plans for her to decrease her medication.    Copied from hospital record:    This is a 63-year-old female with past medical history of depression, lung cancer (patient is receiving immunotherapy), hypothyroidism, and stroke that presented to [facility] on 9/21/2023 secondary to an acute kidney injury.     Patient was receiving doxycycline for a urinary tract infection.  Creatinine baseline is around 2 and was noted to be 4.2 on admission.  The patient was followed by nephrology.  She received IVF, sodium bicarb gtt and nephrotoxic medications were held.  Creatinine trended down to 3.7 at discharge.  She will keep her upcoming appointment with nephrology and follow with PCP in one week.      The following portions of the patient's history were reviewed and updated as appropriate: allergies, current medications, past family  history, past medical history, past social history, past surgical history, and problem list.  Outpatient Medications Prior to Visit   Medication Sig Dispense Refill    aspirin 81 MG EC tablet Take 1 tablet by mouth Daily.      Calcium Carbonate (CALCIUM 500 PO) Take  by mouth Daily.      ezetimibe (ZETIA) 10 MG tablet Take 1 tablet by mouth every night at bedtime.      famotidine (PEPCID) 20 MG tablet Take 1 tablet by mouth Daily. 30 tablet 3    folic acid (FOLVITE) 400 MCG tablet Take 2 tablets by mouth Daily.      midodrine (PROAMATINE) 10 MG tablet Take 1 tablet by mouth 3 (Three) Times a Day Before Meals. 270 tablet 3    NON FORMULARY Prochloroperateine 10mg      Nutritional Supplements (ESTROVEN NIGHTTIME PO) Take 200 mg by mouth. For night sweats      ondansetron (ZOFRAN) 8 MG tablet Take 1 tablet by mouth Every 8 (Eight) Hours As Needed for Nausea or Vomiting. 90 tablet 1    promethazine-dextromethorphan (PROMETHAZINE-DM) 6.25-15 MG/5ML syrup Take 5 mL by mouth 4 (Four) Times a Day As Needed for Cough. 240 mL 0    rivaroxaban (XARELTO) 15 MG tablet Take 1 tablet by mouth Daily With Dinner. Indications: Atrial Fibrillation 30 tablet 0    rosuvastatin (CRESTOR) 5 MG tablet Take 1 tablet by mouth Daily.      sodium bicarbonate 650 MG tablet Take 1 tablet by mouth Every Morning. 30 tablet 3    SUMAtriptan (IMITREX) 20 MG/ACT nasal spray 1 spray into the nostril(s) as directed by provider Every 2 (Two) Hours As Needed for Migraine. 6 each 2    Synthroid 200 MCG tablet TAKE 1 TABLET BY MOUTH EVERY DAY 30 tablet 3    topiramate (TOPAMAX) 50 MG tablet TAKE 1 TABLET BY MOUTH EVERY DAY AT NIGHT 90 tablet 3    traMADol (ULTRAM) 50 MG tablet Take 1 tablet by mouth Every 6 (Six) Hours As Needed. for pain      venlafaxine XR (EFFEXOR-XR) 37.5 MG 24 hr capsule TAKE 1 CAPSULE BY MOUTH EVERY DAY 90 capsule 3    vitamin B-12 (CYANOCOBALAMIN) 500 MCG tablet Take 1 tablet by mouth Daily.      Lorlatinib (Lorbrena) 25 MG tablet  "Take 75 mg by mouth Daily.      predniSONE (DELTASONE) 20 MG tablet Take 2 tablets by mouth Daily.      multivitamin with minerals tablet tablet Take 1 tablet by mouth Daily. (Patient not taking: Reported on 9/29/2023)      pantoprazole (Protonix) 40 MG EC tablet Take 1 tablet by mouth Daily. (Patient not taking: Reported on 9/29/2023) 30 tablet 0    sodium bicarbonate 650 MG tablet Take 1 tablet by mouth Every Morning. (Patient not taking: Reported on 9/29/2023)       No facility-administered medications prior to visit.       Review of Systems  I have reviewed 12 systems with patient. Findings were negative except what is noted below and/or in history of present illness.    Objective   Visit Vitals  /80   Pulse 84   Ht 167.6 cm (66\")   Wt 91.2 kg (201 lb)   LMP  (LMP Unknown)   SpO2 96%   BMI 32.44 kg/m²         Physical Exam  Vitals and nursing note reviewed.   Constitutional:       General: She is not in acute distress.     Appearance: She is well-developed.   HENT:      Head: Normocephalic and atraumatic.      Nose: Nose normal.   Eyes:      General:         Right eye: No discharge.         Left eye: No discharge.      Conjunctiva/sclera: Conjunctivae normal.      Pupils: Pupils are equal, round, and reactive to light.   Neck:      Thyroid: No thyromegaly.   Cardiovascular:      Rate and Rhythm: Normal rate and regular rhythm.      Heart sounds: Normal heart sounds.   Pulmonary:      Effort: Pulmonary effort is normal.      Breath sounds: Normal breath sounds.   Lymphadenopathy:      Cervical: No cervical adenopathy.   Skin:     General: Skin is warm and dry.   Neurological:      Mental Status: She is alert and oriented to person, place, and time.     Assessment & Plan   Diagnoses and all orders for this visit:    1. Acute renal failure superimposed on stage 4 chronic kidney disease, unspecified acute renal failure type (Primary)    2. Primary malignant neoplasm of lung metastatic to other site, " unspecified laterality    Continue current medications. Follow up with specialists as scheduled.  Try hard to maintain hydration.    30  minutes was spent with the patient and reviewing records, counseling and coordination of care.      No orders of the defined types were placed in this encounter.      Current outpatient and discharge medications have been reconciled for the patient.  Reviewed by: Jen Raymundo MD      Return if symptoms worsen or fail to improve, for Next scheduled follow up.

## (undated) DEVICE — CANN SMPL SOFTECH BIFLO ETCO2 A/M 7FT

## (undated) DEVICE — BITEBLOCK ENDO W/STRAP 60F A/ LF DISP